# Patient Record
Sex: FEMALE | Race: WHITE | NOT HISPANIC OR LATINO | Employment: OTHER | URBAN - NONMETROPOLITAN AREA
[De-identification: names, ages, dates, MRNs, and addresses within clinical notes are randomized per-mention and may not be internally consistent; named-entity substitution may affect disease eponyms.]

---

## 2017-02-06 ENCOUNTER — LAB (OUTPATIENT)
Dept: FAMILY MEDICINE CLINIC | Facility: CLINIC | Age: 77
End: 2017-02-06

## 2017-02-06 DIAGNOSIS — E78.00 HYPERCHOLESTEREMIA: ICD-10-CM

## 2017-02-06 DIAGNOSIS — G47.33 OSA ON CPAP: ICD-10-CM

## 2017-02-06 DIAGNOSIS — Z99.89 OSA ON CPAP: ICD-10-CM

## 2017-02-06 DIAGNOSIS — E11.9 TYPE 2 DIABETES MELLITUS WITHOUT COMPLICATION (HCC): ICD-10-CM

## 2017-02-06 LAB
ALBUMIN SERPL-MCNC: 3.9 G/DL (ref 3.2–4.8)
ALBUMIN/GLOB SERPL: 1.3 G/DL (ref 1.5–2.5)
ALP SERPL-CCNC: 63 U/L (ref 25–100)
ALT SERPL W P-5'-P-CCNC: 26 U/L (ref 7–40)
ANION GAP SERPL CALCULATED.3IONS-SCNC: 5 MMOL/L (ref 3–11)
ARTICHOKE IGE QN: 182 MG/DL (ref 0–130)
AST SERPL-CCNC: 27 U/L (ref 0–33)
BILIRUB SERPL-MCNC: 0.9 MG/DL (ref 0.3–1.2)
BUN BLD-MCNC: 11 MG/DL (ref 9–23)
BUN/CREAT SERPL: 11 (ref 7–25)
CALCIUM SPEC-SCNC: 9.6 MG/DL (ref 8.7–10.4)
CHLORIDE SERPL-SCNC: 97 MMOL/L (ref 99–109)
CHOLEST SERPL-MCNC: 294 MG/DL (ref 0–200)
CO2 SERPL-SCNC: 32 MMOL/L (ref 20–31)
CREAT BLD-MCNC: 1 MG/DL (ref 0.6–1.3)
GFR SERPL CREATININE-BSD FRML MDRD: 54 ML/MIN/1.73
GLOBULIN UR ELPH-MCNC: 2.9 GM/DL
GLUCOSE BLD-MCNC: 142 MG/DL (ref 70–100)
HBA1C MFR BLD: 7.6 % (ref 4.8–5.6)
HDLC SERPL-MCNC: 70 MG/DL (ref 40–60)
POTASSIUM BLD-SCNC: 4.5 MMOL/L (ref 3.5–5.5)
PROT SERPL-MCNC: 6.8 G/DL (ref 5.7–8.2)
SODIUM BLD-SCNC: 134 MMOL/L (ref 132–146)
T4 FREE SERPL-MCNC: 1.19 NG/DL (ref 0.89–1.76)
TRIGL SERPL-MCNC: 144 MG/DL (ref 0–150)
TSH SERPL DL<=0.05 MIU/L-ACNC: 1.77 MIU/ML (ref 0.35–5.35)
VIT B12 BLD-MCNC: 801 PG/ML (ref 211–911)

## 2017-02-06 PROCEDURE — 84443 ASSAY THYROID STIM HORMONE: CPT | Performed by: INTERNAL MEDICINE

## 2017-02-06 PROCEDURE — 80053 COMPREHEN METABOLIC PANEL: CPT | Performed by: INTERNAL MEDICINE

## 2017-02-06 PROCEDURE — 84439 ASSAY OF FREE THYROXINE: CPT | Performed by: INTERNAL MEDICINE

## 2017-02-06 PROCEDURE — 80061 LIPID PANEL: CPT | Performed by: INTERNAL MEDICINE

## 2017-02-06 PROCEDURE — 82607 VITAMIN B-12: CPT | Performed by: INTERNAL MEDICINE

## 2017-02-06 PROCEDURE — 83036 HEMOGLOBIN GLYCOSYLATED A1C: CPT | Performed by: INTERNAL MEDICINE

## 2017-02-08 ENCOUNTER — TELEPHONE (OUTPATIENT)
Dept: OBSTETRICS AND GYNECOLOGY | Facility: CLINIC | Age: 77
End: 2017-02-08

## 2017-02-08 ENCOUNTER — OFFICE VISIT (OUTPATIENT)
Dept: FAMILY MEDICINE CLINIC | Facility: CLINIC | Age: 77
End: 2017-02-08

## 2017-02-08 VITALS
BODY MASS INDEX: 31.08 KG/M2 | WEIGHT: 198 LBS | RESPIRATION RATE: 16 BRPM | OXYGEN SATURATION: 97 % | TEMPERATURE: 97.5 F | DIASTOLIC BLOOD PRESSURE: 60 MMHG | SYSTOLIC BLOOD PRESSURE: 135 MMHG | HEART RATE: 77 BPM | HEIGHT: 67 IN

## 2017-02-08 DIAGNOSIS — T46.6X5A STATIN-INDUCED MYOSITIS: ICD-10-CM

## 2017-02-08 DIAGNOSIS — E11.9 TYPE 2 DIABETES MELLITUS WITHOUT COMPLICATION, WITHOUT LONG-TERM CURRENT USE OF INSULIN (HCC): Primary | ICD-10-CM

## 2017-02-08 DIAGNOSIS — Z99.89 OSA ON CPAP: ICD-10-CM

## 2017-02-08 DIAGNOSIS — G47.33 OSA ON CPAP: ICD-10-CM

## 2017-02-08 DIAGNOSIS — M60.9 STATIN-INDUCED MYOSITIS: ICD-10-CM

## 2017-02-08 DIAGNOSIS — I10 ESSENTIAL HYPERTENSION: ICD-10-CM

## 2017-02-08 PROCEDURE — G0439 PPPS, SUBSEQ VISIT: HCPCS | Performed by: INTERNAL MEDICINE

## 2017-02-08 RX ORDER — LOVASTATIN 10 MG/1
10 TABLET ORAL NIGHTLY
Qty: 30 TABLET | Refills: 11 | Status: SHIPPED | OUTPATIENT
Start: 2017-02-08 | End: 2017-07-13 | Stop reason: SDUPTHER

## 2017-02-08 NOTE — PROGRESS NOTES
"Subjective   Patient ID: Radha Whelan is a 76 y.o. female Pt is here for management of multiple medical problems.  History of Present Illness  Pt is here for management of multiple medical problems.    Pt with work up in Dr Howard office.  Pt waiting to hear results.       Pt ran out of DMT2 meds x 1 week. Had inj in hip and knee in Dr Salgado.  Feels well.           The following portions of the patient's history were reviewed and updated as appropriate: allergies, current medications, past family history, past medical history, past social history, past surgical history and problem list.  Review of Systems   Constitutional: Positive for fatigue.   All other systems reviewed and are negative.      Objective     Visit Vitals   • /60   • Pulse 77   • Temp 97.5 °F (36.4 °C) (Oral)   • Resp 16   • Ht 67\" (170.2 cm)   • Wt 198 lb (89.8 kg)   • SpO2 97%   • BMI 31.01 kg/m2     Physical Exam  General Appearance:    Alert, cooperative, no distress, appears stated age   Head:    Normocephalic, without obvious abnormality, atraumatic   Eyes:    PERRL, conjunctiva/corneas clear, EOM's intact           Ears:    Normal TM's and external ear canals, both ears   Nose:   Nares normal, septum midline, mucosa normal, no drainage    or sinus tenderness   Throat:   Lips, mucosa, and tongue normal; teeth and gums normal   Neck:   Supple, symmetrical, trachea midline, no adenopathy;        thyroid:  No enlargement/tenderness/nodules; no carotid    bruit or JVD   Back:     Symmetric, no curvature, ROM normal, no CVA tenderness   Lungs:     Clear to auscultation bilaterally, respirations unlabored   Chest wall:    No tenderness or deformity   Heart:    Regular rate and rhythm, S1 and S2 normal, no murmur, rub   or gallop   Abdomen:     Soft, non-tender, bowel sounds active all four quadrants,     no masses, no organomegaly           Extremities:   Extremities normal, atraumatic, no cyanosis or edema   Pulses:   2+ and symmetric all " extremities   Skin:   Skin color, texture, turgor normal, no rashes or lesions   Lymph nodes:   Cervical, supraclavicular, and axillary nodes normal   Neurologic:   CNII-XII intact. Normal strength, sensation and reflexes       throughout       Assessment/Plan     Pt will try Q3day dosing of lovastatin. Pt has been on all statins and intolerant.     Pt stated walking.   Pt will get more diet complainant.         Radha was seen today for follow-up.    Diagnoses and all orders for this visit:    Type 2 diabetes mellitus without complication, without long-term current use of insulin  -     Hemoglobin A1c; Future  -     Lipid Panel; Future  -     TSH; Future  -     T4, Free; Future  -     Comprehensive Metabolic Panel; Future  -     CBC & Differential; Future  -     Vitamin B12; Future  -     MicroAlbumin, Urine, Random; Future    ODELL on CPAP  -     Hemoglobin A1c; Future  -     Lipid Panel; Future  -     TSH; Future  -     T4, Free; Future  -     Comprehensive Metabolic Panel; Future  -     CBC & Differential; Future  -     Vitamin B12; Future  -     MicroAlbumin, Urine, Random; Future    Essential hypertension  -     Hemoglobin A1c; Future  -     Lipid Panel; Future  -     TSH; Future  -     T4, Free; Future  -     Comprehensive Metabolic Panel; Future  -     CBC & Differential; Future  -     Vitamin B12; Future  -     MicroAlbumin, Urine, Random; Future    Statin-induced myositis  -     Hemoglobin A1c; Future  -     Lipid Panel; Future  -     TSH; Future  -     T4, Free; Future  -     Comprehensive Metabolic Panel; Future  -     CBC & Differential; Future  -     Vitamin B12; Future  -     MicroAlbumin, Urine, Random; Future    Other orders  -     lovastatin (MEVACOR) 10 MG tablet; Take 1 tablet by mouth Every Night.    Return in about 3 months (around 5/8/2017).                   There are no Patient Instructions on file for this visit.

## 2017-02-08 NOTE — TELEPHONE ENCOUNTER
,      This pt has stopped by office 2x for results of her US and Bx that was done back in December

## 2017-02-08 NOTE — PROGRESS NOTES
QUICK REFERENCE INFORMATION:  The ABCs of the Annual Wellness Visit    Medicare Annual Wellness Visit    Subjective   History of Present Illness    Radha Whelan is a 76 y.o. female who presents for an Annual Wellness Visit. In addition, we addressed the following health issues:as per my last note on today.          PMH, PSH, SocHx, FamHx, Allergies, and Medications: Reviewed and updated.     Outpatient Medications Prior to Visit   Medication Sig Dispense Refill   • aspirin 81 MG tablet Take  by mouth daily.     • aspirin-acetaminophen-caffeine (EXCEDRIN MIGRAINE) 250-250-65 MG per tablet Take  by mouth.     • clopidogrel (PLAVIX) 75 MG tablet Take 1 tablet by mouth Daily. 90 tablet 3   • folic acid (FOLVITE) 1 MG tablet Take 1 tablet by mouth Daily. TAKE 2 TABLETS DAILY 90 tablet 3   • furosemide (LASIX) 20 MG tablet Take  by mouth 2 (two) times a day.     • glipiZIDE (GLIPIZIDE XL) 2.5 MG 24 hr tablet Take 1 tablet by mouth Daily. 90 tablet 3   • Glucosamine-Chondroit-Vit C-Mn (GLUCOSAMINE CHONDR 1500 COMPLX PO) Take  by mouth.     • lisinopril (PRINIVIL,ZESTRIL) 40 MG tablet Take 1 tablet by mouth Daily. 90 tablet 3   • niacin (NIASPAN) 1000 MG CR tablet Take 1 tablet by mouth Every Night. 90 tablet 3   • Omega-3 Fatty Acids (FISH OIL) 1200 MG capsule capsule Take  by mouth daily.     • pantoprazole (PROTONIX) 40 MG EC tablet Take 1 tablet by mouth Daily. 30 tablet 11   • amoxicillin-clavulanate (AUGMENTIN) 875-125 MG per tablet Take 1 tablet by mouth 2 (two) times a day. 20 tablet 0   • cetirizine (ZyrTEC) 10 MG tablet Take 1 tablet by mouth daily. 30 tablet 11   • Evolocumab 140 MG/ML solution prefilled syringe Inject 140 mg under the skin every 14 (fourteen) days. 2 syringe 11     No facility-administered medications prior to visit.        Patient Active Problem List   Diagnosis   • Type 2 diabetes mellitus without complication   • Temporary cerebral vascular dysfunction   • Fatigue   • Hypertension   •  Hypervitaminosis B6   • Hyponatremia   • ODELL on CPAP   • Peripheral neuropathy   • Restless legs syndrome   • Edema   • H/O hyperlipidemia       Health Habits:  Dental Exam. up to date  Eye Exam. up to date  No exam data present  Exercise: 7 times/week.  Current exercise activities include: walking    Health Risk Assessment:  The patient has completed a Health Risk Assessment. This has been reviewed with them and has been scanned into the patient's chart.    Current Medical Providers:  Patient Care Team:  Farhan Schaefer MD as PCP - General    The [facility] providers who are involved in the care of this patient are listed above. Additional providers and suppliers are listed below:  none    Recent Hospitalizations:  No recent hospitalization(s)..    Recent Lab Results:  CMP:  Lab Results   Component Value Date    BUN 11 02/06/2017    CREATININE 1.00 02/06/2017    EGFRIFNONA 54 (L) 02/06/2017    BCR 11.0 02/06/2017     02/06/2017    K 4.5 02/06/2017    CO2 32.0 (H) 02/06/2017    CALCIUM 9.6 02/06/2017    ALBUMIN 3.90 02/06/2017    LABIL2 1.3 (L) 02/06/2017    BILITOT 0.9 02/06/2017    ALKPHOS 63 02/06/2017    AST 27 02/06/2017    ALT 26 02/06/2017       LIPID PANEL:  Lab Results   Component Value Date    TRIG 144 02/06/2017    HDL 70 (H) 02/06/2017       HbA1c:  Lab Results   Component Value Date    HGBA1C 7.60 (H) 02/06/2017       URINE MICROALBUMIN:  No results found for: MICROALBUR, POCMALB    PSA:  No results found for: PSA    Age-appropriate Screening Schedule:  Refer to the list below for future screening recommendations based on patient's age, sex and/or medical conditions. Orders for these recommended tests are listed in the plan section. The patient has been provided with a written plan.    Health Maintenance   Topic Date Due   • PNEUMOCOCCAL VACCINES (65+ LOW/MEDIUM RISK) (2 of 2 - PPSV23) 02/08/2017   • HEMOGLOBIN A1C  08/06/2017   • DIABETIC FOOT EXAM  08/08/2017   • DIABETIC EYE EXAM   "01/08/2018   • LIPID PANEL  02/06/2018   • URINE MICROALBUMIN  02/08/2018   • MAMMOGRAM  05/04/2018   • TDAP/TD VACCINES (2 - Td) 02/08/2024   • INFLUENZA VACCINE  Completed   • ZOSTER VACCINE  Addressed       Depression Screen:   No flowsheet data found.      Functional and Cognitive Screening:  No flowsheet data found.    Does the patient have evidence of cognitive impairment? No    Advanced Care Planning:  has NO advanced directive - information provided to the patient today    Identification of Risk Factors:  Risk factors include: weight  and unhealthy diet.    Review of Systems    Compared to one year ago, the patient feels his physical health is the same.  Compared to one year ago, the patient feels his mental health is the same.    Objective     Physical Exam    Vitals:    02/08/17 0957 02/08/17 1027   BP: 154/94 135/60   BP Location: Right arm    Patient Position: Sitting    Cuff Size: Adult    Pulse: 77    Resp: 16    Temp: 97.5 °F (36.4 °C)    TempSrc: Oral    SpO2: 97%    Weight: 198 lb (89.8 kg)    Height: 67\" (170.2 cm)        Body mass index is 31.01 kg/(m^2).  Discussed the patient's BMI with her. The BMI is above average; BMI management plan is completed.    Assessment/Plan   Patient Self-Management and Personalized Health Advice  The patient has been provided with information about: diet and exercise and preventive services including:   · Advanced directives: has NO advanced directive - not interested in additional information.    Visit Diagnoses:    ICD-10-CM ICD-9-CM   1. Type 2 diabetes mellitus without complication, without long-term current use of insulin E11.9 250.00   2. ODELL on CPAP G47.33 327.23   3. Essential hypertension I10 401.9   4. Statin-induced myositis M60.9 729.1    T65.6X1A E980.4       Orders Placed This Encounter   Procedures   • Hemoglobin A1c     Standing Status:   Future     Standing Expiration Date:   8/10/2018   • Lipid Panel     Standing Status:   Future     Standing " Expiration Date:   2/8/2018   • TSH     Standing Status:   Future     Standing Expiration Date:   8/27/2018   • T4, Free     Standing Status:   Future     Standing Expiration Date:   8/27/2018   • Comprehensive Metabolic Panel     Standing Status:   Future     Standing Expiration Date:   8/27/2018   • Vitamin B12     Standing Status:   Future     Standing Expiration Date:   8/10/2018   • MicroAlbumin, Urine, Random     Standing Status:   Future     Standing Expiration Date:   8/27/2018       Outpatient Encounter Prescriptions as of 2/8/2017   Medication Sig Dispense Refill   • aspirin 81 MG tablet Take  by mouth daily.     • aspirin-acetaminophen-caffeine (EXCEDRIN MIGRAINE) 250-250-65 MG per tablet Take  by mouth.     • clopidogrel (PLAVIX) 75 MG tablet Take 1 tablet by mouth Daily. 90 tablet 3   • folic acid (FOLVITE) 1 MG tablet Take 1 tablet by mouth Daily. TAKE 2 TABLETS DAILY 90 tablet 3   • furosemide (LASIX) 20 MG tablet Take  by mouth 2 (two) times a day.     • glipiZIDE (GLIPIZIDE XL) 2.5 MG 24 hr tablet Take 1 tablet by mouth Daily. 90 tablet 3   • Glucosamine-Chondroit-Vit C-Mn (GLUCOSAMINE CHONDR 1500 COMPLX PO) Take  by mouth.     • lisinopril (PRINIVIL,ZESTRIL) 40 MG tablet Take 1 tablet by mouth Daily. 90 tablet 3   • niacin (NIASPAN) 1000 MG CR tablet Take 1 tablet by mouth Every Night. 90 tablet 3   • Omega-3 Fatty Acids (FISH OIL) 1200 MG capsule capsule Take  by mouth daily.     • pantoprazole (PROTONIX) 40 MG EC tablet Take 1 tablet by mouth Daily. 30 tablet 11   • lovastatin (MEVACOR) 10 MG tablet Take 1 tablet by mouth Every Night. 30 tablet 11   • [DISCONTINUED] amoxicillin-clavulanate (AUGMENTIN) 875-125 MG per tablet Take 1 tablet by mouth 2 (two) times a day. 20 tablet 0   • [DISCONTINUED] cetirizine (ZyrTEC) 10 MG tablet Take 1 tablet by mouth daily. 30 tablet 11   • [DISCONTINUED] Evolocumab 140 MG/ML solution prefilled syringe Inject 140 mg under the skin every 14 (fourteen) days. 2  syringe 11     No facility-administered encounter medications on file as of 2/8/2017.        Reviewed use of high risk medication in the elderly: yes  Reviewed for potential of harmful drug interactions in the elderly: yes    Follow Up:  Return in about 3 months (around 5/8/2017).     An After Visit Summary and PPPS with all of these plans were given to the patient.            Radha was seen today for follow-up.    Diagnoses and all orders for this visit:    Type 2 diabetes mellitus without complication, without long-term current use of insulin  -     Hemoglobin A1c; Future  -     Lipid Panel; Future  -     TSH; Future  -     T4, Free; Future  -     Comprehensive Metabolic Panel; Future  -     CBC & Differential; Future  -     Vitamin B12; Future  -     MicroAlbumin, Urine, Random; Future    ODELL on CPAP  -     Hemoglobin A1c; Future  -     Lipid Panel; Future  -     TSH; Future  -     T4, Free; Future  -     Comprehensive Metabolic Panel; Future  -     CBC & Differential; Future  -     Vitamin B12; Future  -     MicroAlbumin, Urine, Random; Future    Essential hypertension  -     Hemoglobin A1c; Future  -     Lipid Panel; Future  -     TSH; Future  -     T4, Free; Future  -     Comprehensive Metabolic Panel; Future  -     CBC & Differential; Future  -     Vitamin B12; Future  -     MicroAlbumin, Urine, Random; Future    Statin-induced myositis  -     Hemoglobin A1c; Future  -     Lipid Panel; Future  -     TSH; Future  -     T4, Free; Future  -     Comprehensive Metabolic Panel; Future  -     CBC & Differential; Future  -     Vitamin B12; Future  -     MicroAlbumin, Urine, Random; Future    Other orders  -     lovastatin (MEVACOR) 10 MG tablet; Take 1 tablet by mouth Every Night.

## 2017-02-08 NOTE — TELEPHONE ENCOUNTER
----- Message from Sandra A Locker sent at 2/8/2017 10:53 AM EST -----  Contact: PATIENT  Patient would like a callback with ultrasound and biopsy results when possible. Patient sees Dr. Howard.

## 2017-02-21 RX ORDER — FUROSEMIDE 20 MG/1
TABLET ORAL
Qty: 180 TABLET | Refills: 3 | Status: SHIPPED | OUTPATIENT
Start: 2017-02-21 | End: 2018-02-11 | Stop reason: SDUPTHER

## 2017-03-06 RX ORDER — GLIPIZIDE 2.5 MG/1
TABLET, EXTENDED RELEASE ORAL
Qty: 30 TABLET | Refills: 0 | Status: ON HOLD | OUTPATIENT
Start: 2017-03-06 | End: 2019-12-19 | Stop reason: HOSPADM

## 2017-05-03 ENCOUNTER — OFFICE VISIT (OUTPATIENT)
Dept: FAMILY MEDICINE CLINIC | Facility: CLINIC | Age: 77
End: 2017-05-03

## 2017-05-03 ENCOUNTER — LAB (OUTPATIENT)
Dept: FAMILY MEDICINE CLINIC | Facility: CLINIC | Age: 77
End: 2017-05-03

## 2017-05-03 VITALS
SYSTOLIC BLOOD PRESSURE: 151 MMHG | RESPIRATION RATE: 16 BRPM | OXYGEN SATURATION: 100 % | WEIGHT: 195 LBS | TEMPERATURE: 98 F | HEIGHT: 67 IN | DIASTOLIC BLOOD PRESSURE: 67 MMHG | HEART RATE: 60 BPM | BODY MASS INDEX: 30.61 KG/M2

## 2017-05-03 DIAGNOSIS — G47.33 OSA ON CPAP: ICD-10-CM

## 2017-05-03 DIAGNOSIS — I10 ESSENTIAL HYPERTENSION: ICD-10-CM

## 2017-05-03 DIAGNOSIS — G89.29 CHRONIC PAIN OF LEFT KNEE: Primary | ICD-10-CM

## 2017-05-03 DIAGNOSIS — T46.6X5A STATIN-INDUCED MYOSITIS: ICD-10-CM

## 2017-05-03 DIAGNOSIS — M25.562 CHRONIC PAIN OF LEFT KNEE: Primary | ICD-10-CM

## 2017-05-03 DIAGNOSIS — E11.9 TYPE 2 DIABETES MELLITUS WITHOUT COMPLICATION, WITHOUT LONG-TERM CURRENT USE OF INSULIN (HCC): ICD-10-CM

## 2017-05-03 DIAGNOSIS — M60.9 STATIN-INDUCED MYOSITIS: ICD-10-CM

## 2017-05-03 DIAGNOSIS — Z99.89 OSA ON CPAP: ICD-10-CM

## 2017-05-03 PROCEDURE — 20610 DRAIN/INJ JOINT/BURSA W/O US: CPT | Performed by: INTERNAL MEDICINE

## 2017-05-03 RX ORDER — TRIAMCINOLONE ACETONIDE 40 MG/ML
40 INJECTION, SUSPENSION INTRA-ARTICULAR; INTRAMUSCULAR ONCE
Status: COMPLETED | OUTPATIENT
Start: 2017-05-03 | End: 2017-05-03

## 2017-05-03 RX ADMIN — TRIAMCINOLONE ACETONIDE 40 MG: 40 INJECTION, SUSPENSION INTRA-ARTICULAR; INTRAMUSCULAR at 11:47

## 2017-05-04 LAB
ALBUMIN SERPL-MCNC: 4.2 G/DL (ref 3.5–5)
ALBUMIN/GLOB SERPL: 1.3 G/DL (ref 1–2)
ALP SERPL-CCNC: 65 U/L (ref 38–126)
ALT SERPL-CCNC: 35 U/L (ref 13–69)
AST SERPL-CCNC: 29 U/L (ref 15–46)
BASOPHILS # BLD AUTO: 0.02 10*3/MM3 (ref 0–0.2)
BASOPHILS NFR BLD AUTO: 0.5 % (ref 0–2.5)
BILIRUB SERPL-MCNC: 1.3 MG/DL (ref 0.2–1.3)
BUN SERPL-MCNC: 19 MG/DL (ref 7–20)
BUN/CREAT SERPL: 19 (ref 7.1–23.5)
CALCIUM SERPL-MCNC: 9.5 MG/DL (ref 8.4–10.2)
CHLORIDE SERPL-SCNC: 101 MMOL/L (ref 98–107)
CHOLEST SERPL-MCNC: 247 MG/DL (ref 0–199)
CO2 SERPL-SCNC: 29 MMOL/L (ref 26–30)
CREAT SERPL-MCNC: 1 MG/DL (ref 0.6–1.3)
EOSINOPHIL # BLD AUTO: 0.15 10*3/MM3 (ref 0–0.7)
EOSINOPHIL NFR BLD AUTO: 3.8 % (ref 0–7)
ERYTHROCYTE [DISTWIDTH] IN BLOOD BY AUTOMATED COUNT: 14 % (ref 11.5–14.5)
GLOBULIN SER CALC-MCNC: 3.2 GM/DL
GLUCOSE SERPL-MCNC: 112 MG/DL (ref 74–98)
HBA1C MFR BLD: 6.7 %
HCT VFR BLD AUTO: 40 % (ref 37–47)
HDLC SERPL-MCNC: 86 MG/DL (ref 40–60)
HGB BLD-MCNC: 13.2 G/DL (ref 12–16)
IMM GRANULOCYTES # BLD: 0.03 10*3/MM3 (ref 0–0.06)
IMM GRANULOCYTES NFR BLD: 0.8 % (ref 0–0.6)
LDLC SERPL CALC-MCNC: 145 MG/DL (ref 0–99)
LYMPHOCYTES # BLD AUTO: 1.88 10*3/MM3 (ref 0.6–3.4)
LYMPHOCYTES NFR BLD AUTO: 47.6 % (ref 10–50)
MCH RBC QN AUTO: 31.6 PG (ref 27–31)
MCHC RBC AUTO-ENTMCNC: 33 G/DL (ref 30–37)
MCV RBC AUTO: 95.7 FL (ref 81–99)
MICROALBUMIN UR-MCNC: <3 UG/ML
MONOCYTES # BLD AUTO: 0.34 10*3/MM3 (ref 0–0.9)
MONOCYTES NFR BLD AUTO: 8.6 % (ref 0–12)
NEUTROPHILS # BLD AUTO: 1.53 10*3/MM3 (ref 2–6.9)
NEUTROPHILS NFR BLD AUTO: 38.7 % (ref 37–80)
NRBC BLD AUTO-RTO: 0 /100 WBC (ref 0–0)
PLATELET # BLD AUTO: 158 10*3/MM3 (ref 130–400)
POTASSIUM SERPL-SCNC: 4.5 MMOL/L (ref 3.5–5.1)
PROT SERPL-MCNC: 7.4 G/DL (ref 6.3–8.2)
RBC # BLD AUTO: 4.18 10*6/MM3 (ref 4.2–5.4)
SODIUM SERPL-SCNC: 139 MMOL/L (ref 137–145)
T4 FREE SERPL-MCNC: 1.08 NG/DL (ref 0.78–2.19)
TRIGL SERPL-MCNC: 80 MG/DL
TSH SERPL DL<=0.005 MIU/L-ACNC: 1.49 MIU/ML (ref 0.47–4.68)
VIT B12 SERPL-MCNC: 470 PG/ML (ref 239–931)
VLDLC SERPL CALC-MCNC: 16 MG/DL
WBC # BLD AUTO: 3.95 10*3/MM3 (ref 4.8–10.8)

## 2017-05-09 ENCOUNTER — OFFICE VISIT (OUTPATIENT)
Dept: FAMILY MEDICINE CLINIC | Facility: CLINIC | Age: 77
End: 2017-05-09

## 2017-05-09 VITALS
HEIGHT: 67 IN | DIASTOLIC BLOOD PRESSURE: 73 MMHG | BODY MASS INDEX: 29.98 KG/M2 | OXYGEN SATURATION: 98 % | HEART RATE: 67 BPM | TEMPERATURE: 97.4 F | SYSTOLIC BLOOD PRESSURE: 139 MMHG | WEIGHT: 191 LBS

## 2017-05-09 DIAGNOSIS — M60.9 STATIN-INDUCED MYOSITIS: ICD-10-CM

## 2017-05-09 DIAGNOSIS — E78.00 HYPERCHOLESTEREMIA: ICD-10-CM

## 2017-05-09 DIAGNOSIS — T46.6X5A STATIN-INDUCED MYOSITIS: ICD-10-CM

## 2017-05-09 DIAGNOSIS — E11.9 TYPE 2 DIABETES MELLITUS WITHOUT COMPLICATION, WITHOUT LONG-TERM CURRENT USE OF INSULIN (HCC): Primary | ICD-10-CM

## 2017-05-09 DIAGNOSIS — I10 ESSENTIAL HYPERTENSION: ICD-10-CM

## 2017-05-09 PROCEDURE — 99213 OFFICE O/P EST LOW 20 MIN: CPT | Performed by: INTERNAL MEDICINE

## 2017-07-13 ENCOUNTER — TELEPHONE (OUTPATIENT)
Dept: FAMILY MEDICINE CLINIC | Facility: CLINIC | Age: 77
End: 2017-07-13

## 2017-07-13 ENCOUNTER — PATIENT MESSAGE (OUTPATIENT)
Dept: FAMILY MEDICINE CLINIC | Facility: CLINIC | Age: 77
End: 2017-07-13

## 2017-07-13 RX ORDER — LOVASTATIN 10 MG/1
10 TABLET ORAL NIGHTLY
Qty: 90 TABLET | Refills: 3 | Status: SHIPPED | OUTPATIENT
Start: 2017-07-13 | End: 2018-06-04 | Stop reason: SDUPTHER

## 2017-07-13 NOTE — TELEPHONE ENCOUNTER
----- Message from Radha Tip sent at 7/13/2017 11:06 AM EDT -----  Regarding: Visit Follow-Up Question  Contact: [phone]  I need a 90 day prescription for Lovastatin  10mg. from [facility] mail order. I was trying it for 30 days. I am doing ok now with it.         Thanks.

## 2017-07-16 ENCOUNTER — PATIENT MESSAGE (OUTPATIENT)
Dept: FAMILY MEDICINE CLINIC | Facility: CLINIC | Age: 77
End: 2017-07-16

## 2017-08-02 ENCOUNTER — TRANSCRIBE ORDERS (OUTPATIENT)
Dept: ADMINISTRATIVE | Facility: HOSPITAL | Age: 77
End: 2017-08-02

## 2017-08-02 DIAGNOSIS — Z13.9 SCREENING: Primary | ICD-10-CM

## 2017-08-25 ENCOUNTER — HOSPITAL ENCOUNTER (OUTPATIENT)
Dept: MAMMOGRAPHY | Facility: HOSPITAL | Age: 77
Discharge: HOME | End: 2017-08-25
Attending: INTERNAL MEDICINE | Admitting: INTERNAL MEDICINE

## 2017-08-25 DIAGNOSIS — Z13.9 SCREENING: ICD-10-CM

## 2017-08-25 PROCEDURE — 77063 BREAST TOMOSYNTHESIS BI: CPT

## 2017-08-25 PROCEDURE — G0202 SCR MAMMO BI INCL CAD: HCPCS

## 2017-09-20 ENCOUNTER — CLINICAL SUPPORT (OUTPATIENT)
Dept: FAMILY MEDICINE CLINIC | Facility: CLINIC | Age: 77
End: 2017-09-20

## 2017-09-20 DIAGNOSIS — Z23 NEED FOR INFLUENZA VACCINATION: Primary | ICD-10-CM

## 2017-09-20 PROCEDURE — G0008 ADMIN INFLUENZA VIRUS VAC: HCPCS | Performed by: INTERNAL MEDICINE

## 2017-09-20 PROCEDURE — 90662 IIV NO PRSV INCREASED AG IM: CPT | Performed by: INTERNAL MEDICINE

## 2017-09-20 RX ORDER — GLIPIZIDE 2.5 MG/1
TABLET, EXTENDED RELEASE ORAL
Qty: 90 TABLET | Refills: 3 | Status: SHIPPED | OUTPATIENT
Start: 2017-09-20 | End: 2018-11-16

## 2017-09-22 RX ORDER — PANTOPRAZOLE SODIUM 40 MG/1
40 TABLET, DELAYED RELEASE ORAL DAILY
Qty: 30 TABLET | Refills: 11 | Status: SHIPPED | OUTPATIENT
Start: 2017-09-22 | End: 2018-09-04 | Stop reason: SDUPTHER

## 2017-10-25 DIAGNOSIS — I10 ESSENTIAL HYPERTENSION: ICD-10-CM

## 2017-10-25 RX ORDER — LISINOPRIL 40 MG/1
TABLET ORAL
Qty: 90 TABLET | Refills: 3 | Status: SHIPPED | OUTPATIENT
Start: 2017-10-25 | End: 2018-10-09 | Stop reason: SDUPTHER

## 2017-11-09 LAB
ALBUMIN SERPL-MCNC: 3.6 G/DL (ref 3.5–5)
ALBUMIN/GLOB SERPL: 1.3 G/DL (ref 1–2)
ALP SERPL-CCNC: 60 U/L (ref 38–126)
ALT SERPL-CCNC: 33 U/L (ref 13–69)
AST SERPL-CCNC: 20 U/L (ref 15–46)
BASOPHILS # BLD AUTO: 0.01 10*3/MM3 (ref 0–0.2)
BASOPHILS NFR BLD AUTO: 0.2 % (ref 0–2.5)
BILIRUB SERPL-MCNC: 0.9 MG/DL (ref 0.2–1.3)
BUN SERPL-MCNC: 39 MG/DL (ref 7–20)
BUN/CREAT SERPL: 39 (ref 7.1–23.5)
CALCIUM SERPL-MCNC: 8.9 MG/DL (ref 8.4–10.2)
CHLORIDE SERPL-SCNC: 98 MMOL/L (ref 98–107)
CO2 SERPL-SCNC: 33 MMOL/L (ref 26–30)
CREAT SERPL-MCNC: 1 MG/DL (ref 0.6–1.3)
EOSINOPHIL # BLD AUTO: 0.07 10*3/MM3 (ref 0–0.7)
EOSINOPHIL NFR BLD AUTO: 1.3 % (ref 0–7)
ERYTHROCYTE [DISTWIDTH] IN BLOOD BY AUTOMATED COUNT: 14.6 % (ref 11.5–14.5)
GFR SERPLBLD CREATININE-BSD FMLA CKD-EPI: 54 ML/MIN/1.73
GFR SERPLBLD CREATININE-BSD FMLA CKD-EPI: 65 ML/MIN/1.73
GLOBULIN SER CALC-MCNC: 2.8 GM/DL
GLUCOSE SERPL-MCNC: 154 MG/DL (ref 74–98)
HBA1C MFR BLD: 6.9 %
HCT VFR BLD AUTO: 38.9 % (ref 37–47)
HGB BLD-MCNC: 12.5 G/DL (ref 12–16)
IMM GRANULOCYTES # BLD: 0.04 10*3/MM3 (ref 0–0.06)
IMM GRANULOCYTES NFR BLD: 0.7 % (ref 0–0.6)
LYMPHOCYTES # BLD AUTO: 1.9 10*3/MM3 (ref 0.6–3.4)
LYMPHOCYTES NFR BLD AUTO: 34.2 % (ref 10–50)
MCH RBC QN AUTO: 30.4 PG (ref 27–31)
MCHC RBC AUTO-ENTMCNC: 32.1 G/DL (ref 30–37)
MCV RBC AUTO: 94.6 FL (ref 81–99)
MONOCYTES # BLD AUTO: 0.43 10*3/MM3 (ref 0–0.9)
MONOCYTES NFR BLD AUTO: 7.7 % (ref 0–12)
NEUTROPHILS # BLD AUTO: 3.11 10*3/MM3 (ref 2–6.9)
NEUTROPHILS NFR BLD AUTO: 55.9 % (ref 37–80)
NRBC BLD AUTO-RTO: 0 /100 WBC (ref 0–0)
PLATELET # BLD AUTO: 164 10*3/MM3 (ref 130–400)
POTASSIUM SERPL-SCNC: 4.5 MMOL/L (ref 3.5–5.1)
PROT SERPL-MCNC: 6.4 G/DL (ref 6.3–8.2)
RBC # BLD AUTO: 4.11 10*6/MM3 (ref 4.2–5.4)
SODIUM SERPL-SCNC: 140 MMOL/L (ref 137–145)
T4 FREE SERPL-MCNC: 1.08 NG/DL (ref 0.78–2.19)
TSH SERPL DL<=0.005 MIU/L-ACNC: 1.01 MIU/ML (ref 0.47–4.68)
VIT B12 SERPL-MCNC: 388 PG/ML (ref 239–931)
WBC # BLD AUTO: 5.56 10*3/MM3 (ref 4.8–10.8)

## 2017-11-14 ENCOUNTER — OFFICE VISIT (OUTPATIENT)
Dept: FAMILY MEDICINE CLINIC | Facility: CLINIC | Age: 77
End: 2017-11-14

## 2017-11-14 VITALS
HEART RATE: 58 BPM | BODY MASS INDEX: 28.88 KG/M2 | WEIGHT: 184 LBS | TEMPERATURE: 97.4 F | DIASTOLIC BLOOD PRESSURE: 68 MMHG | RESPIRATION RATE: 16 BRPM | OXYGEN SATURATION: 100 % | HEIGHT: 67 IN | SYSTOLIC BLOOD PRESSURE: 127 MMHG

## 2017-11-14 DIAGNOSIS — E11.9 TYPE 2 DIABETES MELLITUS WITHOUT COMPLICATION, WITHOUT LONG-TERM CURRENT USE OF INSULIN (HCC): ICD-10-CM

## 2017-11-14 DIAGNOSIS — Z99.89 OSA ON CPAP: ICD-10-CM

## 2017-11-14 DIAGNOSIS — I10 ESSENTIAL HYPERTENSION: Primary | ICD-10-CM

## 2017-11-14 DIAGNOSIS — I67.82 TEMPORARY CEREBRAL VASCULAR DYSFUNCTION: ICD-10-CM

## 2017-11-14 DIAGNOSIS — G47.33 OSA ON CPAP: ICD-10-CM

## 2017-11-14 PROCEDURE — 99214 OFFICE O/P EST MOD 30 MIN: CPT | Performed by: INTERNAL MEDICINE

## 2017-11-14 RX ORDER — CLOPIDOGREL BISULFATE 75 MG/1
75 TABLET ORAL DAILY
Qty: 90 TABLET | Refills: 3 | Status: SHIPPED | OUTPATIENT
Start: 2017-11-14 | End: 2018-02-22 | Stop reason: SDUPTHER

## 2017-11-14 NOTE — PROGRESS NOTES
Subjective     Patient ID: Radha Whelan is a 77 y.o. female. Patient is here for management of multiple medical problems.     Chief Complaint   Patient presents with   • Diabetes Mellitus     follow-up   • URI     runny nose and dry cough, daughter diagnosed with Flu      Diabetes Mellitus   This is a chronic problem. The current episode started today. The problem occurs constantly. The problem has been gradually improving. Associated symptoms include swollen glands. Pertinent negatives include no abdominal pain, anorexia, arthralgias, change in bowel habit or chills. Nothing aggravates the symptoms. The treatment provided moderate relief.   URI    This is a new problem. The current episode started in the past 7 days. There has been no fever. Associated symptoms include rhinorrhea and swollen glands. Pertinent negatives include no abdominal pain. The treatment provided no relief.    + flu exposure. Pt had flu vac. Exposed on sat.  Pt thinks she was expose on the plane.       Pt just had hip inj. bs getting better after that.     Some cramps on cholest meds.     Skin ca on nose followed by derm.               The following portions of the patient's history were reviewed and updated as appropriate: allergies, current medications, past family history, past medical history, past social history, past surgical history and problem list.    Review of Systems   Constitutional: Negative for chills.   HENT: Positive for rhinorrhea.    Gastrointestinal: Negative for abdominal pain, anorexia and change in bowel habit.   Musculoskeletal: Negative for arthralgias.       Current Outpatient Prescriptions:   •  aspirin 81 MG tablet, Take  by mouth daily., Disp: , Rfl:   •  aspirin-acetaminophen-caffeine (EXCEDRIN MIGRAINE) 250-250-65 MG per tablet, Take  by mouth., Disp: , Rfl:   •  folic acid (FOLVITE) 1 MG tablet, Take 1 tablet by mouth Daily. TAKE 2 TABLETS DAILY, Disp: 90 tablet, Rfl: 3  •  furosemide (LASIX) 20 MG tablet, TAKE 1  "TABLET TWICE A DAY, Disp: 180 tablet, Rfl: 3  •  GLIPIZIDE XL 2.5 MG 24 hr tablet, TAKE 1 TABLET DAILY IN THE MORNING BEFORE BREAKFAST, Disp: 90 tablet, Rfl: 3  •  Glucosamine-Chondroit-Vit C-Mn (GLUCOSAMINE CHONDR 1500 COMPLX PO), Take  by mouth., Disp: , Rfl:   •  lisinopril (PRINIVIL,ZESTRIL) 40 MG tablet, TAKE 1 TABLET DAILY, Disp: 90 tablet, Rfl: 3  •  lovastatin (MEVACOR) 10 MG tablet, Take 1 tablet by mouth Every Night., Disp: 90 tablet, Rfl: 3  •  niacin (NIASPAN) 1000 MG CR tablet, Take 1 tablet by mouth Every Night., Disp: 90 tablet, Rfl: 3  •  pantoprazole (PROTONIX) 40 MG EC tablet, Take 1 tablet by mouth Daily., Disp: 30 tablet, Rfl: 11  •  clopidogrel (PLAVIX) 75 MG tablet, Take 1 tablet by mouth Daily., Disp: 90 tablet, Rfl: 3  •  Omega-3 Fatty Acids (FISH OIL) 1200 MG capsule capsule, Take  by mouth daily., Disp: , Rfl:     Objective      Blood pressure 127/68, pulse 58, temperature 97.4 °F (36.3 °C), temperature source Temporal Artery , resp. rate 16, height 67\" (170.2 cm), weight 184 lb (83.5 kg), SpO2 100 %.    Physical Exam    Radha had a diabetic foot exam performed today.   During the foot exam she had a monofilament test performed.    Neurological Sensory Findings - Unaltered hot/cold right ankle/foot discrimination and unaltered hot/cold left ankle/foot discrimination. Unaltered sharp/dull right ankle/foot discrimination and unaltered sharp/dull left ankle/foot discrimination. No right ankle/foot altered proprioception and no left ankle/foot altered proprioception    Vascular Status -  Her exam exhibits right foot vasculature normal. Her exam exhibits left foot vasculature normal. Her exam exhibits no left foot edema.   Skin Integrity  -  Her right foot skin is intact.     Radha 's left foot skin is intact. .       General Appearance:    Alert, cooperative, no distress, appears stated age   Head:    Normocephalic, without obvious abnormality, atraumatic   Eyes:    PERRL, conjunctiva/corneas " clear, EOM's intact   Ears:    Normal TM's and external ear canals, both ears   Nose:   Nares normal, septum midline, mucosa normal, no drainage   or sinus tenderness   Throat:   Lips, mucosa, and tongue normal; teeth and gums normal   Neck:   Supple, symmetrical, trachea midline, no adenopathy;        thyroid:  No enlargement/tenderness/nodules; no carotid    bruit or JVD   Back:     Symmetric, no curvature, ROM normal, no CVA tenderness   Lungs:     Clear to auscultation bilaterally, respirations unlabored   Chest wall:    No tenderness or deformity   Heart:    Regular rate and rhythm, S1 and S2 normal, no murmur,        rub or gallop   Abdomen:     Soft, non-tender, bowel sounds active all four quadrants,     no masses, no organomegaly   Extremities:   Extremities normal, atraumatic, no cyanosis or edema   Pulses:   2+ and symmetric all extremities   Skin:   Skin color, texture, turgor normal, no rashes or lesions   Lymph nodes:   Cervical, supraclavicular, and axillary nodes normal   Neurologic:   CNII-XII intact. Normal strength, sensation and reflexes       throughout      Results for orders placed or performed in visit on 05/09/17   TSH   Result Value Ref Range    TSH 1.010 0.470 - 4.680 mIU/mL   T4, Free   Result Value Ref Range    Free T4 1.08 0.78 - 2.19 ng/dL   Vitamin B12   Result Value Ref Range    Vitamin B-12 388 239 - 931 pg/mL   Comprehensive Metabolic Panel   Result Value Ref Range    Glucose 154 (H) 74 - 98 mg/dL    BUN 39 (H) 7 - 20 mg/dL    Creatinine 1.00 0.60 - 1.30 mg/dL    eGFR Non African Am 54 (L) >60 mL/min/1.73    eGFR African Am 65 >60 mL/min/1.73    BUN/Creatinine Ratio 39.0 (H) 7.1 - 23.5    Sodium 140 137 - 145 mmol/L    Potassium 4.5 3.5 - 5.1 mmol/L    Chloride 98 98 - 107 mmol/L    Total CO2 33.0 (H) 26.0 - 30.0 mmol/L    Calcium 8.9 8.4 - 10.2 mg/dL    Total Protein 6.4 6.3 - 8.2 g/dL    Albumin 3.60 3.50 - 5.00 g/dL    Globulin 2.8 gm/dL    A/G Ratio 1.3 1.0 - 2.0 g/dL    Total  Bilirubin 0.9 0.2 - 1.3 mg/dL    Alkaline Phosphatase 60 38 - 126 U/L    AST (SGOT) 20 15 - 46 U/L    ALT (SGPT) 33 13 - 69 U/L   Hemoglobin A1c   Result Value Ref Range    Hemoglobin A1C 6.90 %   CBC & Differential   Result Value Ref Range    WBC 5.56 4.80 - 10.80 10*3/mm3    RBC 4.11 (L) 4.20 - 5.40 10*6/mm3    Hemoglobin 12.5 12.0 - 16.0 g/dL    Hematocrit 38.9 37.0 - 47.0 %    MCV 94.6 81.0 - 99.0 fL    MCH 30.4 27.0 - 31.0 pg    MCHC 32.1 30.0 - 37.0 g/dL    RDW 14.6 (H) 11.5 - 14.5 %    Platelets 164 130 - 400 10*3/mm3    Neutrophil Rel % 55.9 37.0 - 80.0 %    Lymphocyte Rel % 34.2 10.0 - 50.0 %    Monocyte Rel % 7.7 0.0 - 12.0 %    Eosinophil Rel % 1.3 0.0 - 7.0 %    Basophil Rel % 0.2 0.0 - 2.5 %    Neutrophils Absolute 3.11 2.00 - 6.90 10*3/mm3    Lymphocytes Absolute 1.90 0.60 - 3.40 10*3/mm3    Monocytes Absolute 0.43 0.00 - 0.90 10*3/mm3    Eosinophils Absolute 0.07 0.00 - 0.70 10*3/mm3    Basophils Absolute 0.01 0.00 - 0.20 10*3/mm3    Immature Granulocyte Rel % 0.7 (H) 0.0 - 0.6 %    Immature Grans Absolute 0.04 0.00 - 0.06 10*3/mm3    nRBC 0.0 0.0 - 0.0 /100 WBC         Assessment/Plan       Radha was seen today for diabetes mellitus and uri.    Diagnoses and all orders for this visit:    Essential hypertension  -     Vitamin B12  -     TSH  -     T4, Free  -     Comprehensive Metabolic Panel  -     CBC & Differential  -     Lipid Panel  -     Hemoglobin A1c  -     MicroAlbumin, Urine, Random - Urine, Clean Catch    Type 2 diabetes mellitus without complication, without long-term current use of insulin  -     Vitamin B12  -     TSH  -     T4, Free  -     Comprehensive Metabolic Panel  -     CBC & Differential  -     Lipid Panel  -     Hemoglobin A1c  -     MicroAlbumin, Urine, Random - Urine, Clean Catch    ODELL on CPAP  -     Vitamin B12  -     TSH  -     T4, Free  -     Comprehensive Metabolic Panel  -     CBC & Differential  -     Lipid Panel  -     Hemoglobin A1c  -     MicroAlbumin, Urine, Random -  Urine, Clean Catch      Return in about 6 months (around 5/14/2018).          There are no Patient Instructions on file for this visit.     Farhan Schaefer MD    Assessment/Plan

## 2017-11-16 DIAGNOSIS — E11.8 TYPE 2 DIABETES MELLITUS WITH COMPLICATION, WITHOUT LONG-TERM CURRENT USE OF INSULIN (HCC): Primary | ICD-10-CM

## 2017-11-16 RX ORDER — DIPHENHYDRAMINE HYDROCHLORIDE 25 MG/1
1 CAPSULE, LIQUID FILLED ORAL ONCE
Qty: 1 EACH | Refills: 0 | Status: SHIPPED | OUTPATIENT
Start: 2017-11-16 | End: 2017-11-16

## 2017-11-16 NOTE — TELEPHONE ENCOUNTER
Patient's diabetic testing machine has stopped working.  New machine and strip prescription sent to [facility].  Patient notified.

## 2017-11-21 RX ORDER — CLOPIDOGREL BISULFATE 75 MG/1
TABLET ORAL
Qty: 30 TABLET | Refills: 11 | OUTPATIENT
Start: 2017-11-21

## 2017-11-21 NOTE — TELEPHONE ENCOUNTER
Dr. Schaefer, I talked to Radha, she is still taking the Plavix, Rx was sent to [facility].on 11/14/17.

## 2017-12-06 DIAGNOSIS — I67.82 TEMPORARY CEREBRAL VASCULAR DYSFUNCTION: ICD-10-CM

## 2017-12-07 RX ORDER — CLOPIDOGREL BISULFATE 75 MG/1
TABLET ORAL
Qty: 90 TABLET | Refills: 3 | Status: SHIPPED | OUTPATIENT
Start: 2017-12-07 | End: 2019-05-23 | Stop reason: SDUPTHER

## 2017-12-21 DIAGNOSIS — Z86.39 H/O HYPERLIPIDEMIA: ICD-10-CM

## 2017-12-21 RX ORDER — NIACIN 1000 MG/1
TABLET, EXTENDED RELEASE ORAL
Qty: 90 TABLET | Refills: 3 | Status: SHIPPED | OUTPATIENT
Start: 2017-12-21 | End: 2018-11-09 | Stop reason: SDUPTHER

## 2017-12-29 RX ORDER — FOLIC ACID 1 MG/1
TABLET ORAL
Qty: 90 TABLET | Refills: 3 | Status: SHIPPED | OUTPATIENT
Start: 2017-12-29 | End: 2018-12-26 | Stop reason: SDUPTHER

## 2018-02-12 RX ORDER — FUROSEMIDE 20 MG/1
TABLET ORAL
Qty: 180 TABLET | Refills: 1 | Status: SHIPPED | OUTPATIENT
Start: 2018-02-12 | End: 2018-08-16 | Stop reason: SDUPTHER

## 2018-02-22 ENCOUNTER — OFFICE VISIT (OUTPATIENT)
Dept: INTERNAL MEDICINE | Facility: CLINIC | Age: 78
End: 2018-02-22

## 2018-02-22 VITALS
TEMPERATURE: 97.4 F | OXYGEN SATURATION: 100 % | SYSTOLIC BLOOD PRESSURE: 151 MMHG | HEIGHT: 67 IN | RESPIRATION RATE: 16 BRPM | WEIGHT: 194 LBS | DIASTOLIC BLOOD PRESSURE: 62 MMHG | HEART RATE: 56 BPM | BODY MASS INDEX: 30.45 KG/M2

## 2018-02-22 DIAGNOSIS — G47.33 OSA ON CPAP: ICD-10-CM

## 2018-02-22 DIAGNOSIS — Z99.89 OSA ON CPAP: ICD-10-CM

## 2018-02-22 DIAGNOSIS — Z86.39 H/O HYPERLIPIDEMIA: ICD-10-CM

## 2018-02-22 DIAGNOSIS — E11.9 TYPE 2 DIABETES MELLITUS WITHOUT COMPLICATION, WITHOUT LONG-TERM CURRENT USE OF INSULIN (HCC): Primary | ICD-10-CM

## 2018-02-22 DIAGNOSIS — R79.89 LOW VITAMIN D LEVEL: ICD-10-CM

## 2018-02-22 PROCEDURE — 99214 OFFICE O/P EST MOD 30 MIN: CPT | Performed by: INTERNAL MEDICINE

## 2018-02-22 NOTE — PROGRESS NOTES
Subjective     Patient ID: Radha Whelan is a 77 y.o. female. Patient is here for management of multiple medical problems.     Chief Complaint   Patient presents with   • Hypertension     Patient states her blood pressure on Monday was 197/94, patient has been having swelling in face, hands and feet x 4 days, patient increased Linsopril to 1 1/2 tablets on Monday.     Hypertension   This is a chronic problem. The problem has been gradually worsening since onset. Pertinent negatives include no anxiety, blurred vision, chest pain or headaches. Compliance problems include diet.  Hypertensive end-organ damage includes PVD. There is no history of angina, kidney disease or CAD/MI. There is no history of chronic renal disease or coarctation of the aorta.        Pt drinking vingerar and baking soda.  Pt had increase swelling and edema and wt gain.    Pt had wt loss.  Till alvaro. Now wt is up and pt just getting back to diet and exercise.        No cp no soa with walking on treadmill.        pt doing well on cpap.            The following portions of the patient's history were reviewed and updated as appropriate: allergies, current medications, past family history, past medical history, past social history, past surgical history and problem list.    Review of Systems   Eyes: Negative for blurred vision.   Cardiovascular: Positive for leg swelling. Negative for chest pain.   Neurological: Negative for headaches.       Current Outpatient Prescriptions:   •  aspirin 81 MG tablet, Take  by mouth daily., Disp: , Rfl:   •  aspirin-acetaminophen-caffeine (EXCEDRIN MIGRAINE) 250-250-65 MG per tablet, Take  by mouth., Disp: , Rfl:   •  clopidogrel (PLAVIX) 75 MG tablet, TAKE 1 TABLET DAILY, Disp: 90 tablet, Rfl: 3  •  folic acid (FOLVITE) 1 MG tablet, Take 1 tablet by mouth Daily. TAKE 2 TABLETS DAILY, Disp: 90 tablet, Rfl: 3  •  folic acid (FOLVITE) 1 MG tablet, TAKE 1 TABLET DAILY, Disp: 90 tablet, Rfl: 3  •  furosemide (LASIX) 20  "MG tablet, TAKE 1 TABLET TWICE A DAY, Disp: 180 tablet, Rfl: 1  •  GLIPIZIDE XL 2.5 MG 24 hr tablet, TAKE 1 TABLET DAILY IN THE MORNING BEFORE BREAKFAST, Disp: 90 tablet, Rfl: 3  •  Glucosamine-Chondroit-Vit C-Mn (GLUCOSAMINE CHONDR 1500 COMPLX PO), Take  by mouth., Disp: , Rfl:   •  glucose blood test strip, Use as instructed, Disp: 100 each, Rfl: 12  •  lisinopril (PRINIVIL,ZESTRIL) 40 MG tablet, TAKE 1 TABLET DAILY, Disp: 90 tablet, Rfl: 3  •  lovastatin (MEVACOR) 10 MG tablet, Take 1 tablet by mouth Every Night., Disp: 90 tablet, Rfl: 3  •  niacin (NIASPAN) 1000 MG CR tablet, TAKE 1 TABLET EVERY NIGHT, Disp: 90 tablet, Rfl: 3  •  Omega-3 Fatty Acids (FISH OIL) 1200 MG capsule capsule, Take  by mouth daily., Disp: , Rfl:   •  pantoprazole (PROTONIX) 40 MG EC tablet, Take 1 tablet by mouth Daily., Disp: 30 tablet, Rfl: 11    Objective      Blood pressure 151/62, pulse 56, temperature 97.4 °F (36.3 °C), temperature source Oral, resp. rate 16, height 170.2 cm (67\"), weight 88 kg (194 lb), SpO2 100 %.    Physical Exam     General Appearance:    Alert, cooperative, no distress, appears stated age   Head:    Normocephalic, without obvious abnormality, atraumatic   Eyes:    PERRL, conjunctiva/corneas clear, EOM's intact   Ears:    Normal TM's and external ear canals, both ears   Nose:   Nares normal, septum midline, mucosa normal, no drainage   or sinus tenderness   Throat:   Lips, mucosa, and tongue normal; teeth and gums normal   Neck:   Supple, symmetrical, trachea midline, no adenopathy;        thyroid:  No enlargement/tenderness/nodules; no carotid    bruit or JVD   Back:     Symmetric, no curvature, ROM normal, no CVA tenderness   Lungs:     Clear to auscultation bilaterally, respirations unlabored   Chest wall:    No tenderness or deformity   Heart:    Regular rate and rhythm, S1 and S2 normal, no murmur,        rub or gallop   Abdomen:     Soft, non-tender, bowel sounds active all four quadrants,     no masses, " no organomegaly   Extremities:   Extremities normal, atraumatic, no cyanosis or+ 2 edema   Pulses:   2+ and symmetric all extremities   Skin:   Skin color, texture, turgor normal, no rashes or lesions   Lymph nodes:   Cervical, supraclavicular, and axillary nodes normal   Neurologic:   CNII-XII intact. Normal strength, sensation and reflexes       throughout      Results for orders placed or performed in visit on 05/09/17   TSH   Result Value Ref Range    TSH 1.010 0.470 - 4.680 mIU/mL   T4, Free   Result Value Ref Range    Free T4 1.08 0.78 - 2.19 ng/dL   Vitamin B12   Result Value Ref Range    Vitamin B-12 388 239 - 931 pg/mL   Comprehensive Metabolic Panel   Result Value Ref Range    Glucose 154 (H) 74 - 98 mg/dL    BUN 39 (H) 7 - 20 mg/dL    Creatinine 1.00 0.60 - 1.30 mg/dL    eGFR Non African Am 54 (L) >60 mL/min/1.73    eGFR African Am 65 >60 mL/min/1.73    BUN/Creatinine Ratio 39.0 (H) 7.1 - 23.5    Sodium 140 137 - 145 mmol/L    Potassium 4.5 3.5 - 5.1 mmol/L    Chloride 98 98 - 107 mmol/L    Total CO2 33.0 (H) 26.0 - 30.0 mmol/L    Calcium 8.9 8.4 - 10.2 mg/dL    Total Protein 6.4 6.3 - 8.2 g/dL    Albumin 3.60 3.50 - 5.00 g/dL    Globulin 2.8 gm/dL    A/G Ratio 1.3 1.0 - 2.0 g/dL    Total Bilirubin 0.9 0.2 - 1.3 mg/dL    Alkaline Phosphatase 60 38 - 126 U/L    AST (SGOT) 20 15 - 46 U/L    ALT (SGPT) 33 13 - 69 U/L   Hemoglobin A1c   Result Value Ref Range    Hemoglobin A1C 6.90 %   CBC & Differential   Result Value Ref Range    WBC 5.56 4.80 - 10.80 10*3/mm3    RBC 4.11 (L) 4.20 - 5.40 10*6/mm3    Hemoglobin 12.5 12.0 - 16.0 g/dL    Hematocrit 38.9 37.0 - 47.0 %    MCV 94.6 81.0 - 99.0 fL    MCH 30.4 27.0 - 31.0 pg    MCHC 32.1 30.0 - 37.0 g/dL    RDW 14.6 (H) 11.5 - 14.5 %    Platelets 164 130 - 400 10*3/mm3    Neutrophil Rel % 55.9 37.0 - 80.0 %    Lymphocyte Rel % 34.2 10.0 - 50.0 %    Monocyte Rel % 7.7 0.0 - 12.0 %    Eosinophil Rel % 1.3 0.0 - 7.0 %    Basophil Rel % 0.2 0.0 - 2.5 %    Neutrophils  Absolute 3.11 2.00 - 6.90 10*3/mm3    Lymphocytes Absolute 1.90 0.60 - 3.40 10*3/mm3    Monocytes Absolute 0.43 0.00 - 0.90 10*3/mm3    Eosinophils Absolute 0.07 0.00 - 0.70 10*3/mm3    Basophils Absolute 0.01 0.00 - 0.20 10*3/mm3    Immature Granulocyte Rel % 0.7 (H) 0.0 - 0.6 %    Immature Grans Absolute 0.04 0.00 - 0.06 10*3/mm3    nRBC 0.0 0.0 - 0.0 /100 WBC         Assessment/Plan   Pt will start back on hctz for a few days and then back to prn.    Radha was seen today for hypertension.    Diagnoses and all orders for this visit:    Type 2 diabetes mellitus without complication, without long-term current use of insulin  -     TSH  -     T4, Free  -     Vitamin B12  -     Lipid Panel  -     Hemoglobin A1c  -     MicroAlbumin, Urine, Random - Urine, Clean Catch  -     Comprehensive Metabolic Panel  -     CBC & Differential    H/O hyperlipidemia  -     TSH  -     T4, Free  -     Vitamin B12  -     Lipid Panel  -     Hemoglobin A1c  -     MicroAlbumin, Urine, Random - Urine, Clean Catch  -     Comprehensive Metabolic Panel  -     CBC & Differential    ODELL on CPAP  -     TSH  -     T4, Free  -     Vitamin B12  -     Lipid Panel  -     Hemoglobin A1c  -     MicroAlbumin, Urine, Random - Urine, Clean Catch  -     Comprehensive Metabolic Panel  -     CBC & Differential    Low vitamin D level  -     Vitamin D 25 Hydroxy    Return if symptoms worsen or fail to improve.     pt has f/u with me in may.        There are no Patient Instructions on file for this visit.     Farhan Schaefer MD    Assessment/Plan

## 2018-03-07 RX ORDER — GLIPIZIDE 2.5 MG/1
TABLET, EXTENDED RELEASE ORAL
Qty: 90 TABLET | Refills: 3 | Status: SHIPPED | OUTPATIENT
Start: 2018-03-07 | End: 2018-11-26 | Stop reason: SDUPTHER

## 2018-05-10 LAB
CK MB SERPL-MCNC: 0.85 NG/ML (ref 0.2–3.4)
CK SERPL-CCNC: 95 U/L (ref 30–170)
MYOGLOBIN SERPL-MCNC: 42.2 NG/ML (ref 0–101)

## 2018-05-11 LAB
25(OH)D3+25(OH)D2 SERPL-MCNC: 21.5 NG/ML
ALBUMIN SERPL-MCNC: 3.8 G/DL (ref 3.5–5)
ALBUMIN/GLOB SERPL: 1.3 G/DL (ref 1–2)
ALP SERPL-CCNC: 79 U/L (ref 38–126)
ALT SERPL-CCNC: 38 U/L (ref 13–69)
AST SERPL-CCNC: 38 U/L (ref 15–46)
BASOPHILS # BLD AUTO: 0.02 10*3/MM3 (ref 0–0.2)
BASOPHILS NFR BLD AUTO: 0.6 % (ref 0–2.5)
BILIRUB SERPL-MCNC: 0.6 MG/DL (ref 0.2–1.3)
BUN SERPL-MCNC: 21 MG/DL (ref 7–20)
BUN/CREAT SERPL: 26.3 (ref 7.1–23.5)
CALCIUM SERPL-MCNC: 9 MG/DL (ref 8.4–10.2)
CHLORIDE SERPL-SCNC: 99 MMOL/L (ref 98–107)
CHOLEST SERPL-MCNC: 212 MG/DL (ref 0–199)
CO2 SERPL-SCNC: 29 MMOL/L (ref 26–30)
CREAT SERPL-MCNC: 0.8 MG/DL (ref 0.6–1.3)
EOSINOPHIL # BLD AUTO: 0.17 10*3/MM3 (ref 0–0.7)
EOSINOPHIL NFR BLD AUTO: 4.7 % (ref 0–7)
ERYTHROCYTE [DISTWIDTH] IN BLOOD BY AUTOMATED COUNT: 14.1 % (ref 11.5–14.5)
GFR SERPLBLD CREATININE-BSD FMLA CKD-EPI: 69 ML/MIN/1.73
GFR SERPLBLD CREATININE-BSD FMLA CKD-EPI: 84 ML/MIN/1.73
GLOBULIN SER CALC-MCNC: 3 GM/DL
GLUCOSE SERPL-MCNC: 142 MG/DL (ref 74–98)
HBA1C MFR BLD: 6.8 %
HCT VFR BLD AUTO: 38.2 % (ref 37–47)
HDLC SERPL-MCNC: 81 MG/DL (ref 40–60)
HGB BLD-MCNC: 12.6 G/DL (ref 12–16)
IMM GRANULOCYTES # BLD: 0.01 10*3/MM3 (ref 0–0.06)
IMM GRANULOCYTES NFR BLD: 0.3 % (ref 0–0.6)
LDLC SERPL CALC-MCNC: 114 MG/DL (ref 0–99)
LYMPHOCYTES # BLD AUTO: 1.74 10*3/MM3 (ref 0.6–3.4)
LYMPHOCYTES NFR BLD AUTO: 48.6 % (ref 10–50)
MCH RBC QN AUTO: 30.9 PG (ref 27–31)
MCHC RBC AUTO-ENTMCNC: 33 G/DL (ref 30–37)
MCV RBC AUTO: 93.6 FL (ref 81–99)
MICROALBUMIN UR-MCNC: <3 UG/ML
MONOCYTES # BLD AUTO: 0.34 10*3/MM3 (ref 0–0.9)
MONOCYTES NFR BLD AUTO: 9.5 % (ref 0–12)
NEUTROPHILS # BLD AUTO: 1.3 10*3/MM3 (ref 2–6.9)
NEUTROPHILS NFR BLD AUTO: 36.3 % (ref 37–80)
NRBC BLD AUTO-RTO: 0 /100 WBC (ref 0–0)
PLATELET # BLD AUTO: 166 10*3/MM3 (ref 130–400)
POTASSIUM SERPL-SCNC: 3.9 MMOL/L (ref 3.5–5.1)
PROT SERPL-MCNC: 6.8 G/DL (ref 6.3–8.2)
RBC # BLD AUTO: 4.08 10*6/MM3 (ref 4.2–5.4)
SODIUM SERPL-SCNC: 140 MMOL/L (ref 137–145)
T4 FREE SERPL-MCNC: 0.98 NG/DL (ref 0.78–2.19)
TRIGL SERPL-MCNC: 84 MG/DL
TSH SERPL DL<=0.005 MIU/L-ACNC: 1.84 MIU/ML (ref 0.47–4.68)
VIT B12 SERPL-MCNC: 881 PG/ML (ref 239–931)
VLDLC SERPL CALC-MCNC: 16.8 MG/DL
WBC # BLD AUTO: 3.58 10*3/MM3 (ref 4.8–10.8)

## 2018-05-16 ENCOUNTER — OFFICE VISIT (OUTPATIENT)
Dept: INTERNAL MEDICINE | Facility: CLINIC | Age: 78
End: 2018-05-16

## 2018-05-16 VITALS
TEMPERATURE: 97.6 F | WEIGHT: 192 LBS | SYSTOLIC BLOOD PRESSURE: 138 MMHG | OXYGEN SATURATION: 100 % | BODY MASS INDEX: 30.07 KG/M2 | DIASTOLIC BLOOD PRESSURE: 70 MMHG | RESPIRATION RATE: 16 BRPM | HEART RATE: 60 BPM

## 2018-05-16 DIAGNOSIS — E11.9 TYPE 2 DIABETES MELLITUS WITHOUT COMPLICATION, WITHOUT LONG-TERM CURRENT USE OF INSULIN (HCC): ICD-10-CM

## 2018-05-16 DIAGNOSIS — I10 ESSENTIAL HYPERTENSION: ICD-10-CM

## 2018-05-16 DIAGNOSIS — R79.89 LOW VITAMIN D LEVEL: Primary | ICD-10-CM

## 2018-05-16 PROCEDURE — G0439 PPPS, SUBSEQ VISIT: HCPCS | Performed by: INTERNAL MEDICINE

## 2018-05-16 PROCEDURE — 99213 OFFICE O/P EST LOW 20 MIN: CPT | Performed by: INTERNAL MEDICINE

## 2018-05-16 RX ORDER — LANOLIN ALCOHOL/MO/W.PET/CERES
3 CREAM (GRAM) TOPICAL NIGHTLY
COMMUNITY

## 2018-05-16 NOTE — PROGRESS NOTES
Subjective     Patient ID: Radha Whelan is a 77 y.o. female. Patient is here for management of multiple medical problems.     Chief Complaint   Patient presents with   • Diabetes     3 month follow-up     Diabetes   She presents for her follow-up diabetic visit. She has type 2 diabetes mellitus. There are no hypoglycemic associated symptoms. There are no diabetic associated symptoms. Pertinent negatives for diabetes include no chest pain and no fatigue. There are no hypoglycemic complications. Diabetic complications include a CVA. Current diabetic treatment includes oral agent (monotherapy). She is compliant with treatment all of the time. She is following a diabetic diet.   Hypertension   This is a chronic problem. The current episode started more than 1 year ago. The problem is unchanged. The problem is uncontrolled. Pertinent negatives include no anxiety or chest pain. Current antihypertension treatment includes ACE inhibitors. The current treatment provides moderate improvement. Hypertensive end-organ damage includes CVA. There is no history of angina, kidney disease or CAD/MI.        just got back from vagus 2 am this am. Had leg pain a nd now better since this am.    melatonin helping with sleep.      The following portions of the patient's history were reviewed and updated as appropriate: allergies, current medications, past family history, past medical history, past social history, past surgical history and problem list.    Review of Systems   Constitutional: Negative for diaphoresis and fatigue.   Cardiovascular: Negative for chest pain.   Musculoskeletal: Negative for arthralgias and gait problem.   Skin: Negative for wound.   Psychiatric/Behavioral: Negative for dysphoric mood.   All other systems reviewed and are negative.      Current Outpatient Prescriptions:   •  aspirin 81 MG tablet, Take  by mouth daily., Disp: , Rfl:   •  aspirin-acetaminophen-caffeine (EXCEDRIN MIGRAINE) 250-250-65 MG per tablet,  Take  by mouth., Disp: , Rfl:   •  clopidogrel (PLAVIX) 75 MG tablet, TAKE 1 TABLET DAILY, Disp: 90 tablet, Rfl: 3  •  folic acid (FOLVITE) 1 MG tablet, Take 1 tablet by mouth Daily. TAKE 2 TABLETS DAILY, Disp: 90 tablet, Rfl: 3  •  furosemide (LASIX) 20 MG tablet, TAKE 1 TABLET TWICE A DAY, Disp: 180 tablet, Rfl: 1  •  glipiZIDE (GLUCOTROL XL) 2.5 MG 24 hr tablet, TAKE 1 TABLET DAILY, Disp: 90 tablet, Rfl: 3  •  GLIPIZIDE XL 2.5 MG 24 hr tablet, TAKE 1 TABLET DAILY IN THE MORNING BEFORE BREAKFAST, Disp: 90 tablet, Rfl: 3  •  Glucosamine-Chondroit-Vit C-Mn (GLUCOSAMINE CHONDR 1500 COMPLX PO), Take  by mouth., Disp: , Rfl:   •  glucose blood test strip, Use as instructed, Disp: 100 each, Rfl: 12  •  lisinopril (PRINIVIL,ZESTRIL) 40 MG tablet, TAKE 1 TABLET DAILY, Disp: 90 tablet, Rfl: 3  •  lovastatin (MEVACOR) 10 MG tablet, Take 1 tablet by mouth Every Night. (Patient taking differently: Take 10 mg by mouth Every Other Day.), Disp: 90 tablet, Rfl: 3  •  melatonin 3 MG tablet, Take  by mouth., Disp: , Rfl:   •  niacin (NIASPAN) 1000 MG CR tablet, TAKE 1 TABLET EVERY NIGHT, Disp: 90 tablet, Rfl: 3  •  Omega-3 Fatty Acids (FISH OIL) 1200 MG capsule capsule, Take  by mouth daily., Disp: , Rfl:   •  pantoprazole (PROTONIX) 40 MG EC tablet, Take 1 tablet by mouth Daily., Disp: 30 tablet, Rfl: 11  •  folic acid (FOLVITE) 1 MG tablet, TAKE 1 TABLET DAILY, Disp: 90 tablet, Rfl: 3    Objective      Blood pressure 138/70, pulse 60, temperature 97.6 °F (36.4 °C), temperature source Oral, resp. rate 16, weight 87.1 kg (192 lb), SpO2 100 %.    Physical Exam     General Appearance:    Alert, cooperative, no distress, appears stated age   Head:    Normocephalic, without obvious abnormality, atraumatic   Eyes:    PERRL, conjunctiva/corneas clear, EOM's intact   Ears:    Normal TM's and external ear canals, both ears   Nose:   Nares normal, septum midline, mucosa normal, no drainage   or sinus tenderness   Throat:   Lips, mucosa, and  tongue normal; teeth and gums normal   Neck:   Supple, symmetrical, trachea midline, no adenopathy;        thyroid:  No enlargement/tenderness/nodules; no carotid    bruit or JVD   Back:     Symmetric, no curvature, ROM normal, no CVA tenderness   Lungs:     Clear to auscultation bilaterally, respirations unlabored   Chest wall:    No tenderness or deformity   Heart:    Regular rate and rhythm, S1 and S2 normal, no murmur,        rub or gallop   Abdomen:     Soft, non-tender, bowel sounds active all four quadrants,     no masses, no organomegaly   Extremities:   Extremities normal, atraumatic, no cyanosis or edema   Pulses:   2+ and symmetric all extremities   Skin:   Skin color, texture, turgor normal, no rashes or lesions   Lymph nodes:   Cervical, supraclavicular, and axillary nodes normal   Neurologic:   CNII-XII intact. Normal strength, sensation and reflexes       throughout      Results for orders placed or performed in visit on 02/22/18   TSH   Result Value Ref Range    TSH 1.840 0.470 - 4.680 mIU/mL   T4, Free   Result Value Ref Range    Free T4 0.98 0.78 - 2.19 ng/dL   Vitamin B12   Result Value Ref Range    Vitamin B-12 881 239 - 931 pg/mL   Vitamin D 25 Hydroxy   Result Value Ref Range    25 Hydroxy, Vitamin D 21.5 ng/ml   Lipid Panel   Result Value Ref Range    Total Cholesterol 212 (H) 0 - 199 mg/dL    Triglycerides 84 <150 mg/dL    HDL Cholesterol 81 (H) 40 - 60 mg/dL    VLDL Cholesterol 16.8 mg/dL    LDL Cholesterol  114 (H) 0 - 99 mg/dL   Hemoglobin A1c   Result Value Ref Range    Hemoglobin A1C 6.80 %   MicroAlbumin, Urine, Random - Urine, Clean Catch   Result Value Ref Range    Microalbumin, Urine <3.0 Not Estab. ug/mL   Comprehensive Metabolic Panel   Result Value Ref Range    Glucose 142 (H) 74 - 98 mg/dL    BUN 21 (H) 7 - 20 mg/dL    Creatinine 0.80 0.60 - 1.30 mg/dL    eGFR Non African Am 69 >60 mL/min/1.73    eGFR African Am 84 >60 mL/min/1.73    BUN/Creatinine Ratio 26.3 (H) 7.1 - 23.5     Sodium 140 137 - 145 mmol/L    Potassium 3.9 3.5 - 5.1 mmol/L    Chloride 99 98 - 107 mmol/L    Total CO2 29.0 26.0 - 30.0 mmol/L    Calcium 9.0 8.4 - 10.2 mg/dL    Total Protein 6.8 6.3 - 8.2 g/dL    Albumin 3.80 3.50 - 5.00 g/dL    Globulin 3.0 gm/dL    A/G Ratio 1.3 1.0 - 2.0 g/dL    Total Bilirubin 0.6 0.2 - 1.3 mg/dL    Alkaline Phosphatase 79 38 - 126 U/L    AST (SGOT) 38 15 - 46 U/L    ALT (SGPT) 38 13 - 69 U/L   CK Total & CKMB   Result Value Ref Range    Creatine Kinase 95 30 - 170 U/L    CKMB 0.85 0.20 - 3.40 ng/mL   Myoglobin, Serum   Result Value Ref Range    Myoglobin 42.2 0.0 - 101.0 ng/mL   CBC & Differential   Result Value Ref Range    WBC 3.58 (L) 4.80 - 10.80 10*3/mm3    RBC 4.08 (L) 4.20 - 5.40 10*6/mm3    Hemoglobin 12.6 12.0 - 16.0 g/dL    Hematocrit 38.2 37.0 - 47.0 %    MCV 93.6 81.0 - 99.0 fL    MCH 30.9 27.0 - 31.0 pg    MCHC 33.0 30.0 - 37.0 g/dL    RDW 14.1 11.5 - 14.5 %    Platelets 166 130 - 400 10*3/mm3    Neutrophil Rel % 36.3 (L) 37.0 - 80.0 %    Lymphocyte Rel % 48.6 10.0 - 50.0 %    Monocyte Rel % 9.5 0.0 - 12.0 %    Eosinophil Rel % 4.7 0.0 - 7.0 %    Basophil Rel % 0.6 0.0 - 2.5 %    Neutrophils Absolute 1.30 (L) 2.00 - 6.90 10*3/mm3    Lymphocytes Absolute 1.74 0.60 - 3.40 10*3/mm3    Monocytes Absolute 0.34 0.00 - 0.90 10*3/mm3    Eosinophils Absolute 0.17 0.00 - 0.70 10*3/mm3    Basophils Absolute 0.02 0.00 - 0.20 10*3/mm3    Immature Granulocyte Rel % 0.3 0.0 - 0.6 %    Immature Grans Absolute 0.01 0.00 - 0.06 10*3/mm3    nRBC 0.0 0.0 - 0.0 /100 WBC         Assessment/Plan       Radha was seen today for diabetes.    Diagnoses and all orders for this visit:    Low vitamin D level  -     Vitamin D 25 Hydroxy    Essential hypertension  -     TSH  -     Vitamin B12  -     Lipid Panel  -     Comprehensive Metabolic Panel  -     CBC & Differential    Type 2 diabetes mellitus without complication, without long-term current use of insulin  -     MicroAlbumin, Urine, Random - Urine,  Clean Catch  -     Hemoglobin A1c      Return in about 6 months (around 11/16/2018).          There are no Patient Instructions on file for this visit.     Farhan Schaefer MD    Assessment/Plan

## 2018-05-16 NOTE — PROGRESS NOTES
QUICK REFERENCE INFORMATION:  The ABCs of the Annual Wellness Visit    Initial Medicare Wellness Visit    HEALTH RISK ASSESSMENT    1940    Recent Hospitalizations:  No hospitalization(s) within the last year..    Pain score: no pain reported.      Current Medical Providers:  Patient Care Team:  Farhan Schaefer MD as PCP - General  Farhan Schaefer MD as PCP - Claims Attributed        Smoking Status:  History   Smoking Status   • Never Smoker   Smokeless Tobacco   • Never Used       Alcohol Consumption:  History   Alcohol Use No       Depression Screen:   PHQ-2/PHQ-9 Depression Screening 5/16/2018   Little interest or pleasure in doing things 0   Feeling down, depressed, or hopeless 0   Trouble falling or staying asleep, or sleeping too much -   Feeling tired or having little energy -   Poor appetite or overeating -   Feeling bad about yourself - or that you are a failure or have let yourself or your family down -   Trouble concentrating on things, such as reading the newspaper or watching television -   Moving or speaking so slowly that other people could have noticed. Or the opposite - being so fidgety or restless that you have been moving around a lot more than usual -   Thoughts that you would be better off dead, or of hurting yourself in some way -   Total Score 0       Health Habits and Functional and Cognitive Screening:  Functional & Cognitive Status 5/16/2018   Do you have difficulty preparing food and eating? No   Do you have difficulty bathing yourself, getting dressed or grooming yourself? No   Do you have difficulty using the toilet? No   Do you have difficulty moving around from place to place? No   Do you have trouble with steps or getting out of a bed or a chair? No   In the past year have you fallen or experienced a near fall? No   Current Diet Well Balanced Diet   Dental Exam Up to date   Eye Exam Up to date   Exercise (times per week) 5 times per week   Current Exercise Activities Include  Walking   Do you need help using the phone?  No   Are you deaf or do you have serious difficulty hearing?  No   Do you need help with transportation? No   Do you need help shopping? No   Do you need help preparing meals?  No   Do you need help with housework?  No   Do you need help with laundry? No   Do you need help taking your medications? No   Do you need help managing money? No   Do you ever drive or ride in a car without wearing a seat belt? No   Have you felt unusual stress, anger or loneliness in the last month? No   Who do you live with? Spouse   If you need help, do you have trouble finding someone available to you? No   Have you been bothered in the last four weeks by sexual problems? No   Do you have difficulty concentrating, remembering or making decisions? No           Does the patient have evidence of cognitive impairment? No    Asiprin use counseling: Taking ASA appropriately as indicated      Recent Lab Results:    Visual Acuity:  No exam data present    Age-appropriate Screening Schedule:  Refer to the list below for future screening recommendations based on patient's age, sex and/or medical conditions. Orders for these recommended tests are listed in the plan section. The patient has been provided with a written plan.    Health Maintenance   Topic Date Due   • DIABETIC EYE EXAM  05/16/2018   • INFLUENZA VACCINE  08/01/2018   • HEMOGLOBIN A1C  11/10/2018   • DIABETIC FOOT EXAM  11/14/2018   • LIPID PANEL  05/10/2019   • URINE MICROALBUMIN  05/10/2019   • MAMMOGRAM  08/25/2019   • TDAP/TD VACCINES (2 - Td) 02/08/2024   • PNEUMOCOCCAL VACCINES (65+ LOW/MEDIUM RISK)  Addressed   • ZOSTER VACCINE  Addressed        Subjective   History of Present Illness    Radha Whelan is a 77 y.o. female who presents for an Annual Wellness Visit.    The following portions of the patient's history were reviewed and updated as appropriate: allergies, current medications, past family history, past medical history, past  social history, past surgical history and problem list.    Outpatient Medications Prior to Visit   Medication Sig Dispense Refill   • aspirin 81 MG tablet Take  by mouth daily.     • aspirin-acetaminophen-caffeine (EXCEDRIN MIGRAINE) 250-250-65 MG per tablet Take  by mouth.     • clopidogrel (PLAVIX) 75 MG tablet TAKE 1 TABLET DAILY 90 tablet 3   • folic acid (FOLVITE) 1 MG tablet Take 1 tablet by mouth Daily. TAKE 2 TABLETS DAILY 90 tablet 3   • furosemide (LASIX) 20 MG tablet TAKE 1 TABLET TWICE A  tablet 1   • glipiZIDE (GLUCOTROL XL) 2.5 MG 24 hr tablet TAKE 1 TABLET DAILY 90 tablet 3   • GLIPIZIDE XL 2.5 MG 24 hr tablet TAKE 1 TABLET DAILY IN THE MORNING BEFORE BREAKFAST 90 tablet 3   • Glucosamine-Chondroit-Vit C-Mn (GLUCOSAMINE CHONDR 1500 COMPLX PO) Take  by mouth.     • glucose blood test strip Use as instructed 100 each 12   • lisinopril (PRINIVIL,ZESTRIL) 40 MG tablet TAKE 1 TABLET DAILY 90 tablet 3   • lovastatin (MEVACOR) 10 MG tablet Take 1 tablet by mouth Every Night. (Patient taking differently: Take 10 mg by mouth Every Other Day.) 90 tablet 3   • niacin (NIASPAN) 1000 MG CR tablet TAKE 1 TABLET EVERY NIGHT 90 tablet 3   • Omega-3 Fatty Acids (FISH OIL) 1200 MG capsule capsule Take  by mouth daily.     • pantoprazole (PROTONIX) 40 MG EC tablet Take 1 tablet by mouth Daily. 30 tablet 11   • folic acid (FOLVITE) 1 MG tablet TAKE 1 TABLET DAILY 90 tablet 3     No facility-administered medications prior to visit.        Patient Active Problem List   Diagnosis   • Type 2 diabetes mellitus without complication   • Temporary cerebral vascular dysfunction   • Fatigue   • Hypertension   • Hypervitaminosis B6   • Hyponatremia   • ODELL on CPAP   • Peripheral neuropathy   • Restless legs syndrome   • Edema   • H/O hyperlipidemia       Advance Care Planning:  has NO advance directive - not interested in additional information    Identification of Risk Factors:  Risk factors include: weight , unhealthy  diet, cardiovascular risk and increased fall risk.    Review of Systems    Compared to one year ago, the patient feels her physical health is the same.  Compared to one year ago, the patient feels her mental health is the same.    Objective     Physical Exam    Vitals:    05/16/18 1122   BP: 138/70   BP Location: Left arm   Patient Position: Sitting   Cuff Size: Adult   Pulse: 60   Resp: 16   Temp: 97.6 °F (36.4 °C)   TempSrc: Oral   SpO2: 100%   Weight: 87.1 kg (192 lb)       Patient's Body mass index is 30.07 kg/m². BMI is above normal parameters. Recommendations include: exercise counseling.      Assessment/Plan   Patient Self-Management and Personalized Health Advice  The patient has been provided with information about: diet, exercise, weight management and fall prevention and preventive services including:   · Advance directive, Fall Risk assessment done, Fall Risk plan of care done.    Visit Diagnoses:    ICD-10-CM ICD-9-CM   1. Low vitamin D level E55.9 268.9   2. Essential hypertension I10 401.9   3. Type 2 diabetes mellitus without complication, without long-term current use of insulin E11.9 250.00       No orders of the defined types were placed in this encounter.      Outpatient Encounter Prescriptions as of 5/16/2018   Medication Sig Dispense Refill   • aspirin 81 MG tablet Take  by mouth daily.     • aspirin-acetaminophen-caffeine (EXCEDRIN MIGRAINE) 250-250-65 MG per tablet Take  by mouth.     • clopidogrel (PLAVIX) 75 MG tablet TAKE 1 TABLET DAILY 90 tablet 3   • folic acid (FOLVITE) 1 MG tablet Take 1 tablet by mouth Daily. TAKE 2 TABLETS DAILY 90 tablet 3   • furosemide (LASIX) 20 MG tablet TAKE 1 TABLET TWICE A  tablet 1   • glipiZIDE (GLUCOTROL XL) 2.5 MG 24 hr tablet TAKE 1 TABLET DAILY 90 tablet 3   • GLIPIZIDE XL 2.5 MG 24 hr tablet TAKE 1 TABLET DAILY IN THE MORNING BEFORE BREAKFAST 90 tablet 3   • Glucosamine-Chondroit-Vit C-Mn (GLUCOSAMINE CHONDR 1500 COMPLX PO) Take  by mouth.     •  glucose blood test strip Use as instructed 100 each 12   • lisinopril (PRINIVIL,ZESTRIL) 40 MG tablet TAKE 1 TABLET DAILY 90 tablet 3   • lovastatin (MEVACOR) 10 MG tablet Take 1 tablet by mouth Every Night. (Patient taking differently: Take 10 mg by mouth Every Other Day.) 90 tablet 3   • melatonin 3 MG tablet Take  by mouth.     • niacin (NIASPAN) 1000 MG CR tablet TAKE 1 TABLET EVERY NIGHT 90 tablet 3   • Omega-3 Fatty Acids (FISH OIL) 1200 MG capsule capsule Take  by mouth daily.     • pantoprazole (PROTONIX) 40 MG EC tablet Take 1 tablet by mouth Daily. 30 tablet 11   • folic acid (FOLVITE) 1 MG tablet TAKE 1 TABLET DAILY 90 tablet 3     No facility-administered encounter medications on file as of 5/16/2018.        Reviewed use of high risk medication in the elderly: yes  Reviewed for potential of harmful drug interactions in the elderly: yes    Follow Up:  Return in about 3 months (around 8/16/2018).     An After Visit Summary and PPPS with all of these plans were given to the patient.

## 2018-06-04 RX ORDER — LOVASTATIN 10 MG/1
TABLET ORAL
Qty: 30 TABLET | Refills: 11 | OUTPATIENT
Start: 2018-06-04

## 2018-06-04 RX ORDER — LOVASTATIN 10 MG/1
10 TABLET ORAL NIGHTLY
Qty: 90 TABLET | Refills: 3 | Status: SHIPPED | OUTPATIENT
Start: 2018-06-04 | End: 2019-04-29 | Stop reason: SDUPTHER

## 2018-08-16 RX ORDER — FUROSEMIDE 20 MG/1
TABLET ORAL
Qty: 180 TABLET | Refills: 1 | Status: SHIPPED | OUTPATIENT
Start: 2018-08-16 | End: 2019-02-05 | Stop reason: SDUPTHER

## 2018-08-22 ENCOUNTER — TRANSCRIBE ORDERS (OUTPATIENT)
Dept: ADMINISTRATIVE | Facility: HOSPITAL | Age: 78
End: 2018-08-22

## 2018-08-22 DIAGNOSIS — Z12.39 SCREENING BREAST EXAMINATION: Primary | ICD-10-CM

## 2018-09-04 RX ORDER — PANTOPRAZOLE SODIUM 40 MG/1
40 TABLET, DELAYED RELEASE ORAL DAILY
Qty: 90 TABLET | Refills: 3 | Status: SHIPPED | OUTPATIENT
Start: 2018-09-04 | End: 2019-09-08 | Stop reason: SDUPTHER

## 2018-09-11 ENCOUNTER — HOSPITAL ENCOUNTER (OUTPATIENT)
Dept: MAMMOGRAPHY | Facility: HOSPITAL | Age: 78
Discharge: HOME | End: 2018-09-11
Attending: INTERNAL MEDICINE | Admitting: INTERNAL MEDICINE

## 2018-09-11 DIAGNOSIS — Z12.39 SCREENING BREAST EXAMINATION: ICD-10-CM

## 2018-09-11 PROCEDURE — 77067 SCR MAMMO BI INCL CAD: CPT

## 2018-09-11 PROCEDURE — 77063 BREAST TOMOSYNTHESIS BI: CPT

## 2018-10-09 DIAGNOSIS — I10 ESSENTIAL HYPERTENSION: ICD-10-CM

## 2018-10-09 RX ORDER — LISINOPRIL 40 MG/1
TABLET ORAL
Qty: 90 TABLET | Refills: 3 | Status: SHIPPED | OUTPATIENT
Start: 2018-10-09 | End: 2019-09-23 | Stop reason: SDUPTHER

## 2018-10-12 ENCOUNTER — TELEPHONE (OUTPATIENT)
Dept: INTERNAL MEDICINE | Facility: CLINIC | Age: 78
End: 2018-10-12

## 2018-10-12 ENCOUNTER — FLU SHOT (OUTPATIENT)
Dept: INTERNAL MEDICINE | Facility: CLINIC | Age: 78
End: 2018-10-12

## 2018-10-12 PROCEDURE — G0008 ADMIN INFLUENZA VIRUS VAC: HCPCS | Performed by: INTERNAL MEDICINE

## 2018-10-12 PROCEDURE — 90662 IIV NO PRSV INCREASED AG IM: CPT | Performed by: INTERNAL MEDICINE

## 2018-10-17 ENCOUNTER — OFFICE VISIT (OUTPATIENT)
Dept: INTERNAL MEDICINE | Facility: CLINIC | Age: 78
End: 2018-10-17

## 2018-10-17 VITALS
HEART RATE: 60 BPM | TEMPERATURE: 97.5 F | HEIGHT: 67 IN | DIASTOLIC BLOOD PRESSURE: 60 MMHG | OXYGEN SATURATION: 100 % | BODY MASS INDEX: 30.13 KG/M2 | WEIGHT: 192 LBS | RESPIRATION RATE: 16 BRPM | SYSTOLIC BLOOD PRESSURE: 140 MMHG

## 2018-10-17 DIAGNOSIS — Q10.5 CONGENITAL BLOCKED TEAR DUCT OF LEFT EYE: ICD-10-CM

## 2018-10-17 DIAGNOSIS — R53.83 OTHER FATIGUE: ICD-10-CM

## 2018-10-17 DIAGNOSIS — E11.9 TYPE 2 DIABETES MELLITUS WITHOUT COMPLICATION, WITHOUT LONG-TERM CURRENT USE OF INSULIN (HCC): ICD-10-CM

## 2018-10-17 DIAGNOSIS — I10 ESSENTIAL HYPERTENSION: Primary | ICD-10-CM

## 2018-10-17 PROCEDURE — 99213 OFFICE O/P EST LOW 20 MIN: CPT | Performed by: INTERNAL MEDICINE

## 2018-10-17 RX ORDER — TOBRAMYCIN AND DEXAMETHASONE 3; 1 MG/ML; MG/ML
1 SUSPENSION/ DROPS OPHTHALMIC
Qty: 10 ML | Refills: 5 | Status: SHIPPED | OUTPATIENT
Start: 2018-10-17 | End: 2018-10-17 | Stop reason: SDUPTHER

## 2018-10-17 RX ORDER — TOBRAMYCIN AND DEXAMETHASONE 3; 1 MG/ML; MG/ML
1 SUSPENSION/ DROPS OPHTHALMIC
Qty: 10 ML | Refills: 5 | Status: SHIPPED | OUTPATIENT
Start: 2018-10-17 | End: 2019-08-26

## 2018-10-17 NOTE — PROGRESS NOTES
Subjective     Patient ID: Radha Whelan is a 78 y.o. female. Patient is here for management of multiple medical problems.     Chief Complaint   Patient presents with   • eye issues     patient states she has an infected tear duct in the left eye, patient needs to RX for eye drops     History of Present Illness       Came in for frequent sopping up of lacamar duct. Use tobramycin drops prn to fix the problem.  duration years.    The following portions of the patient's history were reviewed and updated as appropriate: allergies, current medications, past family history, past medical history, past social history, past surgical history and problem list.    Review of Systems   Constitutional: Negative for fatigue.   Eyes: Positive for discharge. Negative for pain and itching.   Psychiatric/Behavioral: Negative for sleep disturbance.       Current Outpatient Prescriptions:   •  aspirin 81 MG tablet, Take  by mouth daily., Disp: , Rfl:   •  aspirin-acetaminophen-caffeine (EXCEDRIN MIGRAINE) 250-250-65 MG per tablet, Take  by mouth., Disp: , Rfl:   •  clopidogrel (PLAVIX) 75 MG tablet, TAKE 1 TABLET DAILY, Disp: 90 tablet, Rfl: 3  •  folic acid (FOLVITE) 1 MG tablet, Take 1 tablet by mouth Daily. TAKE 2 TABLETS DAILY, Disp: 90 tablet, Rfl: 3  •  folic acid (FOLVITE) 1 MG tablet, TAKE 1 TABLET DAILY, Disp: 90 tablet, Rfl: 3  •  furosemide (LASIX) 20 MG tablet, TAKE 1 TABLET TWICE A DAY, Disp: 180 tablet, Rfl: 1  •  glipiZIDE (GLUCOTROL XL) 2.5 MG 24 hr tablet, TAKE 1 TABLET DAILY, Disp: 90 tablet, Rfl: 3  •  GLIPIZIDE XL 2.5 MG 24 hr tablet, TAKE 1 TABLET DAILY IN THE MORNING BEFORE BREAKFAST, Disp: 90 tablet, Rfl: 3  •  Glucosamine-Chondroit-Vit C-Mn (GLUCOSAMINE CHONDR 1500 COMPLX PO), Take  by mouth., Disp: , Rfl:   •  glucose blood test strip, Use as instructed, Disp: 100 each, Rfl: 12  •  lisinopril (PRINIVIL,ZESTRIL) 40 MG tablet, TAKE 1 TABLET DAILY, Disp: 90 tablet, Rfl: 3  •  lovastatin (MEVACOR) 10 MG tablet, Take 1  "tablet by mouth Every Night., Disp: 90 tablet, Rfl: 3  •  melatonin 3 MG tablet, Take  by mouth., Disp: , Rfl:   •  niacin (NIASPAN) 1000 MG CR tablet, TAKE 1 TABLET EVERY NIGHT, Disp: 90 tablet, Rfl: 3  •  Omega-3 Fatty Acids (FISH OIL) 1200 MG capsule capsule, Take  by mouth daily., Disp: , Rfl:   •  pantoprazole (PROTONIX) 40 MG EC tablet, Take 1 tablet by mouth Daily., Disp: 90 tablet, Rfl: 3  •  tobramycin-dexamethasone (TOBRADEX) 0.3-0.1 % ophthalmic suspension, Administer 1 drop into the left eye Every 4 (Four) Hours While Awake., Disp: 10 mL, Rfl: 5    Objective      Blood pressure 140/60, pulse 60, temperature 97.5 °F (36.4 °C), temperature source Oral, resp. rate 16, height 170.2 cm (67\"), weight 87.1 kg (192 lb), SpO2 100 %.    Physical Exam     General Appearance:    Alert, cooperative, no distress, appears stated age   Head:    Normocephalic, without obvious abnormality, atraumatic   Eyes:    Left eye conjunctivitis.  PERRL, conjunctiva/corneas clear, EOM's intact   Ears:    Normal TM's and external ear canals, both ears   Nose:   Nares normal, septum midline, mucosa normal, no drainage   or sinus tenderness   Throat:   Lips, mucosa, and tongue normal; teeth and gums normal   Neck:   Supple, symmetrical, trachea midline, no adenopathy;        thyroid:  No enlargement/tenderness/nodules; no carotid    bruit or JVD   Back:     Symmetric, no curvature, ROM normal, no CVA tenderness   Lungs:     Clear to auscultation bilaterally, respirations unlabored   Chest wall:    No tenderness or deformity   Heart:    Regular rate and rhythm, S1 and S2 normal, no murmur,        rub or gallop   Abdomen:     Soft, non-tender, bowel sounds active all four quadrants,     no masses, no organomegaly   Extremities:   Extremities normal, atraumatic, no cyanosis or edema   Pulses:   2+ and symmetric all extremities   Skin:   Skin color, texture, turgor normal, no rashes or lesions   Lymph nodes:   Cervical, supraclavicular, " and axillary nodes normal   Neurologic:   CNII-XII intact. Normal strength, sensation and reflexes       throughout      Results for orders placed or performed in visit on 02/22/18   TSH   Result Value Ref Range    TSH 1.840 0.470 - 4.680 mIU/mL   T4, Free   Result Value Ref Range    Free T4 0.98 0.78 - 2.19 ng/dL   Vitamin B12   Result Value Ref Range    Vitamin B-12 881 239 - 931 pg/mL   Vitamin D 25 Hydroxy   Result Value Ref Range    25 Hydroxy, Vitamin D 21.5 ng/ml   Lipid Panel   Result Value Ref Range    Total Cholesterol 212 (H) 0 - 199 mg/dL    Triglycerides 84 <150 mg/dL    HDL Cholesterol 81 (H) 40 - 60 mg/dL    VLDL Cholesterol 16.8 mg/dL    LDL Cholesterol  114 (H) 0 - 99 mg/dL   Hemoglobin A1c   Result Value Ref Range    Hemoglobin A1C 6.80 %   MicroAlbumin, Urine, Random - Urine, Clean Catch   Result Value Ref Range    Microalbumin, Urine <3.0 Not Estab. ug/mL   Comprehensive Metabolic Panel   Result Value Ref Range    Glucose 142 (H) 74 - 98 mg/dL    BUN 21 (H) 7 - 20 mg/dL    Creatinine 0.80 0.60 - 1.30 mg/dL    eGFR Non African Am 69 >60 mL/min/1.73    eGFR African Am 84 >60 mL/min/1.73    BUN/Creatinine Ratio 26.3 (H) 7.1 - 23.5    Sodium 140 137 - 145 mmol/L    Potassium 3.9 3.5 - 5.1 mmol/L    Chloride 99 98 - 107 mmol/L    Total CO2 29.0 26.0 - 30.0 mmol/L    Calcium 9.0 8.4 - 10.2 mg/dL    Total Protein 6.8 6.3 - 8.2 g/dL    Albumin 3.80 3.50 - 5.00 g/dL    Globulin 3.0 gm/dL    A/G Ratio 1.3 1.0 - 2.0 g/dL    Total Bilirubin 0.6 0.2 - 1.3 mg/dL    Alkaline Phosphatase 79 38 - 126 U/L    AST (SGOT) 38 15 - 46 U/L    ALT (SGPT) 38 13 - 69 U/L   CK Total & CKMB   Result Value Ref Range    Creatine Kinase 95 30 - 170 U/L    CKMB 0.85 0.20 - 3.40 ng/mL   Myoglobin, Serum   Result Value Ref Range    Myoglobin 42.2 0.0 - 101.0 ng/mL   CBC & Differential   Result Value Ref Range    WBC 3.58 (L) 4.80 - 10.80 10*3/mm3    RBC 4.08 (L) 4.20 - 5.40 10*6/mm3    Hemoglobin 12.6 12.0 - 16.0 g/dL     Hematocrit 38.2 37.0 - 47.0 %    MCV 93.6 81.0 - 99.0 fL    MCH 30.9 27.0 - 31.0 pg    MCHC 33.0 30.0 - 37.0 g/dL    RDW 14.1 11.5 - 14.5 %    Platelets 166 130 - 400 10*3/mm3    Neutrophil Rel % 36.3 (L) 37.0 - 80.0 %    Lymphocyte Rel % 48.6 10.0 - 50.0 %    Monocyte Rel % 9.5 0.0 - 12.0 %    Eosinophil Rel % 4.7 0.0 - 7.0 %    Basophil Rel % 0.6 0.0 - 2.5 %    Neutrophils Absolute 1.30 (L) 2.00 - 6.90 10*3/mm3    Lymphocytes Absolute 1.74 0.60 - 3.40 10*3/mm3    Monocytes Absolute 0.34 0.00 - 0.90 10*3/mm3    Eosinophils Absolute 0.17 0.00 - 0.70 10*3/mm3    Basophils Absolute 0.02 0.00 - 0.20 10*3/mm3    Immature Granulocyte Rel % 0.3 0.0 - 0.6 %    Immature Grans Absolute 0.01 0.00 - 0.06 10*3/mm3    nRBC 0.0 0.0 - 0.0 /100 WBC         Assessment/Plan       Radha was seen today for eye issues.    Diagnoses and all orders for this visit:    Essential hypertension  -     T4, Free  -     TSH  -     Comprehensive Metabolic Panel  -     Vitamin B12  -     CBC & Differential  -     Lipid Panel    Type 2 diabetes mellitus without complication, without long-term current use of insulin (CMS/[facility])  -     Hemoglobin A1c  -     MicroAlbumin, Urine, Random - Urine, Clean Catch    Other fatigue  -     T4, Free  -     TSH    Congenital blocked tear duct of left eye  -     tobramycin-dexamethasone (TOBRADEX) 0.3-0.1 % ophthalmic suspension; Administer 1 drop into the left eye Every 4 (Four) Hours While Awake.    Other orders  -     Discontinue: tobramycin-dexamethasone (TOBRADEX) 0.3-0.1 % ophthalmic suspension; Administer 1 drop into the left eye Every 4 (Four) Hours While Awake.      No Follow-up on file.          There are no Patient Instructions on file for this visit.     Farhan Schaefer MD    Assessment/Plan

## 2018-11-02 ENCOUNTER — OFFICE VISIT (OUTPATIENT)
Dept: INTERNAL MEDICINE | Facility: CLINIC | Age: 78
End: 2018-11-02

## 2018-11-02 ENCOUNTER — TELEPHONE (OUTPATIENT)
Dept: INTERNAL MEDICINE | Facility: CLINIC | Age: 78
End: 2018-11-02

## 2018-11-02 VITALS
TEMPERATURE: 98.6 F | HEIGHT: 67 IN | WEIGHT: 198 LBS | RESPIRATION RATE: 18 BRPM | SYSTOLIC BLOOD PRESSURE: 130 MMHG | HEART RATE: 63 BPM | BODY MASS INDEX: 31.08 KG/M2 | DIASTOLIC BLOOD PRESSURE: 80 MMHG | OXYGEN SATURATION: 98 %

## 2018-11-02 DIAGNOSIS — J01.00 ACUTE NON-RECURRENT MAXILLARY SINUSITIS: Primary | ICD-10-CM

## 2018-11-02 DIAGNOSIS — H04.302 INFECTION OF LEFT TEAR DUCT: ICD-10-CM

## 2018-11-02 DIAGNOSIS — H57.12 LEFT EYE PAIN: ICD-10-CM

## 2018-11-02 PROCEDURE — 99213 OFFICE O/P EST LOW 20 MIN: CPT | Performed by: FAMILY MEDICINE

## 2018-11-02 RX ORDER — KETOROLAC TROMETHAMINE 4 MG/ML
1 SOLUTION/ DROPS OPHTHALMIC 4 TIMES DAILY
Qty: 5 ML | Refills: 0 | Status: SHIPPED | OUTPATIENT
Start: 2018-11-02 | End: 2019-09-26

## 2018-11-02 RX ORDER — AMOXICILLIN AND CLAVULANATE POTASSIUM 875; 125 MG/1; MG/1
1 TABLET, FILM COATED ORAL EVERY 12 HOURS SCHEDULED
Qty: 20 TABLET | Refills: 0 | Status: SHIPPED | OUTPATIENT
Start: 2018-11-02 | End: 2018-11-16

## 2018-11-02 RX ORDER — IBUPROFEN 800 MG/1
800 TABLET ORAL
COMMUNITY
Start: 2015-08-10 | End: 2019-05-30

## 2018-11-02 RX ORDER — ERYTHROMYCIN 5 MG/G
OINTMENT OPHTHALMIC
Refills: 1 | COMMUNITY
Start: 2018-10-12 | End: 2019-05-23

## 2018-11-02 NOTE — PROGRESS NOTES
Radha Whelan is a 78 y.o. female.    Chief Complaint   Patient presents with   • Eye Pain     left eye pain pt states she had an infection and is no better       HPI   Patient reports eye pain for over a week.  She had an eye infection last week. She states the drainage has improved and is now clear.  However, she continues to have redness and swelling to medial left eye.  She states she has a history of a clogged tear duct.  She also reports she is concerned the infection has spread to her sinuses as her left face has become painful and it is painful to wear her dentures.     The following portions of the patient's history were reviewed and updated as appropriate: allergies, current medications, past family history, past medical history, past social history, past surgical history and problem list.     Allergies   Allergen Reactions   • Other    • Statins Myalgia         Current Outpatient Prescriptions:   •  aspirin 81 MG tablet, Take  by mouth daily., Disp: , Rfl:   •  aspirin-acetaminophen-caffeine (EXCEDRIN MIGRAINE) 250-250-65 MG per tablet, Take  by mouth., Disp: , Rfl:   •  clopidogrel (PLAVIX) 75 MG tablet, TAKE 1 TABLET DAILY, Disp: 90 tablet, Rfl: 3  •  erythromycin (ROMYCIN) 5 MG/GM ophthalmic ointment, , Disp: , Rfl: 1  •  folic acid (FOLVITE) 1 MG tablet, Take 1 tablet by mouth Daily. TAKE 2 TABLETS DAILY, Disp: 90 tablet, Rfl: 3  •  folic acid (FOLVITE) 1 MG tablet, TAKE 1 TABLET DAILY, Disp: 90 tablet, Rfl: 3  •  furosemide (LASIX) 20 MG tablet, TAKE 1 TABLET TWICE A DAY, Disp: 180 tablet, Rfl: 1  •  glipiZIDE (GLUCOTROL XL) 2.5 MG 24 hr tablet, TAKE 1 TABLET DAILY, Disp: 90 tablet, Rfl: 3  •  GLIPIZIDE XL 2.5 MG 24 hr tablet, TAKE 1 TABLET DAILY IN THE MORNING BEFORE BREAKFAST, Disp: 90 tablet, Rfl: 3  •  Glucosamine-Chondroit-Vit C-Mn (GLUCOSAMINE CHONDR 1500 COMPLX PO), Take  by mouth., Disp: , Rfl:   •  glucose blood test strip, Use as instructed, Disp: 100 each, Rfl: 12  •  ibuprofen (ADVIL,MOTRIN)  "800 MG tablet, Take 800 mg by mouth., Disp: , Rfl:   •  lisinopril (PRINIVIL,ZESTRIL) 40 MG tablet, TAKE 1 TABLET DAILY, Disp: 90 tablet, Rfl: 3  •  lovastatin (MEVACOR) 10 MG tablet, Take 1 tablet by mouth Every Night., Disp: 90 tablet, Rfl: 3  •  melatonin 3 MG tablet, Take  by mouth., Disp: , Rfl:   •  niacin (NIASPAN) 1000 MG CR tablet, TAKE 1 TABLET EVERY NIGHT, Disp: 90 tablet, Rfl: 3  •  Omega-3 Fatty Acids (FISH OIL) 1200 MG capsule capsule, Take  by mouth daily., Disp: , Rfl:   •  pantoprazole (PROTONIX) 40 MG EC tablet, Take 1 tablet by mouth Daily., Disp: 90 tablet, Rfl: 3  •  tobramycin-dexamethasone (TOBRADEX) 0.3-0.1 % ophthalmic suspension, Administer 1 drop into the left eye Every 4 (Four) Hours While Awake., Disp: 10 mL, Rfl: 5  •  amoxicillin-clavulanate (AUGMENTIN) 875-125 MG per tablet, Take 1 tablet by mouth Every 12 (Twelve) Hours., Disp: 20 tablet, Rfl: 0  •  ketorolac (ACULAR) 0.4 % solution, Administer 1 drop to both eyes 4 (Four) Times a Day., Disp: 5 mL, Rfl: 0    ROS    Review of Systems   Constitutional: Negative for chills, fatigue and fever.   HENT: Negative for congestion, postnasal drip and sore throat.         Facial pain   Eyes: Positive for pain, discharge, redness and visual disturbance.   Respiratory: Negative for cough, shortness of breath and wheezing.    Cardiovascular: Negative for chest pain and leg swelling.   Gastrointestinal: Negative for abdominal pain, constipation, diarrhea, nausea and vomiting.   Neurological: Positive for headache. Negative for weakness.       Vitals:    11/02/18 1444   BP: 130/80   BP Location: Left arm   Patient Position: Sitting   Cuff Size: Adult   Pulse: 63   Resp: 18   Temp: 98.6 °F (37 °C)   TempSrc: Temporal Artery    SpO2: 98%   Weight: 89.8 kg (198 lb)   Height: 170.2 cm (67\")     Body mass index is 31.01 kg/m².    Physical Exam     Physical Exam   Constitutional: She is oriented to person, place, and time. She appears well-developed and " well-nourished. No distress.   HENT:   Head: Normocephalic and atraumatic.   Right Ear: External ear normal.   Left Ear: External ear normal.   Nose: Right sinus exhibits no maxillary sinus tenderness and no frontal sinus tenderness. Left sinus exhibits maxillary sinus tenderness. Left sinus exhibits no frontal sinus tenderness.   Eyes: Conjunctivae and EOM are normal.   Redness and swelling to medial periorbital region with trace clear discharge form left eye   Neck: Normal range of motion. Neck supple.   Cardiovascular: Normal rate and regular rhythm.    No murmur heard.  Pulmonary/Chest: Effort normal and breath sounds normal. No respiratory distress. She has no wheezes.   Abdominal: Soft. Bowel sounds are normal. She exhibits no distension. There is no tenderness.   Lymphadenopathy:     She has no cervical adenopathy.   Neurological: She is alert and oriented to person, place, and time. No cranial nerve deficit.   Skin: Skin is warm and dry.   Psychiatric: She has a normal mood and affect.       Assessment/Plan    Problem List Items Addressed This Visit     Acute non-recurrent maxillary sinusitis - Primary     Concern for left maxillary sinusitis.  Will treat with augmentin.         Infection of left tear duct     Continue tobradex.  Encouraged to apply warm compress and massage the area.          Relevant Medications    amoxicillin-clavulanate (AUGMENTIN) 875-125 MG per tablet    Left eye pain     Secondary to infected tear duct.  Will add ketorlac ophthalmic drops to help with pain.                New Medications Ordered This Visit   Medications   • ketorolac (ACULAR) 0.4 % solution     Sig: Administer 1 drop to both eyes 4 (Four) Times a Day.     Dispense:  5 mL     Refill:  0   • amoxicillin-clavulanate (AUGMENTIN) 875-125 MG per tablet     Sig: Take 1 tablet by mouth Every 12 (Twelve) Hours.     Dispense:  20 tablet     Refill:  0       No orders of the defined types were placed in this  encounter.      Return if symptoms worsen or fail to improve.    Shauna Davis, DO

## 2018-11-09 DIAGNOSIS — Z86.39 H/O HYPERLIPIDEMIA: ICD-10-CM

## 2018-11-09 RX ORDER — NIACIN 1000 MG/1
TABLET, EXTENDED RELEASE ORAL
Qty: 90 TABLET | Refills: 3 | Status: ON HOLD | OUTPATIENT
Start: 2018-11-09 | End: 2019-09-16

## 2018-11-14 LAB
ALBUMIN SERPL-MCNC: 4.1 G/DL (ref 3.5–5)
ALBUMIN/GLOB SERPL: 1.4 G/DL (ref 1–2)
ALP SERPL-CCNC: 71 U/L (ref 38–126)
ALT SERPL-CCNC: 30 U/L (ref 13–69)
AST SERPL-CCNC: 25 U/L (ref 15–46)
BASOPHILS # BLD AUTO: 0.02 10*3/MM3 (ref 0–0.2)
BASOPHILS NFR BLD AUTO: 0.4 % (ref 0–2.5)
BILIRUB SERPL-MCNC: 0.8 MG/DL (ref 0.2–1.3)
BUN SERPL-MCNC: 26 MG/DL (ref 7–20)
BUN/CREAT SERPL: 28.9 (ref 7.1–23.5)
CALCIUM SERPL-MCNC: 9.3 MG/DL (ref 8.4–10.2)
CHLORIDE SERPL-SCNC: 100 MMOL/L (ref 98–107)
CHOLEST SERPL-MCNC: 247 MG/DL (ref 0–199)
CO2 SERPL-SCNC: 31 MMOL/L (ref 26–30)
CREAT SERPL-MCNC: 0.9 MG/DL (ref 0.6–1.3)
EOSINOPHIL # BLD AUTO: 0.08 10*3/MM3 (ref 0–0.7)
EOSINOPHIL NFR BLD AUTO: 1.7 % (ref 0–7)
ERYTHROCYTE [DISTWIDTH] IN BLOOD BY AUTOMATED COUNT: 13.5 % (ref 11.5–14.5)
GLOBULIN SER CALC-MCNC: 2.9 GM/DL
GLUCOSE SERPL-MCNC: 158 MG/DL (ref 74–98)
HBA1C MFR BLD: 6.9 %
HCT VFR BLD AUTO: 41.1 % (ref 37–47)
HDLC SERPL-MCNC: 85 MG/DL (ref 40–60)
HGB BLD-MCNC: 12.9 G/DL (ref 12–16)
IMM GRANULOCYTES # BLD: 0.02 10*3/MM3 (ref 0–0.06)
IMM GRANULOCYTES NFR BLD: 0.4 % (ref 0–0.6)
LDLC SERPL CALC-MCNC: 144 MG/DL (ref 0–99)
LYMPHOCYTES # BLD AUTO: 1.81 10*3/MM3 (ref 0.6–3.4)
LYMPHOCYTES NFR BLD AUTO: 38.8 % (ref 10–50)
MCH RBC QN AUTO: 30.3 PG (ref 27–31)
MCHC RBC AUTO-ENTMCNC: 31.4 G/DL (ref 30–37)
MCV RBC AUTO: 96.5 FL (ref 81–99)
MICROALBUMIN UR-MCNC: 7.8 UG/ML
MONOCYTES # BLD AUTO: 0.4 10*3/MM3 (ref 0–0.9)
MONOCYTES NFR BLD AUTO: 8.6 % (ref 0–12)
NEUTROPHILS # BLD AUTO: 2.33 10*3/MM3 (ref 2–6.9)
NEUTROPHILS NFR BLD AUTO: 50.1 % (ref 37–80)
NRBC BLD AUTO-RTO: 0 /100 WBC (ref 0–0)
PLATELET # BLD AUTO: 169 10*3/MM3 (ref 130–400)
POTASSIUM SERPL-SCNC: 4.2 MMOL/L (ref 3.5–5.1)
PROT SERPL-MCNC: 7 G/DL (ref 6.3–8.2)
RBC # BLD AUTO: 4.26 10*6/MM3 (ref 4.2–5.4)
SODIUM SERPL-SCNC: 137 MMOL/L (ref 137–145)
T4 FREE SERPL-MCNC: 1.1 NG/DL (ref 0.78–2.19)
TRIGL SERPL-MCNC: 89 MG/DL
TSH SERPL DL<=0.005 MIU/L-ACNC: 1.84 MIU/ML (ref 0.47–4.68)
VIT B12 SERPL-MCNC: 857 PG/ML (ref 239–931)
VLDLC SERPL CALC-MCNC: 17.8 MG/DL
WBC # BLD AUTO: 4.66 10*3/MM3 (ref 4.8–10.8)

## 2018-11-16 ENCOUNTER — OFFICE VISIT (OUTPATIENT)
Dept: INTERNAL MEDICINE | Facility: CLINIC | Age: 78
End: 2018-11-16

## 2018-11-16 VITALS
SYSTOLIC BLOOD PRESSURE: 99 MMHG | BODY MASS INDEX: 29.82 KG/M2 | TEMPERATURE: 97.5 F | OXYGEN SATURATION: 98 % | HEIGHT: 67 IN | RESPIRATION RATE: 16 BRPM | WEIGHT: 190 LBS | DIASTOLIC BLOOD PRESSURE: 50 MMHG | HEART RATE: 73 BPM

## 2018-11-16 DIAGNOSIS — M25.562 CHRONIC PAIN OF LEFT KNEE: ICD-10-CM

## 2018-11-16 DIAGNOSIS — I10 ESSENTIAL HYPERTENSION: Primary | ICD-10-CM

## 2018-11-16 DIAGNOSIS — G89.29 CHRONIC PAIN OF LEFT KNEE: ICD-10-CM

## 2018-11-16 DIAGNOSIS — E11.9 TYPE 2 DIABETES MELLITUS WITHOUT COMPLICATION, WITHOUT LONG-TERM CURRENT USE OF INSULIN (HCC): ICD-10-CM

## 2018-11-16 PROCEDURE — 99214 OFFICE O/P EST MOD 30 MIN: CPT | Performed by: INTERNAL MEDICINE

## 2018-11-16 PROCEDURE — 20610 DRAIN/INJ JOINT/BURSA W/O US: CPT | Performed by: INTERNAL MEDICINE

## 2018-11-16 RX ORDER — TRIAMCINOLONE ACETONIDE 40 MG/ML
40 INJECTION, SUSPENSION INTRA-ARTICULAR; INTRAMUSCULAR ONCE
Status: COMPLETED | OUTPATIENT
Start: 2018-11-16 | End: 2018-11-16

## 2018-11-16 RX ADMIN — TRIAMCINOLONE ACETONIDE 40 MG: 40 INJECTION, SUSPENSION INTRA-ARTICULAR; INTRAMUSCULAR at 15:36

## 2018-11-16 NOTE — PROGRESS NOTES
Subjective     Patient ID: Radha Whelan is a 78 y.o. female. Patient is here for management of multiple medical problems.     Chief Complaint   Patient presents with   • Hypertension     6 month follow-up   • Pain     patient having left knee pain, she would like to discuss getting a joint injection     Hypertension   This is a chronic problem. The current episode started more than 1 year ago. The problem has been rapidly improving since onset. The problem is controlled. There are no associated agents to hypertension. Past treatments include ACE inhibitors and direct vasodilators. The current treatment provides moderate improvement. There are no compliance problems.  There is no history of angina, kidney disease or CAD/MI. There is no history of chronic renal disease, hyperaldosteronism or hypercortisolism.   Pain   This is a chronic problem. The problem occurs intermittently. The problem has been waxing and waning. Associated symptoms include arthralgias, fatigue and joint swelling. The symptoms are aggravated by bending. She has tried nothing for the symptoms. The treatment provided no relief.          Left keen pain.       The following portions of the patient's history were reviewed and updated as appropriate: allergies, current medications, past family history, past medical history, past social history, past surgical history and problem list.    Review of Systems   Constitutional: Positive for fatigue.   Musculoskeletal: Positive for arthralgias, gait problem and joint swelling.   Psychiatric/Behavioral: Negative for sleep disturbance.   All other systems reviewed and are negative.      Current Outpatient Medications:   •  aspirin 81 MG tablet, Take  by mouth daily., Disp: , Rfl:   •  aspirin-acetaminophen-caffeine (EXCEDRIN MIGRAINE) 250-250-65 MG per tablet, Take  by mouth., Disp: , Rfl:   •  clopidogrel (PLAVIX) 75 MG tablet, TAKE 1 TABLET DAILY, Disp: 90 tablet, Rfl: 3  •  erythromycin (ROMYCIN) 5 MG/GM  "ophthalmic ointment, , Disp: , Rfl: 1  •  folic acid (FOLVITE) 1 MG tablet, TAKE 1 TABLET DAILY, Disp: 90 tablet, Rfl: 3  •  furosemide (LASIX) 20 MG tablet, TAKE 1 TABLET TWICE A DAY, Disp: 180 tablet, Rfl: 1  •  glipiZIDE (GLUCOTROL XL) 2.5 MG 24 hr tablet, TAKE 1 TABLET DAILY, Disp: 90 tablet, Rfl: 3  •  Glucosamine-Chondroit-Vit C-Mn (GLUCOSAMINE CHONDR 1500 COMPLX PO), Take  by mouth., Disp: , Rfl:   •  glucose blood test strip, Use as instructed, Disp: 100 each, Rfl: 12  •  ibuprofen (ADVIL,MOTRIN) 800 MG tablet, Take 800 mg by mouth., Disp: , Rfl:   •  ketorolac (ACULAR) 0.4 % solution, Administer 1 drop to both eyes 4 (Four) Times a Day., Disp: 5 mL, Rfl: 0  •  lisinopril (PRINIVIL,ZESTRIL) 40 MG tablet, TAKE 1 TABLET DAILY, Disp: 90 tablet, Rfl: 3  •  lovastatin (MEVACOR) 10 MG tablet, Take 1 tablet by mouth Every Night., Disp: 90 tablet, Rfl: 3  •  melatonin 3 MG tablet, Take  by mouth., Disp: , Rfl:   •  niacin (NIASPAN) 1000 MG CR tablet, TAKE 1 TABLET EVERY NIGHT, Disp: 90 tablet, Rfl: 3  •  Omega-3 Fatty Acids (FISH OIL) 1200 MG capsule capsule, Take  by mouth daily., Disp: , Rfl:   •  pantoprazole (PROTONIX) 40 MG EC tablet, Take 1 tablet by mouth Daily., Disp: 90 tablet, Rfl: 3  •  tobramycin-dexamethasone (TOBRADEX) 0.3-0.1 % ophthalmic suspension, Administer 1 drop into the left eye Every 4 (Four) Hours While Awake., Disp: 10 mL, Rfl: 5    Current Facility-Administered Medications:   •  triamcinolone acetonide (KENALOG-40) injection 40 mg, 40 mg, Intra-articular, Once, Farhan Schaefer MD    Objective      Blood pressure 99/50, pulse 73, temperature 97.5 °F (36.4 °C), temperature source Oral, resp. rate 16, height 170.2 cm (67\"), weight 86.2 kg (190 lb), SpO2 98 %.    Physical Exam     General Appearance:    Alert, cooperative, no distress, appears stated age   Head:    Normocephalic, without obvious abnormality, atraumatic   Eyes:    PERRL, conjunctiva/corneas clear, EOM's intact   Ears:    " Normal TM's and external ear canals, both ears   Nose:   Nares normal, septum midline, mucosa normal, no drainage   or sinus tenderness   Throat:   Lips, mucosa, and tongue normal; teeth and gums normal   Neck:   Supple, symmetrical, trachea midline, no adenopathy;        thyroid:  No enlargement/tenderness/nodules; no carotid    bruit or JVD   Back:     Symmetric, no curvature, ROM normal, no CVA tenderness   Lungs:     Clear to auscultation bilaterally, respirations unlabored   Chest wall:    No tenderness or deformity   Heart:    Regular rate and rhythm, S1 and S2 normal, no murmur,        rub or gallop   Abdomen:     Soft, non-tender, bowel sounds active all four quadrants,     no masses, no organomegaly   Extremities:   Extremities normal, atraumatic, no cyanosis or edema   Pulses:   2+ and symmetric all extremities   Skin:   Skin color, texture, turgor normal, no rashes or lesions   Lymph nodes:   Cervical, supraclavicular, and axillary nodes normal   Neurologic:   CNII-XII intact. Normal strength, sensation and reflexes       throughout      Results for orders placed or performed in visit on 10/17/18   T4, Free   Result Value Ref Range    Free T4 1.10 0.78 - 2.19 ng/dL   TSH   Result Value Ref Range    TSH 1.840 0.470 - 4.680 mIU/mL   Comprehensive Metabolic Panel   Result Value Ref Range    Glucose 158 (H) 74 - 98 mg/dL    BUN 26 (H) 7 - 20 mg/dL    Creatinine 0.90 0.60 - 1.30 mg/dL    eGFR Non African Am 60 (L) >60 mL/min/1.73    eGFR African Am 73 >60 mL/min/1.73    BUN/Creatinine Ratio 28.9 (H) 7.1 - 23.5    Sodium 137 137 - 145 mmol/L    Potassium 4.2 3.5 - 5.1 mmol/L    Chloride 100 98 - 107 mmol/L    Total CO2 31.0 (H) 26.0 - 30.0 mmol/L    Calcium 9.3 8.4 - 10.2 mg/dL    Total Protein 7.0 6.3 - 8.2 g/dL    Albumin 4.10 3.50 - 5.00 g/dL    Globulin 2.9 gm/dL    A/G Ratio 1.4 1.0 - 2.0 g/dL    Total Bilirubin 0.8 0.2 - 1.3 mg/dL    Alkaline Phosphatase 71 38 - 126 U/L    AST (SGOT) 25 15 - 46 U/L    ALT  (SGPT) 30 13 - 69 U/L   Vitamin B12   Result Value Ref Range    Vitamin B-12 857 239 - 931 pg/mL   Lipid Panel   Result Value Ref Range    Total Cholesterol 247 (H) 0 - 199 mg/dL    Triglycerides 89 <150 mg/dL    HDL Cholesterol 85 (H) 40 - 60 mg/dL    VLDL Cholesterol 17.8 mg/dL    LDL Cholesterol  144 (H) 0 - 99 mg/dL   Hemoglobin A1c   Result Value Ref Range    Hemoglobin A1C 6.90 %   MicroAlbumin, Urine, Random - Urine, Clean Catch   Result Value Ref Range    Microalbumin, Urine 7.8 Not Estab. ug/mL   CBC & Differential   Result Value Ref Range    WBC 4.66 (L) 4.80 - 10.80 10*3/mm3    RBC 4.26 4.20 - 5.40 10*6/mm3    Hemoglobin 12.9 12.0 - 16.0 g/dL    Hematocrit 41.1 37.0 - 47.0 %    MCV 96.5 81.0 - 99.0 fL    MCH 30.3 27.0 - 31.0 pg    MCHC 31.4 30.0 - 37.0 g/dL    RDW 13.5 11.5 - 14.5 %    Platelets 169 130 - 400 10*3/mm3    Neutrophil Rel % 50.1 37.0 - 80.0 %    Lymphocyte Rel % 38.8 10.0 - 50.0 %    Monocyte Rel % 8.6 0.0 - 12.0 %    Eosinophil Rel % 1.7 0.0 - 7.0 %    Basophil Rel % 0.4 0.0 - 2.5 %    Neutrophils Absolute 2.33 2.00 - 6.90 10*3/mm3    Lymphocytes Absolute 1.81 0.60 - 3.40 10*3/mm3    Monocytes Absolute 0.40 0.00 - 0.90 10*3/mm3    Eosinophils Absolute 0.08 0.00 - 0.70 10*3/mm3    Basophils Absolute 0.02 0.00 - 0.20 10*3/mm3    Immature Granulocyte Rel % 0.4 0.0 - 0.6 %    Immature Grans Absolute 0.02 0.00 - 0.06 10*3/mm3    nRBC 0.0 0.0 - 0.0 /100 WBC         Assessment/Plan   Needs hep a vac.     Knee inj. left  Indication: pain.   Pt consented. Area prepped.  left knee inj with kenolog 40mg 1 cc and lidocaine 1 cc 1%.  Pt tolerated procedure well.          Radha was seen today for hypertension and pain.    Diagnoses and all orders for this visit:    Essential hypertension  -     Lipid Panel  -     CBC & Differential  -     Vitamin B12  -     Comprehensive Metabolic Panel  -     TSH  -     T4, Free  -     Hemoglobin A1c  -     MicroAlbumin, Urine, Random - Urine, Clean Catch    Type 2  diabetes mellitus without complication, without long-term current use of insulin (CMS/[facility])  -     Lipid Panel  -     CBC & Differential  -     Vitamin B12  -     Comprehensive Metabolic Panel  -     TSH  -     T4, Free  -     Hemoglobin A1c  -     MicroAlbumin, Urine, Random - Urine, Clean Catch    Chronic pain of left knee  -     triamcinolone acetonide (KENALOG-40) injection 40 mg; Inject 1 mL into the appropriate joint as directed by provider 1 (One) Time.      Return in about 4 months (around 3/16/2019).          There are no Patient Instructions on file for this visit.     Farhan Schaefer MD    Assessment/Plan

## 2018-11-26 RX ORDER — GLIPIZIDE 2.5 MG/1
2.5 TABLET, EXTENDED RELEASE ORAL DAILY
Qty: 90 TABLET | Refills: 3 | Status: SHIPPED | OUTPATIENT
Start: 2018-11-26 | End: 2019-12-06 | Stop reason: HOSPADM

## 2018-11-26 RX ORDER — GLIPIZIDE 2.5 MG/1
TABLET, EXTENDED RELEASE ORAL
Qty: 90 TABLET | Refills: 3 | OUTPATIENT
Start: 2018-11-26

## 2018-12-07 ENCOUNTER — OFFICE VISIT (OUTPATIENT)
Dept: INTERNAL MEDICINE | Facility: CLINIC | Age: 78
End: 2018-12-07

## 2018-12-07 VITALS
BODY MASS INDEX: 29.66 KG/M2 | HEIGHT: 67 IN | WEIGHT: 189 LBS | DIASTOLIC BLOOD PRESSURE: 64 MMHG | OXYGEN SATURATION: 97 % | SYSTOLIC BLOOD PRESSURE: 124 MMHG | TEMPERATURE: 95.8 F | HEART RATE: 93 BPM

## 2018-12-07 DIAGNOSIS — J01.00 ACUTE NON-RECURRENT MAXILLARY SINUSITIS: Primary | ICD-10-CM

## 2018-12-07 PROCEDURE — 99213 OFFICE O/P EST LOW 20 MIN: CPT | Performed by: PHYSICIAN ASSISTANT

## 2018-12-07 RX ORDER — AMOXICILLIN AND CLAVULANATE POTASSIUM 875; 125 MG/1; MG/1
1 TABLET, FILM COATED ORAL 2 TIMES DAILY
Qty: 14 TABLET | Refills: 0 | Status: SHIPPED | OUTPATIENT
Start: 2018-12-07 | End: 2018-12-14

## 2018-12-07 NOTE — PROGRESS NOTES
Subjective     Chief Complaint: sinus pressure    History of Present Illness     Radha Whelan is a 78 y.o. female presenting with complaints of fatigue, sinus pressure, headache, irritated throat, congestion, cough.  She has been using Mucinex and Coricidin HBP at home.  Patient also took a few pills of left upper Augmentin.  Does note improvement over the past few days.  She denies any fevers, chest pain, shortness breath, nausea, vomiting, diarrhea.  Still eating and drinking well.    The following portions of the patient's history were reviewed and updated as appropriate: current medications, allergies, PMH.    Review of Systems   Constitutional: Positive for fatigue. Negative for appetite change, chills, fever and unexpected weight change.   HENT: Positive for congestion, sinus pressure and sinus pain. Negative for ear pain, hearing loss, nosebleeds, sore throat, tinnitus and trouble swallowing.    Eyes: Negative for pain, discharge, redness, itching and visual disturbance.   Respiratory: Positive for cough. Negative for chest tightness, shortness of breath and wheezing.    Cardiovascular: Negative for chest pain, palpitations and leg swelling.   Gastrointestinal: Negative for abdominal pain, blood in stool, constipation, diarrhea, nausea and vomiting.   Endocrine: Negative for cold intolerance, heat intolerance, polydipsia, polyphagia and polyuria.   Genitourinary: Negative for decreased urine volume, dysuria, flank pain, frequency and hematuria.   Musculoskeletal: Negative for arthralgias, back pain, gait problem, joint swelling, myalgias, neck pain and neck stiffness.   Skin: Negative for color change and rash.   Allergic/Immunologic: Negative for environmental allergies, food allergies and immunocompromised state.   Neurological: Positive for headaches. Negative for dizziness, syncope, weakness and light-headedness.   Hematological: Negative for adenopathy. Does not bruise/bleed easily.  "  Psychiatric/Behavioral: Negative for dysphoric mood, sleep disturbance and suicidal ideas. The patient is not nervous/anxious.      Objective     Vitals:    12/07/18 1401   BP: 124/64   Pulse: 93   Temp: 95.8 °F (35.4 °C)   SpO2: 97%   Weight: 85.7 kg (189 lb)   Height: 170.2 cm (67.01\")       Physical Exam   Constitutional: Appears well-developed and well-nourished.   HENT:   Ears: TMs dull bilaterally  Nose: Maxillary sinus ttp  Mouth/Throat: OP erythema  Eyes: EOM are normal. Pupils are equal, round, and reactive to light.   Neck: Normal range of motion. Neck supple.   Cardiovascular: Normal rate, regular rhythm.  Pulmonary/Chest: Effort normal and breath sounds normal.   Abdominal: Soft. Bowel sounds are normal.   Musculoskeletal: Normal range of motion.   Lymphadenopathy: No cervical adenopathy noted.   Neurological: Alert and oriented to person, place, and time.   Skin: Skin is warm and dry.   Psychiatric: Exhibits a normal mood and affect.     Assessment/Plan     Diagnoses and all orders for this visit:    Acute non-recurrent maxillary sinusitis  -     amoxicillin-clavulanate (AUGMENTIN) 875-125 MG per tablet; Take 1 tablet by mouth 2 (Two) Times a Day for 7 days.    Continue with coricidin HBP prn cough, mucinex, increase water intake.    Myla Guo PA-C  12/07/2018         Please note that portions of this note were completed with a voice recognition program. Efforts were made to edit dictation, but occasionally words are mistranscribed.  "

## 2018-12-26 RX ORDER — FOLIC ACID 1 MG/1
TABLET ORAL
Qty: 90 TABLET | Refills: 3 | Status: SHIPPED | OUTPATIENT
Start: 2018-12-26 | End: 2019-12-23

## 2019-01-11 ENCOUNTER — TELEPHONE (OUTPATIENT)
Dept: INTERNAL MEDICINE | Facility: CLINIC | Age: 79
End: 2019-01-11

## 2019-01-11 ENCOUNTER — OFFICE VISIT (OUTPATIENT)
Dept: INTERNAL MEDICINE | Facility: CLINIC | Age: 79
End: 2019-01-11

## 2019-01-11 VITALS
TEMPERATURE: 97.6 F | HEIGHT: 67 IN | HEART RATE: 63 BPM | OXYGEN SATURATION: 99 % | BODY MASS INDEX: 29.66 KG/M2 | SYSTOLIC BLOOD PRESSURE: 136 MMHG | DIASTOLIC BLOOD PRESSURE: 62 MMHG | WEIGHT: 189 LBS | RESPIRATION RATE: 16 BRPM

## 2019-01-11 DIAGNOSIS — J40 BRONCHITIS: Primary | ICD-10-CM

## 2019-01-11 PROCEDURE — 99213 OFFICE O/P EST LOW 20 MIN: CPT | Performed by: INTERNAL MEDICINE

## 2019-01-11 RX ORDER — DOXYCYCLINE 100 MG/1
100 CAPSULE ORAL EVERY 12 HOURS SCHEDULED
Qty: 20 CAPSULE | Refills: 0 | Status: SHIPPED | OUTPATIENT
Start: 2019-01-11 | End: 2019-04-01

## 2019-01-11 RX ORDER — METHYLPREDNISOLONE 4 MG/1
TABLET ORAL
Qty: 21 EACH | Refills: 0 | Status: SHIPPED | OUTPATIENT
Start: 2019-01-11 | End: 2019-04-01

## 2019-01-11 NOTE — PROGRESS NOTES
Subjective     Patient ID: Radha Whelan is a 78 y.o. female. Patient is here for management of multiple medical problems.     Chief Complaint   Patient presents with   • Cough     patient has a dry cough x 1 week,wheezing x 1day,  runny nose x 1 day      History of Present Illness       Dry cough x 1 week. 3 weeks of amoxil prior to this.   No fever no chills. No sig soa.    Nasal congestion and PND.   Taking whole bottle of mucinex and cloceedin night time.        The following portions of the patient's history were reviewed and updated as appropriate: allergies, current medications, past family history, past medical history, past social history, past surgical history and problem list.    Review of Systems   Constitutional: Positive for fatigue.   Respiratory: Positive for cough. Negative for shortness of breath and wheezing.    All other systems reviewed and are negative.      Current Outpatient Medications:   •  aspirin 81 MG tablet, Take  by mouth daily., Disp: , Rfl:   •  aspirin-acetaminophen-caffeine (EXCEDRIN MIGRAINE) 250-250-65 MG per tablet, Take  by mouth., Disp: , Rfl:   •  clopidogrel (PLAVIX) 75 MG tablet, TAKE 1 TABLET DAILY, Disp: 90 tablet, Rfl: 3  •  erythromycin (ROMYCIN) 5 MG/GM ophthalmic ointment, , Disp: , Rfl: 1  •  folic acid (FOLVITE) 1 MG tablet, TAKE 1 TABLET DAILY, Disp: 90 tablet, Rfl: 3  •  furosemide (LASIX) 20 MG tablet, TAKE 1 TABLET TWICE A DAY, Disp: 180 tablet, Rfl: 1  •  glipiZIDE (GLUCOTROL XL) 2.5 MG 24 hr tablet, Take 1 tablet by mouth Daily., Disp: 90 tablet, Rfl: 3  •  Glucosamine-Chondroit-Vit C-Mn (GLUCOSAMINE CHONDR 1500 COMPLX PO), Take  by mouth., Disp: , Rfl:   •  glucose blood test strip, Use as instructed, Disp: 100 each, Rfl: 12  •  ibuprofen (ADVIL,MOTRIN) 800 MG tablet, Take 800 mg by mouth., Disp: , Rfl:   •  ketorolac (ACULAR) 0.4 % solution, Administer 1 drop to both eyes 4 (Four) Times a Day., Disp: 5 mL, Rfl: 0  •  lisinopril (PRINIVIL,ZESTRIL) 40 MG tablet,  "TAKE 1 TABLET DAILY, Disp: 90 tablet, Rfl: 3  •  lovastatin (MEVACOR) 10 MG tablet, Take 1 tablet by mouth Every Night., Disp: 90 tablet, Rfl: 3  •  melatonin 3 MG tablet, Take  by mouth., Disp: , Rfl:   •  niacin (NIASPAN) 1000 MG CR tablet, TAKE 1 TABLET EVERY NIGHT, Disp: 90 tablet, Rfl: 3  •  Omega-3 Fatty Acids (FISH OIL) 1200 MG capsule capsule, Take  by mouth daily., Disp: , Rfl:   •  pantoprazole (PROTONIX) 40 MG EC tablet, Take 1 tablet by mouth Daily., Disp: 90 tablet, Rfl: 3  •  tobramycin-dexamethasone (TOBRADEX) 0.3-0.1 % ophthalmic suspension, Administer 1 drop into the left eye Every 4 (Four) Hours While Awake., Disp: 10 mL, Rfl: 5  •  doxycycline (MONODOX) 100 MG capsule, Take 1 capsule by mouth Every 12 (Twelve) Hours., Disp: 20 capsule, Rfl: 0  •  MethylPREDNISolone (MEDROL, PETR,) 4 MG tablet, Take as directed on package instructions., Disp: 21 each, Rfl: 0    Objective      Blood pressure 136/62, pulse 63, temperature 97.6 °F (36.4 °C), temperature source Oral, resp. rate 16, height 170.2 cm (67\"), weight 85.7 kg (189 lb), SpO2 99 %.    Physical Exam     General Appearance:    Alert, cooperative, no distress, appears stated age   Head:    Normocephalic, without obvious abnormality, atraumatic   Eyes:    PERRL, conjunctiva/corneas clear, EOM's intact   Ears:    Normal TM's and external ear canals, both ears   Nose:   Nares normal, septum midline, mucosa normal, no drainage   or sinus tenderness   Throat:   Lips, mucosa, and tongue normal; teeth and gums normal   Neck:   Supple, symmetrical, trachea midline, no adenopathy;        thyroid:  No enlargement/tenderness/nodules; no carotid    bruit or JVD   Back:     Symmetric, no curvature, ROM normal, no CVA tenderness   Lungs:     Wheezing on forced expritation to auscultation bilaterally, respirations unlabored   Chest wall:    No tenderness or deformity   Heart:    Regular rate and rhythm, S1 and S2 normal, no murmur,        rub or gallop   Abdomen: "     Soft, non-tender, bowel sounds active all four quadrants,     no masses, no organomegaly   Extremities:   Extremities normal, atraumatic, no cyanosis or edema   Pulses:   2+ and symmetric all extremities   Skin:   Skin color, texture, turgor normal, no rashes or lesions   Lymph nodes:   Cervical, supraclavicular, and axillary nodes normal   Neurologic:   CNII-XII intact. Normal strength, sensation and reflexes       throughout      Results for orders placed or performed in visit on 10/17/18   T4, Free   Result Value Ref Range    Free T4 1.10 0.78 - 2.19 ng/dL   TSH   Result Value Ref Range    TSH 1.840 0.470 - 4.680 mIU/mL   Comprehensive Metabolic Panel   Result Value Ref Range    Glucose 158 (H) 74 - 98 mg/dL    BUN 26 (H) 7 - 20 mg/dL    Creatinine 0.90 0.60 - 1.30 mg/dL    eGFR Non African Am 60 (L) >60 mL/min/1.73    eGFR African Am 73 >60 mL/min/1.73    BUN/Creatinine Ratio 28.9 (H) 7.1 - 23.5    Sodium 137 137 - 145 mmol/L    Potassium 4.2 3.5 - 5.1 mmol/L    Chloride 100 98 - 107 mmol/L    Total CO2 31.0 (H) 26.0 - 30.0 mmol/L    Calcium 9.3 8.4 - 10.2 mg/dL    Total Protein 7.0 6.3 - 8.2 g/dL    Albumin 4.10 3.50 - 5.00 g/dL    Globulin 2.9 gm/dL    A/G Ratio 1.4 1.0 - 2.0 g/dL    Total Bilirubin 0.8 0.2 - 1.3 mg/dL    Alkaline Phosphatase 71 38 - 126 U/L    AST (SGOT) 25 15 - 46 U/L    ALT (SGPT) 30 13 - 69 U/L   Vitamin B12   Result Value Ref Range    Vitamin B-12 857 239 - 931 pg/mL   Lipid Panel   Result Value Ref Range    Total Cholesterol 247 (H) 0 - 199 mg/dL    Triglycerides 89 <150 mg/dL    HDL Cholesterol 85 (H) 40 - 60 mg/dL    VLDL Cholesterol 17.8 mg/dL    LDL Cholesterol  144 (H) 0 - 99 mg/dL   Hemoglobin A1c   Result Value Ref Range    Hemoglobin A1C 6.90 %   MicroAlbumin, Urine, Random - Urine, Clean Catch   Result Value Ref Range    Microalbumin, Urine 7.8 Not Estab. ug/mL   CBC & Differential   Result Value Ref Range    WBC 4.66 (L) 4.80 - 10.80 10*3/mm3    RBC 4.26 4.20 - 5.40  10*6/mm3    Hemoglobin 12.9 12.0 - 16.0 g/dL    Hematocrit 41.1 37.0 - 47.0 %    MCV 96.5 81.0 - 99.0 fL    MCH 30.3 27.0 - 31.0 pg    MCHC 31.4 30.0 - 37.0 g/dL    RDW 13.5 11.5 - 14.5 %    Platelets 169 130 - 400 10*3/mm3    Neutrophil Rel % 50.1 37.0 - 80.0 %    Lymphocyte Rel % 38.8 10.0 - 50.0 %    Monocyte Rel % 8.6 0.0 - 12.0 %    Eosinophil Rel % 1.7 0.0 - 7.0 %    Basophil Rel % 0.4 0.0 - 2.5 %    Neutrophils Absolute 2.33 2.00 - 6.90 10*3/mm3    Lymphocytes Absolute 1.81 0.60 - 3.40 10*3/mm3    Monocytes Absolute 0.40 0.00 - 0.90 10*3/mm3    Eosinophils Absolute 0.08 0.00 - 0.70 10*3/mm3    Basophils Absolute 0.02 0.00 - 0.20 10*3/mm3    Immature Granulocyte Rel % 0.4 0.0 - 0.6 %    Immature Grans Absolute 0.02 0.00 - 0.06 10*3/mm3    nRBC 0.0 0.0 - 0.0 /100 WBC         Assessment/Plan       Radha was seen today for cough.    Diagnoses and all orders for this visit:    Bronchitis  -     doxycycline (MONODOX) 100 MG capsule; Take 1 capsule by mouth Every 12 (Twelve) Hours.  -     MethylPREDNISolone (MEDROL, PETR,) 4 MG tablet; Take as directed on package instructions.      Return if symptoms worsen or fail to improve.          There are no Patient Instructions on file for this visit.     Farhan Schaefer MD    Assessment/Plan

## 2019-02-05 RX ORDER — FUROSEMIDE 20 MG/1
TABLET ORAL
Qty: 180 TABLET | Refills: 1 | Status: SHIPPED | OUTPATIENT
Start: 2019-02-05 | End: 2019-07-31 | Stop reason: SDUPTHER

## 2019-03-28 LAB
ALBUMIN SERPL-MCNC: 3.8 G/DL (ref 3.5–5)
ALBUMIN/GLOB SERPL: 1.2 G/DL (ref 1–2)
ALP SERPL-CCNC: 64 U/L (ref 38–126)
ALT SERPL-CCNC: 27 U/L (ref 13–69)
AST SERPL-CCNC: 26 U/L (ref 15–46)
BASOPHILS # BLD AUTO: 0.02 10*3/MM3 (ref 0–0.2)
BASOPHILS NFR BLD AUTO: 0.4 % (ref 0–1.5)
BILIRUB SERPL-MCNC: 0.6 MG/DL (ref 0.2–1.3)
BUN SERPL-MCNC: 14 MG/DL (ref 7–20)
BUN/CREAT SERPL: 20 (ref 7.1–23.5)
CALCIUM SERPL-MCNC: 9.3 MG/DL (ref 8.4–10.2)
CHLORIDE SERPL-SCNC: 94 MMOL/L (ref 98–107)
CHOLEST SERPL-MCNC: 229 MG/DL (ref 0–199)
CO2 SERPL-SCNC: 30 MMOL/L (ref 26–30)
CREAT SERPL-MCNC: 0.7 MG/DL (ref 0.6–1.3)
EOSINOPHIL # BLD AUTO: 0.2 10*3/MM3 (ref 0–0.4)
EOSINOPHIL NFR BLD AUTO: 4.4 % (ref 0.3–6.2)
ERYTHROCYTE [DISTWIDTH] IN BLOOD BY AUTOMATED COUNT: 13.2 % (ref 12.3–15.4)
GLOBULIN SER CALC-MCNC: 3.1 GM/DL
GLUCOSE SERPL-MCNC: 142 MG/DL (ref 74–98)
HBA1C MFR BLD: 7 % (ref 4.8–5.6)
HCT VFR BLD AUTO: 39.8 % (ref 34–46.6)
HDLC SERPL-MCNC: 87 MG/DL (ref 40–60)
HGB BLD-MCNC: 13.2 G/DL (ref 12–15.9)
IMM GRANULOCYTES # BLD AUTO: 0.01 10*3/MM3 (ref 0–0.05)
IMM GRANULOCYTES NFR BLD AUTO: 0.2 % (ref 0–0.5)
LDLC SERPL CALC-MCNC: 119 MG/DL (ref 0–99)
LYMPHOCYTES # BLD AUTO: 1.91 10*3/MM3 (ref 0.7–3.1)
LYMPHOCYTES NFR BLD AUTO: 42.3 % (ref 19.6–45.3)
MCH RBC QN AUTO: 32.1 PG (ref 26.6–33)
MCHC RBC AUTO-ENTMCNC: 33.2 G/DL (ref 31.5–35.7)
MCV RBC AUTO: 96.8 FL (ref 79–97)
MONOCYTES # BLD AUTO: 0.44 10*3/MM3 (ref 0.1–0.9)
MONOCYTES NFR BLD AUTO: 9.7 % (ref 5–12)
NEUTROPHILS # BLD AUTO: 1.94 10*3/MM3 (ref 1.4–7)
NEUTROPHILS NFR BLD AUTO: 43 % (ref 42.7–76)
NRBC BLD AUTO-RTO: 0 /100 WBC (ref 0–0)
PLATELET # BLD AUTO: 162 10*3/MM3 (ref 140–450)
POTASSIUM SERPL-SCNC: 4.1 MMOL/L (ref 3.5–5.1)
PROT SERPL-MCNC: 6.9 G/DL (ref 6.3–8.2)
RBC # BLD AUTO: 4.11 10*6/MM3 (ref 3.77–5.28)
SODIUM SERPL-SCNC: 134 MMOL/L (ref 137–145)
T4 FREE SERPL-MCNC: 1.19 NG/DL (ref 0.78–2.19)
TRIGL SERPL-MCNC: 116 MG/DL
TSH SERPL DL<=0.005 MIU/L-ACNC: 2.52 MIU/ML (ref 0.47–4.68)
UNABLE TO VOID: NORMAL
VIT B12 SERPL-MCNC: 867 PG/ML (ref 239–931)
VLDLC SERPL CALC-MCNC: 23.2 MG/DL
WBC # BLD AUTO: 4.52 10*3/MM3 (ref 3.4–10.8)

## 2019-04-01 ENCOUNTER — OFFICE VISIT (OUTPATIENT)
Dept: INTERNAL MEDICINE | Facility: CLINIC | Age: 79
End: 2019-04-01

## 2019-04-01 VITALS
SYSTOLIC BLOOD PRESSURE: 118 MMHG | BODY MASS INDEX: 30.29 KG/M2 | RESPIRATION RATE: 16 BRPM | TEMPERATURE: 97.7 F | HEIGHT: 67 IN | WEIGHT: 193 LBS | HEART RATE: 81 BPM | OXYGEN SATURATION: 100 % | DIASTOLIC BLOOD PRESSURE: 67 MMHG

## 2019-04-01 DIAGNOSIS — I10 ESSENTIAL HYPERTENSION: ICD-10-CM

## 2019-04-01 DIAGNOSIS — E11.9 TYPE 2 DIABETES MELLITUS WITHOUT COMPLICATION, WITHOUT LONG-TERM CURRENT USE OF INSULIN (HCC): ICD-10-CM

## 2019-04-01 DIAGNOSIS — G89.29 CHRONIC PAIN OF LEFT KNEE: Primary | ICD-10-CM

## 2019-04-01 DIAGNOSIS — M25.562 CHRONIC PAIN OF LEFT KNEE: Primary | ICD-10-CM

## 2019-04-01 PROCEDURE — 99214 OFFICE O/P EST MOD 30 MIN: CPT | Performed by: INTERNAL MEDICINE

## 2019-04-01 PROCEDURE — 20610 DRAIN/INJ JOINT/BURSA W/O US: CPT | Performed by: INTERNAL MEDICINE

## 2019-04-01 RX ORDER — TRIAMCINOLONE ACETONIDE 40 MG/ML
40 INJECTION, SUSPENSION INTRA-ARTICULAR; INTRAMUSCULAR ONCE
Status: COMPLETED | OUTPATIENT
Start: 2019-04-01 | End: 2019-04-01

## 2019-04-01 RX ADMIN — TRIAMCINOLONE ACETONIDE 40 MG: 40 INJECTION, SUSPENSION INTRA-ARTICULAR; INTRAMUSCULAR at 15:41

## 2019-04-01 NOTE — PROGRESS NOTES
Knee inj.   Indication: pain.   Pt consented. Area prepped.  left knee inj with kenolog 40mg 1 cc and lidocaine 1 cc 1%.  Pt tolerated procedure well.

## 2019-04-01 NOTE — PROGRESS NOTES
Subjective     Patient ID: Radha Whelan is a 78 y.o. female. Patient is here for management of multiple medical problems.     Chief Complaint   Patient presents with   • Hypertension     follow-up   • Knee Pain     patient having left knee pain, would like to get a joint injection     Hypertension   This is a chronic problem. The current episode started more than 1 year ago. The problem has been waxing and waning since onset. The problem is controlled. Pertinent negatives include no headaches or shortness of breath. Current antihypertension treatment includes ACE inhibitors. The current treatment provides significant improvement. There are no compliance problems.  There is no history of angina, kidney disease or CAD/MI. There is no history of chronic renal disease, hyperaldosteronism or hypercortisolism.   Knee Pain    The incident occurred more than 1 week ago. The incident occurred at home. There was no injury mechanism. The pain is at a severity of 4/10. She reports no foreign bodies present. The treatment provided moderate relief.   Diabetes   She presents for her follow-up diabetic visit. She has type 2 diabetes mellitus. Her disease course has been stable. Pertinent negatives for hypoglycemia include no dizziness, headaches, hunger or nervousness/anxiousness. Associated symptoms include fatigue. Pertinent negatives for hypoglycemia complications include no blackouts. Symptoms are stable. Risk factors for coronary artery disease include diabetes mellitus. Her weight is stable. There is no change in her home blood glucose trend. An ACE inhibitor/angiotensin II receptor blocker is being taken.        The following portions of the patient's history were reviewed and updated as appropriate: allergies, current medications, past family history, past medical history, past social history, past surgical history and problem list.    Review of Systems   Constitutional: Positive for fatigue.   Respiratory: Negative for  shortness of breath.    Musculoskeletal: Positive for arthralgias, gait problem and joint swelling. Negative for back pain.   Neurological: Negative for dizziness and headaches.   Psychiatric/Behavioral: Negative for self-injury and sleep disturbance. The patient is not nervous/anxious and is not hyperactive.    All other systems reviewed and are negative.      Current Outpatient Medications:   •  aspirin 81 MG tablet, Take  by mouth daily., Disp: , Rfl:   •  aspirin-acetaminophen-caffeine (EXCEDRIN MIGRAINE) 250-250-65 MG per tablet, Take  by mouth., Disp: , Rfl:   •  clopidogrel (PLAVIX) 75 MG tablet, TAKE 1 TABLET DAILY, Disp: 90 tablet, Rfl: 3  •  erythromycin (ROMYCIN) 5 MG/GM ophthalmic ointment, , Disp: , Rfl: 1  •  folic acid (FOLVITE) 1 MG tablet, TAKE 1 TABLET DAILY, Disp: 90 tablet, Rfl: 3  •  furosemide (LASIX) 20 MG tablet, TAKE 1 TABLET TWICE A DAY, Disp: 180 tablet, Rfl: 1  •  glipiZIDE (GLUCOTROL XL) 2.5 MG 24 hr tablet, Take 1 tablet by mouth Daily., Disp: 90 tablet, Rfl: 3  •  Glucosamine-Chondroit-Vit C-Mn (GLUCOSAMINE CHONDR 1500 COMPLX PO), Take  by mouth., Disp: , Rfl:   •  glucose blood test strip, Use as instructed, Disp: 100 each, Rfl: 12  •  ibuprofen (ADVIL,MOTRIN) 800 MG tablet, Take 800 mg by mouth., Disp: , Rfl:   •  ketorolac (ACULAR) 0.4 % solution, Administer 1 drop to both eyes 4 (Four) Times a Day., Disp: 5 mL, Rfl: 0  •  lisinopril (PRINIVIL,ZESTRIL) 40 MG tablet, TAKE 1 TABLET DAILY, Disp: 90 tablet, Rfl: 3  •  lovastatin (MEVACOR) 10 MG tablet, Take 1 tablet by mouth Every Night., Disp: 90 tablet, Rfl: 3  •  melatonin 3 MG tablet, Take  by mouth., Disp: , Rfl:   •  niacin (NIASPAN) 1000 MG CR tablet, TAKE 1 TABLET EVERY NIGHT, Disp: 90 tablet, Rfl: 3  •  Omega-3 Fatty Acids (FISH OIL) 1200 MG capsule capsule, Take  by mouth daily., Disp: , Rfl:   •  pantoprazole (PROTONIX) 40 MG EC tablet, Take 1 tablet by mouth Daily., Disp: 90 tablet, Rfl: 3  •  tobramycin-dexamethasone  "(TOBRADEX) 0.3-0.1 % ophthalmic suspension, Administer 1 drop into the left eye Every 4 (Four) Hours While Awake., Disp: 10 mL, Rfl: 5    Current Facility-Administered Medications:   •  triamcinolone acetonide (KENALOG-40) injection 40 mg, 40 mg, Intra-articular, Once, Farhan Schaefer MD    Objective      Blood pressure 118/67, pulse 81, temperature 97.7 °F (36.5 °C), temperature source Oral, resp. rate 16, height 170.2 cm (67\"), weight 87.5 kg (193 lb), SpO2 100 %.    Physical Exam     General Appearance:    Alert, cooperative, no distress, appears stated age   Head:    Normocephalic, without obvious abnormality, atraumatic   Eyes:    PERRL, conjunctiva/corneas clear, EOM's intact   Ears:    Normal TM's and external ear canals, both ears   Nose:   Nares normal, septum midline, mucosa normal, no drainage   or sinus tenderness   Throat:   Lips, mucosa, and tongue normal; teeth and gums normal   Neck:   Supple, symmetrical, trachea midline, no adenopathy;        thyroid:  No enlargement/tenderness/nodules; no carotid    bruit or JVD   Back:     Symmetric, no curvature, ROM normal, no CVA tenderness   Lungs:     Clear to auscultation bilaterally, respirations unlabored   Chest wall:    No tenderness or deformity   Heart:    Regular rate and rhythm, S1 and S2 normal, no murmur,        rub or gallop   Abdomen:     Soft, non-tender, bowel sounds active all four quadrants,     no masses, no organomegaly   Extremities:   Extremities normal, atraumatic, no cyanosis or edema   Pulses:   2+ and symmetric all extremities   Skin:   Skin color, texture, turgor normal, no rashes or lesions   Lymph nodes:   Cervical, supraclavicular, and axillary nodes normal   Neurologic:   CNII-XII intact. Normal strength, sensation and reflexes       throughout      Results for orders placed or performed in visit on 11/16/18   Lipid Panel   Result Value Ref Range    Total Cholesterol 229 (H) 0 - 199 mg/dL    Triglycerides 116 <150 mg/dL    HDL " Cholesterol 87 (H) 40 - 60 mg/dL    VLDL Cholesterol 23.2 mg/dL    LDL Cholesterol  119 (H) 0 - 99 mg/dL   Vitamin B12   Result Value Ref Range    Vitamin B-12 867 239 - 931 pg/mL   Comprehensive Metabolic Panel   Result Value Ref Range    Glucose 142 (H) 74 - 98 mg/dL    BUN 14 7 - 20 mg/dL    Creatinine 0.70 0.60 - 1.30 mg/dL    eGFR Non African Am 81 >60 mL/min/1.73    eGFR African Am 98 >60 mL/min/1.73    BUN/Creatinine Ratio 20.0 7.1 - 23.5    Sodium 134 (L) 137 - 145 mmol/L    Potassium 4.1 3.5 - 5.1 mmol/L    Chloride 94 (L) 98 - 107 mmol/L    Total CO2 30.0 26.0 - 30.0 mmol/L    Calcium 9.3 8.4 - 10.2 mg/dL    Total Protein 6.9 6.3 - 8.2 g/dL    Albumin 3.80 3.50 - 5.00 g/dL    Globulin 3.1 gm/dL    A/G Ratio 1.2 1.0 - 2.0 g/dL    Total Bilirubin 0.6 0.2 - 1.3 mg/dL    Alkaline Phosphatase 64 38 - 126 U/L    AST (SGOT) 26 15 - 46 U/L    ALT (SGPT) 27 13 - 69 U/L   TSH   Result Value Ref Range    TSH 2.520 0.470 - 4.680 mIU/mL   T4, Free   Result Value Ref Range    Free T4 1.19 0.78 - 2.19 ng/dL   Hemoglobin A1c   Result Value Ref Range    Hemoglobin A1C 7.00 (H) 4.80 - 5.60 %   Unable To Void   Result Value Ref Range    Unable to Void Comment    CBC & Differential   Result Value Ref Range    WBC 4.52 3.40 - 10.80 10*3/mm3    RBC 4.11 3.77 - 5.28 10*6/mm3    Hemoglobin 13.2 12.0 - 15.9 g/dL    Hematocrit 39.8 34.0 - 46.6 %    MCV 96.8 79.0 - 97.0 fL    MCH 32.1 26.6 - 33.0 pg    MCHC 33.2 31.5 - 35.7 g/dL    RDW 13.2 12.3 - 15.4 %    Platelets 162 140 - 450 10*3/mm3    Neutrophil Rel % 43.0 42.7 - 76.0 %    Lymphocyte Rel % 42.3 19.6 - 45.3 %    Monocyte Rel % 9.7 5.0 - 12.0 %    Eosinophil Rel % 4.4 0.3 - 6.2 %    Basophil Rel % 0.4 0.0 - 1.5 %    Neutrophils Absolute 1.94 1.40 - 7.00 10*3/mm3    Lymphocytes Absolute 1.91 0.70 - 3.10 10*3/mm3    Monocytes Absolute 0.44 0.10 - 0.90 10*3/mm3    Eosinophils Absolute 0.20 0.00 - 0.40 10*3/mm3    Basophils Absolute 0.02 0.00 - 0.20 10*3/mm3    Immature  Granulocyte Rel % 0.2 0.0 - 0.5 %    Immature Grans Absolute 0.01 0.00 - 0.05 10*3/mm3    nRBC 0.0 0.0 - 0.0 /100 WBC         Assessment/Plan       Radha was seen today for hypertension and knee pain.    Diagnoses and all orders for this visit:    Chronic pain of left knee  -     triamcinolone acetonide (KENALOG-40) injection 40 mg  -     Vitamin B12  -     Lipid Panel  -     TSH  -     Comprehensive Metabolic Panel  -     CBC & Differential  -     T4, Free  -     Hemoglobin A1c    Essential hypertension  -     Vitamin B12  -     Lipid Panel  -     TSH  -     Comprehensive Metabolic Panel  -     CBC & Differential  -     T4, Free  -     Hemoglobin A1c    Type 2 diabetes mellitus without complication, without long-term current use of insulin (CMS/[facility])  -     Vitamin B12  -     Lipid Panel  -     TSH  -     Comprehensive Metabolic Panel  -     CBC & Differential  -     T4, Free  -     Hemoglobin A1c      Return in about 4 months (around 8/1/2019), or if symptoms worsen or fail to improve.          There are no Patient Instructions on file for this visit.     Farhan Schaefer MD    Assessment/Plan

## 2019-04-02 LAB — MICROALBUMIN UR-MCNC: <3 UG/ML

## 2019-04-19 DIAGNOSIS — I67.82 TEMPORARY CEREBRAL VASCULAR DYSFUNCTION: ICD-10-CM

## 2019-04-19 RX ORDER — CLOPIDOGREL BISULFATE 75 MG/1
TABLET ORAL
Qty: 90 TABLET | Refills: 3 | Status: SHIPPED | OUTPATIENT
Start: 2019-04-19 | End: 2020-05-05

## 2019-04-29 RX ORDER — LOVASTATIN 10 MG/1
TABLET ORAL
Qty: 90 TABLET | Refills: 3 | Status: SHIPPED | OUTPATIENT
Start: 2019-04-29 | End: 2020-01-22

## 2019-05-10 ENCOUNTER — OFFICE VISIT (OUTPATIENT)
Dept: INTERNAL MEDICINE | Facility: CLINIC | Age: 79
End: 2019-05-10

## 2019-05-10 VITALS
RESPIRATION RATE: 16 BRPM | HEART RATE: 71 BPM | DIASTOLIC BLOOD PRESSURE: 62 MMHG | TEMPERATURE: 97.9 F | OXYGEN SATURATION: 100 % | SYSTOLIC BLOOD PRESSURE: 119 MMHG | HEIGHT: 67 IN | BODY MASS INDEX: 30.29 KG/M2 | WEIGHT: 193 LBS

## 2019-05-10 DIAGNOSIS — L57.0 AK (ACTINIC KERATOSIS): Primary | ICD-10-CM

## 2019-05-10 PROCEDURE — 17003 DESTRUCT PREMALG LES 2-14: CPT | Performed by: INTERNAL MEDICINE

## 2019-05-10 PROCEDURE — 17000 DESTRUCT PREMALG LESION: CPT | Performed by: INTERNAL MEDICINE

## 2019-05-10 PROCEDURE — 99213 OFFICE O/P EST LOW 20 MIN: CPT | Performed by: INTERNAL MEDICINE

## 2019-05-10 NOTE — PROGRESS NOTES
Subjective     Patient ID: Radha Whelan is a 78 y.o. female. Patient is here for management of multiple medical problems.     Chief Complaint   Patient presents with   • Skin Lesions     patient states she is 2 to 3 skins on her chest x 6 months     History of Present Illness     New lesions x 6 months. Hx of skin cancer.    Her dermatologist has retired. Dr Gray.        The following portions of the patient's history were reviewed and updated as appropriate: allergies, current medications, past family history, past medical history, past social history, past surgical history and problem list.    Review of Systems   Skin: Positive for color change, rash and wound.       Current Outpatient Medications:   •  aspirin 81 MG tablet, Take  by mouth daily., Disp: , Rfl:   •  aspirin-acetaminophen-caffeine (EXCEDRIN MIGRAINE) 250-250-65 MG per tablet, Take  by mouth., Disp: , Rfl:   •  clopidogrel (PLAVIX) 75 MG tablet, TAKE 1 TABLET DAILY, Disp: 90 tablet, Rfl: 3  •  clopidogrel (PLAVIX) 75 MG tablet, TAKE 1 TABLET DAILY, Disp: 90 tablet, Rfl: 3  •  erythromycin (ROMYCIN) 5 MG/GM ophthalmic ointment, , Disp: , Rfl: 1  •  folic acid (FOLVITE) 1 MG tablet, TAKE 1 TABLET DAILY, Disp: 90 tablet, Rfl: 3  •  furosemide (LASIX) 20 MG tablet, TAKE 1 TABLET TWICE A DAY, Disp: 180 tablet, Rfl: 1  •  glipiZIDE (GLUCOTROL XL) 2.5 MG 24 hr tablet, Take 1 tablet by mouth Daily., Disp: 90 tablet, Rfl: 3  •  Glucosamine-Chondroit-Vit C-Mn (GLUCOSAMINE CHONDR 1500 COMPLX PO), Take  by mouth., Disp: , Rfl:   •  glucose blood test strip, Use as instructed, Disp: 100 each, Rfl: 12  •  ibuprofen (ADVIL,MOTRIN) 800 MG tablet, Take 800 mg by mouth., Disp: , Rfl:   •  ketorolac (ACULAR) 0.4 % solution, Administer 1 drop to both eyes 4 (Four) Times a Day., Disp: 5 mL, Rfl: 0  •  lisinopril (PRINIVIL,ZESTRIL) 40 MG tablet, TAKE 1 TABLET DAILY, Disp: 90 tablet, Rfl: 3  •  lovastatin (MEVACOR) 10 MG tablet, TAKE 1 TABLET EVERY NIGHT, Disp: 90 tablet,  "Rfl: 3  •  melatonin 3 MG tablet, Take  by mouth., Disp: , Rfl:   •  niacin (NIASPAN) 1000 MG CR tablet, TAKE 1 TABLET EVERY NIGHT, Disp: 90 tablet, Rfl: 3  •  Omega-3 Fatty Acids (FISH OIL) 1200 MG capsule capsule, Take  by mouth daily., Disp: , Rfl:   •  pantoprazole (PROTONIX) 40 MG EC tablet, Take 1 tablet by mouth Daily., Disp: 90 tablet, Rfl: 3  •  tobramycin-dexamethasone (TOBRADEX) 0.3-0.1 % ophthalmic suspension, Administer 1 drop into the left eye Every 4 (Four) Hours While Awake., Disp: 10 mL, Rfl: 5    Objective      Blood pressure 119/62, pulse 71, temperature 97.9 °F (36.6 °C), temperature source Oral, resp. rate 16, height 170.2 cm (67\"), weight 87.5 kg (193 lb), SpO2 100 %.    Physical Exam     skin:   Lesion hyperkerotic on chest x 3, 0.5 cm.   On chest wall.           Lymph nodes:    Neurologic:       Results for orders placed or performed in visit on 04/01/19   MicroAlbumin, Urine, Random -   Result Value Ref Range    Microalbumin, Urine <3.0 Not Estab. ug/mL         Assessment/Plan   Skin lesion x 3 on cw cryo x 3 treatment. Pt tolerated procedure well.      Radha was seen today for skin lesions.    Diagnoses and all orders for this visit:    AK (actinic keratosis)      No Follow-up on file.          There are no Patient Instructions on file for this visit.     Farhan Schaefer MD    Assessment/Plan       "

## 2019-05-23 ENCOUNTER — OFFICE VISIT (OUTPATIENT)
Dept: INTERNAL MEDICINE | Facility: CLINIC | Age: 79
End: 2019-05-23

## 2019-05-23 VITALS
WEIGHT: 196.8 LBS | SYSTOLIC BLOOD PRESSURE: 125 MMHG | TEMPERATURE: 97.6 F | HEART RATE: 55 BPM | RESPIRATION RATE: 16 BRPM | HEIGHT: 67 IN | OXYGEN SATURATION: 100 % | BODY MASS INDEX: 30.89 KG/M2 | DIASTOLIC BLOOD PRESSURE: 55 MMHG

## 2019-05-23 DIAGNOSIS — C44.90 SKIN CANCER: ICD-10-CM

## 2019-05-23 DIAGNOSIS — A46 ERYSIPELAS: Primary | ICD-10-CM

## 2019-05-23 PROCEDURE — 99214 OFFICE O/P EST MOD 30 MIN: CPT | Performed by: INTERNAL MEDICINE

## 2019-05-23 RX ORDER — DOXYCYCLINE 100 MG/1
100 CAPSULE ORAL EVERY 12 HOURS SCHEDULED
Qty: 20 CAPSULE | Refills: 0 | OUTPATIENT
Start: 2019-05-23 | End: 2019-05-25

## 2019-05-23 NOTE — PROGRESS NOTES
Subjective     Patient ID: Radha Whelan is a 78 y.o. female. Patient is here for management of multiple medical problems.     Chief Complaint   Patient presents with   • Insect Bite     patient states she has an insect bite on the eyelid of her left eye, patient having swelling, redness and itching  of the left eye x 3 days      History of Present Illness     Recent insect bit to left eye lid. X 3 days and getting worse. Chills.  Took tylenol.  + fever.      The following portions of the patient's history were reviewed and updated as appropriate: allergies, current medications, past family history, past medical history, past social history, past surgical history and problem list.    Review of Systems   Constitutional: Positive for chills, fatigue and fever. Negative for diaphoresis.   Skin: Positive for color change, rash and wound.   Psychiatric/Behavioral: Positive for sleep disturbance.   All other systems reviewed and are negative.      Current Outpatient Medications:   •  aspirin 81 MG tablet, Take  by mouth daily., Disp: , Rfl:   •  aspirin-acetaminophen-caffeine (EXCEDRIN MIGRAINE) 250-250-65 MG per tablet, Take  by mouth., Disp: , Rfl:   •  clopidogrel (PLAVIX) 75 MG tablet, TAKE 1 TABLET DAILY, Disp: 90 tablet, Rfl: 3  •  folic acid (FOLVITE) 1 MG tablet, TAKE 1 TABLET DAILY, Disp: 90 tablet, Rfl: 3  •  furosemide (LASIX) 20 MG tablet, TAKE 1 TABLET TWICE A DAY, Disp: 180 tablet, Rfl: 1  •  glipiZIDE (GLUCOTROL XL) 2.5 MG 24 hr tablet, Take 1 tablet by mouth Daily., Disp: 90 tablet, Rfl: 3  •  Glucosamine-Chondroit-Vit C-Mn (GLUCOSAMINE CHONDR 1500 COMPLX PO), Take  by mouth., Disp: , Rfl:   •  glucose blood test strip, Use as instructed, Disp: 100 each, Rfl: 12  •  ibuprofen (ADVIL,MOTRIN) 800 MG tablet, Take 800 mg by mouth., Disp: , Rfl:   •  ketorolac (ACULAR) 0.4 % solution, Administer 1 drop to both eyes 4 (Four) Times a Day., Disp: 5 mL, Rfl: 0  •  lisinopril (PRINIVIL,ZESTRIL) 40 MG tablet, TAKE 1  "TABLET DAILY, Disp: 90 tablet, Rfl: 3  •  lovastatin (MEVACOR) 10 MG tablet, TAKE 1 TABLET EVERY NIGHT, Disp: 90 tablet, Rfl: 3  •  melatonin 3 MG tablet, Take  by mouth., Disp: , Rfl:   •  niacin (NIASPAN) 1000 MG CR tablet, TAKE 1 TABLET EVERY NIGHT, Disp: 90 tablet, Rfl: 3  •  Omega-3 Fatty Acids (FISH OIL) 1200 MG capsule capsule, Take  by mouth daily., Disp: , Rfl:   •  pantoprazole (PROTONIX) 40 MG EC tablet, Take 1 tablet by mouth Daily., Disp: 90 tablet, Rfl: 3  •  tobramycin-dexamethasone (TOBRADEX) 0.3-0.1 % ophthalmic suspension, Administer 1 drop into the left eye Every 4 (Four) Hours While Awake., Disp: 10 mL, Rfl: 5  •  doxycycline (MONODOX) 100 MG capsule, Take 1 capsule by mouth Every 12 (Twelve) Hours., Disp: 20 capsule, Rfl: 0    Objective      Blood pressure 125/55, pulse 55, temperature 97.6 °F (36.4 °C), temperature source Oral, resp. rate 16, height 170.2 cm (67\"), weight 89.3 kg (196 lb 12.8 oz), SpO2 100 %.    Physical Exam     General Appearance:    Alert, cooperative, no distress, appears stated age   Head:    Normocephalic, without obvious abnormality, atraumatic   Eyes:    Left eye erythema surrounding.  PERRL, conjunctiva/corneas clear, EOM's intact   Ears:    Normal TM's and external ear canals, both ears   Nose:   Nares normal, septum midline, mucosa normal, no drainage   or sinus tenderness   Throat:   Lips, mucosa, and tongue normal; teeth and gums normal   Neck:   Supple, symmetrical, trachea midline, no adenopathy;        thyroid:  No enlargement/tenderness/nodules; no carotid    bruit or JVD   Back:     Symmetric, no curvature, ROM normal, no CVA tenderness   Lungs:     Clear to auscultation bilaterally, respirations unlabored   Chest wall:    No tenderness or deformity   Heart:    Regular rate and rhythm, S1 and S2 normal, no murmur,        rub or gallop   Abdomen:     Soft, non-tender, bowel sounds active all four quadrants,     no masses, no organomegaly   Extremities:   " Extremities normal, atraumatic, no cyanosis or edema   Pulses:   2+ and symmetric all extremities   Skin:   Skin color, texture, turgor normal, no rashes or lesions   Lymph nodes:   Cervical, supraclavicular, and axillary nodes normal   Neurologic:   CNII-XII intact. Normal strength, sensation and reflexes       throughout      Results for orders placed or performed in visit on 04/01/19   MicroAlbumin, Urine, Random -   Result Value Ref Range    Microalbumin, Urine <3.0 Not Estab. ug/mL         Assessment/Plan   Failed augmetin in the past    Radha was seen today for insect bite.    Diagnoses and all orders for this visit:    Erysipelas  -     doxycycline (MONODOX) 100 MG capsule; Take 1 capsule by mouth Every 12 (Twelve) Hours.    Skin cancer  -     Ambulatory Referral to General Surgery      No Follow-up on file.          There are no Patient Instructions on file for this visit.     Farhan Schaefer MD    Assessment/Plan

## 2019-05-25 ENCOUNTER — HOSPITAL ENCOUNTER (EMERGENCY)
Facility: HOSPITAL | Age: 79
Discharge: HOME | End: 2019-05-25
Attending: EMERGENCY MEDICINE | Admitting: EMERGENCY MEDICINE

## 2019-05-25 VITALS
DIASTOLIC BLOOD PRESSURE: 82 MMHG | SYSTOLIC BLOOD PRESSURE: 145 MMHG | OXYGEN SATURATION: 98 % | HEART RATE: 80 BPM | WEIGHT: 197 LBS | RESPIRATION RATE: 18 BRPM | TEMPERATURE: 98 F | HEIGHT: 67 IN | BODY MASS INDEX: 30.92 KG/M2

## 2019-05-25 DIAGNOSIS — L03.213 PERIORBITAL CELLULITIS OF LEFT EYE: Primary | ICD-10-CM

## 2019-05-25 PROCEDURE — 25010000002 CEFTRIAXONE PER 250 MG: Performed by: EMERGENCY MEDICINE

## 2019-05-25 PROCEDURE — 99283 EMERGENCY DEPT VISIT LOW MDM: CPT

## 2019-05-25 PROCEDURE — 96372 THER/PROPH/DIAG INJ SC/IM: CPT

## 2019-05-25 RX ORDER — CEFTRIAXONE 1 G/1
1000 INJECTION, POWDER, FOR SOLUTION INTRAMUSCULAR; INTRAVENOUS ONCE
Status: COMPLETED | OUTPATIENT
Start: 2019-05-25 | End: 2019-05-25

## 2019-05-25 RX ORDER — CEPHALEXIN 500 MG/1
500 CAPSULE ORAL 4 TIMES DAILY
Qty: 40 CAPSULE | Refills: 0 | Status: SHIPPED | OUTPATIENT
Start: 2019-05-25 | End: 2019-06-03

## 2019-05-25 RX ORDER — LIDOCAINE HYDROCHLORIDE 10 MG/ML
2.1 INJECTION, SOLUTION EPIDURAL; INFILTRATION; INTRACAUDAL; PERINEURAL ONCE
Status: COMPLETED | OUTPATIENT
Start: 2019-05-25 | End: 2019-05-25

## 2019-05-25 RX ORDER — SULFAMETHOXAZOLE AND TRIMETHOPRIM 800; 160 MG/1; MG/1
1 TABLET ORAL ONCE
Status: COMPLETED | OUTPATIENT
Start: 2019-05-25 | End: 2019-05-25

## 2019-05-25 RX ORDER — SULFAMETHOXAZOLE AND TRIMETHOPRIM 800; 160 MG/1; MG/1
1 TABLET ORAL 2 TIMES DAILY
Qty: 20 TABLET | Refills: 0 | Status: SHIPPED | OUTPATIENT
Start: 2019-05-25 | End: 2019-06-03

## 2019-05-25 RX ADMIN — LIDOCAINE HYDROCHLORIDE 2.1 ML: 10 INJECTION, SOLUTION EPIDURAL; INFILTRATION; INTRACAUDAL; PERINEURAL at 20:59

## 2019-05-25 RX ADMIN — SULFAMETHOXAZOLE AND TRIMETHOPRIM 160 MG: 800; 160 TABLET ORAL at 20:59

## 2019-05-25 RX ADMIN — CEFTRIAXONE SODIUM 1000 MG: 1 INJECTION, POWDER, FOR SOLUTION INTRAMUSCULAR; INTRAVENOUS at 20:57

## 2019-05-30 ENCOUNTER — OFFICE VISIT (OUTPATIENT)
Dept: SURGERY | Facility: CLINIC | Age: 79
End: 2019-05-30

## 2019-05-30 VITALS
HEART RATE: 71 BPM | WEIGHT: 194.6 LBS | SYSTOLIC BLOOD PRESSURE: 140 MMHG | OXYGEN SATURATION: 96 % | DIASTOLIC BLOOD PRESSURE: 72 MMHG | HEIGHT: 67 IN | BODY MASS INDEX: 30.54 KG/M2 | TEMPERATURE: 96.7 F

## 2019-05-30 DIAGNOSIS — L98.9 SKIN LESION: Primary | ICD-10-CM

## 2019-05-30 PROCEDURE — 99203 OFFICE O/P NEW LOW 30 MIN: CPT | Performed by: SURGERY

## 2019-05-30 NOTE — PROGRESS NOTES
Patient: Radha Whelan    YOB: 1940    Date: 05/30/2019    Primary Care Provider: Farhan Schaefer MD    Reason for Consultation: Lesion    Chief Complaint   Patient presents with   • Skin Lesion     on chest       Subjective .     History of present illness: Patient with a 3-month history of a chest skin lesion.  She states that it had increased in size.  It also causes her local discomfort.  She had an attempt at cryoablation without success although she states that it may be somewhat smaller since that procedure    The following portions of the patient's history were reviewed and updated as appropriate: allergies, current medications, past family history, past medical history, past social history, past surgical history and problem list..    Review of Systems   Constitutional: Negative for chills, fever and unexpected weight change.   HENT: Negative for hearing loss, trouble swallowing and voice change.    Eyes: Negative for visual disturbance.   Respiratory: Negative for apnea, cough, chest tightness, shortness of breath and wheezing.    Cardiovascular: Negative for chest pain, palpitations and leg swelling.   Gastrointestinal: Negative for abdominal distention, abdominal pain, anal bleeding, blood in stool, constipation, diarrhea, nausea, rectal pain and vomiting.   Endocrine: Negative for cold intolerance and heat intolerance.   Genitourinary: Negative for difficulty urinating, dysuria and flank pain.   Musculoskeletal: Negative for back pain and gait problem.   Skin: Negative for color change, rash and wound.   Neurological: Negative for dizziness, syncope, speech difficulty, weakness, light-headedness, numbness and headaches.   Hematological: Negative for adenopathy. Does not bruise/bleed easily.   Psychiatric/Behavioral: Negative for confusion. The patient is not nervous/anxious.        History:  Past Medical History:   Diagnosis Date   • Diabetes mellitus (CMS/[facility])    • History of migraine  headaches    • Hyperlipidemia    • Hypertension    • Hyponatremia    • Left leg pain     left leg and knee pain    • Muscle spasm    • Stroke (CMS/HCC)    • Thyroid nodule        Past Surgical History:   Procedure Laterality Date   • BREAST BIOPSY     • TUBAL ABDOMINAL LIGATION         Family History   Problem Relation Age of Onset   • Diabetes Other    • Hypertension Other    • Cancer Other         malignant neoplasm    • Migraines Other    • Heart disease Mother    • Heart disease Father    • Breast cancer Sister    • Breast cancer Other    • Breast cancer Sister        Social History     Tobacco Use   • Smoking status: Never Smoker   • Smokeless tobacco: Never Used   Substance Use Topics   • Alcohol use: No   • Drug use: No        Allergies:  Allergies   Allergen Reactions   • Other    • Statins Myalgia       Medications:    Current Outpatient Medications:   •  aspirin 81 MG tablet, Take  by mouth daily., Disp: , Rfl:   •  aspirin-acetaminophen-caffeine (EXCEDRIN MIGRAINE) 250-250-65 MG per tablet, Take  by mouth., Disp: , Rfl:   •  cephalexin (KEFLEX) 500 MG capsule, Take 1 capsule by mouth 4 (Four) Times a Day for 10 days., Disp: 40 capsule, Rfl: 0  •  clopidogrel (PLAVIX) 75 MG tablet, TAKE 1 TABLET DAILY, Disp: 90 tablet, Rfl: 3  •  folic acid (FOLVITE) 1 MG tablet, TAKE 1 TABLET DAILY, Disp: 90 tablet, Rfl: 3  •  furosemide (LASIX) 20 MG tablet, TAKE 1 TABLET TWICE A DAY, Disp: 180 tablet, Rfl: 1  •  glipiZIDE (GLUCOTROL XL) 2.5 MG 24 hr tablet, Take 1 tablet by mouth Daily., Disp: 90 tablet, Rfl: 3  •  Glucosamine-Chondroit-Vit C-Mn (GLUCOSAMINE CHONDR 1500 COMPLX PO), Take  by mouth., Disp: , Rfl:   •  glucose blood test strip, Use as instructed, Disp: 100 each, Rfl: 12  •  ketorolac (ACULAR) 0.4 % solution, Administer 1 drop to both eyes 4 (Four) Times a Day., Disp: 5 mL, Rfl: 0  •  lisinopril (PRINIVIL,ZESTRIL) 40 MG tablet, TAKE 1 TABLET DAILY, Disp: 90 tablet, Rfl: 3  •  lovastatin (MEVACOR) 10 MG  "tablet, TAKE 1 TABLET EVERY NIGHT, Disp: 90 tablet, Rfl: 3  •  melatonin 3 MG tablet, Take  by mouth., Disp: , Rfl:   •  niacin (NIASPAN) 1000 MG CR tablet, TAKE 1 TABLET EVERY NIGHT, Disp: 90 tablet, Rfl: 3  •  Omega-3 Fatty Acids (FISH OIL) 1200 MG capsule capsule, Take  by mouth daily., Disp: , Rfl:   •  pantoprazole (PROTONIX) 40 MG EC tablet, Take 1 tablet by mouth Daily., Disp: 90 tablet, Rfl: 3  •  sulfamethoxazole-trimethoprim (BACTRIM DS,SEPTRA DS) 800-160 MG per tablet, Take 1 tablet by mouth 2 (Two) Times a Day for 10 days., Disp: 20 tablet, Rfl: 0  •  tobramycin-dexamethasone (TOBRADEX) 0.3-0.1 % ophthalmic suspension, Administer 1 drop into the left eye Every 4 (Four) Hours While Awake., Disp: 10 mL, Rfl: 5    Objective     Vital Signs:   Vitals:    05/30/19 1420   BP: 140/72   Pulse: 71   Temp: 96.7 °F (35.9 °C)   SpO2: 96%   Weight: 88.3 kg (194 lb 9.6 oz)   Height: 170.2 cm (67\")       Physical Exam:  General Appearance:    Alert, cooperative, in no acute distress   Head:    Normocephalic, without obvious abnormality, atraumatic   Eyes:            Normal.  No scleral icterus.  PERRLA    Lungs:     Clear to auscultation,respirations regular, even and                  unlabored    Heart:    Regular rhythm and normal rate, normal S1 and S2, no            murmur   Extremities:   Moves all extremities well, no edema, no cyanosis, no             redness   Skin:  1.2 cm chest scaly skin neoplasm.   Neurologic:   Normal without gross deficits.   Psychiatric: No evidence of depression or anxiety          Results Review:   None    Review of Systems was reviewed and confirmed as accurate today.    Assessment/Plan     1. Skin lesion      Patient with a skin neoplasm of the chest.  Possible basal cell carcinoma.  She wishes to have this removed.  She understands the procedure.  She also understands the risks of bleeding and infection and she wishes to proceed.  She wants to try to come off the Plavix for the " procedure and she will discuss this with her primary care physician.  Obviously if she is unable to stop this we will go ahead with the added risk of bleeding although small risk.    Electronically signed by Nik Chaudhari MD  05/30/19  2:39 PM        Portions of this note have been scribed for Nik Chaudhari MD by Judith Valencia CMA 5/30/2019  2:39 PM

## 2019-06-03 ENCOUNTER — HOSPITAL ENCOUNTER (EMERGENCY)
Facility: HOSPITAL | Age: 79
Discharge: HOME | End: 2019-06-03
Attending: EMERGENCY MEDICINE | Admitting: EMERGENCY MEDICINE

## 2019-06-03 ENCOUNTER — HOSPITAL ENCOUNTER (OUTPATIENT)
Dept: GENERAL RADIOLOGY | Facility: HOSPITAL | Age: 79
Discharge: HOME | End: 2019-06-03
Admitting: INTERNAL MEDICINE

## 2019-06-03 ENCOUNTER — OFFICE VISIT (OUTPATIENT)
Dept: INTERNAL MEDICINE | Facility: CLINIC | Age: 79
End: 2019-06-03

## 2019-06-03 VITALS
HEART RATE: 72 BPM | DIASTOLIC BLOOD PRESSURE: 70 MMHG | SYSTOLIC BLOOD PRESSURE: 140 MMHG | OXYGEN SATURATION: 98 % | RESPIRATION RATE: 16 BRPM | HEIGHT: 67 IN | TEMPERATURE: 97.4 F | WEIGHT: 192 LBS | BODY MASS INDEX: 30.13 KG/M2

## 2019-06-03 VITALS
OXYGEN SATURATION: 98 % | HEIGHT: 67 IN | HEART RATE: 81 BPM | TEMPERATURE: 97.2 F | SYSTOLIC BLOOD PRESSURE: 160 MMHG | WEIGHT: 192 LBS | BODY MASS INDEX: 30.13 KG/M2 | DIASTOLIC BLOOD PRESSURE: 60 MMHG

## 2019-06-03 DIAGNOSIS — J40 BRONCHITIS: ICD-10-CM

## 2019-06-03 DIAGNOSIS — J40 BRONCHITIS: Primary | ICD-10-CM

## 2019-06-03 DIAGNOSIS — S61.412A LACERATION OF LEFT HAND WITHOUT FOREIGN BODY, INITIAL ENCOUNTER: Primary | ICD-10-CM

## 2019-06-03 DIAGNOSIS — C44.90 SKIN CANCER: ICD-10-CM

## 2019-06-03 PROCEDURE — 99283 EMERGENCY DEPT VISIT LOW MDM: CPT

## 2019-06-03 PROCEDURE — 71046 X-RAY EXAM CHEST 2 VIEWS: CPT

## 2019-06-03 PROCEDURE — 99214 OFFICE O/P EST MOD 30 MIN: CPT | Performed by: INTERNAL MEDICINE

## 2019-06-03 RX ORDER — FLUOROURACIL 50 MG/G
CREAM TOPICAL 2 TIMES DAILY
Qty: 40 G | Refills: 0 | Status: SHIPPED | OUTPATIENT
Start: 2019-06-03 | End: 2019-08-12 | Stop reason: SINTOL

## 2019-06-03 RX ORDER — GUAIFENESIN 600 MG/1
1200 TABLET, EXTENDED RELEASE ORAL 2 TIMES DAILY
Qty: 60 TABLET | Refills: 0 | Status: SHIPPED | OUTPATIENT
Start: 2019-06-03 | End: 2019-06-04

## 2019-06-03 RX ORDER — LIDOCAINE HYDROCHLORIDE 10 MG/ML
10 INJECTION, SOLUTION INFILTRATION; PERINEURAL ONCE
Status: COMPLETED | OUTPATIENT
Start: 2019-06-03 | End: 2019-06-03

## 2019-06-03 RX ORDER — IBUPROFEN 200 MG
1 TABLET ORAL ONCE
Status: COMPLETED | OUTPATIENT
Start: 2019-06-03 | End: 2019-06-03

## 2019-06-03 RX ORDER — AZITHROMYCIN 250 MG/1
TABLET, FILM COATED ORAL
Qty: 6 TABLET | Refills: 0 | Status: SHIPPED | OUTPATIENT
Start: 2019-06-03 | End: 2019-08-26

## 2019-06-03 RX ADMIN — LIDOCAINE HYDROCHLORIDE 10 ML: 10 INJECTION, SOLUTION INFILTRATION; PERINEURAL at 13:56

## 2019-06-03 RX ADMIN — POLYMYXIN B SULFATE, BACITRACIN ZINC, NEOMYCIN SULFATE 1 APPLICATION: 5000; 3.5; 4 OINTMENT TOPICAL at 13:56

## 2019-06-03 NOTE — PROGRESS NOTES
Subjective     Patient ID: Radha Whelan is a 78 y.o. female. Patient is here for management of multiple medical problems.     Chief Complaint   Patient presents with   • Nausea     chills, cough (non productive), shortness of breath and fatigue     History of Present Illness       Pt with pin strokes. Needs to be off plavix x 5 days for sking lesion removal with surgeon.    Chills cough and soa.      Pt with periorbital cellulitis.  Failed doxy.  Given bacterium and keflex in er.    Now with uri symptoms. Cough and soa. Nasal congestion.  HA.  Stopped abx yesterday.          The following portions of the patient's history were reviewed and updated as appropriate: allergies, current medications, past family history, past medical history, past social history, past surgical history and problem list.    Review of Systems   Constitutional: Positive for diaphoresis, fatigue and fever.   HENT: Positive for congestion, postnasal drip, rhinorrhea, sinus pressure and sinus pain.    Psychiatric/Behavioral: Negative for self-injury and sleep disturbance.   All other systems reviewed and are negative.      Current Outpatient Medications:   •  aspirin 81 MG tablet, Take  by mouth daily., Disp: , Rfl:   •  aspirin-acetaminophen-caffeine (EXCEDRIN MIGRAINE) 250-250-65 MG per tablet, Take  by mouth., Disp: , Rfl:   •  clopidogrel (PLAVIX) 75 MG tablet, TAKE 1 TABLET DAILY, Disp: 90 tablet, Rfl: 3  •  folic acid (FOLVITE) 1 MG tablet, TAKE 1 TABLET DAILY, Disp: 90 tablet, Rfl: 3  •  furosemide (LASIX) 20 MG tablet, TAKE 1 TABLET TWICE A DAY, Disp: 180 tablet, Rfl: 1  •  glipiZIDE (GLUCOTROL XL) 2.5 MG 24 hr tablet, Take 1 tablet by mouth Daily., Disp: 90 tablet, Rfl: 3  •  Glucosamine-Chondroit-Vit C-Mn (GLUCOSAMINE CHONDR 1500 COMPLX PO), Take  by mouth., Disp: , Rfl:   •  glucose blood test strip, Use as instructed, Disp: 100 each, Rfl: 12  •  ketorolac (ACULAR) 0.4 % solution, Administer 1 drop to both eyes 4 (Four) Times a Day.,  "Disp: 5 mL, Rfl: 0  •  lisinopril (PRINIVIL,ZESTRIL) 40 MG tablet, TAKE 1 TABLET DAILY, Disp: 90 tablet, Rfl: 3  •  lovastatin (MEVACOR) 10 MG tablet, TAKE 1 TABLET EVERY NIGHT, Disp: 90 tablet, Rfl: 3  •  melatonin 3 MG tablet, Take  by mouth., Disp: , Rfl:   •  niacin (NIASPAN) 1000 MG CR tablet, TAKE 1 TABLET EVERY NIGHT, Disp: 90 tablet, Rfl: 3  •  Omega-3 Fatty Acids (FISH OIL) 1200 MG capsule capsule, Take  by mouth daily., Disp: , Rfl:   •  pantoprazole (PROTONIX) 40 MG EC tablet, Take 1 tablet by mouth Daily., Disp: 90 tablet, Rfl: 3  •  tobramycin-dexamethasone (TOBRADEX) 0.3-0.1 % ophthalmic suspension, Administer 1 drop into the left eye Every 4 (Four) Hours While Awake., Disp: 10 mL, Rfl: 5  •  azithromycin (ZITHROMAX) 250 MG tablet, Take 2 tablets the first day, then 1 tablet daily for 4 days., Disp: 6 tablet, Rfl: 0  •  fluorouracil (EFUDEX) 5 % cream, Apply  topically to the appropriate area as directed 2 (Two) Times a Day. For 4 weeks., Disp: 40 g, Rfl: 0    Objective      Blood pressure 160/60, pulse 81, temperature 97.2 °F (36.2 °C), height 170.2 cm (67\"), weight 87.1 kg (192 lb), SpO2 98 %.    Physical Exam     General Appearance:    Alert, cooperative, no distress, appears stated age   Head:    Normocephalic, without obvious abnormality, atraumatic   Eyes:    PERRL, conjunctiva/corneas clear, EOM's intact   Ears:    Normal TM's and external ear canals, both ears   Nose:   Nares normal, septum midline, mucosa normal, no drainage   or sinus tenderness   Throat:   Lips, mucosa, and tongue normal; teeth and gums normal   Neck:   Supple, symmetrical, trachea midline, no adenopathy;        thyroid:  No enlargement/tenderness/nodules; no carotid    bruit or JVD   Back:     Symmetric, no curvature, ROM normal, no CVA tenderness   Lungs:     Crackle in both lung bases.  to auscultation bilaterally, respirations unlabored   Chest wall:    No tenderness or deformity   Heart:    Regular rate and rhythm, S1 " and S2 normal, no murmur,        rub or gallop   Abdomen:     Soft, non-tender, bowel sounds active all four quadrants,     no masses, no organomegaly   Extremities:   Extremities normal, atraumatic, no cyanosis or edema   Pulses:   2+ and symmetric all extremities   Skin:   erythema hyperkerotic lesion on right sternal chest wall.   Lymph nodes:   Cervical, supraclavicular, and axillary nodes normal   Neurologic:   CNII-XII intact. Normal strength, sensation and reflexes       throughout      Results for orders placed or performed in visit on 04/01/19   MicroAlbumin, Urine, Random -   Result Value Ref Range    Microalbumin, Urine <3.0 Not Estab. ug/mL         Assessment/Plan   Pt on cpap  With uri and cough and abnormal lung exam.   On bacterium and keflex with these symptom.          Radha was seen today for nausea.    Diagnoses and all orders for this visit:    Bronchitis  -     azithromycin (ZITHROMAX) 250 MG tablet; Take 2 tablets the first day, then 1 tablet daily for 4 days.  -     guaiFENesin (MUCINEX) 600 MG 12 hr tablet; Take 2 tablets by mouth 2 (Two) Times a Day for 1 dose.  -     XR Chest PA & Lateral; Future    Skin cancer  -     fluorouracil (EFUDEX) 5 % cream; Apply  topically to the appropriate area as directed 2 (Two) Times a Day. For 4 weeks.      Return in about 4 weeks (around 7/1/2019) for Medicare Wellness.          There are no Patient Instructions on file for this visit.     Farhan Schaefer MD    Assessment/Plan

## 2019-06-03 NOTE — ED PROVIDER NOTES
Subjective   78-year-old female presents with a laceration to her left thumb on the palm surface, she accidentally stabbed with a knife while preparing food in the kitchen, shots are up-to-date including tetanus shot.  Bleeding controlled with pressure.        History provided by:  Patient   used: No    Laceration   Location:  Hand  Hand laceration location:  L hand  Depth:  Through dermis  Quality: straight    Bleeding: venous    Time since incident:  1 hour  Laceration mechanism:  Knife  Pain details:     Quality:  Aching    Severity:  Mild    Timing:  Constant    Progression:  Unchanged  Foreign body present:  No foreign bodies  Relieved by:  Pressure  Worsened by:  Nothing  Ineffective treatments:  None tried  Tetanus status:  Up to date      Review of Systems   Skin:        Laceration left thumb   All other systems reviewed and are negative.      Past Medical History:   Diagnosis Date   • Diabetes mellitus (CMS/HCC)    • History of migraine headaches    • Hyperlipidemia    • Hypertension    • Hyponatremia    • Left leg pain     left leg and knee pain    • Muscle spasm    • Stroke (CMS/HCC)    • Thyroid nodule        Allergies   Allergen Reactions   • Other        Past Surgical History:   Procedure Laterality Date   • BREAST BIOPSY     • TUBAL ABDOMINAL LIGATION         Family History   Problem Relation Age of Onset   • Diabetes Other    • Hypertension Other    • Cancer Other         malignant neoplasm    • Migraines Other    • Heart disease Mother    • Heart disease Father    • Breast cancer Sister    • Breast cancer Other    • Breast cancer Sister        Social History     Socioeconomic History   • Marital status:      Spouse name: Not on file   • Number of children: Not on file   • Years of education: Not on file   • Highest education level: Not on file   Tobacco Use   • Smoking status: Never Smoker   • Smokeless tobacco: Never Used   Substance and Sexual Activity   • Alcohol use:  No   • Drug use: No           Objective   Physical Exam   Constitutional: She is oriented to person, place, and time. She appears well-developed and well-nourished.   Eyes: EOM are normal.   Neck: Normal range of motion. Neck supple.   Cardiovascular: Normal rate and regular rhythm.   Pulmonary/Chest: Effort normal and breath sounds normal.   Abdominal: Soft. Bowel sounds are normal.   Musculoskeletal: Normal range of motion.   Neurological: She is alert and oriented to person, place, and time. She has normal reflexes.   Skin: Skin is warm and dry.        1 cm laceration palmar surface of left thumb   Psychiatric: She has a normal mood and affect.   Nursing note and vitals reviewed.      Laceration Repair  Date/Time: 6/3/2019 1:37 PM  Performed by: Heath Owens Jr., PA-C  Authorized by: Cici Fontenot MD     Consent:     Consent obtained:  Verbal    Consent given by:  Patient    Risks discussed:  Infection, pain and poor cosmetic result  Anesthesia (see MAR for exact dosages):     Anesthesia method:  Local infiltration    Local anesthetic:  Lidocaine 1% w/o epi  Laceration details:     Location:  Hand    Hand location:  L palm    Length (cm):  1  Repair type:     Repair type:  Simple  Pre-procedure details:     Preparation:  Patient was prepped and draped in usual sterile fashion  Exploration:     Hemostasis achieved with:  Direct pressure    Wound exploration: wound explored through full range of motion      Contaminated: no    Treatment:     Area cleansed with:  Hibiclens    Amount of cleaning:  Standard    Irrigation method:  Syringe    Visualized foreign bodies/material removed: no    Skin repair:     Repair method:  Sutures    Suture size:  4-0    Suture material:  Nylon    Suture technique:  Simple interrupted    Number of sutures:  3  Approximation:     Approximation:  Close    Vermilion border: well-aligned    Post-procedure details:     Dressing:  Antibiotic ointment and sterile  dressing               ED Course                  MDM  Number of Diagnoses or Management Options  Laceration of left hand without foreign body, initial encounter: new and requires workup  Risk of Complications, Morbidity, and/or Mortality  Presenting problems: minimal  Management options: minimal    Patient Progress  Patient progress: stable        Final diagnoses:   Laceration of left hand without foreign body, initial encounter            Heath Owens Jr., PA-C  06/03/19 [id number]

## 2019-06-27 ENCOUNTER — TELEPHONE (OUTPATIENT)
Dept: SURGERY | Facility: CLINIC | Age: 79
End: 2019-06-27

## 2019-06-27 NOTE — TELEPHONE ENCOUNTER
Patient canceled her appointment with Dr Chaudhari . Called patient to see if she wanted to reschedule no answer,called her cell and left message for her to call office.

## 2019-07-31 RX ORDER — FUROSEMIDE 20 MG/1
TABLET ORAL
Qty: 180 TABLET | Refills: 1 | Status: SHIPPED | OUTPATIENT
Start: 2019-07-31 | End: 2019-12-06 | Stop reason: HOSPADM

## 2019-08-07 LAB
ALBUMIN SERPL-MCNC: 3.7 G/DL (ref 3.5–5.2)
ALBUMIN/GLOB SERPL: 1.3 G/DL
ALP SERPL-CCNC: 61 U/L (ref 39–117)
ALT SERPL-CCNC: 15 U/L (ref 1–33)
AST SERPL-CCNC: 16 U/L (ref 1–32)
BASOPHILS # BLD AUTO: 0.02 10*3/MM3 (ref 0–0.2)
BASOPHILS NFR BLD AUTO: 0.4 % (ref 0–1.5)
BILIRUB SERPL-MCNC: 0.6 MG/DL (ref 0.2–1.2)
BUN SERPL-MCNC: 17 MG/DL (ref 8–23)
BUN/CREAT SERPL: 19.5 (ref 7–25)
CALCIUM SERPL-MCNC: 8.9 MG/DL (ref 8.6–10.5)
CHLORIDE SERPL-SCNC: 94 MMOL/L (ref 98–107)
CHOLEST SERPL-MCNC: 225 MG/DL (ref 0–200)
CO2 SERPL-SCNC: 30.4 MMOL/L (ref 22–29)
CREAT SERPL-MCNC: 0.87 MG/DL (ref 0.57–1)
EOSINOPHIL # BLD AUTO: 0.04 10*3/MM3 (ref 0–0.4)
EOSINOPHIL NFR BLD AUTO: 0.7 % (ref 0.3–6.2)
ERYTHROCYTE [DISTWIDTH] IN BLOOD BY AUTOMATED COUNT: 14.8 % (ref 12.3–15.4)
GLOBULIN SER CALC-MCNC: 2.9 GM/DL
GLUCOSE SERPL-MCNC: 162 MG/DL (ref 65–99)
HBA1C MFR BLD: 7.2 % (ref 4.8–5.6)
HCT VFR BLD AUTO: 37.9 % (ref 34–46.6)
HDLC SERPL-MCNC: 97 MG/DL (ref 40–60)
HGB BLD-MCNC: 12.1 G/DL (ref 12–15.9)
IMM GRANULOCYTES # BLD AUTO: 0.04 10*3/MM3 (ref 0–0.05)
IMM GRANULOCYTES NFR BLD AUTO: 0.7 % (ref 0–0.5)
LDLC SERPL CALC-MCNC: 115 MG/DL (ref 0–100)
LYMPHOCYTES # BLD AUTO: 2.06 10*3/MM3 (ref 0.7–3.1)
LYMPHOCYTES NFR BLD AUTO: 37.2 % (ref 19.6–45.3)
MCH RBC QN AUTO: 32.4 PG (ref 26.6–33)
MCHC RBC AUTO-ENTMCNC: 31.9 G/DL (ref 31.5–35.7)
MCV RBC AUTO: 101.6 FL (ref 79–97)
MONOCYTES # BLD AUTO: 0.46 10*3/MM3 (ref 0.1–0.9)
MONOCYTES NFR BLD AUTO: 8.3 % (ref 5–12)
NEUTROPHILS # BLD AUTO: 2.92 10*3/MM3 (ref 1.7–7)
NEUTROPHILS NFR BLD AUTO: 52.7 % (ref 42.7–76)
NRBC BLD AUTO-RTO: 0 /100 WBC (ref 0–0.2)
PLATELET # BLD AUTO: 168 10*3/MM3 (ref 140–450)
POTASSIUM SERPL-SCNC: 4.6 MMOL/L (ref 3.5–5.2)
PROT SERPL-MCNC: 6.6 G/DL (ref 6–8.5)
RBC # BLD AUTO: 3.73 10*6/MM3 (ref 3.77–5.28)
SODIUM SERPL-SCNC: 135 MMOL/L (ref 136–145)
T4 FREE SERPL-MCNC: 1.29 NG/DL (ref 0.93–1.7)
TRIGL SERPL-MCNC: 65 MG/DL (ref 0–150)
TSH SERPL DL<=0.005 MIU/L-ACNC: 1.66 MIU/ML (ref 0.27–4.2)
VIT B12 SERPL-MCNC: 624 PG/ML (ref 211–946)
VLDLC SERPL CALC-MCNC: 13 MG/DL
WBC # BLD AUTO: 5.54 10*3/MM3 (ref 3.4–10.8)

## 2019-08-12 ENCOUNTER — OFFICE VISIT (OUTPATIENT)
Dept: INTERNAL MEDICINE | Facility: CLINIC | Age: 79
End: 2019-08-12

## 2019-08-12 VITALS
BODY MASS INDEX: 30.61 KG/M2 | OXYGEN SATURATION: 100 % | SYSTOLIC BLOOD PRESSURE: 140 MMHG | DIASTOLIC BLOOD PRESSURE: 68 MMHG | TEMPERATURE: 97.7 F | HEIGHT: 67 IN | WEIGHT: 195 LBS | RESPIRATION RATE: 16 BRPM | HEART RATE: 59 BPM

## 2019-08-12 DIAGNOSIS — Z00.00 ROUTINE GENERAL MEDICAL EXAMINATION AT A HEALTH CARE FACILITY: ICD-10-CM

## 2019-08-12 DIAGNOSIS — G89.29 CHRONIC PAIN OF LEFT KNEE: ICD-10-CM

## 2019-08-12 DIAGNOSIS — I10 ESSENTIAL HYPERTENSION: ICD-10-CM

## 2019-08-12 DIAGNOSIS — E11.9 TYPE 2 DIABETES MELLITUS WITHOUT COMPLICATION, WITHOUT LONG-TERM CURRENT USE OF INSULIN (HCC): Primary | ICD-10-CM

## 2019-08-12 DIAGNOSIS — E67.2 HYPERVITAMINOSIS B6: ICD-10-CM

## 2019-08-12 DIAGNOSIS — G72.0 STATIN MYOPATHY: ICD-10-CM

## 2019-08-12 DIAGNOSIS — T46.6X5A STATIN MYOPATHY: ICD-10-CM

## 2019-08-12 DIAGNOSIS — L98.9 SKIN LESION OF LEFT LEG: ICD-10-CM

## 2019-08-12 DIAGNOSIS — M25.562 CHRONIC PAIN OF LEFT KNEE: ICD-10-CM

## 2019-08-12 DIAGNOSIS — R53.83 FATIGUE, UNSPECIFIED TYPE: ICD-10-CM

## 2019-08-12 PROCEDURE — 20610 DRAIN/INJ JOINT/BURSA W/O US: CPT | Performed by: INTERNAL MEDICINE

## 2019-08-12 PROCEDURE — 99204 OFFICE O/P NEW MOD 45 MIN: CPT | Performed by: INTERNAL MEDICINE

## 2019-08-12 RX ORDER — TRIAMCINOLONE ACETONIDE 40 MG/ML
40 INJECTION, SUSPENSION INTRA-ARTICULAR; INTRAMUSCULAR ONCE
Status: COMPLETED | OUTPATIENT
Start: 2019-08-12 | End: 2019-08-12

## 2019-08-12 RX ADMIN — TRIAMCINOLONE ACETONIDE 40 MG: 40 INJECTION, SUSPENSION INTRA-ARTICULAR; INTRAMUSCULAR at 10:37

## 2019-08-12 NOTE — PROGRESS NOTES
Subjective     Patient ID: Radha Whelan is a 79 y.o. female. Patient is here for management of multiple medical problems.     Chief Complaint   Patient presents with   • Knee Pain     patient states she is having pain in the left knee, she would like to discuss getting  a joint injection    • Mammogram     patient needs an order for a mamogram     Knee Pain    The incident occurred more than 1 week ago. The incident occurred at home. There was no injury mechanism. The pain is at a severity of 4/10. The pain is moderate. The pain has been worsening since onset. Pertinent negatives include no inability to bear weight or loss of motion. She reports no foreign bodies present. The treatment provided moderate relief.   Diabetes   She presents for her follow-up diabetic visit. She has type 2 diabetes mellitus. Her disease course has been worsening. Pertinent negatives for hypoglycemia include no pallor. Associated symptoms include fatigue. Pertinent negatives for diabetes include no blurred vision and no chest pain. Symptoms are worsening. Risk factors for coronary artery disease include diabetes mellitus, hypertension, dyslipidemia and sedentary lifestyle. Her weight is stable. She has had a previous visit with a dietitian. She never participates in exercise. Her home blood glucose trend is increasing rapidly. An ACE inhibitor/angiotensin II receptor blocker is being taken. Eye exam is current.   Hyperlipidemia   This is a chronic problem. The problem is resistant. Recent lipid tests were reviewed and are variable. She has no history of chronic renal disease, diabetes, hypothyroidism or liver disease. Pertinent negatives include no chest pain. Current antihyperlipidemic treatment includes statins. The current treatment provides moderate improvement of lipids. Compliance problems include medication side effects.       Off statin due to myositis.      Pt with skin lesion on left lower danii x 3 month and not getting better. She  started using her effudex cream prn on the lesion.  Her Dermatologist has retired.      Knee pain left side. Years of pain. No better. asking for inj.      Had right troch inj some better.        The following portions of the patient's history were reviewed and updated as appropriate: allergies, current medications, past family history, past medical history, past social history, past surgical history and problem list.    Review of Systems   Constitutional: Positive for fatigue.   Eyes: Negative for blurred vision.   Cardiovascular: Negative for chest pain.   Musculoskeletal: Positive for arthralgias and gait problem.   Skin: Positive for color change and wound. Negative for pallor and rash.   All other systems reviewed and are negative.      Current Outpatient Medications:   •  aspirin 81 MG tablet, Take  by mouth daily., Disp: , Rfl:   •  aspirin-acetaminophen-caffeine (EXCEDRIN MIGRAINE) 250-250-65 MG per tablet, Take  by mouth., Disp: , Rfl:   •  clopidogrel (PLAVIX) 75 MG tablet, TAKE 1 TABLET DAILY, Disp: 90 tablet, Rfl: 3  •  folic acid (FOLVITE) 1 MG tablet, TAKE 1 TABLET DAILY, Disp: 90 tablet, Rfl: 3  •  furosemide (LASIX) 20 MG tablet, TAKE 1 TABLET TWICE A DAY, Disp: 180 tablet, Rfl: 1  •  glipiZIDE (GLUCOTROL XL) 2.5 MG 24 hr tablet, Take 1 tablet by mouth Daily., Disp: 90 tablet, Rfl: 3  •  Glucosamine-Chondroit-Vit C-Mn (GLUCOSAMINE CHONDR 1500 COMPLX PO), Take  by mouth., Disp: , Rfl:   •  glucose blood test strip, Use as instructed, Disp: 100 each, Rfl: 12  •  ketorolac (ACULAR) 0.4 % solution, Administer 1 drop to both eyes 4 (Four) Times a Day., Disp: 5 mL, Rfl: 0  •  lisinopril (PRINIVIL,ZESTRIL) 40 MG tablet, TAKE 1 TABLET DAILY, Disp: 90 tablet, Rfl: 3  •  lovastatin (MEVACOR) 10 MG tablet, TAKE 1 TABLET EVERY NIGHT, Disp: 90 tablet, Rfl: 3  •  melatonin 3 MG tablet, Take  by mouth., Disp: , Rfl:   •  niacin (NIASPAN) 1000 MG CR tablet, TAKE 1 TABLET EVERY NIGHT, Disp: 90 tablet, Rfl: 3  •   "Omega-3 Fatty Acids (FISH OIL) 1200 MG capsule capsule, Take  by mouth daily., Disp: , Rfl:   •  pantoprazole (PROTONIX) 40 MG EC tablet, Take 1 tablet by mouth Daily., Disp: 90 tablet, Rfl: 3  •  tobramycin-dexamethasone (TOBRADEX) 0.3-0.1 % ophthalmic suspension, Administer 1 drop into the left eye Every 4 (Four) Hours While Awake., Disp: 10 mL, Rfl: 5  •  azithromycin (ZITHROMAX) 250 MG tablet, Take 2 tablets the first day, then 1 tablet daily for 4 days., Disp: 6 tablet, Rfl: 0    Current Facility-Administered Medications:   •  triamcinolone acetonide (KENALOG-40) injection 40 mg, 40 mg, Intra-articular, Once, Farhan Schaefer MD    Objective      Blood pressure 140/68, pulse 59, temperature 97.7 °F (36.5 °C), temperature source Oral, resp. rate 16, height 170.2 cm (67\"), weight 88.5 kg (195 lb), SpO2 100 %.    Physical Exam     General Appearance:    Alert, cooperative, no distress, appears stated age   Head:    Normocephalic, without obvious abnormality, atraumatic   Eyes:    PERRL, conjunctiva/corneas clear, EOM's intact   Ears:    Normal TM's and external ear canals, both ears   Nose:   Nares normal, septum midline, mucosa normal, no drainage   or sinus tenderness   Throat:   Lips, mucosa, and tongue normal; teeth and gums normal   Neck:   Supple, symmetrical, trachea midline, no adenopathy;        thyroid:  No enlargement/tenderness/nodules; no carotid    bruit or JVD   Back:     Symmetric, no curvature, ROM normal, no CVA tenderness   Lungs:     Clear to auscultation bilaterally, respirations unlabored   Chest wall:    No tenderness or deformity   Heart:    Regular rate and rhythm, S1 and S2 normal, no murmur,        rub or gallop   Abdomen:     Soft, non-tender, bowel sounds active all four quadrants,     no masses, no organomegaly   Extremities:   Crepitus in left knee.      Pulses:   2+ and symmetric all extremities   Skin:   Lesion on left lower leg.     Lymph nodes:   Cervical, supraclavicular, and " axillary nodes normal   Neurologic:   CNII-XII intact. Normal strength, sensation and reflexes       throughout      Results for orders placed or performed in visit on 04/01/19   MicroAlbumin, Urine, Random -   Result Value Ref Range    Microalbumin, Urine <3.0 Not Estab. ug/mL         Assessment/Plan     Knee inj.   Indication: pain.   Pt consented. Area prepped.  left knee inj with kenolog 40mg 1 cc and lidocaine 1 cc 1%.  Pt tolerated procedure well.      Radha was seen today for knee pain and mammogram.    Diagnoses and all orders for this visit:    Type 2 diabetes mellitus without complication, without long-term current use of insulin (CMS/[facility])  -     CBC & Differential  -     Vitamin B12  -     Lipid Panel  -     Comprehensive Metabolic Panel  -     TSH  -     T4, Free  -     Hemoglobin A1c  -     Ambulatory Referral to Cardiology    Essential hypertension  -     CBC & Differential  -     Vitamin B12  -     Lipid Panel  -     Comprehensive Metabolic Panel  -     TSH  -     T4, Free  -     Hemoglobin A1c    Hypervitaminosis B6  -     CBC & Differential  -     Vitamin B12  -     Lipid Panel  -     Comprehensive Metabolic Panel  -     TSH  -     T4, Free  -     Hemoglobin A1c    Chronic pain of left knee  -     triamcinolone acetonide (KENALOG-40) injection 40 mg  -     CBC & Differential  -     Vitamin B12  -     Lipid Panel  -     Comprehensive Metabolic Panel  -     TSH  -     T4, Free  -     Hemoglobin A1c    Skin lesion of left leg  -     Ambulatory Referral to Dermatology    Routine general medical examination at a health care facility  -     CBC & Differential  -     Vitamin B12  -     Lipid Panel  -     Comprehensive Metabolic Panel  -     TSH  -     T4, Free  -     Hemoglobin A1c    Statin myopathy  -     CBC & Differential  -     Vitamin B12  -     Lipid Panel  -     Comprehensive Metabolic Panel  -     TSH  -     T4, Free  -     Hemoglobin A1c    Fatigue, unspecified type  -     Ambulatory Referral to  Cardiology      Return in about 3 months (around 11/12/2019).          There are no Patient Instructions on file for this visit.     Farhan Schaefer MD    Assessment/Plan

## 2019-08-26 ENCOUNTER — CONSULT (OUTPATIENT)
Dept: CARDIOLOGY | Facility: CLINIC | Age: 79
End: 2019-08-26

## 2019-08-26 VITALS
BODY MASS INDEX: 30.83 KG/M2 | HEIGHT: 67 IN | DIASTOLIC BLOOD PRESSURE: 64 MMHG | SYSTOLIC BLOOD PRESSURE: 124 MMHG | WEIGHT: 196.4 LBS | HEART RATE: 87 BPM | OXYGEN SATURATION: 94 %

## 2019-08-26 DIAGNOSIS — I44.7 LBBB (LEFT BUNDLE BRANCH BLOCK): ICD-10-CM

## 2019-08-26 DIAGNOSIS — R60.0 BILATERAL LEG EDEMA: ICD-10-CM

## 2019-08-26 DIAGNOSIS — R53.83 FATIGUE, UNSPECIFIED TYPE: Primary | ICD-10-CM

## 2019-08-26 DIAGNOSIS — G47.33 OSA ON CPAP: ICD-10-CM

## 2019-08-26 DIAGNOSIS — E11.9 TYPE 2 DIABETES MELLITUS WITHOUT COMPLICATION, WITHOUT LONG-TERM CURRENT USE OF INSULIN (HCC): ICD-10-CM

## 2019-08-26 DIAGNOSIS — Z99.89 OSA ON CPAP: ICD-10-CM

## 2019-08-26 DIAGNOSIS — E78.2 MIXED HYPERLIPIDEMIA: ICD-10-CM

## 2019-08-26 DIAGNOSIS — I10 ESSENTIAL HYPERTENSION: ICD-10-CM

## 2019-08-26 PROCEDURE — 93000 ELECTROCARDIOGRAM COMPLETE: CPT | Performed by: INTERNAL MEDICINE

## 2019-08-26 PROCEDURE — 99204 OFFICE O/P NEW MOD 45 MIN: CPT | Performed by: INTERNAL MEDICINE

## 2019-08-26 RX ORDER — UBIDECARENONE 200 MG
400 CAPSULE ORAL EVERY OTHER DAY
COMMUNITY

## 2019-08-26 NOTE — PROGRESS NOTES
[facility] at [facility]  Consultation H&P  Radha Tip  1940  [address]  [place] [address]     VISIT DATE:  19    PCP: Farhan Schaefer MD  [address] [facility] [address]  [facility] [address]    IDENTIFICATION: A 79 y.o. female retired    CC:  Chief Complaint   Patient presents with   • Fatigue     Consult    • Edema       PROBLEM LIST:  1. HTN  2. HLD  1. Statin intolerant to multiple agents  2. 2017   HDL 70   3. 2019  TG 65 HDL 97 , on lovastatin  3. Hx TIAs  1. Committed to DAPT ~  4. T2DM  1. Dx ~   2.  A1c 7.2  5. Hx DVT ~, temporary ac  6. Migraines  7. Thyroid nodule  8. Hx hyponatremia  9.   10. Surgical history:  1. Tubal ligation  2. Breast biopsy    Allergies  Allergies   Allergen Reactions   • Other        Current Medications    Current Outpatient Medications:   •  aspirin 81 MG tablet, Take  by mouth daily., Disp: , Rfl:   •  aspirin-acetaminophen-caffeine (EXCEDRIN MIGRAINE) 250-250-65 MG per tablet, Take  by mouth., Disp: , Rfl:   •  Cholecalciferol (VITAMIN D PO), Take 125 mcg by mouth 3 (Three) Times a Week., Disp: , Rfl:   •  clopidogrel (PLAVIX) 75 MG tablet, TAKE 1 TABLET DAILY, Disp: 90 tablet, Rfl: 3  •  coenzyme Q10 100 MG capsule, Take 400 mg by mouth 3 (Three) Times a Week., Disp: , Rfl:   •  folic acid (FOLVITE) 1 MG tablet, TAKE 1 TABLET DAILY, Disp: 90 tablet, Rfl: 3  •  furosemide (LASIX) 20 MG tablet, TAKE 1 TABLET TWICE A DAY, Disp: 180 tablet, Rfl: 1  •  glipiZIDE (GLUCOTROL XL) 2.5 MG 24 hr tablet, Take 1 tablet by mouth Daily., Disp: 90 tablet, Rfl: 3  •  Glucosamine-Chondroit-Vit C-Mn (GLUCOSAMINE CHONDR 1500 COMPLX PO), Take  by mouth., Disp: , Rfl:   •  glucose blood test strip, Use as instructed, Disp: 100 each, Rfl: 12  •  ketorolac (ACULAR) 0.4 % solution, Administer 1 drop to both eyes 4 (Four) Times a Day., Disp: 5 mL, Rfl: 0  •  lisinopril (PRINIVIL,ZESTRIL) 40 MG tablet, TAKE 1 TABLET  DAILY, Disp: 90 tablet, Rfl: 3  •  lovastatin (MEVACOR) 10 MG tablet, TAKE 1 TABLET EVERY NIGHT (Patient taking differently: TAKE 1 TABLET EVERY THREE TIMES WEEKLY), Disp: 90 tablet, Rfl: 3  •  melatonin 3 MG tablet, Take  by mouth., Disp: , Rfl:   •  niacin (NIASPAN) 1000 MG CR tablet, TAKE 1 TABLET EVERY NIGHT, Disp: 90 tablet, Rfl: 3  •  Omega-3 Fatty Acids (FISH OIL) 1200 MG capsule capsule, Take  by mouth daily., Disp: , Rfl:   •  pantoprazole (PROTONIX) 40 MG EC tablet, Take 1 tablet by mouth Daily., Disp: 90 tablet, Rfl: 3     History of Present Illness   WEI Whelan is a 79 y.o. year old female with the above mentioned PMH who presents for consult from PCP Dr. Schaefer for evaluation of fatigue.    Pt reports a ~6 month history of increased fatigue and LE edema. She does report increased stress over the last month, her  was admitted at [facility] for 2 weeks and then a rehab at [facility] following partial paralysis d/t a back problem and pt stayed with him a lot and did not sleep. Additionally her daughter was recently diagnosed w nonischemic cardiomyopathy. Pt denies any chest pain, dyspnea at rest, dyspnea on exertion, orthopnea, PND, palpitations, or claudication. She does use CPAP at night. Pt had routinely been walking 2.5 miles a day, but her walking moustapha has been ill and she has had some hip pain.     Pt has had TIAs in the past, the first ~15 years ago, most recently ~4 years ago, she was then placed on plavix and asa. Pt denies history of CHF, DVT, PE, MI, or rheumatic fever.       ROS  Review of Systems   Constitution: Positive for malaise/fatigue.   HENT: Positive for hearing loss.    Cardiovascular: Positive for leg swelling.   Respiratory: Positive for sleep disturbances due to breathing.    Musculoskeletal: Positive for arthritis, joint pain and myalgias.   Neurological: Positive for headaches.   All other systems reviewed and are negative.      SOCIAL HX  Social History     Socioeconomic  "History   • Marital status:      Spouse name: Not on file   • Number of children: Not on file   • Years of education: Not on file   • Highest education level: Not on file   Tobacco Use   • Smoking status: Never Smoker   • Smokeless tobacco: Never Used   Substance and Sexual Activity   • Alcohol use: No   • Drug use: No       FAMILY HX  Family History   Problem Relation Age of Onset   • Diabetes Other    • Hypertension Other    • Cancer Other         malignant neoplasm    • Migraines Other    • Heart disease Mother    • Heart disease Father    • Breast cancer Sister    • Breast cancer Other    • Breast cancer Sister        Vitals:    08/26/19 1342   BP: 124/64   BP Location: Right arm   Patient Position: Sitting   Pulse: 87   SpO2: 94%   Weight: 89.1 kg (196 lb 6.4 oz)   Height: 170.2 cm (67\")       PHYSICAL EXAMINATION:  Physical Exam   Constitutional: She is oriented to person, place, and time. She appears well-developed and well-nourished. No distress.   HENT:   Head: Normocephalic and atraumatic.   Right Ear: External ear normal.   Left Ear: External ear normal.   Nose: Nose normal.   Eyes: Conjunctivae and EOM are normal.   Neck: Neck supple. No hepatojugular reflux and no JVD present. Carotid bruit is not present. No thyromegaly present.   Cardiovascular: Normal rate, regular rhythm, S1 normal, S2 normal, normal heart sounds, intact distal pulses and normal pulses. Exam reveals no gallop, no distant heart sounds and no midsystolic click.   No murmur heard.  Pulses:       Radial pulses are 2+ on the right side, and 2+ on the left side.        Dorsalis pedis pulses are 2+ on the right side, and 2+ on the left side.        Posterior tibial pulses are 2+ on the right side, and 2+ on the left side.   Pulmonary/Chest: Effort normal and breath sounds normal. No respiratory distress. She has no decreased breath sounds. She has no wheezes. She has no rhonchi. She has no rales.   Abdominal: Soft. Bowel sounds are " normal. There is no hepatosplenomegaly. There is no tenderness.   Musculoskeletal: Normal range of motion. She exhibits edema.   Neurological: She is alert and oriented to person, place, and time.   No focal deficits.   Skin: Skin is warm and dry. No erythema.   Dusky purple distal LEs   Psychiatric: She has a normal mood and affect. Thought content normal.   Nursing note and vitals reviewed.      Diagnostic Data:    ECG 12 Lead  Date/Time: 8/26/2019 1:50 PM  Performed by: Caleb Luevano MD  Authorized by: Caleb Luevano MD   Comparison: not compared with previous ECG   Previous ECG: no previous ECG available  Rhythm: sinus rhythm  BPM: 87  Conduction: left bundle branch block    Clinical impression: abnormal EKG          Lab Results   Component Value Date    CHLPL 225 (H) 08/07/2019    TRIG 65 08/07/2019    HDL 97 (H) 08/07/2019     Lab Results   Component Value Date    GLUCOSE 142 (H) 02/06/2017    BUN 17 08/07/2019    CREATININE 0.87 08/07/2019     (L) 08/07/2019    K 4.6 08/07/2019    CL 94 (L) 08/07/2019    CO2 30.4 (H) 08/07/2019     Lab Results   Component Value Date    HGBA1C 7.20 (H) 08/07/2019     Lab Results   Component Value Date    WBC 5.54 08/07/2019    HGB 12.1 08/07/2019    HCT 37.9 08/07/2019     08/07/2019       ASSESSMENT:   Diagnosis Plan   1. Fatigue, unspecified type  Adult Transthoracic Echo Complete W/ Cont if Necessary Per Protocol   2. Essential hypertension  ECG 12 Lead    Adult Transthoracic Echo Complete W/ Cont if Necessary Per Protocol   3. Mixed hyperlipidemia     4. Bilateral leg edema  Adult Transthoracic Echo Complete W/ Cont if Necessary Per Protocol   5. Type 2 diabetes mellitus without complication, without long-term current use of insulin (CMS/HCC)     6. ODELL on CPAP  Adult Transthoracic Echo Complete W/ Cont if Necessary Per Protocol   7. LBBB (left bundle branch block)  Adult Transthoracic Echo Complete W/ Cont if Necessary Per Protocol       PLAN:  1. With  fatigue, LE edema, and new LBBB and no recent testing, we will evaluate the structure and function of her heart w echocardiogram. Would consider ischemic testing following w any abnormalities.   2. Patient has acceptable BP. Continue current medical management. Counseled to regularly check BP at home with goal averaging <130/80.   3. Continue statin therapy w 3x weekly lovastatin. Labs per pcp. Pt's HDL is 100, and she does not need to be on both niacin and lovastatin. Rec to d/c niacin.   4.  Pt also counseled on limiting sodium and utilizing compression stockings.   5. Patient counseled that cardiac risk with diabetes increases with hemaglobin a1c >7. Counseled to avoid starch rich foods such as bread, pasta, potatoes, and sweets, and to avoid drinking sugary beverages.    6. Pt counseled on cardiac complications of untreated sleep apnea, continue CPAP     Scribed for Caleb Luevano MD by Cammy Arellano PA-C. 8/26/2019  2:23 PM   Caleb Luevano MD, [facility]

## 2019-09-09 RX ORDER — PANTOPRAZOLE SODIUM 40 MG/1
TABLET, DELAYED RELEASE ORAL
Qty: 90 TABLET | Refills: 3 | Status: SHIPPED | OUTPATIENT
Start: 2019-09-09 | End: 2020-08-24

## 2019-09-11 ENCOUNTER — HOSPITAL ENCOUNTER (OUTPATIENT)
Dept: CARDIOLOGY | Facility: HOSPITAL | Age: 79
Discharge: HOME | End: 2019-09-11
Admitting: PHYSICIAN ASSISTANT

## 2019-09-11 DIAGNOSIS — I10 ESSENTIAL HYPERTENSION: ICD-10-CM

## 2019-09-11 DIAGNOSIS — R60.0 BILATERAL LEG EDEMA: ICD-10-CM

## 2019-09-11 DIAGNOSIS — R53.83 FATIGUE, UNSPECIFIED TYPE: ICD-10-CM

## 2019-09-11 DIAGNOSIS — Z99.89 OSA ON CPAP: ICD-10-CM

## 2019-09-11 DIAGNOSIS — I44.7 LBBB (LEFT BUNDLE BRANCH BLOCK): ICD-10-CM

## 2019-09-11 DIAGNOSIS — G47.33 OSA ON CPAP: ICD-10-CM

## 2019-09-11 LAB
BH CV ECHO MEAS - AO MAX PG (FULL): 4.7 MMHG
BH CV ECHO MEAS - AO MAX PG: 7 MMHG
BH CV ECHO MEAS - AO MEAN PG (FULL): 2 MMHG
BH CV ECHO MEAS - AO MEAN PG: 3 MMHG
BH CV ECHO MEAS - AO ROOT AREA (BSA CORRECTED): 1.6
BH CV ECHO MEAS - AO ROOT AREA: 8 CM^2
BH CV ECHO MEAS - AO ROOT DIAM: 3.2 CM
BH CV ECHO MEAS - AO V2 MAX: 129 CM/SEC
BH CV ECHO MEAS - AO V2 MEAN: 85.8 CM/SEC
BH CV ECHO MEAS - AO V2 VTI: 33.6 CM
BH CV ECHO MEAS - AVA(I,A): 2.1 CM^2
BH CV ECHO MEAS - AVA(I,D): 2.1 CM^2
BH CV ECHO MEAS - AVA(V,A): 2 CM^2
BH CV ECHO MEAS - AVA(V,D): 2 CM^2
BH CV ECHO MEAS - BSA(HAYCOCK): 2.1 M^2
BH CV ECHO MEAS - BSA: 2 M^2
BH CV ECHO MEAS - BZI_BMI: 30.7 KILOGRAMS/M^2
BH CV ECHO MEAS - BZI_METRIC_HEIGHT: 170.2 CM
BH CV ECHO MEAS - BZI_METRIC_WEIGHT: 88.9 KG
BH CV ECHO MEAS - EDV(CUBED): 96.7 ML
BH CV ECHO MEAS - EDV(MOD-SP2): 92 ML
BH CV ECHO MEAS - EDV(MOD-SP4): 126 ML
BH CV ECHO MEAS - EDV(TEICH): 96.8 ML
BH CV ECHO MEAS - EF(CUBED): 40.5 %
BH CV ECHO MEAS - EF(MOD-BP): 41 %
BH CV ECHO MEAS - EF(MOD-SP2): 40.2 %
BH CV ECHO MEAS - EF(MOD-SP4): 48.4 %
BH CV ECHO MEAS - EF(TEICH): 33.6 %
BH CV ECHO MEAS - ESV(CUBED): 57.5 ML
BH CV ECHO MEAS - ESV(MOD-SP2): 55 ML
BH CV ECHO MEAS - ESV(MOD-SP4): 65 ML
BH CV ECHO MEAS - ESV(TEICH): 64.3 ML
BH CV ECHO MEAS - FS: 15.9 %
BH CV ECHO MEAS - IVS/LVPW: 1.1
BH CV ECHO MEAS - IVSD: 0.77 CM
BH CV ECHO MEAS - LA DIMENSION: 4.7 CM
BH CV ECHO MEAS - LA/AO: 1.5
BH CV ECHO MEAS - LAD MAJOR: 5.6 CM
BH CV ECHO MEAS - LV DIASTOLIC VOL/BSA (35-75): 62.8 ML/M^2
BH CV ECHO MEAS - LV MASS(C)D: 107.6 GRAMS
BH CV ECHO MEAS - LV MASS(C)DI: 53.7 GRAMS/M^2
BH CV ECHO MEAS - LV MAX PG: 2.3 MMHG
BH CV ECHO MEAS - LV MEAN PG: 1 MMHG
BH CV ECHO MEAS - LV SYSTOLIC VOL/BSA (12-30): 32.4 ML/M^2
BH CV ECHO MEAS - LV V1 MAX: 75.2 CM/SEC
BH CV ECHO MEAS - LV V1 MEAN: 49.1 CM/SEC
BH CV ECHO MEAS - LV V1 VTI: 20.7 CM
BH CV ECHO MEAS - LVIDD: 4.6 CM
BH CV ECHO MEAS - LVIDS: 3.9 CM
BH CV ECHO MEAS - LVLD AP2: 7 CM
BH CV ECHO MEAS - LVLD AP4: 7.6 CM
BH CV ECHO MEAS - LVLS AP2: 5.2 CM
BH CV ECHO MEAS - LVLS AP4: 6.4 CM
BH CV ECHO MEAS - LVOT AREA (M): 3.5 CM^2
BH CV ECHO MEAS - LVOT AREA: 3.5 CM^2
BH CV ECHO MEAS - LVOT DIAM: 2.1 CM
BH CV ECHO MEAS - LVPWD: 0.73 CM
BH CV ECHO MEAS - MV A MAX VEL: 112 CM/SEC
BH CV ECHO MEAS - MV DEC SLOPE: 191 CM/SEC^2
BH CV ECHO MEAS - MV DEC TIME: 0.23 SEC
BH CV ECHO MEAS - MV E MAX VEL: 76 CM/SEC
BH CV ECHO MEAS - MV E/A: 0.68
BH CV ECHO MEAS - MV P1/2T MAX VEL: 82.5 CM/SEC
BH CV ECHO MEAS - MV P1/2T: 126.5 MSEC
BH CV ECHO MEAS - MVA P1/2T LCG: 2.7 CM^2
BH CV ECHO MEAS - MVA(P1/2T): 1.7 CM^2
BH CV ECHO MEAS - PA ACC SLOPE: 828 CM/SEC^2
BH CV ECHO MEAS - PA ACC TIME: 0.1 SEC
BH CV ECHO MEAS - PA PR(ACCEL): 34.9 MMHG
BH CV ECHO MEAS - PI END-D VEL: 112 CM/SEC
BH CV ECHO MEAS - RAP SYSTOLE: 3 MMHG
BH CV ECHO MEAS - RVSP: 22 MMHG
BH CV ECHO MEAS - SI(AO): 134.8 ML/M^2
BH CV ECHO MEAS - SI(CUBED): 19.5 ML/M^2
BH CV ECHO MEAS - SI(LVOT): 35.8 ML/M^2
BH CV ECHO MEAS - SI(MOD-SP2): 18.5 ML/M^2
BH CV ECHO MEAS - SI(MOD-SP4): 30.4 ML/M^2
BH CV ECHO MEAS - SI(TEICH): 16.2 ML/M^2
BH CV ECHO MEAS - SV(AO): 270.2 ML
BH CV ECHO MEAS - SV(CUBED): 39.2 ML
BH CV ECHO MEAS - SV(LVOT): 71.7 ML
BH CV ECHO MEAS - SV(MOD-SP2): 37 ML
BH CV ECHO MEAS - SV(MOD-SP4): 61 ML
BH CV ECHO MEAS - SV(TEICH): 32.5 ML
BH CV ECHO MEAS - TAPSE (>1.6): 2.5 CM2
BH CV ECHO MEAS - TR MAX PG: 19 MMHG
BH CV ECHO MEAS - TR MAX VEL: 218 CM/SEC
BH CV XLRA - RV BASE: 3.4 CM
BH CV XLRA - RV LENGTH: 5.4 CM
BH CV XLRA - RV MID: 2.7 CM
LEFT ATRIUM VOLUME INDEX: 39.4 ML/M^2
LEFT ATRIUM VOLUME: 79 ML
LV EF 2D ECHO EST: 42 %

## 2019-09-11 PROCEDURE — 93306 TTE W/DOPPLER COMPLETE: CPT

## 2019-09-11 PROCEDURE — 93306 TTE W/DOPPLER COMPLETE: CPT | Performed by: INTERNAL MEDICINE

## 2019-09-12 ENCOUNTER — TELEPHONE (OUTPATIENT)
Dept: CARDIOLOGY | Facility: CLINIC | Age: 79
End: 2019-09-12

## 2019-09-12 DIAGNOSIS — R93.1 ABNORMAL ECHOCARDIOGRAM: Primary | ICD-10-CM

## 2019-09-12 NOTE — TELEPHONE ENCOUNTER
Per Dr Spain Echo reveals weakened pump function   [facility] rec'd       Notified the pt of echo results and dr spain recommendations. She is agreeable to proceed with heart cath. Order placed

## 2019-09-13 ENCOUNTER — PREP FOR SURGERY (OUTPATIENT)
Dept: OTHER | Facility: HOSPITAL | Age: 79
End: 2019-09-13

## 2019-09-13 DIAGNOSIS — I42.0 CARDIOMYOPATHY, DILATED (HCC): ICD-10-CM

## 2019-09-13 DIAGNOSIS — E11.9 TYPE 2 DIABETES MELLITUS WITHOUT COMPLICATION, WITHOUT LONG-TERM CURRENT USE OF INSULIN (HCC): Primary | ICD-10-CM

## 2019-09-13 PROBLEM — R93.1 ABNORMAL ECHOCARDIOGRAM: Status: ACTIVE | Noted: 2019-09-13

## 2019-09-13 RX ORDER — ASPIRIN 325 MG
325 TABLET ORAL ONCE
Status: CANCELLED | OUTPATIENT
Start: 2019-09-13 | End: 2019-09-13

## 2019-09-13 RX ORDER — ONDANSETRON 2 MG/ML
4 INJECTION INTRAMUSCULAR; INTRAVENOUS EVERY 6 HOURS PRN
Status: CANCELLED | OUTPATIENT
Start: 2019-09-13

## 2019-09-13 RX ORDER — ASPIRIN 325 MG
325 TABLET, DELAYED RELEASE (ENTERIC COATED) ORAL DAILY
Status: CANCELLED | OUTPATIENT
Start: 2019-09-14

## 2019-09-13 RX ORDER — NITROGLYCERIN 0.4 MG/1
0.4 TABLET SUBLINGUAL
Status: CANCELLED | OUTPATIENT
Start: 2019-09-13

## 2019-09-13 RX ORDER — METOCLOPRAMIDE HYDROCHLORIDE 5 MG/ML
10 INJECTION INTRAMUSCULAR; INTRAVENOUS EVERY 6 HOURS PRN
Status: CANCELLED | OUTPATIENT
Start: 2019-09-13

## 2019-09-16 ENCOUNTER — HOSPITAL ENCOUNTER (OUTPATIENT)
Facility: HOSPITAL | Age: 79
Discharge: HOME | End: 2019-09-16
Attending: INTERNAL MEDICINE | Admitting: INTERNAL MEDICINE

## 2019-09-16 VITALS
SYSTOLIC BLOOD PRESSURE: 145 MMHG | BODY MASS INDEX: 31 KG/M2 | TEMPERATURE: 96.4 F | RESPIRATION RATE: 18 BRPM | DIASTOLIC BLOOD PRESSURE: 75 MMHG | WEIGHT: 197.53 LBS | OXYGEN SATURATION: 99 % | HEART RATE: 58 BPM | HEIGHT: 67 IN

## 2019-09-16 DIAGNOSIS — R93.1 ABNORMAL ECHOCARDIOGRAM: ICD-10-CM

## 2019-09-16 DIAGNOSIS — R53.83 FATIGUE, UNSPECIFIED TYPE: Primary | ICD-10-CM

## 2019-09-16 PROBLEM — I42.9 CARDIOMYOPATHY (HCC): Status: ACTIVE | Noted: 2019-09-16

## 2019-09-16 LAB
ANION GAP SERPL CALCULATED.3IONS-SCNC: 10 MMOL/L (ref 5–15)
BUN BLD-MCNC: 15 MG/DL (ref 8–23)
BUN/CREAT SERPL: 18.3 (ref 7–25)
CALCIUM SPEC-SCNC: 8.9 MG/DL (ref 8.6–10.5)
CHLORIDE SERPL-SCNC: 100 MMOL/L (ref 98–107)
CHOLEST SERPL-MCNC: 224 MG/DL (ref 0–200)
CO2 SERPL-SCNC: 30 MMOL/L (ref 22–29)
CREAT BLD-MCNC: 0.82 MG/DL (ref 0.57–1)
DEPRECATED RDW RBC AUTO: 48.8 FL (ref 37–54)
ERYTHROCYTE [DISTWIDTH] IN BLOOD BY AUTOMATED COUNT: 13.3 % (ref 12.3–15.4)
GFR SERPL CREATININE-BSD FRML MDRD: 67 ML/MIN/1.73
GLUCOSE BLD-MCNC: 115 MG/DL (ref 65–99)
HBA1C MFR BLD: 7.2 % (ref 4.8–5.6)
HCT VFR BLD AUTO: 36.6 % (ref 34–46.6)
HDLC SERPL-MCNC: 71 MG/DL (ref 40–60)
HGB BLD-MCNC: 11.7 G/DL (ref 12–15.9)
LDLC SERPL CALC-MCNC: 133 MG/DL (ref 0–100)
LDLC/HDLC SERPL: 1.88 {RATIO}
MCH RBC QN AUTO: 31.7 PG (ref 26.6–33)
MCHC RBC AUTO-ENTMCNC: 32 G/DL (ref 31.5–35.7)
MCV RBC AUTO: 99.2 FL (ref 79–97)
PLATELET # BLD AUTO: 171 10*3/MM3 (ref 140–450)
PMV BLD AUTO: 8.6 FL (ref 6–12)
POTASSIUM BLD-SCNC: 3.6 MMOL/L (ref 3.5–5.2)
RBC # BLD AUTO: 3.69 10*6/MM3 (ref 3.77–5.28)
SODIUM BLD-SCNC: 140 MMOL/L (ref 136–145)
TRIGL SERPL-MCNC: 99 MG/DL (ref 0–150)
VLDLC SERPL-MCNC: 19.8 MG/DL
WBC NRBC COR # BLD: 4.14 10*3/MM3 (ref 3.4–10.8)

## 2019-09-16 PROCEDURE — C1769 GUIDE WIRE: HCPCS | Performed by: INTERNAL MEDICINE

## 2019-09-16 PROCEDURE — 25010000002 MIDAZOLAM PER 1 MG: Performed by: INTERNAL MEDICINE

## 2019-09-16 PROCEDURE — 93458 L HRT ARTERY/VENTRICLE ANGIO: CPT | Performed by: INTERNAL MEDICINE

## 2019-09-16 PROCEDURE — 85027 COMPLETE CBC AUTOMATED: CPT

## 2019-09-16 PROCEDURE — C1894 INTRO/SHEATH, NON-LASER: HCPCS | Performed by: INTERNAL MEDICINE

## 2019-09-16 PROCEDURE — 25010000002 HEPARIN (PORCINE) PER 1000 UNITS: Performed by: INTERNAL MEDICINE

## 2019-09-16 PROCEDURE — 80061 LIPID PANEL: CPT | Performed by: PHYSICIAN ASSISTANT

## 2019-09-16 PROCEDURE — 36415 COLL VENOUS BLD VENIPUNCTURE: CPT

## 2019-09-16 PROCEDURE — 0 IOPAMIDOL PER 1 ML: Performed by: INTERNAL MEDICINE

## 2019-09-16 PROCEDURE — 83036 HEMOGLOBIN GLYCOSYLATED A1C: CPT | Performed by: INTERNAL MEDICINE

## 2019-09-16 PROCEDURE — 80048 BASIC METABOLIC PNL TOTAL CA: CPT | Performed by: INTERNAL MEDICINE

## 2019-09-16 RX ORDER — LIDOCAINE HYDROCHLORIDE 10 MG/ML
INJECTION, SOLUTION EPIDURAL; INFILTRATION; INTRACAUDAL; PERINEURAL AS NEEDED
Status: DISCONTINUED | OUTPATIENT
Start: 2019-09-16 | End: 2019-09-16 | Stop reason: HOSPADM

## 2019-09-16 RX ORDER — MIDAZOLAM HYDROCHLORIDE 1 MG/ML
INJECTION INTRAMUSCULAR; INTRAVENOUS AS NEEDED
Status: DISCONTINUED | OUTPATIENT
Start: 2019-09-16 | End: 2019-09-16 | Stop reason: HOSPADM

## 2019-09-16 RX ORDER — ONDANSETRON 2 MG/ML
4 INJECTION INTRAMUSCULAR; INTRAVENOUS EVERY 6 HOURS PRN
Status: DISCONTINUED | OUTPATIENT
Start: 2019-09-16 | End: 2019-09-16 | Stop reason: HOSPADM

## 2019-09-16 RX ORDER — ASPIRIN 325 MG
325 TABLET, DELAYED RELEASE (ENTERIC COATED) ORAL DAILY
Status: DISCONTINUED | OUTPATIENT
Start: 2019-09-17 | End: 2019-09-16 | Stop reason: HOSPADM

## 2019-09-16 RX ORDER — METOCLOPRAMIDE HYDROCHLORIDE 5 MG/ML
10 INJECTION INTRAMUSCULAR; INTRAVENOUS EVERY 6 HOURS PRN
Status: DISCONTINUED | OUTPATIENT
Start: 2019-09-16 | End: 2019-09-16 | Stop reason: HOSPADM

## 2019-09-16 RX ORDER — ASPIRIN 325 MG
325 TABLET ORAL ONCE
Status: COMPLETED | OUTPATIENT
Start: 2019-09-16 | End: 2019-09-16

## 2019-09-16 RX ORDER — NITROGLYCERIN 0.4 MG/1
0.4 TABLET SUBLINGUAL
Status: DISCONTINUED | OUTPATIENT
Start: 2019-09-16 | End: 2019-09-16 | Stop reason: HOSPADM

## 2019-09-16 RX ORDER — SODIUM CHLORIDE 9 MG/ML
100 INJECTION, SOLUTION INTRAVENOUS CONTINUOUS
Status: ACTIVE | OUTPATIENT
Start: 2019-09-16 | End: 2019-09-16

## 2019-09-16 RX ADMIN — ASPIRIN 325 MG: 325 TABLET ORAL at 07:02

## 2019-09-16 NOTE — INTERVAL H&P NOTE
H&P reviewed with the following changes noted:    No change in the physical exam    Lab Results   Component Value Date    BUN 15 09/16/2019     Lab Results   Component Value Date    CREATININE 0.82 09/16/2019     Lab Results   Component Value Date     09/16/2019    K 3.6 09/16/2019     09/16/2019    CO2 30.0 (H) 09/16/2019     Lab Results   Component Value Date    WBC 4.14 09/16/2019    HGB 11.7 (L) 09/16/2019    HCT 36.6 09/16/2019    MCV 99.2 (H) 09/16/2019     09/16/2019       Active Hospital Problems    Diagnosis   • **Abnormal echocardiogram     · Echo (9/11/2019):LVEF = 42%.  Grade 1 diastolic dysfunction.     • Cardiomyopathy (CMS/HCC)     · Echo (9/11/2019): LVEF 42%.  Grade 1 diastolic dysfunction     • LBBB (left bundle branch block)     · Noted on EKG 8/26/2019     • Type 2 diabetes mellitus without complication (CMS/HCC)   • Mixed hyperlipidemia   • Hypertension     Patient is a 79-year-old female with no known coronary disease but a history of diabetes mellitus, hypertension, hyperlipidemia and TIAs that is being referred by Dr. Sylvester Luevano to undergo cardiac catheterization for an abnormal echocardiogram suggesting new cardiomyopathy with a reduced LVEF 42% new left bundle branch block noted on EKG. The risks and benefits of the procedure were discussed and the patient is agreeable to proceed.      Plan:  · Proceed with cardiac cath  · Further recommendations to follow    Liz ACEVEDO

## 2019-09-23 DIAGNOSIS — I10 ESSENTIAL HYPERTENSION: ICD-10-CM

## 2019-09-23 RX ORDER — LISINOPRIL 40 MG/1
TABLET ORAL
Qty: 90 TABLET | Refills: 3 | Status: SHIPPED | OUTPATIENT
Start: 2019-09-23 | End: 2019-12-06 | Stop reason: HOSPADM

## 2019-09-26 ENCOUNTER — OFFICE VISIT (OUTPATIENT)
Dept: INTERNAL MEDICINE | Facility: CLINIC | Age: 79
End: 2019-09-26

## 2019-09-26 VITALS
SYSTOLIC BLOOD PRESSURE: 123 MMHG | RESPIRATION RATE: 16 BRPM | HEART RATE: 62 BPM | TEMPERATURE: 98.1 F | HEIGHT: 67 IN | BODY MASS INDEX: 31.25 KG/M2 | OXYGEN SATURATION: 99 % | DIASTOLIC BLOOD PRESSURE: 73 MMHG | WEIGHT: 199.12 LBS

## 2019-09-26 DIAGNOSIS — E11.9 TYPE 2 DIABETES MELLITUS WITHOUT COMPLICATION, WITHOUT LONG-TERM CURRENT USE OF INSULIN (HCC): ICD-10-CM

## 2019-09-26 DIAGNOSIS — I10 ESSENTIAL HYPERTENSION: Primary | ICD-10-CM

## 2019-09-26 DIAGNOSIS — Z00.00 ROUTINE GENERAL MEDICAL EXAMINATION AT A HEALTH CARE FACILITY: ICD-10-CM

## 2019-09-26 DIAGNOSIS — H10.32 ACUTE BACTERIAL CONJUNCTIVITIS OF LEFT EYE: ICD-10-CM

## 2019-09-26 DIAGNOSIS — I42.9 CARDIOMYOPATHY, UNSPECIFIED TYPE (HCC): ICD-10-CM

## 2019-09-26 PROCEDURE — G0439 PPPS, SUBSEQ VISIT: HCPCS | Performed by: INTERNAL MEDICINE

## 2019-09-26 PROCEDURE — 99214 OFFICE O/P EST MOD 30 MIN: CPT | Performed by: INTERNAL MEDICINE

## 2019-09-26 RX ORDER — AMOXICILLIN AND CLAVULANATE POTASSIUM 875; 125 MG/1; MG/1
1 TABLET, FILM COATED ORAL EVERY 12 HOURS SCHEDULED
Qty: 20 TABLET | Refills: 0 | Status: SHIPPED | OUTPATIENT
Start: 2019-09-26 | End: 2019-10-10

## 2019-09-26 NOTE — PROGRESS NOTES
QUICK REFERENCE INFORMATION:  The ABCs of the Annual Wellness Visit    Medicare Annual Wellness Visit    Subjective   History of Present Illness    Radha Whelan is a 79 y.o. female who presents for an Annual Wellness Visit. In addition, we addressed the following health issues conjunctitis dmt2.      Chronic pain: 5/10      PMH, PSH, SocHx, FamHx, Allergies, and Medications: Reviewed and updated.     Outpatient Medications Prior to Visit   Medication Sig Dispense Refill   • aspirin 81 MG tablet Take  by mouth daily.     • aspirin-acetaminophen-caffeine (EXCEDRIN MIGRAINE) 250-250-65 MG per tablet Take  by mouth.     • Cholecalciferol (VITAMIN D PO) Take 125 mcg by mouth 2 (Two) Times a Week.     • clopidogrel (PLAVIX) 75 MG tablet TAKE 1 TABLET DAILY 90 tablet 3   • coenzyme Q10 100 MG capsule Take 400 mg by mouth Every Other Day.     • folic acid (FOLVITE) 1 MG tablet TAKE 1 TABLET DAILY 90 tablet 3   • furosemide (LASIX) 20 MG tablet TAKE 1 TABLET TWICE A  tablet 1   • glipiZIDE (GLUCOTROL XL) 2.5 MG 24 hr tablet Take 1 tablet by mouth Daily. 90 tablet 3   • Glucosamine-Chondroit-Vit C-Mn (GLUCOSAMINE CHONDR 1500 COMPLX PO) Take 1 tablet by mouth Daily.     • glucose blood test strip Use as instructed 100 each 12   • lisinopril (PRINIVIL,ZESTRIL) 40 MG tablet TAKE 1 TABLET DAILY 90 tablet 3   • lovastatin (MEVACOR) 10 MG tablet TAKE 1 TABLET EVERY NIGHT (Patient taking differently: TAKE 1 TABLET EVERY THREE TIMES WEEKLY) 90 tablet 3   • melatonin 3 MG tablet Take 3 mg by mouth Every Night.     • Omega-3 Fatty Acids (FISH OIL) 1200 MG capsule capsule Take 1,200 mg by mouth Daily.     • pantoprazole (PROTONIX) 40 MG EC tablet TAKE 1 TABLET DAILY 90 tablet 3   • Tobramycin-Dexamethasone 0.3-0.05 % suspension Apply 1 drop to eye(s) as directed by provider 2 (Two) Times a Day. 5 mL 0   • ketorolac (ACULAR) 0.4 % solution Administer 1 drop to both eyes 4 (Four) Times a Day. 5 mL 0     No facility-administered  medications prior to visit.        Patient Active Problem List   Diagnosis   • Type 2 diabetes mellitus without complication (CMS/HCC)   • Temporary cerebral vascular dysfunction   • Fatigue   • Hypertension   • Hypervitaminosis B6   • Hyponatremia   • ODELL on CPAP   • Peripheral neuropathy   • Restless legs syndrome   • Edema   • H/O hyperlipidemia   • Acute non-recurrent maxillary sinusitis   • Infection of left tear duct   • Left eye pain   • Mixed hyperlipidemia   • LBBB (left bundle branch block)   • Abnormal echocardiogram   • Cardiomyopathy (CMS/HCC)       Health Habits:  Dental Exam. up to date  Eye Exam. up to date  No exam data present  Exercise: 0 times/week.  Current exercise activities include: none    Health Risk Assessment:  The patient has completed a Health Risk Assessment. This has been reviewed with them and has been scanned into the patient's chart.    Current Medical Providers:  Patient Care Team:  Farhan Schaefer MD as PCP - General  Farhan Schaefer MD as PCP - [facility] Nik Ingram MD as Consulting Physician (General Surgery)    The [facility] providers who are involved in the care of this patient are listed above. Additional providers and suppliers are listed below:  Dr Trotter eye  Dr Mays cardiology.       Recent Hospitalizations:  No hospitalization(s) within the last year..    Recent Lab Results:  CMP:  Lab Results   Component Value Date     (H) 08/07/2019    BUN 15 09/16/2019    CREATININE 0.82 09/16/2019    EGFRIFNONA 67 09/16/2019    EGFRIFAFRI 76 08/07/2019    BCR 18.3 09/16/2019     09/16/2019    K 3.6 09/16/2019    CO2 30.0 (H) 09/16/2019    CALCIUM 8.9 09/16/2019    PROTENTOTREF 6.6 08/07/2019    ALBUMIN 3.70 08/07/2019    LABGLOBREF 2.9 08/07/2019    LABIL2 1.3 08/07/2019    BILITOT 0.6 08/07/2019    ALKPHOS 61 08/07/2019    AST 16 08/07/2019    ALT 15 08/07/2019       LIPID PANEL:  Lab Results   Component Value Date    CHLPL 225 (H) 08/07/2019     TRIG 99 09/16/2019    HDL 71 (H) 09/16/2019    VLDL 19.8 09/16/2019     (H) 09/16/2019       HbA1c:  Lab Results   Component Value Date    HGBA1C 7.20 (H) 09/16/2019       URINE MICROALBUMIN:  Lab Results   Component Value Date    MICROALBUR <3.0 04/01/2019       PSA:  No results found for: PSA    Age-appropriate Screening Schedule:  Refer to the list below for future screening recommendations based on patient's age, sex and/or medical conditions. Orders for these recommended tests are listed in the plan section. The patient has been provided with a written plan.    Health Maintenance   Topic Date Due   • ZOSTER VACCINE (2 of 2) 04/05/2013   • INFLUENZA VACCINE  08/01/2019   • DIABETIC EYE EXAM  12/10/2019   • HEMOGLOBIN A1C  03/16/2020   • URINE MICROALBUMIN  04/01/2020   • MAMMOGRAM  09/11/2020   • LIPID PANEL  09/16/2020   • TDAP/TD VACCINES (2 - Td) 02/08/2024   • PNEUMOCOCCAL VACCINES (65+ LOW/MEDIUM RISK)  Addressed       Depression Screen:   PHQ-2/PHQ-9 Depression Screening 9/26/2019   Little interest or pleasure in doing things 0   Feeling down, depressed, or hopeless 0   Trouble falling or staying asleep, or sleeping too much 0   Feeling tired or having little energy 0   Poor appetite or overeating 0   Feeling bad about yourself - or that you are a failure or have let yourself or your family down 0   Trouble concentrating on things, such as reading the newspaper or watching television 0   Moving or speaking so slowly that other people could have noticed. Or the opposite - being so fidgety or restless that you have been moving around a lot more than usual 0   Thoughts that you would be better off dead, or of hurting yourself in some way 0   Total Score 0         Functional and Cognitive Screening:  Functional & Cognitive Status 9/26/2019   Do you have difficulty preparing food and eating? No   Do you have difficulty bathing yourself, getting dressed or grooming yourself? No   Do you have difficulty  "using the toilet? No   Do you have difficulty moving around from place to place? No   Do you have trouble with steps or getting out of a bed or a chair? No   Current Diet Well Balanced Diet   Dental Exam Up to date   Eye Exam Up to date   Exercise (times per week) 0 times per week   Current Exercise Activities Include None   Do you need help using the phone?  No   Are you deaf or do you have serious difficulty hearing?  No   Do you need help with transportation? No   Do you need help shopping? No   Do you need help preparing meals?  No   Do you need help with housework?  No   Do you need help with laundry? No   Do you need help taking your medications? No   Do you need help managing money? No   Do you ever drive or ride in a car without wearing a seat belt? No   Have you felt unusual stress, anger or loneliness in the last month? No   Who do you live with? Spouse   If you need help, do you have trouble finding someone available to you? No   Have you been bothered in the last four weeks by sexual problems? No   Do you have difficulty concentrating, remembering or making decisions? No       Does the patient have evidence of cognitive impairment? No    Advanced Care Planning:  Patient does not have an advance directive - additional information requested. Referral to advance care planning placed    Identification of Risk Factors:  Risk factors include: Advance Directive Discussion  Cardiovascular risk  Fall Risk  Obesity/Overweight   Osteoprorosis Risk.    Review of Systems    Compared to one year ago, the patient feels his physical health is better.  Compared to one year ago, the patient feels his mental health is better.    Objective     Physical Exam    Vitals:    09/26/19 1524   BP: 123/73   BP Location: Left arm   Patient Position: Sitting   Cuff Size: Large Adult   Pulse: 62   Resp: 16   Temp: 98.1 °F (36.7 °C)   TempSrc: Oral   SpO2: 99%   Weight: 90.3 kg (199 lb 1.9 oz)   Height: 170.2 cm (67\")       Body mass " index is 31.19 kg/m².  Discussed the patient's BMI with her. The BMI is in the acceptable range.    Assessment/Plan   Patient Self-Management and Personalized Health Advice  The patient has been provided with information about: diet, exercise and weight management and preventive services including:   · Annual Wellness Visit (AWV).    Visit Diagnoses:    ICD-10-CM ICD-9-CM   1. Essential hypertension I10 401.9   2. Acute bacterial conjunctivitis of left eye H10.32 372.03   3. Type 2 diabetes mellitus without complication, without long-term current use of insulin (CMS/[facility]) E11.9 250.00   4. Cardiomyopathy, unspecified type (CMS/[facility]) I42.9 425.4   5. Routine general medical examination at a health care facility Z00.00 V70.0       Orders Placed This Encounter   Procedures   • Comprehensive Metabolic Panel   • Vitamin B12   • Lipid Panel   • Hemoglobin A1c   • TSH   • T4, Free   • CBC & Differential     Order Specific Question:   Manual Differential     Answer:   No       Outpatient Encounter Medications as of 9/26/2019   Medication Sig Dispense Refill   • aspirin 81 MG tablet Take  by mouth daily.     • aspirin-acetaminophen-caffeine (EXCEDRIN MIGRAINE) 250-250-65 MG per tablet Take  by mouth.     • Cholecalciferol (VITAMIN D PO) Take 125 mcg by mouth 2 (Two) Times a Week.     • clopidogrel (PLAVIX) 75 MG tablet TAKE 1 TABLET DAILY 90 tablet 3   • coenzyme Q10 100 MG capsule Take 400 mg by mouth Every Other Day.     • folic acid (FOLVITE) 1 MG tablet TAKE 1 TABLET DAILY 90 tablet 3   • furosemide (LASIX) 20 MG tablet TAKE 1 TABLET TWICE A  tablet 1   • glipiZIDE (GLUCOTROL XL) 2.5 MG 24 hr tablet Take 1 tablet by mouth Daily. 90 tablet 3   • Glucosamine-Chondroit-Vit C-Mn (GLUCOSAMINE CHONDR 1500 COMPLX PO) Take 1 tablet by mouth Daily.     • glucose blood test strip Use as instructed 100 each 12   • lisinopril (PRINIVIL,ZESTRIL) 40 MG tablet TAKE 1 TABLET DAILY 90 tablet 3   • lovastatin (MEVACOR) 10 MG tablet  TAKE 1 TABLET EVERY NIGHT (Patient taking differently: TAKE 1 TABLET EVERY THREE TIMES WEEKLY) 90 tablet 3   • melatonin 3 MG tablet Take 3 mg by mouth Every Night.     • Omega-3 Fatty Acids (FISH OIL) 1200 MG capsule capsule Take 1,200 mg by mouth Daily.     • pantoprazole (PROTONIX) 40 MG EC tablet TAKE 1 TABLET DAILY 90 tablet 3   • Tobramycin-Dexamethasone 0.3-0.05 % suspension Apply 1 drop to eye(s) as directed by provider 2 (Two) Times a Day. 5 mL 0   • [DISCONTINUED] Tobramycin-Dexamethasone 0.3-0.05 % suspension Apply 1 drop to eye(s) as directed by provider 2 (Two) Times a Day. 5 mL 0   • [DISCONTINUED] Tobramycin-Dexamethasone 0.3-0.05 % suspension Apply 1 drop to eye(s) as directed by provider 2 (Two) Times a Day. 5 mL 0   • [DISCONTINUED] ketorolac (ACULAR) 0.4 % solution Administer 1 drop to both eyes 4 (Four) Times a Day. 5 mL 0     No facility-administered encounter medications on file as of 9/26/2019.        Reviewed use of high risk medication in the elderly: yes  Reviewed for potential of harmful drug interactions in the elderly: yes    Follow Up:  Return in about 6 months (around 3/26/2020).     An After Visit Summary and PPPS with all of these plans were given to the patient.             Radha was seen today for eye issues.    Diagnoses and all orders for this visit:  Routine general medical examination at a health care facility    Other orders

## 2019-09-26 NOTE — PROGRESS NOTES
Subjective     Patient ID: Radha Whelan is a 79 y.o. female. Patient is here for management of multiple medical problems.     Chief Complaint   Patient presents with   • Eye issues     patient states she is having yellow drainage running from her left eye, the eye and area around the eye is red and swollen     History of Present Illness   Eye draining puss.   Happen a few x a year.  Has Gent eye drops and is running out. Clogged tear duct.            The following portions of the patient's history were reviewed and updated as appropriate: allergies, current medications, past family history, past medical history, past social history, past surgical history and problem list.    Review of Systems   Constitutional: Positive for fatigue.   HENT: Negative for ear discharge, ear pain and facial swelling.    Gastrointestinal: Negative for abdominal distention, anal bleeding and blood in stool.   Musculoskeletal: Positive for arthralgias, back pain and gait problem.   All other systems reviewed and are negative.      Current Outpatient Medications:   •  aspirin 81 MG tablet, Take  by mouth daily., Disp: , Rfl:   •  aspirin-acetaminophen-caffeine (EXCEDRIN MIGRAINE) 250-250-65 MG per tablet, Take  by mouth., Disp: , Rfl:   •  Cholecalciferol (VITAMIN D PO), Take 125 mcg by mouth 2 (Two) Times a Week., Disp: , Rfl:   •  clopidogrel (PLAVIX) 75 MG tablet, TAKE 1 TABLET DAILY, Disp: 90 tablet, Rfl: 3  •  coenzyme Q10 100 MG capsule, Take 400 mg by mouth Every Other Day., Disp: , Rfl:   •  folic acid (FOLVITE) 1 MG tablet, TAKE 1 TABLET DAILY, Disp: 90 tablet, Rfl: 3  •  furosemide (LASIX) 20 MG tablet, TAKE 1 TABLET TWICE A DAY, Disp: 180 tablet, Rfl: 1  •  glipiZIDE (GLUCOTROL XL) 2.5 MG 24 hr tablet, Take 1 tablet by mouth Daily., Disp: 90 tablet, Rfl: 3  •  Glucosamine-Chondroit-Vit C-Mn (GLUCOSAMINE CHONDR 1500 COMPLX PO), Take 1 tablet by mouth Daily., Disp: , Rfl:   •  glucose blood test strip, Use as instructed, Disp: 100  "each, Rfl: 12  •  lisinopril (PRINIVIL,ZESTRIL) 40 MG tablet, TAKE 1 TABLET DAILY, Disp: 90 tablet, Rfl: 3  •  lovastatin (MEVACOR) 10 MG tablet, TAKE 1 TABLET EVERY NIGHT (Patient taking differently: TAKE 1 TABLET EVERY THREE TIMES WEEKLY), Disp: 90 tablet, Rfl: 3  •  melatonin 3 MG tablet, Take 3 mg by mouth Every Night., Disp: , Rfl:   •  Omega-3 Fatty Acids (FISH OIL) 1200 MG capsule capsule, Take 1,200 mg by mouth Daily., Disp: , Rfl:   •  pantoprazole (PROTONIX) 40 MG EC tablet, TAKE 1 TABLET DAILY, Disp: 90 tablet, Rfl: 3  •  Tobramycin-Dexamethasone 0.3-0.05 % suspension, Apply 1 drop to eye(s) as directed by provider 2 (Two) Times a Day., Disp: 5 mL, Rfl: 0  •  amoxicillin-clavulanate (AUGMENTIN) 875-125 MG per tablet, Take 1 tablet by mouth Every 12 (Twelve) Hours., Disp: 20 tablet, Rfl: 0    Objective      Blood pressure 123/73, pulse 62, temperature 98.1 °F (36.7 °C), temperature source Oral, resp. rate 16, height 170.2 cm (67\"), weight 90.3 kg (199 lb 1.9 oz), SpO2 99 %.    Physical Exam     General Appearance:    Alert, cooperative, no distress, appears stated age   Head:    Normocephalic, without obvious abnormality, atraumatic   Eyes:    PERRL, conjunctiva/corneas clear, EOM's intact   Ears:    Normal TM's and external ear canals, both ears   Nose:   Nares normal, septum midline, mucosa normal, no drainage   or sinus tenderness   Throat:   Lips, mucosa, and tongue normal; teeth and gums normal   Neck:   Supple, symmetrical, trachea midline, no adenopathy;        thyroid:  No enlargement/tenderness/nodules; no carotid    bruit or JVD   Back:     Symmetric, no curvature, ROM normal, no CVA tenderness   Lungs:     Clear to auscultation bilaterally, respirations unlabored   Chest wall:    No tenderness or deformity   Heart:    Regular rate and rhythm, S1 and S2 normal, no murmur,        rub or gallop   Abdomen:     Soft, non-tender, bowel sounds active all four quadrants,     no masses, no organomegaly "   Extremities:   Extremities normal, atraumatic, no cyanosis or edema   Pulses:   2+ and symmetric all extremities   Skin:   Skin color, texture, turgor normal, no rashes or lesions   Lymph nodes:   Cervical, supraclavicular, and axillary nodes normal   Neurologic:   CNII-XII intact. Normal strength, sensation and reflexes       throughout      Results for orders placed or performed during the hospital encounter of 09/16/19   Lipid Panel   Result Value Ref Range    Total Cholesterol 224 (H) 0 - 200 mg/dL    Triglycerides 99 0 - 150 mg/dL    HDL Cholesterol 71 (H) 40 - 60 mg/dL    LDL Cholesterol  133 (H) 0 - 100 mg/dL    VLDL Cholesterol 19.8 mg/dL    LDL/HDL Ratio 1.88    CBC (No Diff)   Result Value Ref Range    WBC 4.14 3.40 - 10.80 10*3/mm3    RBC 3.69 (L) 3.77 - 5.28 10*6/mm3    Hemoglobin 11.7 (L) 12.0 - 15.9 g/dL    Hematocrit 36.6 34.0 - 46.6 %    MCV 99.2 (H) 79.0 - 97.0 fL    MCH 31.7 26.6 - 33.0 pg    MCHC 32.0 31.5 - 35.7 g/dL    RDW 13.3 12.3 - 15.4 %    RDW-SD 48.8 37.0 - 54.0 fl    MPV 8.6 6.0 - 12.0 fL    Platelets 171 140 - 450 10*3/mm3   Basic Metabolic Panel   Result Value Ref Range    Glucose 115 (H) 65 - 99 mg/dL    BUN 15 8 - 23 mg/dL    Creatinine 0.82 0.57 - 1.00 mg/dL    Sodium 140 136 - 145 mmol/L    Potassium 3.6 3.5 - 5.2 mmol/L    Chloride 100 98 - 107 mmol/L    CO2 30.0 (H) 22.0 - 29.0 mmol/L    Calcium 8.9 8.6 - 10.5 mg/dL    eGFR Non African Amer 67 >60 mL/min/1.73    BUN/Creatinine Ratio 18.3 7.0 - 25.0    Anion Gap 10.0 5.0 - 15.0 mmol/L   Hemoglobin A1c   Result Value Ref Range    Hemoglobin A1C 7.20 (H) 4.80 - 5.60 %         Assessment/Plan     ha1c a bit higher. Pt states she will do better on her diet.      Radha was seen today for eye issues.    Diagnoses and all orders for this visit:    Essential hypertension  -     Comprehensive Metabolic Panel  -     Vitamin B12  -     CBC & Differential  -     Lipid Panel  -     Hemoglobin A1c  -     TSH  -     T4, Free    Acute bacterial  conjunctivitis of left eye  -     Tobramycin-Dexamethasone 0.3-0.05 % suspension; Apply 1 drop to eye(s) as directed by provider 2 (Two) Times a Day.  -     amoxicillin-clavulanate (AUGMENTIN) 875-125 MG per tablet; Take 1 tablet by mouth Every 12 (Twelve) Hours.    Type 2 diabetes mellitus without complication, without long-term current use of insulin (CMS/[facility])  -     Comprehensive Metabolic Panel  -     Vitamin B12  -     CBC & Differential  -     Lipid Panel  -     Hemoglobin A1c  -     TSH  -     T4, Free    Cardiomyopathy, unspecified type (CMS/[facility])  -     Comprehensive Metabolic Panel  -     Vitamin B12  -     CBC & Differential  -     Lipid Panel  -     Hemoglobin A1c  -     TSH  -     T4, Free  Other orders  -     Discontinue: Tobramycin-Dexamethasone 0.3-0.05 % suspension; Apply 1 drop to eye(s) as directed by provider 2 (Two) Times a Day.      Return in about 6 months (around 3/26/2020).          There are no Patient Instructions on file for this visit.     Farhan Schaefer MD    Assessment/Plan

## 2019-10-10 ENCOUNTER — OFFICE VISIT (OUTPATIENT)
Dept: INTERNAL MEDICINE | Facility: CLINIC | Age: 79
End: 2019-10-10

## 2019-10-10 VITALS
TEMPERATURE: 97.8 F | SYSTOLIC BLOOD PRESSURE: 139 MMHG | HEART RATE: 81 BPM | RESPIRATION RATE: 16 BRPM | WEIGHT: 202.8 LBS | OXYGEN SATURATION: 100 % | BODY MASS INDEX: 31.83 KG/M2 | DIASTOLIC BLOOD PRESSURE: 64 MMHG | HEIGHT: 67 IN

## 2019-10-10 DIAGNOSIS — M25.551 RIGHT HIP PAIN: Primary | ICD-10-CM

## 2019-10-10 DIAGNOSIS — E11.59 TYPE 2 DIABETES MELLITUS WITH OTHER CIRCULATORY COMPLICATION, WITHOUT LONG-TERM CURRENT USE OF INSULIN (HCC): ICD-10-CM

## 2019-10-10 PROCEDURE — 99213 OFFICE O/P EST LOW 20 MIN: CPT | Performed by: INTERNAL MEDICINE

## 2019-10-10 NOTE — PROGRESS NOTES
Subjective     Patient ID: Radha Whelan is a 79 y.o. female. Patient is here for management of multiple medical problems.     Chief Complaint   Patient presents with   • Preop clearance     Patient for preop clearance for right hip replacement     History of Present Illness     Unable to walk due hip pain.    Was walking 2.6 miles a day.  Neg heart cath..    Last TIA/cva 10 years ago. On plavix and asp.        The following portions of the patient's history were reviewed and updated as appropriate: allergies, current medications, past family history, past medical history, past social history, past surgical history and problem list.    Review of Systems   Constitutional: Positive for fatigue.   HENT: Negative for congestion, dental problem, ear discharge and facial swelling.    Musculoskeletal: Positive for arthralgias, gait problem and joint swelling. Negative for back pain.   Psychiatric/Behavioral: Negative for self-injury and sleep disturbance. The patient is not nervous/anxious.    All other systems reviewed and are negative.      Current Outpatient Medications:   •  aspirin 81 MG tablet, Take  by mouth daily., Disp: , Rfl:   •  aspirin-acetaminophen-caffeine (EXCEDRIN MIGRAINE) 250-250-65 MG per tablet, Take  by mouth., Disp: , Rfl:   •  Cholecalciferol (VITAMIN D PO), Take 125 mcg by mouth 2 (Two) Times a Week., Disp: , Rfl:   •  clopidogrel (PLAVIX) 75 MG tablet, TAKE 1 TABLET DAILY, Disp: 90 tablet, Rfl: 3  •  coenzyme Q10 100 MG capsule, Take 400 mg by mouth Every Other Day., Disp: , Rfl:   •  folic acid (FOLVITE) 1 MG tablet, TAKE 1 TABLET DAILY, Disp: 90 tablet, Rfl: 3  •  furosemide (LASIX) 20 MG tablet, TAKE 1 TABLET TWICE A DAY, Disp: 180 tablet, Rfl: 1  •  glipiZIDE (GLUCOTROL XL) 2.5 MG 24 hr tablet, Take 1 tablet by mouth Daily., Disp: 90 tablet, Rfl: 3  •  Glucosamine-Chondroit-Vit C-Mn (GLUCOSAMINE CHONDR 1500 COMPLX PO), Take 1 tablet by mouth Daily., Disp: , Rfl:   •  glucose blood test strip, Use  "as instructed, Disp: 100 each, Rfl: 12  •  lisinopril (PRINIVIL,ZESTRIL) 40 MG tablet, TAKE 1 TABLET DAILY, Disp: 90 tablet, Rfl: 3  •  lovastatin (MEVACOR) 10 MG tablet, TAKE 1 TABLET EVERY NIGHT (Patient taking differently: TAKE 1 TABLET EVERY THREE TIMES WEEKLY), Disp: 90 tablet, Rfl: 3  •  melatonin 3 MG tablet, Take 3 mg by mouth Every Night., Disp: , Rfl:   •  Omega-3 Fatty Acids (FISH OIL) 1200 MG capsule capsule, Take 1,200 mg by mouth Daily., Disp: , Rfl:   •  pantoprazole (PROTONIX) 40 MG EC tablet, TAKE 1 TABLET DAILY, Disp: 90 tablet, Rfl: 3  •  Tobramycin-Dexamethasone 0.3-0.05 % suspension, Apply 1 drop to eye(s) as directed by provider 2 (Two) Times a Day., Disp: 5 mL, Rfl: 0    Objective      Blood pressure 139/64, pulse 81, temperature 97.8 °F (36.6 °C), temperature source Oral, resp. rate 16, height 170.2 cm (67\"), weight 92 kg (202 lb 12.8 oz), SpO2 100 %.    Physical Exam     General Appearance:    Alert, cooperative, no distress, appears stated age   Head:    Normocephalic, without obvious abnormality, atraumatic   Eyes:    PERRL, conjunctiva/corneas clear, EOM's intact   Ears:    Normal TM's and external ear canals, both ears   Nose:   Nares normal, septum midline, mucosa normal, no drainage   or sinus tenderness   Throat:   Lips, mucosa, and tongue normal; teeth and gums normal   Neck:   Supple, symmetrical, trachea midline, no adenopathy;        thyroid:  No enlargement/tenderness/nodules; no carotid    bruit or JVD   Back:     Symmetric, no curvature, ROM normal, no CVA tenderness   Lungs:     Clear to auscultation bilaterally, respirations unlabored   Chest wall:    No tenderness or deformity   Heart:    Regular rate and rhythm, S1 and S2 normal, no murmur,        rub or gallop   Abdomen:     Soft, non-tender, bowel sounds active all four quadrants,     no masses, no organomegaly   Extremities:   Pain in hip and limited rom.  Extremities normal, atraumatic, no cyanosis or edema   Pulses:   " 2+ and symmetric all extremities   Skin:   Skin color, texture, turgor normal, no rashes or lesions   Lymph nodes:   Cervical, supraclavicular, and axillary nodes normal   Neurologic:   CNII-XII intact. Normal strength, sensation and reflexes       throughout      Results for orders placed or performed during the hospital encounter of 09/16/19   Lipid Panel   Result Value Ref Range    Total Cholesterol 224 (H) 0 - 200 mg/dL    Triglycerides 99 0 - 150 mg/dL    HDL Cholesterol 71 (H) 40 - 60 mg/dL    LDL Cholesterol  133 (H) 0 - 100 mg/dL    VLDL Cholesterol 19.8 mg/dL    LDL/HDL Ratio 1.88    CBC (No Diff)   Result Value Ref Range    WBC 4.14 3.40 - 10.80 10*3/mm3    RBC 3.69 (L) 3.77 - 5.28 10*6/mm3    Hemoglobin 11.7 (L) 12.0 - 15.9 g/dL    Hematocrit 36.6 34.0 - 46.6 %    MCV 99.2 (H) 79.0 - 97.0 fL    MCH 31.7 26.6 - 33.0 pg    MCHC 32.0 31.5 - 35.7 g/dL    RDW 13.3 12.3 - 15.4 %    RDW-SD 48.8 37.0 - 54.0 fl    MPV 8.6 6.0 - 12.0 fL    Platelets 171 140 - 450 10*3/mm3   Basic Metabolic Panel   Result Value Ref Range    Glucose 115 (H) 65 - 99 mg/dL    BUN 15 8 - 23 mg/dL    Creatinine 0.82 0.57 - 1.00 mg/dL    Sodium 140 136 - 145 mmol/L    Potassium 3.6 3.5 - 5.2 mmol/L    Chloride 100 98 - 107 mmol/L    CO2 30.0 (H) 22.0 - 29.0 mmol/L    Calcium 8.9 8.6 - 10.5 mg/dL    eGFR Non African Amer 67 >60 mL/min/1.73    BUN/Creatinine Ratio 18.3 7.0 - 25.0    Anion Gap 10.0 5.0 - 15.0 mmol/L   Hemoglobin A1c   Result Value Ref Range    Hemoglobin A1C 7.20 (H) 4.80 - 5.60 %         Assessment/Plan     Pt with cva in the past/.    Pt needs off asprin and plavix x 5 days prior to surgery.    Labs stable.  Pt with acceptable rf  to surgery.   Pt is medically cleared for surgery.      Radha was seen today for preop clearance.    Diagnoses and all orders for this visit:    Right hip pain  -     Vitamin B12  -     CBC & Differential  -     Lipid Panel  -     TSH  -     Comprehensive Metabolic Panel  -     T4, Free  -      Hemoglobin A1c  -     MicroAlbumin, Urine, Random - Urine, Clean Catch    Type 2 diabetes mellitus with other circulatory complication, without long-term current use of insulin (CMS/[facility])  -     Vitamin B12  -     CBC & Differential  -     Lipid Panel  -     TSH  -     Comprehensive Metabolic Panel  -     T4, Free  -     Hemoglobin A1c  -     MicroAlbumin, Urine, Random - Urine, Clean Catch      Return if symptoms worsen or fail to improve.          There are no Patient Instructions on file for this visit.     Farhan Schaefer MD    Assessment/Plan

## 2019-10-17 ENCOUNTER — TRANSCRIBE ORDERS (OUTPATIENT)
Dept: ADMINISTRATIVE | Facility: HOSPITAL | Age: 79
End: 2019-10-17

## 2019-10-17 DIAGNOSIS — Z12.31 VISIT FOR SCREENING MAMMOGRAM: Primary | ICD-10-CM

## 2019-10-21 ENCOUNTER — OFFICE VISIT (OUTPATIENT)
Dept: CARDIOLOGY | Facility: CLINIC | Age: 79
End: 2019-10-21

## 2019-10-21 VITALS
WEIGHT: 203 LBS | BODY MASS INDEX: 31.86 KG/M2 | DIASTOLIC BLOOD PRESSURE: 82 MMHG | OXYGEN SATURATION: 97 % | HEART RATE: 58 BPM | SYSTOLIC BLOOD PRESSURE: 144 MMHG | HEIGHT: 67 IN

## 2019-10-21 DIAGNOSIS — I10 ESSENTIAL HYPERTENSION: ICD-10-CM

## 2019-10-21 DIAGNOSIS — I25.10 CORONARY ARTERY DISEASE INVOLVING NATIVE CORONARY ARTERY OF NATIVE HEART WITHOUT ANGINA PECTORIS: Primary | ICD-10-CM

## 2019-10-21 DIAGNOSIS — E11.65 TYPE 2 DIABETES MELLITUS WITH HYPERGLYCEMIA, WITHOUT LONG-TERM CURRENT USE OF INSULIN (HCC): ICD-10-CM

## 2019-10-21 DIAGNOSIS — E78.2 MIXED HYPERLIPIDEMIA: ICD-10-CM

## 2019-10-21 DIAGNOSIS — I50.22 CHRONIC SYSTOLIC CONGESTIVE HEART FAILURE (HCC): ICD-10-CM

## 2019-10-21 PROCEDURE — 99214 OFFICE O/P EST MOD 30 MIN: CPT | Performed by: INTERNAL MEDICINE

## 2019-10-21 RX ORDER — IBUPROFEN 800 MG/1
800 TABLET ORAL 3 TIMES DAILY PRN
Refills: 0 | COMMUNITY
Start: 2019-10-09 | End: 2019-12-06 | Stop reason: HOSPADM

## 2019-10-21 NOTE — PROGRESS NOTES
[facility]  Office Progress Note  Radha Whelan  1940      Visit Date: 10/21/19    PCP: Farhan Schaefer MD  [address] [address]    IDENTIFICATION: A 79 y.o. female retired      PROBLEM LIST:   1. CAD  1. 2019 echo: EF 42%, grade 1 diastolic dysfunction, RVSP 22  2. 2019 LHC per [facility] no evidence of hemodynamically significant CAD, normal EF 55%, 24-hour Holter recommended to assess for bradycardic arrhythmias  2. Bradycardia  1. 2019 24-hour Holter: Average HR 60, range 45-1 07, 1.6% PACs, 0.3% PVCs, rare SVT less than 11 beats with no symptoms  3. HTN  4. HLD  1. Statin intolerant to multiple agents  2. 2017   HDL 70   3. 2019  TG 65 HDL 97 , on lovastatin  5. Hx TIAs  1. Committed to DAPT ~  6. T2DM  1. Dx ~   2.  A1c 7.2  7. Hx DVT ~, temporary ac  8. Migraines  9. Thyroid nodule  10. Hx hyponatremia  11.   12. Surgical history:  1. Tubal ligation  2. Breast biopsy      Chief Complaint   Patient presents with   • Hypertension       Allergies  Allergies   Allergen Reactions   • Other        Current Medications    Current Outpatient Medications:   •  aspirin 81 MG tablet, Take  by mouth daily., Disp: , Rfl:   •  aspirin-acetaminophen-caffeine (EXCEDRIN MIGRAINE) 250-250-65 MG per tablet, Take  by mouth., Disp: , Rfl:   •  Cholecalciferol (VITAMIN D PO), Take 125 mcg by mouth 2 (Two) Times a Week., Disp: , Rfl:   •  clopidogrel (PLAVIX) 75 MG tablet, TAKE 1 TABLET DAILY, Disp: 90 tablet, Rfl: 3  •  coenzyme Q10 100 MG capsule, Take 400 mg by mouth Every Other Day., Disp: , Rfl:   •  folic acid (FOLVITE) 1 MG tablet, TAKE 1 TABLET DAILY, Disp: 90 tablet, Rfl: 3  •  furosemide (LASIX) 20 MG tablet, TAKE 1 TABLET TWICE A DAY, Disp: 180 tablet, Rfl: 1  •  glipiZIDE (GLUCOTROL XL) 2.5 MG 24 hr tablet, Take 1 tablet by mouth Daily., Disp: 90 tablet, Rfl: 3  •  Glucosamine-Chondroit-Vit C-Mn  "(GLUCOSAMINE CHONDR 1500 COMPLX PO), Take 1 tablet by mouth Daily., Disp: , Rfl:   •  glucose blood test strip, Use as instructed, Disp: 100 each, Rfl: 12  •  ibuprofen (ADVIL,MOTRIN) 800 MG tablet, Take 800 mg by mouth 3 (Three) Times a Day As Needed., Disp: , Rfl: 0  •  lisinopril (PRINIVIL,ZESTRIL) 40 MG tablet, TAKE 1 TABLET DAILY, Disp: 90 tablet, Rfl: 3  •  lovastatin (MEVACOR) 10 MG tablet, TAKE 1 TABLET EVERY NIGHT (Patient taking differently: TAKE 1 TABLET EVERY THREE TIMES WEEKLY), Disp: 90 tablet, Rfl: 3  •  melatonin 3 MG tablet, Take 3 mg by mouth Every Night., Disp: , Rfl:   •  Omega-3 Fatty Acids (FISH OIL) 1200 MG capsule capsule, Take 1,200 mg by mouth Daily., Disp: , Rfl:   •  pantoprazole (PROTONIX) 40 MG EC tablet, TAKE 1 TABLET DAILY, Disp: 90 tablet, Rfl: 3  •  Tobramycin-Dexamethasone 0.3-0.05 % suspension, Apply 1 drop to eye(s) as directed by provider 2 (Two) Times a Day., Disp: 5 mL, Rfl: 0      History of Present Illness   Radha Whelan is a 79 y.o. year old female here for follow up.  For hip replacement in December w Dr Salgado.    Pt denies any new chest pain, dyspnea, dyspnea on exertion, orthopnea, PND, palpitations,  or claudication.  Baseline bilat LE edema.    OBJECTIVE:  Vitals:    10/21/19 1258   BP: 144/82   BP Location: Right arm   Patient Position: Sitting   Pulse: 58   SpO2: 97%   Weight: 92.1 kg (203 lb)   Height: 170.2 cm (67\")     Physical Exam   Constitutional: She appears well-developed and well-nourished.   Neck: Normal range of motion. Neck supple. No hepatojugular reflux and no JVD present. Carotid bruit is not present. No tracheal deviation present. No thyromegaly present.   Cardiovascular: Normal rate, regular rhythm, S1 normal, S2 normal, intact distal pulses and normal pulses. PMI is not displaced. Exam reveals no gallop, no distant heart sounds, no friction rub, no midsystolic click and no opening snap.   No murmur heard.  Pulses:       Radial pulses are 2+ on the " right side, and 2+ on the left side.        Dorsalis pedis pulses are 2+ on the right side, and 2+ on the left side.        Posterior tibial pulses are 2+ on the right side, and 2+ on the left side.   Pulmonary/Chest: Effort normal and breath sounds normal. She has no wheezes. She has no rales.   Abdominal: Soft. Bowel sounds are normal. She exhibits no mass. There is no tenderness. There is no guarding.   Musculoskeletal: She exhibits edema.       Diagnostic Data:  Procedures      ASSESSMENT:   Diagnosis Plan   1. Coronary artery disease involving native coronary artery of native heart without angina pectoris     2. Chronic systolic congestive heart failure (CMS/[facility])     3. Essential hypertension     4. Mixed hyperlipidemia     5. Type 2 diabetes mellitus with hyperglycemia, without long-term current use of insulin (CMS/[facility])         PLAN:  CAD nonobstructive continue med Rx    CHF systolic, LV gram appeared w better LVEF however ectopy noted during.  Relooked at echo LVEF appears mid 40's.  Euvolemic continue acei.  HR on monitor 50's avg so remain off beta blockade    htn controlled acei    Hl on statin    DM oral agents.  For cho restriction. Exercise post hip replacement    Acceptable cardiac risk for hip replacement off plavix    Farhan Schaefer MD, thank you for referring Ms. Whelan for evaluation.  I have forwarded my electronically generated recommendations to you for review.  Please do not hesitate to call with any questions.      Caleb Luevano MD, [facility]

## 2019-10-25 ENCOUNTER — HOSPITAL ENCOUNTER (EMERGENCY)
Facility: HOSPITAL | Age: 79
Discharge: HOME | End: 2019-10-25
Attending: EMERGENCY MEDICINE | Admitting: EMERGENCY MEDICINE

## 2019-10-25 ENCOUNTER — APPOINTMENT (OUTPATIENT)
Dept: CT IMAGING | Facility: HOSPITAL | Age: 79
End: 2019-10-25

## 2019-10-25 VITALS
HEART RATE: 58 BPM | SYSTOLIC BLOOD PRESSURE: 171 MMHG | OXYGEN SATURATION: 98 % | DIASTOLIC BLOOD PRESSURE: 67 MMHG | WEIGHT: 203.6 LBS | BODY MASS INDEX: 31.96 KG/M2 | TEMPERATURE: 97.7 F | RESPIRATION RATE: 14 BRPM | HEIGHT: 67 IN

## 2019-10-25 DIAGNOSIS — R60.9 EDEMA, UNSPECIFIED TYPE: ICD-10-CM

## 2019-10-25 DIAGNOSIS — E87.6 HYPOKALEMIA: ICD-10-CM

## 2019-10-25 DIAGNOSIS — I10 ELEVATED BLOOD PRESSURE READING WITH DIAGNOSIS OF HYPERTENSION: ICD-10-CM

## 2019-10-25 DIAGNOSIS — R51.9 ACUTE NONINTRACTABLE HEADACHE, UNSPECIFIED HEADACHE TYPE: Primary | ICD-10-CM

## 2019-10-25 LAB
ALBUMIN SERPL-MCNC: 4.3 G/DL (ref 3.5–5.2)
ALBUMIN/GLOB SERPL: 1.1 G/DL
ALP SERPL-CCNC: 85 U/L (ref 39–117)
ALT SERPL W P-5'-P-CCNC: 18 U/L (ref 1–33)
ANION GAP SERPL CALCULATED.3IONS-SCNC: 14.9 MMOL/L (ref 5–15)
AST SERPL-CCNC: 24 U/L (ref 1–32)
BILIRUB SERPL-MCNC: 0.4 MG/DL (ref 0.2–1.2)
BUN BLD-MCNC: 15 MG/DL (ref 8–23)
BUN/CREAT SERPL: 15.5 (ref 7–25)
CALCIUM SPEC-SCNC: 9.3 MG/DL (ref 8.6–10.5)
CHLORIDE SERPL-SCNC: 97 MMOL/L (ref 98–107)
CO2 SERPL-SCNC: 28.1 MMOL/L (ref 22–29)
CREAT BLD-MCNC: 0.97 MG/DL (ref 0.57–1)
GFR SERPL CREATININE-BSD FRML MDRD: 55 ML/MIN/1.73
GLOBULIN UR ELPH-MCNC: 3.8 GM/DL
GLUCOSE BLD-MCNC: 218 MG/DL (ref 65–99)
HOLD SPECIMEN: NORMAL
HOLD SPECIMEN: NORMAL
POTASSIUM BLD-SCNC: 3.3 MMOL/L (ref 3.5–5.2)
PROT SERPL-MCNC: 8.1 G/DL (ref 6–8.5)
SODIUM BLD-SCNC: 140 MMOL/L (ref 136–145)
WHOLE BLOOD HOLD SPECIMEN: NORMAL
WHOLE BLOOD HOLD SPECIMEN: NORMAL

## 2019-10-25 PROCEDURE — 99284 EMERGENCY DEPT VISIT MOD MDM: CPT

## 2019-10-25 PROCEDURE — 93005 ELECTROCARDIOGRAM TRACING: CPT

## 2019-10-25 PROCEDURE — 25010000002 FUROSEMIDE PER 20 MG: Performed by: EMERGENCY MEDICINE

## 2019-10-25 PROCEDURE — 80053 COMPREHEN METABOLIC PANEL: CPT | Performed by: EMERGENCY MEDICINE

## 2019-10-25 PROCEDURE — 96374 THER/PROPH/DIAG INJ IV PUSH: CPT

## 2019-10-25 PROCEDURE — 70450 CT HEAD/BRAIN W/O DYE: CPT

## 2019-10-25 RX ORDER — POTASSIUM CHLORIDE 20 MEQ/1
20 TABLET, EXTENDED RELEASE ORAL DAILY
Qty: 5 TABLET | Refills: 0 | Status: SHIPPED | OUTPATIENT
Start: 2019-10-25 | End: 2019-11-13

## 2019-10-25 RX ORDER — MORPHINE SULFATE 2 MG/ML
2 INJECTION, SOLUTION INTRAMUSCULAR; INTRAVENOUS ONCE
Status: DISCONTINUED | OUTPATIENT
Start: 2019-10-25 | End: 2019-10-26 | Stop reason: HOSPADM

## 2019-10-25 RX ORDER — SODIUM CHLORIDE 0.9 % (FLUSH) 0.9 %
10 SYRINGE (ML) INJECTION AS NEEDED
Status: DISCONTINUED | OUTPATIENT
Start: 2019-10-25 | End: 2019-10-26 | Stop reason: HOSPADM

## 2019-10-25 RX ORDER — POTASSIUM CHLORIDE 750 MG/1
40 CAPSULE, EXTENDED RELEASE ORAL ONCE
Status: COMPLETED | OUTPATIENT
Start: 2019-10-25 | End: 2019-10-25

## 2019-10-25 RX ORDER — ONDANSETRON 2 MG/ML
4 INJECTION INTRAMUSCULAR; INTRAVENOUS ONCE
Status: DISCONTINUED | OUTPATIENT
Start: 2019-10-25 | End: 2019-10-26 | Stop reason: HOSPADM

## 2019-10-25 RX ORDER — HYDRALAZINE HYDROCHLORIDE 20 MG/ML
10 INJECTION INTRAMUSCULAR; INTRAVENOUS ONCE
Status: DISCONTINUED | OUTPATIENT
Start: 2019-10-25 | End: 2019-10-26 | Stop reason: HOSPADM

## 2019-10-25 RX ORDER — FUROSEMIDE 10 MG/ML
40 INJECTION INTRAMUSCULAR; INTRAVENOUS ONCE
Status: COMPLETED | OUTPATIENT
Start: 2019-10-25 | End: 2019-10-25

## 2019-10-25 RX ORDER — CLONIDINE HYDROCHLORIDE 0.1 MG/1
0.1 TABLET ORAL 2 TIMES DAILY
Qty: 30 TABLET | Refills: 0 | Status: SHIPPED | OUTPATIENT
Start: 2019-10-25 | End: 2019-12-06 | Stop reason: HOSPADM

## 2019-10-25 RX ADMIN — POTASSIUM CHLORIDE 40 MEQ: 750 CAPSULE, EXTENDED RELEASE ORAL at 22:53

## 2019-10-25 RX ADMIN — FUROSEMIDE 40 MG: 10 INJECTION, SOLUTION INTRAMUSCULAR; INTRAVENOUS at 21:57

## 2019-10-26 NOTE — ED PROVIDER NOTES
Subjective   History of Present Illness    Chief Complaint: Headache edema elevated blood pressures  History of Present Illness: Patient is an 79-year-old female with Plavix use and Lasix use for lower extremity edema.  Presents with a headache today with associated elevated blood pressures.  No falls or injury.  No confusion.  States legs are more swollen today.  Took an additional lisinopril 40 mg at 7 PM.  Onset: One day  Duration: Persistent  Exacerbating / Alleviating factors: None  Associated symptoms: None      Nurses Notes reviewed and agree, including vitals, allergies, social history and prior medical history.     REVIEW OF SYSTEMS: All systems reviewed and not pertinent unless noted.    Positive for: Headache, edema, elevated blood pressures at home    Negative for: Confusion, fall, neck stiffness, fever  Review of Systems    Past Medical History:   Diagnosis Date   • Diabetes mellitus (CMS/[facility])    • DVT (deep venous thrombosis) (CMS/[facility])     1970's   • Edema    • Fatigue    • Fatigue    • History of migraine headaches    • Hyperlipidemia    • Hypertension    • Hyponatremia    • LBBB (left bundle branch block)    • Left leg pain     left leg and knee pain    • Muscle spasm    • ODELL on CPAP    • Stroke (CMS/[facility])    • Thyroid nodule    • TIA (transient ischemic attack)        Allergies   Allergen Reactions   • Other        Past Surgical History:   Procedure Laterality Date   • BREAST BIOPSY     • CARDIAC CATHETERIZATION      09/16/2019 PER .   • CARDIAC CATHETERIZATION N/A 9/16/2019    Procedure: Left Heart Cath +/- CBI;  Surgeon: Ashlyn Mays MD;  Location: [facility] INVASIVE LOCATION;  Service: Cardiovascular   • CATARACT EXTRACTION  12/2018    both eyes    • TUBAL ABDOMINAL LIGATION         Family History   Problem Relation Age of Onset   • Diabetes Other    • Hypertension Other    • Cancer Other         malignant neoplasm    • Migraines Other    • Heart disease Mother    • Heart disease  Father    • Breast cancer Sister    • Breast cancer Other    • Breast cancer Sister        Social History     Socioeconomic History   • Marital status:      Spouse name: Not on file   • Number of children: Not on file   • Years of education: Not on file   • Highest education level: Not on file   Tobacco Use   • Smoking status: Never Smoker   • Smokeless tobacco: Never Used   Substance and Sexual Activity   • Alcohol use: No   • Drug use: No   • Sexual activity: Defer           Objective   Physical Exam      GENERAL APPEARANCE: Well developed, well nourished, in no acute distress.  VITAL SIGNS: per nursing, reviewed and noted  SKIN: no rashes, ulcerations or petechiae.  Head: Normocephalic, atraumatic.   EYES: perrla. EOMI.  ENT:  TM clear, posterior pharynx patent.  LUNGS:  normal breath sounds. No retractions.   CARDIOVASCULAR:  regular rate and rhythm, no murmurs.  Good Peripheral pulses.  ABDOMEN: Soft, nontender, normal bowel sounds. No hernia. No ascites.  MUSCULOSKELETAL:  No tenderness. Full ROM. Strength and tone normal.  NEUROLOGIC: Alert, oriented x 3. No gross deficits.   NECK: Supple, symmetric. No tenderness, no masses. Full ROM no meningeal signs.  Back: full rom, no paraspinal spasm. No CVA tenderness.   : no bladder tenderness or distention, no CVA tenderness      Procedures     No attending provider procedures were performed      ED Course  ED Course as of Oct 25 2257   Fri Oct 25, 2019   2213 EKG interpreted by me reveals sinus bradycardia with left bundle branch block and nonspecific T wave changes.  [PF]      ED Course User Index  [PF] Chava Carpio,         Head CT interpreted by radiologist reveals no acute intracranial abnormality.  Patient symptomatic improvement and refused pain medications after some diuresis.  benign CMP except for potassium 3.3.  Advised to increase her Lasix to 40 mg twice daily for the next 2 days.  We will add potassium supplementation.  We will also add  clonidine 0.1 mg 2 times daily as needed.  And advised strict blood pressure diary.          MDM    Final diagnoses:   Acute nonintractable headache, unspecified headache type   Elevated blood pressure reading with diagnosis of hypertension   Edema, unspecified type   Hypokalemia              Chava Carpio, DO  10/25/19 9307

## 2019-10-26 NOTE — DISCHARGE INSTRUCTIONS
Increase  Lasix to 40 mg 2 times daily for the next 2 days.  Take the potassium as directed until gone.  Keep a blood pressure diary, you can take the clonidine as needed up to twice daily if you find your blood pressures are staying elevated.

## 2019-10-28 ENCOUNTER — HOSPITAL ENCOUNTER (OUTPATIENT)
Dept: MRI IMAGING | Facility: HOSPITAL | Age: 79
Discharge: HOME | End: 2019-10-28
Admitting: INTERNAL MEDICINE

## 2019-10-28 ENCOUNTER — OFFICE VISIT (OUTPATIENT)
Dept: INTERNAL MEDICINE | Facility: CLINIC | Age: 79
End: 2019-10-28

## 2019-10-28 VITALS
HEIGHT: 67 IN | WEIGHT: 196 LBS | RESPIRATION RATE: 16 BRPM | DIASTOLIC BLOOD PRESSURE: 62 MMHG | TEMPERATURE: 97.8 F | SYSTOLIC BLOOD PRESSURE: 135 MMHG | OXYGEN SATURATION: 98 % | BODY MASS INDEX: 30.76 KG/M2 | HEART RATE: 65 BPM

## 2019-10-28 DIAGNOSIS — I10 ESSENTIAL HYPERTENSION: Primary | ICD-10-CM

## 2019-10-28 DIAGNOSIS — G44.52 NEW DAILY PERSISTENT HEADACHE: Primary | ICD-10-CM

## 2019-10-28 DIAGNOSIS — R78.81 BACTEREMIA: ICD-10-CM

## 2019-10-28 DIAGNOSIS — I10 ESSENTIAL HYPERTENSION: ICD-10-CM

## 2019-10-28 DIAGNOSIS — G44.52 NEW DAILY PERSISTENT HEADACHE: ICD-10-CM

## 2019-10-28 DIAGNOSIS — E11.9 TYPE 2 DIABETES MELLITUS WITHOUT COMPLICATION, WITHOUT LONG-TERM CURRENT USE OF INSULIN (HCC): ICD-10-CM

## 2019-10-28 PROCEDURE — 70544 MR ANGIOGRAPHY HEAD W/O DYE: CPT

## 2019-10-28 PROCEDURE — 99214 OFFICE O/P EST MOD 30 MIN: CPT | Performed by: INTERNAL MEDICINE

## 2019-10-28 RX ORDER — SPIRONOLACTONE 25 MG/1
25 TABLET ORAL DAILY
Qty: 30 TABLET | Refills: 11 | Status: SHIPPED | OUTPATIENT
Start: 2019-10-28 | End: 2019-11-13 | Stop reason: SDUPTHER

## 2019-10-28 NOTE — PROGRESS NOTES
Subjective     Patient ID: Radha Whelan is a 79 y.o. female. Patient is here for management of multiple medical problems.     Chief Complaint   Patient presents with   • Hypertension     patient having elevated blood pressures and severe headaches x 1 week     History of Present Illness       Frontal HA started last Monday.   Standing for minutes cause nausea x 3 days.   In er for hypertension and HA.   Offered morphine and declined.    Hx of complex migrains. This doesn't feel the same.      No diet changes. Increased leg edema. Pt on lasix extra the past 2 day.    exedrin no help.   ibu no help. increasing intensity and frequency.  Ct head no bleed    Pian in hip. May be causing bp to be elevated.  Bitemporal HA.   Waking at night with HA.         The following portions of the patient's history were reviewed and updated as appropriate: allergies, current medications, past family history, past medical history, past social history, past surgical history and problem list.    Review of Systems   Constitutional: Negative for diaphoresis, fatigue and fever.   HENT: Negative for congestion, dental problem, drooling, postnasal drip, rhinorrhea and sinus pain.    Psychiatric/Behavioral: Negative for self-injury and sleep disturbance. The patient is not nervous/anxious.    All other systems reviewed and are negative.      Current Outpatient Medications:   •  aspirin 81 MG tablet, Take  by mouth daily., Disp: , Rfl:   •  aspirin-acetaminophen-caffeine (EXCEDRIN MIGRAINE) 250-250-65 MG per tablet, Take  by mouth., Disp: , Rfl:   •  Cholecalciferol (VITAMIN D PO), Take 125 mcg by mouth 2 (Two) Times a Week., Disp: , Rfl:   •  cloNIDine (CATAPRES) 0.1 MG tablet, Take 1 tablet by mouth 2 (Two) Times a Day., Disp: 30 tablet, Rfl: 0  •  clopidogrel (PLAVIX) 75 MG tablet, TAKE 1 TABLET DAILY, Disp: 90 tablet, Rfl: 3  •  coenzyme Q10 100 MG capsule, Take 400 mg by mouth Every Other Day., Disp: , Rfl:   •  folic acid (FOLVITE) 1 MG  "tablet, TAKE 1 TABLET DAILY, Disp: 90 tablet, Rfl: 3  •  furosemide (LASIX) 20 MG tablet, TAKE 1 TABLET TWICE A DAY, Disp: 180 tablet, Rfl: 1  •  glipiZIDE (GLUCOTROL XL) 2.5 MG 24 hr tablet, Take 1 tablet by mouth Daily., Disp: 90 tablet, Rfl: 3  •  Glucosamine-Chondroit-Vit C-Mn (GLUCOSAMINE CHONDR 1500 COMPLX PO), Take 1 tablet by mouth Daily., Disp: , Rfl:   •  glucose blood test strip, Use as instructed, Disp: 100 each, Rfl: 12  •  ibuprofen (ADVIL,MOTRIN) 800 MG tablet, Take 800 mg by mouth 3 (Three) Times a Day As Needed., Disp: , Rfl: 0  •  lisinopril (PRINIVIL,ZESTRIL) 40 MG tablet, TAKE 1 TABLET DAILY, Disp: 90 tablet, Rfl: 3  •  lovastatin (MEVACOR) 10 MG tablet, TAKE 1 TABLET EVERY NIGHT (Patient taking differently: TAKE 1 TABLET EVERY THREE TIMES WEEKLY), Disp: 90 tablet, Rfl: 3  •  melatonin 3 MG tablet, Take 3 mg by mouth Every Night., Disp: , Rfl:   •  Omega-3 Fatty Acids (FISH OIL) 1200 MG capsule capsule, Take 1,200 mg by mouth Daily., Disp: , Rfl:   •  pantoprazole (PROTONIX) 40 MG EC tablet, TAKE 1 TABLET DAILY, Disp: 90 tablet, Rfl: 3  •  potassium chloride (K-DUR,KLOR-CON) 20 MEQ CR tablet, Take 1 tablet by mouth Daily., Disp: 5 tablet, Rfl: 0  •  Tobramycin-Dexamethasone 0.3-0.05 % suspension, Apply 1 drop to eye(s) as directed by provider 2 (Two) Times a Day., Disp: 5 mL, Rfl: 0  •  spironolactone (ALDACTONE) 25 MG tablet, Take 1 tablet by mouth Daily., Disp: 30 tablet, Rfl: 11    Objective      Blood pressure 135/62, pulse 65, temperature 97.8 °F (36.6 °C), temperature source Oral, resp. rate 16, height 170.2 cm (67\"), weight 88.9 kg (196 lb), SpO2 98 %.    Physical Exam     General Appearance:    Alert, cooperative, no distress, appears stated age   Head:    Normocephalic, without obvious abnormality, atraumatic   Eyes:    PERRL, conjunctiva/corneas clear, EOM's intact   Ears:    Normal TM's and external ear canals, both ears   Nose:   Nares normal, septum midline, mucosa normal, no " drainage   or sinus tenderness   Throat:   Lips, mucosa, and tongue normal; teeth and gums normal   Neck:   Supple, symmetrical, trachea midline, no adenopathy;        thyroid:  No enlargement/tenderness/nodules; no carotid    bruit or JVD   Back:     Symmetric, no curvature, ROM normal, no CVA tenderness   Lungs:     Clear to auscultation bilaterally, respirations unlabored   Chest wall:    No tenderness or deformity   Heart:    Regular rate and rhythm, S1 and S2 normal, no murmur,        rub or gallop   Abdomen:     Soft, non-tender, bowel sounds active all four quadrants,     no masses, no organomegaly   Extremities:   Extremities normal, atraumatic, no cyanosis or edema   Pulses:   2+ and symmetric all extremities   Skin:   Skin color, texture, turgor normal, no rashes or lesions   Lymph nodes:   Cervical, supraclavicular, and axillary nodes normal   Neurologic:   CNII-XII intact. Normal strength, sensation and reflexes       throughout      Results for orders placed or performed during the hospital encounter of 10/25/19   Comprehensive Metabolic Panel   Result Value Ref Range    Glucose 218 (H) 65 - 99 mg/dL    BUN 15 8 - 23 mg/dL    Creatinine 0.97 0.57 - 1.00 mg/dL    Sodium 140 136 - 145 mmol/L    Potassium 3.3 (L) 3.5 - 5.2 mmol/L    Chloride 97 (L) 98 - 107 mmol/L    CO2 28.1 22.0 - 29.0 mmol/L    Calcium 9.3 8.6 - 10.5 mg/dL    Total Protein 8.1 6.0 - 8.5 g/dL    Albumin 4.30 3.50 - 5.20 g/dL    ALT (SGPT) 18 1 - 33 U/L    AST (SGOT) 24 1 - 32 U/L    Alkaline Phosphatase 85 39 - 117 U/L    Total Bilirubin 0.4 0.2 - 1.2 mg/dL    eGFR Non African Amer 55 (L) >60 mL/min/1.73    Globulin 3.8 gm/dL    A/G Ratio 1.1 g/dL    BUN/Creatinine Ratio 15.5 7.0 - 25.0    Anion Gap 14.9 5.0 - 15.0 mmol/L   Light Blue Top   Result Value Ref Range    Extra Tube hold for add-on    Green Top (Gel)   Result Value Ref Range    Extra Tube Hold for add-ons.    Lavender Top   Result Value Ref Range    Extra Tube hold for add-on     Gold Top - SST   Result Value Ref Range    Extra Tube Hold for add-ons.          Assessment/Plan       Radha was seen today for hypertension.    Diagnoses and all orders for this visit:    New daily persistent headache  -     MRI angiogram head wo contrast; Future  -     CBC & Differential  -     Vitamin B12  -     Comprehensive Metabolic Panel  -     TSH  -     T4, Free  -     Urinalysis With Microscopic - Urine, Clean Catch  -     Urine Culture - Urine, Urine, Clean Catch  -     Hemoglobin A1c  -     MRI angiogram head wo contrast; Future    Type 2 diabetes mellitus without complication, without long-term current use of insulin (CMS/[facility])  -     CBC & Differential  -     Vitamin B12  -     Comprehensive Metabolic Panel  -     TSH  -     T4, Free  -     Urinalysis With Microscopic - Urine, Clean Catch  -     Urine Culture - Urine, Urine, Clean Catch  -     Hemoglobin A1c  -     spironolactone (ALDACTONE) 25 MG tablet; Take 1 tablet by mouth Daily.    Essential hypertension  -     CBC & Differential  -     Vitamin B12  -     Comprehensive Metabolic Panel  -     TSH  -     T4, Free  -     Urinalysis With Microscopic - Urine, Clean Catch  -     Urine Culture - Urine, Urine, Clean Catch  -     Hemoglobin A1c  -     spironolactone (ALDACTONE) 25 MG tablet; Take 1 tablet by mouth Daily.    Bacteremia   -     Urine Culture - Urine, Urine, Clean Catch      Return in about 3 days (around 10/31/2019).          There are no Patient Instructions on file for this visit.     Farhan Schaefer MD    Assessment/Plan

## 2019-10-29 ENCOUNTER — TELEPHONE (OUTPATIENT)
Dept: INTERNAL MEDICINE | Facility: CLINIC | Age: 79
End: 2019-10-29

## 2019-10-29 LAB
BUN SERPL-MCNC: 16 MG/DL (ref 8–23)
BUN/CREAT SERPL: 15.7 (ref 7–25)
CALCIUM SERPL-MCNC: 9.5 MG/DL (ref 8.6–10.5)
CHLORIDE SERPL-SCNC: 95 MMOL/L (ref 98–107)
CO2 SERPL-SCNC: 32.9 MMOL/L (ref 22–29)
CREAT SERPL-MCNC: 1.02 MG/DL (ref 0.57–1)
GLUCOSE SERPL-MCNC: 148 MG/DL (ref 65–99)
POTASSIUM SERPL-SCNC: 3.7 MMOL/L (ref 3.5–5.2)
SODIUM SERPL-SCNC: 142 MMOL/L (ref 136–145)

## 2019-10-29 NOTE — TELEPHONE ENCOUNTER
Dr. Schaefer, I  Notified Radha  of MRA results, she states her blood pressure is down and her headache is gone, do you still want to see her on Thursday.

## 2019-10-31 LAB
ALBUMIN SERPL-MCNC: 4.6 G/DL (ref 3.5–5.2)
ALBUMIN/GLOB SERPL: 1.5 G/DL
ALP SERPL-CCNC: 76 U/L (ref 39–117)
ALT SERPL-CCNC: 15 U/L (ref 1–33)
APPEARANCE UR: CLEAR
AST SERPL-CCNC: 19 U/L (ref 1–32)
BACTERIA #/AREA URNS HPF: NORMAL /HPF
BACTERIA UR CULT: ABNORMAL
BACTERIA UR CULT: ABNORMAL
BASOPHILS # BLD AUTO: 0.02 10*3/MM3 (ref 0–0.2)
BASOPHILS NFR BLD AUTO: 0.3 % (ref 0–1.5)
BILIRUB SERPL-MCNC: 0.6 MG/DL (ref 0.2–1.2)
BILIRUB UR QL STRIP: NEGATIVE
BUN SERPL-MCNC: 16 MG/DL (ref 8–23)
BUN/CREAT SERPL: 15 (ref 7–25)
CALCIUM SERPL-MCNC: 9.6 MG/DL (ref 8.6–10.5)
CASTS URNS MICRO: NORMAL
CHLORIDE SERPL-SCNC: 95 MMOL/L (ref 98–107)
CO2 SERPL-SCNC: 33.2 MMOL/L (ref 22–29)
COLOR UR: YELLOW
CREAT SERPL-MCNC: 1.07 MG/DL (ref 0.57–1)
EOSINOPHIL # BLD AUTO: 0.07 10*3/MM3 (ref 0–0.4)
EOSINOPHIL NFR BLD AUTO: 1.1 % (ref 0.3–6.2)
EPI CELLS #/AREA URNS HPF: NORMAL /HPF
ERYTHROCYTE [DISTWIDTH] IN BLOOD BY AUTOMATED COUNT: 11.9 % (ref 12.3–15.4)
GLOBULIN SER CALC-MCNC: 3.1 GM/DL
GLUCOSE SERPL-MCNC: 151 MG/DL (ref 65–99)
GLUCOSE UR QL: NEGATIVE
HBA1C MFR BLD: 7.2 % (ref 4.8–5.6)
HCT VFR BLD AUTO: 41.1 % (ref 34–46.6)
HGB BLD-MCNC: 13.5 G/DL (ref 12–15.9)
HGB UR QL STRIP: NEGATIVE
IMM GRANULOCYTES # BLD AUTO: 0.01 10*3/MM3 (ref 0–0.05)
IMM GRANULOCYTES NFR BLD AUTO: 0.2 % (ref 0–0.5)
KETONES UR QL STRIP: NEGATIVE
LEUKOCYTE ESTERASE UR QL STRIP: (no result)
LYMPHOCYTES # BLD AUTO: 2.42 10*3/MM3 (ref 0.7–3.1)
LYMPHOCYTES NFR BLD AUTO: 36.5 % (ref 19.6–45.3)
MCH RBC QN AUTO: 30.5 PG (ref 26.6–33)
MCHC RBC AUTO-ENTMCNC: 32.8 G/DL (ref 31.5–35.7)
MCV RBC AUTO: 92.8 FL (ref 79–97)
MONOCYTES # BLD AUTO: 0.55 10*3/MM3 (ref 0.1–0.9)
MONOCYTES NFR BLD AUTO: 8.3 % (ref 5–12)
NEUTROPHILS # BLD AUTO: 3.56 10*3/MM3 (ref 1.7–7)
NEUTROPHILS NFR BLD AUTO: 53.6 % (ref 42.7–76)
NITRITE UR QL STRIP: NEGATIVE
NRBC BLD AUTO-RTO: 0 /100 WBC (ref 0–0.2)
OTHER ANTIBIOTIC SUSC ISLT: ABNORMAL
PH UR STRIP: 6.5 [PH] (ref 5–8)
PLATELET # BLD AUTO: 221 10*3/MM3 (ref 140–450)
POTASSIUM SERPL-SCNC: 3.5 MMOL/L (ref 3.5–5.2)
PROT SERPL-MCNC: 7.7 G/DL (ref 6–8.5)
PROT UR QL STRIP: NEGATIVE
RBC # BLD AUTO: 4.43 10*6/MM3 (ref 3.77–5.28)
RBC #/AREA URNS HPF: NORMAL /HPF
SODIUM SERPL-SCNC: 143 MMOL/L (ref 136–145)
SP GR UR: 1.01 (ref 1–1.03)
T4 FREE SERPL-MCNC: 1.18 NG/DL (ref 0.93–1.7)
TSH SERPL DL<=0.005 MIU/L-ACNC: 2.35 UIU/ML (ref 0.27–4.2)
UROBILINOGEN UR STRIP-MCNC: (no result) MG/DL
VIT B12 SERPL-MCNC: 886 PG/ML (ref 211–946)
WBC # BLD AUTO: 6.63 10*3/MM3 (ref 3.4–10.8)
WBC #/AREA URNS HPF: NORMAL /HPF

## 2019-10-31 RX ORDER — AMOXICILLIN AND CLAVULANATE POTASSIUM 875; 125 MG/1; MG/1
1 TABLET, FILM COATED ORAL EVERY 12 HOURS SCHEDULED
Qty: 20 TABLET | Refills: 0 | Status: SHIPPED | OUTPATIENT
Start: 2019-10-31 | End: 2019-11-15 | Stop reason: SDUPTHER

## 2019-11-13 ENCOUNTER — OFFICE VISIT (OUTPATIENT)
Dept: INTERNAL MEDICINE | Facility: CLINIC | Age: 79
End: 2019-11-13

## 2019-11-13 VITALS
TEMPERATURE: 97.7 F | BODY MASS INDEX: 30.13 KG/M2 | WEIGHT: 192 LBS | OXYGEN SATURATION: 100 % | SYSTOLIC BLOOD PRESSURE: 115 MMHG | DIASTOLIC BLOOD PRESSURE: 48 MMHG | HEIGHT: 67 IN | RESPIRATION RATE: 16 BRPM | HEART RATE: 82 BPM

## 2019-11-13 DIAGNOSIS — N30.00 ACUTE CYSTITIS WITHOUT HEMATURIA: Primary | ICD-10-CM

## 2019-11-13 DIAGNOSIS — E11.9 TYPE 2 DIABETES MELLITUS WITHOUT COMPLICATION, WITHOUT LONG-TERM CURRENT USE OF INSULIN (HCC): ICD-10-CM

## 2019-11-13 DIAGNOSIS — I10 ESSENTIAL HYPERTENSION: ICD-10-CM

## 2019-11-13 PROCEDURE — 99213 OFFICE O/P EST LOW 20 MIN: CPT | Performed by: INTERNAL MEDICINE

## 2019-11-13 RX ORDER — SPIRONOLACTONE 25 MG/1
25 TABLET ORAL DAILY
Qty: 90 TABLET | Refills: 3 | Status: SHIPPED | OUTPATIENT
Start: 2019-11-13 | End: 2019-12-06 | Stop reason: HOSPADM

## 2019-11-13 NOTE — PROGRESS NOTES
Subjective     Patient ID: Radha Whelan is a 79 y.o. female. Patient is here for management of multiple medical problems.     Chief Complaint   Patient presents with   • Hypertension     follow-up, patient continuing to have headaches   • Urinary Tract Infection     follow-up, patient states she finished the Augmentin on 11/10/19     Hypertension   This is a chronic problem. The current episode started more than 1 year ago. The problem has been waxing and waning since onset. The problem is controlled. Pertinent negatives include no shortness of breath. Current antihypertension treatment includes diuretics and angiotensin blockers. The current treatment provides significant improvement.   Urinary Tract Infection    Pertinent negatives include no urgency.    assymptomatic. Had ua with wbc.      HA daily. Takes excederin.  Pain in right frontal head. Only in am.  After she is up a while.          Pt on a low carb diet x 2 weeks and feeling better.  Wt down some.   Now dysuria. Pain stomach last week end.  Took last abx on Sunday and Monday doing better.          The following portions of the patient's history were reviewed and updated as appropriate: allergies, current medications, past family history, past medical history, past social history, past surgical history and problem list.    Review of Systems   Constitutional: Positive for fatigue.   HENT: Negative for congestion, dental problem, drooling and ear pain.    Respiratory: Negative for shortness of breath and stridor.    Genitourinary: Negative for decreased urine volume, pelvic pain, urgency and vaginal bleeding.   Psychiatric/Behavioral: The patient is not hyperactive.    All other systems reviewed and are negative.      Current Outpatient Medications:   •  aspirin 81 MG tablet, Take  by mouth daily., Disp: , Rfl:   •  aspirin-acetaminophen-caffeine (EXCEDRIN MIGRAINE) 250-250-65 MG per tablet, Take  by mouth., Disp: , Rfl:   •  Cholecalciferol (VITAMIN D PO),  Take 125 mcg by mouth 2 (Two) Times a Week., Disp: , Rfl:   •  cloNIDine (CATAPRES) 0.1 MG tablet, Take 1 tablet by mouth 2 (Two) Times a Day., Disp: 30 tablet, Rfl: 0  •  clopidogrel (PLAVIX) 75 MG tablet, TAKE 1 TABLET DAILY, Disp: 90 tablet, Rfl: 3  •  coenzyme Q10 100 MG capsule, Take 400 mg by mouth Every Other Day., Disp: , Rfl:   •  folic acid (FOLVITE) 1 MG tablet, TAKE 1 TABLET DAILY, Disp: 90 tablet, Rfl: 3  •  furosemide (LASIX) 20 MG tablet, TAKE 1 TABLET TWICE A DAY, Disp: 180 tablet, Rfl: 1  •  glipiZIDE (GLUCOTROL XL) 2.5 MG 24 hr tablet, Take 1 tablet by mouth Daily., Disp: 90 tablet, Rfl: 3  •  Glucosamine-Chondroit-Vit C-Mn (GLUCOSAMINE CHONDR 1500 COMPLX PO), Take 1 tablet by mouth Daily., Disp: , Rfl:   •  glucose blood test strip, Use as instructed, Disp: 100 each, Rfl: 12  •  ibuprofen (ADVIL,MOTRIN) 800 MG tablet, Take 800 mg by mouth 3 (Three) Times a Day As Needed., Disp: , Rfl: 0  •  lisinopril (PRINIVIL,ZESTRIL) 40 MG tablet, TAKE 1 TABLET DAILY, Disp: 90 tablet, Rfl: 3  •  lovastatin (MEVACOR) 10 MG tablet, TAKE 1 TABLET EVERY NIGHT (Patient taking differently: TAKE 1 TABLET EVERY THREE TIMES WEEKLY), Disp: 90 tablet, Rfl: 3  •  melatonin 3 MG tablet, Take 3 mg by mouth Every Night., Disp: , Rfl:   •  Omega-3 Fatty Acids (FISH OIL) 1200 MG capsule capsule, Take 1,200 mg by mouth Daily., Disp: , Rfl:   •  pantoprazole (PROTONIX) 40 MG EC tablet, TAKE 1 TABLET DAILY, Disp: 90 tablet, Rfl: 3  •  spironolactone (ALDACTONE) 25 MG tablet, Take 1 tablet by mouth Daily., Disp: 90 tablet, Rfl: 3  •  Tobramycin-Dexamethasone 0.3-0.05 % suspension, Apply 1 drop to eye(s) as directed by provider 2 (Two) Times a Day., Disp: 5 mL, Rfl: 0  •  amoxicillin-clavulanate (AUGMENTIN) 875-125 MG per tablet, Take 1 tablet by mouth Every 12 (Twelve) Hours., Disp: 20 tablet, Rfl: 0    Objective      Blood pressure 115/48, pulse 82, temperature 97.7 °F (36.5 °C), temperature source Oral, resp. rate 16, height  "170.2 cm (67\"), weight 87.1 kg (192 lb), SpO2 100 %.    Physical Exam     General Appearance:    Alert, cooperative, no distress, appears stated age   Head:    Normocephalic, without obvious abnormality, atraumatic   Eyes:    PERRL, conjunctiva/corneas clear, EOM's intact   Ears:    Normal TM's and external ear canals, both ears   Nose:   Nares normal, septum midline, mucosa normal, no drainage   or sinus tenderness   Throat:   Lips, mucosa, and tongue normal; teeth and gums normal   Neck:   Supple, symmetrical, trachea midline, no adenopathy;        thyroid:  No enlargement/tenderness/nodules; no carotid    bruit or JVD   Back:     Symmetric, no curvature, ROM normal, no CVA tenderness   Lungs:     Clear to auscultation bilaterally, respirations unlabored   Chest wall:    No tenderness or deformity   Heart:    Regular rate and rhythm, S1 and S2 normal, no murmur,        rub or gallop   Abdomen:     Soft, non-tender, bowel sounds active all four quadrants,     no masses, no organomegaly   Extremities:   Extremities normal, atraumatic, no cyanosis or edema   Pulses:   2+ and symmetric all extremities   Skin:   Skin color, texture, turgor normal, no rashes or lesions   Lymph nodes:   Cervical, supraclavicular, and axillary nodes normal   Neurologic:   CNII-XII intact. Normal strength, sensation and reflexes       throughout      Results for orders placed or performed in visit on 10/28/19   Urine Culture - Urine, Urine, Clean Catch   Result Value Ref Range    Urine Culture Final report (A)     Result 1 Klebsiella pneumoniae (A)     Susceptibility Testing Comment    Vitamin B12   Result Value Ref Range    Vitamin B-12 886 211 - 946 pg/mL   Comprehensive Metabolic Panel   Result Value Ref Range    Glucose 151 (H) 65 - 99 mg/dL    BUN 16 8 - 23 mg/dL    Creatinine 1.07 (H) 0.57 - 1.00 mg/dL    eGFR Non African Am 49 (L) >60 mL/min/1.73    eGFR African Am 60 (L) >60 mL/min/1.73    BUN/Creatinine Ratio 15.0 7.0 - 25.0    " Sodium 143 136 - 145 mmol/L    Potassium 3.5 3.5 - 5.2 mmol/L    Chloride 95 (L) 98 - 107 mmol/L    Total CO2 33.2 (H) 22.0 - 29.0 mmol/L    Calcium 9.6 8.6 - 10.5 mg/dL    Total Protein 7.7 6.0 - 8.5 g/dL    Albumin 4.60 3.50 - 5.20 g/dL    Globulin 3.1 gm/dL    A/G Ratio 1.5 g/dL    Total Bilirubin 0.6 0.2 - 1.2 mg/dL    Alkaline Phosphatase 76 39 - 117 U/L    AST (SGOT) 19 1 - 32 U/L    ALT (SGPT) 15 1 - 33 U/L   TSH   Result Value Ref Range    TSH 2.350 0.270 - 4.200 uIU/mL   T4, Free   Result Value Ref Range    Free T4 1.18 0.93 - 1.70 ng/dL   Hemoglobin A1c   Result Value Ref Range    Hemoglobin A1C 7.20 (H) 4.80 - 5.60 %   Microscopic Examination -   Result Value Ref Range    WBC, UA 0-2 /HPF    RBC, UA 0-2 /HPF    Epithelial Cells (non renal) 0-2 /HPF    Cast Type Comment     Bacteria, UA Comment None Seen /HPF   CBC & Differential   Result Value Ref Range    WBC 6.63 3.40 - 10.80 10*3/mm3    RBC 4.43 3.77 - 5.28 10*6/mm3    Hemoglobin 13.5 12.0 - 15.9 g/dL    Hematocrit 41.1 34.0 - 46.6 %    MCV 92.8 79.0 - 97.0 fL    MCH 30.5 26.6 - 33.0 pg    MCHC 32.8 31.5 - 35.7 g/dL    RDW 11.9 (L) 12.3 - 15.4 %    Platelets 221 140 - 450 10*3/mm3    Neutrophil Rel % 53.6 42.7 - 76.0 %    Lymphocyte Rel % 36.5 19.6 - 45.3 %    Monocyte Rel % 8.3 5.0 - 12.0 %    Eosinophil Rel % 1.1 0.3 - 6.2 %    Basophil Rel % 0.3 0.0 - 1.5 %    Neutrophils Absolute 3.56 1.70 - 7.00 10*3/mm3    Lymphocytes Absolute 2.42 0.70 - 3.10 10*3/mm3    Monocytes Absolute 0.55 0.10 - 0.90 10*3/mm3    Eosinophils Absolute 0.07 0.00 - 0.40 10*3/mm3    Basophils Absolute 0.02 0.00 - 0.20 10*3/mm3    Immature Granulocyte Rel % 0.2 0.0 - 0.5 %    Immature Grans Absolute 0.01 0.00 - 0.05 10*3/mm3    nRBC 0.0 0.0 - 0.2 /100 WBC   Urinalysis With Microscopic - Urine, Clean Catch   Result Value Ref Range    Specific Gravity, UA 1.015 1.005 - 1.030    pH, UA 6.5 5.0 - 8.0    Color, UA Yellow     Appearance, UA Clear Clear    Leukocytes, UA Trace (A)  Negative    Protein Negative Negative    Glucose, UA Negative Negative    Ketones Negative Negative    Blood, UA Negative Negative    Bilirubin, UA Negative Negative    Urobilinogen, UA Comment     Nitrite, UA Negative Negative         Assessment/Plan       Radha was seen today for hypertension and urinary tract infection.    Diagnoses and all orders for this visit:    Acute cystitis without hematuria  -     Urine Culture - Urine, Urine, Clean Catch  -     CBC & Differential  -     Lipid Panel  -     Vitamin B12  -     Comprehensive Metabolic Panel  -     TSH  -     T4, Free    Type 2 diabetes mellitus without complication, without long-term current use of insulin (CMS/[facility])  -     spironolactone (ALDACTONE) 25 MG tablet; Take 1 tablet by mouth Daily.  -     CBC & Differential  -     Lipid Panel  -     Vitamin B12  -     Comprehensive Metabolic Panel  -     TSH  -     T4, Free    Essential hypertension  -     spironolactone (ALDACTONE) 25 MG tablet; Take 1 tablet by mouth Daily.  -     CBC & Differential  -     Lipid Panel  -     Vitamin B12  -     Comprehensive Metabolic Panel  -     TSH  -     T4, Free      Return if symptoms worsen or fail to improve.          There are no Patient Instructions on file for this visit.     Farhan Schaefer MD    Assessment/Plan

## 2019-11-15 RX ORDER — AMOXICILLIN AND CLAVULANATE POTASSIUM 875; 125 MG/1; MG/1
1 TABLET, FILM COATED ORAL EVERY 12 HOURS SCHEDULED
Qty: 20 TABLET | Refills: 0 | Status: ON HOLD | OUTPATIENT
Start: 2019-11-15 | End: 2019-12-04

## 2019-11-16 LAB
BACTERIA UR CULT: ABNORMAL
BACTERIA UR CULT: ABNORMAL
OTHER ANTIBIOTIC SUSC ISLT: ABNORMAL

## 2019-11-20 ENCOUNTER — APPOINTMENT (OUTPATIENT)
Dept: PREADMISSION TESTING | Facility: HOSPITAL | Age: 79
End: 2019-11-20

## 2019-11-20 VITALS — WEIGHT: 192 LBS | HEIGHT: 67 IN | BODY MASS INDEX: 30.13 KG/M2

## 2019-11-20 DIAGNOSIS — E11.9 TYPE 2 DIABETES MELLITUS WITHOUT COMPLICATION, WITHOUT LONG-TERM CURRENT USE OF INSULIN (HCC): ICD-10-CM

## 2019-11-20 DIAGNOSIS — I42.0 CARDIOMYOPATHY, DILATED (HCC): ICD-10-CM

## 2019-11-20 LAB
ABO GROUP BLD: NORMAL
ALBUMIN SERPL-MCNC: 4.6 G/DL (ref 3.5–5.2)
ALBUMIN/GLOB SERPL: 1.2 G/DL
ALP SERPL-CCNC: 69 U/L (ref 39–117)
ALT SERPL W P-5'-P-CCNC: 15 U/L (ref 1–33)
ANION GAP SERPL CALCULATED.3IONS-SCNC: 16.3 MMOL/L (ref 5–15)
APTT PPP: 32.2 SECONDS (ref 24.5–37.2)
AST SERPL-CCNC: 23 U/L (ref 1–32)
BACTERIA UR QL AUTO: ABNORMAL /HPF
BILIRUB SERPL-MCNC: 0.6 MG/DL (ref 0.2–1.2)
BILIRUB UR QL STRIP: NEGATIVE
BUN BLD-MCNC: 24 MG/DL (ref 8–23)
BUN/CREAT SERPL: 23.1 (ref 7–25)
CALCIUM SPEC-SCNC: 10 MG/DL (ref 8.6–10.5)
CHLORIDE SERPL-SCNC: 91 MMOL/L (ref 98–107)
CLARITY UR: CLEAR
CO2 SERPL-SCNC: 25.7 MMOL/L (ref 22–29)
COLOR UR: YELLOW
CREAT BLD-MCNC: 1.04 MG/DL (ref 0.57–1)
DEPRECATED RDW RBC AUTO: 43.3 FL (ref 37–54)
ERYTHROCYTE [DISTWIDTH] IN BLOOD BY AUTOMATED COUNT: 12.5 % (ref 12.3–15.4)
GFR SERPL CREATININE-BSD FRML MDRD: 51 ML/MIN/1.73
GLOBULIN UR ELPH-MCNC: 3.9 GM/DL
GLUCOSE BLD-MCNC: 108 MG/DL (ref 65–99)
GLUCOSE UR STRIP-MCNC: NEGATIVE MG/DL
HBA1C MFR BLD: 6.9 % (ref 4.8–5.6)
HCT VFR BLD AUTO: 41.4 % (ref 34–46.6)
HGB BLD-MCNC: 13.8 G/DL (ref 12–15.9)
HGB UR QL STRIP.AUTO: NEGATIVE
HYALINE CASTS UR QL AUTO: ABNORMAL /LPF
INR PPP: 0.97 (ref 0.9–1.1)
KETONES UR QL STRIP: NEGATIVE
LEUKOCYTE ESTERASE UR QL STRIP.AUTO: ABNORMAL
MCH RBC QN AUTO: 31.2 PG (ref 26.6–33)
MCHC RBC AUTO-ENTMCNC: 33.3 G/DL (ref 31.5–35.7)
MCV RBC AUTO: 93.7 FL (ref 79–97)
NITRITE UR QL STRIP: NEGATIVE
PH UR STRIP.AUTO: <=5 [PH] (ref 5–8)
PLATELET # BLD AUTO: 206 10*3/MM3 (ref 140–450)
PMV BLD AUTO: 9.2 FL (ref 6–12)
POTASSIUM BLD-SCNC: 4.1 MMOL/L (ref 3.5–5.2)
PROT SERPL-MCNC: 8.5 G/DL (ref 6–8.5)
PROT UR QL STRIP: NEGATIVE
PROTHROMBIN TIME: 13.1 SECONDS (ref 12–15.1)
RBC # BLD AUTO: 4.42 10*6/MM3 (ref 3.77–5.28)
RBC # UR: ABNORMAL /HPF
REF LAB TEST METHOD: ABNORMAL
RH BLD: POSITIVE
SODIUM BLD-SCNC: 133 MMOL/L (ref 136–145)
SP GR UR STRIP: 1.01 (ref 1–1.03)
SQUAMOUS #/AREA URNS HPF: ABNORMAL /HPF
UROBILINOGEN UR QL STRIP: ABNORMAL
WBC NRBC COR # BLD: 6.11 10*3/MM3 (ref 3.4–10.8)
WBC UR QL AUTO: ABNORMAL /HPF

## 2019-11-20 PROCEDURE — 85610 PROTHROMBIN TIME: CPT | Performed by: ORTHOPAEDIC SURGERY

## 2019-11-20 PROCEDURE — 85730 THROMBOPLASTIN TIME PARTIAL: CPT | Performed by: ORTHOPAEDIC SURGERY

## 2019-11-20 PROCEDURE — 86901 BLOOD TYPING SEROLOGIC RH(D): CPT | Performed by: ORTHOPAEDIC SURGERY

## 2019-11-20 PROCEDURE — 80053 COMPREHEN METABOLIC PANEL: CPT | Performed by: ORTHOPAEDIC SURGERY

## 2019-11-20 PROCEDURE — 85027 COMPLETE CBC AUTOMATED: CPT | Performed by: ORTHOPAEDIC SURGERY

## 2019-11-20 PROCEDURE — 83036 HEMOGLOBIN GLYCOSYLATED A1C: CPT | Performed by: PHYSICIAN ASSISTANT

## 2019-11-20 PROCEDURE — 81001 URINALYSIS AUTO W/SCOPE: CPT | Performed by: ORTHOPAEDIC SURGERY

## 2019-11-20 PROCEDURE — 36415 COLL VENOUS BLD VENIPUNCTURE: CPT

## 2019-11-20 PROCEDURE — 86900 BLOOD TYPING SEROLOGIC ABO: CPT | Performed by: ORTHOPAEDIC SURGERY

## 2019-11-20 NOTE — PAT
"Pt here for PAT appt for upcoming procedure with Dr. Salgado on 12/4/19.  Pt states that she has a cardiac clearance from 10/21/19 from Dr. Luevano.  Pt states that she was seen in the E.R. On 10/25/19 R/T high blood pressure.  Pt reported, \"my blood pressure was about 212/110\".  Pt states that she was given IV lasix.  Pt stated a few days later, she followed up with her PCP, Dr. Schaefer, and was put on spironolactone.  Pt stated that she lost 13 lbs and went on a diet.  Pt stated that she also stopped taking ibuprofen.  Pt denies any edema now.  Last PCP visit on 11/13/19 has a BP documented of 115/48.      Called anesthesia phone.  Spoke to Carl Long CRNA and notified regarding above.  Asked if anything further needed.  Carl stated, \"no, we don't need anything else\".    "

## 2019-12-04 ENCOUNTER — APPOINTMENT (OUTPATIENT)
Dept: GENERAL RADIOLOGY | Facility: HOSPITAL | Age: 79
End: 2019-12-04

## 2019-12-04 ENCOUNTER — HOSPITAL ENCOUNTER (INPATIENT)
Facility: HOSPITAL | Age: 79
LOS: 2 days | Discharge: ANOTHER HOSPITAL | End: 2019-12-06
Attending: ORTHOPAEDIC SURGERY | Admitting: ORTHOPAEDIC SURGERY

## 2019-12-04 ENCOUNTER — ANESTHESIA (OUTPATIENT)
Dept: PERIOP | Facility: HOSPITAL | Age: 79
End: 2019-12-04

## 2019-12-04 ENCOUNTER — ANESTHESIA EVENT (OUTPATIENT)
Dept: PERIOP | Facility: HOSPITAL | Age: 79
End: 2019-12-04

## 2019-12-04 DIAGNOSIS — Z74.09 IMPAIRED MOBILITY AND ADLS: Primary | ICD-10-CM

## 2019-12-04 DIAGNOSIS — Z78.9 IMPAIRED MOBILITY AND ADLS: Primary | ICD-10-CM

## 2019-12-04 DIAGNOSIS — M16.11 PRIMARY OSTEOARTHRITIS OF RIGHT HIP: ICD-10-CM

## 2019-12-04 PROBLEM — Z96.641 STATUS POST RIGHT HIP REPLACEMENT: Status: ACTIVE | Noted: 2019-12-04

## 2019-12-04 PROBLEM — M19.90 ARTHRITIS: Status: ACTIVE | Noted: 2019-12-04

## 2019-12-04 PROBLEM — I50.22 CHRONIC SYSTOLIC CONGESTIVE HEART FAILURE: Status: ACTIVE | Noted: 2019-12-04

## 2019-12-04 LAB
ABO GROUP BLD: NORMAL
ANION GAP SERPL CALCULATED.3IONS-SCNC: 10.5 MMOL/L (ref 5–15)
BLD GP AB SCN SERPL QL: NEGATIVE
BUN BLD-MCNC: 25 MG/DL (ref 8–23)
BUN/CREAT SERPL: 23.1 (ref 7–25)
CALCIUM SPEC-SCNC: 9.2 MG/DL (ref 8.6–10.5)
CHLORIDE SERPL-SCNC: 100 MMOL/L (ref 98–107)
CO2 SERPL-SCNC: 26.5 MMOL/L (ref 22–29)
CREAT BLD-MCNC: 1.08 MG/DL (ref 0.57–1)
DEPRECATED RDW RBC AUTO: 43.6 FL (ref 37–54)
ERYTHROCYTE [DISTWIDTH] IN BLOOD BY AUTOMATED COUNT: 12.7 % (ref 12.3–15.4)
GFR SERPL CREATININE-BSD FRML MDRD: 49 ML/MIN/1.73
GLUCOSE BLD-MCNC: 202 MG/DL (ref 65–99)
GLUCOSE BLDC GLUCOMTR-MCNC: 115 MG/DL (ref 70–130)
GLUCOSE BLDC GLUCOMTR-MCNC: 152 MG/DL (ref 70–130)
GLUCOSE BLDC GLUCOMTR-MCNC: 191 MG/DL (ref 70–130)
HCT VFR BLD AUTO: 32.9 % (ref 34–46.6)
HGB BLD-MCNC: 10.8 G/DL (ref 12–15.9)
MCH RBC QN AUTO: 30.9 PG (ref 26.6–33)
MCHC RBC AUTO-ENTMCNC: 32.8 G/DL (ref 31.5–35.7)
MCV RBC AUTO: 94 FL (ref 79–97)
PLATELET # BLD AUTO: 154 10*3/MM3 (ref 140–450)
PMV BLD AUTO: 8.9 FL (ref 6–12)
POTASSIUM BLD-SCNC: 3.8 MMOL/L (ref 3.5–5.2)
RBC # BLD AUTO: 3.5 10*6/MM3 (ref 3.77–5.28)
RH BLD: POSITIVE
SODIUM BLD-SCNC: 137 MMOL/L (ref 136–145)
T&S EXPIRATION DATE: NORMAL
WBC NRBC COR # BLD: 7.5 10*3/MM3 (ref 3.4–10.8)

## 2019-12-04 PROCEDURE — 86900 BLOOD TYPING SEROLOGIC ABO: CPT | Performed by: ORTHOPAEDIC SURGERY

## 2019-12-04 PROCEDURE — 25010000002 ONDANSETRON PER 1 MG: Performed by: NURSE ANESTHETIST, CERTIFIED REGISTERED

## 2019-12-04 PROCEDURE — 86901 BLOOD TYPING SEROLOGIC RH(D): CPT | Performed by: ORTHOPAEDIC SURGERY

## 2019-12-04 PROCEDURE — 82962 GLUCOSE BLOOD TEST: CPT

## 2019-12-04 PROCEDURE — 63710000001 INSULIN ASPART PER 5 UNITS: Performed by: NURSE PRACTITIONER

## 2019-12-04 PROCEDURE — 25010000002 MIDAZOLAM PER 1MG: Performed by: NURSE ANESTHETIST, CERTIFIED REGISTERED

## 2019-12-04 PROCEDURE — C1776 JOINT DEVICE (IMPLANTABLE): HCPCS | Performed by: ORTHOPAEDIC SURGERY

## 2019-12-04 PROCEDURE — 80048 BASIC METABOLIC PNL TOTAL CA: CPT | Performed by: NURSE PRACTITIONER

## 2019-12-04 PROCEDURE — 86850 RBC ANTIBODY SCREEN: CPT | Performed by: ORTHOPAEDIC SURGERY

## 2019-12-04 PROCEDURE — 0SR904A REPLACEMENT OF RIGHT HIP JOINT WITH CERAMIC ON POLYETHYLENE SYNTHETIC SUBSTITUTE, UNCEMENTED, OPEN APPROACH: ICD-10-PCS | Performed by: ORTHOPAEDIC SURGERY

## 2019-12-04 PROCEDURE — 25010000002 PROMETHAZINE PER 50 MG

## 2019-12-04 PROCEDURE — 73501 X-RAY EXAM HIP UNI 1 VIEW: CPT

## 2019-12-04 PROCEDURE — 25010000002 PROPOFOL 200 MG/20ML EMULSION: Performed by: NURSE ANESTHETIST, CERTIFIED REGISTERED

## 2019-12-04 PROCEDURE — 25010000002 MORPHINE PER 10 MG: Performed by: NURSE ANESTHETIST, CERTIFIED REGISTERED

## 2019-12-04 PROCEDURE — 99221 1ST HOSP IP/OBS SF/LOW 40: CPT | Performed by: NURSE PRACTITIONER

## 2019-12-04 PROCEDURE — 25010000002 ONDANSETRON PER 1 MG

## 2019-12-04 PROCEDURE — 25010000003 CEFAZOLIN SODIUM-DEXTROSE 2-3 GM-%(50ML) RECONSTITUTED SOLUTION: Performed by: ORTHOPAEDIC SURGERY

## 2019-12-04 PROCEDURE — 25010000002 PROPOFOL 1000 MG/100ML EMULSION: Performed by: NURSE ANESTHETIST, CERTIFIED REGISTERED

## 2019-12-04 PROCEDURE — 85027 COMPLETE CBC AUTOMATED: CPT | Performed by: NURSE PRACTITIONER

## 2019-12-04 PROCEDURE — 25010000002 DEXAMETHASONE PER 1 MG: Performed by: NURSE ANESTHETIST, CERTIFIED REGISTERED

## 2019-12-04 DEVICE — SHLL ACET G7 PPS LTD/HL TI SZE 52MM: Type: IMPLANTABLE DEVICE | Site: HIP | Status: FUNCTIONAL

## 2019-12-04 DEVICE — IMPLANTABLE DEVICE: Type: IMPLANTABLE DEVICE | Site: HIP | Status: FUNCTIONAL

## 2019-12-04 DEVICE — TOTAL HIP PRIMARY: Type: IMPLANTABLE DEVICE | Status: FUNCTIONAL

## 2019-12-04 DEVICE — HD FEM/HIP G7 BIOLOX/DELTA OPTN 36MM: Type: IMPLANTABLE DEVICE | Site: HIP | Status: FUNCTIONAL

## 2019-12-04 DEVICE — STEM FEM/HIP TAPERLOC COMPL DIST/REDUC PPS OFFST/STD SZ12: Type: IMPLANTABLE DEVICE | Site: HIP | Status: FUNCTIONAL

## 2019-12-04 DEVICE — CAP CERAM HD HIP UPCHRG: Type: IMPLANTABLE DEVICE | Status: FUNCTIONAL

## 2019-12-04 DEVICE — SCRW ACET CORT TRILOGY S/TAP 6.5X30: Type: IMPLANTABLE DEVICE | Site: HIP | Status: FUNCTIONAL

## 2019-12-04 DEVICE — ADAPT HIP BIOLOX OPTN TYPE1 TPR STD: Type: IMPLANTABLE DEVICE | Site: HIP | Status: FUNCTIONAL

## 2019-12-04 DEVICE — CAP HIP VE UPCHRG: Type: IMPLANTABLE DEVICE | Status: FUNCTIONAL

## 2019-12-04 RX ORDER — DEXAMETHASONE SODIUM PHOSPHATE 4 MG/ML
INJECTION, SOLUTION INTRA-ARTICULAR; INTRALESIONAL; INTRAMUSCULAR; INTRAVENOUS; SOFT TISSUE AS NEEDED
Status: DISCONTINUED | OUTPATIENT
Start: 2019-12-04 | End: 2019-12-04 | Stop reason: SURG

## 2019-12-04 RX ORDER — LANOLIN ALCOHOL/MO/W.PET/CERES
3 CREAM (GRAM) TOPICAL NIGHTLY
Status: DISCONTINUED | OUTPATIENT
Start: 2019-12-04 | End: 2019-12-06 | Stop reason: HOSPADM

## 2019-12-04 RX ORDER — MIDAZOLAM HYDROCHLORIDE 2 MG/2ML
INJECTION, SOLUTION INTRAMUSCULAR; INTRAVENOUS AS NEEDED
Status: DISCONTINUED | OUTPATIENT
Start: 2019-12-04 | End: 2019-12-04 | Stop reason: SURG

## 2019-12-04 RX ORDER — SENNA AND DOCUSATE SODIUM 50; 8.6 MG/1; MG/1
2 TABLET, FILM COATED ORAL 2 TIMES DAILY
Status: DISCONTINUED | OUTPATIENT
Start: 2019-12-04 | End: 2019-12-06 | Stop reason: HOSPADM

## 2019-12-04 RX ORDER — NICOTINE POLACRILEX 4 MG
1 LOZENGE BUCCAL
Status: DISCONTINUED | OUTPATIENT
Start: 2019-12-04 | End: 2019-12-06 | Stop reason: HOSPADM

## 2019-12-04 RX ORDER — OXYCODONE HYDROCHLORIDE AND ACETAMINOPHEN 5; 325 MG/1; MG/1
2 TABLET ORAL EVERY 4 HOURS PRN
Status: DISCONTINUED | OUTPATIENT
Start: 2019-12-04 | End: 2019-12-06 | Stop reason: HOSPADM

## 2019-12-04 RX ORDER — MORPHINE SULFATE 1 MG/ML
INJECTION, SOLUTION EPIDURAL; INTRATHECAL; INTRAVENOUS AS NEEDED
Status: DISCONTINUED | OUTPATIENT
Start: 2019-12-04 | End: 2019-12-04 | Stop reason: SURG

## 2019-12-04 RX ORDER — SODIUM CHLORIDE, SODIUM LACTATE, POTASSIUM CHLORIDE, CALCIUM CHLORIDE 600; 310; 30; 20 MG/100ML; MG/100ML; MG/100ML; MG/100ML
1000 INJECTION, SOLUTION INTRAVENOUS CONTINUOUS
Status: ACTIVE | OUTPATIENT
Start: 2019-12-04 | End: 2019-12-05

## 2019-12-04 RX ORDER — IBUPROFEN 800 MG/1
800 TABLET ORAL EVERY 6 HOURS PRN
Status: DISCONTINUED | OUTPATIENT
Start: 2019-12-04 | End: 2019-12-04

## 2019-12-04 RX ORDER — CEFAZOLIN SODIUM 2 G/50ML
2 SOLUTION INTRAVENOUS ONCE
Status: COMPLETED | OUTPATIENT
Start: 2019-12-04 | End: 2019-12-04

## 2019-12-04 RX ORDER — ONDANSETRON 2 MG/ML
INJECTION INTRAMUSCULAR; INTRAVENOUS
Status: COMPLETED
Start: 2019-12-04 | End: 2019-12-04

## 2019-12-04 RX ORDER — BUPIVACAINE HYDROCHLORIDE 7.5 MG/ML
INJECTION, SOLUTION EPIDURAL; RETROBULBAR
Status: COMPLETED | OUTPATIENT
Start: 2019-12-04 | End: 2019-12-04

## 2019-12-04 RX ORDER — DEXTROSE MONOHYDRATE 25 G/50ML
25 INJECTION, SOLUTION INTRAVENOUS
Status: DISCONTINUED | OUTPATIENT
Start: 2019-12-04 | End: 2019-12-06 | Stop reason: HOSPADM

## 2019-12-04 RX ORDER — SODIUM CHLORIDE 9 MG/ML
50 INJECTION, SOLUTION INTRAVENOUS CONTINUOUS
Status: ACTIVE | OUTPATIENT
Start: 2019-12-04 | End: 2019-12-04

## 2019-12-04 RX ORDER — DOCUSATE SODIUM 100 MG/1
100 CAPSULE, LIQUID FILLED ORAL 2 TIMES DAILY PRN
Status: DISCONTINUED | OUTPATIENT
Start: 2019-12-04 | End: 2019-12-06 | Stop reason: HOSPADM

## 2019-12-04 RX ORDER — MORPHINE SULFATE 2 MG/ML
6 INJECTION, SOLUTION INTRAMUSCULAR; INTRAVENOUS
Status: DISCONTINUED | OUTPATIENT
Start: 2019-12-04 | End: 2019-12-04

## 2019-12-04 RX ORDER — ONDANSETRON 2 MG/ML
INJECTION INTRAMUSCULAR; INTRAVENOUS AS NEEDED
Status: DISCONTINUED | OUTPATIENT
Start: 2019-12-04 | End: 2019-12-04 | Stop reason: SURG

## 2019-12-04 RX ORDER — SPIRONOLACTONE 25 MG/1
25 TABLET ORAL DAILY
Status: DISCONTINUED | OUTPATIENT
Start: 2019-12-04 | End: 2019-12-04

## 2019-12-04 RX ORDER — PROPOFOL 10 MG/ML
INJECTION, EMULSION INTRAVENOUS AS NEEDED
Status: DISCONTINUED | OUTPATIENT
Start: 2019-12-04 | End: 2019-12-04 | Stop reason: SURG

## 2019-12-04 RX ORDER — ONDANSETRON 2 MG/ML
4 INJECTION INTRAMUSCULAR; INTRAVENOUS ONCE AS NEEDED
Status: COMPLETED | OUTPATIENT
Start: 2019-12-04 | End: 2019-12-04

## 2019-12-04 RX ORDER — NALOXONE HCL 0.4 MG/ML
0.4 VIAL (ML) INJECTION
Status: DISCONTINUED | OUTPATIENT
Start: 2019-12-04 | End: 2019-12-05

## 2019-12-04 RX ORDER — CLOPIDOGREL BISULFATE 75 MG/1
75 TABLET ORAL DAILY
Status: DISCONTINUED | OUTPATIENT
Start: 2019-12-04 | End: 2019-12-06 | Stop reason: HOSPADM

## 2019-12-04 RX ORDER — MORPHINE SULFATE 2 MG/ML
2 INJECTION, SOLUTION INTRAMUSCULAR; INTRAVENOUS
Status: DISCONTINUED | OUTPATIENT
Start: 2019-12-04 | End: 2019-12-05

## 2019-12-04 RX ORDER — FUROSEMIDE 20 MG/1
20 TABLET ORAL 2 TIMES DAILY
Status: DISCONTINUED | OUTPATIENT
Start: 2019-12-04 | End: 2019-12-04

## 2019-12-04 RX ORDER — SODIUM CHLORIDE 9 MG/ML
50 INJECTION, SOLUTION INTRAVENOUS CONTINUOUS
Status: ACTIVE | OUTPATIENT
Start: 2019-12-04 | End: 2019-12-05

## 2019-12-04 RX ORDER — PANTOPRAZOLE SODIUM 40 MG/1
40 TABLET, DELAYED RELEASE ORAL
Status: DISCONTINUED | OUTPATIENT
Start: 2019-12-04 | End: 2019-12-06 | Stop reason: HOSPADM

## 2019-12-04 RX ORDER — LISINOPRIL 20 MG/1
40 TABLET ORAL DAILY
Status: DISCONTINUED | OUTPATIENT
Start: 2019-12-05 | End: 2019-12-04

## 2019-12-04 RX ORDER — PROMETHAZINE HYDROCHLORIDE 25 MG/ML
INJECTION, SOLUTION INTRAMUSCULAR; INTRAVENOUS
Status: COMPLETED
Start: 2019-12-04 | End: 2019-12-04

## 2019-12-04 RX ORDER — PROMETHAZINE HYDROCHLORIDE 25 MG/ML
12.5 INJECTION, SOLUTION INTRAMUSCULAR; INTRAVENOUS EVERY 6 HOURS PRN
Status: DISCONTINUED | OUTPATIENT
Start: 2019-12-04 | End: 2019-12-04

## 2019-12-04 RX ORDER — NALOXONE HCL 0.4 MG/ML
0.4 VIAL (ML) INJECTION
Status: DISCONTINUED | OUTPATIENT
Start: 2019-12-04 | End: 2019-12-04

## 2019-12-04 RX ORDER — FOLIC ACID 1 MG/1
1000 TABLET ORAL DAILY
Status: DISCONTINUED | OUTPATIENT
Start: 2019-12-04 | End: 2019-12-06 | Stop reason: HOSPADM

## 2019-12-04 RX ORDER — OXYCODONE HYDROCHLORIDE AND ACETAMINOPHEN 5; 325 MG/1; MG/1
1 TABLET ORAL EVERY 4 HOURS PRN
Status: DISCONTINUED | OUTPATIENT
Start: 2019-12-04 | End: 2019-12-06 | Stop reason: HOSPADM

## 2019-12-04 RX ORDER — SODIUM CHLORIDE 9 MG/ML
75 INJECTION, SOLUTION INTRAVENOUS CONTINUOUS
Status: ACTIVE | OUTPATIENT
Start: 2019-12-04 | End: 2019-12-05

## 2019-12-04 RX ORDER — MORPHINE SULFATE 4 MG/ML
4 INJECTION, SOLUTION INTRAMUSCULAR; INTRAVENOUS
Status: DISCONTINUED | OUTPATIENT
Start: 2019-12-04 | End: 2019-12-04

## 2019-12-04 RX ORDER — PROMETHAZINE HYDROCHLORIDE 25 MG/ML
6.25 INJECTION, SOLUTION INTRAMUSCULAR; INTRAVENOUS ONCE
Status: COMPLETED | OUTPATIENT
Start: 2019-12-04 | End: 2019-12-04

## 2019-12-04 RX ORDER — PROPOFOL 10 MG/ML
INJECTION, EMULSION INTRAVENOUS CONTINUOUS PRN
Status: DISCONTINUED | OUTPATIENT
Start: 2019-12-04 | End: 2019-12-04 | Stop reason: SURG

## 2019-12-04 RX ORDER — MEPERIDINE HYDROCHLORIDE 50 MG/ML
12.5 INJECTION INTRAMUSCULAR; INTRAVENOUS; SUBCUTANEOUS
Status: DISCONTINUED | OUTPATIENT
Start: 2019-12-04 | End: 2019-12-04 | Stop reason: HOSPADM

## 2019-12-04 RX ORDER — CEFAZOLIN SODIUM 2 G/50ML
2 SOLUTION INTRAVENOUS EVERY 8 HOURS
Status: COMPLETED | OUTPATIENT
Start: 2019-12-04 | End: 2019-12-05

## 2019-12-04 RX ORDER — ONDANSETRON 2 MG/ML
4 INJECTION INTRAMUSCULAR; INTRAVENOUS EVERY 6 HOURS PRN
Status: DISCONTINUED | OUTPATIENT
Start: 2019-12-04 | End: 2019-12-06 | Stop reason: HOSPADM

## 2019-12-04 RX ORDER — CLONIDINE HYDROCHLORIDE 0.1 MG/1
0.1 TABLET ORAL 2 TIMES DAILY
Status: DISCONTINUED | OUTPATIENT
Start: 2019-12-04 | End: 2019-12-05

## 2019-12-04 RX ORDER — ONDANSETRON 4 MG/1
4 TABLET, FILM COATED ORAL EVERY 6 HOURS PRN
Status: DISCONTINUED | OUTPATIENT
Start: 2019-12-04 | End: 2019-12-06 | Stop reason: HOSPADM

## 2019-12-04 RX ADMIN — ONDANSETRON 4 MG: 2 INJECTION INTRAMUSCULAR; INTRAVENOUS at 09:16

## 2019-12-04 RX ADMIN — DEXAMETHASONE SODIUM PHOSPHATE 8 MG: 4 INJECTION, SOLUTION INTRAMUSCULAR; INTRAVENOUS at 09:16

## 2019-12-04 RX ADMIN — INSULIN ASPART 2 UNITS: 100 INJECTION, SOLUTION INTRAVENOUS; SUBCUTANEOUS at 17:31

## 2019-12-04 RX ADMIN — CLOPIDOGREL BISULFATE 75 MG: 75 TABLET ORAL at 13:24

## 2019-12-04 RX ADMIN — PROMETHAZINE HYDROCHLORIDE 6.25 MG: 25 INJECTION INTRAMUSCULAR; INTRAVENOUS at 11:06

## 2019-12-04 RX ADMIN — BUPIVACAINE HYDROCHLORIDE 2 ML: 7.5 INJECTION, SOLUTION EPIDURAL; RETROBULBAR at 07:55

## 2019-12-04 RX ADMIN — SODIUM CHLORIDE 75 ML/HR: 9 INJECTION, SOLUTION INTRAVENOUS at 17:32

## 2019-12-04 RX ADMIN — MELATONIN 3 MG: at 20:49

## 2019-12-04 RX ADMIN — ONDANSETRON 4 MG: 2 INJECTION INTRAMUSCULAR; INTRAVENOUS at 10:40

## 2019-12-04 RX ADMIN — PROPOFOL 25 MCG/KG/MIN: 10 INJECTION, EMULSION INTRAVENOUS at 08:05

## 2019-12-04 RX ADMIN — MIDAZOLAM HYDROCHLORIDE 2 MG: 1 INJECTION, SOLUTION INTRAMUSCULAR; INTRAVENOUS at 08:55

## 2019-12-04 RX ADMIN — PROPOFOL 20 MG: 10 INJECTION, EMULSION INTRAVENOUS at 08:05

## 2019-12-04 RX ADMIN — PROPOFOL 30 MG: 10 INJECTION, EMULSION INTRAVENOUS at 08:00

## 2019-12-04 RX ADMIN — CEFAZOLIN SODIUM 2 G: 2 SOLUTION INTRAVENOUS at 17:32

## 2019-12-04 RX ADMIN — SENNOSIDES AND DOCUSATE SODIUM 2 TABLET: 8.6; 5 TABLET ORAL at 20:49

## 2019-12-04 RX ADMIN — SODIUM CHLORIDE 50 ML/HR: 9 INJECTION, SOLUTION INTRAVENOUS at 15:34

## 2019-12-04 RX ADMIN — FOLIC ACID 1000 MCG: 1 TABLET ORAL at 13:24

## 2019-12-04 RX ADMIN — PROMETHAZINE HYDROCHLORIDE 6.25 MG: 25 INJECTION, SOLUTION INTRAMUSCULAR; INTRAVENOUS at 11:06

## 2019-12-04 RX ADMIN — CEFAZOLIN SODIUM 2 G: 2 SOLUTION INTRAVENOUS at 09:16

## 2019-12-04 RX ADMIN — PANTOPRAZOLE SODIUM 40 MG: 40 TABLET, DELAYED RELEASE ORAL at 13:24

## 2019-12-04 RX ADMIN — SODIUM CHLORIDE, POTASSIUM CHLORIDE, SODIUM LACTATE AND CALCIUM CHLORIDE 1000 ML: 600; 310; 30; 20 INJECTION, SOLUTION INTRAVENOUS at 07:27

## 2019-12-04 RX ADMIN — MORPHINE SULFATE 0.3 MG: 1 INJECTION EPIDURAL; INTRATHECAL; INTRAVENOUS at 07:57

## 2019-12-04 NOTE — OP NOTE
[facility] [address], [address]  [place]  [address] [phone]      OPERATIVE REPORT         PATIENT NAME:  Radha Whelan                            YOB: 1940        PREOP DIAGNOSIS:   Right hip advanced end-stage osteoarthritis    POSTOP DIAGNOSIS:  Right hip advanced end-stage osteoarthritis    PROCEDURE:   Right total hip replacement.    SURGEON:   Wellington Salgado MD    OPERATIVE TEAM:   Circulator: Julian Aviles RN  Scrub Person: Yady Herman; Sandra Forman    ANESTHETIST:  MEGHA: Denzel Oliveira CRNA; Tong Cabrera CRNA    ANESTHESIA:   General      SPECIMENS:   Femoral head sent to pathology      FINDINGS:   Right hip oa    HARDWARE:                        biomet     COMPLICATIONS:  None    DISPOSITION:  Stable to recovery.    INDICATIONS AND NARRATIVE:  The patient presents for planned total hip replacement.  The patient has persistent daily pain, limited ambulation and activity intolerance, hip stiffness, deformity, and limitations related to advanced degenerative joint disease of the hip.  Treatment alternatives have been discussed, and the patient wishes to proceed with elective total hip replacement.  Risks and benefits of the proposed procedure have been discussed, and informed consent obtained.  Risks were discussed including but not limited to, anesthesia, infection, nerve/vessel/tendon injury, fracture, prosthetic wear, loosening or dislocation, DVT, pulmonary embolus, blood loss and the possible need for transfusion.  Arrangements have been made for tranexemic acid IV protocol.  Goals of the procedure include the potential for pain relief, improved hip motion, limb alignment and improved tolerance with ambulation and activities.      Antibiotic prophylaxis was given.  Surgeon site marking and a time out were performed.  Anesthesia was effective and well tolerated.  The hip and leg were prepped and draped in the usual sterile fashion.  The  patient was placed in the lateral decubitus position for the procedure, with care taken to pad all areas of the body including a bean bag mold, axillary roll, and fibular head and malleolar padding.      After sterile prep and drape, a curved incision was made centered on the greater trochanter of the hip.  Dissection proceeded in line with the skin incision and through the tensor fascia fe.  A Charnley self-retaining retractor was placed.  The hip was gently internally rotated and a blunt Cobra was placed under the gluteus medius.  The piriformis tendon and short external rotators were released from the fossa and tagged for subsequent repair.  A T capsulotomy was performed and the capsule was tagged for repair.  Synovial joint fluid expressed and suctioned.  There were marked degenerative changes, cartilage worn down to bone and a deformed femoral head and acetabulum.  Using the neck-cutting template, with the desired slope and position, a neck cut was made with a saw.  The femoral head was extracted from the hip, and sent to pathology as a specimen.      After debridement of the osteophytes, reaming of the acetabulum was performed that provided a concentric acetabular socket for the implant.  A trial cup provided an excellent press fit with no toggling.  Care was taken to ream and place the cup in 20 degrees of anteversion and 40-45degrees of inclination.  The trial component was removed, and the acetabulum was irrigated copiously.  Then, the actual acetabular shell was impacted in place and obtained an excellent press fit, with good position.  The fixation was reinforced with superior quadrant screws, with standard drilling depth gauge measurement, and placement of screws.  The liner was placed onto the cup.    Next, steps were taken to prepare the femur.  A box-cutting osteotome was used to lateralize the entry to the canal and along the greater trochanter.  Sequential broaching with the broach was performed,  up to the best-fit sizes, which provided an excellent press fit, no toggling.  Care was taken to broach 15-20 degrees of femoral anteversion.  Next, trial heads and necks were placed to find the best range of motion and stability.  There was smooth, free flexion, full extension of the hip and smooth full external and internal rotation with stability.  The trial components were removed.  The wound and bone surfaces were pulse irrigated with copious antibiotic solution, and then suctioned.  Next, the actual stem was placed, which had an excellent press fit that was in good position.  The trial cup liner was removed and the actual insert was placed.  The actual head was then Smith tapered onto the femoral stem.  A final reduction was performed.  Clinically the leg lengths were equal and there was full range of motion and stability of the hip.  The wound was irrigated copiously.      Routine closure was performed and consisted of interrupted #1 Vicryl to repair the capsule, #5 non-absorbable Ethibond to repair the piriformis tendon through greater trochanter bone tunnels, running barbed absorbable suture to repair the tensor fascia fe, 2-0 Vicryl for the deep and superficial subcutaneous layers, and running barbed suture for the skin.  A sterile Dermabond and Covaderm dressing was applied.  An abduction pillow was placed and the patient was moved supine on the hospital bed.  Anesthesia was effective and well tolerated.  There were no complications of surgery.  The patient was transferred in stable condition to the recovery room and x-rays of the pelvis and hip were requested.

## 2019-12-04 NOTE — PLAN OF CARE
Problem: Patient Care Overview  Goal: Plan of Care Review  Outcome: Ongoing (interventions implemented as appropriate)   12/04/19 8094   Coping/Psychosocial   Plan of Care Reviewed With patient;family   OTHER   Outcome Summary new admit       Problem: Hip Arthroplasty (Total, Partial) (Adult)  Goal: Signs and Symptoms of Listed Potential Problems Will be Absent, Minimized or Managed (Hip Arthroplasty)  Outcome: Ongoing (interventions implemented as appropriate)      Problem: Hospitalized Acutely Ill Older (Adult)  Goal: Signs and Symptoms of Listed Potential Problems Will be Absent, Minimized or Managed (Hospitalized Acutely Ill Older)  Outcome: Ongoing (interventions implemented as appropriate)      Problem: Fall Risk (Adult)  Goal: Identify Related Risk Factors and Signs and Symptoms  Outcome: Ongoing (interventions implemented as appropriate)    Goal: Absence of Fall  Outcome: Ongoing (interventions implemented as appropriate)

## 2019-12-04 NOTE — CONSULTS
[facility] HOSPITALIST   CONSULTATION      Name:  Radha Whelan   Age:  79 y.o.  Sex:  female  :  1940  MRN:  [id number]   Visit Number:  [id number]  Admission Date:  2019  Date Of Service:  19  Primary Care Physician:  Farhan Schaefer MD    Consulting Physician:    CURTIS Wall    Referring Physician:    Dr. Salgado     Reason For Consult:    Medical management    Chief Complaint:     Right hip pain    History Of Presenting Illness:    -year-old female with past medical history significant for hypertension, diabetes mellitus, obstructive sleep apnea on CPAP and TIA who presented to Dr. Salgado with complaints of right hip pain.  According to the patient it is been worsening over time to the point that it was interfering with activity of daily living.  She tried analgesics, anti-inflammatories, rest and exercise with little to no relief.  At the time of evaluation he did discuss treatment options including a right total hip replacement.  Patient did present to [facility] on 2019 where she did undergo a right total hip replacement performed by Dr. Salgado.  She has had some postop nausea and hypotension.  She is getting IV fluids.  The hospitalist service was consulted for medical management.  I have seen and evaluated patient at bedside.  She did have some nausea earlier but that does appear to be resolving.  She is asking about advancing her diet.  She denies any chest pain, shortness of breath nausea or vomiting at this time.      Review Of Systems:     General ROS: Patient denies any fevers, chills or loss of consciousness. Complains of generalized weakness.   Psychological ROS: Denies any hallucinations and delusions.  Ophthalmic ROS: Denies any diplopia or transient loss of vision.  ENT ROS: Denies sore throat, nasal congestion or ear pain.   Allergy and Immunology ROS: Denies rash or itching.  Hematological and Lymphatic ROS: Denies neck swelling or  "easy bleeding.  Endocrine ROS: Denies any recent unintentional weight gain or loss.    Respiratory ROS: Denies cough or shortness of breath.   Cardiovascular ROS: Denies chest pain or palpitations. No history of exertional chest pain.   Gastrointestinal ROS:nausea earlier but resolved.  Denies vomiting. Denies any abdominal pain. No diarrhea.   Genito-Urinary ROS: Denies dysuria or hematuria.  Musculoskeletal ROS: Complains of chronic back pain. No muscle pain. No calf pain. Neurological ROS: Denies any focal weakness. No loss of consciousness. Denies any numbness. Denies neck pain.   Dermatological ROS: Denies any redness or pruritis.     Past Medical History:    Past Medical History:   Diagnosis Date   • Arthritis    • Basal cell carcinoma    • Body piercing     both ears   • Cataract, bilateral     s/p removal    • Diabetes mellitus (CMS/HCC)    • DVT (deep venous thrombosis) (CMS/HCC)     1970's- left leg   • Edema     legs- hx of   • Elevated cholesterol    • Fatigue    • Fatigue    • History of left heart catheterization     9/16/19  no stents   • History of migraine headaches    • Hx of exercise stress test 2004   • Hyperlipidemia    • Hypertension    • Hyponatremia    • LBBB (left bundle branch block)    • Left leg pain     left leg and knee pain    • Muscle spasm     hx of   • ODELL on CPAP     CPAP   • Stroke (CMS/HCC)    • Teeth missing     all of the top, and has 6 bottom teeth left   • Thyroid nodule    • TIA (transient ischemic attack)     x3.  last one was \"a few years ago\"   • Wears dentures     top plate   • Wears glasses     to read       Past Surgical history:    Past Surgical History:   Procedure Laterality Date   • BREAST BIOPSY Left     benign   • CARDIAC CATHETERIZATION      09/16/2019 PER .   • CARDIAC CATHETERIZATION N/A 9/16/2019    Procedure: Left Heart Cath +/- CBI;  Surgeon: Ashlyn Mays MD;  Location: [facility] CATH INVASIVE LOCATION;  Service: Cardiovascular   • CATARACT EXTRACTION "  12/2018    both eyes    • COLONOSCOPY     • MOUTH SURGERY      all top teeth   • SKIN BIOPSY      basal cell   • TUBAL ABDOMINAL LIGATION         Social History:    Social History     Socioeconomic History   • Marital status:      Spouse name: Not on file   • Number of children: Not on file   • Years of education: Not on file   • Highest education level: Not on file   Tobacco Use   • Smoking status: Never Smoker   • Smokeless tobacco: Never Used   Substance and Sexual Activity   • Alcohol use: No   • Drug use: No   • Sexual activity: Defer       Family History:    Family History   Problem Relation Age of Onset   • Diabetes Other    • Hypertension Other    • Cancer Other         malignant neoplasm    • Migraines Other    • Heart disease Mother    • Heart disease Father    • Breast cancer Sister    • Breast cancer Other    • Breast cancer Sister        Allergies:      Cumini; Butter flavor [flavoring agent]; Cheese; Other; and Saccharin    Home Medications:    Prior to Admission Medications     Prescriptions Last Dose Informant Patient Reported? Taking?    aspirin-acetaminophen-caffeine (EXCEDRIN MIGRAINE) 250-250-65 MG per tablet Past Month Self Yes Yes    Take 1 tablet by mouth Every 6 (Six) Hours As Needed.    Cholecalciferol (VITAMIN D PO) Past Week Self Yes Yes    Take 125 mcg by mouth 2 (Two) Times a Week.    coenzyme Q10 100 MG capsule Past Week Self Yes Yes    Take 400 mg by mouth Every Other Day.    Cyanocobalamin ([employer] VITAMIN B12 PO) Past Week Self Yes Yes    Take 1 tablet by mouth 2 (Two) Times a Week.    folic acid (FOLVITE) 1 MG tablet 12/3/2019 Self No Yes    TAKE 1 TABLET DAILY    furosemide (LASIX) 20 MG tablet 12/3/2019 Self No Yes    TAKE 1 TABLET TWICE A DAY    glipiZIDE (GLUCOTROL XL) 2.5 MG 24 hr tablet 12/3/2019 Self No Yes    Take 1 tablet by mouth Daily.    Glucosamine-Chondroit-Vit C-Mn (GLUCOSAMINE CHONDR 1500 COMPLX PO) 12/3/2019 Self Yes Yes    Take 1 tablet by mouth Daily.     lisinopril (PRINIVIL,ZESTRIL) 40 MG tablet 12/4/2019 Self No Yes    TAKE 1 TABLET DAILY    lovastatin (MEVACOR) 10 MG tablet 12/3/2019 Self No Yes    TAKE 1 TABLET EVERY NIGHT    Patient taking differently:  TAKE 1 TABLET EVERY THREE TIMES WEEKLY    melatonin 3 MG tablet 12/3/2019 Self Yes Yes    Take 3 mg by mouth Every Night.    Omega-3 Fatty Acids (FISH OIL) 1200 MG capsule capsule 12/3/2019 Self Yes Yes    Take 1,200 mg by mouth Daily.    pantoprazole (PROTONIX) 40 MG EC tablet 12/3/2019 Self No Yes    TAKE 1 TABLET DAILY    aspirin 81 MG tablet 11/28/2019 Self Yes No    Take 81 mg by mouth Daily.    cloNIDine (CATAPRES) 0.1 MG tablet More than a month Self No No    Take 1 tablet by mouth 2 (Two) Times a Day.    clopidogrel (PLAVIX) 75 MG tablet 11/28/2019 Self No No    TAKE 1 TABLET DAILY    glucose blood test strip Unknown Self No No    Use as instructed    ibuprofen (ADVIL,MOTRIN) 800 MG tablet More than a month  Yes No    Take 800 mg by mouth 3 (Three) Times a Day As Needed.    spironolactone (ALDACTONE) 25 MG tablet More than a month Self No No    Take 1 tablet by mouth Daily.    Tobramycin-Dexamethasone 0.3-0.05 % suspension More than a month Self No No    Apply 1 drop to eye(s) as directed by provider 2 (Two) Times a Day.    Patient taking differently:  Apply 1 drop to eye(s) as directed by provider 2 (Two) Times a Day As Needed.             Hospital Scheduled Meds:      ceFAZolin 2 g Intravenous Q8H   cloNIDine 0.1 mg Oral BID   clopidogrel 75 mg Oral Daily   [START ON 12/5/2019] enoxaparin 40 mg Subcutaneous Daily   folic acid 1,000 mcg Oral Daily   furosemide 20 mg Oral BID   [START ON 12/5/2019] lisinopril 40 mg Oral Daily   melatonin 3 mg Oral Nightly   pantoprazole 40 mg Oral Q AM   senna-docusate sodium 2 tablet Oral BID   spironolactone 25 mg Oral Daily         lactated ringers 1,000 mL Last Rate: 1,000 mL (12/04/19 0727)        Vital Signs:    Temp:  [97.1 °F (36.2 °C)-98.4 °F (36.9 °C)] 98.4 °F  (36.9 °C)  Heart Rate:  [51-80] 57  Resp:  [13-18] 14  BP: ()/(33-61) 102/53        12/04/19  1309   Weight: 101 kg (223 lb 1 oz)       Body mass index is 34.94 kg/m².    Physical Exam:    General Appearance:    Alert and cooperative, not in any acute distress.   Head:    Atraumatic and normocephalic, without obvious abnormality.   Eyes:            PERRLA, conjunctivae and sclerae normal, no Icterus. No pallor. Extraocular movements are within normal limits.   Ears:    Ears appear intact with no abnormalities noted.   Throat:   No oral lesions, no thrush, oral mucosa moist.   Neck:   Supple, trachea midline, no thyromegaly, no carotid bruit.       Lungs:     Chest shape is normal. Breath sounds heard bilaterally equally.  No crackles or wheezing. No Pleural rub or bronchial breathing.      Heart:    Normal S1 and S2, no murmur, no gallop, no rub. No JVD   Abdomen:     Normal bowel sounds, no masses, no organomegaly. Soft        non-tender, non-distended, no guarding, no rebound                Tenderness, obese   Extremities:   Moves all extremities well, no edema, no cyanosis, no             Clubbing, Dressing to right hip intact.  No hematoma or edema   Pulses:   Pulses palpable and equal bilaterally   Skin:   No bleeding, bruising or rash, no skin breakdown, pink, warm and dry       Neurologic:   Cranial nerves 2 - 12 grossly intact, sensation intact, Motor power is normal and equal bilaterally.           Labs:    Lab Results (last 24 hours)     Procedure Component Value Units Date/Time    Tissue Pathology Exam [[id number]] Collected:  12/04/19 0833    Specimen:  Bone from Hip, Right Updated:  12/04/19 1416    POC Glucose Once [[id number]]  (Normal) Collected:  12/04/19 0722    Specimen:  Blood Updated:  12/04/19 0740     Glucose 115 mg/dL      Comment: Serial Number: [id number]:  [id number]             Radiology:    Imaging Results (Last 72 Hours)     Procedure Component Value Units Date/Time    XR Hip 1  View Without Pelvis Right (Surgery Only) [[id number]] Collected:  12/04/19 0953     Updated:  12/04/19 0956    Narrative:       PROCEDURE: XR HIP 1 VIEW WO PELVIS RIGHT-     HISTORY: Post-Op Hip Arthroplasty; M16.11-Unilateral primary  osteoarthritis, right hip     COMPARISON: None.     FINDINGS: The patient is status post total right hip arthroplasty. There  are no hardware complications or fractures. The pubic symphysis and SI  joints are intact. The expected postoperative gas is seen in the soft  tissues of the right hip.       Impression:       Status post total right hip arthroplasty with no hardware  complications.     This report was finalized on 12/4/2019 9:53 AM by Alyx Parsons M.D..          Assessment/Recommendations/Plan:   1.  Diabetes mellitus-we will place patient on insulin protocol.  Monitor blood sugars and titrate accordingly    2.  Chronic systolic CHF with last EF 42% in September 2019.  He is followed by Dr. Luevano with cardiology.  She did see him in October for clearance for this procedure.  According to the family she underwent a heart catheterization in September 2019 which was reported as no blockages.  Given her systolic heart failure we will need to hydrate carefully.  I will hold her diuretics and antihypertensives at this time since her blood pressure is on the lower side.    3.  Chronic essential hypertension-patient's blood pressure is on the lower side currently so we will hold her blood pressure medications.  We will have to give fluids cautiously given that she does have systolic heart failure.  Will closely monitor for any evidence of overload.    4.  Obstructive sleep apnea-patient does have her home unit.    5.  History of CVA    6.  Status post right total hip replacement-patient is on analgesics and DVT prophylaxis.  PT OT has been consulted as well as case management for discharge planning.    7.  Hypotension-I do suspect that this is likely secondary to anesthesia and  pain medication.  I have decreased her dose of morphine.  We will continue to monitor closely    8.  Nausea-resolving patient does have antiemetics ordered as needed.          CURTIS Wall  12/04/19  2:29 PM

## 2019-12-04 NOTE — ANESTHESIA PROCEDURE NOTES
Spinal Block      Patient reassessed immediately prior to procedure    Patient location during procedure: OR  Start Time: 12/4/2019 7:50 AM  Stop Time: 12/4/2019 7:57 AM  Indication:procedure for pain  Performed By  CRNA: Denzel Oliveira CRNA  Preanesthetic Checklist  Completed: patient identified, site marked, surgical consent, pre-op evaluation, timeout performed, IV checked, risks and benefits discussed and monitors and equipment checked  Spinal Block Prep:  Patient Position:sitting  Sterile Tech:cap, gloves, mask and sterile barriers  Prep:Chloraprep  Patient Monitoring:blood pressure monitoring, continuous pulse oximetry and EKG  Spinal Block Procedure  Approach:midline  Guidance:landmark technique  Location:L3-L4  Needle Type:Pencan  Needle Gauge:25 G  Placement of Spinal needle event:cerebrospinal fluid aspirated  Paresthesia: no  Fluid Appearance:clear  Medications: bupivacaine PF (MARCAINE) injection 0.75%, 2 mL  Med Administered at 12/4/2019 7:55 AM   Post Assessment  Patient Tolerance:patient tolerated the procedure well with no apparent complications  Complications no  Additional Notes  Risks of spinal anesthesia discussed , including but not limited to:  Headache, itching, nausea and vomiting, infection, failure of the block, decreased BP, permanent chronic back pain, nerve damage, paralysis, etc.    Skin localized with lidocaine skin wheal.    SPINAL INJECTED INTRATHECALLY WITH  2 ml 0.75% Bupivacaine with dextrose  0.1 ml of Epinephrine 1:1000  0.3 mg Morphine

## 2019-12-04 NOTE — ANESTHESIA POSTPROCEDURE EVALUATION
Patient: Radha Whelan    Procedure Summary     Date:  12/04/19 Room / Location:  [facility] OR  /  [facility] OR    Anesthesia Start:  0747 Anesthesia Stop:  0939    Procedure:  HIP ARTHROPLASTY TOTAL RIGHT (Right Hip) Diagnosis:       Primary osteoarthritis of right hip      (Primary osteoarthritis of right hip [M16.11])    Surgeon:  Issa Salgado MD Provider:  Tong Cabrera CRNA    Anesthesia Type:  general with block, spinal ASA Status:  3          Anesthesia Type: general with block, spinal  Last vitals  BP   (!) 86/37 (12/04/19 1000)   Temp   97.1 °F (36.2 °C) (12/04/19 0930)   Pulse   61 (12/04/19 1000)   Resp   16 (12/04/19 1000)     SpO2   99 % (12/04/19 1000)     Post Anesthesia Care and Evaluation    Patient location during evaluation: PACU  Patient participation: complete - patient participated  Level of consciousness: awake  Pain score: 0  Pain management: adequate  Airway patency: patent  Anesthetic complications: No anesthetic complications  PONV Status: controlled  Cardiovascular status: acceptable and stable  Respiratory status: acceptable and room air  Hydration status: acceptable

## 2019-12-04 NOTE — ANESTHESIA PREPROCEDURE EVALUATION
Anesthesia Evaluation     Patient summary reviewed and Nursing notes reviewed   no history of anesthetic complications:  NPO Solid Status: > 8 hours  NPO Liquid Status: > 8 hours           Airway   Mallampati: II  TM distance: >3 FB  Neck ROM: full  no difficulty expected  Dental - normal exam     Pulmonary - normal exam   (+) sleep apnea,   Cardiovascular - normal exam    Rhythm: regular  Rate: normal    (+) hypertension, dysrhythmias, DVT, hyperlipidemia,       Neuro/Psych  (+) TIA, CVA, numbness,     GI/Hepatic/Renal/Endo    (+) obesity,   diabetes mellitus type 2,     Musculoskeletal     Abdominal    Substance History - negative use     OB/GYN negative ob/gyn ROS         Other   arthritis,    history of cancer    ROS/Med Hx Other: Hx of Left BBB  Hx of skin cancer on nose  Off plavix for six days   Off ASA for six days                  Anesthesia Plan    ASA 3     general with block and spinal   (GA with FI block vs SAB with post op FI block. Pt agreeable to either plan.)

## 2019-12-04 NOTE — SIGNIFICANT NOTE
12/04/19 1452   Rehab Time/Intention   Evaluation Not Performed patient unavailable for evaluation  (Attempted PT eval. Pt unable to participate d/t B LE's still being numb from spinal block. PT will follow up with patient tomorrow and perform eval as tolerated.)   Rehab Treatment   Discipline physical therapist

## 2019-12-04 NOTE — SIGNIFICANT NOTE
12/04/19 1421   Rehab Time/Intention   Evaluation Not Performed other (see comments)  (Pt not seen for OT evaluation at this time as she is still numb from her spinal.  Will follow up with pt tomorrow.)   Rehab Treatment   Discipline occupational therapist

## 2019-12-05 ENCOUNTER — APPOINTMENT (OUTPATIENT)
Dept: GENERAL RADIOLOGY | Facility: HOSPITAL | Age: 79
End: 2019-12-05

## 2019-12-05 LAB
ANION GAP SERPL CALCULATED.3IONS-SCNC: 11.4 MMOL/L (ref 5–15)
ANION GAP SERPL CALCULATED.3IONS-SCNC: 12.5 MMOL/L (ref 5–15)
BASOPHILS # BLD AUTO: 0 10*3/MM3 (ref 0–0.2)
BASOPHILS NFR BLD AUTO: 0 % (ref 0–1.5)
BILIRUB UR QL STRIP: NEGATIVE
BUN BLD-MCNC: 19 MG/DL (ref 8–23)
BUN BLD-MCNC: 20 MG/DL (ref 8–23)
BUN/CREAT SERPL: 16.7 (ref 7–25)
BUN/CREAT SERPL: 18.3 (ref 7–25)
CALCIUM SPEC-SCNC: 8.9 MG/DL (ref 8.6–10.5)
CALCIUM SPEC-SCNC: 9.4 MG/DL (ref 8.6–10.5)
CHLORIDE SERPL-SCNC: 101 MMOL/L (ref 98–107)
CHLORIDE SERPL-SCNC: 98 MMOL/L (ref 98–107)
CLARITY UR: CLEAR
CO2 SERPL-SCNC: 25.5 MMOL/L (ref 22–29)
CO2 SERPL-SCNC: 26.6 MMOL/L (ref 22–29)
COLOR UR: YELLOW
CREAT BLD-MCNC: 1.09 MG/DL (ref 0.57–1)
CREAT BLD-MCNC: 1.14 MG/DL (ref 0.57–1)
DEPRECATED RDW RBC AUTO: 44.3 FL (ref 37–54)
EOSINOPHIL # BLD AUTO: 0.01 10*3/MM3 (ref 0–0.4)
EOSINOPHIL NFR BLD AUTO: 0.1 % (ref 0.3–6.2)
ERYTHROCYTE [DISTWIDTH] IN BLOOD BY AUTOMATED COUNT: 12.6 % (ref 12.3–15.4)
GFR SERPL CREATININE-BSD FRML MDRD: 46 ML/MIN/1.73
GFR SERPL CREATININE-BSD FRML MDRD: 48 ML/MIN/1.73
GLUCOSE BLD-MCNC: 141 MG/DL (ref 65–99)
GLUCOSE BLD-MCNC: 179 MG/DL (ref 65–99)
GLUCOSE BLDC GLUCOMTR-MCNC: 131 MG/DL (ref 70–130)
GLUCOSE BLDC GLUCOMTR-MCNC: 135 MG/DL (ref 70–130)
GLUCOSE BLDC GLUCOMTR-MCNC: 150 MG/DL (ref 70–130)
GLUCOSE BLDC GLUCOMTR-MCNC: 182 MG/DL (ref 70–130)
GLUCOSE UR STRIP-MCNC: NEGATIVE MG/DL
HCT VFR BLD AUTO: 31.1 % (ref 34–46.6)
HCT VFR BLD AUTO: 31.3 % (ref 34–46.6)
HCT VFR BLD AUTO: 31.6 % (ref 34–46.6)
HGB BLD-MCNC: 10.1 G/DL (ref 12–15.9)
HGB BLD-MCNC: 10.4 G/DL (ref 12–15.9)
HGB BLD-MCNC: 9.9 G/DL (ref 12–15.9)
HGB UR QL STRIP.AUTO: NEGATIVE
IMM GRANULOCYTES # BLD AUTO: 0.03 10*3/MM3 (ref 0–0.05)
IMM GRANULOCYTES NFR BLD AUTO: 0.4 % (ref 0–0.5)
KETONES UR QL STRIP: NEGATIVE
LEUKOCYTE ESTERASE UR QL STRIP.AUTO: NEGATIVE
LYMPHOCYTES # BLD AUTO: 1.54 10*3/MM3 (ref 0.7–3.1)
LYMPHOCYTES NFR BLD AUTO: 20.5 % (ref 19.6–45.3)
MCH RBC QN AUTO: 30.6 PG (ref 26.6–33)
MCHC RBC AUTO-ENTMCNC: 31.8 G/DL (ref 31.5–35.7)
MCV RBC AUTO: 96 FL (ref 79–97)
MONOCYTES # BLD AUTO: 0.97 10*3/MM3 (ref 0.1–0.9)
MONOCYTES NFR BLD AUTO: 12.9 % (ref 5–12)
NEUTROPHILS # BLD AUTO: 4.95 10*3/MM3 (ref 1.7–7)
NEUTROPHILS NFR BLD AUTO: 66.1 % (ref 42.7–76)
NITRITE UR QL STRIP: NEGATIVE
NRBC BLD AUTO-RTO: 0 /100 WBC (ref 0–0.2)
PH UR STRIP.AUTO: <=5 [PH] (ref 5–8)
PLATELET # BLD AUTO: 151 10*3/MM3 (ref 140–450)
PMV BLD AUTO: 9 FL (ref 6–12)
POTASSIUM BLD-SCNC: 4.5 MMOL/L (ref 3.5–5.2)
POTASSIUM BLD-SCNC: 5.1 MMOL/L (ref 3.5–5.2)
PROT UR QL STRIP: NEGATIVE
RBC # BLD AUTO: 3.24 10*6/MM3 (ref 3.77–5.28)
SODIUM BLD-SCNC: 136 MMOL/L (ref 136–145)
SODIUM BLD-SCNC: 139 MMOL/L (ref 136–145)
SP GR UR STRIP: 1.02 (ref 1–1.03)
TROPONIN T SERPL-MCNC: <0.01 NG/ML (ref 0–0.03)
UROBILINOGEN UR QL STRIP: NORMAL
WBC NRBC COR # BLD: 7.5 10*3/MM3 (ref 3.4–10.8)

## 2019-12-05 PROCEDURE — 99222 1ST HOSP IP/OBS MODERATE 55: CPT | Performed by: INTERNAL MEDICINE

## 2019-12-05 PROCEDURE — 25010000003 CEFAZOLIN SODIUM-DEXTROSE 2-3 GM-%(50ML) RECONSTITUTED SOLUTION: Performed by: ORTHOPAEDIC SURGERY

## 2019-12-05 PROCEDURE — 85014 HEMATOCRIT: CPT | Performed by: NURSE PRACTITIONER

## 2019-12-05 PROCEDURE — 97162 PT EVAL MOD COMPLEX 30 MIN: CPT

## 2019-12-05 PROCEDURE — 97530 THERAPEUTIC ACTIVITIES: CPT

## 2019-12-05 PROCEDURE — 85018 HEMOGLOBIN: CPT | Performed by: NURSE PRACTITIONER

## 2019-12-05 PROCEDURE — 81003 URINALYSIS AUTO W/O SCOPE: CPT | Performed by: NURSE PRACTITIONER

## 2019-12-05 PROCEDURE — 80048 BASIC METABOLIC PNL TOTAL CA: CPT | Performed by: NURSE PRACTITIONER

## 2019-12-05 PROCEDURE — 73502 X-RAY EXAM HIP UNI 2-3 VIEWS: CPT

## 2019-12-05 PROCEDURE — 85014 HEMATOCRIT: CPT | Performed by: ORTHOPAEDIC SURGERY

## 2019-12-05 PROCEDURE — 97165 OT EVAL LOW COMPLEX 30 MIN: CPT

## 2019-12-05 PROCEDURE — 85018 HEMOGLOBIN: CPT | Performed by: ORTHOPAEDIC SURGERY

## 2019-12-05 PROCEDURE — 25010000002 ENOXAPARIN PER 10 MG: Performed by: ORTHOPAEDIC SURGERY

## 2019-12-05 PROCEDURE — 80048 BASIC METABOLIC PNL TOTAL CA: CPT | Performed by: ORTHOPAEDIC SURGERY

## 2019-12-05 PROCEDURE — 82962 GLUCOSE BLOOD TEST: CPT

## 2019-12-05 PROCEDURE — 99232 SBSQ HOSP IP/OBS MODERATE 35: CPT | Performed by: NURSE PRACTITIONER

## 2019-12-05 PROCEDURE — 63710000001 INSULIN ASPART PER 5 UNITS: Performed by: NURSE PRACTITIONER

## 2019-12-05 PROCEDURE — 71045 X-RAY EXAM CHEST 1 VIEW: CPT

## 2019-12-05 PROCEDURE — 85025 COMPLETE CBC W/AUTO DIFF WBC: CPT | Performed by: NURSE PRACTITIONER

## 2019-12-05 PROCEDURE — 84484 ASSAY OF TROPONIN QUANT: CPT | Performed by: NURSE PRACTITIONER

## 2019-12-05 RX ORDER — NALOXONE HCL 0.4 MG/ML
0.4 VIAL (ML) INJECTION
Status: DISCONTINUED | OUTPATIENT
Start: 2019-12-05 | End: 2019-12-06 | Stop reason: HOSPADM

## 2019-12-05 RX ORDER — ONDANSETRON 2 MG/ML
4 INJECTION INTRAMUSCULAR; INTRAVENOUS EVERY 6 HOURS PRN
Status: ON HOLD
Start: 2019-12-05 | End: 2019-12-06

## 2019-12-05 RX ORDER — SODIUM CHLORIDE 9 MG/ML
75 INJECTION, SOLUTION INTRAVENOUS CONTINUOUS
Status: ON HOLD
Start: 2019-12-05 | End: 2019-12-06

## 2019-12-05 RX ORDER — SODIUM CHLORIDE 9 MG/ML
50 INJECTION, SOLUTION INTRAVENOUS CONTINUOUS
Status: ON HOLD
Start: 2019-12-05 | End: 2019-12-06

## 2019-12-05 RX ORDER — OXYCODONE HYDROCHLORIDE AND ACETAMINOPHEN 5; 325 MG/1; MG/1
1 TABLET ORAL EVERY 4 HOURS PRN
Status: ON HOLD
Start: 2019-12-05 | End: 2019-12-06

## 2019-12-05 RX ORDER — ONDANSETRON 4 MG/1
4 TABLET, FILM COATED ORAL EVERY 6 HOURS PRN
Status: ON HOLD
Start: 2019-12-05 | End: 2019-12-06

## 2019-12-05 RX ORDER — MORPHINE SULFATE 2 MG/ML
1 INJECTION, SOLUTION INTRAMUSCULAR; INTRAVENOUS EVERY 4 HOURS PRN
Status: DISCONTINUED | OUTPATIENT
Start: 2019-12-05 | End: 2019-12-06 | Stop reason: HOSPADM

## 2019-12-05 RX ORDER — NALOXONE HCL 0.4 MG/ML
0.4 VIAL (ML) INJECTION
Status: ON HOLD
Start: 2019-12-05 | End: 2019-12-06

## 2019-12-05 RX ORDER — MORPHINE SULFATE 2 MG/ML
1 INJECTION, SOLUTION INTRAMUSCULAR; INTRAVENOUS EVERY 4 HOURS PRN
Status: ON HOLD
Start: 2019-12-05 | End: 2019-12-06

## 2019-12-05 RX ORDER — PSEUDOEPHEDRINE HCL 30 MG
100 TABLET ORAL 2 TIMES DAILY PRN
Start: 2019-12-05 | End: 2020-03-09

## 2019-12-05 RX ORDER — SODIUM CHLORIDE 9 MG/ML
75 INJECTION, SOLUTION INTRAVENOUS CONTINUOUS
Status: ACTIVE | OUTPATIENT
Start: 2019-12-05 | End: 2019-12-06

## 2019-12-05 RX ORDER — SODIUM CHLORIDE 9 MG/ML
75 INJECTION, SOLUTION INTRAVENOUS CONTINUOUS
Status: DISCONTINUED | OUTPATIENT
Start: 2019-12-05 | End: 2019-12-05

## 2019-12-05 RX ORDER — SENNA AND DOCUSATE SODIUM 50; 8.6 MG/1; MG/1
2 TABLET, FILM COATED ORAL 2 TIMES DAILY
Status: ON HOLD
Start: 2019-12-05 | End: 2019-12-06

## 2019-12-05 RX ADMIN — OXYCODONE HYDROCHLORIDE AND ACETAMINOPHEN 2 TABLET: 5; 325 TABLET ORAL at 20:17

## 2019-12-05 RX ADMIN — PANTOPRAZOLE SODIUM 40 MG: 40 TABLET, DELAYED RELEASE ORAL at 06:43

## 2019-12-05 RX ADMIN — SODIUM CHLORIDE 75 ML/HR: 9 INJECTION, SOLUTION INTRAVENOUS at 15:52

## 2019-12-05 RX ADMIN — SODIUM CHLORIDE 250 ML: 9 INJECTION, SOLUTION INTRAVENOUS at 11:33

## 2019-12-05 RX ADMIN — SODIUM CHLORIDE 75 ML/HR: 9 INJECTION, SOLUTION INTRAVENOUS at 12:00

## 2019-12-05 RX ADMIN — OXYCODONE HYDROCHLORIDE AND ACETAMINOPHEN 1 TABLET: 5; 325 TABLET ORAL at 06:53

## 2019-12-05 RX ADMIN — CLOPIDOGREL BISULFATE 75 MG: 75 TABLET ORAL at 09:30

## 2019-12-05 RX ADMIN — INSULIN ASPART 2 UNITS: 100 INJECTION, SOLUTION INTRAVENOUS; SUBCUTANEOUS at 12:00

## 2019-12-05 RX ADMIN — INSULIN ASPART 2 UNITS: 100 INJECTION, SOLUTION INTRAVENOUS; SUBCUTANEOUS at 20:23

## 2019-12-05 RX ADMIN — MELATONIN 3 MG: at 20:17

## 2019-12-05 RX ADMIN — SENNOSIDES AND DOCUSATE SODIUM 2 TABLET: 8.6; 5 TABLET ORAL at 09:30

## 2019-12-05 RX ADMIN — ENOXAPARIN SODIUM 40 MG: 40 INJECTION SUBCUTANEOUS at 09:30

## 2019-12-05 RX ADMIN — SENNOSIDES AND DOCUSATE SODIUM 2 TABLET: 8.6; 5 TABLET ORAL at 20:17

## 2019-12-05 RX ADMIN — OXYCODONE HYDROCHLORIDE AND ACETAMINOPHEN 1 TABLET: 5; 325 TABLET ORAL at 13:37

## 2019-12-05 RX ADMIN — CEFAZOLIN SODIUM 2 G: 2 SOLUTION INTRAVENOUS at 00:50

## 2019-12-05 RX ADMIN — SODIUM CHLORIDE, POTASSIUM CHLORIDE, SODIUM LACTATE AND CALCIUM CHLORIDE 1000 ML: 600; 310; 30; 20 INJECTION, SOLUTION INTRAVENOUS at 06:54

## 2019-12-05 RX ADMIN — FOLIC ACID 1000 MCG: 1 TABLET ORAL at 09:30

## 2019-12-05 NOTE — CONSULTS
"G-Cardiology Consult Note    Referring Provider: Isa  Reason for Consultation: Syncope    Patient Care Team:  Farhan Schaefer MD as PCP - General  Farhan Schaefer MD as PCP - [facility]  Nik Chaudhari MD as Consulting Physician (General Surgery)    Chief complaint : Loss of consciousness    Subjective:    History of present illness: This is an 79-year-old female patient who is been complaining of severe generalized weakness, fatigue and severe dizziness with recurrent near syncope.  The patient indicates that these episodes have been going on for several months.  She indicates that prior to the episode she will experience severe nausea followed by occasional diaphoresis followed by extreme dizziness.  She indicates that if she is not able to sit down or lay down she will \"pass out\".  The patient began a preliminary work-up with an echocardiogram which was felt to show her ejection fraction to be 40-45%.  She was subsequently referred for heart catheterization which was performed by Dr. Mays at [facility].  This demonstrated angiographically minor, nonobstructive coronary atherosclerosis with a left ventricular ejection fraction of 55%.  Bradycardia was identified during the cardiac catheterization and the patient was subsequently referred for a 24-hour Holter monitor.  This was done as an outpatient and showed only minor sinus bradycardia.  The patient recently had a recurrent episode of syncope which resulted in a fall with hip fracture.  The patient underwent uncomplicated hip surgery yesterday.  She was noted to have intermittent low blood pressure postoperatively and her medications have been discontinued.  She was not on any beta blockade, digoxin or other medications which would result in bradycardia.  The patient had multiple episodes of recurrent syncope today.  These were also preceded by severe nausea.  Her rhythm strips were reviewed late this evening by the night shift " nurses who were coming on duty.  It was only then noticed that the patient had had multiple episodes of sinus arrest with pauses ranging from 6-10 seconds per episode.  Her heart rate has spontaneously recovered and she is currently asymptomatic.  Her blood pressure remains lower than usual but stable.  She denies having chest discomfort at rest or with activity.  She denies shortness of breath.  She has no orthopnea PND or lower extremity edema.  She has no palpitations.  She is a lifelong non-smoker.    Review of Systems   Review of Systems   Constitution: Positive for diaphoresis, weakness and malaise/fatigue. Negative for chills, fever, weight gain and weight loss.   HENT: Negative for ear discharge, hearing loss, hoarse voice and nosebleeds.    Eyes: Negative for discharge, double vision, pain and photophobia.   Cardiovascular: Positive for near-syncope and syncope. Negative for chest pain, claudication, cyanosis, dyspnea on exertion, irregular heartbeat, leg swelling, orthopnea, palpitations and paroxysmal nocturnal dyspnea.   Respiratory: Negative for cough, hemoptysis, sputum production and wheezing.    Endocrine: Negative for cold intolerance, heat intolerance, polydipsia, polyphagia and polyuria.   Hematologic/Lymphatic: Negative for adenopathy and bleeding problem. Does not bruise/bleed easily.   Skin: Negative for color change, flushing, itching and rash.   Musculoskeletal: Positive for joint pain. Negative for muscle cramps, muscle weakness, myalgias and stiffness.   Gastrointestinal: Positive for nausea. Negative for abdominal pain, diarrhea, hematemesis, hematochezia and vomiting.   Genitourinary: Negative for dysuria, frequency and nocturia.   Neurological: Positive for dizziness and light-headedness. Negative for focal weakness, loss of balance, numbness, paresthesias and seizures.   Psychiatric/Behavioral: Negative for altered mental status, hallucinations and suicidal ideas.  "  Allergic/Immunologic: Negative for HIV exposure, hives and persistent infections.       History  Past Medical History:   Diagnosis Date   • Arthritis    • Basal cell carcinoma    • Body piercing     both ears   • Cataract, bilateral     s/p removal    • Diabetes mellitus (CMS/HCC)    • DVT (deep venous thrombosis) (CMS/HCC)     1970's- left leg   • Edema     legs- hx of   • Elevated cholesterol    • Fatigue    • Fatigue    • History of left heart catheterization     9/16/19  no stents   • History of migraine headaches    • Hx of exercise stress test 2004   • Hyperlipidemia    • Hypertension    • Hyponatremia    • Impaired functional mobility, balance, gait, and endurance    • LBBB (left bundle branch block)    • Left leg pain     left leg and knee pain    • Muscle spasm     hx of   • ODELL on CPAP     CPAP   • Stroke (CMS/HCC)    • Teeth missing     all of the top, and has 6 bottom teeth left   • Thyroid nodule    • TIA (transient ischemic attack)     x3.  last one was \"a few years ago\"   • Wears dentures     top plate   • Wears glasses     to read   ,   Past Surgical History:   Procedure Laterality Date   • BREAST BIOPSY Left     benign   • CARDIAC CATHETERIZATION      09/16/2019 PER .   • CARDIAC CATHETERIZATION N/A 9/16/2019    Procedure: Left Heart Cath +/- CBI;  Surgeon: Ashlyn Mays MD;  Location: [facility] CATH INVASIVE LOCATION;  Service: Cardiovascular   • CATARACT EXTRACTION  12/2018    both eyes    • COLONOSCOPY     • MOUTH SURGERY      all top teeth   • SKIN BIOPSY      basal cell   • TUBAL ABDOMINAL LIGATION     ,   Family History   Problem Relation Age of Onset   • Diabetes Other    • Hypertension Other    • Cancer Other         malignant neoplasm    • Migraines Other    • Heart disease Mother    • Heart disease Father    • Breast cancer Sister    • Breast cancer Other    • Breast cancer Sister    ,   Social History     Tobacco Use   • Smoking status: Never Smoker   • Smokeless tobacco: Never " Used   Substance Use Topics   • Alcohol use: No   • Drug use: No   ,   Medications Prior to Admission   Medication Sig Dispense Refill Last Dose   • aspirin-acetaminophen-caffeine (EXCEDRIN MIGRAINE) 250-250-65 MG per tablet Take 1 tablet by mouth Every 6 (Six) Hours As Needed.   Past Month at Unknown time   • Cholecalciferol (VITAMIN D PO) Take 125 mcg by mouth 2 (Two) Times a Week.   Past Week at Unknown time   • coenzyme Q10 100 MG capsule Take 400 mg by mouth Every Other Day.   Past Week at Unknown time   • Cyanocobalamin ([employer] VITAMIN B12 PO) Take 1 tablet by mouth 2 (Two) Times a Week.   Past Week at Unknown time   • folic acid (FOLVITE) 1 MG tablet TAKE 1 TABLET DAILY 90 tablet 3 12/3/2019 at 2200   • furosemide (LASIX) 20 MG tablet TAKE 1 TABLET TWICE A  tablet 1 12/3/2019 at 0900   • glipiZIDE (GLUCOTROL XL) 2.5 MG 24 hr tablet Take 1 tablet by mouth Daily. 90 tablet 3 12/3/2019 at 0900   • Glucosamine-Chondroit-Vit C-Mn (GLUCOSAMINE CHONDR 1500 COMPLX PO) Take 1 tablet by mouth Daily.   12/3/2019 at 0900   • lisinopril (PRINIVIL,ZESTRIL) 40 MG tablet TAKE 1 TABLET DAILY 90 tablet 3 12/4/2019 at 0500   • lovastatin (MEVACOR) 10 MG tablet TAKE 1 TABLET EVERY NIGHT (Patient taking differently: TAKE 1 TABLET EVERY THREE TIMES WEEKLY) 90 tablet 3 12/3/2019 at 2200   • melatonin 3 MG tablet Take 3 mg by mouth Every Night.   12/3/2019 at 2200   • Omega-3 Fatty Acids (FISH OIL) 1200 MG capsule capsule Take 1,200 mg by mouth Daily.   12/3/2019 at 2200   • pantoprazole (PROTONIX) 40 MG EC tablet TAKE 1 TABLET DAILY 90 tablet 3 12/3/2019 at 0900   • aspirin 81 MG tablet Take 81 mg by mouth Daily.   11/28/2019   • cloNIDine (CATAPRES) 0.1 MG tablet Take 1 tablet by mouth 2 (Two) Times a Day. 30 tablet 0 More than a month at Unknown time   • clopidogrel (PLAVIX) 75 MG tablet TAKE 1 TABLET DAILY 90 tablet 3 11/28/2019   • glucose blood test strip Use as instructed 100 each 12 Unknown at Unknown time   • ibuprofen  "(ADVIL,MOTRIN) 800 MG tablet Take 800 mg by mouth 3 (Three) Times a Day As Needed.  0 More than a month at Unknown time   • spironolactone (ALDACTONE) 25 MG tablet Take 1 tablet by mouth Daily. 90 tablet 3 More than a month at Unknown time   • Tobramycin-Dexamethasone 0.3-0.05 % suspension Apply 1 drop to eye(s) as directed by provider 2 (Two) Times a Day. (Patient taking differently: Apply 1 drop to eye(s) as directed by provider 2 (Two) Times a Day As Needed.) 5 mL 0 More than a month at Unknown time    and Allergies:  Cumini; Butter flavor [flavoring agent]; Cheese; Other; and Saccharin    Objective:    Vital Sign Min/Max for last 24 hours  Temp  Min: 97.4 °F (36.3 °C)  Max: 97.6 °F (36.4 °C)   BP  Min: 80/48  Max: 104/40   Pulse  Min: 57  Max: 77   Resp  Min: 16  Max: 20   SpO2  Min: 95 %  Max: 100 %   Flow (L/min)  Min: 2  Max: 2   Weight  Min: 89.4 kg (197 lb)  Max: 89.4 kg (197 lb)     Flowsheet Rows      First Filed Value   Admission Height  170.2 cm (67\") Documented at 12/04/2019 1309   Admission Weight  101 kg (223 lb 1 oz) Documented at 12/04/2019 1309             Echo EF Estimated  Lab Results   Component Value Date    ECHOEFEST 42 09/11/2019     Cath Ejection Fraction Quantitative  CATH LVEF: 55%    Physical Exam:   Physical Exam   Constitutional: She is oriented to person, place, and time. She appears well-developed and well-nourished. No distress.   HENT:   Head: Normocephalic and atraumatic.   Mouth/Throat: Oropharynx is clear and moist.   Eyes: Conjunctivae and EOM are normal. Pupils are equal, round, and reactive to light. No scleral icterus.   Neck: Normal range of motion. Neck supple. No JVD present. No tracheal deviation present. No thyromegaly present.   Cardiovascular: Normal rate, regular rhythm, S1 normal, S2 normal, normal heart sounds, intact distal pulses and normal pulses. PMI is not displaced. Exam reveals no gallop and no friction rub.   No murmur heard.  Pulmonary/Chest: Effort normal " and breath sounds normal. No stridor. No respiratory distress. She has no wheezes. She has no rales.   Abdominal: Soft. Bowel sounds are normal. She exhibits no distension and no mass. There is no tenderness. There is no rebound and no guarding.   Musculoskeletal: Normal range of motion. She exhibits no edema or deformity.   Neurological: She is alert and oriented to person, place, and time. She displays normal reflexes. No cranial nerve deficit. Coordination normal.   Skin: Skin is warm and dry. No rash noted. She is not diaphoretic. No erythema.   Psychiatric: She has a normal mood and affect. Her behavior is normal. Thought content normal.       Results Review:   I reviewed the patient's new clinical results.  Results from last 7 days   Lab Units 12/05/19  1644 12/05/19  1251 12/05/19  0548 12/04/19  1434   WBC 10*3/mm3 7.50  --   --  7.50   HEMOGLOBIN g/dL 9.9* 10.4* 10.1* 10.8*   HEMATOCRIT % 31.1* 31.6* 31.3* 32.9*   PLATELETS 10*3/mm3 151  --   --  154     Results from last 7 days   Lab Units 12/05/19  1644 12/05/19  0548 12/04/19  1434   SODIUM mmol/L 136 139 137   POTASSIUM mmol/L 4.5 5.1 3.8   CHLORIDE mmol/L 98 101 100   CO2 mmol/L 25.5 26.6 26.5   BUN mg/dL 19 20 25*   CREATININE mg/dL 1.14* 1.09* 1.08*   GLUCOSE mg/dL 141* 179* 202*   CALCIUM mg/dL 8.9 9.4 9.2     Lab Results   Lab Value Date/Time    TROPONINT <0.010 12/05/2019 1644             Assessment/Plan:      Type 2 diabetes mellitus without complication (CMS/HCC)    Hypertension    ODELL on CPAP    Peripheral neuropathy    LBBB (left bundle branch block)    Cardiomyopathy (CMS/HCC)    Arthritis    Chronic diastolic heart failure.  Heart failure with preserved ejection fraction.  Stage C.  Euvolemic.  New York Heart Association functional class II symptoms.  Left ventricular ejection fraction: 55%    Status post right hip replacement    Multiple recurrent bouts of syncope due to profound sinus node arrest.  Multiple symptomatic pauses up to 10  seconds duration today.  The patient has a typical prodrome of severe nausea and occasional diaphoresis before losing consciousness.  This is most likely an extreme vagal response to postop hip pain.  It appears in retrospect that severe hip pain may have also been initiating vagal episodes as an outpatient.  She is not on any offending medications.  There is evidence of significant conduction disturbance with a extremely wide QRS complex and a left bundle branch block pattern.      NPO.  Transfer to intensive care unit for close supervision.  IV dopamine as needed for heart rate and blood pressure control.  At this point, the patient does not require a temporary transvenous pacemaker.  I have spoken with Dr. Silva electrophysiologist on-call at [facility].  He is agreed to accept the patient in transfer to the CV OU in the morning for permanent pacemaker implantation.  I have also spoken to her regular cardiologist Dr. Mays who is aware.  Strict bedrest.    I discussed the patients findings and my recommendations with patient    Rosendo Frost MD  12/05/19  6:31 PM

## 2019-12-05 NOTE — DISCHARGE SUMMARY
[facility]   DISCHARGE SUMMARY      Name:  Radha Whelan   Age:  79 y.o.  Sex:  female  :  1940  MRN:  [id number]   Visit Number:  [id number]  Primary Care Physician:  Farhan Schaefer MD  Date of Discharge:  2019  Admission Date:  2019      Discharge Diagnosis:     1.  Multiple recurrent bouts of syncope due to profound sinus node arrest.  2.  Status post right hip replacement due to severe osteoarthritis.  3.  Type 2 diabetes mellitus without complication  4.  Essential hypertension  5.  ODELL on CPAP  6.  Left bundle branch block, chronic  7.  Cardiomyopathy with ejection fraction of 42%.  8.  History of CVA    Active Hospital Problems    Diagnosis  POA   • Arthritis [M19.90]  Yes   • Chronic systolic congestive heart failure (CMS/HCC) [I50.22]  Yes   • Status post right hip replacement [Z96.641]  Not Applicable   • Cardiomyopathy (CMS/HCC) [I42.9]  Yes   • LBBB (left bundle branch block) [I44.7]  Yes   • ODELL on CPAP [G47.33, Z99.89]  Not Applicable   • Peripheral neuropathy [G62.9]  Yes   • Type 2 diabetes mellitus without complication (CMS/HCC) [E11.9]  Yes   • Hypertension [I10]  Yes      Resolved Hospital Problems   No resolved problems to display.         Presenting Problem/History of Present Illness:    Primary osteoarthritis of right hip [M16.11]  Arthritis [M19.90]         Hospital Course:    Patient is a 79-year-old female with past medical history significant for hypertension, diabetes mellitus type 2, ODELL on CPAP, TIA who came to Dr. Salgado from orthopedic surgery with right hip pain.  She was found to have osteoarthritis, she was brought in on 2019 for elective total hip arthroplasty.  She tolerated the procedure well but had some hypotension after the procedure which was suspected to be due to anesthesia and pain medication.  However on 2019 she had an episode of recurrent near syncope when standing today.  Patient relays previous episodes in which  she would have severe nausea followed by occasional diaphoresis and extreme dizziness.  She had a work-up as an outpatient including left heart catheterization.  She had angiographically minor nonobstructive coronary atherosclerosis with ejection fraction of 55%.  Bradycardia was identified during the left heart cath and she was referred to her 24-hour Holter monitor which only showed minor sinus bradycardia.    Around 1530 she was orthostatic when she got up to use the restroom.  She felt as if she would pass out.  It was unclear if she lost consciousness.  Work-up was done including chest x-ray, BMP and CBC as well as urinalysis which were negative.  She was noted to have low blood pressure so her blood pressure medications had been held.  She had not been on any beta blockade, digoxin or other medications that would result in bradycardia.  Strips were reviewed after the episode which showed multiple episodes of pauses ranging from 6-10 seconds per episode.  She is currently asymptomatic, denying any chest pressure, shortness of breath, nausea, vomiting, or pain.  Her heart rate is stable at present.     from cardiology was consulted.  He felt the patient needed to be sent to another facility where she could be evaluated for permanent pacemaker implantation.  He spoke with Dr. Silva electrophysiologist on-call at [facility].  He has agreed to accept the patient in transfer to [facility] in the morning for permanent pacemaker implantation.  In the meantime we will transfer the patient to the ICU and monitor closely.  Spoke to the family, answered all questions of the daughters as well as the  and patient.    Dr. Salgado was notified by nursing staff of the events and the transfer.  Procedures Performed:    Procedure(s):  HIP ARTHROPLASTY TOTAL RIGHT       Consults:     Consults     Date and Time Order Name Status Description    12/5/2019 1823 Inpatient Cardiology Consult Completed      12/4/2019 1311 Inpatient Hospitalist Consult Completed           Pertinent Test Results:     Lab Results (all)     Procedure Component Value Units Date/Time    Basic Metabolic Panel [[id number]]  (Abnormal) Collected:  12/05/19 1644    Specimen:  Blood Updated:  12/05/19 1726     Glucose 141 mg/dL      BUN 19 mg/dL      Creatinine 1.14 mg/dL      Sodium 136 mmol/L      Potassium 4.5 mmol/L      Chloride 98 mmol/L      CO2 25.5 mmol/L      Calcium 8.9 mg/dL      eGFR Non African Amer 46 mL/min/1.73      BUN/Creatinine Ratio 16.7     Anion Gap 12.5 mmol/L     Narrative:       GFR Normal >60  Chronic Kidney Disease <60  Kidney Failure <15    Troponin [[id number]]  (Normal) Collected:  12/05/19 1644    Specimen:  Blood Updated:  12/05/19 1726     Troponin T <0.010 ng/mL     Narrative:       Troponin T Reference Range:  <= 0.03 ng/mL-   Negative for AMI  >0.03 ng/mL-     Abnormal for myocardial necrosis.  Clinicians would have to utilize clinical acumen, EKG, Troponin and serial changes to determine if it is an Acute Myocardial Infarction or myocardial injury due to an underlying chronic condition.     POC Glucose Once [[id number]]  (Abnormal) Collected:  12/05/19 1651    Specimen:  Blood Updated:  12/05/19 1705     Glucose 131 mg/dL      Comment: Serial Number: [id number]:  [id number]       CBC & Differential [[id number]] Collected:  12/05/19 1644    Specimen:  Blood Updated:  12/05/19 1701    Narrative:       The following orders were created for panel order CBC & Differential.  Procedure                               Abnormality         Status                     ---------                               -----------         ------                     CBC Auto Differential[[id number]]        Abnormal            Final result                 Please view results for these tests on the individual orders.    CBC Auto Differential [[id number]]  (Abnormal) Collected:  12/05/19 1644    Specimen:  Blood Updated:  12/05/19 1701      WBC 7.50 10*3/mm3      RBC 3.24 10*6/mm3      Hemoglobin 9.9 g/dL      Hematocrit 31.1 %      MCV 96.0 fL      MCH 30.6 pg      MCHC 31.8 g/dL      RDW 12.6 %      RDW-SD 44.3 fl      MPV 9.0 fL      Platelets 151 10*3/mm3      Neutrophil % 66.1 %      Lymphocyte % 20.5 %      Monocyte % 12.9 %      Eosinophil % 0.1 %      Basophil % 0.0 %      Immature Grans % 0.4 %      Neutrophils, Absolute 4.95 10*3/mm3      Lymphocytes, Absolute 1.54 10*3/mm3      Monocytes, Absolute 0.97 10*3/mm3      Eosinophils, Absolute 0.01 10*3/mm3      Basophils, Absolute 0.00 10*3/mm3      Immature Grans, Absolute 0.03 10*3/mm3      nRBC 0.0 /100 WBC     Urinalysis With Culture If Indicated - Urine, Catheter In/Out [[id number]]  (Normal) Collected:  12/05/19 1559    Specimen:  Urine, Catheter In/Out Updated:  12/05/19 1620     Color, UA Yellow     Appearance, UA Clear     pH, UA <=5.0     Specific Gravity, UA 1.024     Glucose, UA Negative     Ketones, UA Negative     Bilirubin, UA Negative     Blood, UA Negative     Protein, UA Negative     Leuk Esterase, UA Negative     Nitrite, UA Negative     Urobilinogen, UA 1.0 E.U./dL    Narrative:       Urine microscopic not indicated.    Hemoglobin & Hematocrit, Blood [[id number]]  (Abnormal) Collected:  12/05/19 1251    Specimen:  Blood Updated:  12/05/19 1303     Hemoglobin 10.4 g/dL      Hematocrit 31.6 %     POC Glucose Once [[id number]]  (Abnormal) Collected:  12/05/19 1044    Specimen:  Blood Updated:  12/05/19 1050     Glucose 150 mg/dL      Comment: Serial Number: [id number]:  [id number]       Basic Metabolic Panel [[id number]]  (Abnormal) Collected:  12/05/19 0548    Specimen:  Blood Updated:  12/05/19 0705     Glucose 179 mg/dL      BUN 20 mg/dL      Creatinine 1.09 mg/dL      Sodium 139 mmol/L      Potassium 5.1 mmol/L      Chloride 101 mmol/L      CO2 26.6 mmol/L      Calcium 9.4 mg/dL      eGFR Non African Amer 48 mL/min/1.73      BUN/Creatinine Ratio 18.3     Anion Gap 11.4 mmol/L      Narrative:       GFR Normal >60  Chronic Kidney Disease <60  Kidney Failure <15    POC Glucose Once [[id number]]  (Abnormal) Collected:  12/05/19 0625    Specimen:  Blood Updated:  12/05/19 0635     Glucose 135 mg/dL      Comment: Serial Number: [id number]:  [id number]       Hemoglobin & Hematocrit, Blood [[id number]]  (Abnormal) Collected:  12/05/19 0548    Specimen:  Blood Updated:  12/05/19 0629     Hemoglobin 10.1 g/dL      Hematocrit 31.3 %     POC Glucose Once [[id number]]  (Abnormal) Collected:  12/04/19 2047    Specimen:  Blood Updated:  12/04/19 2057     Glucose 152 mg/dL      Comment: Serial Number: [id number]:  [id number]       POC Glucose Once [[id number]]  (Abnormal) Collected:  12/04/19 1628    Specimen:  Blood Updated:  12/04/19 1639     Glucose 191 mg/dL      Comment: Serial Number: [id number]:  [id number]       Basic Metabolic Panel [[id number]]  (Abnormal) Collected:  12/04/19 1434    Specimen:  Blood Updated:  12/04/19 1518     Glucose 202 mg/dL      BUN 25 mg/dL      Creatinine 1.08 mg/dL      Sodium 137 mmol/L      Potassium 3.8 mmol/L      Chloride 100 mmol/L      CO2 26.5 mmol/L      Calcium 9.2 mg/dL      eGFR Non African Amer 49 mL/min/1.73      BUN/Creatinine Ratio 23.1     Anion Gap 10.5 mmol/L     Narrative:       GFR Normal >60  Chronic Kidney Disease <60  Kidney Failure <15    CBC (No Diff) [[id number]]  (Abnormal) Collected:  12/04/19 1434    Specimen:  Blood Updated:  12/04/19 1443     WBC 7.50 10*3/mm3      RBC 3.50 10*6/mm3      Hemoglobin 10.8 g/dL      Hematocrit 32.9 %      MCV 94.0 fL      MCH 30.9 pg      MCHC 32.8 g/dL      RDW 12.7 %      RDW-SD 43.6 fl      MPV 8.9 fL      Platelets 154 10*3/mm3     Tissue Pathology Exam [[id number]] Collected:  12/04/19 0833    Specimen:  Bone from Hip, Right Updated:  12/04/19 1416    POC Glucose Once [[id number]]  (Normal) Collected:  12/04/19 0722    Specimen:  Blood Updated:  12/04/19 0740     Glucose 115 mg/dL      Comment:  "Serial Number: [id number]:  [id number]             Imaging Results (All)     Procedure Component Value Units Date/Time    XR Chest 1 View [[id number]] Collected:  12/05/19 1614     Updated:  12/05/19 1622    Narrative:       PROCEDURE: XR CHEST 1 VW-     HISTORY: chills low grade fever; Z74.09-Other reduced mobility;  M16.11-Unilateral primary osteoarthritis, right hip     COMPARISON: None.     FINDINGS: The heart is normal in size. The mediastinum is unremarkable.  The lungs are clear. There is no pneumothorax.  There are no acute  osseous abnormalities.       Impression:       No acute cardiopulmonary process.     Continued followup is recommended.     This report was finalized on 12/5/2019 4:14 PM by Alyx Parsons M.D..    XR Hip 1 View Without Pelvis Right (Surgery Only) [[id number]] Collected:  12/04/19 0953     Updated:  12/04/19 0956    Narrative:       PROCEDURE: XR HIP 1 VIEW WO PELVIS RIGHT-     HISTORY: Post-Op Hip Arthroplasty; M16.11-Unilateral primary  osteoarthritis, right hip     COMPARISON: None.     FINDINGS: The patient is status post total right hip arthroplasty. There  are no hardware complications or fractures. The pubic symphysis and SI  joints are intact. The expected postoperative gas is seen in the soft  tissues of the right hip.       Impression:       Status post total right hip arthroplasty with no hardware  complications.     This report was finalized on 12/4/2019 9:53 AM by Alyx Parsons M.D..          Condition on Discharge:      Guarded    Vital Signs:    BP 91/74 (BP Location: Right arm, Patient Position: Lying)   Pulse 73   Temp 97.6 °F (36.4 °C) (Oral)   Resp 20   Ht 170.2 cm (67\")   Wt 89.4 kg (197 lb)   SpO2 95%   BMI 30.85 kg/m²     Physical Exam:    General: Alert and oriented x4, well-developed well-nourished  Heart: Regular rate and rhythm without murmur rub or thrill  Lungs: Clear to auscultation bilaterally without use of accessory muscles of " respiration  Abdomen: Soft/nontender/nondistended.  No hepatosplenomegaly is noted  Muscular skeletal: 4/5 strength in upper/lower extremities bilaterally.  Pain with any movement of the right leg.  Psych: Short-term long-term memory intact does not appear so depressed.    Discharge Disposition:    Short Term Hospital (DC - External)    Discharge Medication:       Discharge Medications      New Medications      Instructions Start Date   docusate sodium 100 MG capsule   100 mg, Oral, 2 Times Daily PRN      enoxaparin 40 MG/0.4ML solution syringe  Commonly known as:  LOVENOX   40 mg, Subcutaneous, Daily   Start Date:  12/6/2019     insulin aspart 100 UNIT/ML injection  Commonly known as:  novoLOG   0-9 Units, Subcutaneous, 4 Times Daily Before Meals & Nightly      Morphine sulfate (PF) 2 MG/ML solution injection   1 mg, Intravenous, Every 4 Hours PRN      naloxone 0.4 MG/ML injection  Commonly known as:  NARCAN   0.4 mg, Intravenous, Every 5 Minutes PRN      ondansetron 4 MG tablet  Commonly known as:  ZOFRAN   4 mg, Oral, Every 6 Hours PRN      ondansetron 4 MG/2ML injection  Commonly known as:  ZOFRAN   4 mg, Intravenous, Every 6 Hours PRN      oxyCODONE-acetaminophen 5-325 MG per tablet  Commonly known as:  PERCOCET   1 tablet, Oral, Every 4 Hours PRN      senna-docusate sodium 8.6-50 MG tablet  Commonly known as:  SENOKOT-S   2 tablets, Oral, 2 Times Daily      sodium chloride 0.9 % solution   50 mL/hr, Intravenous, Continuous      sodium chloride 0.9 % solution   75 mL/hr, Intravenous, Continuous      sodium chloride 0.9 % solution   75 mL/hr, Intravenous, Continuous         Changes to Medications      Instructions Start Date   lovastatin 10 MG tablet  Commonly known as:  MEVACOR  What changed:    · how much to take  · how to take this  · when to take this   TAKE 1 TABLET EVERY NIGHT         Continue These Medications      Instructions Start Date   clopidogrel 75 MG tablet  Commonly known as:  PLAVIX   TAKE 1  TABLET DAILY      coenzyme Q10 100 MG capsule   400 mg, Oral, Every Other Day      folic acid 1 MG tablet  Commonly known as:  FOLVITE   TAKE 1 TABLET DAILY      GLUCOSAMINE CHONDR 1500 COMPLX PO   1 tablet, Oral, Daily      melatonin 3 MG tablet   3 mg, Oral, Nightly      pantoprazole 40 MG EC tablet  Commonly known as:  PROTONIX   TAKE 1 TABLET DAILY      VITAMIN D PO   125 mcg, Oral, 2 Times Weekly         Stop These Medications    aspirin 81 MG tablet     cloNIDine 0.1 MG tablet  Commonly known as:  CATAPRES     [employer]S VITAMIN B12 PO     EXCEDRIN MIGRAINE 250-250-65 MG per tablet  Generic drug:  aspirin-acetaminophen-caffeine     fish oil 1200 MG capsule capsule     furosemide 20 MG tablet  Commonly known as:  LASIX     glipizide 2.5 MG 24 hr tablet  Commonly known as:  GLUCOTROL XL     glucose blood test strip     ibuprofen 800 MG tablet  Commonly known as:  ADVIL,MOTRIN     lisinopril 40 MG tablet  Commonly known as:  PRINIVIL,ZESTRIL     spironolactone 25 MG tablet  Commonly known as:  ALDACTONE     Tobramycin-Dexamethasone 0.3-0.05 % suspension            Discharge Diet:     Diet Instructions     Diet: Nothing By Mouth      Discharge Diet:  Nothing By Mouth          Activity at Discharge:     Activity Instructions     Pelvic Rest            Follow-up Appointments:    Future Appointments   Date Time Provider Department Center   1/8/2020  2:20 PM [facility]   3/26/2020  1:00 PM Farhan Schaefer MD [facility] None   7/29/2020  2:30 PM Caleb Luevano MD [facility] None         Test Results Pending at Discharge:     Order Current Status    Tissue Pathology Exam In process           Jayant Escalante DO  12/05/19  6:56 PM

## 2019-12-05 NOTE — THERAPY EVALUATION
Acute Care - Occupational Therapy Initial Evaluation   Pierre     Patient Name: Radha Whelan  : 1940  MRN: [id number]  Today's Date: 2019  Onset of Illness/Injury or Date of Surgery: 19  Date of Referral to OT: 19  Referring Physician: Dr. Salgado    Admit Date: 2019       ICD-10-CM ICD-9-CM   1. Impaired mobility and ADLs Z74.09 799.89   2. Primary osteoarthritis of right hip M16.11 715.15     Patient Active Problem List   Diagnosis   • Type 2 diabetes mellitus without complication (CMS/HCC)   • Temporary cerebral vascular dysfunction   • Fatigue   • Hypertension   • Hypervitaminosis B6   • Hyponatremia   • ODELL on CPAP   • Peripheral neuropathy   • Restless legs syndrome   • Edema   • H/O hyperlipidemia   • Acute non-recurrent maxillary sinusitis   • Infection of left tear duct   • Left eye pain   • Mixed hyperlipidemia   • LBBB (left bundle branch block)   • Abnormal echocardiogram   • Cardiomyopathy (CMS/HCC)   • Arthritis   • Chronic systolic congestive heart failure (CMS/HCC)   • Status post right hip replacement     Past Medical History:   Diagnosis Date   • Arthritis    • Basal cell carcinoma    • Body piercing     both ears   • Cataract, bilateral     s/p removal    • Diabetes mellitus (CMS/HCC)    • DVT (deep venous thrombosis) (CMS/HCC)     - left leg   • Edema     legs- hx of   • Elevated cholesterol    • Fatigue    • Fatigue    • History of left heart catheterization     19  no stents   • History of migraine headaches    • Hx of exercise stress test    • Hyperlipidemia    • Hypertension    • Hyponatremia    • Impaired functional mobility, balance, gait, and endurance    • LBBB (left bundle branch block)    • Left leg pain     left leg and knee pain    • Muscle spasm     hx of   • ODELL on CPAP     CPAP   • Stroke (CMS/HCC)    • Teeth missing     all of the top, and has 6 bottom teeth left   • Thyroid nodule    • TIA (transient ischemic attack)     x3.  last one  "was \"a few years ago\"   • Wears dentures     top plate   • Wears glasses     to read     Past Surgical History:   Procedure Laterality Date   • BREAST BIOPSY Left     benign   • CARDIAC CATHETERIZATION      09/16/2019 PER .   • CARDIAC CATHETERIZATION N/A 9/16/2019    Procedure: Left Heart Cath +/- CBI;  Surgeon: Ashlyn Mays MD;  Location:  [facility];  Service: Cardiovascular   • CATARACT EXTRACTION  12/2018    both eyes    • COLONOSCOPY     • MOUTH SURGERY      all top teeth   • SKIN BIOPSY      basal cell   • TUBAL ABDOMINAL LIGATION            OT ASSESSMENT FLOWSHEET (last 12 hours)      Occupational Therapy Evaluation     Row Name 12/05/19 1022                   OT Evaluation Time/Intention    Subjective Information  no complaints  -        Document Type  evaluation  -        Mode of Treatment  occupational therapy  -        Patient Effort  good  -        Symptoms Noted During/After Treatment  dizziness  -           General Information    Patient Profile Reviewed?  yes  -        Onset of Illness/Injury or Date of Surgery  12/04/19  -        Referring Physician  Dr. Salgdao  -        Patient Observations  alert;cooperative;agree to therapy  -        Patient/Family Observations  Pts spouse, daughter and son-in-law present  -        General Observations of Patient  Pt received supine in bed  -        Prior Level of Function  independent:;community mobility;ADL's  -        Equipment Currently Used at Home  walker, rolling;shower chair;bipap/ cpap  -        Pertinent History of Current Functional Problem  arthritis s/p R MANAS  -        Existing Precautions/Restrictions  fall;right;hip, posterior  -        Risks Reviewed  patient and family:;increased discomfort  -        Benefits Reviewed  patient and family:;improve function;increase independence;increase strength  -           Relationship/Environment    Primary Source of Support/Comfort  spouse;child(nataliia)  " -        Lives With  spouse  -           Resource/Environmental Concerns    Current Living Arrangements  home/apartment/condo  -           Home Main Entrance    Number of Stairs, Main Entrance  two  -           Cognitive Assessment/Intervention- PT/OT    Orientation Status (Cognition)  oriented x 4  -        Follows Commands (Cognition)  WFL  -        Safety Deficit (Cognitive)  safety precautions awareness;safety precautions follow-through/compliance  -           Safety Issues, Functional Mobility    Safety Issues Affecting Function (Mobility)  safety precaution awareness;safety precautions follow-through/compliance  -        Impairments Affecting Function (Mobility)  endurance/activity tolerance;strength;pain  -           Mobility Assessment/Treatment    Extremity Weight-bearing Status  right lower extremity  -        Right Lower Extremity (Weight-bearing Status)  weight-bearing as tolerated (WBAT)  -           Bed Mobility Assessment/Treatment    Bed Mobility Assessment/Treatment  supine-sit  -        Supine-Sit [place] (Bed Mobility)  minimum assist (75% patient effort)  -        Assistive Device (Bed Mobility)  bed rails;head of bed elevated  -           Functional Mobility    Functional Mobility- Ind. Level  minimum assist (75% patient effort)  -        Functional Mobility- Device  rolling walker  -        Functional Mobility-Distance (Feet)  12  -        Functional Mobility- Safety Issues  sequencing ability decreased  -           Transfer Assessment/Treatment    Transfer Assessment/Treatment  sit-stand transfer;stand-sit transfer  -           Sit-Stand Transfer    Sit-Stand [place] (Transfers)  minimum assist (75% patient effort)  -        Assistive Device (Sit-Stand Transfers)  walker, front-wheeled  -           Stand-Sit Transfer    Stand-Sit [place] (Transfers)  minimum assist (75% patient effort)  -        Assistive Device (Stand-Sit Transfers)   walker, front-wheeled  -           ADL Assessment/Intervention    BADL Assessment/Intervention  bathing;upper body dressing;lower body dressing;grooming;feeding;toileting  -           Bathing Assessment/Intervention    Bathing [place] Level  moderate assist (50% patient effort)  -           Upper Body Dressing Assessment/Training    Upper Body Dressing [place] Level  set up  -           Lower Body Dressing Assessment/Training    Lower Body Dressing [place] Level  maximum assist (25% patient effort)  -           Grooming Assessment/Training    [place] Level (Grooming)  set up  -           Self-Feeding Assessment/Training    [place] Level (Feeding)  set up  -           Toileting Assessment/Training    [place] Level (Toileting)  moderate assist (50% patient effort)  -           BADL Safety/Performance    Impairments, BADL Safety/Performance  balance;endurance/activity tolerance;pain;strength  -           General ROM    GENERAL ROM COMMENTS  [facility]  -           MMT (Manual Muscle Testing)    General MMT Comments  [facility]           Positioning and Restraints    Pre-Treatment Position  in bed  -        Post Treatment Position  chair  -        In Chair  reclined;call light within reach;encouraged to call for assist;with family/caregiver  -           Pain Assessment    Additional Documentation  Pain Scale: Numbers Pre/Post-Treatment (Group)  -           Pain Scale: Numbers Pre/Post-Treatment    Pain Scale: Numbers, Pretreatment  0/10 - no pain  -        Pain Scale: Numbers, Post-Treatment  0/10 - no pain  -           Wound 12/04/19 0827 Right hip Incision    Wound - Properties Group Date first assessed: 12/04/19  -AO Time first assessed: 0827  -AO Side: Right  -AO Location: hip  -AO Primary Wound Type: Incision  -AO       Coping    Observed Emotional State  accepting;cooperative  -        Verbalized Emotional State  acceptance  -           Plan of Care  Review    Plan of Care Reviewed With  patient;daughter  -           Clinical Impression (OT)    Date of Referral to OT  12/04/19  -        OT Diagnosis  ADL decline  -        Patient/Family Goals Statement (OT Eval)  Pt wants to d/c to rehab  -        Criteria for Skilled Therapeutic Interventions Met (OT Eval)  yes;treatment indicated  -        Rehab Potential (OT Eval)  good, to achieve stated therapy goals  -        Therapy Frequency (OT Eval)  daily Mon-Fri  -        Care Plan Review (OT)  evaluation/treatment results reviewed;patient/other agree to care plan  -        Care Plan Review, Other Participant (OT Eval)  daughter  -        Anticipated Discharge Disposition (OT)  inpatient rehabilitation facility  -           Vital Signs    Pre Systolic BP Rehab  90  -AH        Pre Treatment Diastolic BP  48  -AH        Intra Systolic BP Rehab  98  -AH        Intra Treatment Diastolic BP  60  -AH        Post Systolic BP Rehab  80  -AH        Post Treatment Diastolic BP  42  -AH        Pre Patient Position  Supine  -        Intra Patient Position  Sitting  -AH        Post Patient Position  Standing  -           OT Goals    Bed Mobility Goal Selection (OT)  bed mobility, OT goal 1  -        Transfer Goal Selection (OT)  transfer, OT goal 1  -        Dressing Goal Selection (OT)  dressing, OT goal 1  -        Strength Goal Selection (OT)  strength, OT goal 1  -        Functional Mobility Goal Selection (OT)  functional mobility, OT goal 1  -        Additional Documentation  Strength Goal Selection (OT) (Row);Functional Mobility Selection (OT) (Row)  -           Bed Mobility Goal 1 (OT)    Activity/Assistive Device (Bed Mobility Goal 1, OT)  supine to sit  -        [place] Level/Cues Needed (Bed Mobility Goal 1, OT)  contact guard assist  -        Time Frame (Bed Mobility Goal 1, OT)  long term goal (LTG);2 weeks  -        Progress/Outcomes (Bed Mobility Goal 1, OT)  goal  ongoing  -AH           Transfer Goal 1 (OT)    Activity/Assistive Device (Transfer Goal 1, OT)  sit-to-stand/stand-to-sit;walker, rolling  -AH        [place] Level/Cues Needed (Transfer Goal 1, OT)  contact guard assist  -AH        Time Frame (Transfer Goal 1, OT)  by discharge  -AH        Progress/Outcome (Transfer Goal 1, OT)  goal ongoing  -AH           Dressing Goal 1 (OT)    Activity/Assistive Device (Dressing Goal 1, OT)  lower body dressing;reacher;sock-aid  -AH        [place]/Cues Needed (Dressing Goal 1, OT)  contact guard assist  -AH        Time Frame (Dressing Goal 1, OT)  by discharge  -AH        Progress/Outcome (Dressing Goal 1, OT)  goal ongoing  -AH           Strength Goal 1 (OT)    Strength Goal 1 (OT)  Pt will perform UB strengthening ex using theraband for resistance  -AH        Time Frame (Strength Goal 1, OT)  by discharge  -        Progress/Outcome (Strength Goal 1, OT)  goal ongoing  -AH           Functional Mobility Goal 1 (OT)    Activity/Assistive Device (Functional Mobility Goal 1, OT)  walker, rolling  -AH        [place] Level/Cues Needed (Functional Mobility Goal 1, OT)  contact guard assist  -AH        Distance Goal 1 (Functional Mobility, OT)  50  -AH        Time Frame (Functional Mobility Goal 1, OT)  by discharge  -        Progress/Outcome (Functional Mobility Goal 1, OT)  goal ongoing  -           Living Environment    Home Accessibility  stairs to enter home  -AH          User Key  (r) = Recorded By, (t) = Taken By, (c) = Cosigned By    Initials Name Effective Dates    Jennifer Wood 03/07/18 -     Julian Goldman RN 06/16/16 -          Occupational Therapy Education     Title: PT OT SLP Therapies (In Progress)     Topic: Occupational Therapy (In Progress)     Point: ADL training (Done)     Description: Instruct learner(s) on proper safety adaptation and remediation techniques during self care or transfers.   Instruct in proper use of assistive  devices.    Learning Progress Summary           Patient Acceptance, E,TB, VU by  at 12/5/2019 12:06 PM    Comment:  Role of OT/POC   Family Acceptance, E,TB, VU by  at 12/5/2019 12:06 PM    Comment:  Role of OT/POC                               User Key     Initials Effective Dates Name Provider Type [facility] 03/07/18 -  Jennifer Anthony Occupational Therapist OT                  OT Recommendation and Plan  Outcome Summary/Treatment Plan (OT)  Anticipated Discharge Disposition (OT): inpatient rehabilitation facility  Therapy Frequency (OT Eval): daily(Mon-Fri)  Plan of Care Review  Plan of Care Reviewed With: patient, daughter  Plan of Care Reviewed With: patient, daughter  Outcome Summary: Pt seen for OT evaluation today.  Pt presents with decreased strength and independence with self care and functional mobility tasks.  Pt able to get oob and to the chair, however her BP was noted to drop upon standing.  Pt was asymptomatic until she walked to her chair then upon sitting she c/o dizziness and feeling like she was going to pass out.  Pt was placed in a reclined position and O2 @2L was placed via nc.  Pt is expected to benefit from skilled OT to improve her independence with ADL tasks.    Outcome Measures     Row Name 12/05/19 1022             How much help from another is currently needed...    Putting on and taking off regular lower body clothing?  2  -AH      Bathing (including washing, rinsing, and drying)  2  -AH      Toileting (which includes using toilet bed pan or urinal)  2  -AH      Putting on and taking off regular upper body clothing  3  -AH      Taking care of personal grooming (such as brushing teeth)  3  -AH      Eating meals  4  -AH      AM-PAC 6 Clicks Score (OT)  16  -         Functional Assessment    Outcome Measure Options  AM-PAC 6 Clicks Daily Activity (OT)  -        User Key  (r) = Recorded By, (t) = Taken By, (c) = Cosigned By    Initials Name Provider Type    Jennifer Wood  Occupational Therapist          Time Calculation:   Time Calculation- OT     Row Name 12/05/19 1210             Time Calculation- OT    OT Start Time  1022  -      OT Received On  12/05/19  -      OT Goal Re-Cert Due Date  12/15/19  -        User Key  (r) = Recorded By, (t) = Taken By, (c) = Cosigned By    Initials Name Provider Type    Jennifer Wood Occupational Therapist        Therapy Charges for Today     Code Description Service Date Service Provider Modifiers Qty    [id number]  OT EVAL LOW COMPLEXITY 4 12/5/2019 Jennifer Anthony GO 1               Jennifer Anthony  12/5/2019

## 2019-12-05 NOTE — PLAN OF CARE
Problem: Patient Care Overview  Goal: Plan of Care Review  Outcome: Ongoing (interventions implemented as appropriate)   12/05/19 1206   Coping/Psychosocial   Plan of Care Reviewed With patient;daughter   OTHER   Outcome Summary Pt seen for OT evaluation today. Pt presents with decreased strength and independence with self care and functional mobility tasks. Pt able to get oob and to the chair, however her BP was noted to drop upon standing. Pt was asymptomatic until she walked to her chair then upon sitting she c/o dizziness and feeling like she was going to pass out. Pt was placed in a reclined position and O2 @2L was placed via nc. Pt is expected to benefit from skilled OT to improve her independence with ADL tasks.   Plan of Care Review   Progress no change

## 2019-12-05 NOTE — DISCHARGE PLACEMENT REQUEST
"  Alba Sanchez RN  [phone] fax [phone]      Radha Barillas (79 y.o. Female)     Date of Birth Social Security Number Address Home Phone MRN    1940  [id number] [place] [address] [phone] [id number]    Congregational Marital Status          Mormon        Admission Date Admission Type Admitting Provider Attending Provider Department, Room/Bed    12/4/19 Elective Issa Salgado MD Grau, Gregory F, MD [facility] MED SURG  4, 411/1    Discharge Date Discharge Disposition Discharge Destination                       Attending Provider:  Issa Salgado MD    Allergies:  Cumini, Butter Flavor [Flavoring Agent], Cheese, Other, Saccharin    Isolation:  None   Infection:  None   Code Status:  CPR    Ht:  170.2 cm (67\")   Wt:  89.4 kg (197 lb)    Admission Cmt:  None   Principal Problem:  None                Active Insurance as of 12/4/2019     Primary Coverage     Payor Plan Insurance Group Employer/Plan Group    MEDICARE MEDICARE A & B      Payor Plan Address Payor Plan Phone Number Payor Plan Fax Number Effective Dates    [address] [phone]  8/1/2004 - None Entered    [facility] [address]       Subscriber Name Subscriber Birth Date Member ID       RADHA BARILLAS 1940 [id number]           Secondary Coverage     Payor Plan Insurance Group Employer/Plan Group    [facility] [id number]     Payor Plan Address Payor Plan Phone Number Payor Plan Fax Number Effective Dates    [address]   1/1/2016 - None Entered    [facility] [id number]       Subscriber Name Subscriber Birth Date Member ID       RADHA BARILLAS 1940 [id number]                 Emergency Contacts      (Rel.) Home Phone Work Phone Mobile Phone    Dougie Barillas (Spouse) [phone] -- [phone]               History & Physical      Issa Salgado MD at 12/04/19 0725          H&P reviewed. The patient was examined and there are no changes to the H&P.          Electronically " signed by Issa Salgado MD at 12/04/19 0725   Source Note         Scan on 11/25/2019: [place] [facility] 11/20/2019 (below)            Electronically signed by Interface, Scans Incoming at 11/25/19 1008             H&P filed by [facility], [place] at 11/25/19 1008     Scan on 11/25/2019: [place] [facility] 11/20/2019 (below)            Electronically signed by Interface, Scans Incoming at 11/25/19 1008       Physician Progress Notes (last 24 hours) (Notes from 12/04/19 1233 through 12/05/19 1233)     No notes of this type exist for this encounter.           Physical Therapy Notes (last 24 hours) (Notes from 12/04/19 1233 through 12/05/19 1233)      Mary Beth Rodriguez, PT at 12/04/19 1453  Version 1 of 1            12/04/19 1452   Rehab Time/Intention   Evaluation Not Performed patient unavailable for evaluation  (Attempted PT eval. Pt unable to participate d/t B LE's still being numb from spinal block. PT will follow up with patient tomorrow and perform eval as tolerated.)   Rehab Treatment   Discipline physical therapist       Electronically signed by Mary Beth Rodriguez, PT at 12/04/19 1453     Katharina Jiang, PT Student at 12/05/19 1150  Version 1 of 1         Problem: Patient Care Overview  Goal: Plan of Care Review  Outcome: Ongoing (interventions implemented as appropriate)   12/05/19 1145   Coping/Psychosocial   Plan of Care Reviewed With patient;family   OTHER   Outcome Summary PT eval completed. Pt's blood pressure was taken before therapy and presented with 80/42 when standing. Pt stated she did not feel dizzy and ambulated to bedside chair with min A and RW. Pt tolerated gait training well, but stated she was going to pass out when reclined in bedside chair. Pt did not pass out and was given HEP at end of treatment. Pt presents with deficits in balance, strength, endurance, and functional mobility. Pt to benefit from continued skilled PT services prior to D/C.            Electronically signed by Katharina Jaing, PT Student at 12/05/19  "1150     Katharina Jiang, PT Student at 19 1150  Version 1 of 1         Patient Name: Radha Whelan  : 1940    MRN: [id number]                              Today's Date: 2019       Admit Date: 2019    Visit Dx:     ICD-10-CM ICD-9-CM   1. Impaired mobility and ADLs Z74.09 799.89   2. Primary osteoarthritis of right hip M16.11 715.15     Patient Active Problem List   Diagnosis   • Type 2 diabetes mellitus without complication (CMS/HCC)   • Temporary cerebral vascular dysfunction   • Fatigue   • Hypertension   • Hypervitaminosis B6   • Hyponatremia   • ODELL on CPAP   • Peripheral neuropathy   • Restless legs syndrome   • Edema   • H/O hyperlipidemia   • Acute non-recurrent maxillary sinusitis   • Infection of left tear duct   • Left eye pain   • Mixed hyperlipidemia   • LBBB (left bundle branch block)   • Abnormal echocardiogram   • Cardiomyopathy (CMS/HCC)   • Arthritis   • Chronic systolic congestive heart failure (CMS/HCC)   • Status post right hip replacement     Past Medical History:   Diagnosis Date   • Arthritis    • Basal cell carcinoma    • Body piercing     both ears   • Cataract, bilateral     s/p removal    • Diabetes mellitus (CMS/HCC)    • DVT (deep venous thrombosis) (CMS/HCC)     - left leg   • Edema     legs- hx of   • Elevated cholesterol    • Fatigue    • Fatigue    • History of left heart catheterization     19  no stents   • History of migraine headaches    • Hx of exercise stress test    • Hyperlipidemia    • Hypertension    • Hyponatremia    • Impaired functional mobility, balance, gait, and endurance    • LBBB (left bundle branch block)    • Left leg pain     left leg and knee pain    • Muscle spasm     hx of   • ODELL on CPAP     CPAP   • Stroke (CMS/HCC)    • Teeth missing     all of the top, and has 6 bottom teeth left   • Thyroid nodule    • TIA (transient ischemic attack)     x3.  last one was \"a few years ago\"   • Wears dentures     top plate   • Wears " glasses     to read     Past Surgical History:   Procedure Laterality Date   • BREAST BIOPSY Left     benign   • CARDIAC CATHETERIZATION      09/16/2019 PER .   • CARDIAC CATHETERIZATION N/A 9/16/2019    Procedure: Left Heart Cath +/- CBI;  Surgeon: Ashlyn Mays MD;  Location: [facility]ATH [facility]AS[facility];  Service: Cardiovascular   • CATARACT EXTRACTION  12/2018    both eyes    • COLONOSCOPY     • MOUTH SURGERY      all top teeth   • SKIN BIOPSY      basal cell   • TUBAL ABDOMINAL LIGATION       General Information     Row Name 12/05/19 1025          PT Evaluation Time/Intention    Document Type  evaluation  (Pended)   -MR     Mode of Treatment  physical therapy  (Pended)   -     Row Name 12/05/19 1025          General Information    Patient Profile Reviewed?  yes  (Pended)   -MR     Prior Level of Function  independent:;all household mobility  (Pended)   -MR     Existing Precautions/Restrictions  hip, posterior;right;fall  (Pended)   -MR     Barriers to Rehab  none identified  (Pended)   -     Row Name 12/05/19 1025          Relationship/Environment    Lives With  spouse  (Pended)   -     Row Name 12/05/19 1025          Resource/Environmental Concerns    Current Living Arrangements  home/apartment/condo  (Pended)   -     Row Name 12/05/19 1025          Home Main Entrance    Number of Stairs, Main Entrance  none  (Pended)   -     Row Name 12/05/19 1025          Stairs Within Home, Primary    Number of Stairs, Within Home, Primary  none  (Pended)   -     Row Name 12/05/19 1025          Cognitive Assessment/Intervention- PT/OT    Orientation Status (Cognition)  oriented x 4  (Pended)   -     Row Name 12/05/19 1025          Safety Issues, Functional Mobility    Safety Issues Affecting Function (Mobility)  safety precautions follow-through/compliance;safety precaution awareness  (Pended)   -MR     Impairments Affecting Function (Mobility)  balance;endurance/activity tolerance;pain;strength   (Pended)  Low blood pressure  -MR       User Key  (r) = Recorded By, (t) = Taken By, (c) = Cosigned By    Initials Name Provider Type    Katharina Suazo, PT Student PT Student        Mobility     Row Name 12/05/19 1025          Bed Mobility Assessment/Treatment    Bed Mobility Assessment/Treatment  supine-sit  (Pended)   -MR     Supine-Sit [place] (Bed Mobility)  minimum assist (75% patient effort)  (Pended)   -MR     Assistive Device (Bed Mobility)  bed rails;head of bed elevated  (Pended)   -MR     Row Name 12/05/19 1025          Sit-Stand Transfer    Sit-Stand [place] (Transfers)  minimum assist (75% patient effort)  (Pended)   -MR     Assistive Device (Sit-Stand Transfers)  walker, front-wheeled  (Pended)   -MR     Row Name 12/05/19 1025          Gait/Stairs Assessment/Training    Gait/Stairs Assessment/Training  gait/ambulation assistive device;distance ambulated  (Pended)   -MR     [place] Level (Gait)  minimum assist (75% patient effort)  (Pended)   -MR     Assistive Device (Gait)  walker, front-wheeled  (Pended)   -MR     Distance in Feet (Gait)  12'  (Pended)   -MR     Pattern (Gait)  step-to  (Pended)   -MR     Deviations/Abnormal Patterns (Gait)  gait speed decreased;venancio decreased;stride length decreased  (Pended)   -MR     Bilateral Gait Deviations  heel strike decreased  (Pended)   -MR     Right Sided Gait Deviations  weight shift ability decreased;foot drop/toe drag  (Pended)   -MR     Row Name 12/05/19 1025          Mobility Assessment/Intervention    Extremity Weight-bearing Status  right lower extremity  (Pended)   -MR     Right Lower Extremity (Weight-bearing Status)  weight-bearing as tolerated (WBAT)  (Pended)   -MR       User Key  (r) = Recorded By, (t) = Taken By, (c) = Cosigned By    Initials Name Provider Type    Katharina Suazo, PT Student PT Student        Obj/Interventions     Row Name 12/05/19 1025          General ROM    GENERAL ROM COMMENTS  RLE Hip Precautions; No  IR, ADD, or Flexion passed 90 degrees.   (Pended)   -MR     Row Name 12/05/19 1025          MMT (Manual Muscle Testing)    General MMT Comments  RLE 3/5  (Pended)   -MR     Karthik Name 12/05/19 1025          Static Sitting Balance    Level of [place] (Unsupported Sitting, Static Balance)  conditional independence  (Pended)   -MR     Sitting Position (Unsupported Sitting, Static Balance)  sitting on edge of bed  (Pended)   -MR     Time Able to Maintain Position (Unsupported Sitting, Static Balance)  3 to 4 minutes  (Pended)   -MR     Row Name 12/05/19 1025          Dynamic Sitting Balance    Level of [place], Reaches Outside Midline (Sitting, Dynamic Balance)  conditional independence  (Pended)   -MR     Sitting Position, Reaches Outside Midline (Sitting, Dynamic Balance)  sitting on edge of bed  (Pended)   -MR     Karthik Name 12/05/19 1025          Static Standing Balance    Level of [place] (Supported Standing, Static Balance)  contact guard assist  (Pended)   -MR     Time Able to Maintain Position (Supported Standing, Static Balance)  2 to 3 minutes  (Pended)   -MR     Assistive Device Utilized (Supported Standing, Static Balance)  walker, rolling  (Pended)   -MR     Row Name 12/05/19 1025          Dynamic Standing Balance    Level of [place], Reaches Outside Midline (Standing, Dynamic Balance)  contact guard assist  (Pended)   -MR     Time Able to Maintain Position, Reaches Outside Midline (Standing, Dynamic Balance)  15 to 30 seconds  (Pended)   -MR     Assistive Device Utilized (Supported Standing, Dynamic Balance)  walker, rolling  (Pended)   -MR       User Key  (r) = Recorded By, (t) = Taken By, (c) = Cosigned By    Initials Name Provider Type    Katharina Suazo, PT Student PT Student        Goals/Plan     Row Name 12/05/19 1025          Bed Mobility Goal 1 (PT)    Activity/Assistive Device (Bed Mobility Goal 1, PT)  bed mobility activities, all  (Pended)   -MR     [place] Level/Cues  Needed (Bed Mobility Goal 1, PT)  conditional independence  (Pended)   -MR     Time Frame (Bed Mobility Goal 1, PT)  short term goal (STG);2 weeks  (Pended)   -MR     Progress/Outcomes (Bed Mobility Goal 1, PT)  goal ongoing  (Pended)   -MR     Row Name 12/05/19 1025          Transfer Goal 1 (PT)    Activity/Assistive Device (Transfer Goal 1, PT)  transfers, all  (Pended)   -MR     [place] Level/Cues Needed (Transfer Goal 1, PT)  conditional independence  (Pended)   -MR     Time Frame (Transfer Goal 1, PT)  short term goal (STG);2 weeks  (Pended)   -MR     Progress/Outcome (Transfer Goal 1, PT)  goal ongoing  (Pended)   -MR     Row Name 12/05/19 1025          Gait Training Goal 1 (PT)    Activity/Assistive Device (Gait Training Goal 1, PT)  assistive device use;gait (walking locomotion)  (Pended)   -MR     [place] Level (Gait Training Goal 1, PT)  conditional independence  (Pended)   -MR     Distance (Gait Goal 1, PT)  100'  (Pended)   -MR     Time Frame (Gait Training Goal 1, PT)  short term goal (STG);2 weeks  (Pended)   -MR     Progress/Outcome (Gait Training Goal 1, PT)  goal ongoing  (Pended)   -MR       User Key  (r) = Recorded By, (t) = Taken By, (c) = Cosigned By    Initials Name Provider Type    Katharina Suazo, PT Student PT Student        Clinical Impression     Row Name 12/05/19 1025          Pain Assessment    Additional Documentation  Pain Scale: Numbers Pre/Post-Treatment (Group)  (Pended)   -     Row Name 12/05/19 1025          Pain Scale: Numbers Pre/Post-Treatment    Pain Scale: Numbers, Pretreatment  0/10 - no pain  (Pended)   -MR     Pain Scale: Numbers, Post-Treatment  0/10 - no pain  (Pended)   -MR     Row Name 12/05/19 1025          Plan of Care Review    Plan of Care Reviewed With  patient  (Pended)   -MR     Row Name 12/05/19 1025          Physical Therapy Clinical Impression    Patient/Family Goals Statement (PT Clinical Impression)  Pt wishes to go to rehab after D/C from  hospital.   (Pended)   -MR     Criteria for Skilled Interventions Met (PT Clinical Impression)  yes  (Pended)   -MR     Rehab Potential (PT Clinical Summary)  good, to achieve stated therapy goals  (Pended)   -MR     Row Name 12/05/19 1025          Vital Signs    O2 Delivery Pre Treatment  room air  (Pended)   -MR     O2 Delivery Intra Treatment  room air  (Pended)   -MR     O2 Delivery Post Treatment  supplemental O2  (Pended)  2 LPM  -MR     Pre Patient Position  Supine  (Pended)   -MR     Intra Patient Position  Standing  (Pended)   -MR     Post Patient Position  Sitting  (Pended)   -MR     Row Name 12/05/19 1025          Positioning and Restraints    Pre-Treatment Position  in bed  (Pended)   -MR     Post Treatment Position  chair  (Pended)   -MR     In Chair  encouraged to call for assist;call light within reach;with family/caregiver;reclined  (Pended)   -MR       User Key  (r) = Recorded By, (t) = Taken By, (c) = Cosigned By    Initials Name Provider Type    Katharina Suazo, PT Student PT Student        Outcome Measures     Row Name 12/05/19 1025          How much help from another person do you currently need...    Turning from your back to your side while in flat bed without using bedrails?  3  (Pended)   -MR     Moving from lying on back to sitting on the side of a flat bed without bedrails?  3  (Pended)   -MR     Moving to and from a bed to a chair (including a wheelchair)?  3  (Pended)   -MR     Standing up from a chair using your arms (e.g., wheelchair, bedside chair)?  3  (Pended)   -MR     Climbing 3-5 steps with a railing?  2  (Pended)   -MR     To walk in hospital room?  3  (Pended)   -MR     AM-PAC 6 Clicks Score (PT)  17  (Pended)   -MR     Row Name 12/05/19 1025          Functional Assessment    Outcome Measure Options  AM-PAC 6 Clicks Basic Mobility (PT)  (Pended)   -MR       User Key  (r) = Recorded By, (t) = Taken By, (c) = Cosigned By    Initials Name Provider Type    Katharina Suazo, PT  Student PT Student        Physical Therapy Education     Title: PT OT SLP Therapies (Done)     Topic: Physical Therapy (Done)     Point: Mobility training (Done)     Learning Progress Summary           Patient Acceptance, E,TB, VU by MR at 12/5/2019 11:45 AM    Comment:  Pt instructed on posterior hip protocol, HEP, and gait training with RW.   Family Acceptance, E,TB, VU by MR at 12/5/2019 11:45 AM    Comment:  Pt instructed on posterior hip protocol, HEP, and gait training with RW.                   Point: Home exercise program (Done)     Learning Progress Summary           Patient Acceptance, E,TB, VU by MR at 12/5/2019 11:45 AM    Comment:  Pt instructed on posterior hip protocol, HEP, and gait training with RW.   Family Acceptance, E,TB, VU by MR at 12/5/2019 11:45 AM    Comment:  Pt instructed on posterior hip protocol, HEP, and gait training with RW.                   Point: Body mechanics (Done)     Learning Progress Summary           Patient Acceptance, E,TB, VU by MR at 12/5/2019 11:45 AM    Comment:  Pt instructed on posterior hip protocol, HEP, and gait training with RW.   Family Acceptance, E,TB, VU by MR at 12/5/2019 11:45 AM    Comment:  Pt instructed on posterior hip protocol, HEP, and gait training with RW.                   Point: Precautions (Done)     Learning Progress Summary           Patient Acceptance, E,TB, VU by MR at 12/5/2019 11:45 AM    Comment:  Pt instructed on posterior hip protocol, HEP, and gait training with RW.   Family Acceptance, E,TB, VU by MR at 12/5/2019 11:45 AM    Comment:  Pt instructed on posterior hip protocol, HEP, and gait training with RW.                               User Key     Initials Effective Dates Name Provider Type Discipline    MR 11/06/19 -  Katharina Jiang, PT Student PT Student PT              PT Recommendation and Plan  Planned Therapy Interventions (PT Eval): (P) balance training, bed mobility training, gait training, strengthening, transfer  training  Outcome Summary/Treatment Plan (PT)  Anticipated Discharge Disposition (PT): (P) inpatient rehabilitation facility  Plan of Care Reviewed With: (P) patient, family  Outcome Summary: (P) PT eval completed. Pt's blood pressure was taken before therapy and presented with 80/42 when standing. Pt stated she did not feel dizzy and ambulated to bedside chair with min A and RW.      Time Calculation:   PT Charges     Row Name 19 1025             Time Calculation    Start Time  1025  (Pended)   -MR      PT Received On  19  (Pended)   -MR      PT Goal Re-Cert Due Date  12/15/19  (Pended)   -MR        User Key  (r) = Recorded By, (t) = Taken By, (c) = Cosigned By    Initials Name Provider Type     ApolinarKatharina, PT Student PT Student        Therapy Charges for Today     Code Description Service Date Service Provider Modifiers Qty    16562033922 HC PT EVAL MOD COMPLEXITY 3 2019 Katharina Jiang PT Student GP 1          PT G-Codes  Outcome Measure Options: (P) AM-PAC 6 Clicks Basic Mobility (PT)  AM-PAC 6 Clicks Score (PT): (P) 17    Katharina Jiang PT Student  2019         Electronically signed by Katharina Jiang PT Student at 19 1150          Occupational Therapy Notes (last 24 hours) (Notes from 19 1233 through 19 1233)      Jennifer Anthony at 19 1211          Acute Care - Occupational Therapy Initial Evaluation   Ign[facility]     Patient Name: Radha Whelan  : 1940  MRN: [id number]  Today's Date: 2019  Onset of Illness/Injury or Date of Surgery: 19  Date of Referral to OT: 19  Referring Physician: Dr. Salgado    Admit Date: 2019       ICD-10-CM ICD-9-CM   1. Impaired mobility and ADLs Z74.09 799.89   2. Primary osteoarthritis of right hip M16.11 715.15     Patient Active Problem List   Diagnosis   • Type 2 diabetes mellitus without complication (CMS/HCC)   • Temporary cerebral vascular dysfunction   • Fatigue   • Hypertension   • Hypervitaminosis B6   •  "Hyponatremia   • ODELL on CPAP   • Peripheral neuropathy   • Restless legs syndrome   • Edema   • H/O hyperlipidemia   • Acute non-recurrent maxillary sinusitis   • Infection of left tear duct   • Left eye pain   • Mixed hyperlipidemia   • LBBB (left bundle branch block)   • Abnormal echocardiogram   • Cardiomyopathy (CMS/HCC)   • Arthritis   • Chronic systolic congestive heart failure (CMS/HCC)   • Status post right hip replacement     Past Medical History:   Diagnosis Date   • Arthritis    • Basal cell carcinoma    • Body piercing     both ears   • Cataract, bilateral     s/p removal    • Diabetes mellitus (CMS/HCC)    • DVT (deep venous thrombosis) (CMS/HCC)     1970's- left leg   • Edema     legs- hx of   • Elevated cholesterol    • Fatigue    • Fatigue    • History of left heart catheterization     9/16/19  no stents   • History of migraine headaches    • Hx of exercise stress test 2004   • Hyperlipidemia    • Hypertension    • Hyponatremia    • Impaired functional mobility, balance, gait, and endurance    • LBBB (left bundle branch block)    • Left leg pain     left leg and knee pain    • Muscle spasm     hx of   • ODELL on CPAP     CPAP   • Stroke (CMS/HCC)    • Teeth missing     all of the top, and has 6 bottom teeth left   • Thyroid nodule    • TIA (transient ischemic attack)     x3.  last one was \"a few years ago\"   • Wears dentures     top plate   • Wears glasses     to read     Past Surgical History:   Procedure Laterality Date   • BREAST BIOPSY Left     benign   • CARDIAC CATHETERIZATION      09/16/2019 PER .   • CARDIAC CATHETERIZATION N/A 9/16/2019    Procedure: Left Heart Cath +/- CBI;  Surgeon: Ashlyn Mays MD;  Location: [facility] CATH INVASIVE LOCATION;  Service: Cardiovascular   • CATARACT EXTRACTION  12/2018    both eyes    • COLONOSCOPY     • MOUTH SURGERY      all top teeth   • SKIN BIOPSY      basal cell   • TUBAL ABDOMINAL LIGATION            OT ASSESSMENT FLOWSHEET (last 12 hours)    "   Occupational Therapy Evaluation     Row Name 12/05/19 1022                   OT Evaluation Time/Intention    Subjective Information  no complaints  -        Document Type  evaluation  -        Mode of Treatment  occupational therapy  -        Patient Effort  good  -        Symptoms Noted During/After Treatment  dizziness  -           General Information    Patient Profile Reviewed?  yes  -        Onset of Illness/Injury or Date of Surgery  12/04/19  -        Referring Physician  Dr. Salgado  -        Patient Observations  alert;cooperative;agree to therapy  -        Patient/Family Observations  Pts spouse, daughter and son-in-law present  -        General Observations of Patient  Pt received supine in bed  -        Prior Level of Function  independent:;community mobility;ADL's  -        Equipment Currently Used at Home  walker, rolling;shower chair;bipap/ cpap  -        Pertinent History of Current Functional Problem  arthritis s/p R MANAS  -        Existing Precautions/Restrictions  fall;right;hip, posterior  -        Risks Reviewed  patient and family:;increased discomfort  -        Benefits Reviewed  patient and family:;improve function;increase independence;increase strength  -           Relationship/Environment    Primary Source of Support/Comfort  spouse;child(nataliia)  -        Lives With  spouse  -           Resource/Environmental Concerns    Current Living Arrangements  home/apartment/condo  -           Home Main Entrance    Number of Stairs, Main Entrance  two  -           Cognitive Assessment/Intervention- PT/OT    Orientation Status (Cognition)  oriented x 4  -        Follows Commands (Cognition)  WFL  -        Safety Deficit (Cognitive)  safety precautions awareness;safety precautions follow-through/compliance  -           Safety Issues, Functional Mobility    Safety Issues Affecting Function (Mobility)  safety precaution awareness;safety precautions  follow-through/compliance  -        Impairments Affecting Function (Mobility)  endurance/activity tolerance;strength;pain  -           Mobility Assessment/Treatment    Extremity Weight-bearing Status  right lower extremity  -        Right Lower Extremity (Weight-bearing Status)  weight-bearing as tolerated (WBAT)  -           Bed Mobility Assessment/Treatment    Bed Mobility Assessment/Treatment  supine-sit  -        Supine-Sit [place] (Bed Mobility)  minimum assist (75% patient effort)  -        Assistive Device (Bed Mobility)  bed rails;head of bed elevated  -           Functional Mobility    Functional Mobility- Ind. Level  minimum assist (75% patient effort)  -        Functional Mobility- Device  rolling walker  -        Functional Mobility-Distance (Feet)  12  -        Functional Mobility- Safety Issues  sequencing ability decreased  -           Transfer Assessment/Treatment    Transfer Assessment/Treatment  sit-stand transfer;stand-sit transfer  -           Sit-Stand Transfer    Sit-Stand [place] (Transfers)  minimum assist (75% patient effort)  -        Assistive Device (Sit-Stand Transfers)  walker, front-wheeled  -           Stand-Sit Transfer    Stand-Sit [place] (Transfers)  minimum assist (75% patient effort)  -        Assistive Device (Stand-Sit Transfers)  walker, front-wheeled  -           ADL Assessment/Intervention    BADL Assessment/Intervention  bathing;upper body dressing;lower body dressing;grooming;feeding;toileting  -           Bathing Assessment/Intervention    Bathing [place] Level  moderate assist (50% patient effort)  -           Upper Body Dressing Assessment/Training    Upper Body Dressing [place] Level  set up  -           Lower Body Dressing Assessment/Training    Lower Body Dressing [place] Level  maximum assist (25% patient effort)  -           Grooming Assessment/Training    [place] Level (Grooming)  set up   -           Self-Feeding Assessment/Training    [place] Level (Feeding)  set up  -           Toileting Assessment/Training    [place] Level (Toileting)  moderate assist (50% patient effort)  -           BADL Safety/Performance    Impairments, BADL Safety/Performance  balance;endurance/activity tolerance;pain;strength  -           General ROM    GENERAL ROM COMMENTS  BUE WFL  -           MMT (Manual Muscle Testing)    General MMT Comments  [facility]  -           Positioning and Restraints    Pre-Treatment Position  in bed  -        Post Treatment Position  chair  -        In Chair  reclined;call light within reach;encouraged to call for assist;with family/caregiver  -           Pain Assessment    Additional Documentation  Pain Scale: Numbers Pre/Post-Treatment (Group)  -           Pain Scale: Numbers Pre/Post-Treatment    Pain Scale: Numbers, Pretreatment  0/10 - no pain  -        Pain Scale: Numbers, Post-Treatment  0/10 - no pain  -           Wound 12/04/19 0827 Right hip Incision    Wound - Properties Group Date first assessed: 12/04/19  - Time first assessed: 0827  -AO Side: Right  -AO Location: hip  -AO Primary Wound Type: Incision  -AO       Coping    Observed Emotional State  accepting;cooperative  -        Verbalized Emotional State  acceptance  -           Plan of Care Review    Plan of Care Reviewed With  patient;daughter  -           Clinical Impression (OT)    Date of Referral to OT  12/04/19  -        OT Diagnosis  ADL decline  -        Patient/Family Goals Statement (OT Eval)  Pt wants to d/c to rehab  -        Criteria for Skilled Therapeutic Interventions Met (OT Eval)  yes;treatment indicated  -        Rehab Potential (OT Eval)  good, to achieve stated therapy goals  -        Therapy Frequency (OT Eval)  daily Mon-Fri  -        Care Plan Review (OT)  evaluation/treatment results reviewed;patient/other agree to care plan  -        Care Plan Review,  Other Participant (OT Eval)  daughter  -        Anticipated Discharge Disposition (OT)  inpatient rehabilitation facility  -           Vital Signs    Pre Systolic BP Rehab  90  -AH        Pre Treatment Diastolic BP  48  -AH        Intra Systolic BP Rehab  98  -AH        Intra Treatment Diastolic BP  60  -AH        Post Systolic BP Rehab  80  -AH        Post Treatment Diastolic BP  42  -AH        Pre Patient Position  Supine  -AH        Intra Patient Position  Sitting  -AH        Post Patient Position  Standing  -AH           OT Goals    Bed Mobility Goal Selection (OT)  bed mobility, OT goal 1  -        Transfer Goal Selection (OT)  transfer, OT goal 1  -AH        Dressing Goal Selection (OT)  dressing, OT goal 1  -AH        Strength Goal Selection (OT)  strength, OT goal 1  -        Functional Mobility Goal Selection (OT)  functional mobility, OT goal 1  -        Additional Documentation  Strength Goal Selection (OT) (Row);Functional Mobility Selection (OT) (Row)  -           Bed Mobility Goal 1 (OT)    Activity/Assistive Device (Bed Mobility Goal 1, OT)  supine to sit  -        [place] Level/Cues Needed (Bed Mobility Goal 1, OT)  contact guard assist  -        Time Frame (Bed Mobility Goal 1, OT)  long term goal (LTG);2 weeks  -        Progress/Outcomes (Bed Mobility Goal 1, OT)  goal ongoing  -           Transfer Goal 1 (OT)    Activity/Assistive Device (Transfer Goal 1, OT)  sit-to-stand/stand-to-sit;walker, rolling  -        [place] Level/Cues Needed (Transfer Goal 1, OT)  contact guard assist  -AH        Time Frame (Transfer Goal 1, OT)  by discharge  -        Progress/Outcome (Transfer Goal 1, OT)  goal ongoing  -           Dressing Goal 1 (OT)    Activity/Assistive Device (Dressing Goal 1, OT)  lower body dressing;reacher;sock-aid  -        [place]/Cues Needed (Dressing Goal 1, OT)  contact guard assist  -AH        Time Frame (Dressing Goal 1, OT)  by discharge  -         Progress/Outcome (Dressing Goal 1, OT)  goal ongoing  -           Strength Goal 1 (OT)    Strength Goal 1 (OT)  Pt will perform UB strengthening ex using theraband for resistance  -        Time Frame (Strength Goal 1, OT)  by discharge  -        Progress/Outcome (Strength Goal 1, OT)  goal ongoing  -           Functional Mobility Goal 1 (OT)    Activity/Assistive Device (Functional Mobility Goal 1, OT)  walker, rolling  -        [place] Level/Cues Needed (Functional Mobility Goal 1, OT)  contact guard assist  -        Distance Goal 1 (Functional Mobility, OT)  50  -        Time Frame (Functional Mobility Goal 1, OT)  by discharge  -        Progress/Outcome (Functional Mobility Goal 1, OT)  goal ongoing  -           Living Environment    Home Accessibility  stairs to enter home  -          User Key  (r) = Recorded By, (t) = Taken By, (c) = Cosigned By    Initials Name Effective Dates    Jennifer Wood 03/07/18 -     Julian Goldman RN 06/16/16 -          Occupational Therapy Education     Title: PT OT SLP Therapies (In Progress)     Topic: Occupational Therapy (In Progress)     Point: ADL training (Done)     Description: Instruct learner(s) on proper safety adaptation and remediation techniques during self care or transfers.   Instruct in proper use of assistive devices.    Learning Progress Summary           Patient Acceptance, E,TB, VU by  at 12/5/2019 12:06 PM    Comment:  Role of OT/POC   Family Acceptance, E,TB, VU by  at 12/5/2019 12:06 PM    Comment:  Role of OT/POC                               User Key     Initials Effective Dates Name Provider Type Discipline     03/07/18 -  Jennifer Anthony Occupational Therapist OT                  OT Recommendation and Plan  Outcome Summary/Treatment Plan (OT)  Anticipated Discharge Disposition (OT): inpatient rehabilitation facility  Therapy Frequency (OT Eval): daily(Mon-Fri)  Plan of Care Review  Plan of Care Reviewed  With: patient, daughter  Plan of Care Reviewed With: patient, daughter  Outcome Summary: Pt seen for OT evaluation today.  Pt presents with decreased strength and independence with self care and functional mobility tasks.  Pt able to get oob and to the chair, however her BP was noted to drop upon standing.  Pt was asymptomatic until she walked to her chair then upon sitting she c/o dizziness and feeling like she was going to pass out.  Pt was placed in a reclined position and O2 @2L was placed via nc.  Pt is expected to benefit from skilled OT to improve her independence with ADL tasks.    Outcome Measures     Row Name 12/05/19 1022             How much help from another is currently needed...    Putting on and taking off regular lower body clothing?  2  -AH      Bathing (including washing, rinsing, and drying)  2  -AH      Toileting (which includes using toilet bed pan or urinal)  2  -AH      Putting on and taking off regular upper body clothing  3  -AH      Taking care of personal grooming (such as brushing teeth)  3  -AH      Eating meals  4  -      AM-PAC 6 Clicks Score (OT)  16  -         Functional Assessment    Outcome Measure Options  AM-PAC 6 Clicks Daily Activity (OT)  -        User Key  (r) = Recorded By, (t) = Taken By, (c) = Cosigned By    Initials Name Provider Type    Jennifer Wood Occupational Therapist          Time Calculation:   Time Calculation- OT     Row Name 12/05/19 1210             Time Calculation- OT    OT Start Time  1022  -      OT Received On  12/05/19  -      OT Goal Re-Cert Due Date  12/15/19  -        User Key  (r) = Recorded By, (t) = Taken By, (c) = Cosigned By    Initials Name Provider Type    Jennifer Wood Occupational Therapist        Therapy Charges for Today     Code Description Service Date Service Provider Modifiers Qty    [id number] HC OT EVAL LOW COMPLEXITY 4 12/5/2019 Jennifer Anthony GO 1               Jennifer Anthony  12/5/2019    Electronically signed  by Jennifer Anthony at 12/05/19 1211     Jennifer Anthony at 12/05/19 1210          Problem: Patient Care Overview  Goal: Plan of Care Review  Outcome: Ongoing (interventions implemented as appropriate)   12/05/19 1206   Coping/Psychosocial   Plan of Care Reviewed With patient;daughter   OTHER   Outcome Summary Pt seen for OT evaluation today. Pt presents with decreased strength and independence with self care and functional mobility tasks. Pt able to get oob and to the chair, however her BP was noted to drop upon standing. Pt was asymptomatic until she walked to her chair then upon sitting she c/o dizziness and feeling like she was going to pass out. Pt was placed in a reclined position and O2 @2L was placed via nc. Pt is expected to benefit from skilled OT to improve her independence with ADL tasks.   Plan of Care Review   Progress no change           Electronically signed by Jennifer Anthony at 12/05/19 1210     Jennifer Anthony at 12/04/19 1422             12/04/19 1421   Rehab Time/Intention   Evaluation Not Performed other (see comments)  (Pt not seen for OT evaluation at this time as she is still numb from her spinal.  Will follow up with pt tomorrow.)   Rehab Treatment   Discipline occupational therapist       Electronically signed by Jennifer Anthony at 12/04/19 6555

## 2019-12-05 NOTE — PLAN OF CARE
Problem: Patient Care Overview  Goal: Plan of Care Review  Outcome: Ongoing (interventions implemented as appropriate)   12/05/19 7516   Coping/Psychosocial   Plan of Care Reviewed With patient   OTHER   Outcome Summary VSS, rested well, no complaints of pain, wore home cpap    Plan of Care Review   Progress improving     Goal: Individualization and Mutuality  Outcome: Ongoing (interventions implemented as appropriate)    Goal: Discharge Needs Assessment  Outcome: Ongoing (interventions implemented as appropriate)      Problem: Hip Arthroplasty (Total, Partial) (Adult)  Goal: Signs and Symptoms of Listed Potential Problems Will be Absent, Minimized or Managed (Hip Arthroplasty)  Outcome: Ongoing (interventions implemented as appropriate)    Goal: Anesthesia/Sedation Recovery  Outcome: Ongoing (interventions implemented as appropriate)      Problem: Hospitalized Acutely Ill Older (Adult)  Goal: Signs and Symptoms of Listed Potential Problems Will be Absent, Minimized or Managed (Hospitalized Acutely Ill Older)  Outcome: Ongoing (interventions implemented as appropriate)      Problem: Fall Risk (Adult)  Goal: Identify Related Risk Factors and Signs and Symptoms  Outcome: Outcome(s) achieved Date Met: 12/05/19    Goal: Absence of Fall  Outcome: Ongoing (interventions implemented as appropriate)      Problem: Skin Injury Risk (Adult)  Goal: Identify Related Risk Factors and Signs and Symptoms  Outcome: Outcome(s) achieved Date Met: 12/05/19    Goal: Skin Health and Integrity  Outcome: Ongoing (interventions implemented as appropriate)

## 2019-12-05 NOTE — THERAPY EVALUATION
"Patient Name: Radha Whelan  : 1940    MRN: [id number]                              Today's Date: 2019       Admit Date: 2019    Visit Dx:     ICD-10-CM ICD-9-CM   1. Impaired mobility and ADLs Z74.09 799.89   2. Primary osteoarthritis of right hip M16.11 715.15     Patient Active Problem List   Diagnosis   • Type 2 diabetes mellitus without complication (CMS/HCC)   • Temporary cerebral vascular dysfunction   • Fatigue   • Hypertension   • Hypervitaminosis B6   • Hyponatremia   • ODELL on CPAP   • Peripheral neuropathy   • Restless legs syndrome   • Edema   • H/O hyperlipidemia   • Acute non-recurrent maxillary sinusitis   • Infection of left tear duct   • Left eye pain   • Mixed hyperlipidemia   • LBBB (left bundle branch block)   • Abnormal echocardiogram   • Cardiomyopathy (CMS/HCC)   • Arthritis   • Chronic systolic congestive heart failure (CMS/HCC)   • Status post right hip replacement     Past Medical History:   Diagnosis Date   • Arthritis    • Basal cell carcinoma    • Body piercing     both ears   • Cataract, bilateral     s/p removal    • Diabetes mellitus (CMS/HCC)    • DVT (deep venous thrombosis) (CMS/HCC)     - left leg   • Edema     legs- hx of   • Elevated cholesterol    • Fatigue    • Fatigue    • History of left heart catheterization     19  no stents   • History of migraine headaches    • Hx of exercise stress test    • Hyperlipidemia    • Hypertension    • Hyponatremia    • Impaired functional mobility, balance, gait, and endurance    • LBBB (left bundle branch block)    • Left leg pain     left leg and knee pain    • Muscle spasm     hx of   • ODELL on CPAP     CPAP   • Stroke (CMS/HCC)    • Teeth missing     all of the top, and has 6 bottom teeth left   • Thyroid nodule    • TIA (transient ischemic attack)     x3.  last one was \"a few years ago\"   • Wears dentures     top plate   • Wears glasses     to read     Past Surgical History:   Procedure Laterality Date   • " BREAST BIOPSY Left     benign   • CARDIAC CATHETERIZATION      09/16/2019 PER .   • CARDIAC CATHETERIZATION N/A 9/16/2019    Procedure: Left Heart Cath +/- CBI;  Surgeon: Ashlyn Mays MD;  Location: [facility] CATH INVASIVE LOCATION;  Service: Cardiovascular   • CATARACT EXTRACTION  12/2018    both eyes    • COLONOSCOPY     • MOUTH SURGERY      all top teeth   • SKIN BIOPSY      basal cell   • TUBAL ABDOMINAL LIGATION       General Information     Row Name 12/05/19 1025          PT Evaluation Time/Intention    Document Type  evaluation  (Pended)   -MR     Mode of Treatment  physical therapy  (Pended)   -MR     Row Name 12/05/19 1025          General Information    Patient Profile Reviewed?  yes  (Pended)   -MR     Prior Level of Function  independent:;all household mobility  (Pended)   -MR     Existing Precautions/Restrictions  hip, posterior;right;fall  (Pended)   -MR     Barriers to Rehab  none identified  (Pended)   -MR     Row Name 12/05/19 1025          Relationship/Environment    Lives With  spouse  (Pended)   -     Row Name 12/05/19 1025          Resource/Environmental Concerns    Current Living Arrangements  home/apartment/condo  (Pended)   -     Row Name 12/05/19 1025          Home Main Entrance    Number of Stairs, Main Entrance  none  (Pended)   -MR     Row Name 12/05/19 1025          Stairs Within Home, Primary    Number of Stairs, Within Home, Primary  none  (Pended)   -MR     Row Name 12/05/19 1025          Cognitive Assessment/Intervention- PT/OT    Orientation Status (Cognition)  oriented x 4  (Pended)   -     Row Name 12/05/19 1025          Safety Issues, Functional Mobility    Safety Issues Affecting Function (Mobility)  safety precautions follow-through/compliance;safety precaution awareness  (Pended)   -MR     Impairments Affecting Function (Mobility)  balance;endurance/activity tolerance;pain;strength  (Pended)  Low blood pressure  -MR       User Key  (r) = Recorded By, (t) = Taken  By, (c) = Cosigned By    Initials Name Provider Type    Katharina Suazo, PT Student PT Student        Mobility     Row Name 12/05/19 1025          Bed Mobility Assessment/Treatment    Bed Mobility Assessment/Treatment  supine-sit  (Pended)   -MR     Supine-Sit Mathews (Bed Mobility)  minimum assist (75% patient effort)  (Pended)   -MR     Assistive Device (Bed Mobility)  bed rails;head of bed elevated  (Pended)   -MR     Row Name 12/05/19 1025          Sit-Stand Transfer    Sit-Stand Mathews (Transfers)  minimum assist (75% patient effort)  (Pended)   -MR     Assistive Device (Sit-Stand Transfers)  walker, front-wheeled  (Pended)   -MR     Row Name 12/05/19 1025          Gait/Stairs Assessment/Training    Gait/Stairs Assessment/Training  gait/ambulation assistive device;distance ambulated  (Pended)   -MR     Mathews Level (Gait)  minimum assist (75% patient effort)  (Pended)   -MR     Assistive Device (Gait)  walker, front-wheeled  (Pended)   -MR     Distance in Feet (Gait)  12'  (Pended)   -MR     Pattern (Gait)  step-to  (Pended)   -MR     Deviations/Abnormal Patterns (Gait)  gait speed decreased;venancio decreased;stride length decreased  (Pended)   -MR     Bilateral Gait Deviations  heel strike decreased  (Pended)   -MR     Right Sided Gait Deviations  weight shift ability decreased;foot drop/toe drag  (Pended)   -MR     Row Name 12/05/19 1025          Mobility Assessment/Intervention    Extremity Weight-bearing Status  right lower extremity  (Pended)   -MR     Right Lower Extremity (Weight-bearing Status)  weight-bearing as tolerated (WBAT)  (Pended)   -MR       User Key  (r) = Recorded By, (t) = Taken By, (c) = Cosigned By    Initials Name Provider Type    Katharina Suazo, PT Student PT Student        Obj/Interventions     Row Name 12/05/19 1025          General ROM    GENERAL ROM COMMENTS  RLE Hip Precautions; No IR, ADD, or Flexion passed 90 degrees.   (Pended)   -MR     Row Name 12/05/19  1025          MMT (Manual Muscle Testing)    General MMT Comments  RLE 3/5  (Pended)   -MR     Row Name 12/05/19 1025          Static Sitting Balance    Level of [place] (Unsupported Sitting, Static Balance)  conditional independence  (Pended)   -MR     Sitting Position (Unsupported Sitting, Static Balance)  sitting on edge of bed  (Pended)   -MR     Time Able to Maintain Position (Unsupported Sitting, Static Balance)  3 to 4 minutes  (Pended)   -MR     Row Name 12/05/19 1025          Dynamic Sitting Balance    Level of [place], Reaches Outside Midline (Sitting, Dynamic Balance)  conditional independence  (Pended)   -MR     Sitting Position, Reaches Outside Midline (Sitting, Dynamic Balance)  sitting on edge of bed  (Pended)   -MR     Row Name 12/05/19 1025          Static Standing Balance    Level of [place] (Supported Standing, Static Balance)  contact guard assist  (Pended)   -MR     Time Able to Maintain Position (Supported Standing, Static Balance)  2 to 3 minutes  (Pended)   -MR     Assistive Device Utilized (Supported Standing, Static Balance)  walker, rolling  (Pended)   -MR     Row Name 12/05/19 1025          Dynamic Standing Balance    Level of [place], Reaches Outside Midline (Standing, Dynamic Balance)  contact guard assist  (Pended)   -MR     Time Able to Maintain Position, Reaches Outside Midline (Standing, Dynamic Balance)  15 to 30 seconds  (Pended)   -MR     Assistive Device Utilized (Supported Standing, Dynamic Balance)  walker, rolling  (Pended)   -MR       User Key  (r) = Recorded By, (t) = Taken By, (c) = Cosigned By    Initials Name Provider Type    MR Katharina Jiang, PT Student PT Student        Goals/Plan     Row Name 12/05/19 1025          Bed Mobility Goal 1 (PT)    Activity/Assistive Device (Bed Mobility Goal 1, PT)  bed mobility activities, all  (Pended)   -MR     [place] Level/Cues Needed (Bed Mobility Goal 1, PT)  conditional independence  (Pended)   -MR     Time  Frame (Bed Mobility Goal 1, PT)  short term goal (STG);2 weeks  (Pended)   -MR     Progress/Outcomes (Bed Mobility Goal 1, PT)  goal ongoing  (Pended)   -MR     Row Name 12/05/19 1025          Transfer Goal 1 (PT)    Activity/Assistive Device (Transfer Goal 1, PT)  transfers, all  (Pended)   -MR     [place] Level/Cues Needed (Transfer Goal 1, PT)  conditional independence  (Pended)   -MR     Time Frame (Transfer Goal 1, PT)  short term goal (STG);2 weeks  (Pended)   -MR     Progress/Outcome (Transfer Goal 1, PT)  goal ongoing  (Pended)   -MR     Row Name 12/05/19 1025          Gait Training Goal 1 (PT)    Activity/Assistive Device (Gait Training Goal 1, PT)  assistive device use;gait (walking locomotion)  (Pended)   -MR     [place] Level (Gait Training Goal 1, PT)  conditional independence  (Pended)   -MR     Distance (Gait Goal 1, PT)  100'  (Pended)   -MR     Time Frame (Gait Training Goal 1, PT)  short term goal (STG);2 weeks  (Pended)   -MR     Progress/Outcome (Gait Training Goal 1, PT)  goal ongoing  (Pended)   -MR       User Key  (r) = Recorded By, (t) = Taken By, (c) = Cosigned By    Initials Name Provider Type    Katharina Suazo, PT Student PT Student        Clinical Impression     Row Name 12/05/19 1025          Pain Assessment    Additional Documentation  Pain Scale: Numbers Pre/Post-Treatment (Group)  (Pended)   -MR     Row Name 12/05/19 1025          Pain Scale: Numbers Pre/Post-Treatment    Pain Scale: Numbers, Pretreatment  0/10 - no pain  (Pended)   -MR     Pain Scale: Numbers, Post-Treatment  0/10 - no pain  (Pended)   -MR     Row Name 12/05/19 1025          Plan of Care Review    Plan of Care Reviewed With  patient  (Pended)   -MR     Row Name 12/05/19 1025          Physical Therapy Clinical Impression    Patient/Family Goals Statement (PT Clinical Impression)  Pt wishes to go to rehab after D/C from hospital.   (Pended)   -MR     Criteria for Skilled Interventions Met (PT Clinical  Impression)  yes  (Pended)   -MR     Rehab Potential (PT Clinical Summary)  good, to achieve stated therapy goals  (Pended)   -MR     Row Name 12/05/19 1025          Vital Signs    O2 Delivery Pre Treatment  room air  (Pended)   -MR     O2 Delivery Intra Treatment  room air  (Pended)   -MR     O2 Delivery Post Treatment  supplemental O2  (Pended)  2 LPM  -MR     Pre Patient Position  Supine  (Pended)   -MR     Intra Patient Position  Standing  (Pended)   -MR     Post Patient Position  Sitting  (Pended)   -MR     Row Name 12/05/19 1025          Positioning and Restraints    Pre-Treatment Position  in bed  (Pended)   -MR     Post Treatment Position  chair  (Pended)   -MR     In Chair  encouraged to call for assist;call light within reach;with family/caregiver;reclined  (Pended)   -MR       User Key  (r) = Recorded By, (t) = Taken By, (c) = Cosigned By    Initials Name Provider Type    Katharina Suazo, PT Student PT Student        Outcome Measures     Row Name 12/05/19 1025          How much help from another person do you currently need...    Turning from your back to your side while in flat bed without using bedrails?  3  (Pended)   -MR     Moving from lying on back to sitting on the side of a flat bed without bedrails?  3  (Pended)   -MR     Moving to and from a bed to a chair (including a wheelchair)?  3  (Pended)   -MR     Standing up from a chair using your arms (e.g., wheelchair, bedside chair)?  3  (Pended)   -MR     Climbing 3-5 steps with a railing?  2  (Pended)   -MR     To walk in hospital room?  3  (Pended)   -MR     AM-PAC 6 Clicks Score (PT)  17  (Pended)   -MR     Row Name 12/05/19 1025          Functional Assessment    Outcome Measure Options  AM-PAC 6 Clicks Basic Mobility (PT)  (Pended)   -MR       User Key  (r) = Recorded By, (t) = Taken By, (c) = Cosigned By    Initials Name Provider Type    MR Jiang Katharina, PT Student PT Student        Physical Therapy Education     Title: PT OT SLP Therapies  (Done)     Topic: Physical Therapy (Done)     Point: Mobility training (Done)     Learning Progress Summary           Patient Acceptance, E,TB, VU by MR at 12/5/2019 11:45 AM    Comment:  Pt instructed on posterior hip protocol, HEP, and gait training with RW.   Family Acceptance, E,TB, VU by MR at 12/5/2019 11:45 AM    Comment:  Pt instructed on posterior hip protocol, HEP, and gait training with RW.                   Point: Home exercise program (Done)     Learning Progress Summary           Patient Acceptance, E,TB, VU by MR at 12/5/2019 11:45 AM    Comment:  Pt instructed on posterior hip protocol, HEP, and gait training with RW.   Family Acceptance, E,TB, VU by MR at 12/5/2019 11:45 AM    Comment:  Pt instructed on posterior hip protocol, HEP, and gait training with RW.                   Point: Body mechanics (Done)     Learning Progress Summary           Patient Acceptance, E,TB, VU by MR at 12/5/2019 11:45 AM    Comment:  Pt instructed on posterior hip protocol, HEP, and gait training with RW.   Family Acceptance, E,TB, VU by MR at 12/5/2019 11:45 AM    Comment:  Pt instructed on posterior hip protocol, HEP, and gait training with RW.                   Point: Precautions (Done)     Learning Progress Summary           Patient Acceptance, E,TB, VU by MR at 12/5/2019 11:45 AM    Comment:  Pt instructed on posterior hip protocol, HEP, and gait training with RW.   Family Acceptance, E,TB, VU by MR at 12/5/2019 11:45 AM    Comment:  Pt instructed on posterior hip protocol, HEP, and gait training with RW.                               User Key     Initials Effective Dates Name Provider Type Discipline    MR 11/06/19 -  Katharina Jiang, PT Student PT Student PT              PT Recommendation and Plan  Planned Therapy Interventions (PT Eval): (P) balance training, bed mobility training, gait training, strengthening, transfer training  Outcome Summary/Treatment Plan (PT)  Anticipated Discharge Disposition (PT): (P)  inpatient rehabilitation facility  Plan of Care Reviewed With: (P) patient, family  Outcome Summary: (P) PT eval completed. Pt's blood pressure was taken before therapy and presented with 80/42 when standing. Pt stated she did not feel dizzy and ambulated to bedside chair with min A and RW.      Time Calculation:   PT Charges     Row Name 12/05/19 1025             Time Calculation    Start Time  1025  (Pended)   -MR      PT Received On  12/05/19  (Pended)   -MR      PT Goal Re-Cert Due Date  12/15/19  (Pended)   -MR        User Key  (r) = Recorded By, (t) = Taken By, (c) = Cosigned By    Initials Name Provider Type     Katharina Jiang, PT Student PT Student        Therapy Charges for Today     Code Description Service Date Service Provider Modifiers Qty    [id number] HC PT EVAL MOD COMPLEXITY 3 12/5/2019 Katharina Jiang, PT Student GP 1          PT G-Codes  Outcome Measure Options: (P) AM-PAC 6 Clicks Basic Mobility (PT)  AM-PAC 6 Clicks Score (PT): (P) 17    Katharina Jiang PT Student  12/5/2019

## 2019-12-05 NOTE — PROGRESS NOTES
PROGRESS NOTE        Date of Admission: 12/4/2019  Length of Stay: 1  Primary Care Physician: Farhan Schaefer MD    Subjective   Chief Complaint: Low up hypotension  HPI: This is a 79-year-old female who was admitted on 12/4/2019 by Dr. Salgado with orthopedics after undergoing a right total hip replacement.  She has a history of diabetes mellitus, hypertension, obstructive sleep apnea.  She was noted to have a 2D echo done in September 2019 in preparation for surgery which did show an ejection fraction of 42%.  She saw cardiologist Dr. Luevano who did a heart cath and she did not have any blockages requiring intervention.  She was started on medical management.  However looking back through the notes it looks like she may have had some bradycardia in the past and is not currently on a beta-blocker.  She was noted to have some hypotension postoperatively.  I did start her on IV fluids postoperatively at a decreased rate to avoid fluid overload.  This morning her systolic is still in the 90s but she did drop when standing up with therapy to the 70s.  She is currently sitting up to bedside in a chair.  She denies any dizziness with orthostasis.  I will continue to give her some IV fluids and give her a bolus of 250 and see how she does with this.  Should she continue to have orthostasis may need to consider getting cardiology evaluation for recommendations.  She denies any chest pain, shortness of breath, nausea or vomiting.    Addendum 1530: I was notified by nursing staff that patient became orthostatic this afternoon when they got her up to use the restroom.  According to the patient she had a syncopal episode but did not fall and was only out for a second.  However according nurses have is unclear if she totally lost consciousness or felt like she was just going to pass out.  I have recommended that the patient not be gotten up anymore today.  She is having some chills however I do suspect that this is secondary  to the block wearing off and she is having worsening pain.  Unfortunately given her hypotension we have to treat her pain sparingly as analgesics narcotics can worsen her hypotension.  Due to the chills however I will check a urinalysis, chest x-ray, CBC and BMP to ensure no abnormalities.  She only is noted to have a low-grade fever of 99.  I will place an order that should her temperature spike to greater than 100.6 obtain 2 sets of blood cultures with arts.  I did evaluate her incision and there is no erythema or streaking or drainage to indicate infection.      Review Of Systems:   Review of Systems  General ROS: Patient denies any fevers, chills or loss of consciousness.    ENT ROS: Denies sore throat, nasal congestion or ear pain.   Hematological and Lymphatic ROS: Denies neck swelling or easy bleeding.  Endocrine ROS: Denies any recent unintentional weight gain or loss.  Respiratory ROS: Denies cough or shortness of breath.   Cardiovascular ROS: Denies chest pain or palpitations. No history of exertional chest pain.   Gastrointestinal ROS: Denies nausea and vomiting. Denies any abdominal pain. No diarrhea.  Genito-Urinary ROS: Denies dysuria or hematuria.  Musculoskeletal ROS: Denies back pain. No muscle pain. No calf pain.   Neurological ROS: Denies any focal weakness. No loss of consciousness. Denies any numbness. Denies neck pain.   Dermatological ROS: Denies any redness or pruritis.    Objective      Temp:  [97.4 °F (36.3 °C)-97.6 °F (36.4 °C)] 97.6 °F (36.4 °C)  Heart Rate:  [51-77] 73  Resp:  [12-18] 16  BP: ()/(40-58) 97/42  Physical Exam    General Appearance:  Alert and cooperative, not in any acute distress.   Head:  Atraumatic and normocephalic, without obvious abnormality.   Eyes:          PERRLA, conjunctivae and sclerae normal, no Icterus. No pallor. Extraocular movements are within normal limits.   Ears:  Ears appear intact with no abnormalities noted.   Throat: No oral lesions, no  thrush, oral mucosa moist.   Neck: Supple, trachea midline, no thyromegaly, no carotid bruit.       Lungs:   Chest shape is normal. Breath sounds heard bilaterally equally.  No crackles or wheezing. No Pleural rub or bronchial breathing.   Heart:  Normal S1 and S2, no murmur, no gallop, no rub. No JVD   Abdomen:   Normal bowel sounds, no masses, no organomegaly. Soft    non-tender, non-distended, no guarding, no rebound             tenderness   Extremities: Moves all extremities well, no edema, no cyanosis, no clubbing.  Incision right hip is intact.  No drainage, edema or hematoma noted.  Pulses are palpable.    Pulses: Pulses palpable and equal bilaterally   Skin: No bleeding, bruising or rash       Neurologic:    Psychiatric/Behavior:     Cranial nerves 2 - 12 grossly intact, sensation intact, Motor power is normal and equal bilaterally.  Mood normal, behavior normal       Results Review:    I have reviewed the labs, radiology results and diagnostic studies.    Results from last 7 days   Lab Units 12/05/19  1251  12/04/19  1434   WBC 10*3/mm3  --   --  7.50   HEMOGLOBIN g/dL 10.4*   < > 10.8*   PLATELETS 10*3/mm3  --   --  154    < > = values in this interval not displayed.     Results from last 7 days   Lab Units 12/05/19  0548   SODIUM mmol/L 139   POTASSIUM mmol/L 5.1   CO2 mmol/L 26.6   CREATININE mg/dL 1.09*   GLUCOSE mg/dL 179*       Culture Data:    Radiology Data:   Cardiology Data:    I have reviewed the medications.    Assessment/Plan     Assessment/Plan:  1.  Diabetes mellitus-we will place patient on insulin protocol.  Monitor blood sugars and titrate accordingly     2.  Chronic systolic CHF with last EF 42% in September 2019.  She is followed by Dr. Luevano with cardiology.  She did undergo a cardiac catheterization in September 2019 for which she did not require any intervention.  She does have a follow-up with Dr. Luevano as an outpatient.   She is on ACE inhibitor and looks like spironolactone.  She  is not on a beta-blocker however looking back through her history there is some question about possible bradycardia arrhythmia.  We will hold off on initiating beta-blockers especially given that she is hypotensive.  Will defer to her primary cardiologist to follow-up.    3.  Chronic essential hypertension-patient's blood pressure is on the lower side currently so we will continue to hold her home antihypertensive medications.     4.  Obstructive sleep apnea-patient does have her home unit.     5.  History of CVA     6.  Status post right total hip replacement-patient is on analgesics and DVT prophylaxis.  PT OT has been consulted as well as case management for discharge planning.     7.  Hypotension-blood pressure is still 90 systolic.  He did have some orthostasis going from systolic and 90s while sitting to 70s when standing up.  I am going to give her some gentle hydration and will continue to monitor.  Should she continue to have orthostasis may consider getting cardiology evaluation for recommendations.     8.  Nausea- Resolved.  Tolerating diet.    9.  Acute blood loss anemia-  Patient does not require blood transfusion.  Continue to monitor    10.  Orthostatic hypotension-  BP did drop from 90s systolic to 70s upon standing but did recover.  I am holding antihypertensive medications.  I will give a small IV bolus of 250 mL and give her some fluids as I feel like she is probably just behind on her volume status.  Should she continue to have orthostasis may need to consider getting cardiology evaluation.  Patient has been placed on telemetry.    11.  Chronic kidney disease stage III-do suspect that this is been going on for quite some time.  I do recommend that she follow-up with nephrology on an outpatient basis.        DVT prophylaxis:  Lovenox  Discharge Planning:   CURTIS Wall 12/05/19 1:13 PM

## 2019-12-05 NOTE — PROGRESS NOTES
Patient: Radha Whelan  Procedure(s):  HIP ARTHROPLASTY TOTAL RIGHT  Anesthesia type: [unfilled]    Patient location: [facility] Surgical Floor  Last vitals:   Vitals:    12/05/19 1148   BP: 97/42   Pulse: 73   Resp: 16   Temp: 97.6 °F (36.4 °C)   SpO2: 97%     Level of consciousness: awake, alert and oriented    Post-anesthesia pain: adequate analgesia  Airway patency: patent  Respiratory: unassisted  Cardiovascular: stable and blood pressure at baseline  Hydration: euvolemic    Anesthetic complications: no

## 2019-12-05 NOTE — THERAPY TREATMENT NOTE
"Patient Name: Radha Whelan  : 1940    MRN: [id number]                              Today's Date: 2019       Admit Date: 2019    Visit Dx:     ICD-10-CM ICD-9-CM   1. Impaired mobility and ADLs Z74.09 799.89   2. Primary osteoarthritis of right hip M16.11 715.15     Patient Active Problem List   Diagnosis   • Type 2 diabetes mellitus without complication (CMS/HCC)   • Temporary cerebral vascular dysfunction   • Fatigue   • Hypertension   • Hypervitaminosis B6   • Hyponatremia   • ODELL on CPAP   • Peripheral neuropathy   • Restless legs syndrome   • Edema   • H/O hyperlipidemia   • Acute non-recurrent maxillary sinusitis   • Infection of left tear duct   • Left eye pain   • Mixed hyperlipidemia   • LBBB (left bundle branch block)   • Abnormal echocardiogram   • Cardiomyopathy (CMS/HCC)   • Arthritis   • Chronic systolic congestive heart failure (CMS/HCC)   • Status post right hip replacement     Past Medical History:   Diagnosis Date   • Arthritis    • Basal cell carcinoma    • Body piercing     both ears   • Cataract, bilateral     s/p removal    • Diabetes mellitus (CMS/HCC)    • DVT (deep venous thrombosis) (CMS/HCC)     - left leg   • Edema     legs- hx of   • Elevated cholesterol    • Fatigue    • Fatigue    • History of left heart catheterization     19  no stents   • History of migraine headaches    • Hx of exercise stress test    • Hyperlipidemia    • Hypertension    • Hyponatremia    • Impaired functional mobility, balance, gait, and endurance    • LBBB (left bundle branch block)    • Left leg pain     left leg and knee pain    • Muscle spasm     hx of   • ODELL on CPAP     CPAP   • Stroke (CMS/HCC)    • Teeth missing     all of the top, and has 6 bottom teeth left   • Thyroid nodule    • TIA (transient ischemic attack)     x3.  last one was \"a few years ago\"   • Wears dentures     top plate   • Wears glasses     to read     Past Surgical History:   Procedure Laterality Date   • " BREAST BIOPSY Left     benign   • CARDIAC CATHETERIZATION      09/16/2019 PER .   • CARDIAC CATHETERIZATION N/A 9/16/2019    Procedure: Left Heart Cath +/- CBI;  Surgeon: Ashlyn Mays MD;  Location: [facility] CATH INVASIVE LOCATION;  Service: Cardiovascular   • CATARACT EXTRACTION  12/2018    both eyes    • COLONOSCOPY     • MOUTH SURGERY      all top teeth   • SKIN BIOPSY      basal cell   • TUBAL ABDOMINAL LIGATION       General Information     Row Name 12/05/19 1508 12/05/19 1025       PT Evaluation Time/Intention    Document Type  therapy note (daily note)  (Pended)   -MR  evaluation  -JR (r) MR (t) JR (c)    Mode of Treatment  physical therapy  (Pended)   -MR  physical therapy  -JR (r) MR (t) JR (c)    Row Name 12/05/19 1508 12/05/19 1025       General Information    Patient Profile Reviewed?  yes  (Pended)   -MR  yes  -JR (r) MR (t) JR (c)    Prior Level of Function  --  independent:;all household mobility  -JR (r) MR (t) JR (c)    Existing Precautions/Restrictions  hip, posterior;right;fall  (Pended)   -MR  hip, posterior;right;fall  -JR (r) MR (t) JR (c)    Barriers to Rehab  --  none identified  -JR (r) MR (t) JR (c)    Row Name 12/05/19 1025          Relationship/Environment    Lives With  spouse  -JR (r) MR (t) JR (c)     Row Name 12/05/19 1025          Resource/Environmental Concerns    Current Living Arrangements  home/apartment/condo  -JR (r) MR (t) JR (c)     Row Name 12/05/19 1025          Home Main Entrance    Number of Stairs, Main Entrance  none  -JR (r) MR (t) JR (c)     Row Name 12/05/19 1025          Stairs Within Home, Primary    Number of Stairs, Within Home, Primary  none  -JR (r) MR (t) JR (c)     Row Name 12/05/19 1508 12/05/19 1025       Cognitive Assessment/Intervention- PT/OT    Orientation Status (Cognition)  oriented x 4  (Pended)   -MR  oriented x 4  -JR (r) MR (t) JR (c)    Row Name 12/05/19 1508 12/05/19 1025       Safety Issues, Functional Mobility    Safety Issues Affecting  Function (Mobility)  safety precautions follow-through/compliance;safety precaution awareness  (Pended)   -MR  safety precautions follow-through/compliance;safety precaution awareness  -JR (r) MR (t) JR (c)    Impairments Affecting Function (Mobility)  balance;endurance/activity tolerance;pain;strength  (Pended)   -MR  balance;endurance/activity tolerance;pain;strength Low blood pressure  -JR (r) MR (t) JR (c)    Comment, Safety Issues/Impairments (Mobility)  Pt appeared to pass out on bedside commode due to increased pain; Required min A x 2 to return to bed.   (Pended)   -MR  --      User Key  (r) = Recorded By, (t) = Taken By, (c) = Cosigned By    Initials Name Provider Type    Yen Sin, PT Physical Therapist    Katharina Suazo, PT Student PT Student        Mobility     Row Name 12/05/19 1025          Bed Mobility Assessment/Treatment    Bed Mobility Assessment/Treatment  supine-sit  -JR (r) MR (t) JR (c)     Supine-Sit [place] (Bed Mobility)  minimum assist (75% patient effort)  -JR (r) MR (t) JR (c)     Assistive Device (Bed Mobility)  bed rails;head of bed elevated  -JR (r) MR (t) JR (c)     Row Name 12/05/19 1025          Sit-Stand Transfer    Sit-Stand [place] (Transfers)  minimum assist (75% patient effort)  -JR (r) MR (t) JR (c)     Assistive Device (Sit-Stand Transfers)  walker, front-wheeled  -JR (r) MR (t) JR (c)     Row Name 12/05/19 1025          Gait/Stairs Assessment/Training    Gait/Stairs Assessment/Training  gait/ambulation assistive device;distance ambulated  -JR (r) MR (t) JR (c)     [place] Level (Gait)  minimum assist (75% patient effort)  -JR (r) MR (t) JR (c)     Assistive Device (Gait)  walker, front-wheeled  -JR (r) MR (t) JR (c)     Distance in Feet (Gait)  12'  -JR (r) MR (t) JR (c)     Pattern (Gait)  step-to  -JR (r) MR (t) JR (c)     Deviations/Abnormal Patterns (Gait)  gait speed decreased;venancio decreased;stride length decreased  -JR (r) MR (t) JR (c)      Bilateral Gait Deviations  heel strike decreased  -JR (r) MR (t) JR (c)     Right Sided Gait Deviations  weight shift ability decreased;foot drop/toe drag  -JR (r) MR (t) JR (c)     Row Name 12/05/19 1508 12/05/19 1025       Mobility Assessment/Intervention    Extremity Weight-bearing Status  right lower extremity  (Pended)   -MR  right lower extremity  -JR (r) MR (t) JR (c)    Right Lower Extremity (Weight-bearing Status)  weight-bearing as tolerated (WBAT)  (Pended)   -MR  weight-bearing as tolerated (WBAT)  -JR (r) MR (t) JR (c)      User Key  (r) = Recorded By, (t) = Taken By, (c) = Cosigned By    Initials Name Provider Type    Yen Sni, PT Physical Therapist    Katharina Suazo, PT Student PT Student        Obj/Interventions     Row Name 12/05/19 1025          General ROM    GENERAL ROM COMMENTS  RLE Hip Precautions; No IR, ADD, or Flexion passed 90 degrees.   -JR (r) MR (t) JR (c)     Row Name 12/05/19 1025          MMT (Manual Muscle Testing)    General MMT Comments  RLE 3/5  -JR (r) MR (t) JR (c)     Row Name 12/05/19 1508 12/05/19 1025       Static Sitting Balance    Level of [place] (Unsupported Sitting, Static Balance)  maximal assist, 25 to 49% patient effort  (Pended)   -MR  conditional independence  -JR (r) MR (t) JR (c)    Sitting Position (Unsupported Sitting, Static Balance)  other (see comments)  (Pended)  Sitting on bedside commode  -MR  sitting on edge of bed  -JR (r) MR (t) JR (c)    Time Able to Maintain Position (Unsupported Sitting, Static Balance)  more than 5 minutes  (Pended)   -MR  3 to 4 minutes  -JR (r) MR (t) JR (c)    Comment (Unsupported Sitting, Static Balance)  Pt passed out on bedside commode.   (Pended)   -MR  --    Row Name 12/05/19 1025          Dynamic Sitting Balance    Level of [place], Reaches Outside Midline (Sitting, Dynamic Balance)  conditional independence  -JR (r) MR (t) JR (c)     Sitting Position, Reaches Outside Midline (Sitting, Dynamic Balance)   sitting on edge of bed  -JR (r) MR (t) JR (c)     Row Name 12/05/19 1025          Static Standing Balance    Level of [place] (Supported Standing, Static Balance)  contact guard assist  -JR (r) MR (t) JR (c)     Time Able to Maintain Position (Supported Standing, Static Balance)  2 to 3 minutes  -JR (r) MR (t) JR (c)     Assistive Device Utilized (Supported Standing, Static Balance)  walker, rolling  -JR (r) MR (t) JR (c)     Row Name 12/05/19 1025          Dynamic Standing Balance    Level of [place], Reaches Outside Midline (Standing, Dynamic Balance)  contact guard assist  -JR (r) MR (t) JR (c)     Time Able to Maintain Position, Reaches Outside Midline (Standing, Dynamic Balance)  15 to 30 seconds  -JR (r) MR (t) JR (c)     Assistive Device Utilized (Supported Standing, Dynamic Balance)  walker, rolling  -JR (r) MR (t) JR (c)       User Key  (r) = Recorded By, (t) = Taken By, (c) = Cosigned By    Initials Name Provider Type    Yen Sin, PT Physical Therapist    Katharina Suazo, PT Student PT Student        Goals/Plan     Row Name 12/05/19 1025          Bed Mobility Goal 1 (PT)    Activity/Assistive Device (Bed Mobility Goal 1, PT)  bed mobility activities, all  -JR (r) MR (t) JR (c)     [place] Level/Cues Needed (Bed Mobility Goal 1, PT)  conditional independence  -JR (r) MR (t) JR (c)     Time Frame (Bed Mobility Goal 1, PT)  short term goal (STG);2 weeks  -JR (r) MR (t) JR (c)     Progress/Outcomes (Bed Mobility Goal 1, PT)  goal ongoing  -JR (r) MR (t) JR (c)     Row Name 12/05/19 1025          Transfer Goal 1 (PT)    Activity/Assistive Device (Transfer Goal 1, PT)  transfers, all  -JR (r) MR (t) JR (c)     [place] Level/Cues Needed (Transfer Goal 1, PT)  conditional independence  -JR (r) MR (t) JR (c)     Time Frame (Transfer Goal 1, PT)  short term goal (STG);2 weeks  -JR (r) MR (t) JR (c)     Progress/Outcome (Transfer Goal 1, PT)  goal ongoing  -JR (r)  (t)  (c)     Karthik  Name 12/05/19 1025          Gait Training Goal 1 (PT)    Activity/Assistive Device (Gait Training Goal 1, PT)  assistive device use;gait (walking locomotion)  -JR (r) MR (t) JR (c)     [place] Level (Gait Training Goal 1, PT)  conditional independence  -JR (r) MR (t) JR (c)     Distance (Gait Goal 1, PT)  100'  -JR (r) MR (t) JR (c)     Time Frame (Gait Training Goal 1, PT)  short term goal (STG);2 weeks  -JR (r) MR (t) JR (c)     Progress/Outcome (Gait Training Goal 1, PT)  goal ongoing  -JR (r) MR (t) JR (c)       User Key  (r) = Recorded By, (t) = Taken By, (c) = Cosigned By    Initials Name Provider Type    Yen Sin, PT Physical Therapist    Katharina Suazo, PT Student PT Student        Clinical Impression     Row Name 12/05/19 1025          Pain Assessment    Additional Documentation  Pain Scale: Numbers Pre/Post-Treatment (Group)  -JR (r) MR (t) JR (c)     Row Name 12/05/19 1508 12/05/19 1025       Pain Scale: Numbers Pre/Post-Treatment    Pain Scale: Numbers, Pretreatment  8/10  (Pended)   -MR  0/10 - no pain  -JR (r) MR (t) JR (c)    Pain Scale: Numbers, Post-Treatment  8/10  (Pended)   -MR  0/10 - no pain  -JR (r) MR (t) JR (c)    Pain Location - Side  Right  (Pended)   -MR  --    Pain Location  hip  (Pended)   -MR  --    Pain Intervention(s)  Repositioned;Ambulation/increased activity  (Pended)   -MR  --    Row Name 12/05/19 1025          Plan of Care Review    Plan of Care Reviewed With  patient  -JR (r) MR (t) JR (c)     Row Name 12/05/19 1025          Physical Therapy Clinical Impression    Patient/Family Goals Statement (PT Clinical Impression)  Pt wishes to go to rehab after D/C from hospital.   -JR (r) MR (t) JR (c)     Criteria for Skilled Interventions Met (PT Clinical Impression)  yes  -JR (r) MR (t) JR (c)     Rehab Potential (PT Clinical Summary)  good, to achieve stated therapy goals  -JR (r)  (t)  (c)     Row Name 12/05/19 1508 12/05/19 1025       Vital Signs    Pre SpO2 (%)   100  (Pended)   -MR  --    O2 Delivery Pre Treatment  room air  (Pended)   -MR  room air  -JR (r) MR (t) JR (c)    O2 Delivery Intra Treatment  room air  (Pended)   -MR  room air  -JR (r) MR (t) JR (c)    Post SpO2 (%)  100  (Pended)   -MR  --    O2 Delivery Post Treatment  room air  (Pended)   -MR  supplemental O2 2 LPM  -JR (r) MR (t) JR (c)    Pre Patient Position  Sitting  (Pended)   -MR  Supine  -JR (r) MR (t) JR (c)    Intra Patient Position  Sitting  (Pended)   -MR  Standing  -JR (r) MR (t) JR (c)    Post Patient Position  Supine  (Pended)   -MR  Sitting  -JR (r) MR (t) JR (c)    Row Name 12/05/19 1508 12/05/19 1025       Positioning and Restraints    Pre-Treatment Position  sitting in chair/recliner  (Pended)   -MR  in bed  -JR (r) MR (t) JR (c)    Post Treatment Position  bed  (Pended)   -MR  chair  -JR (r) MR (t) JR (c)    In Bed  with family/caregiver;supine;call light within reach;encouraged to call for assist;ABD pillow  (Pended)   -MR  --    In Chair  --  encouraged to call for assist;call light within reach;with family/caregiver;reclined  -JR (r) MR (t) JR (c)      User Key  (r) = Recorded By, (t) = Taken By, (c) = Cosigned By    Initials Name Provider Type    Yen Sin, PT Physical Therapist    Katharina Suazo, PT Student PT Student        Outcome Measures     Row Name 12/05/19 1508 12/05/19 1025       How much help from another person do you currently need...    Turning from your back to your side while in flat bed without using bedrails?  3  (Pended)   -MR  3  -JR (r) MR (t) JR (c)    Moving from lying on back to sitting on the side of a flat bed without bedrails?  3  (Pended)   -MR  3  -JR (r) MR (t) JR (c)    Moving to and from a bed to a chair (including a wheelchair)?  3  (Pended)   -MR  3  -JR (r) MR (t) JR (c)    Standing up from a chair using your arms (e.g., wheelchair, bedside chair)?  3  (Pended)   -MR  3  -JR (r) MR (t) JR (c)    Climbing 3-5 steps with a railing?  2  (Pended)    -MR  2  -JR (r) MR (t) JR (c)    To walk in hospital room?  3  (Pended)   -MR  3  -JR (r) MR (t) JR (c)    AM-PAC 6 Clicks Score (PT)  17  (Pended)   -MR  17  -JR (r) MR (t)    Row Name 12/05/19 1508 12/05/19 1025       Functional Assessment    Outcome Measure Options  AM-PAC 6 Clicks Basic Mobility (PT)  (Pended)   -MR  AM-PAC 6 Clicks Basic Mobility (PT)  -JR (r) MR (t) JR (c)      User Key  (r) = Recorded By, (t) = Taken By, (c) = Cosigned By    Initials Name Provider Type    Yen Sin, PT Physical Therapist    Katharina Suazo, PT Student PT Student        Physical Therapy Education     Title: PT OT SLP Therapies (In Progress)     Topic: Physical Therapy (Done)     Point: Mobility training (Done)     Learning Progress Summary           Patient Acceptance, E,TB, VU by MR at 12/5/2019  4:11 PM    Comment:  Pt instructed on breathing techniques and protocol for posterior hip.    Acceptance, E,TB, VU by MR at 12/5/2019 11:45 AM    Comment:  Pt instructed on posterior hip protocol, HEP, and gait training with RW.   Family Acceptance, E,TB, VU by MR at 12/5/2019  4:11 PM    Comment:  Pt instructed on breathing techniques and protocol for posterior hip.    Acceptance, E,TB, VU by MR at 12/5/2019 11:45 AM    Comment:  Pt instructed on posterior hip protocol, HEP, and gait training with RW.                   Point: Home exercise program (Done)     Learning Progress Summary           Patient Acceptance, E,TB, VU by MR at 12/5/2019  4:11 PM    Comment:  Pt instructed on breathing techniques and protocol for posterior hip.    Acceptance, E,TB, VU by MR at 12/5/2019 11:45 AM    Comment:  Pt instructed on posterior hip protocol, HEP, and gait training with RW.   Family Acceptance, E,TB, VU by MR at 12/5/2019  4:11 PM    Comment:  Pt instructed on breathing techniques and protocol for posterior hip.    Acceptance, E,TB, VU by MR at 12/5/2019 11:45 AM    Comment:  Pt instructed on posterior hip protocol, HEP, and gait  training with RW.                   Point: Body mechanics (Done)     Learning Progress Summary           Patient Acceptance, E,TB, VU by MR at 12/5/2019  4:11 PM    Comment:  Pt instructed on breathing techniques and protocol for posterior hip.    Acceptance, E,TB, VU by MR at 12/5/2019 11:45 AM    Comment:  Pt instructed on posterior hip protocol, HEP, and gait training with RW.   Family Acceptance, E,TB, VU by MR at 12/5/2019  4:11 PM    Comment:  Pt instructed on breathing techniques and protocol for posterior hip.    Acceptance, E,TB, VU by MR at 12/5/2019 11:45 AM    Comment:  Pt instructed on posterior hip protocol, HEP, and gait training with RW.                   Point: Precautions (Done)     Learning Progress Summary           Patient Acceptance, E,TB, VU by MR at 12/5/2019  4:11 PM    Comment:  Pt instructed on breathing techniques and protocol for posterior hip.    Acceptance, E,TB, VU by MR at 12/5/2019 11:45 AM    Comment:  Pt instructed on posterior hip protocol, HEP, and gait training with RW.   Family Acceptance, E,TB, VU by MR at 12/5/2019  4:11 PM    Comment:  Pt instructed on breathing techniques and protocol for posterior hip.    Acceptance, E,TB, VU by MR at 12/5/2019 11:45 AM    Comment:  Pt instructed on posterior hip protocol, HEP, and gait training with RW.                               User Key     Initials Effective Dates Name Provider Type Discipline    MR 11/06/19 -  Katharina iJang, PT Student PT Student PT              PT Recommendation and Plan  Planned Therapy Interventions (PT Eval): balance training, bed mobility training, gait training, strengthening, transfer training  Outcome Summary/Treatment Plan (PT)  Anticipated Discharge Disposition (PT): inpatient rehabilitation facility  Plan of Care Reviewed With: (P) patient, daughter  Progress: (P) no change  Outcome Summary: (P) PT tx completed. Pt transfered from bedside chair to bedside commode with min A and verbal cues to maintain R  hip protocol. Pt stated she has passed out in the past due to increased pain and while bedside commode pt appeared to pass out for approximately 20 seconds. Nursing was notified and responded promptly to evaluate pt.      Time Calculation:   PT Charges     Row Name 12/05/19 1508 12/05/19 1025          Time Calculation    Start Time  1508  (Pended)   -MR  1025  -JR (r) MR (t) JR (c)     Stop Time  1535  (Pended)   -MR  --     Time Calculation (min)  27 min  (Pended)   -MR  --     PT Received On  12/05/19  (Pended)   -MR  12/05/19  -JR (r) MR (t) JR (c)     PT Goal Re-Cert Due Date  12/15/19  (Pended)   -  12/15/19  -JR (r) MR (t) JR (c)       User Key  (r) = Recorded By, (t) = Taken By, (c) = Cosigned By    Initials Name Provider Type    Yen Sin, PT Physical Therapist    Katharina Suazo, PT Student PT Student        Therapy Charges for Today     Code Description Service Date Service Provider Modifiers Qty    87967634583 HC PT EVAL MOD COMPLEXITY 3 12/5/2019 Katharina Jiang, PT Student GP 1    70554449477 HC PT THERAPEUTIC ACT EA 15 MIN 12/5/2019 Katharina Jiang, PT Student GP 1          PT G-Codes  Outcome Measure Options: (P) AM-PAC 6 Clicks Basic Mobility (PT)  AM-PAC 6 Clicks Score (PT): (P) 17  AM-PAC 6 Clicks Score (OT): 16    Katharina Jiang PT Student  12/5/2019

## 2019-12-05 NOTE — PLAN OF CARE
Problem: Patient Care Overview  Goal: Plan of Care Review  Outcome: Ongoing (interventions implemented as appropriate)   12/05/19 3976   Coping/Psychosocial   Plan of Care Reviewed With patient;family   OTHER   Outcome Summary PT eval completed. Pt's blood pressure was taken before therapy and presented with 80/42 when standing. Pt stated she did not feel dizzy and ambulated to bedside chair with min A and RW. Pt tolerated gait training well, but stated she was going to pass out when reclined in bedside chair. Pt did not pass out and was given HEP at end of treatment. Pt presents with deficits in balance, strength, endurance, and functional mobility. Pt to benefit from continued skilled PT services prior to D/C.

## 2019-12-05 NOTE — PROGRESS NOTES
Discharge Planning Assessment   Ignacio     Patient Name: Radha Whelan  MRN: [id number]  Today's Date: 12/5/2019    Admit Date: 12/4/2019    Discharge Needs Assessment     Row Name 12/05/19 1220       Living Environment    Lives With  spouse    Name(s) of Who Lives With Patient  Dougie     Current Living Arrangements  home/apartment/condo    Primary Care Provided by  self    Provides Primary Care For  no one    Family Caregiver if Needed  spouse    Family Caregiver Names  Dougie     Quality of Family Relationships  involved    Able to Return to Prior Arrangements  no       Resource/Environmental Concerns    Transportation Concerns  car, none       Discharge Needs Assessment    Readmission Within the Last 30 Days  no previous admission in last 30 days    Concerns to be Addressed  discharge planning    Equipment Currently Used at Home  walker, rolling    Equipment Needed After Discharge  commode    Outpatient/Agency/Support Group Needs  inpatient rehabilitation facility    Discharge Facility/Level of Care Needs  acute rehab    Provided post acute provider list?  Refused    Post Acute Provider Lists  Inpatient Rehab        Discharge Plan     Row Name 12/05/19 1222       Plan    Plan  Rehab at [facility]     Patient/Family in Agreement with Plan  yes    Plan Comments  Spoke to pt regarding discharge plans Confirmed address and phone number She has CPAP with [facility]  Walker with Wheels She lives with her   She would like to go to rehab at [facility] Faxed a referral to them She does not have a living will and is declining information regarding this         Destination      No service coordination in this encounter.      Durable Medical Equipment      No service coordination in this encounter.      Dialysis/Infusion      No service coordination in this encounter.      Home Medical Care      No service coordination in this encounter.      Therapy      No service coordination in this encounter.       Community Resources      No service coordination in this encounter.        Expected Discharge Date and Time     Expected Discharge Date Expected Discharge Time    Dec 7, 2019         Demographic Summary     Row Name 12/05/19 1218       General Information    Admission Type  inpatient    Referral Source  admission list        Functional Status     Row Name 12/05/19 1219       Functional Status    Usual Activity Tolerance  moderate       Functional Status, IADL    Medications  independent    Meal Preparation  independent    Housekeeping  independent    Laundry  independent    Shopping  independent       Mental Status    General Appearance WDL  WDL        Psychosocial    No documentation.       Abuse/Neglect    No documentation.       Legal     Row Name 12/05/19 1219       Financial/Legal    Finance Comments  Denies any needs         Substance Abuse    No documentation.       Patient Forms    No documentation.           Alba Sanchez RN

## 2019-12-05 NOTE — PROGRESS NOTES
Adult Nutrition  Assessment/PES    Patient Name:  Radha Whelan  YOB: 1940  MRN: [id number]  Admit Date:  12/4/2019    Assessment Date:  12/5/2019    Comments:    Recommend:  1. Consider adding cardiac modification to current diet order as medically appropriate and tolerated.  2. Encourage PO intake. PO intake average ~87% x 2 meals.  3. RD ordered Boost Glucose Control BID.  4. Consider a multivitamin with minerals daily.     RD available PRN.      Reason for Assessment     Row Name 12/05/19 0948          Reason for Assessment    Reason For Assessment  diagnosis/disease state     Diagnosis  diabetes diagnosis/complications;cardiac disease;other (see comments)     Identified At Risk by Screening Criteria  BMI         Nutrition/Diet History     Row Name 12/05/19 0950          Nutrition/Diet History    Food Allergies  other (see comments) Cumini, butter flavor, cheese, saccharin         Anthropometrics     Row Name 12/05/19 0653          Anthropometrics    Weight  89.4 kg (197 lb)         Labs/Tests/Procedures/Meds     Row Name 12/05/19 0952          Labs/Procedures/Meds    Lab Results Reviewed  reviewed, pertinent     Lab Results Comments  High: Gluc, Cr High: BUN, Cr        Medications    Pertinent Medications Reviewed  reviewed, pertinent     Pertinent Medications Comments  Insulin         Physical Findings     Row Name 12/05/19 0958          Physical Findings    Overall Physical Appearance  obese         Estimated/Assessed Needs     Row Name 12/05/19 0958          Calculation Measurements    Weight Used For Calculations  68.6 kg (151 lb 2 oz) Adjusted BW        Estimated/Assessed Needs    Additional Documentation  Fluid Requirements (Group);Coosa-St. Jeor Equation (Group);Protein Requirements (Group);Calorie Requirements (Group)        Calorie Requirements    Estimated Calorie Need Method  [place]-St Tim     Estimated Calorie Requirement Comment  7038 - 7468 AF 1.3        Coosa-St. Jeor Equation     RMR ([place]-St. Jeor Equation)  1188.125        Protein Requirements    Weight Used For Protein Calculations  68.6 kg (151 lb 2 oz)     Est Protein Requirement Amount (gms/kg)  1.5 gm protein 83 - 102 gm     Estimated Protein Requirements (gms/day)  102.82        Fluid Requirements    Estimated Fluid Requirement Method  [place]-Segar Formula     [place]-Segar Method (over 20 kg)  2871         Nutrition Prescription Ordered     Row Name 12/05/19 1000          Nutrition Prescription PO    Current PO Diet  Regular     Common Modifiers  Consistent Carbohydrate         Evaluation of Received Nutrient/Fluid Intake     Row Name 12/05/19 0958          PO Evaluation    Number of Days PO Intake Evaluated  2 days     Number of Meals  2     % PO Intake  87               Problem/Interventions:  Problem 1     Row Name 12/05/19 1001          Nutrition Diagnoses Problem 1    Problem 1  Impaired Nutrient Utilization     Etiology (related to)  Medical Diagnosis     Endocrine  DM2     Signs/Symptoms (evidenced by)  Biochemical     Specific Labs Noted  Glucose;HgbA1C         Problem 2     Row Name 12/05/19 1002          Nutrition Diagnoses Problem 2    Problem 2  Overweight/Obesity     Etiology (related to)  Factors Affecting Nutrition     Food Habit/Preferences  Large Meals     Signs/Symptoms (evidenced by)  BMI     BMI  30 - 34.9             Intervention Goal     Row Name 12/05/19 1003          Intervention Goal    General  Meet nutritional needs for age/condition     PO  Meet estimated needs;Maintain intake     Weight  No significant weight loss         Nutrition Intervention     Row Name 12/05/19 1004          Nutrition Intervention    RD/Tech Action  Follow Tx progress;Encourage intake;Recommend/ordered     Recommended/Ordered  Supplement         Nutrition Prescription     Row Name 12/05/19 1004          Nutrition Prescription PO    PO Prescription  Begin/change diet;Begin/change supplement     Begin/Change Diet to  Regular      Supplement  Boost Glucose Control     Common Modifiers  Cardiac;Consistent Carbohydrate     New PO Prescription Ordered?  No, recommended        Other Orders    Obtain Weight  Daily     Obtain Weight Ordered?  No, recommended     Supplement  Vitamin mineral supplement     Supplement Ordered?  No, recommended         Education/Evaluation     Row Name 12/05/19 1005          Education    Education  Will Instruct as appropriate        Monitor/Evaluation    Monitor  Per protocol;PO intake;Supplement intake;I&O;Pertinent labs;Weight;Skin status           Electronically signed by:  Mica Knowles RD  12/05/19 10:07 AM

## 2019-12-05 NOTE — PROGRESS NOTES
Continued Stay Note  ANNIKA Pierre     Patient Name: Radha Whelan  MRN: [id number]  Today's Date: 12/5/2019    Admit Date: 12/4/2019    Discharge Plan     Row Name 12/05/19 1411       Plan    Plan Comments  [facility]  has accepted pt Saturday nursing to call report to [phone] fax Dc summary  to [phone]    Row Name 12/05/19 6306       Plan    Plan  Rehab at [facility]     Patient/Family in Agreement with Plan  yes    Plan Comments  Spoke to pt regarding discharge plans Confirmed address and phone number She has CPAP with [facility]  [facility] She lives with her   She would like to go to rehab at [facility] Faxed a referral to them She does not have a living will and is declining  information regarding this         Discharge Codes    No documentation.       Expected Discharge Date and Time     Expected Discharge Date Expected Discharge Time    Dec 7, 2019             Alba Sanchez RN

## 2019-12-05 NOTE — PLAN OF CARE
Problem: Patient Care Overview  Goal: Plan of Care Review  Outcome: Ongoing (interventions implemented as appropriate)   12/05/19 9832   Coping/Psychosocial   Plan of Care Reviewed With patient;daughter   OTHER   Outcome Summary PT tx completed. Pt transferred from bedside chair to bedside commode with min A and verbal cues to maintain R hip protocol. While on bedside commode pt appeared to pass out for approximately 20 seconds. Nursing was notified and responded promptly to evaluate pt. Pt was able to be transferred back to bed with min A x 2 for safety. Pt assisted with hygiene and was placed in supine with abduction pillow in place. Pt's vitals were stable and pt was alert and conversant. She demonstrated bodily shakes and reported 8/10 R hip pain. Pt to continue with skilled PT services per POC.    Plan of Care Review   Progress no change

## 2019-12-06 ENCOUNTER — APPOINTMENT (OUTPATIENT)
Dept: GENERAL RADIOLOGY | Facility: HOSPITAL | Age: 79
End: 2019-12-06

## 2019-12-06 ENCOUNTER — HOSPITAL ENCOUNTER (INPATIENT)
Facility: HOSPITAL | Age: 79
LOS: 3 days | Discharge: NURSING, REHAB OR LONG-TERM CARE | End: 2019-12-10
Attending: INTERNAL MEDICINE | Admitting: INTERNAL MEDICINE

## 2019-12-06 ENCOUNTER — APPOINTMENT (OUTPATIENT)
Dept: MAMMOGRAPHY | Facility: HOSPITAL | Age: 79
End: 2019-12-06

## 2019-12-06 VITALS
OXYGEN SATURATION: 98 % | HEIGHT: 67 IN | WEIGHT: 213 LBS | RESPIRATION RATE: 20 BRPM | DIASTOLIC BLOOD PRESSURE: 62 MMHG | SYSTOLIC BLOOD PRESSURE: 117 MMHG | HEART RATE: 73 BPM | BODY MASS INDEX: 33.43 KG/M2 | TEMPERATURE: 99.5 F

## 2019-12-06 PROBLEM — R00.1 BRADYCARDIA: Status: ACTIVE | Noted: 2019-12-06

## 2019-12-06 LAB
ANION GAP SERPL CALCULATED.3IONS-SCNC: 11.3 MMOL/L (ref 5–15)
BUN BLD-MCNC: 17 MG/DL (ref 8–23)
BUN/CREAT SERPL: 18.7 (ref 7–25)
CALCIUM SPEC-SCNC: 7.7 MG/DL (ref 8.6–10.5)
CHLORIDE SERPL-SCNC: 104 MMOL/L (ref 98–107)
CO2 SERPL-SCNC: 24.7 MMOL/L (ref 22–29)
CREAT BLD-MCNC: 0.91 MG/DL (ref 0.57–1)
DEPRECATED RDW RBC AUTO: 44.8 FL (ref 37–54)
ERYTHROCYTE [DISTWIDTH] IN BLOOD BY AUTOMATED COUNT: 12.8 % (ref 12.3–15.4)
GFR SERPL CREATININE-BSD FRML MDRD: 59 ML/MIN/1.73
GLUCOSE BLD-MCNC: 126 MG/DL (ref 65–99)
GLUCOSE BLDC GLUCOMTR-MCNC: 128 MG/DL (ref 70–130)
GLUCOSE BLDC GLUCOMTR-MCNC: 139 MG/DL (ref 70–130)
GLUCOSE BLDC GLUCOMTR-MCNC: 144 MG/DL (ref 70–130)
GLUCOSE BLDC GLUCOMTR-MCNC: 175 MG/DL (ref 70–130)
HCT VFR BLD AUTO: 28.9 % (ref 34–46.6)
HGB BLD-MCNC: 9.1 G/DL (ref 12–15.9)
MCH RBC QN AUTO: 30.1 PG (ref 26.6–33)
MCHC RBC AUTO-ENTMCNC: 31.5 G/DL (ref 31.5–35.7)
MCV RBC AUTO: 95.7 FL (ref 79–97)
PLATELET # BLD AUTO: 143 10*3/MM3 (ref 140–450)
PMV BLD AUTO: 9.1 FL (ref 6–12)
POTASSIUM BLD-SCNC: 4.3 MMOL/L (ref 3.5–5.2)
RBC # BLD AUTO: 3.02 10*6/MM3 (ref 3.77–5.28)
SODIUM BLD-SCNC: 140 MMOL/L (ref 136–145)
WBC NRBC COR # BLD: 7.2 10*3/MM3 (ref 3.4–10.8)

## 2019-12-06 PROCEDURE — 71046 X-RAY EXAM CHEST 2 VIEWS: CPT

## 2019-12-06 PROCEDURE — 33208 INSRT HEART PM ATRIAL & VENT: CPT | Performed by: INTERNAL MEDICINE

## 2019-12-06 PROCEDURE — 99152 MOD SED SAME PHYS/QHP 5/>YRS: CPT

## 2019-12-06 PROCEDURE — C1785 PMKR, DUAL, RATE-RESP: HCPCS | Performed by: INTERNAL MEDICINE

## 2019-12-06 PROCEDURE — 63710000001 INSULIN LISPRO (HUMAN) PER 5 UNITS: Performed by: NURSE PRACTITIONER

## 2019-12-06 PROCEDURE — C1892 INTRO/SHEATH,FIXED,PEEL-AWAY: HCPCS | Performed by: INTERNAL MEDICINE

## 2019-12-06 PROCEDURE — 25010000002 FENTANYL CITRATE (PF) 100 MCG/2ML SOLUTION: Performed by: INTERNAL MEDICINE

## 2019-12-06 PROCEDURE — 82962 GLUCOSE BLOOD TEST: CPT

## 2019-12-06 PROCEDURE — G0378 HOSPITAL OBSERVATION PER HR: HCPCS

## 2019-12-06 PROCEDURE — 25010000002 KETOROLAC TROMETHAMINE PER 15 MG: Performed by: NURSE PRACTITIONER

## 2019-12-06 PROCEDURE — 25010000002 MORPHINE PER 10 MG: Performed by: NURSE PRACTITIONER

## 2019-12-06 PROCEDURE — C1898 LEAD, PMKR, OTHER THAN TRANS: HCPCS | Performed by: INTERNAL MEDICINE

## 2019-12-06 PROCEDURE — 02H63JZ INSERTION OF PACEMAKER LEAD INTO RIGHT ATRIUM, PERCUTANEOUS APPROACH: ICD-10-PCS | Performed by: INTERNAL MEDICINE

## 2019-12-06 PROCEDURE — 85027 COMPLETE CBC AUTOMATED: CPT | Performed by: NURSE PRACTITIONER

## 2019-12-06 PROCEDURE — 02HK3JZ INSERTION OF PACEMAKER LEAD INTO RIGHT VENTRICLE, PERCUTANEOUS APPROACH: ICD-10-PCS | Performed by: INTERNAL MEDICINE

## 2019-12-06 PROCEDURE — 25010000002 MIDAZOLAM PER 1 MG: Performed by: INTERNAL MEDICINE

## 2019-12-06 PROCEDURE — 0JH606Z INSERTION OF PACEMAKER, DUAL CHAMBER INTO CHEST SUBCUTANEOUS TISSUE AND FASCIA, OPEN APPROACH: ICD-10-PCS | Performed by: INTERNAL MEDICINE

## 2019-12-06 PROCEDURE — 80048 BASIC METABOLIC PNL TOTAL CA: CPT | Performed by: NURSE PRACTITIONER

## 2019-12-06 PROCEDURE — 99222 1ST HOSP IP/OBS MODERATE 55: CPT | Performed by: NURSE PRACTITIONER

## 2019-12-06 PROCEDURE — 25010000002 CALCIUM GLUCONATE-NACL 1-0.675 GM/50ML-% SOLUTION: Performed by: INTERNAL MEDICINE

## 2019-12-06 PROCEDURE — 99239 HOSP IP/OBS DSCHRG MGMT >30: CPT | Performed by: NURSE PRACTITIONER

## 2019-12-06 PROCEDURE — 99152 MOD SED SAME PHYS/QHP 5/>YRS: CPT | Performed by: INTERNAL MEDICINE

## 2019-12-06 PROCEDURE — 25010000003 CEFAZOLIN IN DEXTROSE 2-4 GM/100ML-% SOLUTION: Performed by: INTERNAL MEDICINE

## 2019-12-06 DEVICE — IMPLANTABLE DEVICE: Type: IMPLANTABLE DEVICE | Status: FUNCTIONAL

## 2019-12-06 DEVICE — PACEMAKER
Type: IMPLANTABLE DEVICE | Status: FUNCTIONAL
Brand: ACCOLADE™ MRI DR

## 2019-12-06 RX ORDER — PANTOPRAZOLE SODIUM 40 MG/1
40 TABLET, DELAYED RELEASE ORAL
Status: DISCONTINUED | OUTPATIENT
Start: 2019-12-07 | End: 2019-12-10 | Stop reason: HOSPADM

## 2019-12-06 RX ORDER — BUPIVACAINE HYDROCHLORIDE 5 MG/ML
INJECTION, SOLUTION PERINEURAL AS NEEDED
Status: DISCONTINUED | OUTPATIENT
Start: 2019-12-06 | End: 2019-12-06 | Stop reason: HOSPADM

## 2019-12-06 RX ORDER — IBUPROFEN 600 MG/1
600 TABLET ORAL EVERY 6 HOURS SCHEDULED
Status: DISCONTINUED | OUTPATIENT
Start: 2019-12-06 | End: 2019-12-06

## 2019-12-06 RX ORDER — MIDAZOLAM HYDROCHLORIDE 1 MG/ML
INJECTION INTRAMUSCULAR; INTRAVENOUS AS NEEDED
Status: DISCONTINUED | OUTPATIENT
Start: 2019-12-06 | End: 2019-12-06 | Stop reason: HOSPADM

## 2019-12-06 RX ORDER — FOLIC ACID 1 MG/1
1000 TABLET ORAL DAILY
Status: DISCONTINUED | OUTPATIENT
Start: 2019-12-06 | End: 2019-12-10 | Stop reason: HOSPADM

## 2019-12-06 RX ORDER — ONDANSETRON 2 MG/ML
4 INJECTION INTRAMUSCULAR; INTRAVENOUS EVERY 6 HOURS PRN
Status: DISCONTINUED | OUTPATIENT
Start: 2019-12-06 | End: 2019-12-10 | Stop reason: HOSPADM

## 2019-12-06 RX ORDER — SODIUM CHLORIDE 0.9 % (FLUSH) 0.9 %
3 SYRINGE (ML) INJECTION EVERY 12 HOURS SCHEDULED
Status: DISCONTINUED | OUTPATIENT
Start: 2019-12-06 | End: 2019-12-10 | Stop reason: HOSPADM

## 2019-12-06 RX ORDER — LANOLIN ALCOHOL/MO/W.PET/CERES
1000 CREAM (GRAM) TOPICAL 2 TIMES WEEKLY
COMMUNITY
End: 2022-07-25

## 2019-12-06 RX ORDER — DOCUSATE SODIUM 100 MG/1
100 CAPSULE, LIQUID FILLED ORAL 2 TIMES DAILY PRN
Status: DISCONTINUED | OUTPATIENT
Start: 2019-12-06 | End: 2019-12-10 | Stop reason: HOSPADM

## 2019-12-06 RX ORDER — LIDOCAINE HYDROCHLORIDE 10 MG/ML
INJECTION, SOLUTION EPIDURAL; INFILTRATION; INTRACAUDAL; PERINEURAL AS NEEDED
Status: DISCONTINUED | OUTPATIENT
Start: 2019-12-06 | End: 2019-12-06 | Stop reason: HOSPADM

## 2019-12-06 RX ORDER — FENTANYL CITRATE 50 UG/ML
INJECTION, SOLUTION INTRAMUSCULAR; INTRAVENOUS AS NEEDED
Status: DISCONTINUED | OUTPATIENT
Start: 2019-12-06 | End: 2019-12-06 | Stop reason: HOSPADM

## 2019-12-06 RX ORDER — ACETAMINOPHEN 325 MG/1
650 TABLET ORAL EVERY 6 HOURS
Status: DISCONTINUED | OUTPATIENT
Start: 2019-12-06 | End: 2019-12-10 | Stop reason: HOSPADM

## 2019-12-06 RX ORDER — ATORVASTATIN CALCIUM 10 MG/1
10 TABLET, FILM COATED ORAL NIGHTLY
Status: DISCONTINUED | OUTPATIENT
Start: 2019-12-06 | End: 2019-12-10 | Stop reason: HOSPADM

## 2019-12-06 RX ORDER — CHOLECALCIFEROL (VITAMIN D3) 125 MCG
5 CAPSULE ORAL NIGHTLY
Status: DISCONTINUED | OUTPATIENT
Start: 2019-12-06 | End: 2019-12-10 | Stop reason: HOSPADM

## 2019-12-06 RX ORDER — SODIUM CHLORIDE 0.9 % (FLUSH) 0.9 %
10 SYRINGE (ML) INJECTION AS NEEDED
Status: DISCONTINUED | OUTPATIENT
Start: 2019-12-06 | End: 2019-12-10 | Stop reason: HOSPADM

## 2019-12-06 RX ORDER — NICOTINE POLACRILEX 4 MG
15 LOZENGE BUCCAL
Status: DISCONTINUED | OUTPATIENT
Start: 2019-12-06 | End: 2019-12-10 | Stop reason: HOSPADM

## 2019-12-06 RX ORDER — CEFAZOLIN SODIUM 2 G/100ML
2 INJECTION, SOLUTION INTRAVENOUS ONCE
Status: COMPLETED | OUTPATIENT
Start: 2019-12-06 | End: 2019-12-06

## 2019-12-06 RX ORDER — TRAMADOL HYDROCHLORIDE 50 MG/1
50 TABLET ORAL EVERY 6 HOURS PRN
Status: DISPENSED | OUTPATIENT
Start: 2019-12-06 | End: 2019-12-09

## 2019-12-06 RX ORDER — DEXTROSE MONOHYDRATE 25 G/50ML
25 INJECTION, SOLUTION INTRAVENOUS
Status: DISCONTINUED | OUTPATIENT
Start: 2019-12-06 | End: 2019-12-10 | Stop reason: HOSPADM

## 2019-12-06 RX ORDER — KETOROLAC TROMETHAMINE 30 MG/ML
15 INJECTION, SOLUTION INTRAMUSCULAR; INTRAVENOUS ONCE AS NEEDED
Status: COMPLETED | OUTPATIENT
Start: 2019-12-06 | End: 2019-12-06

## 2019-12-06 RX ORDER — GLIPIZIDE 5 MG/1
1.25 TABLET ORAL
Status: DISCONTINUED | OUTPATIENT
Start: 2019-12-06 | End: 2019-12-06

## 2019-12-06 RX ORDER — GLIPIZIDE 2.5 MG/1
2.5 TABLET, EXTENDED RELEASE ORAL DAILY
COMMUNITY
End: 2019-12-20 | Stop reason: ALTCHOICE

## 2019-12-06 RX ORDER — ACETAMINOPHEN 325 MG/1
650 TABLET ORAL EVERY 6 HOURS PRN
Status: DISCONTINUED | OUTPATIENT
Start: 2019-12-06 | End: 2019-12-10 | Stop reason: HOSPADM

## 2019-12-06 RX ORDER — CLOPIDOGREL BISULFATE 75 MG/1
75 TABLET ORAL DAILY
Status: DISCONTINUED | OUTPATIENT
Start: 2019-12-06 | End: 2019-12-10 | Stop reason: HOSPADM

## 2019-12-06 RX ORDER — CALCIUM GLUCONATE 20 MG/ML
1 INJECTION, SOLUTION INTRAVENOUS ONCE
Status: COMPLETED | OUTPATIENT
Start: 2019-12-06 | End: 2019-12-06

## 2019-12-06 RX ADMIN — SODIUM CHLORIDE, PRESERVATIVE FREE 3 ML: 5 INJECTION INTRAVENOUS at 22:23

## 2019-12-06 RX ADMIN — MORPHINE SULFATE 1 MG: 2 INJECTION, SOLUTION INTRAMUSCULAR; INTRAVENOUS at 08:34

## 2019-12-06 RX ADMIN — MELATONIN TAB 5 MG 5 MG: 5 TAB at 22:22

## 2019-12-06 RX ADMIN — CEFAZOLIN SODIUM 2 G: 2 INJECTION, SOLUTION INTRAVENOUS at 17:50

## 2019-12-06 RX ADMIN — ATORVASTATIN CALCIUM 10 MG: 10 TABLET, FILM COATED ORAL at 19:52

## 2019-12-06 RX ADMIN — INSULIN LISPRO 2 UNITS: 100 INJECTION, SOLUTION INTRAVENOUS; SUBCUTANEOUS at 22:23

## 2019-12-06 RX ADMIN — ACETAMINOPHEN 650 MG: 325 TABLET ORAL at 19:52

## 2019-12-06 RX ADMIN — CALCIUM GLUCONATE 1 G: 20 INJECTION, SOLUTION INTRAVENOUS at 07:18

## 2019-12-06 RX ADMIN — KETOROLAC TROMETHAMINE 15 MG: 30 INJECTION, SOLUTION INTRAMUSCULAR; INTRAVENOUS at 16:02

## 2019-12-06 RX ADMIN — TRAMADOL HYDROCHLORIDE 50 MG: 50 TABLET, COATED ORAL at 21:18

## 2019-12-06 RX ADMIN — OXYCODONE HYDROCHLORIDE AND ACETAMINOPHEN 2 TABLET: 5; 325 TABLET ORAL at 03:19

## 2019-12-06 RX ADMIN — CLOPIDOGREL BISULFATE 75 MG: 75 TABLET ORAL at 19:52

## 2019-12-06 NOTE — H&P (VIEW-ONLY)
Cardiac Electrophysiology In Patient Consultation        [facility]     Consultation      PATIENT NAME:  Radha Whelan    :  1940 AGE: 79 y.o.     Date of Admission:  2019  Date of Consultation:  2019      Subjective      REASON FOR CONSULT: Sinus Node Dysfunction    CHIEF COMPLAINT: Recurrent Syncope    Problem List:     1. Syncope / Collapse  a. Recurrent Wilfrido Syncope x 2.  First 10/2019 while seated and second 2019 s/p Rt total hip surgery on transfer to bed.  2. Essential Hypertension  a. ECHO 2019 LVEF 42%, Trace MR / TR.  b. LHC 2019 without evidence of hemodynamically significant CAD, LVEF 55%.  3. Dyslipidemia  4. Type 2 Diabetes Mellitus   5. CVA / TIA  6. LBBB  7. Osteoarthritis       HISTORY OF PRESENT ILLNESS: Ms. Kumar is a 79-year-old white female seen in consultation for syncope status post right total hip replacement with pauses noted on telemetry.  She has a past medical history significant for hypertension, dyslipidemia, type 2 diabetes, TIAs, and left bundle branch block.  The patient states that she has a very long history of recurrent syncope mainly upon the visualization of blood in the past.  The patient does endorse that her syncope has been different in the recent past year.  She states that her syncope is aggravated with standing and relieved by lying down with her feet raised.  She notes that she has daily symptoms of profound dizziness upon standing associated with visual disturbances and nausea.  She reports wilfrido syncope once approximately 2 months ago sitting in a chair while having her hair fixed and then again yesterday while working with physical therapy to get off the bedside commode and back to bed that she associates with increased pain in her right hip.  The patient also endorses a past medical history of TIAs x 3 initially occurring in  and the last approximately 4 years ago when she was placed on Plavix.  She  "denies ever being told she had a irregular heart rhythm or wearing a heart monitor during this time.  The patient is noted not to be on any AV donovan blocking agents and no other transient or reversible causes are identified.  She is noted to have a low-grade fever up to 100.0 status post right hip replacement 12/4/2019 at outlying facility.    PAST MEDICAL HISTORY  Past Medical History:   Diagnosis Date   • Arthritis    • Basal cell carcinoma    • Body piercing     both ears   • Cataract, bilateral     s/p removal    • Diabetes mellitus (CMS/[facility])    • DVT (deep venous thrombosis) (CMS/[facility])     1970's- left leg   • Edema     legs- hx of   • Elevated cholesterol    • Fatigue    • Fatigue    • History of left heart catheterization     9/16/19  no stents   • History of migraine headaches    • Hx of exercise stress test 2004   • Hyperlipidemia    • Hypertension    • Hyponatremia    • Impaired functional mobility, balance, gait, and endurance    • LBBB (left bundle branch block)    • Left leg pain     left leg and knee pain    • Muscle spasm     hx of   • ODELL on CPAP     CPAP   • Stroke (CMS/[facility])    • Teeth missing     all of the top, and has 6 bottom teeth left   • Thyroid nodule    • TIA (transient ischemic attack)     x3.  last one was \"a few years ago\"   • Wears dentures     top plate   • Wears glasses     to read       SURGICAL HISTORY   has a past surgical history that includes Tubal ligation; Cataract extraction (12/2018); Cardiac catheterization; Cardiac catheterization (N/A, 9/16/2019); Breast biopsy (Left); Colonoscopy; Skin biopsy; and Mouth surgery.     SOCIAL HISTORY  Social History     Socioeconomic History   • Marital status:      Spouse name: Not on file   • Number of children: Not on file   • Years of education: Not on file   • Highest education level: Not on file   Tobacco Use   • Smoking status: Never Smoker   • Smokeless tobacco: Never Used   Substance and Sexual Activity   • Alcohol use: No   • " Drug use: No   • Sexual activity: Defer       FAMILY HISTORY  family history includes Breast cancer in her sister, sister and another family member; Cancer in an other family member; Diabetes in an other family member; Heart disease in her father and mother; Hypertension in an other family member; Migraines in an other family member.     MEDICATIONS  Prior to Admission medications    Medication Sig Start Date End Date Taking? Authorizing Provider   Cholecalciferol (VITAMIN D PO) Take 125 mcg by mouth 2 (Two) Times a Week.   Yes Susy Fernandes MD   clopidogrel (PLAVIX) 75 MG tablet TAKE 1 TABLET DAILY 4/19/19  Yes Farhan Schaefer MD   coenzyme Q10 100 MG capsule Take 400 mg by mouth Every Other Day.   Yes Susy Fernandes MD   docusate sodium 100 MG capsule Take 100 mg by mouth 2 (Two) Times a Day As Needed for Constipation. 12/5/19  Yes Jayant Escalante,    folic acid (FOLVITE) 1 MG tablet TAKE 1 TABLET DAILY 12/26/18  Yes Farhan Schaefer MD   glipizide (GLUCOTROL XL) 2.5 MG 24 hr tablet Take 2.5 mg by mouth Daily.   Yes Susy Fernandes MD   Glucosamine-Chondroit-Vit C-Mn (GLUCOSAMINE CHONDR 1500 COMPLX PO) Take 1 tablet by mouth Daily. 10/28/15  Yes Susy Fernandes MD   lovastatin (MEVACOR) 10 MG tablet TAKE 1 TABLET EVERY NIGHT  Patient taking differently: TAKE 1 TABLET EVERY THREE TIMES WEEKLY 4/29/19  Yes Farhan Schaefer MD   melatonin 3 MG tablet Take 3 mg by mouth Every Night.   Yes Susy Fernandes MD   pantoprazole (PROTONIX) 40 MG EC tablet TAKE 1 TABLET DAILY 9/9/19  Yes Farhan Schaefer MD   vitamin B-12 (CYANOCOBALAMIN) 1000 MCG tablet Take 1,000 mcg by mouth 2 (Two) Times a Week.   Yes Susy Fernandes MD   aspirin 81 MG tablet Take 81 mg by mouth Daily. 10/28/15 12/6/19  Susy Fernandes MD   aspirin-acetaminophen-caffeine (EXCEDRIN MIGRAINE) 250-250-65 MG per tablet Take 1 tablet by mouth Every 6 (Six) Hours As Needed. 12/10/15 12/6/19  Dom  MD Susy   cloNIDine (CATAPRES) 0.1 MG tablet Take 1 tablet by mouth 2 (Two) Times a Day. 10/25/19 12/6/19  Chava Carpio DO   Cyanocobalamin ([facility] VITAMIN B12 PO) Take 1 tablet by mouth 2 (Two) Times a Week.  12/6/19  Susy Fernandes MD   enoxaparin (LOVENOX) 40 MG/0.4ML solution syringe Inject 0.4 mL under the skin into the appropriate area as directed Daily. 12/6/19 12/6/19  Jayant Escalante DO   furosemide (LASIX) 20 MG tablet TAKE 1 TABLET TWICE A DAY 7/31/19 12/6/19  Farhan Schaefer MD   glipiZIDE (GLUCOTROL XL) 2.5 MG 24 hr tablet Take 1 tablet by mouth Daily. 11/26/18 12/6/19  Farhan Schaefer MD   glucose blood test strip Use as instructed 11/16/17 12/6/19  Farhan Schaefer MD   ibuprofen (ADVIL,MOTRIN) 800 MG tablet Take 800 mg by mouth 3 (Three) Times a Day As Needed. 10/9/19 12/6/19  Susy Fernandes MD   insulin aspart (novoLOG) 100 UNIT/ML injection Inject 0-9 Units under the skin into the appropriate area as directed 4 (Four) Times a Day Before Meals & at Bedtime. 12/5/19 12/6/19  Jayant Escalante DO   lisinopril (PRINIVIL,ZESTRIL) 40 MG tablet TAKE 1 TABLET DAILY 9/23/19 12/6/19  Farhan Schaefer MD   Morphine sulfate, PF, 2 MG/ML solution injection Infuse 0.5 mL into a venous catheter Every 4 (Four) Hours As Needed (pain). 12/5/19 12/6/19  Jayant Escalante DO   naloxone (NARCAN) 0.4 MG/ML injection Infuse 1 mL into a venous catheter Every 5 (Five) Minutes As Needed for Respiratory Depression. 12/5/19 12/6/19  Jayant Escalante DO   Omega-3 Fatty Acids (FISH OIL) 1200 MG capsule capsule Take 1,200 mg by mouth Daily. 10/28/15 12/6/19  Susy Fernandes MD   ondansetron (ZOFRAN) 4 MG tablet Take 1 tablet by mouth Every 6 (Six) Hours As Needed for Nausea or Vomiting. 12/5/19 12/6/19  Jayant Escalante,    ondansetron (ZOFRAN) 4 MG/2ML injection Infuse 2 mL into a venous catheter Every 6 (Six) Hours As Needed for Nausea or Vomiting. 12/5/19 12/6/19  Ava,  "Jayant FRIEDMAN DO   oxyCODONE-acetaminophen (PERCOCET) 5-325 MG per tablet Take 1 tablet by mouth Every 4 (Four) Hours As Needed for Moderate Pain  for up to 9 days. 12/5/19 12/6/19  Jayant Escalante DO   senna-docusate sodium (SENOKOT-S) 8.6-50 MG tablet Take 2 tablets by mouth 2 (Two) Times a Day. 12/5/19 12/6/19  Jayant Escalante,    sodium chloride 0.9 % solution Infuse 50 mL/hr into a venous catheter Continuous. 12/5/19 12/6/19  Jayant Escalante, DO   sodium chloride 0.9 % solution Infuse 75 mL/hr into a venous catheter Continuous. 12/5/19 12/6/19  Jayant Escalante, DO   sodium chloride 0.9 % solution Infuse 75 mL/hr into a venous catheter Continuous for 1 day. 12/5/19 12/6/19  Jayant Escalante DO   spironolactone (ALDACTONE) 25 MG tablet Take 1 tablet by mouth Daily. 11/13/19 12/6/19  Farhan Schaefer MD   Tobramycin-Dexamethasone 0.3-0.05 % suspension Apply 1 drop to eye(s) as directed by provider 2 (Two) Times a Day.  Patient taking differently: Apply 1 drop to eye(s) as directed by provider 2 (Two) Times a Day As Needed. 9/26/19 12/6/19  Farhan Schaefer MD       ALLERGIES  Allergies   Allergen Reactions   • Cumini Other (See Comments)     Complex migrane   • Butter Flavor [Flavoring Agent] Other (See Comments)     States \"butter fat\"-migranes     • Cheese Other (See Comments)     migrane     • Other Other (See Comments)     \"ice cream\"-migranes     • Saccharin Other (See Comments)     migrane         REVIEW OF SYSTEMS  Review of Systems:   Constitutional: No fevers or chills, no recent weight gain or weight loss or fatigue  Eyes: No visual loss, blurred vision, double vision, yellow sclerae.  ENT: No headaches, hearing loss, vertigo, congestion or sore throat.   Cardiovascular: Per HPI  Respiratory: No cough or wheezing, no sputum production, no hematemesis   Gastrointestinal: No abdominal pain, no nausea, vomiting, constipation, diarrhea, melena.   Genitourinary: No dysuria, hematuria or " "increased frequency, + nausea  Musculoskeletal:  No gait disturbance, weakness or joint pain or stiffness  Integumentary: No rashes, urticaria, ulcers or sores.   Neurological: No headache, paralysis, ataxia, no prior CVA/TIA, + dizziness, syncope,   Psychiatric: No anxiety, or depression  Endocrine: No diaphoresis, cold or heat intolerance. No polyuria or polydipsia.   Hematologic/Lymphatic: No anemia, abnormal bruising or bleeding. No history of DVT/PE.       Objective     VITAL SIGNS: /56 (BP Location: Left arm, Patient Position: Lying)   Pulse 77   Temp 97.2 °F (36.2 °C) (Temporal)   Ht 170.2 cm (67\")   Wt 99.1 kg (218 lb 7.6 oz)   SpO2 98%   BMI 34.22 kg/m²      ADMIT WEIGHT:  99.1 kg (218 lb 7.6 oz)  BMI: Body mass index is 34.22 kg/m².    Admission Weight: 99.1 kg (218 lb 7.6 oz)     PHYSICAL EXAM  General appearance: Awake, alert, cooperative  Head: Normocephalic, without obvious abnormality, atraumatic  Eyes: Conjunctivae/corneas clear, EOMs intact  Neck: no adenopathy, no carotid bruit, no JVD and thyroid: not enlarged  Lungs: clear to auscultation bilaterally and no rhonchi or crackles\", ' symmetric  Heart: regular rate and rhythm, S1, S2 normal, no murmur, click, rub or gallop  Abdomen: Soft, non-tender, bowel sounds normal,  no organomegaly  Extremities: extremities normal, atraumatic, no cyanosis or edema  Skin: Skin color, turgor normal, no rashes or lesions  Neurologic: Grossly normal     CBC: Results from last 7 days   Lab Units 12/06/19  0428 12/05/19  1644 12/05/19  1251  12/04/19  1434   WBC 10*3/mm3 7.20 7.50  --   --  7.50   HEMOGLOBIN g/dL 9.1* 9.9* 10.4*   < > 10.8*   HEMATOCRIT % 28.9* 31.1* 31.6*   < > 32.9*   MCV fL 95.7 96.0  --   --  94.0   PLATELETS 10*3/mm3 143 151  --   --  154    < > = values in this interval not displayed.         BMP:  Results from last 7 days   Lab Units 12/06/19  0428 12/05/19  1644 12/05/19  0548   POTASSIUM mmol/L 4.3 4.5 5.1   CHLORIDE mmol/L 104 " 98 101   CO2 mmol/L 24.7 25.5 26.6   BUN mg/dL 17 19 20   CREATININE mg/dL 0.91 1.14* 1.09*   GLUCOSE mg/dL 126* 141* 179*   CALCIUM mg/dL 7.7* 8.9 9.4     TELEMETRY: NSR at 70 bpm    Assessment      1.  Syncope/collapse.  2.  Osteoarthritis status post right hip replacement 12/4/2019.  3.  Essential hypertension  4.  Dyslipidemia      Plan        Ms. Kumar is a 79-year-old elderly white female seen in consultation for concern over recurrent syncope/collapse.  The majority of her history of her presyncopal and syncopal episodes are related to standing however she does have a history of wilfrido syncope while seated.  She is not taking any AV donovan blocking agents or no transient or reversible causes are identified.  Will review case with Dr Laboy for potential DDD PM implant.    Will consult hospital medicine for diabetes and pain management status post right hip replacement.    Electronically signed by CURTIS Mccarty, 12/06/19, 1:21 PM.      This note was completed using a voice transcription system. Every effort was made to ensure accuracy. However, inadvertent computerized transcription errors may be present.

## 2019-12-06 NOTE — CONSULTS
Cardiac Electrophysiology In Patient Consultation        [facility]     Consultation      PATIENT NAME:  Radha Whelan    :  1940 AGE: 79 y.o.     Date of Admission:  2019  Date of Consultation:  2019      Subjective      REASON FOR CONSULT: Sinus Node Dysfunction    CHIEF COMPLAINT: Recurrent Syncope    Problem List:     1. Syncope / Collapse  a. Recurrent Wilfrido Syncope x 2.  First 10/2019 while seated and second 2019 s/p Rt total hip surgery on transfer to bed.  2. Essential Hypertension  a. ECHO 2019 LVEF 42%, Trace MR / TR.  b. LHC 2019 without evidence of hemodynamically significant CAD, LVEF 55%.  3. Dyslipidemia  4. Type 2 Diabetes Mellitus   5. CVA / TIA  6. LBBB  7. Osteoarthritis       HISTORY OF PRESENT ILLNESS: Ms. Kumar is a 79-year-old white female seen in consultation for syncope status post right total hip replacement with pauses noted on telemetry.  She has a past medical history significant for hypertension, dyslipidemia, type 2 diabetes, TIAs, and left bundle branch block.  The patient states that she has a very long history of recurrent syncope mainly upon the visualization of blood in the past.  The patient does endorse that her syncope has been different in the recent past year.  She states that her syncope is aggravated with standing and relieved by lying down with her feet raised.  She notes that she has daily symptoms of profound dizziness upon standing associated with visual disturbances and nausea.  She reports wilfrido syncope once approximately 2 months ago sitting in a chair while having her hair fixed and then again yesterday while working with physical therapy to get off the bedside commode and back to bed that she associates with increased pain in her right hip.  The patient also endorses a past medical history of TIAs x 3 initially occurring in  and the last approximately 4 years ago when she was placed on Plavix.  She  "denies ever being told she had a irregular heart rhythm or wearing a heart monitor during this time.  The patient is noted not to be on any AV donovan blocking agents and no other transient or reversible causes are identified.  She is noted to have a low-grade fever up to 100.0 status post right hip replacement 12/4/2019 at outlying facility.    PAST MEDICAL HISTORY  Past Medical History:   Diagnosis Date   • Arthritis    • Basal cell carcinoma    • Body piercing     both ears   • Cataract, bilateral     s/p removal    • Diabetes mellitus (CMS/[facility])    • DVT (deep venous thrombosis) (CMS/Formerly [facility])     1970's- left leg   • Edema     legs- hx of   • Elevated cholesterol    • Fatigue    • Fatigue    • History of left heart catheterization     9/16/19  no stents   • History of migraine headaches    • Hx of exercise stress test 2004   • Hyperlipidemia    • Hypertension    • Hyponatremia    • Impaired functional mobility, balance, gait, and endurance    • LBBB (left bundle branch block)    • Left leg pain     left leg and knee pain    • Muscle spasm     hx of   • ODELL on CPAP     CPAP   • Stroke (CMS/[facility])    • Teeth missing     all of the top, and has 6 bottom teeth left   • Thyroid nodule    • TIA (transient ischemic attack)     x3.  last one was \"a few years ago\"   • Wears dentures     top plate   • Wears glasses     to read       SURGICAL HISTORY   has a past surgical history that includes Tubal ligation; Cataract extraction (12/2018); Cardiac catheterization; Cardiac catheterization (N/A, 9/16/2019); Breast biopsy (Left); Colonoscopy; Skin biopsy; and Mouth surgery.     SOCIAL HISTORY  Social History     Socioeconomic History   • Marital status:      Spouse name: Not on file   • Number of children: Not on file   • Years of education: Not on file   • Highest education level: Not on file   Tobacco Use   • Smoking status: Never Smoker   • Smokeless tobacco: Never Used   Substance and Sexual Activity   • Alcohol use: No   • " Drug use: No   • Sexual activity: Defer       FAMILY HISTORY  family history includes Breast cancer in her sister, sister and another family member; Cancer in an other family member; Diabetes in an other family member; Heart disease in her father and mother; Hypertension in an other family member; Migraines in an other family member.     MEDICATIONS  Prior to Admission medications    Medication Sig Start Date End Date Taking? Authorizing Provider   Cholecalciferol (VITAMIN D PO) Take 125 mcg by mouth 2 (Two) Times a Week.   Yes Susy Fernandes MD   clopidogrel (PLAVIX) 75 MG tablet TAKE 1 TABLET DAILY 4/19/19  Yes Farhan Schaefer MD   coenzyme Q10 100 MG capsule Take 400 mg by mouth Every Other Day.   Yes Susy Fernandes MD   docusate sodium 100 MG capsule Take 100 mg by mouth 2 (Two) Times a Day As Needed for Constipation. 12/5/19  Yes Jayant Escalante,    folic acid (FOLVITE) 1 MG tablet TAKE 1 TABLET DAILY 12/26/18  Yes Farhan Schaefer MD   glipizide (GLUCOTROL XL) 2.5 MG 24 hr tablet Take 2.5 mg by mouth Daily.   Yes Susy Fernandes MD   Glucosamine-Chondroit-Vit C-Mn (GLUCOSAMINE CHONDR 1500 COMPLX PO) Take 1 tablet by mouth Daily. 10/28/15  Yes Susy Fernandes MD   lovastatin (MEVACOR) 10 MG tablet TAKE 1 TABLET EVERY NIGHT  Patient taking differently: TAKE 1 TABLET EVERY THREE TIMES WEEKLY 4/29/19  Yes Farhan Schaefer MD   melatonin 3 MG tablet Take 3 mg by mouth Every Night.   Yes Susy Fernandes MD   pantoprazole (PROTONIX) 40 MG EC tablet TAKE 1 TABLET DAILY 9/9/19  Yes Farhan Schaefer MD   vitamin B-12 (CYANOCOBALAMIN) 1000 MCG tablet Take 1,000 mcg by mouth 2 (Two) Times a Week.   Yes Susy Fernandes MD   aspirin 81 MG tablet Take 81 mg by mouth Daily. 10/28/15 12/6/19  Susy Fernandes MD   aspirin-acetaminophen-caffeine (EXCEDRIN MIGRAINE) 250-250-65 MG per tablet Take 1 tablet by mouth Every 6 (Six) Hours As Needed. 12/10/15 12/6/19  Dom  MD Susy   cloNIDine (CATAPRES) 0.1 MG tablet Take 1 tablet by mouth 2 (Two) Times a Day. 10/25/19 12/6/19  Chava Carpio DO   Cyanocobalamin ([employer] VITAMIN B12 PO) Take 1 tablet by mouth 2 (Two) Times a Week.  12/6/19  Susy Fernandes MD   enoxaparin (LOVENOX) 40 MG/0.4ML solution syringe Inject 0.4 mL under the skin into the appropriate area as directed Daily. 12/6/19 12/6/19  Jayant Escalante DO   furosemide (LASIX) 20 MG tablet TAKE 1 TABLET TWICE A DAY 7/31/19 12/6/19  Farhan Schaefer MD   glipiZIDE (GLUCOTROL XL) 2.5 MG 24 hr tablet Take 1 tablet by mouth Daily. 11/26/18 12/6/19  Farhan Schaefer MD   glucose blood test strip Use as instructed 11/16/17 12/6/19  Farhan Schaefer MD   ibuprofen (ADVIL,MOTRIN) 800 MG tablet Take 800 mg by mouth 3 (Three) Times a Day As Needed. 10/9/19 12/6/19  Susy Fernandes MD   insulin aspart (novoLOG) 100 UNIT/ML injection Inject 0-9 Units under the skin into the appropriate area as directed 4 (Four) Times a Day Before Meals & at Bedtime. 12/5/19 12/6/19  Jayant Escalante DO   lisinopril (PRINIVIL,ZESTRIL) 40 MG tablet TAKE 1 TABLET DAILY 9/23/19 12/6/19  Farhan Schaefer MD   Morphine sulfate, PF, 2 MG/ML solution injection Infuse 0.5 mL into a venous catheter Every 4 (Four) Hours As Needed (pain). 12/5/19 12/6/19  Jayant Escalante DO   naloxone (NARCAN) 0.4 MG/ML injection Infuse 1 mL into a venous catheter Every 5 (Five) Minutes As Needed for Respiratory Depression. 12/5/19 12/6/19  Jayant Escalante DO   Omega-3 Fatty Acids (FISH OIL) 1200 MG capsule capsule Take 1,200 mg by mouth Daily. 10/28/15 12/6/19  Susy Fernandes MD   ondansetron (ZOFRAN) 4 MG tablet Take 1 tablet by mouth Every 6 (Six) Hours As Needed for Nausea or Vomiting. 12/5/19 12/6/19  Jayant Escalante,    ondansetron (ZOFRAN) 4 MG/2ML injection Infuse 2 mL into a venous catheter Every 6 (Six) Hours As Needed for Nausea or Vomiting. 12/5/19 12/6/19  Ava,  "Jayant FRIEDMAN DO   oxyCODONE-acetaminophen (PERCOCET) 5-325 MG per tablet Take 1 tablet by mouth Every 4 (Four) Hours As Needed for Moderate Pain  for up to 9 days. 12/5/19 12/6/19  Jayant Escalante DO   senna-docusate sodium (SENOKOT-S) 8.6-50 MG tablet Take 2 tablets by mouth 2 (Two) Times a Day. 12/5/19 12/6/19  Jayant Escalante,    sodium chloride 0.9 % solution Infuse 50 mL/hr into a venous catheter Continuous. 12/5/19 12/6/19  Jayant Escalante, DO   sodium chloride 0.9 % solution Infuse 75 mL/hr into a venous catheter Continuous. 12/5/19 12/6/19  Jyaant Escalante, DO   sodium chloride 0.9 % solution Infuse 75 mL/hr into a venous catheter Continuous for 1 day. 12/5/19 12/6/19  Jayant Escalante DO   spironolactone (ALDACTONE) 25 MG tablet Take 1 tablet by mouth Daily. 11/13/19 12/6/19  Farhan Schaefer MD   Tobramycin-Dexamethasone 0.3-0.05 % suspension Apply 1 drop to eye(s) as directed by provider 2 (Two) Times a Day.  Patient taking differently: Apply 1 drop to eye(s) as directed by provider 2 (Two) Times a Day As Needed. 9/26/19 12/6/19  Farhan Schaefer MD       ALLERGIES  Allergies   Allergen Reactions   • Cumini Other (See Comments)     Complex migrane   • Butter Flavor [Flavoring Agent] Other (See Comments)     States \"butter fat\"-migranes     • Cheese Other (See Comments)     migrane     • Other Other (See Comments)     \"ice cream\"-migranes     • Saccharin Other (See Comments)     migrane         REVIEW OF SYSTEMS  Review of Systems:   Constitutional: No fevers or chills, no recent weight gain or weight loss or fatigue  Eyes: No visual loss, blurred vision, double vision, yellow sclerae.  ENT: No headaches, hearing loss, vertigo, congestion or sore throat.   Cardiovascular: Per HPI  Respiratory: No cough or wheezing, no sputum production, no hematemesis   Gastrointestinal: No abdominal pain, no nausea, vomiting, constipation, diarrhea, melena.   Genitourinary: No dysuria, hematuria or " "increased frequency, + nausea  Musculoskeletal:  No gait disturbance, weakness or joint pain or stiffness  Integumentary: No rashes, urticaria, ulcers or sores.   Neurological: No headache, paralysis, ataxia, no prior CVA/TIA, + dizziness, syncope,   Psychiatric: No anxiety, or depression  Endocrine: No diaphoresis, cold or heat intolerance. No polyuria or polydipsia.   Hematologic/Lymphatic: No anemia, abnormal bruising or bleeding. No history of DVT/PE.       Objective     VITAL SIGNS: /56 (BP Location: Left arm, Patient Position: Lying)   Pulse 77   Temp 97.2 °F (36.2 °C) (Temporal)   Ht 170.2 cm (67\")   Wt 99.1 kg (218 lb 7.6 oz)   SpO2 98%   BMI 34.22 kg/m²      ADMIT WEIGHT:  99.1 kg (218 lb 7.6 oz)  BMI: Body mass index is 34.22 kg/m².    Admission Weight: 99.1 kg (218 lb 7.6 oz)     PHYSICAL EXAM  General appearance: Awake, alert, cooperative  Head: Normocephalic, without obvious abnormality, atraumatic  Eyes: Conjunctivae/corneas clear, EOMs intact  Neck: no adenopathy, no carotid bruit, no JVD and thyroid: not enlarged  Lungs: clear to auscultation bilaterally and no rhonchi or crackles\", ' symmetric  Heart: regular rate and rhythm, S1, S2 normal, no murmur, click, rub or gallop  Abdomen: Soft, non-tender, bowel sounds normal,  no organomegaly  Extremities: extremities normal, atraumatic, no cyanosis or edema  Skin: Skin color, turgor normal, no rashes or lesions  Neurologic: Grossly normal     CBC: Results from last 7 days   Lab Units 12/06/19  0428 12/05/19  1644 12/05/19  1251  12/04/19  1434   WBC 10*3/mm3 7.20 7.50  --   --  7.50   HEMOGLOBIN g/dL 9.1* 9.9* 10.4*   < > 10.8*   HEMATOCRIT % 28.9* 31.1* 31.6*   < > 32.9*   MCV fL 95.7 96.0  --   --  94.0   PLATELETS 10*3/mm3 143 151  --   --  154    < > = values in this interval not displayed.         BMP:  Results from last 7 days   Lab Units 12/06/19  0428 12/05/19  1644 12/05/19  0548   POTASSIUM mmol/L 4.3 4.5 5.1   CHLORIDE mmol/L 104 " 98 101   CO2 mmol/L 24.7 25.5 26.6   BUN mg/dL 17 19 20   CREATININE mg/dL 0.91 1.14* 1.09*   GLUCOSE mg/dL 126* 141* 179*   CALCIUM mg/dL 7.7* 8.9 9.4     TELEMETRY: NSR at 70 bpm    Assessment      1.  Syncope/collapse.  2.  Osteoarthritis status post right hip replacement 12/4/2019.  3.  Essential hypertension  4.  Dyslipidemia      Plan        Ms. Kumar is a 79-year-old elderly white female seen in consultation for concern over recurrent syncope/collapse.  The majority of her history of her presyncopal and syncopal episodes are related to standing however she does have a history of wilfrido syncope while seated.  She is not taking any AV donovan blocking agents or no transient or reversible causes are identified.  Will review case with Dr Laboy for potential DDD PM implant.    Will consult hospital medicine for diabetes and pain management status post right hip replacement.    Electronically signed by CURTIS Mccarty, 12/06/19, 1:21 PM.      This note was completed using a voice transcription system. Every effort was made to ensure accuracy. However, inadvertent computerized transcription errors may be present.

## 2019-12-06 NOTE — PROGRESS NOTES
Progress Report    S: Pt is sp right MANAS.  Pain control is fair.  She has been out of bed to chair with PT assistance.  She has experienced orthostatic hypotension with standing.  Labs reviewed and pt is not significantly anemic.  Her heart rate has been slow.  She notes some nausea, no vomiting.  She notes no abdominal pain or chest pain.    O: VSS, A&OXIII,NAD. Right LE: slightly internally rotated, though painfree with PROM and AROM with hip IR/ER.  NV status intact RLE. Neg homans. No thigh/calf pain noted. Distal motor and sensory intact.  No abdominal pain to palpation.     A:  sp right MANAS, stable.   Bradycardia   Orthostatic hypotension    P: Request evaluation with hospitalist and possibly cardiology.  Fluid bolus.  Xray ordered right hip to assess implant.   Continue DVT prophylaxis and pain management.  Will follow.  Dictated by Alejo Mariscal PA-C.

## 2019-12-06 NOTE — PROGRESS NOTES
Case Management Discharge Note      Final Note: Pt transferring to [facility]. EMS provided transportation.     Provided post acute provider list?: Refused  Post Acute Provider Lists: Inpatient Rehab    Destination - Selection Complete      Service Provider Request Status Selected Services Address Phone Number Fax Number    [address] [address] [phone] --      Durable Medical Equipment      No service has been selected for the patient.      Dialysis/Infusion      No service has been selected for the patient.      Home Medical Care      No service has been selected for the patient.      Therapy      No service has been selected for the patient.      Community Resources      No service has been selected for the patient.        Transportation Services  Ambulance: [facility]    Final Discharge Disposition Code: 02 - short term [facility] for [facility]

## 2019-12-06 NOTE — CONSULTS
[facility] Medicine Services  CONSULT NOTE      Patient Name: Radha Whelan  : 1940  MRN: [id number]    Primary Care Physician: Farhan Schaefer MD  Provider requesting consultation: Rosendo Frost MD    Subjective   Subjective     Reason for Consultation:  DM and Rt hip pain s/p Rt THR for medical mgmt     HPI:  Radha Whelan is a 79 y.o. female with hx of DM type II, HTN, HLD, ODELL on cpap, OA, RLS, TIAs x 3, obesity, and possible CHF per report.  Pt was admitted to [facility] on  for planned Rt TKR per Dr Salgado.  Underwent surgery as planned. Post op mild hyponatremia.  Then yesterday  developed worsening hypothension, bradycardia with HR in 30s, 6-10 sec pauses on EKG and syncope.  Cards followed.  Transferred today to [facility] to [facility] to Dr Laboy for planned eval and placement of PPM.  Hospitalist were consulted for DM and post Rt THR pain mgmt.     On exam in CVOU pt is awake, alert and in NAD.  HR in 70's per tele.  Pt denies any complaint except for Rt hip pain. Xray of Rt hip at [facility] last pm with no fracture or dislocation. Pt is currently resting calm awaiting PPM.      Review of Systems   Constitutional: Positive for activity change and fatigue. Negative for appetite change and fever.   HENT: Negative.    Respiratory: Negative for chest tightness and shortness of breath.    Cardiovascular: Negative for chest pain and palpitations.   Gastrointestinal: Negative for abdominal distention, diarrhea, nausea and vomiting.   Endocrine: Negative.    Genitourinary: Negative.    Musculoskeletal: Positive for arthralgias.   Skin: Negative.    Allergic/Immunologic: Negative.    Neurological: Positive for dizziness, syncope and weakness.        At OSH yesterday    Hematological: Negative.    Psychiatric/Behavioral: Negative.          Otherwise complete ROS reviewed and is negative except as mentioned in the HPI.    Past Medical History:   Diagnosis Date   • Arthritis    • Basal cell carcinoma   "  • Body piercing     both ears   • Cataract, bilateral     s/p removal    • Diabetes mellitus (CMS/HCC)    • DVT (deep venous thrombosis) (CMS/HCC)     1970's- left leg   • Edema     legs- hx of   • Elevated cholesterol    • Fatigue    • Fatigue    • History of left heart catheterization     9/16/19  no stents   • History of migraine headaches    • Hx of exercise stress test 2004   • Hyperlipidemia    • Hypertension    • Hyponatremia    • Impaired functional mobility, balance, gait, and endurance    • LBBB (left bundle branch block)    • Left leg pain     left leg and knee pain    • Muscle spasm     hx of   • Obesity    • ODELL on CPAP     CPAP   • Stroke (CMS/HCC)    • Teeth missing     all of the top, and has 6 bottom teeth left   • Thyroid nodule    • TIA (transient ischemic attack)     x3.  last one was \"a few years ago\"   • Wears dentures     top plate   • Wears glasses     to read       Past Surgical History:   Procedure Laterality Date   • BREAST BIOPSY Left     benign   • CARDIAC CATHETERIZATION      09/16/2019 PER .   • CARDIAC CATHETERIZATION N/A 9/16/2019    Procedure: Left Heart Cath +/- CBI;  Surgeon: Ashlyn Mays MD;  Location: [facility] CATH INVASIVE LOCATION;  Service: Cardiovascular   • CATARACT EXTRACTION  12/2018    both eyes    • COLONOSCOPY     • MOUTH SURGERY      all top teeth   • SKIN BIOPSY      basal cell   • TUBAL ABDOMINAL LIGATION         Family History: family history includes Asthma in her daughter; Breast cancer in her sister and another family member; Cancer in an other family member; Diabetes in an other family member; Heart disease in her father and mother; Heart failure in her daughter; Hypertension in an other family member; Intracerebral hemorrhage in her brother; Migraines in an other family member. Otherwise pertinent FHx was reviewed and unremarkable.     Social History:  reports that she has never smoked. She has never used smokeless tobacco. She reports that she does " "not drink alcohol or use drugs.    Medications:  Medications Prior to Admission   Medication Sig Dispense Refill Last Dose   • Cholecalciferol (VITAMIN D PO) Take 125 mcg by mouth 2 (Two) Times a Week.   Past Week at Unknown time   • clopidogrel (PLAVIX) 75 MG tablet TAKE 1 TABLET DAILY 90 tablet 3 11/28/2019   • coenzyme Q10 100 MG capsule Take 400 mg by mouth Every Other Day.   Past Week at Unknown time   • docusate sodium 100 MG capsule Take 100 mg by mouth 2 (Two) Times a Day As Needed for Constipation.      • folic acid (FOLVITE) 1 MG tablet TAKE 1 TABLET DAILY 90 tablet 3 12/3/2019 at 2200   • glipizide (GLUCOTROL XL) 2.5 MG 24 hr tablet Take 2.5 mg by mouth Daily.      • Glucosamine-Chondroit-Vit C-Mn (GLUCOSAMINE CHONDR 1500 COMPLX PO) Take 1 tablet by mouth Daily.   12/3/2019 at 0900   • lovastatin (MEVACOR) 10 MG tablet TAKE 1 TABLET EVERY NIGHT (Patient taking differently: TAKE 1 TABLET EVERY THREE TIMES WEEKLY) 90 tablet 3 12/3/2019 at 2200   • melatonin 3 MG tablet Take 3 mg by mouth Every Night.   12/3/2019 at 2200   • pantoprazole (PROTONIX) 40 MG EC tablet TAKE 1 TABLET DAILY 90 tablet 3 12/3/2019 at 0900   • vitamin B-12 (CYANOCOBALAMIN) 1000 MCG tablet Take 1,000 mcg by mouth 2 (Two) Times a Week.          Scheduled Meds:  atorvastatin 10 mg Oral Nightly   clopidogrel 75 mg Oral Daily   folic acid 1,000 mcg Oral Daily   glipizide 1.25 mg Oral BID AC   melatonin 5 mg Oral Nightly   [START ON 12/7/2019] pantoprazole 40 mg Oral Q AM     Continuous Infusions:   PRN Meds:.docusate sodium    Allergies   Allergen Reactions   • Cumini Other (See Comments)     Complex migrane   • Butter Flavor [Flavoring Agent] Other (See Comments)     States \"butter fat\"-migranes     • Cheese Other (See Comments)     migrane     • Other Other (See Comments)     \"ice cream\"-migranes     • Saccharin Other (See Comments)     migrane         Objective   Objective     Vital Signs:   Temp:  [97.2 °F (36.2 °C)-99.7 °F (37.6 °C)] " 97.2 °F (36.2 °C)  Heart Rate:  [70-96] 77  Resp:  [20] 20  BP: ()/(47-66) 126/56     Physical Exam  Constitutional: No acute distress, awake, alert. Siting up in bed.   and daughter at bs.   Eyes: PERRLA, sclerae anicteric, no conjunctival injection  HENT: NCAT, mucous membranes moist  Neck: Supple, no thyromegaly, no lymphadenopathy, trachea midline  Respiratory: Clear to auscultation bilaterally A/P slightly decreased at bases, nonlabored respirations on RA with sats 98%   Cardiovascular: Anthony, no murmurs, rubs, or gallops, palpable pedal pulses bilaterally  Gastrointestinal: Positive bowel sounds, soft, nontender, nondistended. Obese  Musculoskeletal: No bilateral ankle edema, no clubbing or cyanosis to extremities.  CALL spontaneously but decreased ROM to RLE s/p Rt TKR  Psychiatric: Appropriate affect, cooperative and calm   Neurologic: Oriented x 3, strength symmetric in all extremities, Cranial Nerves grossly intact to confrontation, speech clear and appropriate.  Follows commands   Skin: No rashes.  Rt hip drsg d/i from Rt TKA on 12/4.  Scant old dry drainage and mild generalized rt hip/thigh edema.        Results Reviewed:  I have personally reviewed current lab, radiology, and data and agree.    Results from last 7 days   Lab Units 12/06/19  0428   WBC 10*3/mm3 7.20   HEMOGLOBIN g/dL 9.1*   HEMATOCRIT % 28.9*   PLATELETS 10*3/mm3 143     Results from last 7 days   Lab Units 12/06/19  0428   SODIUM mmol/L 140   POTASSIUM mmol/L 4.3   CHLORIDE mmol/L 104   CO2 mmol/L 24.7   BUN mg/dL 17   CREATININE mg/dL 0.91   GLUCOSE mg/dL 126*   CALCIUM mg/dL 7.7*     Estimated Creatinine Clearance: 59.6 mL/min (by C-G formula based on SCr of 0.91 mg/dL).  Brief Urine Lab Results  (Last result in the past 365 days)      Color   Clarity   Blood   Leuk Est   Nitrite   Protein   CREAT   Urine HCG        12/05/19 1559 Yellow Clear Negative Negative Negative Negative             No results found for:  BNP    Microbiology Results Abnormal     None          Imaging Results (Last 24 Hours)     ** No results found for the last 24 hours. **        Results for orders placed during the hospital encounter of 09/11/19   Adult Transthoracic Echo Complete W/ Cont if Necessary Per Protocol    Narrative · Estimated EF = 42%.  · Left ventricular diastolic dysfunction (grade I) consistent with   impaired relaxation.  · Calculated right ventricular systolic pressure from tricuspid   regurgitation is 22.0 mmHg.          Assessment/Plan   Assessment / Plan     Active Hospital Problems    Diagnosis POA   • **Bradycardia [R00.1] Unknown     Priority: High   • Status post right hip replacement [Z96.641] Not Applicable   • ODELL on CPAP [G47.33, Z99.89] Not Applicable   • Restless legs syndrome [G25.81] Yes   • Type 2 diabetes mellitus without complication (CMS/HCC) [E11.9] Yes   • Hypertension [I10] Yes     80 yo female with hx of DM type II, HTN, HLD, ODELL on cpap, OA, RLS, TIAs x 3, obesity, and possible CHF per report.  Pt was admitted to [facility] on 12/4 for planned Rt TKR per Dr Salgado.  Underwent surgery as planned. Post op mild hyponatremia.  Then yesterday 12/5 developed worsening hypothension, bradycardia with HR in 30s, 6-10 sec pauses on EKG and syncope.  Cards followed.  Transferred today to [facility] to [facility] to Dr Laboy for planned eval and placement of PPM.  Hospitalist were consulted for DM and post Rt THR pain mgmt.     Assessment/Plan:    Syncope  Hypotension on hx of HTN  Bradycardia  ?CHF (data deficit)--pt.jennifer deny  --transferred from [facility] today for planned eval and likely PPM per EP/cards  --care per cards  --in CVOU on admit.  Has NOT underwent PPM placement yet    Rt hip pain s/p Rt TKA on 12/4  Hx OA  --Sx of 12/4 at [facility] per Dr Salgado with ortho  --per [facility] ortho notes on tranfer here, pt will need f/u with Dr Salgado in 2 weeks  --consult PT/OT  --CM consult for dc planning.  Pt/family hope to transfer to acute rehab in [place] on  dc  --monitor Rt hip drsg  --Xray at [facility] last pm 12/5 showed no displacement or fracture.  Edema noted.      Mild post op anemia  --Hbg 10.8 on 12/4.  Down to 9.1 today.   --monitor am labs     Dm type II (A1c 6.9--11/20/10)  --takes glipizide outpt.  HOLD  --accuchecks achs  --LDSSI achs for now.  Will need cardiac/diabetic diet when post procedure    ODELL on cpap  --oxygen prn   --RT to setup cpap for hs and prn use     RLS  --home meds     Obesity    DVT prophylaxis: per primary serivce    Disposition: TBD per cards/EP.    Thank you for allowing [facility] Medicine Service to provide consultative care for your patient, we will continue to follow while clinically appropriate.    Electronically signed by CURTIS Toledo, 12/06/19, 4:55 PM.

## 2019-12-06 NOTE — OP NOTE
Cardiac Electrophysiology Procedure Note       [facility] at [facility]     PROCEDURE: PERMANENT PACEMAKER    OPERATION PERFORMED:     Implantation of BSCI L311 model, [id number] serial number pulse generator at the left prepectoral site.    Atrial lead BSCI 7741, 52 cm, serial number [id number].    Right ventricular lead 7742, 89 cm, serial number [id number].     ATTENDING SURGEON: Stephan Laboy, DO     MODERATE SEDATION FOR PROCEDURE:    Moderate sedation was given during this procedure.    I supervised and directed Jackson VILLALOBOS to administer this sedation.  This staff member also monitored the patient's hemodynamic and respiratory status and response to these medications.  Please see the full detailed procedure report generated by the electrophysiology laboratory staff.  The patient tolerated moderate sedation well.  There were no complications regarding sedation.  The total dose of fentanyl was 100 mcg and the total dose of midazolam was 5 mg.  The total dose of Brevital was 50 ,g.  First sedation was administered at 1757 and continued through 1821.    ESTIMATED BLOOD LOSS: less than 20cc    RADIATION EXPOSURE: 2.2 minutes and 14 milliGray.    COMPLICATIONS: None.    TIME OUT: Time out was completed with verification of the correct patient identity, procedure to be performed, procedure site and implanted equipment.    INDICATION FOR PROCEDURE:  Briefly, Radha Whelan is a 79 y.o. year old female with a history of symptomatic sinus node dysfunction with syncope during 8 seconds of cardiac asystole documented on cardiac telemetry at [facility] where she was an inpatient for elective right hip surgery ( which had been completed ).     PROCEDURE AND FINDINGS:  The patient was brought to the electrophysiology laboratory in a post absorptive state.  Informed consent was given by the patient prior to the procedure and confirmed.  Intravenous prophylactic antibiotics were administered prior to  the procedure.  After the site of implantation was prepped and draped in the usual sterile fashion and after adequate anesthesia was given, the skin was infiltrated with 1% lidocaine and 0.5% bupivicaine 50/50 mixture.  The skin was incised with a #10 scalpel.  Blunt and electrosurgical dissection was carried out to the level of the pre pectoral fascia with careful attention paid to hemostasis.  A pocket to house the pulse generator was formed between the pre pectoral fascia and subcutaneous fat with blunt and electrosurgical dissection.  The pocket was copiously irrigated with antibiotic containing normal saline and subsequently observed.  Once adequate hemostasis was confirmed within the pocket, venous access was obtained.  The axillary vein was accessed via a contrast guided Seldinger technique.  J tip 0.035 inch guide wires were introduced and their course through the venous system was confirmed by their presence under fluoroscopy in the inferior vena (excluding inadvertent arterial puncture).    RIGHT VENTRICULAR LEAD:    A peel away sheath was brought to the field and placed into the venous system via over the wire technique.  The right ventricular lead was placed via this sheath into the right ventricle. The lead was attached via active fixation.  Adequate sensing and threshold parameters were obtained.  There was no evidence of diaphragmatic stimulation at 10 V output.  The peel away sheath was removed and the lead collar was advanced to the pectoral muscle and sutured with non absorbable suture.  Tug testing of this lead confirmed stability of the lead and the length of the lead's slack was assessed as optimal with fluoroscopy.     RIGHT ATRIAL LEAD:    A peel away sheath was brought to the field and placed into the venous system via over the wire technique.  The right atrial lead was placed via this sheath into the right atrium. The lead was attached via active fixation.  Adequate sensing and threshold  parameters were obtained.  There was no evidence of diaphragmatic stimulation at 10 V output.  The peel away sheath was removed and the lead collar was advanced to the pectoral muscle and sutured with non absorbable suture.  Tug testing of this lead confirmed stability of the lead and the length of the lead's slack was assessed as optimal with fluoroscopy.     The pulse generator was then sutured to the fascia in a medial location within the pocket using 1-0 silk suture.  The pocket was closed with two layers of suture using 2-0 then 3-0 Vicryl, followed by a layer of surgical adhesive and finally a sterile occlusive dressing.    MEASURED DEVICE DATA:    Atrial lead                   sensin.5 mV                   impedance:            626 Ohms                   Threshold:             0.6 Volts at 0.4 ms    RV lead                    sensin.6 mV                   impedance:            1157 Ohms                   Threshold:             0.4 Volts at 0.4 ms                     PROGRAMMED PARAMETERS:    Mode:                      DDDR   Lower Rate:                60 pulses per minute  Upper Sensor Rate:         130 pulses per minute  Upper Tracking Rate:       130 pulses per minute  Mode Switch Rate:          170 bpm    CONCLUSION:  Successful implantation of permanent pacemaker system.      RECOMMENDATION:    Consider transfer back to [facility] for the balance of her post hip surgery care.     Patient is to follow up with our clinic in approximately one week and then myself in 3 months.    No lovenox or heparin at any dose is to be given.      FOR THE PATIENT    Please do not lift more than 10 pounds or abduct the shoulder joint / or raise the arm above the shoulder for 6 weeks after device was implanted (this does not apply to subcutaneous ICDs).    Avoid activities that involve heavy lifting or rough contact that could result in blows to your implant site and  to allow your incision time to heal.    No shower or getting device incision wet for 2 days post-operatively.    Keep wound exposed to air unless otherwise instructed, the surgical glue is the bandage.    No creams, lotions, or powders to incision.    Please avoid allowing bra strap or suspenders to lay over incision until completely healed.    Avoid hot tubs or pools until incision completely healed.    No driving for 24 hours post-operatively after device implant.    Call your doctor if you have any swelling, redness or discharge around your incision, notice anything unusual or unexpected, or you develop a fever that does not go away in two or three days.    Call your doctor if you hear any beeping sounds / vibratory alerts from your device as this indicates your device needs to be checked immediately.    Carry your Medical Device ID Card with you at all times.  Please call our office () with any questions about the device or the wounds.          Stephan Laboy, DO, FACC, RS  Cardiac Electrophysiologist  [facility] / [facility]

## 2019-12-06 NOTE — NURSING NOTE
ACC REVIEW REPORT: [facility]        PATIENT NAME: Radha Whelan    PATIENT ID: [id number]    BED: [facility]    BED TYPE: observation    BED GIVEN TO: Taina Resendez RN    TIME BED GIVEN: 0620    YOB: 1940    AGE: 79    GENDER: female    PREVIOUS ADMIT TO [facility]:     PREVIOUS ADMISSION DATE:     PATIENT CLASS: observation    TODAY'S DATE: 12/6/2019    TRANSFER DATE: 12/6/19    ETA: 0800    TRANSFERRING FACILITY: [facility]    TRANSFERRING FACILITY PHONE # : [phone]    TRANSFERRING MD: Tiny    DATE/TIME REQUEST RECEIVED: 12/5 @ 0882    [facility] RN: Taty Eller RN    REPORT FROM: Tania Resendez RN    TIME REP*ORT TAKEN: 0615    DIAGNOSIS: Bradycardia    REASON FOR TRANSFER TO [facility]: pacemaker placement    TRANSPORTATION: local ground    CLINICAL REASON FOR TRANSFER TO [facility]: higher level of care      CLINICAL INFORMATION    HEIGHT: 67 inches    WEIGHT: 213 lbs    ALLERGIES: NKDA, Several food allergies    SANCHEZ: no , nurse reports I/O cath with 700 ml return    INFECTIOUS DISEASE: n    ISOLATION: n    LAST VITAL SIGNS:  TIME: 0400  TEMP: 99.5  PULSE: 78  B/P: 123/59  RESP: 18    LAB INFORMATION: BUN 17, Cr 0.91, Calcium 7.7 this morning - nurse will discuss with MD.     CULTURE INFORMATION: none    MEDS/IV FLUIDS: #18 Right FA - NS @ 75 ml/hr      CARDIAC SYSTEM:    CHEST PAIN: no    RATE:     SCALE:     RHYTHM: SB/NSR    Is patient taking or has patient been given any drugs that could increase bleeding? yes  (Plavix, Brilinta, Effient, Eliquis, Xarelto, Warfarin, Integrilin, Angiomax)    DRUG: Plavix     DOSE/FREQUENCY: 75 mg daily    CARDIAC NOTES: Lovenox sq      RESPIRATORY SYSTEM:    LUNG SOUNDS:    CLEAR: y  CRACKLES: n  WHEEZES: n  RHONCHI: n  DIMINISHED: n    OXYGEN: yes    O2 SAT: 97%    ADMINISTRATION ROUTE: 2L NC    RESPIRATORY STATUS: stable      CNS/MUSCULOSKELETAL    ALERT AND ORIENTED:    PERSON: y  PLACE: y  TIME: y    HERNAN COMA SCALE:    E: 4  M: 6  V:  "5      CNS/MUSCULOSKELETAL NOTES: Patient has wedge she uses in bed 2/2 hip repair a few days ago.       GI//GY      ABDOMINAL PAIN: n    VOMITING: n    DIARRHEA: n    NAUSEA: n    BOWEL SOUNDS: +    GI//GY NOTES: Last BM - 12/5 , Urinary retention overnight - nurse I/O cath with 700 return    PAST MEDICAL HISTORY: *  • Arthritis     • Basal cell carcinoma     • Body piercing       both ears   • Cataract, bilateral       s/p removal    • Diabetes mellitus (CMS/HCC)     • DVT (deep venous thrombosis) (CMS/HCC)       1970's- left leg   • Edema       legs- hx of   • Elevated cholesterol     • Fatigue     • Fatigue     • History of left heart catheterization       9/16/19  no stents   • History of migraine headaches     • Hx of exercise stress test 2004   • Hyperlipidemia     • Hypertension     • Hyponatremia     • LBBB (left bundle branch block)     • Left leg pain       left leg and knee pain    • Muscle spasm       hx of   • ODELL on CPAP       CPAP   • Stroke (CMS/HCC)     • Teeth missing       all of the top, and has 6 bottom teeth left   • Thyroid nodule     • TIA (transient ischemic attack)       x3.  last one was \"a few years ago\"   • Wears dentures       top plate   • Wears glasses       to read     • BREAST BIOPSY Left       benign   • CARDIAC CATHETERIZATION         09/16/2019 PER .   • CARDIAC CATHETERIZATION N/A 9/16/2019     Procedure: Left Heart Cath +/- CBI;  Surgeon: Ashlyn Mays MD;  Location: [facility] INVASIVE LOCATION;  Service: Cardiovascular   • CATARACT EXTRACTION   12/2018     both eyes    • COLONOSCOPY       • MOUTH SURGERY         all top teeth   • SKIN BIOPSY         basal cell   • TUBAL ABDOMINAL LIGATION                   OTHER SYMPTOM NOTES: Right Hip incision s/p Right hip repair on 12/4    ADDITIONAL NOTES: Patient presented to [facility] 12/4 for total right hip repair. S/P surgery patient was working with therapy and became symptomatic sitting on side of bed. EKG was " completed and HR was in 30's. Patient is transferring for pacemaker placement today via Dr. Silva.           Linette Eller RN  12/6/2019  6:16 AM

## 2019-12-06 NOTE — PLAN OF CARE
Problem: Fall Risk (Adult)  Intervention: Monitor/Assist with Self Care   12/06/19 0148   Activity   Activity Assistance Provided assistance, 2 people       Goal: Absence of Fall   12/06/19 0148   Fall Risk (Adult)   Absence of Fall achieves outcome

## 2019-12-06 NOTE — DISCHARGE SUMMARY
"    [facility]    Name:  Radha Whelan   Age:  79 y.o.  Sex:  female  :  1940  MRN:  [id number]   Visit Number:  [id number]  Primary Care Physician:  Farhan Schaefer MD  Date of Discharge:  2019  Admission Date:  2019      Presenting Problem:    Primary osteoarthritis of right hip [M16.11]  Arthritis [M19.90]       Discharge Diagnosis:       Type 2 diabetes mellitus without complication (CMS/HCC)    Hypertension    ODELL on CPAP    Peripheral neuropathy    LBBB (left bundle branch block)    Cardiomyopathy (CMS/HCC)    Arthritis    Chronic systolic congestive heart failure (CMS/HCC)    Status post right hip replacement        Past Medical History:  Past Medical History:   Diagnosis Date   • Arthritis    • Basal cell carcinoma    • Body piercing     both ears   • Cataract, bilateral     s/p removal    • Diabetes mellitus (CMS/HCC)    • DVT (deep venous thrombosis) (CMS/HCC)     - left leg   • Edema     legs- hx of   • Elevated cholesterol    • Fatigue    • Fatigue    • History of left heart catheterization     19  no stents   • History of migraine headaches    • Hx of exercise stress test    • Hyperlipidemia    • Hypertension    • Hyponatremia    • Impaired functional mobility, balance, gait, and endurance    • LBBB (left bundle branch block)    • Left leg pain     left leg and knee pain    • Muscle spasm     hx of   • ODELL on CPAP     CPAP   • Stroke (CMS/HCC)    • Teeth missing     all of the top, and has 6 bottom teeth left   • Thyroid nodule    • TIA (transient ischemic attack)     x3.  last one was \"a few years ago\"   • Wears dentures     top plate   • Wears glasses     to read         Consults:     Consults     Date and Time Order Name Status Description    2019 1823 Inpatient Cardiology Consult Completed     2019 1311 Inpatient Hospitalist Consult Completed           Procedures Performed:    Procedure(s):  HIP ARTHROPLASTY TOTAL " RIGHT         History of presenting illness/Hospital Course:  This is a 79-year-old female who was admitted on 12/4/2019 by Dr. Salgado with orthopedics after undergoing a right total hip replacement for osteoarthritis on 12/4/2019.    She has a history of diabetes mellitus, hypertension, obstructive sleep apnea.    The hospitalist service was consulted for medical management of her chronic health problems.    Postoperatively patient did have some hypotension for which she was started on IV fluids.  On 12/5/2019 she was noted to have some orthostatic hypotension.  I did give her IV fluid bolus and continue her IV fluids at a gentle rate given her history of systolic heart failure.  She was noted to have a 2D echo done in September 2019 in preparation for surgery which did show an ejection fraction of 42%.  She saw cardiologist Dr. Luevano who did a heart cath and she did not have any blockages requiring intervention.  She was started on medical management. However, when reviewing her previous notes she was noted to have bradycardia during the cardiac catheterization for which she did have a 24-hour Holter monitor placed.  Events of the Holter monitor showed average heart rate of 60 with some PACs and PVCs.  She had a rare SVT with less than 11 beats.  Cardiology recommended avoiding beta blockade.  After she was noted to have orthostatic hypotension and IV fluids initiated she was placed on cardiac monitoring.  At approximately 3:30 PM I was notified by nursing staff that patient had a syncopal episode when she was gotten out of bed and sitting on the bedside commode.  I did see and evaluate patient at bedside.  She denied any chest pain, shortness of breath, abdominal pain, nausea or vomiting.  She was noted to be shaking at the time of my evaluation however she felt that this was due to pain as her block was wearing off.  I did initiate a work-up to rule out infection with a urinalysis, chest x-ray, CBC, BMP and a  troponin which were all stable.  Patient was noted however on telemetry during her syncopal episode to have a sinus pause up to 8.6 seconds.  Patient did remain asymptomatic.  According to the patient she had been having these episodes for over a year where she would stand up become nauseated and feel as though she was going to pass out however this was not reported to me by the patient at the time of my evaluation during her syncopal episode as an inpatient.    Cardiologist Dr. Frost was consulted for further evaluation and recommendations.  Cardiology did recommend transfer to the ICU for close supervision.  He also recommended IV dopamine as needed for heart rate and blood pressure control however this was not required.  He did not feel that she would require temporary transvenous pacemaker at that time.  He did speak with electrophysiologist on-call at [facility] in [place] who did agree to accept the patient for transfer to the [facility] this morning for pacemaker placement.    I have seen and evaluated patient at bedside this morning.  She denies any chest pain, shortness of breath, abdominal pain, nausea or vomiting.  I have reviewed telemetry and it does appear since transfer to the ICU patient has not had a pauses greater than 1.6 seconds overnight.  Family is at bedside.  She has been accepted to [facility] this morning to the [facility] for permanent pacemaker implantation.  She is hemodynamically stable from the hospitalist standpoint for transfer.  Dr. Salgado with orthopedics was notified last evening of the transfer.  She will need a follow-up in 2 weeks with Dr. Salgado in the office after she is discharged from [facility] in [place].  Patient's intention was to go to rehab in [facility] after discharge.  Will defer to  at [facility] to arrange when she is stable from cardiology standpoint post pacemaker implantation for discharge.              Vital Signs:    Temp:   [97.6 °F (36.4 °C)-99.7 °F (37.6 °C)] 99.5 °F (37.5 °C)  Heart Rate:  [63-96] 78  Resp:  [16-20] 20  BP: ()/(40-74) 123/59    Physical Exam:  General Appearance:    Alert and cooperative, not in any acute distress.   Head:    Atraumatic and normocephalic, without obvious abnormality.   Eyes:            PERRLA, conjunctivae and sclerae normal, no Icterus. No pallor. Extra-occular movements are within normal limits.   Ears:    Ears appear intact with no abnormalities noted.       Neck:   Supple, trachea midline, no thyromegaly, no carotid bruit.   Back:     No kyphoscoliosis present. No tenderness to palpation,   range of motion normal.   Lungs:     Chest shape is normal. Breath sounds heard bilaterally equally.  No crackles or wheezing. No Pleural rub or bronchial breathing.    Heart:    Normal S1 and S2, no murmur, no gallop, no rub. No JVD   Abdomen:     Normal bowel sounds, no masses, no organomegaly. Soft        non-tender, non-distended, no guarding, no rebound                tenderness   Extremities:   Moves all extremities well, no edema, no cyanosis, no             Clubbing, pain with movement of her right leg.  Dressing intact.     Pulses:   Pulses palpable and equal bilaterally   Skin:   No bleeding, bruising or rash     Psychiatric/Behavior:       Normal mood, normal behavior   Neurologic:   Patient is alert and can answer questions and move extremities on command with exception of her right leg due to pain           Pertinent Lab Results:     Results from last 7 days   Lab Units 12/06/19 0428 12/05/19  1644 12/05/19  0548   SODIUM mmol/L 140 136 139   POTASSIUM mmol/L 4.3 4.5 5.1   CHLORIDE mmol/L 104 98 101   CO2 mmol/L 24.7 25.5 26.6   BUN mg/dL 17 19 20   CREATININE mg/dL 0.91 1.14* 1.09*   CALCIUM mg/dL 7.7* 8.9 9.4   GLUCOSE mg/dL 126* 141* 179*     Results from last 7 days   Lab Units 12/06/19  0428 12/05/19  1644 12/05/19  1251  12/04/19  1434   WBC 10*3/mm3 7.20 7.50  --   --  7.50    HEMOGLOBIN g/dL 9.1* 9.9* 10.4*   < > 10.8*   HEMATOCRIT % 28.9* 31.1* 31.6*   < > 32.9*   PLATELETS 10*3/mm3 143 151  --   --  154    < > = values in this interval not displayed.                Pertinent Radiology Results:    Imaging Results (All)     Procedure Component Value Units Date/Time    XR Hip With or Without Pelvis 2 - 3 View Right [[id number]] Updated:  12/05/19 1936    XR Chest 1 View [[id number]] Collected:  12/05/19 1614     Updated:  12/05/19 1622    Narrative:       PROCEDURE: XR CHEST 1 VW-     HISTORY: chills low grade fever; Z74.09-Other reduced mobility;  M16.11-Unilateral primary osteoarthritis, right hip     COMPARISON: None.     FINDINGS: The heart is normal in size. The mediastinum is unremarkable.  The lungs are clear. There is no pneumothorax.  There are no acute  osseous abnormalities.       Impression:       No acute cardiopulmonary process.     Continued followup is recommended.     This report was finalized on 12/5/2019 4:14 PM by Alyx Parsons M.D..    XR Hip 1 View Without Pelvis Right (Surgery Only) [[id number]] Collected:  12/04/19 0953     Updated:  12/04/19 0956    Narrative:       PROCEDURE: XR HIP 1 VIEW WO PELVIS RIGHT-     HISTORY: Post-Op Hip Arthroplasty; M16.11-Unilateral primary  osteoarthritis, right hip     COMPARISON: None.     FINDINGS: The patient is status post total right hip arthroplasty. There  are no hardware complications or fractures. The pubic symphysis and SI  joints are intact. The expected postoperative gas is seen in the soft  tissues of the right hip.       Impression:       Status post total right hip arthroplasty with no hardware  complications.     This report was finalized on 12/4/2019 9:53 AM by Alyx Parsons M.D..          Condition on Discharge:    Stable        Discharge Disposition:    Short Term Hospital (DC - External)    Discharge Medication:       Discharge Medications      New Medications      Instructions Start Date   docusate  sodium 100 MG capsule   100 mg, Oral, 2 Times Daily PRN      enoxaparin 40 MG/0.4ML solution syringe  Commonly known as:  LOVENOX   40 mg, Subcutaneous, Daily      insulin aspart 100 UNIT/ML injection  Commonly known as:  novoLOG   0-9 Units, Subcutaneous, 4 Times Daily Before Meals & Nightly      Morphine sulfate (PF) 2 MG/ML solution injection   1 mg, Intravenous, Every 4 Hours PRN      naloxone 0.4 MG/ML injection  Commonly known as:  NARCAN   0.4 mg, Intravenous, Every 5 Minutes PRN      ondansetron 4 MG tablet  Commonly known as:  ZOFRAN   4 mg, Oral, Every 6 Hours PRN      ondansetron 4 MG/2ML injection  Commonly known as:  ZOFRAN   4 mg, Intravenous, Every 6 Hours PRN      oxyCODONE-acetaminophen 5-325 MG per tablet  Commonly known as:  PERCOCET   1 tablet, Oral, Every 4 Hours PRN      senna-docusate sodium 8.6-50 MG tablet  Commonly known as:  SENOKOT-S   2 tablets, Oral, 2 Times Daily      sodium chloride 0.9 % solution   50 mL/hr, Intravenous, Continuous      sodium chloride 0.9 % solution   75 mL/hr, Intravenous, Continuous      sodium chloride 0.9 % solution   75 mL/hr, Intravenous, Continuous         Changes to Medications      Instructions Start Date   lovastatin 10 MG tablet  Commonly known as:  MEVACOR  What changed:    · how much to take  · how to take this  · when to take this   TAKE 1 TABLET EVERY NIGHT         Continue These Medications      Instructions Start Date   clopidogrel 75 MG tablet  Commonly known as:  PLAVIX   TAKE 1 TABLET DAILY      coenzyme Q10 100 MG capsule   400 mg, Oral, Every Other Day      folic acid 1 MG tablet  Commonly known as:  FOLVITE   TAKE 1 TABLET DAILY      GLUCOSAMINE CHONDR 1500 COMPLX PO   1 tablet, Oral, Daily      melatonin 3 MG tablet   3 mg, Oral, Nightly      pantoprazole 40 MG EC tablet  Commonly known as:  PROTONIX   TAKE 1 TABLET DAILY      VITAMIN D PO   125 mcg, Oral, 2 Times Weekly         Stop These Medications    aspirin 81 MG tablet     cloNIDine 0.1  MG tablet  Commonly known as:  CATAPRES     CVS VITAMIN B12 PO     EXCEDRIN MIGRAINE 250-250-65 MG per tablet  Generic drug:  aspirin-acetaminophen-caffeine     fish oil 1200 MG capsule capsule     furosemide 20 MG tablet  Commonly known as:  LASIX     glipizide 2.5 MG 24 hr tablet  Commonly known as:  GLUCOTROL XL     glucose blood test strip     ibuprofen 800 MG tablet  Commonly known as:  ADVIL,MOTRIN     lisinopril 40 MG tablet  Commonly known as:  PRINIVIL,ZESTRIL     spironolactone 25 MG tablet  Commonly known as:  ALDACTONE     Tobramycin-Dexamethasone 0.3-0.05 % suspension            Discharge Diet:     Diet Instructions     Diet: Nothing By Mouth      Discharge Diet:  Nothing By Mouth          Activity at Discharge:     Activity Instructions     Pelvic Rest            Follow-up Appointments:    Future Appointments   Date Time Provider Department Center   1/8/2020  2:20 PM ZORA CALIXTO [facility] 1 BH [facility] BR [facility] (Cl   3/26/2020  1:00 PM Farhan Schaefer MD [facility] RI MR None   7/29/2020  2:30 PM Caleb Luevano MD [facility] None         Test Results Pending at Discharge:     Order Current Status    Tissue Pathology Exam In process             CURTIS Wall  12/06/19  7:18 AM    Time:  35  minutes were spent reviewing labs, history, evaluating patient and discharge planning.

## 2019-12-07 LAB
ANION GAP SERPL CALCULATED.3IONS-SCNC: 10 MMOL/L (ref 5–15)
BASOPHILS # BLD AUTO: 0.01 10*3/MM3 (ref 0–0.2)
BASOPHILS NFR BLD AUTO: 0.2 % (ref 0–1.5)
BUN BLD-MCNC: 15 MG/DL (ref 8–23)
BUN/CREAT SERPL: 21.1 (ref 7–25)
CALCIUM SPEC-SCNC: 8.2 MG/DL (ref 8.6–10.5)
CHLORIDE SERPL-SCNC: 104 MMOL/L (ref 98–107)
CO2 SERPL-SCNC: 22 MMOL/L (ref 22–29)
CREAT BLD-MCNC: 0.71 MG/DL (ref 0.57–1)
DEPRECATED RDW RBC AUTO: 47.5 FL (ref 37–54)
EOSINOPHIL # BLD AUTO: 0.06 10*3/MM3 (ref 0–0.4)
EOSINOPHIL NFR BLD AUTO: 1 % (ref 0.3–6.2)
ERYTHROCYTE [DISTWIDTH] IN BLOOD BY AUTOMATED COUNT: 13.1 % (ref 12.3–15.4)
GFR SERPL CREATININE-BSD FRML MDRD: 79 ML/MIN/1.73
GLUCOSE BLD-MCNC: 116 MG/DL (ref 65–99)
GLUCOSE BLDC GLUCOMTR-MCNC: 112 MG/DL (ref 70–130)
GLUCOSE BLDC GLUCOMTR-MCNC: 132 MG/DL (ref 70–130)
GLUCOSE BLDC GLUCOMTR-MCNC: 167 MG/DL (ref 70–130)
GLUCOSE BLDC GLUCOMTR-MCNC: 178 MG/DL (ref 70–130)
HCT VFR BLD AUTO: 29 % (ref 34–46.6)
HGB BLD-MCNC: 9 G/DL (ref 12–15.9)
IMM GRANULOCYTES # BLD AUTO: 0.02 10*3/MM3 (ref 0–0.05)
IMM GRANULOCYTES NFR BLD AUTO: 0.3 % (ref 0–0.5)
LAB AP CASE REPORT: NORMAL
LYMPHOCYTES # BLD AUTO: 1.71 10*3/MM3 (ref 0.7–3.1)
LYMPHOCYTES NFR BLD AUTO: 29.1 % (ref 19.6–45.3)
MCH RBC QN AUTO: 31 PG (ref 26.6–33)
MCHC RBC AUTO-ENTMCNC: 31 G/DL (ref 31.5–35.7)
MCV RBC AUTO: 100 FL (ref 79–97)
MONOCYTES # BLD AUTO: 0.57 10*3/MM3 (ref 0.1–0.9)
MONOCYTES NFR BLD AUTO: 9.7 % (ref 5–12)
NEUTROPHILS # BLD AUTO: 3.51 10*3/MM3 (ref 1.7–7)
NEUTROPHILS NFR BLD AUTO: 59.7 % (ref 42.7–76)
NRBC BLD AUTO-RTO: 0 /100 WBC (ref 0–0.2)
PATH REPORT.FINAL DX SPEC: NORMAL
PLATELET # BLD AUTO: 132 10*3/MM3 (ref 140–450)
PMV BLD AUTO: 9.3 FL (ref 6–12)
POTASSIUM BLD-SCNC: 3.7 MMOL/L (ref 3.5–5.2)
RBC # BLD AUTO: 2.9 10*6/MM3 (ref 3.77–5.28)
SODIUM BLD-SCNC: 136 MMOL/L (ref 136–145)
WBC NRBC COR # BLD: 5.88 10*3/MM3 (ref 3.4–10.8)

## 2019-12-07 PROCEDURE — 80048 BASIC METABOLIC PNL TOTAL CA: CPT | Performed by: NURSE PRACTITIONER

## 2019-12-07 PROCEDURE — 99232 SBSQ HOSP IP/OBS MODERATE 35: CPT | Performed by: NURSE PRACTITIONER

## 2019-12-07 PROCEDURE — 82962 GLUCOSE BLOOD TEST: CPT

## 2019-12-07 PROCEDURE — 99024 POSTOP FOLLOW-UP VISIT: CPT | Performed by: PHYSICIAN ASSISTANT

## 2019-12-07 PROCEDURE — 97162 PT EVAL MOD COMPLEX 30 MIN: CPT

## 2019-12-07 PROCEDURE — 97165 OT EVAL LOW COMPLEX 30 MIN: CPT

## 2019-12-07 PROCEDURE — 85025 COMPLETE CBC W/AUTO DIFF WBC: CPT | Performed by: NURSE PRACTITIONER

## 2019-12-07 RX ORDER — MIDODRINE HYDROCHLORIDE 5 MG/1
2.5 TABLET ORAL
Status: DISCONTINUED | OUTPATIENT
Start: 2019-12-07 | End: 2019-12-10 | Stop reason: HOSPADM

## 2019-12-07 RX ADMIN — INSULIN LISPRO 2 UNITS: 100 INJECTION, SOLUTION INTRAVENOUS; SUBCUTANEOUS at 12:03

## 2019-12-07 RX ADMIN — FOLIC ACID 1000 MCG: 1 TABLET ORAL at 09:15

## 2019-12-07 RX ADMIN — ACETAMINOPHEN 650 MG: 325 TABLET ORAL at 17:21

## 2019-12-07 RX ADMIN — SODIUM CHLORIDE, PRESERVATIVE FREE 3 ML: 5 INJECTION INTRAVENOUS at 21:34

## 2019-12-07 RX ADMIN — MIDODRINE HYDROCHLORIDE 2.5 MG: 5 TABLET ORAL at 12:03

## 2019-12-07 RX ADMIN — ACETAMINOPHEN 650 MG: 325 TABLET ORAL at 06:36

## 2019-12-07 RX ADMIN — INSULIN LISPRO 2 UNITS: 100 INJECTION, SOLUTION INTRAVENOUS; SUBCUTANEOUS at 17:22

## 2019-12-07 RX ADMIN — CLOPIDOGREL BISULFATE 75 MG: 75 TABLET ORAL at 09:15

## 2019-12-07 RX ADMIN — PANTOPRAZOLE SODIUM 40 MG: 40 TABLET, DELAYED RELEASE ORAL at 06:36

## 2019-12-07 RX ADMIN — MIDODRINE HYDROCHLORIDE 2.5 MG: 5 TABLET ORAL at 17:22

## 2019-12-07 RX ADMIN — ACETAMINOPHEN 650 MG: 325 TABLET ORAL at 12:03

## 2019-12-07 RX ADMIN — MELATONIN TAB 5 MG 5 MG: 5 TAB at 21:33

## 2019-12-07 RX ADMIN — TRAMADOL HYDROCHLORIDE 50 MG: 50 TABLET, COATED ORAL at 23:43

## 2019-12-07 RX ADMIN — TRAMADOL HYDROCHLORIDE 50 MG: 50 TABLET, COATED ORAL at 02:05

## 2019-12-07 RX ADMIN — TRAMADOL HYDROCHLORIDE 50 MG: 50 TABLET, COATED ORAL at 14:11

## 2019-12-07 NOTE — PLAN OF CARE
Problem: Patient Care Overview  Goal: Plan of Care Review  Outcome: Ongoing (interventions implemented as appropriate)     Problem: Patient Care Overview  Goal: Plan of Care Review  12/7/2019 0930 by Miesha Hernández, PT  Flowsheets  Taken 12/7/2019 0930  Progress: no change  Taken 12/7/2019 0911  Plan of Care Reviewed With: patient;daughter  Outcome Summary: PT initial eval completed. Pt reports dizziness and nausea upon standing, which resolves after transfer is completed and pt is reclined in chair. Pt requires max A x 2 with bed mobility and SPS transfer. Gait not initiated d/t orthostatic BP and symptoms reported. Cont with IPPT and recommend IP rehab to improve independence and safety with mobility.

## 2019-12-07 NOTE — PLAN OF CARE
Problem: Patient Care Overview  Goal: Plan of Care Review  Outcome: Ongoing (interventions implemented as appropriate)  Flowsheets  Taken 12/7/2019 0931 by Jailene Johnston OT  Progress: improving  Outcome Summary: OT completed a brief chart review. Pt is Max Ax2 for bed mobility and Mod Ax2 for STS t/f, limited d/t orthostatic BP and c/o pain. Recommend cont skilled IPOT POC. Pt would benefit from AE next session. Recommend pt DC to IP rehab.  Taken 12/7/2019 0911 by Miesha Hernández PT  Plan of Care Reviewed With: patient;daughter

## 2019-12-07 NOTE — PROGRESS NOTES
"[facility] Progress Note     LOS: 0 days   Patient Care Team:  Farhan Schaefer MD as PCP - General  Farhan Schaefer MD as PCP - Claims Attributed  Nik Chaudhari MD as Consulting Physician (General Surgery)  PCP:  Farhan Schaefer MD    Chief Complaint:  Syncope, s/p ppm, hypotension    SUBJECTIVE:   Pt transferred to [facility] yesterday following syncopal episode correlating w 9 second pauses on tele at [facility] following elective R hip replacement. Pt received ppm implant yesterday. Is doing well post procedure. Denies CP, dyspnea, palpitations. Did have episode of \"not feeling right\" this am after standing up with PT where BP dropped 67/40, family present states she was very pale. BP improved upon sitting. Was 99/55 when I saw her. Dr. Salgado came to see her today, is arranging transfer to a rehab facility probably Monday.    OBJECTIVE:    Vital Sign Min/Max for last 24 hours  Temp  Min: 97.5 °F (36.4 °C)  Max: 98.9 °F (37.2 °C)   BP  Min: 65/41  Max: 158/71   Pulse  Min: 63  Max: 93   Resp  Min: 14  Max: 18   SpO2  Min: 91 %  Max: 99 %   Flow (L/min)  Min: 2  Max: 2   Weight  Min: 94.8 kg (209 lb)  Max: 94.8 kg (209 lb)     Flowsheet Rows      First Filed Value   Admission Height  170.2 cm (67\") Documented at 12/06/2019 0947   Admission Weight  99.1 kg (218 lb 7.6 oz) Documented at 12/06/2019 0947          Telemetry: nsr 80      Intake/Output Summary (Last 24 hours) at 12/7/2019 1110  Last data filed at 12/7/2019 0900  Gross per 24 hour   Intake 480 ml   Output 200 ml   Net 280 ml     Intake & Output (last 3 days)       12/04 0701 - 12/05 0700 12/05 0701 - 12/06 0700 12/06 0701 - 12/07 0700 12/07 0701 - 12/08 0700    P.O.    480    Total Intake(mL/kg)    480 (5.1)    Urine (mL/kg/hr)   200     Total Output   200     Net   -200 +480                   Physical Exam:  Physical Exam   Constitutional: She is oriented to person, place, and time. She appears well-developed and " well-nourished. No distress.   Cardiovascular: Normal rate, regular rhythm, normal heart sounds and intact distal pulses.   No murmur heard.  Pulses:       Radial pulses are 2+ on the right side, and 2+ on the left side.        Dorsalis pedis pulses are 2+ on the right side, and 2+ on the left side.        Posterior tibial pulses are 2+ on the right side, and 2+ on the left side.   Pulmonary/Chest: Effort normal and breath sounds normal. She has no wheezes. She has no rales.   Abdominal: Soft. Bowel sounds are normal. There is no tenderness. There is no guarding.   Musculoskeletal: She exhibits edema (minimal RLE > LLE ). She exhibits no tenderness.   Neurological: She is alert and oriented to person, place, and time.   Skin: Skin is warm and dry. No rash noted.   Psychiatric: She has a normal mood and affect.   Nursing note and vitals reviewed.       LABS/DIAGNOSTIC DATA:  Results from last 7 days   Lab Units 12/07/19  0543 12/06/19  0428 12/05/19  1644   WBC 10*3/mm3 5.88 7.20 7.50   HEMOGLOBIN g/dL 9.0* 9.1* 9.9*   HEMATOCRIT % 29.0* 28.9* 31.1*   PLATELETS 10*3/mm3 132* 143 151     Lab Results   Lab Value Date/Time    TROPONINT <0.010 12/05/2019 1644         Results from last 7 days   Lab Units 12/07/19  0543 12/06/19  0428 12/05/19  1644   SODIUM mmol/L 136 140 136   POTASSIUM mmol/L 3.7 4.3 4.5   CHLORIDE mmol/L 104 104 98   CO2 mmol/L 22.0 24.7 25.5   BUN mg/dL 15 17 19   CREATININE mg/dL 0.71 0.91 1.14*   CALCIUM mg/dL 8.2* 7.7* 8.9   GLUCOSE mg/dL 116* 126* 141*                       Medication Review:     acetaminophen 650 mg Oral Q6H   atorvastatin 10 mg Oral Nightly   clopidogrel 75 mg Oral Daily   folic acid 1,000 mcg Oral Daily   insulin lispro 0-7 Units Subcutaneous 4x Daily With Meals & Nightly   melatonin 5 mg Oral Nightly   midodrine 2.5 mg Oral TID AC   pantoprazole 40 mg Oral Q AM   sodium chloride 3 mL Intravenous Q12H              Bradycardia    Type 2 diabetes mellitus without complication  (CMS/HCC)    Hypertension    ODELL on CPAP    Restless legs syndrome    Status post right hip replacement      ASSESSMENT/PLAN:  Bradycardia  Sinus Pauses  Syncope  - syncopal episode following RTHA correlating w 8.6 second sinus pause  - s/p [employer] ppm per Dr. Laboy    Orthostatic Hypotension  - element of orthostatic hypotension likely contributing to dizziness/syncope as well with clear history of vasodepressor presyncope/syncope.   - Counseled to patient and family that the ppm will only help with bradycardia mediated syncope, and it will still be important for her to hydrate well and avoid sudden positional changes to prevent drops in BP. Would expect this to be worse following surgery d/t anemia.     Recent R hip arthoplasty  - elective RTHA on 12/4/19 at [facility] w Dr. Salgado  - PT  - CM to arrange rehab transfer early next week  - 2 week fu w Dr. Salgado previously arranged    Hypotension  - midodrine started. May titrate up as needed.   - would expect improvement and likely d/c of midodrine as postop anemia resolves  - no antihypertensives    T2DM    ODELL  -CPAP qhs    Will ask hospitalists to take over as attending as pt needs to be switched to inpatient awaiting rehab for her hip. Stable form cardiovascular standpoint otherwise. Would follow up w EP for wound check in 7-10 days then w Dr. Laboy in 3 months.      Electronically signed by Cammy Arellano PA-C, 12/07/19, 10:34 AM.

## 2019-12-07 NOTE — THERAPY EVALUATION
"Patient Name: Radha Whelan  : 1940    MRN: [id number]                              Today's Date: 2019       Admit Date: 2019    Visit Dx: No diagnosis found.  Patient Active Problem List   Diagnosis   • Type 2 diabetes mellitus without complication (CMS/HCC)   • Temporary cerebral vascular dysfunction   • Fatigue   • Hypertension   • Hypervitaminosis B6   • Hyponatremia   • ODELL on CPAP   • Peripheral neuropathy   • Restless legs syndrome   • Edema   • H/O hyperlipidemia   • Acute non-recurrent maxillary sinusitis   • Infection of left tear duct   • Left eye pain   • Mixed hyperlipidemia   • LBBB (left bundle branch block)   • Abnormal echocardiogram   • Cardiomyopathy (CMS/HCC)   • Arthritis   • Chronic systolic congestive heart failure (CMS/HCC)   • Status post right hip replacement   • Bradycardia     Past Medical History:   Diagnosis Date   • Arthritis    • Basal cell carcinoma    • Body piercing     both ears   • Cataract, bilateral     s/p removal    • Diabetes mellitus (CMS/HCC)    • DVT (deep venous thrombosis) (CMS/HCC)     - left leg   • Edema     legs- hx of   • Elevated cholesterol    • Fatigue    • Fatigue    • History of left heart catheterization     19  no stents   • History of migraine headaches    • Hx of exercise stress test    • Hyperlipidemia    • Hypertension    • Hyponatremia    • Impaired functional mobility, balance, gait, and endurance    • LBBB (left bundle branch block)    • Left leg pain     left leg and knee pain    • Muscle spasm     hx of   • Obesity    • ODELL on CPAP     CPAP   • Stroke (CMS/HCC)    • Teeth missing     all of the top, and has 6 bottom teeth left   • Thyroid nodule    • TIA (transient ischemic attack)     x3.  last one was \"a few years ago\"   • Wears dentures     top plate   • Wears glasses     to read     Past Surgical History:   Procedure Laterality Date   • BREAST BIOPSY Left     benign   • CARDIAC CATHETERIZATION      2019 PER " .   • CARDIAC CATHETERIZATION N/A 9/16/2019    Procedure: Left Heart Cath +/- CBI;  Surgeon: Ashlyn Mays MD;  Location: [facility] CATH INVASIVE LOCATION;  Service: Cardiovascular   • CATARACT EXTRACTION  12/2018    both eyes    • COLONOSCOPY     • MOUTH SURGERY      all top teeth   • SKIN BIOPSY      basal cell   • TUBAL ABDOMINAL LIGATION       General Information     Row Name 12/07/19 0900          PT Evaluation Time/Intention    Document Type  evaluation  -SR     Mode of Treatment  physical therapy  -SR     Row Name 12/07/19 0900          General Information    Patient Profile Reviewed?  yes  -SR     Prior Level of Function  independent:;ADL's;all household mobility;community mobility  -SR     Existing Precautions/Restrictions  hip, posterior;fall;oxygen therapy device and L/min;pacemaker pacemaker placement 12/6, recent R hip replacement  -SR     Barriers to Rehab  none identified  -SR     Row Name 12/07/19 0900          Relationship/Environment    Lives With  spouse  -SR     Row Name 12/07/19 0900          Resource/Environmental Concerns    Current Living Arrangements  home/apartment/condo  -SR     Row Name 12/07/19 0900          Home Main Entrance    Number of Stairs, Main Entrance  one  -SR     Row Name 12/07/19 0900          Stairs Within Home, Primary    Number of Stairs, Within Home, Primary  none  -SR     Row Name 12/07/19 0900          Cognitive Assessment/Intervention- PT/OT    Orientation Status (Cognition)  oriented x 3  -SR     Row Name 12/07/19 0900          Safety Issues, Functional Mobility    Safety Issues Affecting Function (Mobility)  insight into deficits/self awareness;awareness of need for assistance;safety precautions follow-through/compliance;safety precaution awareness  -SR     Impairments Affecting Function (Mobility)  endurance/activity tolerance;balance;motor control;strength;pain;shortness of breath;other (see comments)  -SR     Comment, Safety Issues/Impairments (Mobility)  pt  has history of syncope with transfers, she became nauseous and dizzy with transfer today, low BP noted  -SR       User Key  (r) = Recorded By, (t) = Taken By, (c) = Cosigned By    Initials Name Provider Type    SR Miesha Hernández, PT Physical Therapist        Mobility     Row Name 12/07/19 0904          Bed Mobility Assessment/Treatment    Bed Mobility Assessment/Treatment  supine-sit  -SR     Supine-Sit [place] (Bed Mobility)  maximum assist (25% patient effort);2 person assist  -SR     Assistive Device (Bed Mobility)  bed rails;draw sheet;head of bed elevated  -SR     Comment (Bed Mobility)  cues for sequencing and mechanics  -SR     Row Name 12/07/19 0904          Transfer Assessment/Treatment    Comment (Transfers)  cues for step-by-step sequencing  -SR     Row Name 12/07/19 0904          Bed-Chair Transfer    Bed-Chair [place] (Transfers)  maximum assist (25% patient effort);2 person assist  -SR     Assistive Device (Bed-Chair Transfers)  other (see comments) LUE in sling, RUE support, gait belt  -SR     Row Name 12/07/19 0904          Sit-Stand Transfer    Sit-Stand [place] (Transfers)  maximum assist (25% patient effort);2 person assist  -SR     Assistive Device (Sit-Stand Transfers)  other (see comments) LUE in sling, RUE support, gait belt  -SR     Row Name 12/07/19 0904          Gait/Stairs Assessment/Training    [place] Level (Gait)  not tested  -SR     Row Name 12/07/19 0904          Mobility Assessment/Intervention    Extremity Weight-bearing Status  right lower extremity  -SR     Right Lower Extremity (Weight-bearing Status)  weight-bearing as tolerated (WBAT)  -SR       User Key  (r) = Recorded By, (t) = Taken By, (c) = Cosigned By    Initials Name Provider Type    Miesha Garber, PT Physical Therapist        Obj/Interventions     Row Name 12/07/19 0909          General ROM    GENERAL ROM COMMENTS  RLE AROM limited 60% d/t stiffness, LLE AROM WNL  -SR     Row Name 12/07/19 0909           MMT (Manual Muscle Testing)    General MMT Comments  RLE functionally 2+/5, LLE functionally 4/5  -SR     Row Name 12/07/19 0909          Static Sitting Balance    Level of [place] (Unsupported Sitting, Static Balance)  minimal assist, 75% patient effort;2 person assist  -SR     Sitting Position (Unsupported Sitting, Static Balance)  sitting on edge of bed  -SR     Time Able to Maintain Position (Unsupported Sitting, Static Balance)  2 to 3 minutes  -SR     Row Name 12/07/19 0909          Static Standing Balance    Level of [place] (Supported Standing, Static Balance)  maximal assist, 25 to 49% patient effort;2 person assist  -SR     Time Able to Maintain Position (Supported Standing, Static Balance)  2 to 3 minutes  -SR     Assistive Device Utilized (Supported Standing, Static Balance)  other (see comments) LUE in sling, RUE support, gait belt  -SR     Comment (Supported Standing, Static Balance)  pt reports dizziness and nausea with standing and transfer  -SR     Row Name 12/07/19 0909          Sensory Assessment/Intervention    Sensory General Assessment  no sensation deficits identified  -SR       User Key  (r) = Recorded By, (t) = Taken By, (c) = Cosigned By    Initials Name Provider Type    SR Miesha Hernández, PT Physical Therapist        Goals/Plan     Row Name 12/07/19 0923          Bed Mobility Goal 1 (PT)    Activity/Assistive Device (Bed Mobility Goal 1, PT)  bed mobility activities, all  -SR     [place] Level/Cues Needed (Bed Mobility Goal 1, PT)  minimum assist (75% or more patient effort)  -SR     Time Frame (Bed Mobility Goal 1, PT)  short term goal (STG);5 days  -SR     Progress/Outcomes (Bed Mobility Goal 1, PT)  goal ongoing  -SR     Row Name 12/07/19 0923          Transfer Goal 1 (PT)    Activity/Assistive Device (Transfer Goal 1, PT)  sit-to-stand/stand-to-sit;bed-to-chair/chair-to-bed;walker, rolling  -SR     [place] Level/Cues Needed (Transfer Goal 1, PT)  minimum  assist (75% or more patient effort)  -SR     Time Frame (Transfer Goal 1, PT)  long term goal (LTG);10 days  -SR     Progress/Outcome (Transfer Goal 1, PT)  goal ongoing  -SR     Row Name 12/07/19 0923          Gait Training Goal 1 (PT)    Activity/Assistive Device (Gait Training Goal 1, PT)  gait (walking locomotion);assistive device use;walker, rolling  -SR     [place] Level (Gait Training Goal 1, PT)  minimum assist (75% or more patient effort)  -SR     Distance (Gait Goal 1, PT)  200  -SR     Time Frame (Gait Training Goal 1, PT)  long term goal (LTG);10 days  -SR     Progress/Outcome (Gait Training Goal 1, PT)  goal ongoing  -SR       User Key  (r) = Recorded By, (t) = Taken By, (c) = Cosigned By    Initials Name Provider Type    SR Miesha Hernández, PT Physical Therapist        Clinical Impression     Row Name 12/07/19 0911          Pain Assessment    Additional Documentation  Pain Scale: FACES Pre/Post-Treatment (Group)  -SR     Row Name 12/07/19 0911          Pain Scale: Numbers Pre/Post-Treatment    Pain Location - Side  Right  -SR     Pain Location  hip  -SR     Row Name 12/07/19 0911          Pain Scale: FACES Pre/Post-Treatment    Pain: FACES Scale, Pretreatment  0-->no hurt  -SR     Pain: FACES Scale, Post-Treatment  4-->hurts little more  -SR     Pre/Post Treatment Pain Comment  tolerated  -SR     Row Name 12/07/19 0911          Plan of Care Review    Plan of Care Reviewed With  patient;daughter  -SR     Progress  no change  -SR     Outcome Summary  PT initial eval completed. Pt reports dizziness and nausea upon standing, which resolves after transfer is completed and pt is reclined in chair. Pt requires max A x 2 with bed mobility and SPS transfer. Gait not initiated d/t orthostatic BP and symptoms reported. Cont with IPPT and recommend IP rehab to improve independence and safety with mobility.   -SR     Row Name 12/07/19 0911          Physical Therapy Clinical Impression    Patient/Family Goals  Statement (PT Clinical Impression)  to get stronger and go home   -SR     Criteria for Skilled Interventions Met (PT Clinical Impression)  yes;treatment indicated  -SR     Rehab Potential (PT Clinical Summary)  good, to achieve stated therapy goals  -SR     Row Name 12/07/19 0911          Vital Signs    Pre Systolic BP Rehab  65 s/p transfer  -SR     Pre Treatment Diastolic BP  41  -SR     Intra Systolic BP Rehab  79  -SR     Intra Treatment Diastolic BP  37  -SR     Post Systolic BP Rehab  99  -SR     Post Treatment Diastolic BP  58  -SR     Row Name 12/07/19 0911          Positioning and Restraints    Pre-Treatment Position  in bed  -SR     Post Treatment Position  chair  -SR     In Chair  notified nsg;reclined;sitting;call light within reach;encouraged to call for assist;exit alarm on;with family/caregiver;on mechanical lift sling;waffle cushion  -SR       User Key  (r) = Recorded By, (t) = Taken By, (c) = Cosigned By    Initials Name Provider Type    SR Miesha Hernández, PT Physical Therapist        Outcome Measures     Row Name 12/07/19 0925          How much help from another person do you currently need...    Turning from your back to your side while in flat bed without using bedrails?  2  -SR     Moving from lying on back to sitting on the side of a flat bed without bedrails?  2  -SR     Moving to and from a bed to a chair (including a wheelchair)?  2  -SR     Standing up from a chair using your arms (e.g., wheelchair, bedside chair)?  2  -SR     Climbing 3-5 steps with a railing?  1  -SR     To walk in hospital room?  1  -SR     AM-PAC 6 Clicks Score (PT)  10  -SR     Row Name 12/07/19 0925          Functional Assessment    Outcome Measure Options  AM-PAC 6 Clicks Basic Mobility (PT)  -SR       User Key  (r) = Recorded By, (t) = Taken By, (c) = Cosigned By    Initials Name Provider Type    Miesha Garber, PT Physical Therapist          PT Recommendation and Plan  Planned Therapy Interventions (PT Eval):  balance training, bed mobility training, gait training, home exercise program, transfer training, strengthening  Outcome Summary/Treatment Plan (PT)  Anticipated Discharge Disposition (PT): inpatient rehabilitation facility  Plan of Care Reviewed With: patient, daughter  Progress: no change  Outcome Summary: PT initial eval completed. Pt reports dizziness and nausea upon standing, which resolves after transfer is completed and pt is reclined in chair. Pt requires max A x 2 with bed mobility and SPS transfer. Gait not initiated d/t orthostatic BP and symptoms reported. Cont with IPPT and recommend IP rehab to improve independence and safety with mobility.      Time Calculation:   PT Charges     Row Name 12/07/19 0931             Time Calculation    Start Time  0828  -SR      PT Received On  12/07/19  -SR      PT Goal Re-Cert Due Date  12/17/19  -SR         Time Calculation- PT    Total Timed Code Minutes- PT  0 minute(s)  -SR        User Key  (r) = Recorded By, (t) = Taken By, (c) = Cosigned By    Initials Name Provider Type    SR Miesha Hernández, PT Physical Therapist        Therapy Charges for Today     Code Description Service Date Service Provider Modifiers Qty    [id number] HC PT EVAL MOD COMPLEXITY 4 12/7/2019 Miesha Hernández, PT GP 1          PT G-Codes  Outcome Measure Options: AM-PAC 6 Clicks Basic Mobility (PT)  AM-PAC 6 Clicks Score (PT): 10  AM-PAC 6 Clicks Score (OT): 10    Miesha Hernández PT  12/7/2019

## 2019-12-07 NOTE — PROGRESS NOTES
[facility] Medicine Services  PROGRESS NOTE    Patient Name: Radha Whelan  : 1940  MRN: [id number]    Date of Admission: 2019  Primary Care Physician: Farhan Schaefer MD    Subjective   Subjective     CC:  DM and Rt hip pain s/p Rt THR for medical mgmt     HPI:  Pt is seen resting up in the chair in NAD.  Family at bs.  Pt states she feels okay now.  No n/v, abd pain, cp or soa.  Tolerating diet. Reports a brief episode of hypotension, asymptomatic, this am.  None since.  Overall feels good except still having Rt hip post op pain.  Responds well to pain meds. Wants to stay here inpt until ready to go to acute rehab.  States PPM site pain minimal.  No new issues.      Review of Systems  Gen- No fevers, chills  CV- No chest pain, palpitations  Resp- No cough, dyspnea  GI- No N/V/D, abd pain      Objective   Objective     Vital Signs:   Temp:  [97.3 °F (36.3 °C)-98.9 °F (37.2 °C)] 97.3 °F (36.3 °C)  Heart Rate:  [63-93] 67  Resp:  [14-18] 18  BP: ()/(37-93) 123/60        Physical Exam:  Constitutional: No acute distress, awake, alert. Siting up in chair.  Daughter at bs.   Eyes: PERRLA, sclerae anicteric, no conjunctival injection  HENT: NCAT, mucous membranes moist  Neck: Supple, no thyromegaly, no lymphadenopathy, trachea midline  Respiratory: Clear to auscultation bilaterally A/P slightly decreased at bases, nonlabored respirations on RA with sats 94%   Cardiovascular: RRR, no murmurs, rubs, or gallops, palpable pedal pulses bilaterally. New Lt upper chest wall PPM placed yesterday.  Drsg c/d/i with small amount of brusing proximal to drsg.  Soft tissue.  No crepitus.   Gastrointestinal: Positive bowel sounds, soft, nontender, nondistended. Obese  Musculoskeletal: No bilateral ankle edema, no clubbing or cyanosis to extremities. CALL spontaneously but decreased ROM to RLE s/p Rt TKR.    Psychiatric: Appropriate affect, cooperative and calm   Neurologic: Oriented x 3,  strength symmetric in all extremities, Cranial Nerves grossly intact to confrontation, speech clear and appropriate.  Follows commands   Skin: No rashes.  Rt hip drsg d/i from Rt TKA on 12/4.  Scant old dry drainage and mild generalized rt hip/thigh edema.  Stable     Results Reviewed:    Results from last 7 days   Lab Units 12/07/19  0543 12/06/19  0428 12/05/19  1644   WBC 10*3/mm3 5.88 7.20 7.50   HEMOGLOBIN g/dL 9.0* 9.1* 9.9*   HEMATOCRIT % 29.0* 28.9* 31.1*   PLATELETS 10*3/mm3 132* 143 151     Results from last 7 days   Lab Units 12/07/19  0543 12/06/19  0428 12/05/19  1644   SODIUM mmol/L 136 140 136   POTASSIUM mmol/L 3.7 4.3 4.5   CHLORIDE mmol/L 104 104 98   CO2 mmol/L 22.0 24.7 25.5   BUN mg/dL 15 17 19   CREATININE mg/dL 0.71 0.91 1.14*   GLUCOSE mg/dL 116* 126* 141*   CALCIUM mg/dL 8.2* 7.7* 8.9   TROPONIN T ng/mL  --   --  <0.010     Estimated Creatinine Clearance: 66.3 mL/min (by C-G formula based on SCr of 0.71 mg/dL).    Microbiology Results Abnormal     None          Imaging Results (Last 24 Hours)     Procedure Component Value Units Date/Time    XR Chest PA & Lateral [[id number]] Updated:  12/06/19 2154          Results for orders placed during the hospital encounter of 09/11/19   Adult Transthoracic Echo Complete W/ Cont if Necessary Per Protocol    Narrative · Estimated EF = 42%.  · Left ventricular diastolic dysfunction (grade I) consistent with   impaired relaxation.  · Calculated right ventricular systolic pressure from tricuspid   regurgitation is 22.0 mmHg.          I have reviewed the medications:  Scheduled Meds:  acetaminophen 650 mg Oral Q6H   atorvastatin 10 mg Oral Nightly   clopidogrel 75 mg Oral Daily   folic acid 1,000 mcg Oral Daily   insulin lispro 0-7 Units Subcutaneous 4x Daily With Meals & Nightly   melatonin 5 mg Oral Nightly   midodrine 2.5 mg Oral TID AC   pantoprazole 40 mg Oral Q AM   sodium chloride 3 mL Intravenous Q12H     Continuous Infusions:   PRN Meds:.•   acetaminophen  •  dextrose  •  dextrose  •  docusate sodium  •  glucagon (human recombinant)  •  ondansetron  •  sodium chloride  •  traMADol      Assessment/Plan   Assessment / Plan     Active Hospital Problems    Diagnosis  POA   • **Bradycardia [R00.1]  Unknown     Priority: High   • Status post right hip replacement [Z96.641]  Not Applicable   • ODELL on CPAP [G47.33, Z99.89]  Not Applicable   • Restless legs syndrome [G25.81]  Yes   • Type 2 diabetes mellitus without complication (CMS/HCC) [E11.9]  Yes   • Hypertension [I10]  Yes      Resolved Hospital Problems   No resolved problems to display.        Brief Hospital Course to date:  Radha Whelan is a 79 y.o. female with hx of DM type II, HTN, HLD, ODELL on cpap, OA, RLS, TIAs x 3, obesity, and possible CHF per report.  Pt was admitted to [facility] on 12/4 for planned Rt TKR per Dr Salgado.  Underwent surgery as planned. Post op mild hyponatremia.  Then yesterday 12/5 developed worsening hypothension, bradycardia with HR in 30s, 6-10 sec pauses on EKG and syncope.  Cards followed.  Transferred today to [facility] to [facility] to Dr Laboy for planned eval and placement of PPM.  Hospitalist were consulted for DM and post Rt THR pain mgmt.      Assessment/Plan:     Syncope  Hypotension on hx of HTN  Bradycardia  ?CHF (data deficit)--pt.jennifer ivan  --transferred from [facility] today for planned eval and likely PPM per EP/cards  --care per cards  --underwent PPM yesterday 12/6  --another episode of hypotension this am.  She reports asymptomatic.  Cards addressing.       Rt hip pain s/p Rt TKA on 12/4  Hx OA  --Sx of 12/4 at [facility] per Dr Salgado with ortho   --per [facility] ortho notes on tranfer here, pt will need f/u with Dr Salgado in 2 weeks  --consult PT/OT  --CM consulted for dc plannin.  Pt/family hope to transfer to acute rehab in [place] on dc. They DO NOT want to go back to [facility] from here.  theyd rather stay here until ready for rehab.   --monitor Rt hip drsg/ stable  --Xray at [facility] last pm 12/5 showed no  displacement or fracture.  Edema noted.    --hany cbc in am     Mild post op anemia  --Hbg 10.8 on 12/4.  stable today.  --monitor am labs   --will hany cbc in am for stability      Dm type II (A1c 6.9--11/20/10)  --takes glipizide outpt.  HOLD  --accuchecks achs. Glucoses running 112-175  --LDSSI achs for now.    --cardiac/diabetic diet      ODELL on cpap  --oxygen prn   --RT to setup cpap for hs and prn use      RLS  --home meds      Obesity     DVT prophylaxis: per primary serivce     Disposition: TBD per cards/EP.     Thank you for allowing [facility] Medicine Service to provide consultative care for your patient, we will continue to follow while clinically appropriate.    CODE STATUS:   There are no questions and answers to display.         Electronically signed by CURTIS Toledo, 12/07/19, 3:32 PM.

## 2019-12-07 NOTE — PLAN OF CARE
Problem: Patient Care Overview  Goal: Plan of Care Review  Outcome: Ongoing (interventions implemented as appropriate)  Flowsheets  Taken 12/7/2019 1649 by Kia Domínguez RN  Progress: no change  Taken 12/7/2019 0911 by Miesha Hernández PT  Plan of Care Reviewed With: patient;daughter  Goal: Individualization and Mutuality  Outcome: Ongoing (interventions implemented as appropriate)  Goal: Discharge Needs Assessment  Outcome: Ongoing (interventions implemented as appropriate)  Goal: Interprofessional Rounds/Family Conf  Outcome: Ongoing (interventions implemented as appropriate)     Problem: Skin Injury Risk (Adult)  Goal: Identify Related Risk Factors and Signs and Symptoms  Outcome: Ongoing (interventions implemented as appropriate)  Flowsheets (Taken 12/7/2019 0419 by Teresa Nicolas RN)  Related Risk Factors (Skin Injury Risk): advanced age;mobility impaired  Goal: Skin Health and Integrity  Outcome: Ongoing (interventions implemented as appropriate)  Flowsheets (Taken 12/7/2019 0419 by Teresa Nicolas RN)  Skin Health and Integrity: making progress toward outcome     Problem: Fall Risk (Adult)  Goal: Identify Related Risk Factors and Signs and Symptoms  Outcome: Ongoing (interventions implemented as appropriate)  Flowsheets (Taken 12/7/2019 0419 by Teresa Nicolas RN)  Related Risk Factors (Fall Risk): gait/mobility problems;history of falls;environment unfamiliar  Signs and Symptoms (Fall Risk): presence of risk factors  Goal: Absence of Fall  Outcome: Ongoing (interventions implemented as appropriate)  Flowsheets (Taken 12/7/2019 3569 by Teresa Nicolas RN)  Absence of Fall: achieves outcome

## 2019-12-07 NOTE — THERAPY EVALUATION
"Acute Care - Occupational Therapy Initial Evaluation  [facility]     Patient Name: Radha Whelan  : 1940  MRN: [id number]  Today's Date: 2019     Date of Referral to OT: 19       Admit Date: 2019     No diagnosis found.  Patient Active Problem List   Diagnosis   • Type 2 diabetes mellitus without complication (CMS/HCC)   • Temporary cerebral vascular dysfunction   • Fatigue   • Hypertension   • Hypervitaminosis B6   • Hyponatremia   • ODELL on CPAP   • Peripheral neuropathy   • Restless legs syndrome   • Edema   • H/O hyperlipidemia   • Acute non-recurrent maxillary sinusitis   • Infection of left tear duct   • Left eye pain   • Mixed hyperlipidemia   • LBBB (left bundle branch block)   • Abnormal echocardiogram   • Cardiomyopathy (CMS/HCC)   • Arthritis   • Chronic systolic congestive heart failure (CMS/HCC)   • Status post right hip replacement   • Bradycardia     Past Medical History:   Diagnosis Date   • Arthritis    • Basal cell carcinoma    • Body piercing     both ears   • Cataract, bilateral     s/p removal    • Diabetes mellitus (CMS/HCC)    • DVT (deep venous thrombosis) (CMS/HCC)     - left leg   • Edema     legs- hx of   • Elevated cholesterol    • Fatigue    • Fatigue    • History of left heart catheterization     19  no stents   • History of migraine headaches    • Hx of exercise stress test    • Hyperlipidemia    • Hypertension    • Hyponatremia    • Impaired functional mobility, balance, gait, and endurance    • LBBB (left bundle branch block)    • Left leg pain     left leg and knee pain    • Muscle spasm     hx of   • Obesity    • ODELL on CPAP     CPAP   • Stroke (CMS/HCC)    • Teeth missing     all of the top, and has 6 bottom teeth left   • Thyroid nodule    • TIA (transient ischemic attack)     x3.  last one was \"a few years ago\"   • Wears dentures     top plate   • Wears glasses     to read     Past Surgical History:   Procedure Laterality Date   • BREAST " BIOPSY Left     benign   • CARDIAC CATHETERIZATION      09/16/2019 PER .   • CARDIAC CATHETERIZATION N/A 9/16/2019    Procedure: Left Heart Cath +/- CBI;  Surgeon: Ashlyn Mays MD;  Location: [facility] CATH INVASIVE LOCATION;  Service: Cardiovascular   • CATARACT EXTRACTION  12/2018    both eyes    • COLONOSCOPY     • MOUTH SURGERY      all top teeth   • SKIN BIOPSY      basal cell   • TUBAL ABDOMINAL LIGATION            OT ASSESSMENT FLOWSHEET (last 12 hours)      Occupational Therapy Evaluation     Row Name 12/07/19 0825                   OT Evaluation Time/Intention    Subjective Information  complains of;weakness;fatigue;pain  -CL        Document Type  evaluation  -CL        Mode of Treatment  occupational therapy  -CL        Patient Effort  good  -CL        Symptoms Noted During/After Treatment  none  -CL           General Information    Patient Profile Reviewed?  yes  -CL        Prior Level of Function  independent:;all household mobility;transfer;ADL's;dressing;bathing  -CL        Equipment Currently Used at Home  walker, rolling;shower chair  -CL        Existing Precautions/Restrictions  (S) fall;right;hip, posterior;pacemaker;cardiac RLE WBAT w/ PHP s/p MANAS 12/4, s/p PM, orthostatic BP  -CL        Risks Reviewed  patient and family:;LOB;nausea/vomiting;dizziness;change in vital signs;increased discomfort;lines disloged  -CL        Benefits Reviewed  patient and family:;improve function;increase independence;increase strength;increase balance;decrease pain;increase knowledge  -CL        Barriers to Rehab  none identified  -CL           Relationship/Environment    Lives With  spouse  -CL           Resource/Environmental Concerns    Current Living Arrangements  home/apartment/condo  -CL           Home Main Entrance    Number of Stairs, Main Entrance  one  -CL           Cognitive Assessment/Interventions    Additional Documentation  Cognitive Assessment/Intervention (Group)  -CL           Cognitive  Assessment/Intervention- PT/OT    Affect/Mental Status (Cognitive)  WFL  -CL        Orientation Status (Cognition)  oriented x 3  -CL        Follows Commands (Cognition)  WFL  -CL        Cognitive Function (Cognitive)  WFL  -CL           Bed Mobility Assessment/Treatment    Bed Mobility Assessment/Treatment  supine-sit  -CL        Supine-Sit [place] (Bed Mobility)  maximum assist (25% patient effort);2 person assist;verbal cues  -CL        Assistive Device (Bed Mobility)  bed rails;draw sheet;head of bed elevated  -CL           Transfer Assessment/Treatment    Transfer Assessment/Treatment  sit-stand transfer;stand-sit transfer  -CL        Comment (Transfers)  Pt stood w/ RUE support, no noted buckling though c/o dizziness.   -CL           Sit-Stand Transfer    Sit-Stand [place] (Transfers)  maximum assist (25% patient effort);2 person assist;verbal cues  -CL           Stand-Sit Transfer    Stand-Sit [place] (Transfers)  maximum assist (25% patient effort);2 person assist;verbal cues  -CL           ADL Assessment/Intervention    BADL Assessment/Intervention  -- Deferred.   -CL           General ROM    GENERAL ROM COMMENTS  Deferred SAMANTHA JIMENEZ grossly WFL.   -CL           MMT (Manual Muscle Testing)    General MMT Comments  RUE grossly 4/5, LUE deferred.   -CL           Motor Assessment/Interventions    Additional Documentation  Balance (Group);Therapeutic Exercise (Group)  -CL           Balance    Balance  static sitting balance;static standing balance  -CL           Static Sitting Balance    Level of [place] (Unsupported Sitting, Static Balance)  minimal assist, 75% patient effort  -CL        Sitting Position (Unsupported Sitting, Static Balance)  sitting on edge of bed  -CL        Time Able to Maintain Position (Unsupported Sitting, Static Balance)  2 to 3 minutes  -CL           Static Standing Balance    Level of [place] (Supported Standing, Static Balance)  moderate assist, 50 to 74%  patient effort;2 person assist  -CL           Sensory Assessment/Intervention    Sensory General Assessment  no sensation deficits identified  -CL           Positioning and Restraints    Pre-Treatment Position  in bed  -CL        Post Treatment Position  chair  -CL        In Bed  notified nsg;sitting EOB;call light within reach;encouraged to call for assist;exit alarm on;with PT w/ PT  -CL           Pain Assessment    Additional Documentation  Pain Scale 2: FACES Pre/Post-Treatment (Group)  -CL           Pain Scale 2: FACES Pre/Post-Treatment    Pain 2: FACES Scale, Pretreatment  0-->no hurt  -CL        Pain 2: FACES Scale, Post-Treatment  4-->hurts little more  -CL        Pain Location 2 - Side  Right  -CL        Pain Location 2 - Orientation  incisional  -CL        Pain Location 2  hip  -CL        Pre/Post Treatment Pain 2 Comment  tolerated  -CL        Pain Intervention(s) 2  Repositioned;Ambulation/increased activity  -CL           Wound 12/04/19 0827 Right hip Incision    Wound - Properties Group Date first assessed: 12/04/19  -AO Time first assessed: 0827  -AO Side: Right  -AO Location: hip  -AO Primary Wound Type: Incision  -AO       Wound Left chest    Wound - Properties Group Side: Left  -KF Location: chest  -KF       Clinical Impression (OT)    Date of Referral to OT  12/06/19  -CL        OT Diagnosis  Decreased independence.   -CL        Patient/Family Goals Statement (OT Eval)  Return to PLOF.   -CL        Criteria for Skilled Therapeutic Interventions Met (OT Eval)  yes;treatment indicated  -CL        Rehab Potential (OT Eval)  good, to achieve stated therapy goals  -CL        Therapy Frequency (OT Eval)  daily  -CL        Anticipated Equipment Needs at Discharge (OT)  -- TBA further  -CL        Anticipated Discharge Disposition (OT)  inpatient rehabilitation facility  -CL           Vital Signs    Pre Systolic BP Rehab  119  -CL        Pre Treatment Diastolic BP  62  -CL        Intra Systolic BP Rehab   (S) 65  -CL        Intra Treatment Diastolic BP  (S) 41  -CL        Post Systolic BP Rehab  99  -CL        Post Treatment Diastolic BP  55  -CL        Pre SpO2 (%)  96  -CL        O2 Delivery Pre Treatment  room air  -CL        Post SpO2 (%)  96  -CL        O2 Delivery Post Treatment  room air  -CL        Pre Patient Position  Supine  -CL        Intra Patient Position  Standing  -CL        Post Patient Position  Sitting  -CL           OT Goals    Toileting Goal Selection (OT)  toileting, OT goal 1  -CL           Transfer Goal 1 (OT)    Activity/Assistive Device (Transfer Goal 1, OT)  sit-to-stand/stand-to-sit;toilet  -CL        [place] Level/Cues Needed (Transfer Goal 1, OT)  minimum assist (75% or more patient effort);verbal cues required  -CL        Time Frame (Transfer Goal 1, OT)  by discharge;long term goal (LTG)  -CL        Progress/Outcome (Transfer Goal 1, OT)  goal ongoing  -CL           Dressing Goal 1 (OT)    Activity/Assistive Device (Dressing Goal 1, OT)  lower body dressing Don/doff socks w/ AAD  -CL        [place]/Cues Needed (Dressing Goal 1, OT)  moderate assist (50-74% patient effort);verbal cues required  -CL        Time Frame (Dressing Goal 1, OT)  by discharge;long term goal (LTG)  -CL        Progress/Outcome (Dressing Goal 1, OT)  goal ongoing  -CL           Toileting Goal 1 (OT)    Activity/Device (Toileting Goal 1, OT)  adjust/manage clothing;perform perineal hygiene  -CL        [place] Level/Cues Needed (Toileting Goal 1, OT)  moderate assist (50-74% patient effort);verbal cues required  -CL        Time Frame (Toileting Goal 1, OT)  by discharge;long term goal (LTG)  -CL        Progress/Outcome (Toileting Goal 1, OT)  goal ongoing  -CL           Living Environment    Home Accessibility  stairs to enter home  -CL          User Key  (r) = Recorded By, (t) = Taken By, (c) = Cosigned By    Initials Name Effective Dates    Julian Goldman RN 06/16/16 -     KF Faas,  Mariza ANDRE 04/23/18 -     CL Jailene Johnston OT 04/03/18 -                OT Recommendation and Plan  Outcome Summary/Treatment Plan (OT)  Anticipated Equipment Needs at Discharge (OT): (TBA further)  Anticipated Discharge Disposition (OT): inpatient rehabilitation facility  Therapy Frequency (OT Eval): daily  Outcome Summary: OT completed a brief chart review. Pt is Max Ax2 for bed mobility and Mod Ax2 for STS t/f, limited d/t orthostatic BP and c/o pain. Recommend cont skilled IPOT POC. Pt would benefit from AE next session. Recommend pt DC to IP rehab.    Outcome Measures     Row Name 12/07/19 0825             How much help from another is currently needed...    Putting on and taking off regular lower body clothing?  1  -CL      Bathing (including washing, rinsing, and drying)  1  -CL      Toileting (which includes using toilet bed pan or urinal)  1  -CL      Putting on and taking off regular upper body clothing  2  -CL      Taking care of personal grooming (such as brushing teeth)  2  -CL      Eating meals  3  -CL      AM-PAC 6 Clicks Score (OT)  10  -CL         Functional Assessment    Outcome Measure Options  AM-PAC 6 Clicks Daily Activity (OT)  -CL        User Key  (r) = Recorded By, (t) = Taken By, (c) = Cosigned By    Initials Name Provider Type    Jailene Bar OT Occupational Therapist          Time Calculation:   Time Calculation- OT     Row Name 12/07/19 0825             Time Calculation- OT    OT Start Time  0825  -CL      OT Received On  12/07/19  -CL      OT Goal Re-Cert Due Date  12/17/19  -CL        User Key  (r) = Recorded By, (t) = Taken By, (c) = Cosigned By    Initials Name Provider Type    Jailene Bar OT Occupational Therapist        Therapy Charges for Today     Code Description Service Date Service Provider Modifiers Qty    60401238299 HC OT EVAL LOW COMPLEXITY 4 12/7/2019 Jailene Johnston OT GO 1               Jailene Johnston OT  12/7/2019

## 2019-12-07 NOTE — PLAN OF CARE
Problem: Patient Care Overview  Goal: Plan of Care Review  Outcome: Ongoing (interventions implemented as appropriate)  Flowsheets (Taken 12/7/2019 0419)  Plan of Care Reviewed With: patient;family  Outcome Summary: Pt transferred [facility] floor from [facility]. Post pacemaker placement. Pt underwent recent right hip arthoplasty earlier this week. Dressing on right hip dry and intact. Pt complained of pain at incision. PA notified and PRN tramadol added for pain and administered with relief. Vital signs stable, will continue to monitor.     Problem: Skin Injury Risk (Adult)  Goal: Identify Related Risk Factors and Signs and Symptoms  Outcome: Ongoing (interventions implemented as appropriate)  Flowsheets (Taken 12/7/2019 0419)  Related Risk Factors (Skin Injury Risk): advanced age;mobility impaired     Problem: Skin Injury Risk (Adult)  Goal: Skin Health and Integrity  Outcome: Ongoing (interventions implemented as appropriate)  Flowsheets (Taken 12/7/2019 0419)  Skin Health and Integrity: making progress toward outcome     Problem: Fall Risk (Adult)  Goal: Identify Related Risk Factors and Signs and Symptoms  Outcome: Ongoing (interventions implemented as appropriate)  Flowsheets (Taken 12/7/2019 0419)  Related Risk Factors (Fall Risk): gait/mobility problems;history of falls;environment unfamiliar  Signs and Symptoms (Fall Risk): presence of risk factors     Problem: Fall Risk (Adult)  Goal: Absence of Fall  Outcome: Ongoing (interventions implemented as appropriate)  Flowsheets (Taken 12/7/2019 0419)  Absence of Fall: achieves outcome

## 2019-12-07 NOTE — PROGRESS NOTES
Discharge Planning Assessment  [facility]     Patient Name: Radha Whelan  MRN: [id number]  Today's Date: 12/7/2019    Admit Date: 12/6/2019    Discharge Needs Assessment     Row Name 12/07/19 1353       Living Environment    Lives With  spouse    Name(s) of Who Lives With Patient  Spouse is Jg or Dougie    Current Living Arrangements  home/apartment/condo    Primary Care Provided by  self    Provides Primary Care For  no one    Family Caregiver if Needed  spouse;child(nataliia), adult    Family Caregiver Names  adult daughters     Quality of Family Relationships  supportive    Able to Return to Prior Arrangements  yes       Resource/Environmental Concerns    Resource/Environmental Concerns  none    Transportation Concerns  car, none       Transition Planning    Patient/Family Anticipates Transition to  inpatient rehabilitation facility    Patient/Family Anticipated Services at Transition  rehabilitation services    Transportation Anticipated  family or friend will provide       Discharge Needs Assessment    Readmission Within the Last 30 Days  no previous admission in last 30 days    Concerns to be Addressed  discharge planning    Equipment Currently Used at Home  walker, rolling;glucometer;bipap/cpap;grab bar;shower chair CPAP through [facility], new walk in shower     Anticipated Changes Related to Illness  none    Equipment Needed After Discharge  commode    Discharge Facility/Level of Care Needs  rehabilitation facility    Provided post acute provider list?  Refused    Current Discharge Risk  dependent with mobility/activities of daily living        Discharge Plan     Row Name 12/07/19 1313       Plan    Plan  [facility]    Plan Comments  Patient's discharge plan initiated at [facility] with facility making a referral to [facility]. This unit will need to be contacted on Monday to facililitate plans to transfer there. I did review other rehab options with  patient and her family. She gave permission to speak in front of family and include them in discharge planning. I reviewed skilled care in a nursing home or going to [facility]. She continues to request [facility].     Final Discharge Disposition Code  61 - hospital-based swing bed        Destination      Service Provider Request Status Selected Services Address Phone Number Fax Number    [facility] Pending - No Request Sent N/A [address] [id number] [phone]      Durable Medical Equipment      Coordination has not been started for this encounter.      Dialysis/Infusion      Coordination has not been started for this encounter.      Home Medical Care      Coordination has not been started for this encounter.      Therapy      Coordination has not been started for this encounter.      Community Resources      Coordination has not been started for this encounter.          Demographic Summary     Row Name 12/07/19 1352       General Information    Admission Type  inpatient    Referral Source  admission list    Preferred Language  English    General Information Comments  PCP is Farhan Schaefer       Contact Information    Permission Granted to Share Info With          Functional Status     Row Name 12/07/19 1352       Functional Status    Usual Activity Tolerance  moderate    Current Activity Tolerance  poor       Functional Status, IADL    Medications  independent    Meal Preparation  assistive person    Housekeeping  assistive person    Laundry  assistive person    Shopping  assistive person       Employment/    Employment/ Comments  Patient has Medicare and [facility] insurance and denies concerns regarding coverage or disruption in coverage issues. Patient has drug coverage and denies issues obtaining or affording current medications. .        Psychosocial    No documentation.       Abuse/Neglect    No documentation.        Legal    No documentation.       Substance Abuse    No documentation.       Patient Forms    No documentation.           Jesenia Aviles RN

## 2019-12-08 LAB
ANION GAP SERPL CALCULATED.3IONS-SCNC: 9 MMOL/L (ref 5–15)
BASOPHILS # BLD AUTO: 0.01 10*3/MM3 (ref 0–0.2)
BASOPHILS NFR BLD AUTO: 0.2 % (ref 0–1.5)
BUN BLD-MCNC: 18 MG/DL (ref 8–23)
BUN/CREAT SERPL: 21.7 (ref 7–25)
CALCIUM SPEC-SCNC: 8.5 MG/DL (ref 8.6–10.5)
CHLORIDE SERPL-SCNC: 102 MMOL/L (ref 98–107)
CO2 SERPL-SCNC: 27 MMOL/L (ref 22–29)
CREAT BLD-MCNC: 0.83 MG/DL (ref 0.57–1)
DEPRECATED RDW RBC AUTO: 45.9 FL (ref 37–54)
EOSINOPHIL # BLD AUTO: 0.12 10*3/MM3 (ref 0–0.4)
EOSINOPHIL NFR BLD AUTO: 1.8 % (ref 0.3–6.2)
ERYTHROCYTE [DISTWIDTH] IN BLOOD BY AUTOMATED COUNT: 12.9 % (ref 12.3–15.4)
GFR SERPL CREATININE-BSD FRML MDRD: 66 ML/MIN/1.73
GLUCOSE BLD-MCNC: 121 MG/DL (ref 65–99)
GLUCOSE BLDC GLUCOMTR-MCNC: 123 MG/DL (ref 70–130)
GLUCOSE BLDC GLUCOMTR-MCNC: 144 MG/DL (ref 70–130)
GLUCOSE BLDC GLUCOMTR-MCNC: 154 MG/DL (ref 70–130)
GLUCOSE BLDC GLUCOMTR-MCNC: 242 MG/DL (ref 70–130)
HCT VFR BLD AUTO: 28.1 % (ref 34–46.6)
HGB BLD-MCNC: 8.8 G/DL (ref 12–15.9)
IMM GRANULOCYTES # BLD AUTO: 0.02 10*3/MM3 (ref 0–0.05)
IMM GRANULOCYTES NFR BLD AUTO: 0.3 % (ref 0–0.5)
LYMPHOCYTES # BLD AUTO: 2.27 10*3/MM3 (ref 0.7–3.1)
LYMPHOCYTES NFR BLD AUTO: 34.7 % (ref 19.6–45.3)
MCH RBC QN AUTO: 30.7 PG (ref 26.6–33)
MCHC RBC AUTO-ENTMCNC: 31.3 G/DL (ref 31.5–35.7)
MCV RBC AUTO: 97.9 FL (ref 79–97)
MONOCYTES # BLD AUTO: 0.65 10*3/MM3 (ref 0.1–0.9)
MONOCYTES NFR BLD AUTO: 9.9 % (ref 5–12)
NEUTROPHILS # BLD AUTO: 3.48 10*3/MM3 (ref 1.7–7)
NEUTROPHILS NFR BLD AUTO: 53.1 % (ref 42.7–76)
NRBC BLD AUTO-RTO: 0 /100 WBC (ref 0–0.2)
PLAT MORPH BLD: NORMAL
PLATELET # BLD AUTO: 154 10*3/MM3 (ref 140–450)
PMV BLD AUTO: 9.1 FL (ref 6–12)
POTASSIUM BLD-SCNC: 4.2 MMOL/L (ref 3.5–5.2)
RBC # BLD AUTO: 2.87 10*6/MM3 (ref 3.77–5.28)
RBC MORPH BLD: NORMAL
SODIUM BLD-SCNC: 138 MMOL/L (ref 136–145)
WBC MORPH BLD: NORMAL
WBC NRBC COR # BLD: 6.55 10*3/MM3 (ref 3.4–10.8)

## 2019-12-08 PROCEDURE — 85025 COMPLETE CBC W/AUTO DIFF WBC: CPT | Performed by: NURSE PRACTITIONER

## 2019-12-08 PROCEDURE — 82962 GLUCOSE BLOOD TEST: CPT

## 2019-12-08 PROCEDURE — 80048 BASIC METABOLIC PNL TOTAL CA: CPT | Performed by: NURSE PRACTITIONER

## 2019-12-08 PROCEDURE — 97110 THERAPEUTIC EXERCISES: CPT

## 2019-12-08 PROCEDURE — 97116 GAIT TRAINING THERAPY: CPT

## 2019-12-08 PROCEDURE — 99232 SBSQ HOSP IP/OBS MODERATE 35: CPT | Performed by: NURSE PRACTITIONER

## 2019-12-08 PROCEDURE — 99024 POSTOP FOLLOW-UP VISIT: CPT | Performed by: PHYSICIAN ASSISTANT

## 2019-12-08 PROCEDURE — 85007 BL SMEAR W/DIFF WBC COUNT: CPT | Performed by: NURSE PRACTITIONER

## 2019-12-08 RX ADMIN — CLOPIDOGREL BISULFATE 75 MG: 75 TABLET ORAL at 09:22

## 2019-12-08 RX ADMIN — SODIUM CHLORIDE, PRESERVATIVE FREE 3 ML: 5 INJECTION INTRAVENOUS at 09:22

## 2019-12-08 RX ADMIN — MIDODRINE HYDROCHLORIDE 2.5 MG: 5 TABLET ORAL at 12:04

## 2019-12-08 RX ADMIN — ACETAMINOPHEN 650 MG: 325 TABLET ORAL at 02:11

## 2019-12-08 RX ADMIN — FOLIC ACID 1000 MCG: 1 TABLET ORAL at 09:22

## 2019-12-08 RX ADMIN — PANTOPRAZOLE SODIUM 40 MG: 40 TABLET, DELAYED RELEASE ORAL at 06:52

## 2019-12-08 RX ADMIN — SODIUM CHLORIDE, PRESERVATIVE FREE 3 ML: 5 INJECTION INTRAVENOUS at 20:54

## 2019-12-08 RX ADMIN — MIDODRINE HYDROCHLORIDE 2.5 MG: 5 TABLET ORAL at 09:22

## 2019-12-08 RX ADMIN — INSULIN LISPRO 2 UNITS: 100 INJECTION, SOLUTION INTRAVENOUS; SUBCUTANEOUS at 20:55

## 2019-12-08 RX ADMIN — INSULIN LISPRO 3 UNITS: 100 INJECTION, SOLUTION INTRAVENOUS; SUBCUTANEOUS at 12:05

## 2019-12-08 RX ADMIN — MIDODRINE HYDROCHLORIDE 2.5 MG: 5 TABLET ORAL at 17:32

## 2019-12-08 RX ADMIN — ACETAMINOPHEN 650 MG: 325 TABLET ORAL at 17:32

## 2019-12-08 RX ADMIN — ACETAMINOPHEN 650 MG: 325 TABLET ORAL at 06:52

## 2019-12-08 RX ADMIN — TRAMADOL HYDROCHLORIDE 50 MG: 50 TABLET, COATED ORAL at 14:59

## 2019-12-08 RX ADMIN — MELATONIN TAB 5 MG 5 MG: 5 TAB at 20:54

## 2019-12-08 RX ADMIN — ACETAMINOPHEN 650 MG: 325 TABLET ORAL at 12:04

## 2019-12-08 RX ADMIN — TRAMADOL HYDROCHLORIDE 50 MG: 50 TABLET, COATED ORAL at 21:31

## 2019-12-08 NOTE — DISCHARGE INSTRUCTIONS
Please do not lift more than 10 pounds or abduct the shoulder joint / or raise the arm above the shoulder for 6 weeks after device was implanted (this does not apply to subcutaneous ICDs).     Avoid activities that involve heavy lifting or rough contact that could result in blows to your implant site and to allow your incision time to heal.     No shower or getting device incision wet for 2 days post-operatively.     Keep wound exposed to air unless otherwise instructed, the surgical glue is the bandage.     No creams, lotions, or powders to incision.     Please avoid allowing bra strap or suspenders to lay over incision until completely healed.     Avoid hot tubs or pools until incision completely healed.     No driving for 24 hours post-operatively after device implant.     Call your doctor if you have any swelling, redness or discharge around your incision, notice anything unusual or unexpected, or you develop a fever that does not go away in two or three days.     Call your doctor if you hear any beeping sounds / vibratory alerts from your device as this indicates your device needs to be checked immediately.     Carry your Medical Device ID Card with you at all times.  Please call our office () with any questions about the device or the wounds.

## 2019-12-08 NOTE — PROGRESS NOTES
"[facility] Progress Note     LOS: 1 day   Patient Care Team:  Farhan Schaefer MD as PCP - General  Farhan Schaefer MD as PCP - [facility]  Nik Chaudhari MD as Consulting Physician (General Surgery)  PCP:  Farhan Schaefer MD    Chief Complaint:  Syncope, s/p ppm, hypotension    SUBJECTIVE:   Pt just waking up. No complaints. No further presyncopal episodes. Hopefully rehab tomorrow.     OBJECTIVE:    Vital Sign Min/Max for last 24 hours  Temp  Min: 97.3 °F (36.3 °C)  Max: 98.7 °F (37.1 °C)   BP  Min: 65/41  Max: 133/59   Pulse  Min: 63  Max: 84   Resp  Min: 16  Max: 18   SpO2  Min: 91 %  Max: 95 %   Flow (L/min)  Min: 2  Max: 2   No data recorded     Flowsheet Rows      First Filed Value   Admission Height  170.2 cm (67\") Documented at 12/06/2019 0947   Admission Weight  99.1 kg (218 lb 7.6 oz) Documented at 12/06/2019 0947          Telemetry: nsr 72      Intake/Output Summary (Last 24 hours) at 12/8/2019 0732  Last data filed at 12/7/2019 0900  Gross per 24 hour   Intake 480 ml   Output --   Net 480 ml     Intake & Output (last 3 days)       12/05 0701 - 12/06 0700 12/06 0701 - 12/07 0700 12/07 0701 - 12/08 0700 12/08 0701 - 12/09 0700    P.O.   480     Total Intake(mL/kg)   480 (5.1)     Urine (mL/kg/hr)  200      Total Output  200      Net  -200 +480                    Physical Exam:  Physical Exam   Constitutional: She is oriented to person, place, and time. She appears well-developed and well-nourished. No distress.   Cardiovascular: Normal rate, regular rhythm, normal heart sounds and intact distal pulses.   No murmur heard.  Pulses:       Radial pulses are 2+ on the right side, and 2+ on the left side.        Dorsalis pedis pulses are 2+ on the right side, and 2+ on the left side.        Posterior tibial pulses are 2+ on the right side, and 2+ on the left side.   Pulmonary/Chest: Effort normal and breath sounds normal. She has no wheezes. She has no " rales.   Abdominal: Soft. Bowel sounds are normal. There is no tenderness. There is no guarding.   Musculoskeletal: She exhibits edema (minimal RLE > LLE ). She exhibits no tenderness.   Neurological: She is alert and oriented to person, place, and time.   Skin: Skin is warm and dry. No rash noted.   Psychiatric: She has a normal mood and affect.   Nursing note and vitals reviewed.       LABS/DIAGNOSTIC DATA:  Results from last 7 days   Lab Units 12/07/19  0543 12/06/19  0428 12/05/19  1644   WBC 10*3/mm3 5.88 7.20 7.50   HEMOGLOBIN g/dL 9.0* 9.1* 9.9*   HEMATOCRIT % 29.0* 28.9* 31.1*   PLATELETS 10*3/mm3 132* 143 151     Lab Results   Lab Value Date/Time    TROPONINT <0.010 12/05/2019 1644         Results from last 7 days   Lab Units 12/08/19  0540 12/07/19  0543 12/06/19  0428   SODIUM mmol/L 138 136 140   POTASSIUM mmol/L 4.2 3.7 4.3   CHLORIDE mmol/L 102 104 104   CO2 mmol/L 27.0 22.0 24.7   BUN mg/dL 18 15 17   CREATININE mg/dL 0.83 0.71 0.91   CALCIUM mg/dL 8.5* 8.2* 7.7*   GLUCOSE mg/dL 121* 116* 126*                       Medication Review:     acetaminophen 650 mg Oral Q6H   atorvastatin 10 mg Oral Nightly   clopidogrel 75 mg Oral Daily   folic acid 1,000 mcg Oral Daily   insulin lispro 0-7 Units Subcutaneous 4x Daily With Meals & Nightly   melatonin 5 mg Oral Nightly   midodrine 2.5 mg Oral TID AC   pantoprazole 40 mg Oral Q AM   sodium chloride 3 mL Intravenous Q12H              Bradycardia    Type 2 diabetes mellitus without complication (CMS/HCC)    Hypertension    ODELL on CPAP    Restless legs syndrome    Status post right hip replacement      ASSESSMENT/PLAN:  Bradycardia  Sinus Pauses  Syncope  - syncopal episode following RTHA correlating w 8.6 second sinus pause  - s/p [employer] ppm per Dr. Laboy    Orthostatic Hypotension  - element of orthostatic hypotension likely contributing to dizziness/syncope as well with clear history of vasodepressor presyncope/syncope.   - Counseled to patient and  family that the ppm will only help with bradycardia mediated syncope, and it will still be important for her to hydrate well and avoid sudden positional changes to prevent drops in BP. Would expect this to be worse following surgery d/t anemia.     Recent R hip arthoplasty  - elective RTHA on 12/4/19 at [facility] w Dr. Salgado  - PT  - CM to arrange rehab transfer early next week  - 2 week fu w Dr. Salgado previously arranged    Hypotension  - midodrine started. May titrate up as needed.   - would expect improvement and likely d/c of midodrine as postop anemia resolves  - no antihypertensives    T2DM    ODELL  -CPAP [facility]    Hospitalists following postop hip replacement. Plan for rehab at [facility] swing bed hopefully tomorrow. Stable from cardiovascular standpoint otherwise. Would follow up w EP for wound check in 7-10 days then w Dr. Laboy in 3 months.      Electronically signed by Cammy Arellano PA-C, 12/08/19, 7:33 AM.

## 2019-12-08 NOTE — PLAN OF CARE
Problem: Patient Care Overview  Goal: Plan of Care Review  Outcome: Ongoing (interventions implemented as appropriate)  Flowsheets (Taken 12/8/2019 1210)  Progress: improving  Plan of Care Reviewed With: patient;daughter  Outcome Summary: Pt demonstrated increased indep with bed mobility, transfers; progressed forward ambulation distance to 8 total ft with mod x2 A, RW. Gait distance limited by onset of faituge/ nausea; HR WFL but noted drop in BP post gait. Edu pt/DTR re: HEP, post hip precautions, PM precautions; pt very motivated to participate and progress. Will cont PT POC as clinically appropriate, closely monitoring vitals during session.

## 2019-12-08 NOTE — DISCHARGE PLACEMENT REQUEST
"Bryn Whelan (79 y.o. Female)     Transferred from [facility] to [facility] for cardiac. You have referral for her orthopedic surgery done at [place].     Please call [phone] to speak with  Dianne    Thank you    Date of Birth Social Security Number Address Home Phone MRN    1940  [address] [place] [address] [phone] [id number]    Gnosticist Marital Status          Restorationist        Admission Date Admission Type Admitting Provider Attending Provider Department, Room/Bed    12/6/19 Urgent Arsen Mendez MD West, Christopher R, MD [facility] 6B, N631/1    Discharge Date Discharge Disposition Discharge Destination                       Attending Provider:  Arsen Mendez MD    Allergies:  Cumini, Advil [Ibuprofen], Butter Flavor [Flavoring Agent], Cheese, Other, Saccharin    Isolation:  None   Infection:  None   Code Status:  Prior    Ht:  170.2 cm (67\")   Wt:  94.8 kg (209 lb)    Admission Cmt:  None   Principal Problem:  Bradycardia [R00.1]                 Active Insurance as of 12/6/2019     Primary Coverage     Payor Plan Insurance Group Employer/Plan Group    MEDICARE MEDICARE A & B      Payor Plan Address Payor Plan Phone Number Payor Plan Fax Number Effective Dates    [address] [phone]  8/1/2004 - None Entered    [facility] [id number]       Subscriber Name Subscriber Birth Date Member ID       NARGISBRYN 1940 [id number]           Secondary Coverage     Payor Plan Insurance Group Employer/Plan Group    [facility] [id number]     Payor Plan Address Payor Plan Phone Number Payor Plan Fax Number Effective Dates    [address]   1/1/2016 - None Entered    [facility] [id number]       Subscriber Name Subscriber Birth Date Member ID       NARGIS,BRYN 1940 [id number]                 Emergency Contacts      (Rel.) Home Phone Work Phone Mobile Phone    Dougie Whelan (Spouse) [phone] -- " [phone]    Delores Zaidi (Daughter) -- -- [phone]    Afua Lindsay (Daughter) -- -- [phone]            Insurance Information                MEDICARE/MEDICARE A & B Phone: [phone]    Subscriber: Radha Whelan Subscriber#: [id number]    Group#:  Precert#:         [employer] Phone:     Subscriber: Radha Whelan Subscriber#: [id number]    Group#: [id number] Precert#:              History & Physical      Stephan Laboy DO at 19 1530          H&P reviewed. The patient was examined and there are no changes to the H&P.          Electronically signed by Stephan Laboy DO at 19 1530   Source Note     Attestation signed by Stephan Laboy DO at 19 6347    I have reviewed the documentation above and agree.                          Cardiac Electrophysiology In Patient Consultation        [facility] at [facility]     Consultation      PATIENT NAME:  Radha Whelan    :  1940 AGE: 79 y.o.     Date of Admission:  2019  Date of Consultation:  2019      Subjective      REASON FOR CONSULT: Sinus Node Dysfunction    CHIEF COMPLAINT: Recurrent Syncope    Problem List:     1. Syncope / Collapse  a. Recurrent Arthur Syncope x 2.  First 10/2019 while seated and second 2019 s/p Rt total hip surgery on transfer to bed.  2. Essential Hypertension  a. ECHO 2019 LVEF 42%, Trace MR / TR.  b. LHC 2019 without evidence of hemodynamically significant CAD, LVEF 55%.  3. Dyslipidemia  4. Type 2 Diabetes Mellitus   5. CVA / TIA  6. LBBB  7. Osteoarthritis       HISTORY OF PRESENT ILLNESS: Ms. Kumar is a 79-year-old white female seen in consultation for syncope status post right total hip replacement with pauses noted on telemetry.  She has a past medical history significant for hypertension, dyslipidemia, type 2 diabetes, TIAs, and left bundle branch block.  The patient states that she has a very long history of recurrent syncope mainly upon the  visualization of blood in the past.  The patient does endorse that her syncope has been different in the recent past year.  She states that her syncope is aggravated with standing and relieved by lying down with her feet raised.  She notes that she has daily symptoms of profound dizziness upon standing associated with visual disturbances and nausea.  She reports wilfrido syncope once approximately 2 months ago sitting in a chair while having her hair fixed and then again yesterday while working with physical therapy to get off the bedside commode and back to bed that she associates with increased pain in her right hip.  The patient also endorses a past medical history of TIAs x 3 initially occurring in 2004 and the last approximately 4 years ago when she was placed on Plavix.  She denies ever being told she had a irregular heart rhythm or wearing a heart monitor during this time.  The patient is noted not to be on any AV donovan blocking agents and no other transient or reversible causes are identified.  She is noted to have a low-grade fever up to 100.0 status post right hip replacement 12/4/2019 at outlying facility.    PAST MEDICAL HISTORY  Past Medical History:   Diagnosis Date   • Arthritis    • Basal cell carcinoma    • Body piercing     both ears   • Cataract, bilateral     s/p removal    • Diabetes mellitus (CMS/HCC)    • DVT (deep venous thrombosis) (CMS/HCC)     1970's- left leg   • Edema     legs- hx of   • Elevated cholesterol    • Fatigue    • Fatigue    • History of left heart catheterization     9/16/19  no stents   • History of migraine headaches    • Hx of exercise stress test 2004   • Hyperlipidemia    • Hypertension    • Hyponatremia    • Impaired functional mobility, balance, gait, and endurance    • LBBB (left bundle branch block)    • Left leg pain     left leg and knee pain    • Muscle spasm     hx of   • ODELL on CPAP     CPAP   • Stroke (CMS/HCC)    • Teeth missing     all of the top, and has 6  "bottom teeth left   • Thyroid nodule    • TIA (transient ischemic attack)     x3.  last one was \"a few years ago\"   • Wears dentures     top plate   • Wears glasses     to read       SURGICAL HISTORY   has a past surgical history that includes Tubal ligation; Cataract extraction (12/2018); Cardiac catheterization; Cardiac catheterization (N/A, 9/16/2019); Breast biopsy (Left); Colonoscopy; Skin biopsy; and Mouth surgery.     SOCIAL HISTORY  Social History     Socioeconomic History   • Marital status:      Spouse name: Not on file   • Number of children: Not on file   • Years of education: Not on file   • Highest education level: Not on file   Tobacco Use   • Smoking status: Never Smoker   • Smokeless tobacco: Never Used   Substance and Sexual Activity   • Alcohol use: No   • Drug use: No   • Sexual activity: Defer       FAMILY HISTORY  family history includes Breast cancer in her sister, sister and another family member; Cancer in an other family member; Diabetes in an other family member; Heart disease in her father and mother; Hypertension in an other family member; Migraines in an other family member.     MEDICATIONS  Prior to Admission medications    Medication Sig Start Date End Date Taking? Authorizing Provider   Cholecalciferol (VITAMIN D PO) Take 125 mcg by mouth 2 (Two) Times a Week.   Yes Susy Fernandes MD   clopidogrel (PLAVIX) 75 MG tablet TAKE 1 TABLET DAILY 4/19/19  Yes Farhan Schaefer MD   coenzyme Q10 100 MG capsule Take 400 mg by mouth Every Other Day.   Yes Susy Fernandes MD   docusate sodium 100 MG capsule Take 100 mg by mouth 2 (Two) Times a Day As Needed for Constipation. 12/5/19  Yes Jayant Escalante,    folic acid (FOLVITE) 1 MG tablet TAKE 1 TABLET DAILY 12/26/18  Yes Farhan Schaefer MD   glipizide (GLUCOTROL XL) 2.5 MG 24 hr tablet Take 2.5 mg by mouth Daily.   Yes Susy Fernandes MD   Glucosamine-Chondroit-Vit C-Mn (GLUCOSAMINE CHONDR 1500 COMPLX PO) Take " 1 tablet by mouth Daily. 10/28/15  Yes Susy Fernandes MD   lovastatin (MEVACOR) 10 MG tablet TAKE 1 TABLET EVERY NIGHT  Patient taking differently: TAKE 1 TABLET EVERY THREE TIMES WEEKLY 4/29/19  Yes Farhan Schaefer MD   melatonin 3 MG tablet Take 3 mg by mouth Every Night.   Yes Susy Fernandes MD   pantoprazole (PROTONIX) 40 MG EC tablet TAKE 1 TABLET DAILY 9/9/19  Yes Farhan Schaefer MD   vitamin B-12 (CYANOCOBALAMIN) 1000 MCG tablet Take 1,000 mcg by mouth 2 (Two) Times a Week.   Yes Susy Fernandes MD   aspirin 81 MG tablet Take 81 mg by mouth Daily. 10/28/15 12/6/19  Susy Fernandes MD   aspirin-acetaminophen-caffeine (EXCEDRIN MIGRAINE) 250-250-65 MG per tablet Take 1 tablet by mouth Every 6 (Six) Hours As Needed. 12/10/15 12/6/19  Susy Fernandes MD   cloNIDine (CATAPRES) 0.1 MG tablet Take 1 tablet by mouth 2 (Two) Times a Day. 10/25/19 12/6/19  Chava Carpio DO   Cyanocobalamin (CVS VITAMIN B12 PO) Take 1 tablet by mouth 2 (Two) Times a Week.  12/6/19  Susy Fernandes MD   enoxaparin (LOVENOX) 40 MG/0.4ML solution syringe Inject 0.4 mL under the skin into the appropriate area as directed Daily. 12/6/19 12/6/19  Jayant Escalante DO   furosemide (LASIX) 20 MG tablet TAKE 1 TABLET TWICE A DAY 7/31/19 12/6/19  Farhan Schaefer MD   glipiZIDE (GLUCOTROL XL) 2.5 MG 24 hr tablet Take 1 tablet by mouth Daily. 11/26/18 12/6/19  Farhan Schaefer MD   glucose blood test strip Use as instructed 11/16/17 12/6/19  Farhan Schaefer MD   ibuprofen (ADVIL,MOTRIN) 800 MG tablet Take 800 mg by mouth 3 (Three) Times a Day As Needed. 10/9/19 12/6/19  Susy Fernandes MD   insulin aspart (novoLOG) 100 UNIT/ML injection Inject 0-9 Units under the skin into the appropriate area as directed 4 (Four) Times a Day Before Meals & at Bedtime. 12/5/19 12/6/19  Jayant Escalante,    lisinopril (PRINIVIL,ZESTRIL) 40 MG tablet TAKE 1 TABLET DAILY 9/23/19 12/6/19  Farhan Schaefer,  MD   Morphine sulfate, PF, 2 MG/ML solution injection Infuse 0.5 mL into a venous catheter Every 4 (Four) Hours As Needed (pain). 12/5/19 12/6/19  Jayant Escalante DO   naloxone (NARCAN) 0.4 MG/ML injection Infuse 1 mL into a venous catheter Every 5 (Five) Minutes As Needed for Respiratory Depression. 12/5/19 12/6/19  Jayant Escalante DO   Omega-3 Fatty Acids (FISH OIL) 1200 MG capsule capsule Take 1,200 mg by mouth Daily. 10/28/15 12/6/19  ProviderSusy MD   ondansetron (ZOFRAN) 4 MG tablet Take 1 tablet by mouth Every 6 (Six) Hours As Needed for Nausea or Vomiting. 12/5/19 12/6/19  Jayant Escalante DO   ondansetron (ZOFRAN) 4 MG/2ML injection Infuse 2 mL into a venous catheter Every 6 (Six) Hours As Needed for Nausea or Vomiting. 12/5/19 12/6/19  Jayant Escalante DO   oxyCODONE-acetaminophen (PERCOCET) 5-325 MG per tablet Take 1 tablet by mouth Every 4 (Four) Hours As Needed for Moderate Pain  for up to 9 days. 12/5/19 12/6/19  Jayant Escalante DO   senna-docusate sodium (SENOKOT-S) 8.6-50 MG tablet Take 2 tablets by mouth 2 (Two) Times a Day. 12/5/19 12/6/19  Jayant Escalante DO   sodium chloride 0.9 % solution Infuse 50 mL/hr into a venous catheter Continuous. 12/5/19 12/6/19  Jayant Escalante DO   sodium chloride 0.9 % solution Infuse 75 mL/hr into a venous catheter Continuous. 12/5/19 12/6/19  Jayant Escalante DO   sodium chloride 0.9 % solution Infuse 75 mL/hr into a venous catheter Continuous for 1 day. 12/5/19 12/6/19  Jayant Escalante DO   spironolactone (ALDACTONE) 25 MG tablet Take 1 tablet by mouth Daily. 11/13/19 12/6/19  Farhan Schaefer MD   Tobramycin-Dexamethasone 0.3-0.05 % suspension Apply 1 drop to eye(s) as directed by provider 2 (Two) Times a Day.  Patient taking differently: Apply 1 drop to eye(s) as directed by provider 2 (Two) Times a Day As Needed. 9/26/19 12/6/19  Farhan Schaefer MD       ALLERGIES  Allergies   Allergen Reactions   • Cumini  "Other (See Comments)     Complex migrane   • Butter Flavor [Flavoring Agent] Other (See Comments)     States \"butter fat\"-migranes     • Cheese Other (See Comments)     migrane     • Other Other (See Comments)     \"ice cream\"-migranes     • Saccharin Other (See Comments)     migrane         REVIEW OF SYSTEMS  Review of Systems:   Constitutional: No fevers or chills, no recent weight gain or weight loss or fatigue  Eyes: No visual loss, blurred vision, double vision, yellow sclerae.  ENT: No headaches, hearing loss, vertigo, congestion or sore throat.   Cardiovascular: Per HPI  Respiratory: No cough or wheezing, no sputum production, no hematemesis   Gastrointestinal: No abdominal pain, no nausea, vomiting, constipation, diarrhea, melena.   Genitourinary: No dysuria, hematuria or increased frequency, + nausea  Musculoskeletal:  No gait disturbance, weakness or joint pain or stiffness  Integumentary: No rashes, urticaria, ulcers or sores.   Neurological: No headache, paralysis, ataxia, no prior CVA/TIA, + dizziness, syncope,   Psychiatric: No anxiety, or depression  Endocrine: No diaphoresis, cold or heat intolerance. No polyuria or polydipsia.   Hematologic/Lymphatic: No anemia, abnormal bruising or bleeding. No history of DVT/PE.       Objective     VITAL SIGNS: /56 (BP Location: Left arm, Patient Position: Lying)   Pulse 77   Temp 97.2 °F (36.2 °C) (Temporal)   Ht 170.2 cm (67\")   Wt 99.1 kg (218 lb 7.6 oz)   SpO2 98%   BMI 34.22 kg/m²       ADMIT WEIGHT:  99.1 kg (218 lb 7.6 oz)  BMI: Body mass index is 34.22 kg/m².    Admission Weight: 99.1 kg (218 lb 7.6 oz)     PHYSICAL EXAM  General appearance: Awake, alert, cooperative  Head: Normocephalic, without obvious abnormality, atraumatic  Eyes: Conjunctivae/corneas clear, EOMs intact  Neck: no adenopathy, no carotid bruit, no JVD and thyroid: not enlarged  Lungs: clear to auscultation bilaterally and no rhonchi or crackles\", ' symmetric  Heart: regular " rate and rhythm, S1, S2 normal, no murmur, click, rub or gallop  Abdomen: Soft, non-tender, bowel sounds normal,  no organomegaly  Extremities: extremities normal, atraumatic, no cyanosis or edema  Skin: Skin color, turgor normal, no rashes or lesions  Neurologic: Grossly normal     CBC: Results from last 7 days   Lab Units 12/06/19  0428 12/05/19  1644 12/05/19  1251  12/04/19  1434   WBC 10*3/mm3 7.20 7.50  --   --  7.50   HEMOGLOBIN g/dL 9.1* 9.9* 10.4*   < > 10.8*   HEMATOCRIT % 28.9* 31.1* 31.6*   < > 32.9*   MCV fL 95.7 96.0  --   --  94.0   PLATELETS 10*3/mm3 143 151  --   --  154    < > = values in this interval not displayed.         BMP:  Results from last 7 days   Lab Units 12/06/19  0428 12/05/19  1644 12/05/19  0548   POTASSIUM mmol/L 4.3 4.5 5.1   CHLORIDE mmol/L 104 98 101   CO2 mmol/L 24.7 25.5 26.6   BUN mg/dL 17 19 20   CREATININE mg/dL 0.91 1.14* 1.09*   GLUCOSE mg/dL 126* 141* 179*   CALCIUM mg/dL 7.7* 8.9 9.4     TELEMETRY: NSR at 70 bpm    Assessment      1.  Syncope/collapse.  2.  Osteoarthritis status post right hip replacement 12/4/2019.  3.  Essential hypertension  4.  Dyslipidemia      Plan        Ms. Kumar is a 79-year-old elderly white female seen in consultation for concern over recurrent syncope/collapse.  The majority of her history of her presyncopal and syncopal episodes are related to standing however she does have a history of wilfrido syncope while seated.  She is not taking any AV donovan blocking agents or no transient or reversible causes are identified.  Will review case with Dr Laboy for potential DDD PM implant.    Will consult hospital medicine for diabetes and pain management status post right hip replacement.    Electronically signed by CURTIS Mccarty, 12/06/19, 1:21 PM.      This note was completed using a voice transcription system. Every effort was made to ensure accuracy. However, inadvertent computerized transcription errors may be present.    Electronically  signed by Maurice Lawton APRN at 19 1440             Maurice Lawton APRN at 19 1321     Attestation signed by Stephan Laboy DO at 19 1525    I have reviewed the documentation above and agree.                          Cardiac Electrophysiology In Patient Consultation        [facility] at [facility]     Consultation      PATIENT NAME:  Radha Whelan YOB: 1940 AGE: 79 y.o.     Date of Admission:  2019  Date of Consultation:  2019      Subjective      REASON FOR CONSULT: Sinus Node Dysfunction    CHIEF COMPLAINT: Recurrent Syncope    Problem List:     8. Syncope / Collapse  a. Recurrent Wilfrido Syncope x 2.  First 10/2019 while seated and second 2019 s/p Rt total hip surgery on transfer to bed.  9. Essential Hypertension  a. ECHO 2019 LVEF 42%, Trace MR / TR.  b. LHC 2019 without evidence of hemodynamically significant CAD, LVEF 55%.  10. Dyslipidemia  11. Type 2 Diabetes Mellitus   12. CVA / TIA  13. LBBB  14. Osteoarthritis       HISTORY OF PRESENT ILLNESS: Ms. Kumar is a 79-year-old white female seen in consultation for syncope status post right total hip replacement with pauses noted on telemetry.  She has a past medical history significant for hypertension, dyslipidemia, type 2 diabetes, TIAs, and left bundle branch block.  The patient states that she has a very long history of recurrent syncope mainly upon the visualization of blood in the past.  The patient does endorse that her syncope has been different in the recent past year.  She states that her syncope is aggravated with standing and relieved by lying down with her feet raised.  She notes that she has daily symptoms of profound dizziness upon standing associated with visual disturbances and nausea.  She reports wilfrido syncope once approximately 2 months ago sitting in a chair while having her hair fixed and then again yesterday while working with physical therapy to get off the  "bedside commode and back to bed that she associates with increased pain in her right hip.  The patient also endorses a past medical history of TIAs x 3 initially occurring in 2004 and the last approximately 4 years ago when she was placed on Plavix.  She denies ever being told she had a irregular heart rhythm or wearing a heart monitor during this time.  The patient is noted not to be on any AV donovan blocking agents and no other transient or reversible causes are identified.  She is noted to have a low-grade fever up to 100.0 status post right hip replacement 12/4/2019 at outlying facility.    PAST MEDICAL HISTORY  Past Medical History:   Diagnosis Date   • Arthritis    • Basal cell carcinoma    • Body piercing     both ears   • Cataract, bilateral     s/p removal    • Diabetes mellitus (CMS/HCC)    • DVT (deep venous thrombosis) (CMS/HCC)     1970's- left leg   • Edema     legs- hx of   • Elevated cholesterol    • Fatigue    • Fatigue    • History of left heart catheterization     9/16/19  no stents   • History of migraine headaches    • Hx of exercise stress test 2004   • Hyperlipidemia    • Hypertension    • Hyponatremia    • Impaired functional mobility, balance, gait, and endurance    • LBBB (left bundle branch block)    • Left leg pain     left leg and knee pain    • Muscle spasm     hx of   • ODELL on CPAP     CPAP   • Stroke (CMS/HCC)    • Teeth missing     all of the top, and has 6 bottom teeth left   • Thyroid nodule    • TIA (transient ischemic attack)     x3.  last one was \"a few years ago\"   • Wears dentures     top plate   • Wears glasses     to read       SURGICAL HISTORY   has a past surgical history that includes Tubal ligation; Cataract extraction (12/2018); Cardiac catheterization; Cardiac catheterization (N/A, 9/16/2019); Breast biopsy (Left); Colonoscopy; Skin biopsy; and Mouth surgery.     SOCIAL HISTORY  Social History     Socioeconomic History   • Marital status:      Spouse name: Not on " file   • Number of children: Not on file   • Years of education: Not on file   • Highest education level: Not on file   Tobacco Use   • Smoking status: Never Smoker   • Smokeless tobacco: Never Used   Substance and Sexual Activity   • Alcohol use: No   • Drug use: No   • Sexual activity: Defer       FAMILY HISTORY  family history includes Breast cancer in her sister, sister and another family member; Cancer in an other family member; Diabetes in an other family member; Heart disease in her father and mother; Hypertension in an other family member; Migraines in an other family member.     MEDICATIONS  Prior to Admission medications    Medication Sig Start Date End Date Taking? Authorizing Provider   Cholecalciferol (VITAMIN D PO) Take 125 mcg by mouth 2 (Two) Times a Week.   Yes Susy Fernandes MD   clopidogrel (PLAVIX) 75 MG tablet TAKE 1 TABLET DAILY 4/19/19  Yes Farhan Schaefer MD   coenzyme Q10 100 MG capsule Take 400 mg by mouth Every Other Day.   Yes Susy Fernandes MD   docusate sodium 100 MG capsule Take 100 mg by mouth 2 (Two) Times a Day As Needed for Constipation. 12/5/19  Yes Jayant Escalante DO   folic acid (FOLVITE) 1 MG tablet TAKE 1 TABLET DAILY 12/26/18  Yes Farhan Schaefer MD   glipizide (GLUCOTROL XL) 2.5 MG 24 hr tablet Take 2.5 mg by mouth Daily.   Yes Susy Fernandes MD   Glucosamine-Chondroit-Vit C-Mn (GLUCOSAMINE CHONDR 1500 COMPLX PO) Take 1 tablet by mouth Daily. 10/28/15  Yes Susy Fernandes MD   lovastatin (MEVACOR) 10 MG tablet TAKE 1 TABLET EVERY NIGHT  Patient taking differently: TAKE 1 TABLET EVERY THREE TIMES WEEKLY 4/29/19  Yes Farhan Schaefer MD   melatonin 3 MG tablet Take 3 mg by mouth Every Night.   Yes Susy Fernandes MD   pantoprazole (PROTONIX) 40 MG EC tablet TAKE 1 TABLET DAILY 9/9/19  Yes Farhan Schaefer MD   vitamin B-12 (CYANOCOBALAMIN) 1000 MCG tablet Take 1,000 mcg by mouth 2 (Two) Times a Week.   Yes Susy Fernandes MD    aspirin 81 MG tablet Take 81 mg by mouth Daily. 10/28/15 12/6/19  Susy Fernandes MD   aspirin-acetaminophen-caffeine (EXCEDRIN MIGRAINE) 250-250-65 MG per tablet Take 1 tablet by mouth Every 6 (Six) Hours As Needed. 12/10/15 12/6/19  Susy Fernandes MD   cloNIDine (CATAPRES) 0.1 MG tablet Take 1 tablet by mouth 2 (Two) Times a Day. 10/25/19 12/6/19  Chava Carpio DO   Cyanocobalamin (CVS VITAMIN B12 PO) Take 1 tablet by mouth 2 (Two) Times a Week.  12/6/19  Susy Fernandes MD   enoxaparin (LOVENOX) 40 MG/0.4ML solution syringe Inject 0.4 mL under the skin into the appropriate area as directed Daily. 12/6/19 12/6/19  Jayant Escalante DO   furosemide (LASIX) 20 MG tablet TAKE 1 TABLET TWICE A DAY 7/31/19 12/6/19  Farhan Schaefer MD   glipiZIDE (GLUCOTROL XL) 2.5 MG 24 hr tablet Take 1 tablet by mouth Daily. 11/26/18 12/6/19  Farhan Schaefer MD   glucose blood test strip Use as instructed 11/16/17 12/6/19  Farhan Schaefer MD   ibuprofen (ADVIL,MOTRIN) 800 MG tablet Take 800 mg by mouth 3 (Three) Times a Day As Needed. 10/9/19 12/6/19  Susy Fernandes MD   insulin aspart (novoLOG) 100 UNIT/ML injection Inject 0-9 Units under the skin into the appropriate area as directed 4 (Four) Times a Day Before Meals & at Bedtime. 12/5/19 12/6/19  Jayant Escalante DO   lisinopril (PRINIVIL,ZESTRIL) 40 MG tablet TAKE 1 TABLET DAILY 9/23/19 12/6/19  Farhan Schaefer MD   Morphine sulfate, PF, 2 MG/ML solution injection Infuse 0.5 mL into a venous catheter Every 4 (Four) Hours As Needed (pain). 12/5/19 12/6/19  Jayant Escalante DO   naloxone (NARCAN) 0.4 MG/ML injection Infuse 1 mL into a venous catheter Every 5 (Five) Minutes As Needed for Respiratory Depression. 12/5/19 12/6/19  Jayant Escalante,    Omega-3 Fatty Acids (FISH OIL) 1200 MG capsule capsule Take 1,200 mg by mouth Daily. 10/28/15 12/6/19  Provider, MD Susy   ondansetron (ZOFRAN) 4 MG tablet Take 1 tablet by mouth  "Every 6 (Six) Hours As Needed for Nausea or Vomiting. 12/5/19 12/6/19  Jayant Escalante, DO   ondansetron (ZOFRAN) 4 MG/2ML injection Infuse 2 mL into a venous catheter Every 6 (Six) Hours As Needed for Nausea or Vomiting. 12/5/19 12/6/19  Jayant Escalante, DO   oxyCODONE-acetaminophen (PERCOCET) 5-325 MG per tablet Take 1 tablet by mouth Every 4 (Four) Hours As Needed for Moderate Pain  for up to 9 days. 12/5/19 12/6/19  Jayant Escalante, DO   senna-docusate sodium (SENOKOT-S) 8.6-50 MG tablet Take 2 tablets by mouth 2 (Two) Times a Day. 12/5/19 12/6/19  Jayant Escalante, DO   sodium chloride 0.9 % solution Infuse 50 mL/hr into a venous catheter Continuous. 12/5/19 12/6/19  Jayant Escalante, DO   sodium chloride 0.9 % solution Infuse 75 mL/hr into a venous catheter Continuous. 12/5/19 12/6/19  Jayant Escalante, DO   sodium chloride 0.9 % solution Infuse 75 mL/hr into a venous catheter Continuous for 1 day. 12/5/19 12/6/19  Jayant Escalante, DO   spironolactone (ALDACTONE) 25 MG tablet Take 1 tablet by mouth Daily. 11/13/19 12/6/19  Farhan Schaefer MD   Tobramycin-Dexamethasone 0.3-0.05 % suspension Apply 1 drop to eye(s) as directed by provider 2 (Two) Times a Day.  Patient taking differently: Apply 1 drop to eye(s) as directed by provider 2 (Two) Times a Day As Needed. 9/26/19 12/6/19  Farhan Schaefer MD       ALLERGIES  Allergies   Allergen Reactions   • Cumini Other (See Comments)     Complex migrane   • Butter Flavor [Flavoring Agent] Other (See Comments)     States \"butter fat\"-migranes     • Cheese Other (See Comments)     migrane     • Other Other (See Comments)     \"ice cream\"-migranes     • Saccharin Other (See Comments)     migrane         REVIEW OF SYSTEMS  Review of Systems:   Constitutional: No fevers or chills, no recent weight gain or weight loss or fatigue  Eyes: No visual loss, blurred vision, double vision, yellow sclerae.  ENT: No headaches, hearing loss, vertigo, " "congestion or sore throat.   Cardiovascular: Per HPI  Respiratory: No cough or wheezing, no sputum production, no hematemesis   Gastrointestinal: No abdominal pain, no nausea, vomiting, constipation, diarrhea, melena.   Genitourinary: No dysuria, hematuria or increased frequency, + nausea  Musculoskeletal:  No gait disturbance, weakness or joint pain or stiffness  Integumentary: No rashes, urticaria, ulcers or sores.   Neurological: No headache, paralysis, ataxia, no prior CVA/TIA, + dizziness, syncope,   Psychiatric: No anxiety, or depression  Endocrine: No diaphoresis, cold or heat intolerance. No polyuria or polydipsia.   Hematologic/Lymphatic: No anemia, abnormal bruising or bleeding. No history of DVT/PE.       Objective     VITAL SIGNS: /56 (BP Location: Left arm, Patient Position: Lying)   Pulse 77   Temp 97.2 °F (36.2 °C) (Temporal)   Ht 170.2 cm (67\")   Wt 99.1 kg (218 lb 7.6 oz)   SpO2 98%   BMI 34.22 kg/m²       ADMIT WEIGHT:  99.1 kg (218 lb 7.6 oz)  BMI: Body mass index is 34.22 kg/m².    Admission Weight: 99.1 kg (218 lb 7.6 oz)     PHYSICAL EXAM  General appearance: Awake, alert, cooperative  Head: Normocephalic, without obvious abnormality, atraumatic  Eyes: Conjunctivae/corneas clear, EOMs intact  Neck: no adenopathy, no carotid bruit, no JVD and thyroid: not enlarged  Lungs: clear to auscultation bilaterally and no rhonchi or crackles\", ' symmetric  Heart: regular rate and rhythm, S1, S2 normal, no murmur, click, rub or gallop  Abdomen: Soft, non-tender, bowel sounds normal,  no organomegaly  Extremities: extremities normal, atraumatic, no cyanosis or edema  Skin: Skin color, turgor normal, no rashes or lesions  Neurologic: Grossly normal     CBC: Results from last 7 days   Lab Units 12/06/19  0428 12/05/19  1644 12/05/19  1251  12/04/19  1434   WBC 10*3/mm3 7.20 7.50  --   --  7.50   HEMOGLOBIN g/dL 9.1* 9.9* 10.4*   < > 10.8*   HEMATOCRIT % 28.9* 31.1* 31.6*   < > 32.9*   MCV fL 95.7 " 96.0  --   --  94.0   PLATELETS 10*3/mm3 143 151  --   --  154    < > = values in this interval not displayed.         BMP:  Results from last 7 days   Lab Units 12/06/19  0428 12/05/19  1644 12/05/19  0548   POTASSIUM mmol/L 4.3 4.5 5.1   CHLORIDE mmol/L 104 98 101   CO2 mmol/L 24.7 25.5 26.6   BUN mg/dL 17 19 20   CREATININE mg/dL 0.91 1.14* 1.09*   GLUCOSE mg/dL 126* 141* 179*   CALCIUM mg/dL 7.7* 8.9 9.4     TELEMETRY: NSR at 70 bpm    Assessment      1.  Syncope/collapse.  2.  Osteoarthritis status post right hip replacement 12/4/2019.  3.  Essential hypertension  4.  Dyslipidemia      Plan        Ms. Kumar is a 79-year-old elderly white female seen in consultation for concern over recurrent syncope/collapse.  The majority of her history of her presyncopal and syncopal episodes are related to standing however she does have a history of wilfrido syncope while seated.  She is not taking any AV donovan blocking agents or no transient or reversible causes are identified.  Will review case with Dr Laboy for potential DDD PM implant.    Will consult hospital medicine for diabetes and pain management status post right hip replacement.    Electronically signed by CURTIS Mccarty, 12/06/19, 1:21 PM.      This note was completed using a voice transcription system. Every effort was made to ensure accuracy. However, inadvertent computerized transcription errors may be present.    Electronically signed by Maurice Lawton APRN at 12/06/19 1440       Hospital Medications (active)       Dose Frequency Start End                                                                                                                                                                                                                                      Physician Progress Notes (most recent note)      Michell Doe APRN at 12/08/19 1241              [facility] Medicine Services  PROGRESS NOTE    Patient Name:  "Radha Whelan  : 1940  MRN: [id number]    Date of Admission: 2019  Primary Care Physician: Farhan Schaefer MD    Subjective   Subjective     CC:  DM and Rt hip pain s/p Rt THR for medical mgmt     HPI:  Up with PT today and responded better, though still orthostatic. Less symptomatic today, felt \"sick\" and sat down. No dizziness. Pain is controlled when not moving. No other complaints.     Review of Systems  Gen- No fevers, chills  CV- No chest pain, palpitations  Resp- No cough, dyspnea  GI- No N/V/D, abd pain      Objective   Objective     Vital Signs:   Temp:  [97.4 °F (36.3 °C)-98.7 °F (37.1 °C)] 97.4 °F (36.3 °C)  Heart Rate:  [62-73] 71  Resp:  [16-18] 18  BP: (114-144)/(55-73) 115/58        Physical Exam:  Constitutional: No acute distress, awake, alert.  Daughter at bs.   Eyes: PERRLA, sclerae anicteric, no conjunctival injection  HENT: NCAT, mucous membranes moist  Neck: Supple, no thyromegaly, no lymphadenopathy, trachea midline  Respiratory: Clear to auscultation bilaterally A/P slightly decreased at bases, nonlabored respirations on RA   Cardiovascular: RRR, no murmurs, rubs, or gallops, palpable pedal pulses bilaterally. New Lt upper chest wall PPM with arm in sling.  Drsg c/d/i with small amount of brusing proximal to drsg.  Soft tissue.  No crepitus.   Gastrointestinal: Positive bowel sounds, soft, nontender, nondistended. Obese  Musculoskeletal: No bilateral ankle edema, no clubbing or cyanosis to extremities. CALL spontaneously but decreased ROM to RLE s/p Rt TKR.    Psychiatric: Appropriate affect, cooperative and calm   Neurologic: Oriented x 3, strength symmetric in all extremities, Cranial Nerves grossly intact to confrontation, speech clear and appropriate.  Follows commands   Skin: No rashes.  Rt hip drsg d/i from Rt TKA on .  Scant old dry drainage and mild generalized rt hip/thigh edema.  Stable     Results Reviewed:    Results from last 7 days   Lab Units 19  0540 " 12/07/19  0543 12/06/19  0428   WBC 10*3/mm3 6.55 5.88 7.20   HEMOGLOBIN g/dL 8.8* 9.0* 9.1*   HEMATOCRIT % 28.1* 29.0* 28.9*   PLATELETS 10*3/mm3 154 132* 143     Results from last 7 days   Lab Units 12/08/19  0540 12/07/19  0543 12/06/19  0428 12/05/19  1644   SODIUM mmol/L 138 136 140 136   POTASSIUM mmol/L 4.2 3.7 4.3 4.5   CHLORIDE mmol/L 102 104 104 98   CO2 mmol/L 27.0 22.0 24.7 25.5   BUN mg/dL 18 15 17 19   CREATININE mg/dL 0.83 0.71 0.91 1.14*   GLUCOSE mg/dL 121* 116* 126* 141*   CALCIUM mg/dL 8.5* 8.2* 7.7* 8.9   TROPONIN T ng/mL  --   --   --  <0.010     Estimated Creatinine Clearance: 63.9 mL/min (by C-G formula based on SCr of 0.83 mg/dL).    Microbiology Results Abnormal     None          Imaging Results (Last 24 Hours)     Procedure Component Value Units Date/Time    XR Chest PA & Lateral [[id number]] Collected:  12/07/19 2135     Updated:  12/07/19 2139    Narrative:          EXAMINATION: XR CHEST PA AND LATERAL-      INDICATION: ARTIFICIAL CARDIAC PACEMAKER PRESENT      COMPARISON: 06/03/2019     FINDINGS: Left-sided dual lead pacemaker is noted. Heart and vasculature  appear normal in size. Lungs appear normally expanded and grossly clear.  No pneumothorax or effusion is seen.           Impression:       Left-sided pacemaker placement with no evidence of  pneumothorax.     This report was finalized on 12/7/2019 9:36 PM by DR. Jordin Duron MD.             Results for orders placed during the hospital encounter of 09/11/19   Adult Transthoracic Echo Complete W/ Cont if Necessary Per Protocol    Narrative · Estimated EF = 42%.  · Left ventricular diastolic dysfunction (grade I) consistent with   impaired relaxation.  · Calculated right ventricular systolic pressure from tricuspid   regurgitation is 22.0 mmHg.          I have reviewed the medications:  Scheduled Meds:    acetaminophen 650 mg Oral Q6H   atorvastatin 10 mg Oral Nightly   clopidogrel 75 mg Oral Daily   folic acid 1,000 mcg Oral Daily    insulin lispro 0-7 Units Subcutaneous 4x Daily With Meals & Nightly   melatonin 5 mg Oral Nightly   midodrine 2.5 mg Oral TID AC   pantoprazole 40 mg Oral Q AM   sodium chloride 3 mL Intravenous Q12H     Continuous Infusions:   PRN Meds:.•  acetaminophen  •  dextrose  •  dextrose  •  docusate sodium  •  glucagon (human recombinant)  •  ondansetron  •  sodium chloride  •  traMADol      Assessment/Plan   Assessment / Plan     Active Hospital Problems    Diagnosis  POA   • **Bradycardia [R00.1]  Unknown   • Status post right hip replacement [Z96.641]  Not Applicable   • ODELL on CPAP [G47.33, Z99.89]  Not Applicable   • Restless legs syndrome [G25.81]  Yes   • Type 2 diabetes mellitus without complication (CMS/HCC) [E11.9]  Yes   • Hypertension [I10]  Yes      Resolved Hospital Problems   No resolved problems to display.        Brief Hospital Course to date:  Radha Whelan is a 79 y.o. female with hx of DM type II, HTN, HLD, ODELL on cpap, OA, RLS, TIAs x 3, obesity, and possible CHF per report.  Pt was admitted to [facility] on 12/4 for planned Rt TKR per Dr Salgado.  Underwent surgery as planned. Post op mild hyponatremia.  Then yesterday 12/5 developed worsening hypothension, bradycardia with HR in 30s, 6-10 sec pauses on EKG and syncope.  Cards followed.  Transferred today to [facility] to [facility] to Dr Laboy for planned eval and placement of PPM.  Hospitalist were consulted for DM and post Rt THR pain mgmt.      Assessment/Plan:     Syncope  Hypotension on hx of HTN  Bradycardia  ?CHF (data deficit)--pt.jennifer ivan  --transferred from [facility] for planned eval and likely PPM per EP/cards  --care per cards  --underwent PPM yesterday 12/6  --orthostatic, started on Midodrine by cardiology, would expect improvement and likely d/c of midodrine as postop anemia resolves  --experienced orthostatic hypotension again with standing/ ambulation but much less symptomatic.   --Hospital medicine asked to assume care today while medically stable and awaiting  rehab.   --Will need to follow up with EP in 1 week for wound check, and then Dr Laboy in 3 months     Rt hip pain s/p Rt TKA on   Hx OA  --Sx of  at [facility] per Dr Salgado with ortho   --per [facility] ortho notes on tranfer here, pt will need f/u with Dr Salgado in 2 weeks  --consult PT/OT  --CM consulted for dc plannin.  Pt/family hope to transfer to acute rehab in [place] on dc. They DO NOT want to go back to [facility] from here.  theyd rather stay here until ready for rehab.   --monitor Rt hip drsg/ stable  --Xray at [facility] last pm  showed no displacement or fracture.  Edema noted.    --H/H remains stable     Mild post op anemia  --Hbg 8.8/ 28.1  --monitor am labs      Dm type II (A1c 6.9--11/20/10)  --takes glipizide outpt.  HOLD  --accuchecks achs. Glucoses well controlled   --LDSSI achs for now.    --cardiac/diabetic diet      ODELL on cpap  --oxygen prn   --RT to setup cpap for hs and prn use      RLS  --home meds      Obesity     DVT prophylaxis: Mechanical due to recent PPM      Disposition:  I expect the patient to be discharged to rehab in 1-2 days.       CODE STATUS:   There are no questions and answers to display.         Electronically signed by CURTIS Aguilera, 19, 12:41 PM.        Electronically signed by Michell Doe APRN at 19 1259          Physical Therapy Notes (most recent note)      Tere Jolly, PT at 19 1016  Version 1 of 1         Patient Name: Radha Whelan  : 1940    MRN: [id number]                              Today's Date: 2019       Admit Date: 2019    Visit Dx: No diagnosis found.  Patient Active Problem List   Diagnosis   • Type 2 diabetes mellitus without complication (CMS/HCC)   • Temporary cerebral vascular dysfunction   • Fatigue   • Hypertension   • Hypervitaminosis B6   • Hyponatremia   • ODELL on CPAP   • Peripheral neuropathy   • Restless legs syndrome   • Edema   • H/O hyperlipidemia   • Acute non-recurrent maxillary sinusitis   • Infection of left  "tear duct   • Left eye pain   • Mixed hyperlipidemia   • LBBB (left bundle branch block)   • Abnormal echocardiogram   • Cardiomyopathy (CMS/HCC)   • Arthritis   • Chronic systolic congestive heart failure (CMS/HCC)   • Status post right hip replacement   • Bradycardia     Past Medical History:   Diagnosis Date   • Arthritis    • Basal cell carcinoma    • Body piercing     both ears   • Cataract, bilateral     s/p removal    • Diabetes mellitus (CMS/HCC)    • DVT (deep venous thrombosis) (CMS/HCC)     1970's- left leg   • Edema     legs- hx of   • Elevated cholesterol    • Fatigue    • Fatigue    • History of left heart catheterization     9/16/19  no stents   • History of migraine headaches    • Hx of exercise stress test 2004   • Hyperlipidemia    • Hypertension    • Hyponatremia    • Impaired functional mobility, balance, gait, and endurance    • LBBB (left bundle branch block)    • Left leg pain     left leg and knee pain    • Muscle spasm     hx of   • Obesity    • ODELL on CPAP     CPAP   • Stroke (CMS/HCC)    • Teeth missing     all of the top, and has 6 bottom teeth left   • Thyroid nodule    • TIA (transient ischemic attack)     x3.  last one was \"a few years ago\"   • Wears dentures     top plate   • Wears glasses     to read     Past Surgical History:   Procedure Laterality Date   • BREAST BIOPSY Left     benign   • CARDIAC CATHETERIZATION      09/16/2019 PER .   • CARDIAC CATHETERIZATION N/A 9/16/2019    Procedure: Left Heart Cath +/- CBI;  Surgeon: Ashlyn Mays MD;  Location: [facility] INVASIVE LOCATION;  Service: Cardiovascular   • CATARACT EXTRACTION  12/2018    both eyes    • COLONOSCOPY     • MOUTH SURGERY      all top teeth   • SKIN BIOPSY      basal cell   • TUBAL ABDOMINAL LIGATION       General Information     Row Name 12/08/19 1200          PT Evaluation Time/Intention    Document Type  therapy note (daily note)  -LS     Mode of Treatment  physical therapy  -LS     Row Name 12/08/19 " 1200          General Information    Existing Precautions/Restrictions  hip, posterior;fall;oxygen therapy device and L/min;pacemaker RLE WBAT  -LS     Row Name 12/08/19 1200          Cognitive Assessment/Intervention- PT/OT    Orientation Status (Cognition)  oriented x 4  -LS       User Key  (r) = Recorded By, (t) = Taken By, (c) = Cosigned By    Initials Name Provider Type    LS Tere Jolly, PT Physical Therapist        Mobility     Row Name 12/08/19 1201          Bed Mobility Assessment/Treatment    Bed Mobility Assessment/Treatment  supine-sit  -LS     Supine-Sit [place] (Bed Mobility)  moderate assist (50% patient effort);2 person assist;verbal cues  -LS     Assistive Device (Bed Mobility)  bed rails;draw sheet;head of bed elevated  -LS     Comment (Bed Mobility)  VC for sequencing; BP recording upon sitting EOB (WFL).  -LS     Row Name 12/08/19 1201          Transfer Assessment/Treatment    Comment (Transfers)  VC for sliding RLE forward and avoiding excessive LUE use (due to recent PPM placement).   -LS     Row Name 12/08/19 1201          Sit-Stand Transfer    Sit-Stand [place] (Transfers)  minimum assist (75% patient effort);2 person assist;verbal cues  -LS     Assistive Device (Sit-Stand Transfers)  walker, front-wheeled  -LS     Row Name 12/08/19 1201          Gait/Stairs Assessment/Training    Gait/Stairs Assessment/Training  gait/ambulation independence  -LS     [place] Level (Gait)  moderate assist (50% patient effort);2 person assist  -LS     Assistive Device (Gait)  walker, front-wheeled  -LS     Distance in Feet (Gait)  8  -LS     Pattern (Gait)  step-to  -LS     Deviations/Abnormal Patterns (Gait)  gait speed decreased;right sided deviations;stride length decreased  -LS     Bilateral Gait Deviations  forward flexed posture;weight shift ability decreased  -LS     Comment (Gait/Stairs)  PT demonstrated appropriate sequencing of gait prior to pt's attempt. Req'd initial assist for  advancing RLE. Distance limited by onset of nausea/fatigue; followed closely with recliner. BP recorded after gait (116/56; RN aware).   -     Row Name 12/08/19 1201          Mobility Assessment/Intervention    Extremity Weight-bearing Status  right lower extremity  -LS     Right Lower Extremity (Weight-bearing Status)  weight-bearing as tolerated (WBAT)  -       User Key  (r) = Recorded By, (t) = Taken By, (c) = Cosigned By    Initials Name Provider Type    Tere Brothers, PT Physical Therapist        Obj/Interventions     Row Name 12/08/19 1210          Therapeutic Exercise    Lower Extremity (Therapeutic Exercise)  gluteal sets;quad sets, bilateral  -LS     Lower Extremity Range of Motion (Therapeutic Exercise)  ankle dorsiflexion/plantar flexion, bilateral  -LS     Exercise Type (Therapeutic Exercise)  AROM (active range of motion)  -LS     Sets/Reps (Therapeutic Exercise)  1/10  -     Row Name 12/08/19 1210          Static Sitting Balance    Level of [place] (Unsupported Sitting, Static Balance)  contact guard assist  -LS     Sitting Position (Unsupported Sitting, Static Balance)  sitting on edge of bed  -     Row Name 12/08/19 1210          Static Standing Balance    Level of [place] (Supported Standing, Static Balance)  contact guard assist  -LS     Assistive Device Utilized (Supported Standing, Static Balance)  walker, rolling  -       User Key  (r) = Recorded By, (t) = Taken By, (c) = Cosigned By    Initials Name Provider Type    Tere Brothers, PT Physical Therapist        Goals/Plan    No documentation.       Clinical Impression     Row Name 12/08/19 1210          Pain Scale: Numbers Pre/Post-Treatment    Pain Scale: Numbers, Pretreatment  0/10 - no pain  -LS     Pain Scale: Numbers, Post-Treatment  3/10  -LS     Pain Location - Side  Right  -LS     Pain Location - Orientation  incisional  -LS     Pain Location  hip  -LS     Pre/Post Treatment Pain Comment  tolerated  -LS      Pain Intervention(s)  Repositioned;Ambulation/increased activity  -     Row Name 12/08/19 1210          Plan of Care Review    Plan of Care Reviewed With  patient;daughter  -     Progress  improving  -     Outcome Summary  Pt demonstrated increased indep with bed mobility, transfers; progressed forward ambulation distance to 8 total ft with mod x2 A, RW. Gait distance limited by onset of faituge/ nausea; HR WFL but noted drop in BP post gait. Edu pt/DTR re: HEP, post hip precautions, PM precautions; pt very motivated to participate and progress. Will cont PT POC as clinically appropriate, closely monitoring vitals during session.   -     Row Name 12/08/19 1210          Vital Signs    Pre Systolic BP Rehab  144  -LS     Pre Treatment Diastolic BP  73  -LS     Post Systolic BP Rehab  116  -LS     Post Treatment Diastolic BP  56  -LS     Pretreatment Heart Rate (beats/min)  94  -LS     Posttreatment Heart Rate (beats/min)  82  -LS     Pre SpO2 (%)  96  -LS     O2 Delivery Pre Treatment  room air  -LS     O2 Delivery Post Treatment  room air  -LS     Pre Patient Position  Supine  -LS     Intra Patient Position  Standing  -LS     Post Patient Position  Sitting  -     Row Name 12/08/19 1210          Positioning and Restraints    Pre-Treatment Position  in bed  -LS     Post Treatment Position  chair  -LS     In Chair  notified nsg;reclined;call light within reach;encouraged to call for assist;exit alarm on;waffle cushion;on mechanical lift sling;RUE elevated;LUE elevated;legs elevated;heels elevated;with family/caregiver  -       User Key  (r) = Recorded By, (t) = Taken By, (c) = Cosigned By    Initials Name Provider Type    Tere Brothers, PT Physical Therapist        Outcome Measures     Row Name 12/08/19 1214          How much help from another person do you currently need...    Turning from your back to your side while in flat bed without using bedrails?  2  -LS     Moving from lying on back to sitting  on the side of a flat bed without bedrails?  2  -LS     Moving to and from a bed to a chair (including a wheelchair)?  2  -LS     Standing up from a chair using your arms (e.g., wheelchair, bedside chair)?  3  -LS     Climbing 3-5 steps with a railing?  1  -LS     To walk in hospital room?  2  -LS     AM-PAC 6 Clicks Score (PT)  12  -LS       User Key  (r) = Recorded By, (t) = Taken By, (c) = Cosigned By    Initials Name Provider Type    Tere Brothers, PT Physical Therapist          PT Recommendation and Plan     Outcome Summary/Treatment Plan (PT)  Anticipated Discharge Disposition (PT): inpatient rehabilitation facility  Plan of Care Reviewed With: patient, daughter  Progress: improving  Outcome Summary: Pt demonstrated increased indep with bed mobility, transfers; progressed forward ambulation distance to 8 total ft with mod x2 A, RW. Gait distance limited by onset of faituge/ nausea; HR WFL but noted drop in BP post gait. Edu pt/DTR re: HEP, post hip precautions, PM precautions; pt very motivated to participate and progress. Will cont PT POC as clinically appropriate, closely monitoring vitals during session.      Time Calculation:   PT Charges     Row Name 12/08/19 1215             Time Calculation    Start Time  1016  -      PT Received On  12/08/19  -         Time Calculation- PT    Total Timed Code Minutes- PT  29 minute(s)  -         Timed Charges    09045 - PT Therapeutic Exercise Minutes  5  -      16635 - Gait Training Minutes   20  -      39575 - PT Therapeutic Activity Minutes  4  -LS        User Key  (r) = Recorded By, (t) = Taken By, (c) = Cosigned By    Initials Name Provider Type    Tere Brothers, PT Physical Therapist        Therapy Charges for Today     Code Description Service Date Service Provider Modifiers Qty    92693682690 HC GAIT TRAINING EA 15 MIN 12/8/2019 Tere Jolly, PT GP 1    43383636994 HC PT THER PROC EA 15 MIN 12/8/2019 Tere Jolly, PT GP 1    42868739264 HC  PT THER SUPP EA 15 MIN 12/8/2019 Tere Jolly, PT GP 2          PT G-Codes  Outcome Measure Options: AM-PAC 6 Clicks Basic Mobility (PT)  AM-PAC 6 Clicks Score (PT): 12  AM-PAC 6 Clicks Score (OT): 10    Tere Jolly, PT  12/8/2019         Electronically signed by Tere Jolly, PT at 12/08/19 1211

## 2019-12-08 NOTE — THERAPY TREATMENT NOTE
"Patient Name: Radha Whelan  : 1940    MRN: [id number]                              Today's Date: 2019       Admit Date: 2019    Visit Dx: No diagnosis found.  Patient Active Problem List   Diagnosis   • Type 2 diabetes mellitus without complication (CMS/HCC)   • Temporary cerebral vascular dysfunction   • Fatigue   • Hypertension   • Hypervitaminosis B6   • Hyponatremia   • ODELL on CPAP   • Peripheral neuropathy   • Restless legs syndrome   • Edema   • H/O hyperlipidemia   • Acute non-recurrent maxillary sinusitis   • Infection of left tear duct   • Left eye pain   • Mixed hyperlipidemia   • LBBB (left bundle branch block)   • Abnormal echocardiogram   • Cardiomyopathy (CMS/HCC)   • Arthritis   • Chronic systolic congestive heart failure (CMS/HCC)   • Status post right hip replacement   • Bradycardia     Past Medical History:   Diagnosis Date   • Arthritis    • Basal cell carcinoma    • Body piercing     both ears   • Cataract, bilateral     s/p removal    • Diabetes mellitus (CMS/HCC)    • DVT (deep venous thrombosis) (CMS/HCC)     - left leg   • Edema     legs- hx of   • Elevated cholesterol    • Fatigue    • Fatigue    • History of left heart catheterization     19  no stents   • History of migraine headaches    • Hx of exercise stress test    • Hyperlipidemia    • Hypertension    • Hyponatremia    • Impaired functional mobility, balance, gait, and endurance    • LBBB (left bundle branch block)    • Left leg pain     left leg and knee pain    • Muscle spasm     hx of   • Obesity    • ODELL on CPAP     CPAP   • Stroke (CMS/HCC)    • Teeth missing     all of the top, and has 6 bottom teeth left   • Thyroid nodule    • TIA (transient ischemic attack)     x3.  last one was \"a few years ago\"   • Wears dentures     top plate   • Wears glasses     to read     Past Surgical History:   Procedure Laterality Date   • BREAST BIOPSY Left     benign   • CARDIAC CATHETERIZATION      2019 PER " .   • CARDIAC CATHETERIZATION N/A 9/16/2019    Procedure: Left Heart Cath +/- CBI;  Surgeon: Ashlyn Mays MD;  Location: [facility] CATH INVASIVE LOCATION;  Service: Cardiovascular   • CATARACT EXTRACTION  12/2018    both eyes    • COLONOSCOPY     • MOUTH SURGERY      all top teeth   • SKIN BIOPSY      basal cell   • TUBAL ABDOMINAL LIGATION       General Information     Row Name 12/08/19 1200          PT Evaluation Time/Intention    Document Type  therapy note (daily note)  -     Mode of Treatment  physical therapy  -     Row Name 12/08/19 1200          General Information    Existing Precautions/Restrictions  hip, posterior;fall;oxygen therapy device and L/min;pacemaker RLE WBAT  -     Row Name 12/08/19 1200          Cognitive Assessment/Intervention- PT/OT    Orientation Status (Cognition)  oriented x 4  -LS       User Key  (r) = Recorded By, (t) = Taken By, (c) = Cosigned By    Initials Name Provider Type    LS Tere Jolly, PT Physical Therapist        Mobility     Row Name 12/08/19 1201          Bed Mobility Assessment/Treatment    Bed Mobility Assessment/Treatment  supine-sit  -LS     Supine-Sit [place] (Bed Mobility)  moderate assist (50% patient effort);2 person assist;verbal cues  -LS     Assistive Device (Bed Mobility)  bed rails;draw sheet;head of bed elevated  -     Comment (Bed Mobility)  VC for sequencing; BP recording upon sitting EOB (WFL).  -     Row Name 12/08/19 1201          Transfer Assessment/Treatment    Comment (Transfers)  VC for sliding RLE forward and avoiding excessive LUE use (due to recent PPM placement).   -     Row Name 12/08/19 1201          Sit-Stand Transfer    Sit-Stand [place] (Transfers)  minimum assist (75% patient effort);2 person assist;verbal cues  -LS     Assistive Device (Sit-Stand Transfers)  walker, front-wheeled  -     Row Name 12/08/19 1201          Gait/Stairs Assessment/Training    Gait/Stairs Assessment/Training  gait/ambulation  independence  -LS     [place] Level (Gait)  moderate assist (50% patient effort);2 person assist  -LS     Assistive Device (Gait)  walker, front-wheeled  -LS     Distance in Feet (Gait)  8  -LS     Pattern (Gait)  step-to  -LS     Deviations/Abnormal Patterns (Gait)  gait speed decreased;right sided deviations;stride length decreased  -LS     Bilateral Gait Deviations  forward flexed posture;weight shift ability decreased  -LS     Comment (Gait/Stairs)  PT demonstrated appropriate sequencing of gait prior to pt's attempt. Req'd initial assist for advancing RLE. Distance limited by onset of nausea/fatigue; followed closely with recliner. BP recorded after gait (116/56; RN aware).   -LS     Row Name 12/08/19 1201          Mobility Assessment/Intervention    Extremity Weight-bearing Status  right lower extremity  -LS     Right Lower Extremity (Weight-bearing Status)  weight-bearing as tolerated (WBAT)  -LS       User Key  (r) = Recorded By, (t) = Taken By, (c) = Cosigned By    Initials Name Provider Type    LS Tere Jolly, PT Physical Therapist        Obj/Interventions     Row Name 12/08/19 1210          Therapeutic Exercise    Lower Extremity (Therapeutic Exercise)  gluteal sets;quad sets, bilateral  -LS     Lower Extremity Range of Motion (Therapeutic Exercise)  ankle dorsiflexion/plantar flexion, bilateral  -LS     Exercise Type (Therapeutic Exercise)  AROM (active range of motion)  -LS     Sets/Reps (Therapeutic Exercise)  1/10  -     Row Name 12/08/19 1210          Static Sitting Balance    Level of [place] (Unsupported Sitting, Static Balance)  contact guard assist  -LS     Sitting Position (Unsupported Sitting, Static Balance)  sitting on edge of bed  -LS     Row Name 12/08/19 1210          Static Standing Balance    Level of [place] (Supported Standing, Static Balance)  contact guard assist  -LS     Assistive Device Utilized (Supported Standing, Static Balance)  walker, rolling  -LS        User Key  (r) = Recorded By, (t) = Taken By, (c) = Cosigned By    Initials Name Provider Type    LS Tere Jolly, PT Physical Therapist        Goals/Plan    No documentation.       Clinical Impression     Row Name 12/08/19 1210          Pain Scale: Numbers Pre/Post-Treatment    Pain Scale: Numbers, Pretreatment  0/10 - no pain  -LS     Pain Scale: Numbers, Post-Treatment  3/10  -LS     Pain Location - Side  Right  -LS     Pain Location - Orientation  incisional  -LS     Pain Location  hip  -LS     Pre/Post Treatment Pain Comment  tolerated  -LS     Pain Intervention(s)  Repositioned;Ambulation/increased activity  -LS     Row Name 12/08/19 1210          Plan of Care Review    Plan of Care Reviewed With  patient;daughter  -LS     Progress  improving  -LS     Outcome Summary  Pt demonstrated increased indep with bed mobility, transfers; progressed forward ambulation distance to 8 total ft with mod x2 A, RW. Gait distance limited by onset of faituge/ nausea; HR WFL but noted drop in BP post gait. Edu pt/DTR re: HEP, post hip precautions, PM precautions; pt very motivated to participate and progress. Will cont PT POC as clinically appropriate, closely monitoring vitals during session.   -LS     Row Name 12/08/19 1210          Vital Signs    Pre Systolic BP Rehab  144  -LS     Pre Treatment Diastolic BP  73  -LS     Post Systolic BP Rehab  116  -LS     Post Treatment Diastolic BP  56  -LS     Pretreatment Heart Rate (beats/min)  94  -LS     Posttreatment Heart Rate (beats/min)  82  -LS     Pre SpO2 (%)  96  -LS     O2 Delivery Pre Treatment  room air  -LS     O2 Delivery Post Treatment  room air  -LS     Pre Patient Position  Supine  -LS     Intra Patient Position  Standing  -LS     Post Patient Position  Sitting  -LS     Row Name 12/08/19 1210          Positioning and Restraints    Pre-Treatment Position  in bed  -LS     Post Treatment Position  chair  -LS     In Chair  notified nsg;reclined;call light within  reach;encouraged to call for assist;exit alarm on;waffle cushion;on mechanical lift sling;RUE elevated;LUE elevated;legs elevated;heels elevated;with family/caregiver  -       User Key  (r) = Recorded By, (t) = Taken By, (c) = Cosigned By    Initials Name Provider Type    Tere Brothers, PT Physical Therapist        Outcome Measures     Row Name 12/08/19 1214          How much help from another person do you currently need...    Turning from your back to your side while in flat bed without using bedrails?  2  -LS     Moving from lying on back to sitting on the side of a flat bed without bedrails?  2  -LS     Moving to and from a bed to a chair (including a wheelchair)?  2  -LS     Standing up from a chair using your arms (e.g., wheelchair, bedside chair)?  3  -LS     Climbing 3-5 steps with a railing?  1  -LS     To walk in hospital room?  2  -LS     AM-PAC 6 Clicks Score (PT)  12  -       User Key  (r) = Recorded By, (t) = Taken By, (c) = Cosigned By    Initials Name Provider Type    Tere Brothers, PT Physical Therapist          PT Recommendation and Plan     Outcome Summary/Treatment Plan (PT)  Anticipated Discharge Disposition (PT): inpatient rehabilitation facility  Plan of Care Reviewed With: patient, daughter  Progress: improving  Outcome Summary: Pt demonstrated increased indep with bed mobility, transfers; progressed forward ambulation distance to 8 total ft with mod x2 A, RW. Gait distance limited by onset of faituge/ nausea; HR WFL but noted drop in BP post gait. Edu pt/DTR re: HEP, post hip precautions, PM precautions; pt very motivated to participate and progress. Will cont PT POC as clinically appropriate, closely monitoring vitals during session.      Time Calculation:   PT Charges     Row Name 12/08/19 1215             Time Calculation    Start Time  1016  -      PT Received On  12/08/19  -         Time Calculation- PT    Total Timed Code Minutes- PT  29 minute(s)  -         Timed  Charges    93501 - PT Therapeutic Exercise Minutes  5  -LS      45610 - Gait Training Minutes   20  -LS      87622 - PT Therapeutic Activity Minutes  4  -LS        User Key  (r) = Recorded By, (t) = Taken By, (c) = Cosigned By    Initials Name Provider Type    Tere Brothers, PT Physical Therapist        Therapy Charges for Today     Code Description Service Date Service Provider Modifiers Qty    36776984414 HC GAIT TRAINING EA 15 MIN 12/8/2019 Tere Jolly, PT GP 1    77427945334 HC PT THER PROC EA 15 MIN 12/8/2019 Tere Jolly, PT GP 1    57054570132 HC PT THER SUPP EA 15 MIN 12/8/2019 Tere Jolly, PT GP 2          PT G-Codes  Outcome Measure Options: AM-PAC 6 Clicks Basic Mobility (PT)  AM-PAC 6 Clicks Score (PT): 12  AM-PAC 6 Clicks Score (OT): 10    Tere Jolly, PT  12/8/2019

## 2019-12-08 NOTE — PLAN OF CARE
Problem: Patient Care Overview  Goal: Plan of Care Review  Outcome: Ongoing (interventions implemented as appropriate)  Goal: Individualization and Mutuality  Outcome: Ongoing (interventions implemented as appropriate)  Goal: Discharge Needs Assessment  Outcome: Ongoing (interventions implemented as appropriate)  Goal: Interprofessional Rounds/Family Conf  Outcome: Ongoing (interventions implemented as appropriate)     Problem: Skin Injury Risk (Adult)  Goal: Identify Related Risk Factors and Signs and Symptoms  Outcome: Ongoing (interventions implemented as appropriate)  Goal: Skin Health and Integrity  Outcome: Ongoing (interventions implemented as appropriate)     Problem: Fall Risk (Adult)  Goal: Identify Related Risk Factors and Signs and Symptoms  Outcome: Ongoing (interventions implemented as appropriate)  Goal: Absence of Fall  Outcome: Ongoing (interventions implemented as appropriate)     Problem: Patient Care Overview  Goal: Plan of Care Review  Outcome: Ongoing (interventions implemented as appropriate)  Goal: Individualization and Mutuality  Outcome: Ongoing (interventions implemented as appropriate)  Goal: Discharge Needs Assessment  Outcome: Ongoing (interventions implemented as appropriate)  Goal: Interprofessional Rounds/Family Conf  Outcome: Ongoing (interventions implemented as appropriate)     Problem: Skin Injury Risk (Adult)  Goal: Identify Related Risk Factors and Signs and Symptoms  Outcome: Ongoing (interventions implemented as appropriate)  Goal: Skin Health and Integrity  Outcome: Ongoing (interventions implemented as appropriate)     Problem: Fall Risk (Adult)  Goal: Identify Related Risk Factors and Signs and Symptoms  Outcome: Ongoing (interventions implemented as appropriate)  Goal: Absence of Fall  Outcome: Ongoing (interventions implemented as appropriate)

## 2019-12-08 NOTE — PROGRESS NOTES
"    [facility] Medicine Services  PROGRESS NOTE    Patient Name: Radha Whelan  : 1940  MRN: [id number]    Date of Admission: 2019  Primary Care Physician: Farhan Schaefer MD    Subjective   Subjective     CC:  DM and Rt hip pain s/p Rt THR for medical mgmt     HPI:  Up with PT today and responded better, though still orthostatic. Less symptomatic today, felt \"sick\" and sat down. No dizziness. Pain is controlled when not moving. No other complaints.     Review of Systems  Gen- No fevers, chills  CV- No chest pain, palpitations  Resp- No cough, dyspnea  GI- No N/V/D, abd pain      Objective   Objective     Vital Signs:   Temp:  [97.4 °F (36.3 °C)-98.7 °F (37.1 °C)] 97.4 °F (36.3 °C)  Heart Rate:  [62-73] 71  Resp:  [16-18] 18  BP: (114-144)/(55-73) 115/58        Physical Exam:  Constitutional: No acute distress, awake, alert.  Daughter at bs.   Eyes: PERRLA, sclerae anicteric, no conjunctival injection  HENT: NCAT, mucous membranes moist  Neck: Supple, no thyromegaly, no lymphadenopathy, trachea midline  Respiratory: Clear to auscultation bilaterally A/P slightly decreased at bases, nonlabored respirations on RA   Cardiovascular: RRR, no murmurs, rubs, or gallops, palpable pedal pulses bilaterally. New Lt upper chest wall PPM with arm in sling.  Drsg c/d/i with small amount of brusing proximal to drsg.  Soft tissue.  No crepitus.   Gastrointestinal: Positive bowel sounds, soft, nontender, nondistended. Obese  Musculoskeletal: No bilateral ankle edema, no clubbing or cyanosis to extremities. CALL spontaneously but decreased ROM to RLE s/p Rt TKR.    Psychiatric: Appropriate affect, cooperative and calm   Neurologic: Oriented x 3, strength symmetric in all extremities, Cranial Nerves grossly intact to confrontation, speech clear and appropriate.  Follows commands   Skin: No rashes.  Rt hip drsg d/i from Rt TKA on .  Scant old dry drainage and mild generalized rt hip/thigh edema.  " Stable     Results Reviewed:    Results from last 7 days   Lab Units 12/08/19  0540 12/07/19  0543 12/06/19  0428   WBC 10*3/mm3 6.55 5.88 7.20   HEMOGLOBIN g/dL 8.8* 9.0* 9.1*   HEMATOCRIT % 28.1* 29.0* 28.9*   PLATELETS 10*3/mm3 154 132* 143     Results from last 7 days   Lab Units 12/08/19  0540 12/07/19  0543 12/06/19  0428 12/05/19  1644   SODIUM mmol/L 138 136 140 136   POTASSIUM mmol/L 4.2 3.7 4.3 4.5   CHLORIDE mmol/L 102 104 104 98   CO2 mmol/L 27.0 22.0 24.7 25.5   BUN mg/dL 18 15 17 19   CREATININE mg/dL 0.83 0.71 0.91 1.14*   GLUCOSE mg/dL 121* 116* 126* 141*   CALCIUM mg/dL 8.5* 8.2* 7.7* 8.9   TROPONIN T ng/mL  --   --   --  <0.010     Estimated Creatinine Clearance: 63.9 mL/min (by C-G formula based on SCr of 0.83 mg/dL).    Microbiology Results Abnormal     None          Imaging Results (Last 24 Hours)     Procedure Component Value Units Date/Time    XR Chest PA & Lateral [[id number]] Collected:  12/07/19 2135     Updated:  12/07/19 2139    Narrative:          EXAMINATION: XR CHEST PA AND LATERAL-      INDICATION: ARTIFICIAL CARDIAC PACEMAKER PRESENT      COMPARISON: 06/03/2019     FINDINGS: Left-sided dual lead pacemaker is noted. Heart and vasculature  appear normal in size. Lungs appear normally expanded and grossly clear.  No pneumothorax or effusion is seen.           Impression:       Left-sided pacemaker placement with no evidence of  pneumothorax.     This report was finalized on 12/7/2019 9:36 PM by DR. Jordin Duron MD.             Results for orders placed during the hospital encounter of 09/11/19   Adult Transthoracic Echo Complete W/ Cont if Necessary Per Protocol    Narrative · Estimated EF = 42%.  · Left ventricular diastolic dysfunction (grade I) consistent with   impaired relaxation.  · Calculated right ventricular systolic pressure from tricuspid   regurgitation is 22.0 mmHg.          I have reviewed the medications:  Scheduled Meds:    acetaminophen 650 mg Oral Q6H   atorvastatin 10 mg  Oral Nightly   clopidogrel 75 mg Oral Daily   folic acid 1,000 mcg Oral Daily   insulin lispro 0-7 Units Subcutaneous 4x Daily With Meals & Nightly   melatonin 5 mg Oral Nightly   midodrine 2.5 mg Oral TID AC   pantoprazole 40 mg Oral Q AM   sodium chloride 3 mL Intravenous Q12H     Continuous Infusions:   PRN Meds:.•  acetaminophen  •  dextrose  •  dextrose  •  docusate sodium  •  glucagon (human recombinant)  •  ondansetron  •  sodium chloride  •  traMADol      Assessment/Plan   Assessment / Plan     Active Hospital Problems    Diagnosis  POA   • **Bradycardia [R00.1]  Unknown   • Status post right hip replacement [Z96.641]  Not Applicable   • ODELL on CPAP [G47.33, Z99.89]  Not Applicable   • Restless legs syndrome [G25.81]  Yes   • Type 2 diabetes mellitus without complication (CMS/HCC) [E11.9]  Yes   • Hypertension [I10]  Yes      Resolved Hospital Problems   No resolved problems to display.        Brief Hospital Course to date:  Radha Whelan is a 79 y.o. female with hx of DM type II, HTN, HLD, ODELL on cpap, OA, RLS, TIAs x 3, obesity, and possible CHF per report.  Pt was admitted to [facility] on 12/4 for planned Rt TKR per Dr Salgado.  Underwent surgery as planned. Post op mild hyponatremia.  Then yesterday 12/5 developed worsening hypothension, bradycardia with HR in 30s, 6-10 sec pauses on EKG and syncope.  Cards followed.  Transferred today to [facility] to [facility] to Dr Laboy for planned eval and placement of PPM.  Hospitalist were consulted for DM and post Rt THR pain mgmt.      Assessment/Plan:     Syncope  Hypotension on hx of HTN  Bradycardia  ?CHF (data deficit)--pt.jennifer moncho  --transferred from [facility] for planned eval and likely PPM per EP/cards  --care per cards  --underwent PPM yesterday 12/6  --orthostatic, started on Midodrine by cardiology, would expect improvement and likely d/c of midodrine as postop anemia resolves  --experienced orthostatic hypotension again with standing/ ambulation but much less symptomatic.    --Hospital medicine asked to assume care today while medically stable and awaiting rehab.   --Will need to follow up with EP in 1 week for wound check, and then Dr Laboy in 3 months     Rt hip pain s/p Rt TKA on 12/4  Hx OA  --Sx of 12/4 at [facility] per Dr Salgado with ortho   --per [facility] ortho notes on tranfer here, pt will need f/u with Dr Salgado in 2 weeks  --consult PT/OT  --CM consulted for dc plannin.  Pt/family hope to transfer to acute rehab in [place] on dc. They DO NOT want to go back to [facility] from here.  theyd rather stay here until ready for rehab.   --monitor Rt hip drsg/ stable  --Xray at [facility] last pm 12/5 showed no displacement or fracture.  Edema noted.    --H/H remains stable     Mild post op anemia  --Hbg 8.8/ 28.1  --monitor am labs      Dm type II (A1c 6.9--11/20/10)  --takes glipizide outpt.  HOLD  --accuchecks achs. Glucoses well controlled   --LDSSI achs for now.    --cardiac/diabetic diet      ODELL on cpap  --oxygen prn   --RT to setup cpap for hs and prn use      RLS  --home meds      Obesity     DVT prophylaxis: Mechanical due to recent PPM      Disposition: I expect the patient to be discharged to rehab in 1-2 days.       CODE STATUS:   There are no questions and answers to display.         Electronically signed by CURTIS Aguilera, 12/08/19, 12:41 PM.

## 2019-12-09 LAB
GLUCOSE BLDC GLUCOMTR-MCNC: 127 MG/DL (ref 70–130)
GLUCOSE BLDC GLUCOMTR-MCNC: 157 MG/DL (ref 70–130)
GLUCOSE BLDC GLUCOMTR-MCNC: 160 MG/DL (ref 70–130)
GLUCOSE BLDC GLUCOMTR-MCNC: 195 MG/DL (ref 70–130)
GLUCOSE BLDC GLUCOMTR-MCNC: 202 MG/DL (ref 70–130)
GLUCOSE BLDC GLUCOMTR-MCNC: 203 MG/DL (ref 70–130)

## 2019-12-09 PROCEDURE — 82962 GLUCOSE BLOOD TEST: CPT

## 2019-12-09 PROCEDURE — 97116 GAIT TRAINING THERAPY: CPT

## 2019-12-09 PROCEDURE — 97110 THERAPEUTIC EXERCISES: CPT

## 2019-12-09 PROCEDURE — 99232 SBSQ HOSP IP/OBS MODERATE 35: CPT | Performed by: NURSE PRACTITIONER

## 2019-12-09 RX ADMIN — ACETAMINOPHEN 650 MG: 325 TABLET ORAL at 06:00

## 2019-12-09 RX ADMIN — MELATONIN TAB 5 MG 5 MG: 5 TAB at 20:49

## 2019-12-09 RX ADMIN — INSULIN LISPRO 2 UNITS: 100 INJECTION, SOLUTION INTRAVENOUS; SUBCUTANEOUS at 12:22

## 2019-12-09 RX ADMIN — DOCUSATE SODIUM 100 MG: 100 CAPSULE, LIQUID FILLED ORAL at 20:58

## 2019-12-09 RX ADMIN — INSULIN LISPRO 2 UNITS: 100 INJECTION, SOLUTION INTRAVENOUS; SUBCUTANEOUS at 18:36

## 2019-12-09 RX ADMIN — MIDODRINE HYDROCHLORIDE 2.5 MG: 5 TABLET ORAL at 11:00

## 2019-12-09 RX ADMIN — ACETAMINOPHEN 650 MG: 325 TABLET ORAL at 00:33

## 2019-12-09 RX ADMIN — MIDODRINE HYDROCHLORIDE 2.5 MG: 5 TABLET ORAL at 08:32

## 2019-12-09 RX ADMIN — ACETAMINOPHEN 650 MG: 325 TABLET ORAL at 18:34

## 2019-12-09 RX ADMIN — PANTOPRAZOLE SODIUM 40 MG: 40 TABLET, DELAYED RELEASE ORAL at 06:00

## 2019-12-09 RX ADMIN — FOLIC ACID 1000 MCG: 1 TABLET ORAL at 08:32

## 2019-12-09 RX ADMIN — MIDODRINE HYDROCHLORIDE 2.5 MG: 5 TABLET ORAL at 18:35

## 2019-12-09 RX ADMIN — POLYETHYLENE GLYCOL 3350 17 G: 17 POWDER, FOR SOLUTION ORAL at 12:22

## 2019-12-09 RX ADMIN — SODIUM CHLORIDE, PRESERVATIVE FREE 3 ML: 5 INJECTION INTRAVENOUS at 20:49

## 2019-12-09 RX ADMIN — SODIUM CHLORIDE, PRESERVATIVE FREE 10 ML: 5 INJECTION INTRAVENOUS at 11:03

## 2019-12-09 RX ADMIN — TRAMADOL HYDROCHLORIDE 50 MG: 50 TABLET, COATED ORAL at 20:49

## 2019-12-09 RX ADMIN — ACETAMINOPHEN 650 MG: 325 TABLET ORAL at 11:00

## 2019-12-09 RX ADMIN — INSULIN LISPRO 3 UNITS: 100 INJECTION, SOLUTION INTRAVENOUS; SUBCUTANEOUS at 20:49

## 2019-12-09 RX ADMIN — CLOPIDOGREL BISULFATE 75 MG: 75 TABLET ORAL at 08:32

## 2019-12-09 NOTE — PLAN OF CARE
Problem: Patient Care Overview  Goal: Plan of Care Review  Note:   Up to chair  most of shift. Vss denies pain, RA sats 95%,

## 2019-12-09 NOTE — PROGRESS NOTES
[facility] Medicine Services  PROGRESS NOTE    Patient Name: Radha Whelan  : 1940  MRN: [id number]    Date of Admission: 2019  Primary Care Physician: Farhan Schaefer MD    Subjective   Subjective     CC:  DM and Rt hip pain s/p Rt THR for medical mgmt     HPI:  Has been walking, pain is much better today. No SOA. Felt slightly dizzy after a few minutes of ambulation and needed to rest/ sit down , but improving everyday. Feels well and eager to go to rehab.     Review of Systems  Gen- No fevers, chills, +dizziness   CV- No chest pain, palpitations  Resp- No cough, dyspnea  GI- No N/V/D, abd pain        Objective   Objective     Vital Signs:   Temp:  [97.6 °F (36.4 °C)-98.3 °F (36.8 °C)] 97.9 °F (36.6 °C)  Heart Rate:  [60-76] 60  Resp:  [14-19] 16  BP: (135-150)/(60-75) 146/70        Physical Exam:  Constitutional: No acute distress, awake, alert.  Daughter at bs.   Eyes: PERRLA, sclerae anicteric, no conjunctival injection  HENT: NCAT, mucous membranes moist  Neck: Supple, no thyromegaly, no lymphadenopathy, trachea midline  Respiratory: Clear to auscultation bilaterally A/P slightly decreased at bases, nonlabored respirations on RA   Cardiovascular: RRR, no murmurs, rubs, or gallops, palpable pedal pulses bilaterally. New Lt upper chest wall PPM.  Drsg c/d/i with small amount of brusing proximal to drsg.  Soft tissue.  No crepitus.   Gastrointestinal: Positive bowel sounds, soft, nontender, nondistended. Obese  Musculoskeletal: No bilateral ankle edema, no clubbing or cyanosis to extremities. CALL spontaneously but decreased ROM to RLE s/p Rt TKR.    Psychiatric: Appropriate affect, cooperative and calm   Neurologic: Oriented x 3, strength symmetric in all extremities, Cranial Nerves grossly intact to confrontation, speech clear and appropriate.  Follows commands   Skin: No rashes.  Rt hip drsg d/i from Rt TKA on .  Scant old dry drainage and mild generalized rt hip/thigh  edema.  Stable     Results Reviewed:    Results from last 7 days   Lab Units 12/08/19  0540 12/07/19  0543 12/06/19  0428   WBC 10*3/mm3 6.55 5.88 7.20   HEMOGLOBIN g/dL 8.8* 9.0* 9.1*   HEMATOCRIT % 28.1* 29.0* 28.9*   PLATELETS 10*3/mm3 154 132* 143     Results from last 7 days   Lab Units 12/08/19  0540 12/07/19  0543 12/06/19  0428 12/05/19  1644   SODIUM mmol/L 138 136 140 136   POTASSIUM mmol/L 4.2 3.7 4.3 4.5   CHLORIDE mmol/L 102 104 104 98   CO2 mmol/L 27.0 22.0 24.7 25.5   BUN mg/dL 18 15 17 19   CREATININE mg/dL 0.83 0.71 0.91 1.14*   GLUCOSE mg/dL 121* 116* 126* 141*   CALCIUM mg/dL 8.5* 8.2* 7.7* 8.9   TROPONIN T ng/mL  --   --   --  <0.010     Estimated Creatinine Clearance: 63.9 mL/min (by C-G formula based on SCr of 0.83 mg/dL).    Microbiology Results Abnormal     None          Imaging Results (Last 24 Hours)     ** No results found for the last 24 hours. **          Results for orders placed during the hospital encounter of 09/11/19   Adult Transthoracic Echo Complete W/ Cont if Necessary Per Protocol    Narrative · Estimated EF = 42%.  · Left ventricular diastolic dysfunction (grade I) consistent with   impaired relaxation.  · Calculated right ventricular systolic pressure from tricuspid   regurgitation is 22.0 mmHg.          I have reviewed the medications:  Scheduled Meds:    acetaminophen 650 mg Oral Q6H   atorvastatin 10 mg Oral Nightly   clopidogrel 75 mg Oral Daily   folic acid 1,000 mcg Oral Daily   insulin lispro 0-7 Units Subcutaneous 4x Daily With Meals & Nightly   melatonin 5 mg Oral Nightly   midodrine 2.5 mg Oral TID AC   pantoprazole 40 mg Oral Q AM   polyethylene glycol 17 g Oral Daily   sodium chloride 3 mL Intravenous Q12H     Continuous Infusions:   PRN Meds:.•  acetaminophen  •  dextrose  •  dextrose  •  docusate sodium  •  glucagon (human recombinant)  •  ondansetron  •  sodium chloride  •  traMADol      Assessment/Plan   Assessment / Plan     Active Hospital Problems     Diagnosis  POA   • **Bradycardia [R00.1]  Unknown   • Status post right hip replacement [Z96.641]  Not Applicable   • ODELL on CPAP [G47.33, Z99.89]  Not Applicable   • Restless legs syndrome [G25.81]  Yes   • Type 2 diabetes mellitus without complication (CMS/HCC) [E11.9]  Yes   • Hypertension [I10]  Yes      Resolved Hospital Problems   No resolved problems to display.        Brief Hospital Course to date:  Radha Whelan is a 79 y.o. female with hx of DM type II, HTN, HLD, ODELL on cpap, OA, RLS, TIAs x 3, obesity, and possible CHF per report.  Pt was admitted to [facility] on 12/4 for planned Rt TKR per Dr Salgado.  Underwent surgery as planned. Post op mild hyponatremia.  Then yesterday 12/5 developed worsening hypothension, bradycardia with HR in 30s, 6-10 sec pauses on EKG and syncope.  Cards followed.  Transferred today to [facility] to [facility] to Dr Laboy for planned eval and placement of PPM.  Hospitalist were consulted for DM and post Rt THR pain mgmt.      Assessment/Plan:     Syncope  Hypotension on hx of HTN  Bradycardia  ?CHF (data deficit)--pt.jennifer ivan  --transferred from [facility] for planned eval and now s/p PPM per EP/cards  --care per cards  --underwent PPM 12/6  --orthostatic, started on Midodrine by cardiology, would expect improvement and likely d/c of midodrine as postop anemia resolves  --Hospital medicine asked to assume care while medically stable and awaiting rehab.   --Will need to follow up with EP in 1 week for wound check, and then Dr Laboy in 3 months     Rt hip pain s/p Rt TKA on 12/4  Hx OA  --Sx of 12/4 at [facility] per Dr Salgado with ortho   --per [facility] ortho notes on tranfer here, pt will need f/u with Dr Salgado in 2 weeks  --consult PT/OT  --CM consulted for dc plannin.  Pt/family hope to transfer to acute rehab in [place] on dc. They DO NOT want to go back to [facility] from here.  theyd rather stay here until ready for rehab.   --monitor Rt hip drsg/ stable  --Xray at [facility] last pm 12/5 showed no displacement or fracture.  Edema  noted.    --H/H remains stable     Mild post op anemia  --Hbg 8.8/ 28.1  --monitor am labs      Dm type II (A1c 6.9--11/20/10)  --takes glipizide outpt.  HOLD  --accuchecks achs. Glucoses well controlled   --LDSSI achs for now.    --cardiac/diabetic diet      ODELL on cpap  --oxygen prn   --RT to setup cpap for hs and prn use      RLS  --home meds      Obesity     DVT prophylaxis: Mechanical due to recent PPM      Disposition: I expect the patient to be discharged to rehab in 1-2 days.       CODE STATUS:   Code Status and Medical Interventions:   Ordered at: 12/09/19 1139     Level Of Support Discussed With:    Patient     Code Status:    CPR     Medical Interventions (Level of Support Prior to Arrest):    Full         Electronically signed by CURTIS Aguilera, 12/09/19, 1:27 PM.

## 2019-12-09 NOTE — PLAN OF CARE
Problem: Patient Care Overview  Goal: Plan of Care Review  Outcome: Ongoing (interventions implemented as appropriate)  Flowsheets  Taken 12/9/2019 0405 by Erin Velazquez RN  Progress: no change  Taken 12/8/2019 1210 by Tere Jolly, PT  Plan of Care Reviewed With: patient;daughter  Note:   Patient rested well overnight. Tramadol given x1 as ordered for pain. Pt used abductor pillow as ordered to sleep this shift. VSS. Plan to D/C to rehab when bed available.

## 2019-12-09 NOTE — PROGRESS NOTES
Continued Stay Note  [facility]     Patient Name: Radha Whelan  MRN: [id number]  Today's Date: 12/9/2019    Admit Date: 12/6/2019    Discharge Plan     Row Name 12/09/19 1556       Plan    Plan  update    Patient/Family in Agreement with Plan  yes    Plan Comments  Called [facility] and spoke to admissions who can offer patient a bed tomorrow if discharged.  Spoke with patient and  at bedside regarding discharge plan and advised that [facility] can take her when she is discharged.  Patient in agreement with plan, family to transport.       Final Discharge Disposition Code  61 - hospital-based swing bed        Discharge Codes    No documentation.       Expected Discharge Date and Time     Expected Discharge Date Expected Discharge Time    Dec 13, 2019             Nancy Mckeon RN

## 2019-12-09 NOTE — THERAPY TREATMENT NOTE
"Patient Name: Radha Whelan  : 1940    MRN: [id number]                              Today's Date: 2019       Admit Date: 2019    Visit Dx: No diagnosis found.  Patient Active Problem List   Diagnosis   • Type 2 diabetes mellitus without complication (CMS/HCC)   • Temporary cerebral vascular dysfunction   • Fatigue   • Hypertension   • Hypervitaminosis B6   • Hyponatremia   • ODELL on CPAP   • Peripheral neuropathy   • Restless legs syndrome   • Edema   • H/O hyperlipidemia   • Acute non-recurrent maxillary sinusitis   • Infection of left tear duct   • Left eye pain   • Mixed hyperlipidemia   • LBBB (left bundle branch block)   • Abnormal echocardiogram   • Cardiomyopathy (CMS/HCC)   • Arthritis   • Chronic systolic congestive heart failure (CMS/HCC)   • Status post right hip replacement   • Bradycardia     Past Medical History:   Diagnosis Date   • Arthritis    • Basal cell carcinoma    • Body piercing     both ears   • Cataract, bilateral     s/p removal    • Diabetes mellitus (CMS/HCC)    • DVT (deep venous thrombosis) (CMS/HCC)     - left leg   • Edema     legs- hx of   • Elevated cholesterol    • Fatigue    • Fatigue    • History of left heart catheterization     19  no stents   • History of migraine headaches    • Hx of exercise stress test    • Hyperlipidemia    • Hypertension    • Hyponatremia    • Impaired functional mobility, balance, gait, and endurance    • LBBB (left bundle branch block)    • Left leg pain     left leg and knee pain    • Muscle spasm     hx of   • Obesity    • ODELL on CPAP     CPAP   • Stroke (CMS/HCC)    • Teeth missing     all of the top, and has 6 bottom teeth left   • Thyroid nodule    • TIA (transient ischemic attack)     x3.  last one was \"a few years ago\"   • Wears dentures     top plate   • Wears glasses     to read     Past Surgical History:   Procedure Laterality Date   • BREAST BIOPSY Left     benign   • CARDIAC CATHETERIZATION      2019 PER " .   • CARDIAC CATHETERIZATION N/A 9/16/2019    Procedure: Left Heart Cath +/- CBI;  Surgeon: Ashlyn Mays MD;  Location: [facility] CATH INVASIVE LOCATION;  Service: Cardiovascular   • CATARACT EXTRACTION  12/2018    both eyes    • COLONOSCOPY     • MOUTH SURGERY      all top teeth   • SKIN BIOPSY      basal cell   • TUBAL ABDOMINAL LIGATION       General Information     Row Name 12/09/19 0825          PT Evaluation Time/Intention    Document Type  therapy note (daily note)  -VG     Mode of Treatment  individual therapy;physical therapy  -VG     Row Name 12/09/19 0825          General Information    Existing Precautions/Restrictions  fall;right;hip, posterior;pacemaker  -VG     Row Name 12/09/19 0825          Cognitive Assessment/Intervention- PT/OT    Orientation Status (Cognition)  oriented x 3  -VG       User Key  (r) = Recorded By, (t) = Taken By, (c) = Cosigned By    Initials Name Provider Type    VG Mary Osorio, PT Physical Therapist        Mobility     Row Name 12/09/19 0826          Bed Mobility Assessment/Treatment    Bed Mobility Assessment/Treatment  supine-sit  -VG     Supine-Sit [place] (Bed Mobility)  moderate assist (50% patient effort);verbal cues  -VG     Assistive Device (Bed Mobility)  bed rails;draw sheet;head of bed elevated  -VG     Comment (Bed Mobility)  Assist at RLE and trunk for supine to sit. Cues for hand placement, sequencing, maintaining post hip prec.  -VG     Row Name 12/09/19 0826          Transfer Assessment/Treatment    Comment (Transfers)  Cues for hand placement and sequencing.   -VG     Row Name 12/09/19 0826          Sit-Stand Transfer    Sit-Stand [place] (Transfers)  minimum assist (75% patient effort);verbal cues  -VG     Assistive Device (Sit-Stand Transfers)  walker, front-wheeled  -VG     Row Name 12/09/19 0826          Gait/Stairs Assessment/Training    [place] Level (Gait)  contact guard;verbal cues;1 person to manage equipment  -VG      Assistive Device (Gait)  walker, front-wheeled  -VG     Distance in Feet (Gait)  50  -VG     Deviations/Abnormal Patterns (Gait)  festinating/shuffling;gait speed decreased;antalgic  -VG     Bilateral Gait Deviations  forward flexed posture  -VG     Comment (Gait/Stairs)  Distance limited by fatigue, weakness, dizziness, and pain in R hip. VSS. Cues for sequencing, hip alignment, and AD management.  -VG     Row Name 12/09/19 0826          Mobility Assessment/Intervention    Extremity Weight-bearing Status  right lower extremity  -VG     Right Lower Extremity (Weight-bearing Status)  weight-bearing as tolerated (WBAT)  -VG       User Key  (r) = Recorded By, (t) = Taken By, (c) = Cosigned By    Initials Name Provider Type    Mary Echeverria, SIOBHAN Physical Therapist        Obj/Interventions     Row Name 12/09/19 0828          Therapeutic Exercise    Lower Extremity (Therapeutic Exercise)  heel slides, bilateral;SLR (straight leg raise), right;gluteal sets  -VG     Lower Extremity Range of Motion (Therapeutic Exercise)  ankle dorsiflexion/plantar flexion, bilateral  -VG     Exercise Type (Therapeutic Exercise)  AROM (active range of motion)  -VG     Position (Therapeutic Exercise)  seated  -VG     Sets/Reps (Therapeutic Exercise)  15x  -VG     Comment (Therapeutic Exercise)  Cues for technique.  -VG       User Key  (r) = Recorded By, (t) = Taken By, (c) = Cosigned By    Initials Name Provider Type    Mary Echeverria, PT Physical Therapist        Goals/Plan    No documentation.       Clinical Impression     Row Name 12/09/19 0829          Pain Scale: Numbers Pre/Post-Treatment    Pain Scale: Numbers, Pretreatment  0/10 - no pain  -VG     Pain Scale: Numbers, Post-Treatment  0/10 - no pain  -VG     Row Name 12/09/19 0829          Plan of Care Review    Plan of Care Reviewed With  patient  -VG     Row Name 12/09/19 0829          Vital Signs    Pre Systolic BP Rehab  138  -VG     Pre Treatment Diastolic BP  72  -VG      Post Systolic BP Rehab  131  -VG     Post Treatment Diastolic BP  65  -VG     Pretreatment Heart Rate (beats/min)  70  -VG     Posttreatment Heart Rate (beats/min)  71  -VG     Pre SpO2 (%)  93  -VG     O2 Delivery Pre Treatment  room air  -VG     Post SpO2 (%)  95  -VG     O2 Delivery Post Treatment  room air  -VG     Pre Patient Position  Supine  -VG     Post Patient Position  Sitting  -VG     Row Name 12/09/19 0829          Positioning and Restraints    Pre-Treatment Position  in bed  -VG     Post Treatment Position  chair  -VG     In Chair  reclined;call light within reach;encouraged to call for assist;exit alarm on;waffle cushion;pillow between legs;legs elevated  -VG       User Key  (r) = Recorded By, (t) = Taken By, (c) = Cosigned By    Initials Name Provider Type    Mary Echeverria, PT Physical Therapist        Outcome Measures     Row Name 12/09/19 0832          How much help from another person do you currently need...    Turning from your back to your side while in flat bed without using bedrails?  3  -VG     Moving from lying on back to sitting on the side of a flat bed without bedrails?  3  -VG     Moving to and from a bed to a chair (including a wheelchair)?  3  -VG     Standing up from a chair using your arms (e.g., wheelchair, bedside chair)?  3  -VG     Climbing 3-5 steps with a railing?  2  -VG     To walk in hospital room?  3  -VG     AM-PAC 6 Clicks Score (PT)  17  -VG     Row Name 12/09/19 0832          Functional Assessment    Outcome Measure Options  AM-PAC 6 Clicks Basic Mobility (PT)  -VG       User Key  (r) = Recorded By, (t) = Taken By, (c) = Cosigned By    Initials Name Provider Type    Mary Echeverria, PT Physical Therapist          PT Recommendation and Plan     Outcome Summary/Treatment Plan (PT)  Anticipated Discharge Disposition (PT): inpatient rehabilitation facility  Plan of Care Reviewed With: patient, daughter  Progress: improving  Outcome Summary: Distance increased  to 50 ft with RW and CGA, 2nd person for recliner follow. Improved endurance, balanace, strength. Distance limited by fatigue, weakness,R hip pain, and dizziness. VSS. Will continue to progress pt as able per POC.     Time Calculation:   PT Charges     Row Name 12/09/19 0745             Time Calculation    Start Time  0745  -VG      PT Received On  12/09/19  -VG      PT Goal Re-Cert Due Date  12/17/19  -VG         Time Calculation- PT    Total Timed Code Minutes- PT  38 minute(s)  -VG         Timed Charges    44081 - PT Therapeutic Exercise Minutes  13  -VG      55586 - Gait Training Minutes   25  -VG        User Key  (r) = Recorded By, (t) = Taken By, (c) = Cosigned By    Initials Name Provider Type    VG Mary Osorio, PT Physical Therapist        Therapy Charges for Today     Code Description Service Date Service Provider Modifiers Qty    19158584853 HC PT THER PROC EA 15 MIN 12/9/2019 Mary Osorio, PT GP 1    56145245526 HC GAIT TRAINING EA 15 MIN 12/9/2019 Mary Osorio, PT GP 2          PT G-Codes  Outcome Measure Options: AM-PAC 6 Clicks Basic Mobility (PT)  AM-PAC 6 Clicks Score (PT): 17  AM-PAC 6 Clicks Score (OT): 10    Leonarda Osorio PT  12/9/2019

## 2019-12-09 NOTE — PLAN OF CARE
Problem: Patient Care Overview  Goal: Plan of Care Review  Flowsheets (Taken 12/9/2019 0833)  Progress: improving  Plan of Care Reviewed With: patient; daughter  Outcome Summary: Distance increased to 50 ft with RW and CGA, 2nd person for recliner follow. Improved endurance, balanace, strength. Distance limited by fatigue, weakness,R hip pain, and dizziness. VSS. Will continue to progress pt as able per POC.

## 2019-12-10 ENCOUNTER — HOSPITAL ENCOUNTER (INPATIENT)
Facility: HOSPITAL | Age: 79
LOS: 9 days | Discharge: HOME | DRG: 560 | End: 2019-12-19
Attending: INTERNAL MEDICINE | Admitting: INTERNAL MEDICINE
Payer: MEDICARE

## 2019-12-10 VITALS
HEART RATE: 65 BPM | BODY MASS INDEX: 32.8 KG/M2 | OXYGEN SATURATION: 98 % | TEMPERATURE: 98.1 F | SYSTOLIC BLOOD PRESSURE: 175 MMHG | WEIGHT: 209 LBS | DIASTOLIC BLOOD PRESSURE: 79 MMHG | RESPIRATION RATE: 18 BRPM | HEIGHT: 67 IN

## 2019-12-10 DIAGNOSIS — N30.01 ACUTE CYSTITIS WITH HEMATURIA: ICD-10-CM

## 2019-12-10 DIAGNOSIS — E11.9 TYPE 2 DIABETES MELLITUS WITHOUT COMPLICATION, WITHOUT LONG-TERM CURRENT USE OF INSULIN (HCC): Primary | ICD-10-CM

## 2019-12-10 PROBLEM — R53.81 DECLINING FUNCTIONAL STATUS: Status: ACTIVE | Noted: 2019-12-10

## 2019-12-10 LAB
BACTERIA: ABNORMAL /HPF
BILIRUBIN URINE: NEGATIVE
BLOOD, URINE: ABNORMAL
CLARITY: ABNORMAL
COLOR: YELLOW
EPITHELIAL CELLS, UA: ABNORMAL /HPF
GLUCOSE BLD-MCNC: 215 MG/DL (ref 74–106)
GLUCOSE BLD-MCNC: 233 MG/DL (ref 74–106)
GLUCOSE BLDC GLUCOMTR-MCNC: 135 MG/DL (ref 70–130)
GLUCOSE BLDC GLUCOMTR-MCNC: 146 MG/DL (ref 70–130)
GLUCOSE URINE: 500 MG/DL
KETONES, URINE: NEGATIVE MG/DL
LEUKOCYTE ESTERASE, URINE: ABNORMAL
MICROSCOPIC EXAMINATION: YES
NITRITE, URINE: NEGATIVE
PERFORMED ON: ABNORMAL
PERFORMED ON: ABNORMAL
PH UA: 6.5 (ref 5–8)
PROTEIN UA: NEGATIVE MG/DL
RBC UA: ABNORMAL /HPF (ref 0–2)
SPECIFIC GRAVITY UA: 1.01 (ref 1–1.03)
URINE REFLEX TO CULTURE: YES
URINE TYPE: ABNORMAL
UROBILINOGEN, URINE: 4 E.U./DL
WBC UA: ABNORMAL /HPF (ref 0–5)

## 2019-12-10 PROCEDURE — 1200000002 HC SEMI PRIVATE SWING BED

## 2019-12-10 PROCEDURE — 97110 THERAPEUTIC EXERCISES: CPT

## 2019-12-10 PROCEDURE — 81001 URINALYSIS AUTO W/SCOPE: CPT

## 2019-12-10 PROCEDURE — 87077 CULTURE AEROBIC IDENTIFY: CPT

## 2019-12-10 PROCEDURE — 87186 SC STD MICRODIL/AGAR DIL: CPT

## 2019-12-10 PROCEDURE — 87086 URINE CULTURE/COLONY COUNT: CPT

## 2019-12-10 PROCEDURE — 82962 GLUCOSE BLOOD TEST: CPT

## 2019-12-10 PROCEDURE — 6370000000 HC RX 637 (ALT 250 FOR IP): Performed by: INTERNAL MEDICINE

## 2019-12-10 PROCEDURE — 87184 SC STD DISK METHOD PER PLATE: CPT

## 2019-12-10 PROCEDURE — 99239 HOSP IP/OBS DSCHRG MGMT >30: CPT | Performed by: NURSE PRACTITIONER

## 2019-12-10 PROCEDURE — 97116 GAIT TRAINING THERAPY: CPT

## 2019-12-10 RX ORDER — GLIPIZIDE 5 MG/1
2.5 TABLET ORAL
Status: DISCONTINUED | OUTPATIENT
Start: 2019-12-11 | End: 2019-12-11

## 2019-12-10 RX ORDER — SODIUM PHOSPHATE,MONO-DIBASIC 19G-7G/118
1 ENEMA (ML) RECTAL DAILY
Status: DISCONTINUED | OUTPATIENT
Start: 2019-12-10 | End: 2019-12-19 | Stop reason: HOSPADM

## 2019-12-10 RX ORDER — FOLIC ACID 1 MG/1
1 TABLET ORAL DAILY
Status: DISCONTINUED | OUTPATIENT
Start: 2019-12-10 | End: 2019-12-19 | Stop reason: HOSPADM

## 2019-12-10 RX ORDER — VITAMIN B COMPLEX
5000 TABLET ORAL DAILY
Status: DISCONTINUED | OUTPATIENT
Start: 2019-12-10 | End: 2019-12-11

## 2019-12-10 RX ORDER — CEFDINIR 300 MG/1
300 CAPSULE ORAL EVERY 12 HOURS SCHEDULED
Status: DISCONTINUED | OUTPATIENT
Start: 2019-12-10 | End: 2019-12-13 | Stop reason: ALTCHOICE

## 2019-12-10 RX ORDER — UBIDECARENONE 100 MG
400 CAPSULE ORAL EVERY OTHER DAY
COMMUNITY

## 2019-12-10 RX ORDER — CHOLECALCIFEROL (VITAMIN D3) 125 MCG
1000 CAPSULE ORAL
Status: DISCONTINUED | OUTPATIENT
Start: 2019-12-10 | End: 2019-12-19 | Stop reason: HOSPADM

## 2019-12-10 RX ORDER — SIMVASTATIN 10 MG
10 TABLET ORAL NIGHTLY
Status: DISCONTINUED | OUTPATIENT
Start: 2019-12-10 | End: 2019-12-19 | Stop reason: HOSPADM

## 2019-12-10 RX ORDER — LANOLIN ALCOHOL/MO/W.PET/CERES
1000 CREAM (GRAM) TOPICAL
COMMUNITY

## 2019-12-10 RX ORDER — ACETAMINOPHEN 325 MG/1
650 TABLET ORAL EVERY 4 HOURS PRN
Status: DISCONTINUED | OUTPATIENT
Start: 2019-12-10 | End: 2019-12-11

## 2019-12-10 RX ORDER — DOCUSATE SODIUM 100 MG/1
100 CAPSULE, LIQUID FILLED ORAL 2 TIMES DAILY PRN
Status: DISCONTINUED | OUTPATIENT
Start: 2019-12-10 | End: 2019-12-19 | Stop reason: HOSPADM

## 2019-12-10 RX ORDER — BISACODYL 10 MG
10 SUPPOSITORY, RECTAL RECTAL ONCE
Status: COMPLETED | OUTPATIENT
Start: 2019-12-10 | End: 2019-12-10

## 2019-12-10 RX ORDER — LANOLIN ALCOHOL/MO/W.PET/CERES
3 CREAM (GRAM) TOPICAL NIGHTLY
COMMUNITY

## 2019-12-10 RX ORDER — PANTOPRAZOLE SODIUM 40 MG/1
40 TABLET, DELAYED RELEASE ORAL DAILY
Status: DISCONTINUED | OUTPATIENT
Start: 2019-12-10 | End: 2019-12-19 | Stop reason: HOSPADM

## 2019-12-10 RX ORDER — DEXTROSE MONOHYDRATE 25 G/50ML
12.5 INJECTION, SOLUTION INTRAVENOUS PRN
Status: DISCONTINUED | OUTPATIENT
Start: 2019-12-10 | End: 2019-12-19 | Stop reason: HOSPADM

## 2019-12-10 RX ORDER — NICOTINE POLACRILEX 4 MG
15 LOZENGE BUCCAL PRN
Status: DISCONTINUED | OUTPATIENT
Start: 2019-12-10 | End: 2019-12-19 | Stop reason: HOSPADM

## 2019-12-10 RX ORDER — ACETAMINOPHEN 325 MG/1
650 TABLET ORAL EVERY 6 HOURS PRN
COMMUNITY

## 2019-12-10 RX ORDER — UBIDECARENONE 100 MG
400 CAPSULE ORAL EVERY OTHER DAY
Status: DISCONTINUED | OUTPATIENT
Start: 2019-12-10 | End: 2019-12-19 | Stop reason: HOSPADM

## 2019-12-10 RX ORDER — LANOLIN ALCOHOL/MO/W.PET/CERES
3 CREAM (GRAM) TOPICAL NIGHTLY
Status: DISCONTINUED | OUTPATIENT
Start: 2019-12-10 | End: 2019-12-19 | Stop reason: HOSPADM

## 2019-12-10 RX ORDER — ACETAMINOPHEN 325 MG/1
650 TABLET ORAL EVERY 6 HOURS PRN
Status: DISCONTINUED | OUTPATIENT
Start: 2019-12-10 | End: 2019-12-19 | Stop reason: HOSPADM

## 2019-12-10 RX ORDER — DOCUSATE SODIUM 100 MG/1
100 CAPSULE, LIQUID FILLED ORAL 2 TIMES DAILY PRN
Status: ON HOLD | COMMUNITY
End: 2019-12-19 | Stop reason: SDUPTHER

## 2019-12-10 RX ORDER — POLYETHYLENE GLYCOL 3350 17 G/17G
17 POWDER, FOR SOLUTION ORAL DAILY
COMMUNITY
Start: 2019-12-11

## 2019-12-10 RX ORDER — TRAMADOL HYDROCHLORIDE 50 MG/1
50 TABLET ORAL ONCE
Status: COMPLETED | OUTPATIENT
Start: 2019-12-10 | End: 2019-12-10

## 2019-12-10 RX ORDER — DEXTROSE MONOHYDRATE 50 MG/ML
100 INJECTION, SOLUTION INTRAVENOUS PRN
Status: DISCONTINUED | OUTPATIENT
Start: 2019-12-10 | End: 2019-12-19 | Stop reason: HOSPADM

## 2019-12-10 RX ORDER — HYDROCODONE BITARTRATE AND ACETAMINOPHEN 5; 325 MG/1; MG/1
1 TABLET ORAL EVERY 4 HOURS PRN
Status: DISCONTINUED | OUTPATIENT
Start: 2019-12-10 | End: 2019-12-19 | Stop reason: HOSPADM

## 2019-12-10 RX ORDER — POLYETHYLENE GLYCOL 3350 17 G/17G
17 POWDER, FOR SOLUTION ORAL DAILY
Status: DISCONTINUED | OUTPATIENT
Start: 2019-12-11 | End: 2019-12-19 | Stop reason: HOSPADM

## 2019-12-10 RX ORDER — ACETAMINOPHEN 325 MG/1
650 TABLET ORAL EVERY 6 HOURS PRN
Start: 2019-12-10

## 2019-12-10 RX ORDER — CLOPIDOGREL BISULFATE 75 MG/1
75 TABLET ORAL DAILY
Status: DISCONTINUED | OUTPATIENT
Start: 2019-12-10 | End: 2019-12-19 | Stop reason: HOSPADM

## 2019-12-10 RX ORDER — LOVASTATIN 10 MG/1
10 TABLET ORAL NIGHTLY
COMMUNITY

## 2019-12-10 RX ADMIN — HYDROCODONE BITARTRATE AND ACETAMINOPHEN 1 TABLET: 5; 325 TABLET ORAL at 20:33

## 2019-12-10 RX ADMIN — POLYETHYLENE GLYCOL 3350 17 G: 17 POWDER, FOR SOLUTION ORAL at 08:16

## 2019-12-10 RX ADMIN — CLOPIDOGREL BISULFATE 75 MG: 75 TABLET ORAL at 10:47

## 2019-12-10 RX ADMIN — MELATONIN TAB 3 MG 3 MG: 3 TAB at 20:34

## 2019-12-10 RX ADMIN — MIDODRINE HYDROCHLORIDE 2.5 MG: 5 TABLET ORAL at 08:15

## 2019-12-10 RX ADMIN — CYANOCOBALAMIN TAB 500 MCG 1000 MCG: 500 TAB at 20:34

## 2019-12-10 RX ADMIN — DOCUSATE SODIUM 100 MG: 100 CAPSULE, LIQUID FILLED ORAL at 10:47

## 2019-12-10 RX ADMIN — ACETAMINOPHEN 650 MG: 325 TABLET ORAL at 00:38

## 2019-12-10 RX ADMIN — FOLIC ACID 1000 MCG: 1 TABLET ORAL at 08:16

## 2019-12-10 RX ADMIN — MELATONIN 5000 UNITS: at 20:33

## 2019-12-10 RX ADMIN — ACETAMINOPHEN 650 MG: 325 TABLET ORAL at 06:06

## 2019-12-10 RX ADMIN — PANTOPRAZOLE SODIUM 40 MG: 40 TABLET, DELAYED RELEASE ORAL at 20:34

## 2019-12-10 RX ADMIN — ACETAMINOPHEN 650 MG: 325 TABLET ORAL at 12:04

## 2019-12-10 RX ADMIN — PANTOPRAZOLE SODIUM 40 MG: 40 TABLET, DELAYED RELEASE ORAL at 06:06

## 2019-12-10 RX ADMIN — TRAMADOL HYDROCHLORIDE 50 MG: 50 TABLET, FILM COATED ORAL at 03:53

## 2019-12-10 RX ADMIN — INSULIN LISPRO 2 UNITS: 100 INJECTION, SOLUTION INTRAVENOUS; SUBCUTANEOUS at 20:34

## 2019-12-10 RX ADMIN — CEFDINIR 300 MG: 300 CAPSULE ORAL at 23:38

## 2019-12-10 RX ADMIN — BISACODYL 10 MG: 10 SUPPOSITORY RECTAL at 12:04

## 2019-12-10 RX ADMIN — SODIUM CHLORIDE, PRESERVATIVE FREE 10 ML: 5 INJECTION INTRAVENOUS at 08:16

## 2019-12-10 ASSESSMENT — PAIN DESCRIPTION - PAIN TYPE: TYPE: SURGICAL PAIN

## 2019-12-10 ASSESSMENT — PAIN DESCRIPTION - DESCRIPTORS: DESCRIPTORS: SPASM;THROBBING

## 2019-12-10 ASSESSMENT — PAIN SCALES - GENERAL
PAINLEVEL_OUTOF10: 4
PAINLEVEL_OUTOF10: 3

## 2019-12-10 ASSESSMENT — PAIN DESCRIPTION - LOCATION: LOCATION: HIP

## 2019-12-10 ASSESSMENT — PAIN DESCRIPTION - ORIENTATION: ORIENTATION: RIGHT

## 2019-12-10 NOTE — PLAN OF CARE
Problem: Patient Care Overview  Goal: Plan of Care Review  12/10/2019 1422 by Mary Osorio, PT  Flowsheets (Taken 12/10/2019 1419)  Progress: improving  Plan of Care Reviewed With: patient  Outcome Summary: Pt increased ambulation distance to 100 ft with RW and CGA, limited by fatigue and weakness. Improved endurance and stability with gait. Cues throughout for maintaining precautions. Will continue to progress pt as able per POC.

## 2019-12-10 NOTE — DISCHARGE SUMMARY
[facility] Hospital Medicine Services  DISCHARGE SUMMARY    Patient Name: Radha Whelan  : 1940  MRN: [id number]    Date of Admission: 2019  9:40 AM  Date of Discharge:  12/10/2019  Primary Care Physician: Farhan Schaefer MD    Consults     Date and Time Order Name Status Description    2019 1112 Inpatient Hospitalist Consult      2019 1446 Inpatient Hospitalist Consult Completed     2019 1823 Inpatient Cardiology Consult Completed     2019 1311 Inpatient Hospitalist Consult Completed           Hospital Course     Presenting Problem:   Bradycardia [R00.1]  Bradycardia [R00.1]  Bradycardia [R00.1]    Active Hospital Problems    Diagnosis  POA   • **Bradycardia [R00.1]  Unknown   • Status post right hip replacement [Z96.641]  Not Applicable   • ODELL on CPAP [G47.33, Z99.89]  Not Applicable   • Restless legs syndrome [G25.81]  Yes   • Type 2 diabetes mellitus without complication (CMS/HCC) [E11.9]  Yes   • Hypertension [I10]  Yes      Resolved Hospital Problems   No resolved problems to display.          Hospital Course:  Radha Whelan is a 79 y.o. female with a history of DM2, HTN, HLD, ODELL on cpap, OA, RLS, TIA x 3, obesity and possible CHF per report.  She was admitted to [facility] on 2019 for a planned right total knee replacement per Dr. Salgado.  She underwent her surgery as planned.  She was noted to have some mild postoperative hyponatremia but was doing decently postop.  Then on 2019 she developed worsening hypotension, bradycardia with heart rate in the 30s, 6 to 10-second pauses on EKG as well as syncope.  Cards was consulted in [place] and followed.  She was transferred to [facility] straight to  OU per Dr. Laboy for planned eval and placement of a PPM.  She underwent a PPM placement on .  She was noted to be orthostatic and was started on  M.idodrine her blood pressures have since improved and she has been taken off of  this.  Patient was seen in consultation by PT/OT given her recent knee replacement.  They have recommended a skilled rehab facility.  Her type 2 diabetes has been well controlled on a low-dose sliding scale.  Patient has done well since her permanent pacemaker placement.  She has had already had her wound check and her postop dressing removed.  She will need to follow-up with Dr. Laboy in 3 months.  She is been accepted to a skilled bed at [facility] for rehab and will to be transferred there today by her family.  She should follow-up with her primary care provider when she is discharged from rehab.      Discharge Follow Up Recommendations for labs/diagnostics:  Follow-up with PCP once discharged from rehab  Follow-up with Dr. Laboy in 3 months with pacemaker check    Day of Discharge     HPI:   Sitting upright in bed with family at bedside.  No overnight issues.  Had her pacemaker dressing removed this morning.  Anxious for rehab.    Review of Systems  Gen- No fevers, chills  CV- No chest pain, palpitations  Resp- No cough, dyspnea  GI- No N/V/D, abd pain      Otherwise ROS is negative except as mentioned in the HPI.    Vital Signs:   Temp:  [97.8 °F (36.6 °C)-98.4 °F (36.9 °C)] 98.1 °F (36.7 °C)  Heart Rate:  [65-82] 65  Resp:  [14-18] 18  BP: (123-175)/(64-79) 175/79     Physical Exam:  Constitutional: No acute distress, awake, alert, resting in bed, family at bedside  HENT: NCAT, mucous membranes moist  Respiratory: Clear to auscultation bilaterally, respiratory effort normal on room air  Cardiovascular: RRR, no murmurs, rubs, or gallops, palpable pedal pulses bilaterally  Gastrointestinal: Positive bowel sounds, soft, nontender, nondistended  Musculoskeletal: No bilateral ankle edema  Psychiatric: Appropriate affect, cooperative  Neurologic: Oriented x 3, moves all extremities, speech clear  Skin: No rashes to exposed skin, left chest permanent pacemaker site open to air-no erythema noted.      Pertinent   and/or Most Recent Results     Results from last 7 days   Lab Units 12/08/19  0540 12/07/19  0543 12/06/19  0428 12/05/19  1644 12/05/19  1251 12/05/19  0548 12/04/19  1434   WBC 10*3/mm3 6.55 5.88 7.20 7.50  --   --  7.50   HEMOGLOBIN g/dL 8.8* 9.0* 9.1* 9.9* 10.4* 10.1* 10.8*   HEMATOCRIT % 28.1* 29.0* 28.9* 31.1* 31.6* 31.3* 32.9*   PLATELETS 10*3/mm3 154 132* 143 151  --   --  154   SODIUM mmol/L 138 136 140 136  --  139 137   POTASSIUM mmol/L 4.2 3.7 4.3 4.5  --  5.1 3.8   CHLORIDE mmol/L 102 104 104 98  --  101 100   CO2 mmol/L 27.0 22.0 24.7 25.5  --  26.6 26.5   BUN mg/dL 18 15 17 19  --  20 25*   CREATININE mg/dL 0.83 0.71 0.91 1.14*  --  1.09* 1.08*   GLUCOSE mg/dL 121* 116* 126* 141*  --  179* 202*   CALCIUM mg/dL 8.5* 8.2* 7.7* 8.9  --  9.4 9.2           Invalid input(s): PROT, LABALBU        Invalid input(s): TG, LDLCALC, LDLREALC  Results from last 7 days   Lab Units 12/05/19  1644   TROPONIN T ng/mL <0.010       Brief Urine Lab Results  (Last result in the past 365 days)      Color   Clarity   Blood   Leuk Est   Nitrite   Protein   CREAT   Urine HCG        12/05/19 1559 Yellow Clear Negative Negative Negative Negative               Microbiology Results Abnormal     None          Imaging Results (All)     Procedure Component Value Units Date/Time    XR Chest PA & Lateral [[id number]] Collected:  12/07/19 2135     Updated:  12/07/19 2139    Narrative:          EXAMINATION: XR CHEST PA AND LATERAL-      INDICATION: ARTIFICIAL CARDIAC PACEMAKER PRESENT      COMPARISON: 06/03/2019     FINDINGS: Left-sided dual lead pacemaker is noted. Heart and vasculature  appear normal in size. Lungs appear normally expanded and grossly clear.  No pneumothorax or effusion is seen.           Impression:       Left-sided pacemaker placement with no evidence of  pneumothorax.     This report was finalized on 12/7/2019 9:36 PM by DR. Jordin Duron MD.                       Results for orders placed during the hospital encounter  of 09/11/19   Adult Transthoracic Echo Complete W/ Cont if Necessary Per Protocol    Narrative · Estimated EF = 42%.  · Left ventricular diastolic dysfunction (grade I) consistent with   impaired relaxation.  · Calculated right ventricular systolic pressure from tricuspid   regurgitation is 22.0 mmHg.            Discharge Details        Discharge Medications      New Medications      Instructions Start Date   acetaminophen 325 MG tablet  Commonly known as:  TYLENOL   650 mg, Oral, Every 6 Hours PRN      insulin lispro 100 UNIT/ML injection  Commonly known as:  humaLOG   0-7 Units, Subcutaneous, 4 Times Daily With Meals & Nightly      polyethylene glycol pack packet  Commonly known as:  MIRALAX   17 g, Oral, Daily   Start Date:  December 11, 2019        Changes to Medications      Instructions Start Date   lovastatin 10 MG tablet  Commonly known as:  MEVACOR  What changed:    · how much to take  · how to take this  · when to take this   TAKE 1 TABLET EVERY NIGHT         Continue These Medications      Instructions Start Date   clopidogrel 75 MG tablet  Commonly known as:  PLAVIX   TAKE 1 TABLET DAILY      coenzyme Q10 100 MG capsule   400 mg, Oral, Every Other Day      docusate sodium 100 MG capsule   100 mg, Oral, 2 Times Daily PRN      folic acid 1 MG tablet  Commonly known as:  FOLVITE   TAKE 1 TABLET DAILY      glipizide 2.5 MG 24 hr tablet  Commonly known as:  GLUCOTROL XL   2.5 mg, Oral, Daily      GLUCOSAMINE CHONDR 1500 COMPLX PO   1 tablet, Oral, Daily      melatonin 3 MG tablet   3 mg, Oral, Nightly      pantoprazole 40 MG EC tablet  Commonly known as:  PROTONIX   TAKE 1 TABLET DAILY      vitamin B-12 1000 MCG tablet  Commonly known as:  CYANOCOBALAMIN   1,000 mcg, Oral, 2 Times Weekly      VITAMIN D PO   125 mcg, Oral, 2 Times Weekly             Allergies   Allergen Reactions   • Cumini Other (See Comments)     Complex migrane   • Advil [Ibuprofen] Rash     Swelling all over   • Butter Flavor [Flavoring  "Agent] Other (See Comments)     States \"butter fat\"-migranes     • Cheese Other (See Comments)     migrane     • Other Other (See Comments)     \"ice cream\"-migranes     • Saccharin Other (See Comments)     migrane           Discharge Disposition:  Skilled Nursing Facility (DC - External)    Diet:  Hospital:  Diet Order   Procedures   • Diet Regular; Consistent Carbohydrate       Activity:  Activity Instructions     Activity as Tolerated                 CODE STATUS:    Code Status and Medical Interventions:   Ordered at: 12/09/19 1139     Level Of Support Discussed With:    Patient     Code Status:    CPR     Medical Interventions (Level of Support Prior to Arrest):    Full       Future Appointments   Date Time Provider Department Center   12/13/2019 11:00 AM Farhan Schaefer MD [facility] None   1/8/2020  2:20 PM [facility]   3/26/2020  1:00 PM Farhan Schaefer MD [facility] None   7/29/2020  2:30 PM Caleb Luevano MD [facility] None       Additional Instructions for the Follow-ups that You Need to Schedule     Discharge Follow-up with PCP   As directed       Currently Documented PCP:    Farhan Schaefer MD    PCP Phone Number:    [phone]     Follow Up Details:  once discharged from rehab         Discharge Follow-up with Specified Provider: Dr. Laboy; 3 Months   As directed      To:  Dr. Laboy    Follow Up:  3 Months               Time Spent on Discharge:  40 minutes    Electronically signed by CURTIS Connolly, 12/10/19, 11:53 AM.      "

## 2019-12-10 NOTE — THERAPY TREATMENT NOTE
"Patient Name: Radha Whelan  : 1940    MRN: [id number]                              Today's Date: 12/10/2019       Admit Date: 2019    Visit Dx: No diagnosis found.  Patient Active Problem List   Diagnosis   • Type 2 diabetes mellitus without complication (CMS/HCC)   • Temporary cerebral vascular dysfunction   • Fatigue   • Hypertension   • Hypervitaminosis B6   • Hyponatremia   • ODELL on CPAP   • Peripheral neuropathy   • Restless legs syndrome   • Edema   • H/O hyperlipidemia   • Acute non-recurrent maxillary sinusitis   • Infection of left tear duct   • Left eye pain   • Mixed hyperlipidemia   • LBBB (left bundle branch block)   • Abnormal echocardiogram   • Cardiomyopathy (CMS/HCC)   • Arthritis   • Chronic systolic congestive heart failure (CMS/HCC)   • Status post right hip replacement   • Bradycardia     Past Medical History:   Diagnosis Date   • Arthritis    • Basal cell carcinoma    • Body piercing     both ears   • Cataract, bilateral     s/p removal    • Diabetes mellitus (CMS/HCC)    • DVT (deep venous thrombosis) (CMS/HCC)     - left leg   • Edema     legs- hx of   • Elevated cholesterol    • Fatigue    • Fatigue    • History of left heart catheterization     19  no stents   • History of migraine headaches    • Hx of exercise stress test    • Hyperlipidemia    • Hypertension    • Hyponatremia    • Impaired functional mobility, balance, gait, and endurance    • LBBB (left bundle branch block)    • Left leg pain     left leg and knee pain    • Muscle spasm     hx of   • Obesity    • ODELL on CPAP     CPAP   • Stroke (CMS/HCC)    • Teeth missing     all of the top, and has 6 bottom teeth left   • Thyroid nodule    • TIA (transient ischemic attack)     x3.  last one was \"a few years ago\"   • Wears dentures     top plate   • Wears glasses     to read     Past Surgical History:   Procedure Laterality Date   • BREAST BIOPSY Left     benign   • CARDIAC CATHETERIZATION      2019 PER " .   • CARDIAC CATHETERIZATION N/A 9/16/2019    Procedure: Left Heart Cath +/- CBI;  Surgeon: Ashlyn Mays MD;  Location:  [facility];  Service: Cardiovascular   • CARDIAC ELECTROPHYSIOLOGY PROCEDURE N/A 12/6/2019    Procedure: PACEMAKER IMPLANTATION- DC;  Surgeon: Stephan Laboy DO;  Location:  [facility] EP INVASIVE LOCATION;  Service: Cardiology   • CATARACT EXTRACTION  12/2018    both eyes    • COLONOSCOPY     • MOUTH SURGERY      all top teeth   • SKIN BIOPSY      basal cell   • TUBAL ABDOMINAL LIGATION       General Information     Row Name 12/10/19 1406          PT Evaluation Time/Intention    Document Type  therapy note (daily note)  -VG     Mode of Treatment  individual therapy;physical therapy  -VG     Row Name 12/10/19 1406          General Information    Existing Precautions/Restrictions  (S) fall;right;hip, posterior;pacemaker  -VG     Row Name 12/10/19 1406          Cognitive Assessment/Intervention- PT/OT    Orientation Status (Cognition)  oriented x 3  -VG     Row Name 12/10/19 1406          Safety Issues, Functional Mobility    Safety Issues Affecting Function (Mobility)  safety precaution awareness Pt able to recall 3/3 hip precautions with cues.  -VG       User Key  (r) = Recorded By, (t) = Taken By, (c) = Cosigned By    Initials Name Provider Type    VG Mary Osorio, PT Physical Therapist        Mobility     Row Name 12/10/19 1402          Bed Mobility Assessment/Treatment    Bed Mobility Assessment/Treatment  rolling left;supine-sit;rolling right  -VG     Rolling Left [place] (Bed Mobility)  moderate assist (50% patient effort);1 person assist  -VG     Rolling Right [place] (Bed Mobility)  moderate assist (50% patient effort);1 person assist  -VG     Supine-Sit [place] (Bed Mobility)  moderate assist (50% patient effort);1 person assist  -VG     Assistive Device (Bed Mobility)  bed rails;draw sheet;head of bed elevated  -VG     Comment (Bed Mobility)   Rolling L/R for donning brief prior to mobility. Assist at RLE and trunk for supine to sit and scooting hips to EOB. Cues for maintaining hip prec. Increased time/effort.  -VG     Row Name 12/10/19 1407          Transfer Assessment/Treatment    Comment (Transfers)  Cues for hand placement and sequencing.   -VG     Row Name 12/10/19 1407          Sit-Stand Transfer    Sit-Stand [place] (Transfers)  contact guard;verbal cues  -VG     Assistive Device (Sit-Stand Transfers)  walker, front-wheeled  -VG     Row Name 12/10/19 1407          Gait/Stairs Assessment/Training    [place] Level (Gait)  contact guard;verbal cues  -VG     Assistive Device (Gait)  walker, front-wheeled  -VG     Distance in Feet (Gait)  100  -VG     Pattern (Gait)  step-to  -VG     Deviations/Abnormal Patterns (Gait)  venancio decreased;festinating/shuffling;gait speed decreased;antalgic  -VG     Bilateral Gait Deviations  forward flexed posture  -VG     Comment (Gait/Stairs)  Distance limited by fatigue, weakness, and R hip pain. Improved endurance. Cues for upright posture, AD management, and maintaining precautions.   -VG       User Key  (r) = Recorded By, (t) = Taken By, (c) = Cosigned By    Initials Name Provider Type    VG Mary Osorio, PT Physical Therapist        Obj/Interventions    No documentation.       Goals/Plan    No documentation.       Clinical Impression     Row Name 12/10/19 1410          Pain Scale: Numbers Pre/Post-Treatment    Pain Scale: Numbers, Pretreatment  0/10 - no pain  -VG     Pain Scale: Numbers, Post-Treatment  0/10 - no pain  -VG     Row Name 12/10/19 1410          Plan of Care Review    Plan of Care Reviewed With  patient  -VG     Progress  improving  -VG     Outcome Summary  Pt increased ambulation distance to 100 ft with RW and CGA, limited by fatigue and weakness. Improved endurance and stability with gait. Cues throughout for maintaining precautions. Will continue to progress pt as able per POC.  -VG      Row Name 12/10/19 1419          Positioning and Restraints    Pre-Treatment Position  in bed  -VG     Post Treatment Position  bathroom  -VG     Bathroom  sitting;call light within reach;encouraged to call for assist;notified nsg  -VG       User Key  (r) = Recorded By, (t) = Taken By, (c) = Cosigned By    Initials Name Provider Type    Mary Echeverria, PT Physical Therapist        Outcome Measures     Row Name 12/10/19 1421          How much help from another person do you currently need...    Turning from your back to your side while in flat bed without using bedrails?  3  -VG     Moving from lying on back to sitting on the side of a flat bed without bedrails?  3  -VG     Moving to and from a bed to a chair (including a wheelchair)?  3  -VG     Standing up from a chair using your arms (e.g., wheelchair, bedside chair)?  3  -VG     Climbing 3-5 steps with a railing?  2  -VG     To walk in hospital room?  3  -VG     AM-PAC 6 Clicks Score (PT)  17  -VG     Row Name 12/10/19 1421          Functional Assessment    Outcome Measure Options  AM-PAC 6 Clicks Basic Mobility (PT)  -VG       User Key  (r) = Recorded By, (t) = Taken By, (c) = Cosigned By    Initials Name Provider Type    Mary Echeverria, PT Physical Therapist          PT Recommendation and Plan     Outcome Summary/Treatment Plan (PT)  Anticipated Discharge Disposition (PT): inpatient rehabilitation facility  Plan of Care Reviewed With: patient  Progress: improving  Outcome Summary: Pt increased ambulation distance to 100 ft with RW and CGA, limited by fatigue and weakness. Improved endurance and stability with gait. Cues throughout for maintaining precautions. Will continue to progress pt as able per POC.     Time Calculation:   PT Charges     Row Name 12/10/19 3385             Time Calculation    Start Time  1339  -VG      PT Received On  12/10/19  -      PT Goal Re-Cert Due Date  12/17/19  -         Time Calculation- PT    Total Timed Code  Minutes- PT  26 minute(s)  -VG         Timed Charges    59213 - PT Therapeutic Exercise Minutes  10  -VG      84832 - Gait Training Minutes   16  -VG        User Key  (r) = Recorded By, (t) = Taken By, (c) = Cosigned By    Initials Name Provider Type    VG Mary Osorio, PT Physical Therapist        Therapy Charges for Today     Code Description Service Date Service Provider Modifiers Qty    88199295242 HC PT THER PROC EA 15 MIN 12/9/2019 Mary Osorio, PT GP 1    45194798014 HC GAIT TRAINING EA 15 MIN 12/9/2019 Mary Osorio, PT GP 2    25750847493 HC PT THER PROC EA 15 MIN 12/10/2019 Mary Osorio, PT GP 1    67655543883 HC GAIT TRAINING EA 15 MIN 12/10/2019 Mary Osorio, PT GP 1          PT G-Codes  Outcome Measure Options: AM-PAC 6 Clicks Basic Mobility (PT)  AM-PAC 6 Clicks Score (PT): 17  AM-PAC 6 Clicks Score (OT): 10    Leonarda Osorio PT  12/10/2019

## 2019-12-10 NOTE — PLAN OF CARE
Problem: Patient Care Overview  Goal: Plan of Care Review  Outcome: Ongoing (interventions implemented as appropriate)  Flowsheets (Taken 12/10/2019 7600)  Progress: improving  Plan of Care Reviewed With: patient  Note:   Patient's order for Tramadol . One time dose ordered per Bethany ACEVEDO this shift. VSS. Patient requiring less assistance to get up to BSC this shift. Plan to D/C to rehab today.       No

## 2019-12-10 NOTE — PROGRESS NOTES
Case Management Discharge Note      Final Note: Per MD rounds, patient ready for discharge.  Spoke with patient and  at bedside regarding discharge plan who report that patient is being discharged today to rehab.  No discharge needs verbalized.  Called Ashanti with [facility] who indicates that they are ready to receive patient today.  Patient plan is to discharge to [facility] today via car with family to transport.  Nursng to call report to [phone].  Discharge Summary to be faxed to [phone].    Provided post acute provider list?: Refused    Destination      No service has been selected for the patient.      Durable Medical Equipment      No service has been selected for the patient.      Dialysis/Infusion      No service has been selected for the patient.      Home Medical Care      No service has been selected for the patient.      Therapy      No service has been selected for the patient.      Community Resources      No service has been selected for the patient.             Final Discharge Disposition Code: 61 - hospital-based swing bed

## 2019-12-10 NOTE — PROGRESS NOTES
Radha Whelan  1940    12/06/2019  0924    Patient scheduled for transfer to [facility] today status post right hip replacement.  Transportation will be provided by her .  Her wound check was performed today by me.  Her wound is clean dry and intact with Dermabond covering.  Drainage.  All activity restrictions and wound care instructions were provided.  Follow-up scheduled in 3 months with Smart Panel device check.  Patient is stable and ready for discharge to rehab from a cardiovascular standpoint.

## 2019-12-11 ENCOUNTER — EPISODE CHANGES (OUTPATIENT)
Dept: CASE MANAGEMENT | Facility: OTHER | Age: 79
End: 2019-12-11

## 2019-12-11 PROBLEM — Z96.641 S/P HIP REPLACEMENT, RIGHT: Status: ACTIVE | Noted: 2019-12-11

## 2019-12-11 PROBLEM — N30.01 ACUTE CYSTITIS WITH HEMATURIA: Status: ACTIVE | Noted: 2019-12-11

## 2019-12-11 PROBLEM — I50.9 CHF (CONGESTIVE HEART FAILURE) (HCC): Status: ACTIVE | Noted: 2019-12-11

## 2019-12-11 PROBLEM — G47.33 OSA (OBSTRUCTIVE SLEEP APNEA): Status: ACTIVE | Noted: 2019-12-11

## 2019-12-11 PROBLEM — Z95.0 S/P PLACEMENT OF CARDIAC PACEMAKER: Status: ACTIVE | Noted: 2019-12-11

## 2019-12-11 LAB
A/G RATIO: 0.8 (ref 0.8–2)
ALBUMIN SERPL-MCNC: 2.7 G/DL (ref 3.4–4.8)
ALP BLD-CCNC: 74 U/L (ref 25–100)
ALT SERPL-CCNC: 17 U/L (ref 4–36)
ANION GAP SERPL CALCULATED.3IONS-SCNC: 8 MMOL/L (ref 3–16)
AST SERPL-CCNC: 35 U/L (ref 8–33)
BASOPHILS ABSOLUTE: 0 K/UL (ref 0–0.1)
BASOPHILS RELATIVE PERCENT: 0.2 %
BILIRUB SERPL-MCNC: 0.8 MG/DL (ref 0.3–1.2)
BUN BLDV-MCNC: 14 MG/DL (ref 6–20)
CALCIUM SERPL-MCNC: 8.6 MG/DL (ref 8.5–10.5)
CHLORIDE BLD-SCNC: 101 MMOL/L (ref 98–107)
CO2: 28 MMOL/L (ref 20–30)
CREAT SERPL-MCNC: 0.8 MG/DL (ref 0.4–1.2)
EOSINOPHILS ABSOLUTE: 0.1 K/UL (ref 0–0.4)
EOSINOPHILS RELATIVE PERCENT: 2.3 %
GFR AFRICAN AMERICAN: >59
GFR NON-AFRICAN AMERICAN: >60
GLOBULIN: 3.2 G/DL
GLUCOSE BLD-MCNC: 136 MG/DL (ref 74–106)
GLUCOSE BLD-MCNC: 139 MG/DL (ref 74–106)
GLUCOSE BLD-MCNC: 141 MG/DL (ref 74–106)
GLUCOSE BLD-MCNC: 142 MG/DL (ref 74–106)
GLUCOSE BLD-MCNC: 162 MG/DL (ref 74–106)
HCT VFR BLD CALC: 27.5 % (ref 37–47)
HEMOGLOBIN: 8.8 G/DL (ref 11.5–16.5)
IMMATURE GRANULOCYTES #: 0.1 K/UL
IMMATURE GRANULOCYTES %: 1.2 % (ref 0–5)
LYMPHOCYTES ABSOLUTE: 2.1 K/UL (ref 1.5–4)
LYMPHOCYTES RELATIVE PERCENT: 37.5 %
MCH RBC QN AUTO: 30.9 PG (ref 27–32)
MCHC RBC AUTO-ENTMCNC: 32 G/DL (ref 31–35)
MCV RBC AUTO: 96.5 FL (ref 80–100)
MONOCYTES ABSOLUTE: 0.6 K/UL (ref 0.2–0.8)
MONOCYTES RELATIVE PERCENT: 10.7 %
NEUTROPHILS ABSOLUTE: 2.8 K/UL (ref 2–7.5)
NEUTROPHILS RELATIVE PERCENT: 48.1 %
PDW BLD-RTO: 13.2 % (ref 11–16)
PERFORMED ON: ABNORMAL
PLATELET # BLD: 169 K/UL (ref 150–400)
PMV BLD AUTO: 9 FL (ref 6–10)
POTASSIUM REFLEX MAGNESIUM: 3.9 MMOL/L (ref 3.4–5.1)
RBC # BLD: 2.85 M/UL (ref 3.8–5.8)
SODIUM BLD-SCNC: 137 MMOL/L (ref 136–145)
TOTAL PROTEIN: 5.9 G/DL (ref 6.4–8.3)
WBC # BLD: 5.7 K/UL (ref 4–11)

## 2019-12-11 PROCEDURE — 80053 COMPREHEN METABOLIC PANEL: CPT

## 2019-12-11 PROCEDURE — 2500000003 HC RX 250 WO HCPCS: Performed by: INTERNAL MEDICINE

## 2019-12-11 PROCEDURE — 6360000002 HC RX W HCPCS: Performed by: INTERNAL MEDICINE

## 2019-12-11 PROCEDURE — 6370000000 HC RX 637 (ALT 250 FOR IP): Performed by: INTERNAL MEDICINE

## 2019-12-11 PROCEDURE — 1200000002 HC SEMI PRIVATE SWING BED

## 2019-12-11 PROCEDURE — 92610 EVALUATE SWALLOWING FUNCTION: CPT

## 2019-12-11 PROCEDURE — G8998 SWALLOW D/C STATUS: HCPCS

## 2019-12-11 PROCEDURE — 97535 SELF CARE MNGMENT TRAINING: CPT

## 2019-12-11 PROCEDURE — 36415 COLL VENOUS BLD VENIPUNCTURE: CPT

## 2019-12-11 PROCEDURE — 97165 OT EVAL LOW COMPLEX 30 MIN: CPT

## 2019-12-11 PROCEDURE — 97161 PT EVAL LOW COMPLEX 20 MIN: CPT

## 2019-12-11 PROCEDURE — 97802 MEDICAL NUTRITION INDIV IN: CPT

## 2019-12-11 PROCEDURE — 99305 1ST NF CARE MODERATE MDM 35: CPT | Performed by: INTERNAL MEDICINE

## 2019-12-11 PROCEDURE — G8996 SWALLOW CURRENT STATUS: HCPCS

## 2019-12-11 PROCEDURE — G8997 SWALLOW GOAL STATUS: HCPCS

## 2019-12-11 PROCEDURE — 85025 COMPLETE CBC W/AUTO DIFF WBC: CPT

## 2019-12-11 RX ORDER — VITAMIN B COMPLEX
5000 TABLET ORAL
Status: DISCONTINUED | OUTPATIENT
Start: 2019-12-16 | End: 2019-12-19 | Stop reason: HOSPADM

## 2019-12-11 RX ADMIN — HYDROCODONE BITARTRATE AND ACETAMINOPHEN 1 TABLET: 5; 325 TABLET ORAL at 08:44

## 2019-12-11 RX ADMIN — ACETAMINOPHEN 650 MG: 325 TABLET, FILM COATED ORAL at 01:50

## 2019-12-11 RX ADMIN — HYDROCODONE BITARTRATE AND ACETAMINOPHEN 1 TABLET: 5; 325 TABLET ORAL at 20:38

## 2019-12-11 RX ADMIN — MELATONIN TAB 3 MG 3 MG: 3 TAB at 20:38

## 2019-12-11 RX ADMIN — ENOXAPARIN SODIUM 40 MG: 40 INJECTION SUBCUTANEOUS at 08:09

## 2019-12-11 RX ADMIN — HYDROCODONE BITARTRATE AND ACETAMINOPHEN 1 TABLET: 5; 325 TABLET ORAL at 04:43

## 2019-12-11 RX ADMIN — SIMVASTATIN 10 MG: 10 TABLET, FILM COATED ORAL at 20:38

## 2019-12-11 RX ADMIN — POLYETHYLENE GLYCOL 3350 17 G: 17 POWDER, FOR SOLUTION ORAL at 08:19

## 2019-12-11 RX ADMIN — INSULIN LISPRO 1 UNITS: 100 INJECTION, SOLUTION INTRAVENOUS; SUBCUTANEOUS at 16:47

## 2019-12-11 RX ADMIN — PANTOPRAZOLE SODIUM 40 MG: 40 TABLET, DELAYED RELEASE ORAL at 08:09

## 2019-12-11 RX ADMIN — INSULIN LISPRO 1 UNITS: 100 INJECTION, SOLUTION INTRAVENOUS; SUBCUTANEOUS at 11:12

## 2019-12-11 RX ADMIN — HYDROCODONE BITARTRATE AND ACETAMINOPHEN 1 TABLET: 5; 325 TABLET ORAL at 12:46

## 2019-12-11 RX ADMIN — CEFDINIR 300 MG: 300 CAPSULE ORAL at 20:38

## 2019-12-11 RX ADMIN — GLIPIZIDE 2.5 MG: 5 TABLET ORAL at 08:09

## 2019-12-11 RX ADMIN — CEFDINIR 300 MG: 300 CAPSULE ORAL at 08:09

## 2019-12-11 RX ADMIN — TUBERCULIN PURIFIED PROTEIN DERIVATIVE 5 UNITS: 5 INJECTION, SOLUTION INTRADERMAL at 08:19

## 2019-12-11 RX ADMIN — MELATONIN 5000 UNITS: at 08:09

## 2019-12-11 RX ADMIN — FOLIC ACID 1 MG: 1 TABLET ORAL at 08:09

## 2019-12-11 RX ADMIN — CLOPIDOGREL BISULFATE 75 MG: 75 TABLET, FILM COATED ORAL at 08:09

## 2019-12-11 ASSESSMENT — PAIN SCALES - GENERAL
PAINLEVEL_OUTOF10: 0
PAINLEVEL_OUTOF10: 2
PAINLEVEL_OUTOF10: 5
PAINLEVEL_OUTOF10: 0
PAINLEVEL_OUTOF10: 0
PAINLEVEL_OUTOF10: 4
PAINLEVEL_OUTOF10: 5
PAINLEVEL_OUTOF10: 2
PAINLEVEL_OUTOF10: 0
PAINLEVEL_OUTOF10: 0
PAINLEVEL_OUTOF10: 2
PAINLEVEL_OUTOF10: 2
PAINLEVEL_OUTOF10: 0
PAINLEVEL_OUTOF10: 0

## 2019-12-11 ASSESSMENT — PAIN DESCRIPTION - LOCATION: LOCATION: HIP

## 2019-12-11 ASSESSMENT — PAIN DESCRIPTION - PAIN TYPE: TYPE: SURGICAL PAIN

## 2019-12-12 ENCOUNTER — PATIENT OUTREACH (OUTPATIENT)
Dept: CASE MANAGEMENT | Facility: OTHER | Age: 79
End: 2019-12-12

## 2019-12-12 LAB
GLUCOSE BLD-MCNC: 138 MG/DL (ref 74–106)
GLUCOSE BLD-MCNC: 156 MG/DL (ref 74–106)
GLUCOSE BLD-MCNC: 185 MG/DL (ref 74–106)
PERFORMED ON: ABNORMAL

## 2019-12-12 PROCEDURE — 1200000002 HC SEMI PRIVATE SWING BED

## 2019-12-12 PROCEDURE — 97535 SELF CARE MNGMENT TRAINING: CPT

## 2019-12-12 PROCEDURE — 6370000000 HC RX 637 (ALT 250 FOR IP): Performed by: INTERNAL MEDICINE

## 2019-12-12 PROCEDURE — 6360000002 HC RX W HCPCS: Performed by: INTERNAL MEDICINE

## 2019-12-12 PROCEDURE — 6370000000 HC RX 637 (ALT 250 FOR IP): Performed by: NURSE PRACTITIONER

## 2019-12-12 PROCEDURE — 97110 THERAPEUTIC EXERCISES: CPT | Performed by: PHYSICAL THERAPY ASSISTANT

## 2019-12-12 PROCEDURE — 97116 GAIT TRAINING THERAPY: CPT | Performed by: PHYSICAL THERAPY ASSISTANT

## 2019-12-12 RX ORDER — LISINOPRIL AND HYDROCHLOROTHIAZIDE 20; 12.5 MG/1; MG/1
1 TABLET ORAL DAILY
Status: DISCONTINUED | OUTPATIENT
Start: 2019-12-12 | End: 2019-12-17

## 2019-12-12 RX ADMIN — HYDROCODONE BITARTRATE AND ACETAMINOPHEN 1 TABLET: 5; 325 TABLET ORAL at 04:20

## 2019-12-12 RX ADMIN — ENOXAPARIN SODIUM 40 MG: 40 INJECTION SUBCUTANEOUS at 08:05

## 2019-12-12 RX ADMIN — LINAGLIPTIN 5 MG: 5 TABLET, FILM COATED ORAL at 08:05

## 2019-12-12 RX ADMIN — CEFDINIR 300 MG: 300 CAPSULE ORAL at 08:05

## 2019-12-12 RX ADMIN — HYDROCODONE BITARTRATE AND ACETAMINOPHEN 1 TABLET: 5; 325 TABLET ORAL at 10:41

## 2019-12-12 RX ADMIN — INSULIN LISPRO 1 UNITS: 100 INJECTION, SOLUTION INTRAVENOUS; SUBCUTANEOUS at 11:23

## 2019-12-12 RX ADMIN — INSULIN LISPRO 1 UNITS: 100 INJECTION, SOLUTION INTRAVENOUS; SUBCUTANEOUS at 20:44

## 2019-12-12 RX ADMIN — CLOPIDOGREL BISULFATE 75 MG: 75 TABLET, FILM COATED ORAL at 08:05

## 2019-12-12 RX ADMIN — CYANOCOBALAMIN TAB 500 MCG 1000 MCG: 500 TAB at 08:05

## 2019-12-12 RX ADMIN — POLYETHYLENE GLYCOL 3350 17 G: 17 POWDER, FOR SOLUTION ORAL at 08:04

## 2019-12-12 RX ADMIN — CEFDINIR 300 MG: 300 CAPSULE ORAL at 20:41

## 2019-12-12 RX ADMIN — ACETAMINOPHEN 650 MG: 325 TABLET, FILM COATED ORAL at 20:39

## 2019-12-12 RX ADMIN — HYDROCODONE BITARTRATE AND ACETAMINOPHEN 1 TABLET: 5; 325 TABLET ORAL at 18:30

## 2019-12-12 RX ADMIN — SIMVASTATIN 10 MG: 10 TABLET, FILM COATED ORAL at 20:41

## 2019-12-12 RX ADMIN — LISINOPRIL AND HYDROCHLOROTHIAZIDE 1 TABLET: 12.5; 2 TABLET ORAL at 10:41

## 2019-12-12 RX ADMIN — PANTOPRAZOLE SODIUM 40 MG: 40 TABLET, DELAYED RELEASE ORAL at 08:05

## 2019-12-12 RX ADMIN — FOLIC ACID 1 MG: 1 TABLET ORAL at 08:05

## 2019-12-12 RX ADMIN — MELATONIN TAB 3 MG 3 MG: 3 TAB at 20:41

## 2019-12-12 ASSESSMENT — PAIN SCALES - GENERAL
PAINLEVEL_OUTOF10: 0
PAINLEVEL_OUTOF10: 6
PAINLEVEL_OUTOF10: 3
PAINLEVEL_OUTOF10: 9
PAINLEVEL_OUTOF10: 0
PAINLEVEL_OUTOF10: 8

## 2019-12-12 ASSESSMENT — PAIN DESCRIPTION - LOCATION: LOCATION: HIP

## 2019-12-12 ASSESSMENT — PAIN DESCRIPTION - ORIENTATION: ORIENTATION: RIGHT

## 2019-12-12 NOTE — OUTREACH NOTE
SNF Follow-up Note      Responses   Acute Facility Discharged From  [facility]   Acute Discharge Date  12/10/19   Name of the Skilled Nursing Facility?  [facility]   Tier Level of the Skilled Nursing Facility  4   Purpose of SNF Admission  PT, OT, SP   Estimated length of stay for the patient?  UNDETERMINED   Who is the insurance provider or payor of patient stay?  Medicare   Progression of Patient?  Spoke with Piper at [facility].  Patient arrived at this facility 12-10-19 for short term rehab.  No DC date set at this time.            Carissa Scott RN  Ambulatory     12/12/2019, 11:52 AM

## 2019-12-12 NOTE — PROGRESS NOTES
[facility]    Name:  Radha Whelan   Age:  79 y.o.  Sex:  female  :  1940  MRN:  [id number]   Visit Number:  [id number]  Admission Date:  2019  Date Of Service:  19  Primary Care Physician:  Farhan Schaefer MD    I did review telemetry for 19.  Patient was noted to have a syncopial episode yesterday afternoon.  She was noted to have some orthostasis post operatively.  She did have a 3.4 sec, 3.5 sec and 8.6 second pauses consecutively on 19 at 3:15pm.  Patient will be transferred to [facility] for pacemaker placement.  Please see discharge summary dated 19 for complete details.    Vital signs:    Vital Signs (last 24 hours)     None              CURTIS Wall  19  9:52 AM

## 2019-12-13 LAB
ANION GAP SERPL CALCULATED.3IONS-SCNC: 10 MMOL/L (ref 3–16)
BUN BLDV-MCNC: 13 MG/DL (ref 6–20)
CALCIUM SERPL-MCNC: 8.6 MG/DL (ref 8.5–10.5)
CHLORIDE BLD-SCNC: 97 MMOL/L (ref 98–107)
CO2: 28 MMOL/L (ref 20–30)
CREAT SERPL-MCNC: 0.9 MG/DL (ref 0.4–1.2)
FERRITIN: 125.9 NG/ML (ref 22–322)
FOLATE: >20 NG/ML
GFR AFRICAN AMERICAN: >59
GFR NON-AFRICAN AMERICAN: >60
GLUCOSE BLD-MCNC: 117 MG/DL (ref 74–106)
GLUCOSE BLD-MCNC: 126 MG/DL (ref 74–106)
GLUCOSE BLD-MCNC: 130 MG/DL (ref 74–106)
GLUCOSE BLD-MCNC: 140 MG/DL (ref 74–106)
GLUCOSE BLD-MCNC: 184 MG/DL (ref 74–106)
HCT VFR BLD CALC: 27.2 % (ref 37–47)
HEMOGLOBIN: 8.6 G/DL (ref 11.5–16.5)
IRON SATURATION: 24 % (ref 15–50)
IRON: 55 UG/DL (ref 37–145)
MCH RBC QN AUTO: 31 PG (ref 27–32)
MCHC RBC AUTO-ENTMCNC: 31.6 G/DL (ref 31–35)
MCV RBC AUTO: 98.2 FL (ref 80–100)
PDW BLD-RTO: 14.4 % (ref 11–16)
PERFORMED ON: ABNORMAL
PLATELET # BLD: 221 K/UL (ref 150–400)
PMV BLD AUTO: 9 FL (ref 6–10)
POTASSIUM SERPL-SCNC: 3.4 MMOL/L (ref 3.4–5.1)
RBC # BLD: 2.77 M/UL (ref 3.8–5.8)
SODIUM BLD-SCNC: 135 MMOL/L (ref 136–145)
TOTAL IRON BINDING CAPACITY: 233 UG/DL (ref 250–450)
VITAMIN B-12: 577 PG/ML (ref 211–911)
WBC # BLD: 5.8 K/UL (ref 4–11)

## 2019-12-13 PROCEDURE — 6370000000 HC RX 637 (ALT 250 FOR IP): Performed by: INTERNAL MEDICINE

## 2019-12-13 PROCEDURE — 6360000002 HC RX W HCPCS: Performed by: INTERNAL MEDICINE

## 2019-12-13 PROCEDURE — 82728 ASSAY OF FERRITIN: CPT

## 2019-12-13 PROCEDURE — 97116 GAIT TRAINING THERAPY: CPT

## 2019-12-13 PROCEDURE — 97535 SELF CARE MNGMENT TRAINING: CPT

## 2019-12-13 PROCEDURE — 82746 ASSAY OF FOLIC ACID SERUM: CPT

## 2019-12-13 PROCEDURE — 97110 THERAPEUTIC EXERCISES: CPT

## 2019-12-13 PROCEDURE — 82607 VITAMIN B-12: CPT

## 2019-12-13 PROCEDURE — 83540 ASSAY OF IRON: CPT

## 2019-12-13 PROCEDURE — 80048 BASIC METABOLIC PNL TOTAL CA: CPT

## 2019-12-13 PROCEDURE — 83550 IRON BINDING TEST: CPT

## 2019-12-13 PROCEDURE — 6370000000 HC RX 637 (ALT 250 FOR IP): Performed by: NURSE PRACTITIONER

## 2019-12-13 PROCEDURE — 36415 COLL VENOUS BLD VENIPUNCTURE: CPT

## 2019-12-13 PROCEDURE — 85027 COMPLETE CBC AUTOMATED: CPT

## 2019-12-13 PROCEDURE — 1200000002 HC SEMI PRIVATE SWING BED

## 2019-12-13 RX ORDER — LEVOFLOXACIN 750 MG/1
750 TABLET ORAL DAILY
Status: DISCONTINUED | OUTPATIENT
Start: 2019-12-13 | End: 2019-12-15

## 2019-12-13 RX ADMIN — SIMVASTATIN 10 MG: 10 TABLET, FILM COATED ORAL at 21:39

## 2019-12-13 RX ADMIN — ACETAMINOPHEN 650 MG: 325 TABLET, FILM COATED ORAL at 04:13

## 2019-12-13 RX ADMIN — ENOXAPARIN SODIUM 40 MG: 40 INJECTION SUBCUTANEOUS at 08:31

## 2019-12-13 RX ADMIN — HYDROCODONE BITARTRATE AND ACETAMINOPHEN 1 TABLET: 5; 325 TABLET ORAL at 21:39

## 2019-12-13 RX ADMIN — ACETAMINOPHEN 650 MG: 325 TABLET, FILM COATED ORAL at 14:40

## 2019-12-13 RX ADMIN — PANTOPRAZOLE SODIUM 40 MG: 40 TABLET, DELAYED RELEASE ORAL at 08:31

## 2019-12-13 RX ADMIN — LINAGLIPTIN 5 MG: 5 TABLET, FILM COATED ORAL at 08:32

## 2019-12-13 RX ADMIN — HYDROCODONE BITARTRATE AND ACETAMINOPHEN 1 TABLET: 5; 325 TABLET ORAL at 09:17

## 2019-12-13 RX ADMIN — LEVOFLOXACIN 750 MG: 750 TABLET, FILM COATED ORAL at 14:37

## 2019-12-13 RX ADMIN — CLOPIDOGREL BISULFATE 75 MG: 75 TABLET, FILM COATED ORAL at 08:32

## 2019-12-13 RX ADMIN — INSULIN LISPRO 1 UNITS: 100 INJECTION, SOLUTION INTRAVENOUS; SUBCUTANEOUS at 11:49

## 2019-12-13 RX ADMIN — MELATONIN TAB 3 MG 3 MG: 3 TAB at 21:39

## 2019-12-13 RX ADMIN — LISINOPRIL AND HYDROCHLOROTHIAZIDE 1 TABLET: 12.5; 2 TABLET ORAL at 08:31

## 2019-12-13 RX ADMIN — FOLIC ACID 1 MG: 1 TABLET ORAL at 08:31

## 2019-12-13 RX ADMIN — INSULIN LISPRO 1 UNITS: 100 INJECTION, SOLUTION INTRAVENOUS; SUBCUTANEOUS at 16:50

## 2019-12-13 RX ADMIN — CEFDINIR 300 MG: 300 CAPSULE ORAL at 08:32

## 2019-12-13 RX ADMIN — POLYETHYLENE GLYCOL 3350 17 G: 17 POWDER, FOR SOLUTION ORAL at 08:31

## 2019-12-13 ASSESSMENT — PAIN SCALES - GENERAL
PAINLEVEL_OUTOF10: 7
PAINLEVEL_OUTOF10: 7
PAINLEVEL_OUTOF10: 4
PAINLEVEL_OUTOF10: 7

## 2019-12-13 ASSESSMENT — PAIN DESCRIPTION - LOCATION: LOCATION: HIP

## 2019-12-13 ASSESSMENT — PAIN DESCRIPTION - ORIENTATION: ORIENTATION: RIGHT

## 2019-12-14 LAB
GLUCOSE BLD-MCNC: 122 MG/DL (ref 74–106)
GLUCOSE BLD-MCNC: 139 MG/DL (ref 74–106)
GLUCOSE BLD-MCNC: 149 MG/DL (ref 74–106)
GLUCOSE BLD-MCNC: 156 MG/DL (ref 74–106)
PERFORMED ON: ABNORMAL

## 2019-12-14 PROCEDURE — 6370000000 HC RX 637 (ALT 250 FOR IP): Performed by: INTERNAL MEDICINE

## 2019-12-14 PROCEDURE — 6370000000 HC RX 637 (ALT 250 FOR IP): Performed by: NURSE PRACTITIONER

## 2019-12-14 PROCEDURE — 6360000002 HC RX W HCPCS: Performed by: INTERNAL MEDICINE

## 2019-12-14 PROCEDURE — 1200000002 HC SEMI PRIVATE SWING BED

## 2019-12-14 RX ORDER — FUROSEMIDE 20 MG/1
20 TABLET ORAL 2 TIMES DAILY
Status: ON HOLD | COMMUNITY
End: 2019-12-19 | Stop reason: SDUPTHER

## 2019-12-14 RX ORDER — FUROSEMIDE 20 MG/1
20 TABLET ORAL DAILY
Status: DISCONTINUED | OUTPATIENT
Start: 2019-12-14 | End: 2019-12-15

## 2019-12-14 RX ADMIN — CLOPIDOGREL BISULFATE 75 MG: 75 TABLET, FILM COATED ORAL at 08:19

## 2019-12-14 RX ADMIN — LISINOPRIL AND HYDROCHLOROTHIAZIDE 1 TABLET: 12.5; 2 TABLET ORAL at 08:19

## 2019-12-14 RX ADMIN — PANTOPRAZOLE SODIUM 40 MG: 40 TABLET, DELAYED RELEASE ORAL at 08:19

## 2019-12-14 RX ADMIN — INSULIN LISPRO 1 UNITS: 100 INJECTION, SOLUTION INTRAVENOUS; SUBCUTANEOUS at 17:02

## 2019-12-14 RX ADMIN — FUROSEMIDE 20 MG: 20 TABLET ORAL at 12:41

## 2019-12-14 RX ADMIN — LINAGLIPTIN 5 MG: 5 TABLET, FILM COATED ORAL at 08:19

## 2019-12-14 RX ADMIN — FOLIC ACID 1 MG: 1 TABLET ORAL at 08:19

## 2019-12-14 RX ADMIN — ACETAMINOPHEN 650 MG: 325 TABLET, FILM COATED ORAL at 20:07

## 2019-12-14 RX ADMIN — ACETAMINOPHEN 650 MG: 325 TABLET, FILM COATED ORAL at 02:21

## 2019-12-14 RX ADMIN — HYDROCODONE BITARTRATE AND ACETAMINOPHEN 1 TABLET: 5; 325 TABLET ORAL at 12:29

## 2019-12-14 RX ADMIN — ENOXAPARIN SODIUM 40 MG: 40 INJECTION SUBCUTANEOUS at 08:18

## 2019-12-14 RX ADMIN — MELATONIN TAB 3 MG 3 MG: 3 TAB at 20:07

## 2019-12-14 RX ADMIN — LEVOFLOXACIN 750 MG: 750 TABLET, FILM COATED ORAL at 08:19

## 2019-12-14 RX ADMIN — SIMVASTATIN 10 MG: 10 TABLET, FILM COATED ORAL at 20:07

## 2019-12-14 RX ADMIN — POLYETHYLENE GLYCOL 3350 17 G: 17 POWDER, FOR SOLUTION ORAL at 08:19

## 2019-12-14 ASSESSMENT — PAIN SCALES - GENERAL
PAINLEVEL_OUTOF10: 5
PAINLEVEL_OUTOF10: 8
PAINLEVEL_OUTOF10: 8

## 2019-12-15 PROBLEM — I50.33 ACUTE ON CHRONIC DIASTOLIC CONGESTIVE HEART FAILURE (HCC): Status: ACTIVE | Noted: 2019-12-11

## 2019-12-15 LAB
GLUCOSE BLD-MCNC: 126 MG/DL (ref 74–106)
GLUCOSE BLD-MCNC: 137 MG/DL (ref 74–106)
GLUCOSE BLD-MCNC: 158 MG/DL (ref 74–106)
GLUCOSE BLD-MCNC: 176 MG/DL (ref 74–106)
ORGANISM: ABNORMAL
PERFORMED ON: ABNORMAL
URINE CULTURE, ROUTINE: ABNORMAL

## 2019-12-15 PROCEDURE — 6360000002 HC RX W HCPCS: Performed by: INTERNAL MEDICINE

## 2019-12-15 PROCEDURE — 6370000000 HC RX 637 (ALT 250 FOR IP): Performed by: INTERNAL MEDICINE

## 2019-12-15 PROCEDURE — 6370000000 HC RX 637 (ALT 250 FOR IP): Performed by: NURSE PRACTITIONER

## 2019-12-15 PROCEDURE — 1200000002 HC SEMI PRIVATE SWING BED

## 2019-12-15 RX ORDER — CIPROFLOXACIN 500 MG/1
500 TABLET, FILM COATED ORAL EVERY 12 HOURS SCHEDULED
Status: DISCONTINUED | OUTPATIENT
Start: 2019-12-15 | End: 2019-12-19 | Stop reason: HOSPADM

## 2019-12-15 RX ORDER — FUROSEMIDE 20 MG/1
20 TABLET ORAL 2 TIMES DAILY
Status: DISCONTINUED | OUTPATIENT
Start: 2019-12-15 | End: 2019-12-17

## 2019-12-15 RX ORDER — CIPROFLOXACIN 500 MG/1
TABLET, FILM COATED ORAL
Status: DISPENSED
Start: 2019-12-15 | End: 2019-12-16

## 2019-12-15 RX ORDER — FUROSEMIDE 20 MG/1
20 TABLET ORAL 2 TIMES DAILY
Status: DISCONTINUED | OUTPATIENT
Start: 2019-12-16 | End: 2019-12-15 | Stop reason: SDUPTHER

## 2019-12-15 RX ORDER — FUROSEMIDE 20 MG/1
20 TABLET ORAL ONCE
Status: COMPLETED | OUTPATIENT
Start: 2019-12-15 | End: 2019-12-15

## 2019-12-15 RX ADMIN — INSULIN LISPRO 1 UNITS: 100 INJECTION, SOLUTION INTRAVENOUS; SUBCUTANEOUS at 20:20

## 2019-12-15 RX ADMIN — HYDROCODONE BITARTRATE AND ACETAMINOPHEN 1 TABLET: 5; 325 TABLET ORAL at 20:15

## 2019-12-15 RX ADMIN — LEVOFLOXACIN 750 MG: 750 TABLET, FILM COATED ORAL at 07:58

## 2019-12-15 RX ADMIN — LISINOPRIL AND HYDROCHLOROTHIAZIDE 1 TABLET: 12.5; 2 TABLET ORAL at 07:58

## 2019-12-15 RX ADMIN — HYDROCODONE BITARTRATE AND ACETAMINOPHEN 1 TABLET: 5; 325 TABLET ORAL at 07:59

## 2019-12-15 RX ADMIN — ENOXAPARIN SODIUM 40 MG: 40 INJECTION SUBCUTANEOUS at 07:57

## 2019-12-15 RX ADMIN — MELATONIN TAB 3 MG 3 MG: 3 TAB at 20:15

## 2019-12-15 RX ADMIN — LINACLOTIDE 145 MCG: 145 CAPSULE, GELATIN COATED ORAL at 06:19

## 2019-12-15 RX ADMIN — FUROSEMIDE 20 MG: 20 TABLET ORAL at 15:41

## 2019-12-15 RX ADMIN — PANTOPRAZOLE SODIUM 40 MG: 40 TABLET, DELAYED RELEASE ORAL at 07:58

## 2019-12-15 RX ADMIN — CIPROFLOXACIN HYDROCHLORIDE 500 MG: 500 TABLET, FILM COATED ORAL at 13:42

## 2019-12-15 RX ADMIN — FOLIC ACID 1 MG: 1 TABLET ORAL at 07:58

## 2019-12-15 RX ADMIN — FUROSEMIDE 20 MG: 20 TABLET ORAL at 07:58

## 2019-12-15 RX ADMIN — CLOPIDOGREL BISULFATE 75 MG: 75 TABLET, FILM COATED ORAL at 07:58

## 2019-12-15 RX ADMIN — INSULIN LISPRO 1 UNITS: 100 INJECTION, SOLUTION INTRAVENOUS; SUBCUTANEOUS at 11:05

## 2019-12-15 RX ADMIN — LINAGLIPTIN 5 MG: 5 TABLET, FILM COATED ORAL at 07:58

## 2019-12-15 ASSESSMENT — PAIN SCALES - GENERAL
PAINLEVEL_OUTOF10: 4
PAINLEVEL_OUTOF10: 4
PAINLEVEL_OUTOF10: 2

## 2019-12-16 LAB
ANION GAP SERPL CALCULATED.3IONS-SCNC: 10 MMOL/L (ref 3–16)
BUN BLDV-MCNC: 16 MG/DL (ref 6–20)
CALCIUM SERPL-MCNC: 8.6 MG/DL (ref 8.5–10.5)
CHLORIDE BLD-SCNC: 94 MMOL/L (ref 98–107)
CO2: 30 MMOL/L (ref 20–30)
CREAT SERPL-MCNC: 1 MG/DL (ref 0.4–1.2)
GFR AFRICAN AMERICAN: >59
GFR NON-AFRICAN AMERICAN: 53
GLUCOSE BLD-MCNC: 112 MG/DL (ref 74–106)
GLUCOSE BLD-MCNC: 130 MG/DL (ref 74–106)
GLUCOSE BLD-MCNC: 148 MG/DL (ref 74–106)
GLUCOSE BLD-MCNC: 151 MG/DL (ref 74–106)
GLUCOSE BLD-MCNC: 161 MG/DL (ref 74–106)
GLUCOSE BLD-MCNC: 231 MG/DL (ref 74–106)
HCT VFR BLD CALC: 27.3 % (ref 37–47)
HEMOGLOBIN: 8.6 G/DL (ref 11.5–16.5)
MAGNESIUM: 2 MG/DL (ref 1.7–2.4)
MCH RBC QN AUTO: 30.7 PG (ref 27–32)
MCHC RBC AUTO-ENTMCNC: 31.5 G/DL (ref 31–35)
MCV RBC AUTO: 97.5 FL (ref 80–100)
PDW BLD-RTO: 15.4 % (ref 11–16)
PERFORMED ON: ABNORMAL
PLATELET # BLD: 265 K/UL (ref 150–400)
PMV BLD AUTO: 8.6 FL (ref 6–10)
POTASSIUM SERPL-SCNC: 3.8 MMOL/L (ref 3.4–5.1)
PRO-BNP: 115 PG/ML (ref 0–1800)
RBC # BLD: 2.8 M/UL (ref 3.8–5.8)
SODIUM BLD-SCNC: 134 MMOL/L (ref 136–145)
WBC # BLD: 6.4 K/UL (ref 4–11)

## 2019-12-16 PROCEDURE — 6360000002 HC RX W HCPCS: Performed by: INTERNAL MEDICINE

## 2019-12-16 PROCEDURE — 97535 SELF CARE MNGMENT TRAINING: CPT

## 2019-12-16 PROCEDURE — 97116 GAIT TRAINING THERAPY: CPT | Performed by: PHYSICAL THERAPY ASSISTANT

## 2019-12-16 PROCEDURE — 85027 COMPLETE CBC AUTOMATED: CPT

## 2019-12-16 PROCEDURE — 83880 ASSAY OF NATRIURETIC PEPTIDE: CPT

## 2019-12-16 PROCEDURE — 6370000000 HC RX 637 (ALT 250 FOR IP): Performed by: NURSE PRACTITIONER

## 2019-12-16 PROCEDURE — 1200000002 HC SEMI PRIVATE SWING BED

## 2019-12-16 PROCEDURE — 97110 THERAPEUTIC EXERCISES: CPT | Performed by: PHYSICAL THERAPY ASSISTANT

## 2019-12-16 PROCEDURE — 83735 ASSAY OF MAGNESIUM: CPT

## 2019-12-16 PROCEDURE — 80048 BASIC METABOLIC PNL TOTAL CA: CPT

## 2019-12-16 PROCEDURE — 6370000000 HC RX 637 (ALT 250 FOR IP): Performed by: INTERNAL MEDICINE

## 2019-12-16 PROCEDURE — 36415 COLL VENOUS BLD VENIPUNCTURE: CPT

## 2019-12-16 RX ADMIN — CIPROFLOXACIN HYDROCHLORIDE 500 MG: 500 TABLET, FILM COATED ORAL at 20:17

## 2019-12-16 RX ADMIN — FUROSEMIDE 20 MG: 20 TABLET ORAL at 17:43

## 2019-12-16 RX ADMIN — INSULIN LISPRO 1 UNITS: 100 INJECTION, SOLUTION INTRAVENOUS; SUBCUTANEOUS at 11:32

## 2019-12-16 RX ADMIN — ENOXAPARIN SODIUM 40 MG: 40 INJECTION SUBCUTANEOUS at 08:36

## 2019-12-16 RX ADMIN — MELATONIN TAB 3 MG 3 MG: 3 TAB at 20:17

## 2019-12-16 RX ADMIN — CIPROFLOXACIN HYDROCHLORIDE 500 MG: 500 TABLET, FILM COATED ORAL at 08:37

## 2019-12-16 RX ADMIN — MELATONIN 5000 UNITS: at 08:44

## 2019-12-16 RX ADMIN — LINAGLIPTIN 5 MG: 5 TABLET, FILM COATED ORAL at 08:36

## 2019-12-16 RX ADMIN — CYANOCOBALAMIN TAB 500 MCG 1000 MCG: 500 TAB at 08:36

## 2019-12-16 RX ADMIN — FOLIC ACID 1 MG: 1 TABLET ORAL at 08:36

## 2019-12-16 RX ADMIN — LISINOPRIL AND HYDROCHLOROTHIAZIDE 1 TABLET: 12.5; 2 TABLET ORAL at 08:36

## 2019-12-16 RX ADMIN — FUROSEMIDE 20 MG: 20 TABLET ORAL at 08:36

## 2019-12-16 RX ADMIN — ACETAMINOPHEN 650 MG: 325 TABLET, FILM COATED ORAL at 11:29

## 2019-12-16 RX ADMIN — HYDROCODONE BITARTRATE AND ACETAMINOPHEN 1 TABLET: 5; 325 TABLET ORAL at 08:44

## 2019-12-16 RX ADMIN — INSULIN LISPRO 1 UNITS: 100 INJECTION, SOLUTION INTRAVENOUS; SUBCUTANEOUS at 20:17

## 2019-12-16 RX ADMIN — PANTOPRAZOLE SODIUM 40 MG: 40 TABLET, DELAYED RELEASE ORAL at 08:37

## 2019-12-16 RX ADMIN — HYDROCODONE BITARTRATE AND ACETAMINOPHEN 1 TABLET: 5; 325 TABLET ORAL at 20:16

## 2019-12-16 RX ADMIN — CLOPIDOGREL BISULFATE 75 MG: 75 TABLET, FILM COATED ORAL at 08:36

## 2019-12-16 RX ADMIN — INSULIN LISPRO 1 UNITS: 100 INJECTION, SOLUTION INTRAVENOUS; SUBCUTANEOUS at 17:43

## 2019-12-16 RX ADMIN — LINACLOTIDE 145 MCG: 145 CAPSULE, GELATIN COATED ORAL at 06:05

## 2019-12-16 ASSESSMENT — PAIN SCALES - GENERAL
PAINLEVEL_OUTOF10: 5
PAINLEVEL_OUTOF10: 5
PAINLEVEL_OUTOF10: 3
PAINLEVEL_OUTOF10: 4

## 2019-12-17 LAB
GLUCOSE BLD-MCNC: 123 MG/DL (ref 74–106)
GLUCOSE BLD-MCNC: 134 MG/DL (ref 74–106)
GLUCOSE BLD-MCNC: 201 MG/DL (ref 74–106)
GLUCOSE BLD-MCNC: 205 MG/DL (ref 74–106)
PERFORMED ON: ABNORMAL

## 2019-12-17 PROCEDURE — 2700000000 HC OXYGEN THERAPY PER DAY

## 2019-12-17 PROCEDURE — 97535 SELF CARE MNGMENT TRAINING: CPT

## 2019-12-17 PROCEDURE — 97803 MED NUTRITION INDIV SUBSEQ: CPT

## 2019-12-17 PROCEDURE — 97110 THERAPEUTIC EXERCISES: CPT | Performed by: PHYSICAL THERAPY ASSISTANT

## 2019-12-17 PROCEDURE — 6370000000 HC RX 637 (ALT 250 FOR IP): Performed by: NURSE PRACTITIONER

## 2019-12-17 PROCEDURE — 97110 THERAPEUTIC EXERCISES: CPT

## 2019-12-17 PROCEDURE — 6370000000 HC RX 637 (ALT 250 FOR IP): Performed by: INTERNAL MEDICINE

## 2019-12-17 PROCEDURE — 1200000002 HC SEMI PRIVATE SWING BED

## 2019-12-17 PROCEDURE — 6360000002 HC RX W HCPCS: Performed by: INTERNAL MEDICINE

## 2019-12-17 PROCEDURE — 97116 GAIT TRAINING THERAPY: CPT | Performed by: PHYSICAL THERAPY ASSISTANT

## 2019-12-17 RX ORDER — HYDROCHLOROTHIAZIDE 25 MG/1
12.5 TABLET ORAL DAILY
Status: DISCONTINUED | OUTPATIENT
Start: 2019-12-18 | End: 2019-12-19 | Stop reason: HOSPADM

## 2019-12-17 RX ORDER — LISINOPRIL 10 MG/1
10 TABLET ORAL DAILY
Status: DISCONTINUED | OUTPATIENT
Start: 2019-12-18 | End: 2019-12-19 | Stop reason: HOSPADM

## 2019-12-17 RX ORDER — FUROSEMIDE 20 MG/1
20 TABLET ORAL DAILY
Status: DISCONTINUED | OUTPATIENT
Start: 2019-12-18 | End: 2019-12-19 | Stop reason: HOSPADM

## 2019-12-17 RX ADMIN — CIPROFLOXACIN HYDROCHLORIDE 500 MG: 500 TABLET, FILM COATED ORAL at 19:41

## 2019-12-17 RX ADMIN — PANTOPRAZOLE SODIUM 40 MG: 40 TABLET, DELAYED RELEASE ORAL at 08:07

## 2019-12-17 RX ADMIN — CIPROFLOXACIN HYDROCHLORIDE 500 MG: 500 TABLET, FILM COATED ORAL at 08:07

## 2019-12-17 RX ADMIN — LINAGLIPTIN 5 MG: 5 TABLET, FILM COATED ORAL at 08:07

## 2019-12-17 RX ADMIN — FOLIC ACID 1 MG: 1 TABLET ORAL at 08:07

## 2019-12-17 RX ADMIN — FUROSEMIDE 20 MG: 20 TABLET ORAL at 08:07

## 2019-12-17 RX ADMIN — HYDROCODONE BITARTRATE AND ACETAMINOPHEN 1 TABLET: 5; 325 TABLET ORAL at 22:54

## 2019-12-17 RX ADMIN — INSULIN LISPRO 1 UNITS: 100 INJECTION, SOLUTION INTRAVENOUS; SUBCUTANEOUS at 19:42

## 2019-12-17 RX ADMIN — LINACLOTIDE 145 MCG: 145 CAPSULE, GELATIN COATED ORAL at 06:33

## 2019-12-17 RX ADMIN — CLOPIDOGREL BISULFATE 75 MG: 75 TABLET, FILM COATED ORAL at 08:07

## 2019-12-17 RX ADMIN — INSULIN LISPRO 2 UNITS: 100 INJECTION, SOLUTION INTRAVENOUS; SUBCUTANEOUS at 16:49

## 2019-12-17 RX ADMIN — LISINOPRIL AND HYDROCHLOROTHIAZIDE 1 TABLET: 12.5; 2 TABLET ORAL at 08:07

## 2019-12-17 RX ADMIN — ENOXAPARIN SODIUM 40 MG: 40 INJECTION SUBCUTANEOUS at 08:06

## 2019-12-17 RX ADMIN — MELATONIN TAB 3 MG 3 MG: 3 TAB at 22:54

## 2019-12-17 ASSESSMENT — PAIN SCALES - GENERAL
PAINLEVEL_OUTOF10: 0
PAINLEVEL_OUTOF10: 4
PAINLEVEL_OUTOF10: 0

## 2019-12-18 LAB
ANION GAP SERPL CALCULATED.3IONS-SCNC: 11 MMOL/L (ref 3–16)
BUN BLDV-MCNC: 17 MG/DL (ref 6–20)
CALCIUM SERPL-MCNC: 8.5 MG/DL (ref 8.5–10.5)
CHLORIDE BLD-SCNC: 95 MMOL/L (ref 98–107)
CO2: 30 MMOL/L (ref 20–30)
CREAT SERPL-MCNC: 1 MG/DL (ref 0.4–1.2)
GFR AFRICAN AMERICAN: >59
GFR NON-AFRICAN AMERICAN: 53
GLUCOSE BLD-MCNC: 113 MG/DL (ref 74–106)
GLUCOSE BLD-MCNC: 119 MG/DL (ref 74–106)
GLUCOSE BLD-MCNC: 145 MG/DL (ref 74–106)
GLUCOSE BLD-MCNC: 145 MG/DL (ref 74–106)
GLUCOSE BLD-MCNC: 156 MG/DL (ref 74–106)
HCT VFR BLD CALC: 27.5 % (ref 37–47)
HEMOGLOBIN: 8.7 G/DL (ref 11.5–16.5)
MCH RBC QN AUTO: 31.3 PG (ref 27–32)
MCHC RBC AUTO-ENTMCNC: 31.6 G/DL (ref 31–35)
MCV RBC AUTO: 98.9 FL (ref 80–100)
PDW BLD-RTO: 15.7 % (ref 11–16)
PERFORMED ON: ABNORMAL
PLATELET # BLD: 289 K/UL (ref 150–400)
PMV BLD AUTO: 8.3 FL (ref 6–10)
POTASSIUM SERPL-SCNC: 3.1 MMOL/L (ref 3.4–5.1)
RBC # BLD: 2.78 M/UL (ref 3.8–5.8)
SODIUM BLD-SCNC: 136 MMOL/L (ref 136–145)
WBC # BLD: 5.8 K/UL (ref 4–11)

## 2019-12-18 PROCEDURE — 6370000000 HC RX 637 (ALT 250 FOR IP): Performed by: INTERNAL MEDICINE

## 2019-12-18 PROCEDURE — 6360000002 HC RX W HCPCS: Performed by: INTERNAL MEDICINE

## 2019-12-18 PROCEDURE — 80048 BASIC METABOLIC PNL TOTAL CA: CPT

## 2019-12-18 PROCEDURE — 97530 THERAPEUTIC ACTIVITIES: CPT | Performed by: PHYSICAL THERAPY ASSISTANT

## 2019-12-18 PROCEDURE — 6370000000 HC RX 637 (ALT 250 FOR IP): Performed by: PHYSICIAN ASSISTANT

## 2019-12-18 PROCEDURE — 36415 COLL VENOUS BLD VENIPUNCTURE: CPT

## 2019-12-18 PROCEDURE — 85027 COMPLETE CBC AUTOMATED: CPT

## 2019-12-18 PROCEDURE — 97535 SELF CARE MNGMENT TRAINING: CPT

## 2019-12-18 PROCEDURE — 1200000002 HC SEMI PRIVATE SWING BED

## 2019-12-18 PROCEDURE — 97116 GAIT TRAINING THERAPY: CPT | Performed by: PHYSICAL THERAPY ASSISTANT

## 2019-12-18 PROCEDURE — 6370000000 HC RX 637 (ALT 250 FOR IP): Performed by: NURSE PRACTITIONER

## 2019-12-18 PROCEDURE — 97530 THERAPEUTIC ACTIVITIES: CPT

## 2019-12-18 RX ORDER — POTASSIUM CHLORIDE 750 MG/1
20 CAPSULE, EXTENDED RELEASE ORAL EVERY 4 HOURS
Status: COMPLETED | OUTPATIENT
Start: 2019-12-18 | End: 2019-12-18

## 2019-12-18 RX ADMIN — LINACLOTIDE 145 MCG: 145 CAPSULE, GELATIN COATED ORAL at 06:21

## 2019-12-18 RX ADMIN — ENOXAPARIN SODIUM 40 MG: 40 INJECTION SUBCUTANEOUS at 08:19

## 2019-12-18 RX ADMIN — FUROSEMIDE 20 MG: 20 TABLET ORAL at 11:34

## 2019-12-18 RX ADMIN — LINAGLIPTIN 5 MG: 5 TABLET, FILM COATED ORAL at 08:21

## 2019-12-18 RX ADMIN — CLOPIDOGREL BISULFATE 75 MG: 75 TABLET, FILM COATED ORAL at 08:20

## 2019-12-18 RX ADMIN — CIPROFLOXACIN HYDROCHLORIDE 500 MG: 500 TABLET, FILM COATED ORAL at 20:33

## 2019-12-18 RX ADMIN — ACETAMINOPHEN 650 MG: 325 TABLET, FILM COATED ORAL at 06:23

## 2019-12-18 RX ADMIN — POTASSIUM CHLORIDE 20 MEQ: 750 CAPSULE, EXTENDED RELEASE ORAL at 08:20

## 2019-12-18 RX ADMIN — POTASSIUM CHLORIDE 20 MEQ: 750 CAPSULE, EXTENDED RELEASE ORAL at 11:34

## 2019-12-18 RX ADMIN — INSULIN LISPRO 1 UNITS: 100 INJECTION, SOLUTION INTRAVENOUS; SUBCUTANEOUS at 20:35

## 2019-12-18 RX ADMIN — CIPROFLOXACIN HYDROCHLORIDE 500 MG: 500 TABLET, FILM COATED ORAL at 08:20

## 2019-12-18 RX ADMIN — PANTOPRAZOLE SODIUM 40 MG: 40 TABLET, DELAYED RELEASE ORAL at 08:19

## 2019-12-18 RX ADMIN — HYDROCHLOROTHIAZIDE 12.5 MG: 25 TABLET ORAL at 08:20

## 2019-12-18 RX ADMIN — INSULIN LISPRO 1 UNITS: 100 INJECTION, SOLUTION INTRAVENOUS; SUBCUTANEOUS at 17:00

## 2019-12-18 RX ADMIN — INSULIN LISPRO 1 UNITS: 100 INJECTION, SOLUTION INTRAVENOUS; SUBCUTANEOUS at 11:26

## 2019-12-18 RX ADMIN — FOLIC ACID 1 MG: 1 TABLET ORAL at 08:21

## 2019-12-18 ASSESSMENT — PAIN SCALES - GENERAL
PAINLEVEL_OUTOF10: 0
PAINLEVEL_OUTOF10: 4
PAINLEVEL_OUTOF10: 0

## 2019-12-19 ENCOUNTER — APPOINTMENT (OUTPATIENT)
Dept: ULTRASOUND IMAGING | Facility: HOSPITAL | Age: 79
DRG: 560 | End: 2019-12-19
Attending: INTERNAL MEDICINE
Payer: MEDICARE

## 2019-12-19 ENCOUNTER — PATIENT OUTREACH (OUTPATIENT)
Dept: CASE MANAGEMENT | Facility: OTHER | Age: 79
End: 2019-12-19

## 2019-12-19 VITALS
SYSTOLIC BLOOD PRESSURE: 130 MMHG | OXYGEN SATURATION: 97 % | BODY MASS INDEX: 31.03 KG/M2 | HEIGHT: 67 IN | WEIGHT: 197.7 LBS | RESPIRATION RATE: 18 BRPM | DIASTOLIC BLOOD PRESSURE: 60 MMHG | TEMPERATURE: 97.8 F | HEART RATE: 61 BPM

## 2019-12-19 PROBLEM — R55 PRE-SYNCOPE: Status: ACTIVE | Noted: 2019-12-19

## 2019-12-19 PROBLEM — E87.6 HYPOKALEMIA: Status: ACTIVE | Noted: 2019-12-19

## 2019-12-19 PROBLEM — D64.9 ANEMIA: Status: ACTIVE | Noted: 2019-12-19

## 2019-12-19 LAB
GLUCOSE BLD-MCNC: 109 MG/DL (ref 74–106)
GLUCOSE BLD-MCNC: 133 MG/DL (ref 74–106)
PERFORMED ON: ABNORMAL
PERFORMED ON: ABNORMAL

## 2019-12-19 PROCEDURE — 6370000000 HC RX 637 (ALT 250 FOR IP): Performed by: INTERNAL MEDICINE

## 2019-12-19 PROCEDURE — 6360000002 HC RX W HCPCS: Performed by: INTERNAL MEDICINE

## 2019-12-19 PROCEDURE — 6370000000 HC RX 637 (ALT 250 FOR IP): Performed by: NURSE PRACTITIONER

## 2019-12-19 PROCEDURE — 6370000000 HC RX 637 (ALT 250 FOR IP): Performed by: PHYSICIAN ASSISTANT

## 2019-12-19 PROCEDURE — 93971 EXTREMITY STUDY: CPT

## 2019-12-19 PROCEDURE — 99315 NF DSCHRG MGMT 30 MIN/LESS: CPT | Performed by: INTERNAL MEDICINE

## 2019-12-19 RX ORDER — DOCUSATE SODIUM 100 MG/1
100 CAPSULE, LIQUID FILLED ORAL 2 TIMES DAILY PRN
Qty: 60 CAPSULE | Refills: 0 | Status: SHIPPED | OUTPATIENT
Start: 2019-12-19

## 2019-12-19 RX ORDER — CIPROFLOXACIN 500 MG/1
500 TABLET, FILM COATED ORAL EVERY 12 HOURS SCHEDULED
Qty: 6 TABLET | Refills: 0 | Status: SHIPPED | OUTPATIENT
Start: 2019-12-19 | End: 2019-12-22

## 2019-12-19 RX ORDER — FUROSEMIDE 20 MG/1
20 TABLET ORAL DAILY
Qty: 60 TABLET | Refills: 3 | Status: SHIPPED | OUTPATIENT
Start: 2019-12-19

## 2019-12-19 RX ADMIN — LINAGLIPTIN 5 MG: 5 TABLET, FILM COATED ORAL at 09:29

## 2019-12-19 RX ADMIN — MELATONIN TAB 3 MG 3 MG: 3 TAB at 00:33

## 2019-12-19 RX ADMIN — CIPROFLOXACIN HYDROCHLORIDE 500 MG: 500 TABLET, FILM COATED ORAL at 09:29

## 2019-12-19 RX ADMIN — MELATONIN 5000 UNITS: at 09:29

## 2019-12-19 RX ADMIN — ACETAMINOPHEN 650 MG: 325 TABLET, FILM COATED ORAL at 00:34

## 2019-12-19 RX ADMIN — FUROSEMIDE 20 MG: 20 TABLET ORAL at 09:29

## 2019-12-19 RX ADMIN — CYANOCOBALAMIN TAB 500 MCG 1000 MCG: 500 TAB at 09:29

## 2019-12-19 RX ADMIN — LINACLOTIDE 145 MCG: 145 CAPSULE, GELATIN COATED ORAL at 06:26

## 2019-12-19 RX ADMIN — ENOXAPARIN SODIUM 40 MG: 40 INJECTION SUBCUTANEOUS at 09:29

## 2019-12-19 RX ADMIN — HYDROCODONE BITARTRATE AND ACETAMINOPHEN 1 TABLET: 5; 325 TABLET ORAL at 03:03

## 2019-12-19 RX ADMIN — PANTOPRAZOLE SODIUM 40 MG: 40 TABLET, DELAYED RELEASE ORAL at 09:29

## 2019-12-19 RX ADMIN — FOLIC ACID 1 MG: 1 TABLET ORAL at 09:29

## 2019-12-19 RX ADMIN — CLOPIDOGREL BISULFATE 75 MG: 75 TABLET, FILM COATED ORAL at 09:29

## 2019-12-19 ASSESSMENT — PAIN SCALES - GENERAL
PAINLEVEL_OUTOF10: 5
PAINLEVEL_OUTOF10: 4
PAINLEVEL_OUTOF10: 8

## 2019-12-20 ENCOUNTER — OFFICE VISIT (OUTPATIENT)
Dept: INTERNAL MEDICINE | Facility: CLINIC | Age: 79
End: 2019-12-20

## 2019-12-20 ENCOUNTER — RESULTS ENCOUNTER (OUTPATIENT)
Dept: INTERNAL MEDICINE | Facility: CLINIC | Age: 79
End: 2019-12-20

## 2019-12-20 VITALS
DIASTOLIC BLOOD PRESSURE: 66 MMHG | TEMPERATURE: 97.7 F | OXYGEN SATURATION: 100 % | HEART RATE: 70 BPM | HEIGHT: 67 IN | BODY MASS INDEX: 30.2 KG/M2 | SYSTOLIC BLOOD PRESSURE: 150 MMHG | RESPIRATION RATE: 16 BRPM | WEIGHT: 192.4 LBS

## 2019-12-20 DIAGNOSIS — E11.9 TYPE 2 DIABETES MELLITUS WITHOUT COMPLICATION, WITHOUT LONG-TERM CURRENT USE OF INSULIN (HCC): Primary | ICD-10-CM

## 2019-12-20 DIAGNOSIS — N30.00 ACUTE CYSTITIS WITHOUT HEMATURIA: ICD-10-CM

## 2019-12-20 DIAGNOSIS — E11.9 TYPE 2 DIABETES MELLITUS WITHOUT COMPLICATION, WITHOUT LONG-TERM CURRENT USE OF INSULIN (HCC): ICD-10-CM

## 2019-12-20 PROCEDURE — 99214 OFFICE O/P EST MOD 30 MIN: CPT | Performed by: INTERNAL MEDICINE

## 2019-12-20 RX ORDER — GLIPIZIDE 2.5 MG/1
2.5 TABLET, EXTENDED RELEASE ORAL DAILY
Qty: 30 TABLET | Refills: 11 | Status: SHIPPED | OUTPATIENT
Start: 2019-12-20 | End: 2020-01-22

## 2019-12-20 RX ORDER — CIPROFLOXACIN 500 MG/1
500 TABLET, FILM COATED ORAL 2 TIMES DAILY
COMMUNITY
Start: 2019-12-19 | End: 2019-12-22

## 2019-12-20 NOTE — PROGRESS NOTES
Subjective     Patient ID: Radha Whelan is a 79 y.o. female. Patient is here for management of multiple medical problems.     Chief Complaint   Patient presents with   • Hospital follow-up     patient here for follow-up, patient had right hip replacement, and pacemaker implant on 12/5/19.   • Medication questions     patient states she was given medicatons that she is unsure about taking     History of Present Illness     Lost wt and was doing well then needed hip replacement.   Need Pace maker. Then in rehab. Dr Lozoya had to change DM meds.    BS remains elevated on Tradjenta.  Pt was on glipizide 2.5 mg and very stable in the past.    Pt had all carb diet in [facility] rehab and hospitalization. Needed insulin.  Out of rehab since last night.    Pt wt is not up and BS with edema in legs with the right leg a bit more edeama.    Pt has been set back for wt with recent diet in the rehab.      Recent uti with MDR.  abx changed to cipro.  No symptoms.                The following portions of the patient's history were reviewed and updated as appropriate: allergies, current medications, past family history, past medical history, past social history, past surgical history and problem list.    Review of Systems   Constitutional: Positive for fatigue.   Cardiovascular: Negative for chest pain and leg swelling.   Gastrointestinal: Negative for abdominal distention and abdominal pain.   All other systems reviewed and are negative.      Current Outpatient Medications:   •  acetaminophen (TYLENOL) 325 MG tablet, Take 2 tablets by mouth Every 6 (Six) Hours As Needed for Mild Pain ., Disp: , Rfl:   •  Cholecalciferol (VITAMIN D PO), Take 125 mcg by mouth 2 (Two) Times a Week., Disp: , Rfl:   •  ciprofloxacin (CIPRO) 500 MG tablet, Take 500 mg by mouth 2 (Two) Times a Day., Disp: , Rfl:   •  clopidogrel (PLAVIX) 75 MG tablet, TAKE 1 TABLET DAILY, Disp: 90 tablet, Rfl: 3  •  coenzyme Q10 100 MG capsule, Take 400 mg by mouth Every  "Other Day., Disp: , Rfl:   •  docusate sodium 100 MG capsule, Take 100 mg by mouth 2 (Two) Times a Day As Needed for Constipation., Disp: , Rfl:   •  folic acid (FOLVITE) 1 MG tablet, TAKE 1 TABLET DAILY, Disp: 90 tablet, Rfl: 3  •  Glucosamine-Chondroit-Vit C-Mn (GLUCOSAMINE CHONDR 1500 COMPLX PO), Take 1 tablet by mouth Daily., Disp: , Rfl:   •  lovastatin (MEVACOR) 10 MG tablet, TAKE 1 TABLET EVERY NIGHT (Patient taking differently: TAKE 1 TABLET EVERY THREE TIMES WEEKLY), Disp: 90 tablet, Rfl: 3  •  melatonin 3 MG tablet, Take 3 mg by mouth Every Night., Disp: , Rfl:   •  pantoprazole (PROTONIX) 40 MG EC tablet, TAKE 1 TABLET DAILY, Disp: 90 tablet, Rfl: 3  •  polyethylene glycol (MIRALAX) pack packet, Take 17 g by mouth Daily., Disp: , Rfl:   •  vitamin B-12 (CYANOCOBALAMIN) 1000 MCG tablet, Take 1,000 mcg by mouth 2 (Two) Times a Week., Disp: , Rfl:   •  glipizide (GLUCOTROL XL) 2.5 MG 24 hr tablet, Take 1 tablet by mouth Daily., Disp: 30 tablet, Rfl: 11    Objective      Blood pressure 150/66, pulse 70, temperature 97.7 °F (36.5 °C), temperature source Oral, resp. rate 16, height 170.2 cm (67\"), weight 87.3 kg (192 lb 6.4 oz), SpO2 100 %.    Physical Exam     General Appearance:    Alert, cooperative, no distress, appears stated age   Head:    Normocephalic, without obvious abnormality, atraumatic   Eyes:    PERRL, conjunctiva/corneas clear, EOM's intact   Ears:    Normal TM's and external ear canals, both ears   Nose:   Nares normal, septum midline, mucosa normal, no drainage   or sinus tenderness   Throat:   Lips, mucosa, and tongue normal; teeth and gums normal   Neck:   Supple, symmetrical, trachea midline, no adenopathy;        thyroid:  No enlargement/tenderness/nodules; no carotid    bruit or JVD   Back:     Symmetric, no curvature, ROM normal, no CVA tenderness   Lungs:     Clear to auscultation bilaterally, respirations unlabored   Chest wall:    PM in left chest.     Heart:    Regular rate and " rhythm, S1 and S2 normal, no murmur,        rub or gallop   Abdomen:     Soft, non-tender, bowel sounds active all four quadrants,     no masses, no organomegaly   Extremities:   Extremities normal, atraumatic, no cyanosis or edema   Pulses:   2+ and symmetric all extremities   Skin:   Skin color, texture, turgor normal, no rashes or lesions   Lymph nodes:   Cervical, supraclavicular, and axillary nodes normal   Neurologic:   CNII-XII intact. Normal strength, sensation and reflexes       throughout      Results for orders placed or performed during the hospital encounter of 12/06/19   Basic Metabolic Panel   Result Value Ref Range    Glucose 116 (H) 65 - 99 mg/dL    BUN 15 8 - 23 mg/dL    Creatinine 0.71 0.57 - 1.00 mg/dL    Sodium 136 136 - 145 mmol/L    Potassium 3.7 3.5 - 5.2 mmol/L    Chloride 104 98 - 107 mmol/L    CO2 22.0 22.0 - 29.0 mmol/L    Calcium 8.2 (L) 8.6 - 10.5 mg/dL    eGFR Non African Amer 79 >60 mL/min/1.73    BUN/Creatinine Ratio 21.1 7.0 - 25.0    Anion Gap 10.0 5.0 - 15.0 mmol/L   CBC Auto Differential   Result Value Ref Range    WBC 5.88 3.40 - 10.80 10*3/mm3    RBC 2.90 (L) 3.77 - 5.28 10*6/mm3    Hemoglobin 9.0 (L) 12.0 - 15.9 g/dL    Hematocrit 29.0 (L) 34.0 - 46.6 %    .0 (H) 79.0 - 97.0 fL    MCH 31.0 26.6 - 33.0 pg    MCHC 31.0 (L) 31.5 - 35.7 g/dL    RDW 13.1 12.3 - 15.4 %    RDW-SD 47.5 37.0 - 54.0 fl    MPV 9.3 6.0 - 12.0 fL    Platelets 132 (L) 140 - 450 10*3/mm3    Neutrophil % 59.7 42.7 - 76.0 %    Lymphocyte % 29.1 19.6 - 45.3 %    Monocyte % 9.7 5.0 - 12.0 %    Eosinophil % 1.0 0.3 - 6.2 %    Basophil % 0.2 0.0 - 1.5 %    Immature Grans % 0.3 0.0 - 0.5 %    Neutrophils, Absolute 3.51 1.70 - 7.00 10*3/mm3    Lymphocytes, Absolute 1.71 0.70 - 3.10 10*3/mm3    Monocytes, Absolute 0.57 0.10 - 0.90 10*3/mm3    Eosinophils, Absolute 0.06 0.00 - 0.40 10*3/mm3    Basophils, Absolute 0.01 0.00 - 0.20 10*3/mm3    Immature Grans, Absolute 0.02 0.00 - 0.05 10*3/mm3    nRBC 0.0 0.0 -  0.2 /100 WBC   Basic Metabolic Panel   Result Value Ref Range    Glucose 121 (H) 65 - 99 mg/dL    BUN 18 8 - 23 mg/dL    Creatinine 0.83 0.57 - 1.00 mg/dL    Sodium 138 136 - 145 mmol/L    Potassium 4.2 3.5 - 5.2 mmol/L    Chloride 102 98 - 107 mmol/L    CO2 27.0 22.0 - 29.0 mmol/L    Calcium 8.5 (L) 8.6 - 10.5 mg/dL    eGFR Non African Amer 66 >60 mL/min/1.73    BUN/Creatinine Ratio 21.7 7.0 - 25.0    Anion Gap 9.0 5.0 - 15.0 mmol/L   CBC Auto Differential   Result Value Ref Range    WBC 6.55 3.40 - 10.80 10*3/mm3    RBC 2.87 (L) 3.77 - 5.28 10*6/mm3    Hemoglobin 8.8 (L) 12.0 - 15.9 g/dL    Hematocrit 28.1 (L) 34.0 - 46.6 %    MCV 97.9 (H) 79.0 - 97.0 fL    MCH 30.7 26.6 - 33.0 pg    MCHC 31.3 (L) 31.5 - 35.7 g/dL    RDW 12.9 12.3 - 15.4 %    RDW-SD 45.9 37.0 - 54.0 fl    MPV 9.1 6.0 - 12.0 fL    Platelets 154 140 - 450 10*3/mm3    Neutrophil % 53.1 42.7 - 76.0 %    Lymphocyte % 34.7 19.6 - 45.3 %    Monocyte % 9.9 5.0 - 12.0 %    Eosinophil % 1.8 0.3 - 6.2 %    Basophil % 0.2 0.0 - 1.5 %    Immature Grans % 0.3 0.0 - 0.5 %    Neutrophils, Absolute 3.48 1.70 - 7.00 10*3/mm3    Lymphocytes, Absolute 2.27 0.70 - 3.10 10*3/mm3    Monocytes, Absolute 0.65 0.10 - 0.90 10*3/mm3    Eosinophils, Absolute 0.12 0.00 - 0.40 10*3/mm3    Basophils, Absolute 0.01 0.00 - 0.20 10*3/mm3    Immature Grans, Absolute 0.02 0.00 - 0.05 10*3/mm3    nRBC 0.0 0.0 - 0.2 /100 WBC   Scan Slide   Result Value Ref Range    RBC Morphology Normal Normal    WBC Morphology Normal Normal    Platelet Morphology Normal Normal   POC Glucose Once   Result Value Ref Range    Glucose 128 70 - 130 mg/dL   POC Glucose Once   Result Value Ref Range    Glucose 144 (H) 70 - 130 mg/dL   POC Glucose Once   Result Value Ref Range    Glucose 139 (H) 70 - 130 mg/dL   POC Glucose Once   Result Value Ref Range    Glucose 175 (H) 70 - 130 mg/dL   POC Glucose Once   Result Value Ref Range    Glucose 112 70 - 130 mg/dL   POC Glucose Once   Result Value Ref Range     Glucose 178 (H) 70 - 130 mg/dL   POC Glucose Once   Result Value Ref Range    Glucose 167 (H) 70 - 130 mg/dL   POC Glucose Once   Result Value Ref Range    Glucose 132 (H) 70 - 130 mg/dL   POC Glucose Once   Result Value Ref Range    Glucose 123 70 - 130 mg/dL   POC Glucose Once   Result Value Ref Range    Glucose 242 (H) 70 - 130 mg/dL   POC Glucose Once   Result Value Ref Range    Glucose 144 (H) 70 - 130 mg/dL   POC Glucose Once   Result Value Ref Range    Glucose 154 (H) 70 - 130 mg/dL   POC Glucose Once   Result Value Ref Range    Glucose 127 70 - 130 mg/dL   POC Glucose Once   Result Value Ref Range    Glucose 195 (H) 70 - 130 mg/dL   POC Glucose Once   Result Value Ref Range    Glucose 203 (H) 70 - 130 mg/dL   POC Glucose Once   Result Value Ref Range    Glucose 160 (H) 70 - 130 mg/dL   POC Glucose Once   Result Value Ref Range    Glucose 157 (H) 70 - 130 mg/dL   POC Glucose Once   Result Value Ref Range    Glucose 202 (H) 70 - 130 mg/dL   POC Glucose Once   Result Value Ref Range    Glucose 146 (H) 70 - 130 mg/dL   POC Glucose Once   Result Value Ref Range    Glucose 135 (H) 70 - 130 mg/dL         Assessment/Plan       Radha was seen today for hospital follow-up and medication questions.    Diagnoses and all orders for this visit:    Type 2 diabetes mellitus without complication, without long-term current use of insulin (CMS/[facility])  -     Vitamin B12  -     CBC & Differential  -     Lipid Panel  -     Comprehensive Metabolic Panel  -     TSH  -     T4, Free  -     Hemoglobin A1c  -     MicroAlbumin, Urine, Random - Urine, Clean Catch  -     Urinalysis With Microscopic - Urine, Clean Catch  -     Urine Culture - , Urine, Clean Catch; Future    Acute cystitis without hematuria  -     Vitamin B12  -     CBC & Differential  -     Lipid Panel  -     Comprehensive Metabolic Panel  -     TSH  -     T4, Free  -     Hemoglobin A1c  -     MicroAlbumin, Urine, Random - Urine, Clean Catch  -     Urinalysis With Microscopic  - Urine, Clean Catch  -     Urine Culture - , Urine, Clean Catch; Future    Other orders  -     glipizide (GLUCOTROL XL) 2.5 MG 24 hr tablet; Take 1 tablet by mouth Daily.      Return in about 2 weeks (around 1/3/2020).          There are no Patient Instructions on file for this visit.     Farhan Schaefer MD    Assessment/Plan

## 2019-12-21 LAB
ALBUMIN SERPL-MCNC: 4.1 G/DL (ref 3.5–5.2)
ALBUMIN/GLOB SERPL: 1.4 G/DL
ALP SERPL-CCNC: 84 U/L (ref 39–117)
ALT SERPL-CCNC: 13 U/L (ref 1–33)
APPEARANCE UR: CLEAR
AST SERPL-CCNC: 21 U/L (ref 1–32)
BACTERIA #/AREA URNS HPF: ABNORMAL /HPF
BASOPHILS # BLD AUTO: 0.02 10*3/MM3 (ref 0–0.2)
BASOPHILS NFR BLD AUTO: 0.4 % (ref 0–1.5)
BILIRUB SERPL-MCNC: 0.8 MG/DL (ref 0.2–1.2)
BILIRUB UR QL STRIP: NEGATIVE
BUN SERPL-MCNC: 14 MG/DL (ref 8–23)
BUN/CREAT SERPL: 14.9 (ref 7–25)
CALCIUM SERPL-MCNC: 9.2 MG/DL (ref 8.6–10.5)
CASTS URNS MICRO: ABNORMAL
CHLORIDE SERPL-SCNC: 93 MMOL/L (ref 98–107)
CHOLEST SERPL-MCNC: 202 MG/DL (ref 0–200)
CO2 SERPL-SCNC: 29.9 MMOL/L (ref 22–29)
COLOR UR: YELLOW
CREAT SERPL-MCNC: 0.94 MG/DL (ref 0.57–1)
EOSINOPHIL # BLD AUTO: 0.04 10*3/MM3 (ref 0–0.4)
EOSINOPHIL NFR BLD AUTO: 0.8 % (ref 0.3–6.2)
EPI CELLS #/AREA URNS HPF: ABNORMAL /HPF
ERYTHROCYTE [DISTWIDTH] IN BLOOD BY AUTOMATED COUNT: 13.8 % (ref 12.3–15.4)
GLOBULIN SER CALC-MCNC: 3 GM/DL
GLUCOSE SERPL-MCNC: 139 MG/DL (ref 65–99)
GLUCOSE UR QL: NEGATIVE
HBA1C MFR BLD: 6 % (ref 4.8–5.6)
HCT VFR BLD AUTO: 30.9 % (ref 34–46.6)
HDLC SERPL-MCNC: 60 MG/DL (ref 40–60)
HGB BLD-MCNC: 10.6 G/DL (ref 12–15.9)
HGB UR QL STRIP: NEGATIVE
IMM GRANULOCYTES # BLD AUTO: 0.02 10*3/MM3 (ref 0–0.05)
IMM GRANULOCYTES NFR BLD AUTO: 0.4 % (ref 0–0.5)
KETONES UR QL STRIP: NEGATIVE
LDLC SERPL CALC-MCNC: 116 MG/DL (ref 0–100)
LEUKOCYTE ESTERASE UR QL STRIP: ABNORMAL
LYMPHOCYTES # BLD AUTO: 1.45 10*3/MM3 (ref 0.7–3.1)
LYMPHOCYTES NFR BLD AUTO: 28.6 % (ref 19.6–45.3)
MCH RBC QN AUTO: 32 PG (ref 26.6–33)
MCHC RBC AUTO-ENTMCNC: 34.3 G/DL (ref 31.5–35.7)
MCV RBC AUTO: 93.4 FL (ref 79–97)
MICROALBUMIN UR-MCNC: 8.1 UG/ML
MONOCYTES # BLD AUTO: 0.48 10*3/MM3 (ref 0.1–0.9)
MONOCYTES NFR BLD AUTO: 9.5 % (ref 5–12)
NEUTROPHILS # BLD AUTO: 3.06 10*3/MM3 (ref 1.7–7)
NEUTROPHILS NFR BLD AUTO: 60.3 % (ref 42.7–76)
NITRITE UR QL STRIP: NEGATIVE
NRBC BLD AUTO-RTO: 0 /100 WBC (ref 0–0.2)
PH UR STRIP: 6.5 [PH] (ref 5–8)
PLATELET # BLD AUTO: 359 10*3/MM3 (ref 140–450)
POTASSIUM SERPL-SCNC: 3.8 MMOL/L (ref 3.5–5.2)
PROT SERPL-MCNC: 7.1 G/DL (ref 6–8.5)
PROT UR QL STRIP: ABNORMAL
RBC # BLD AUTO: 3.31 10*6/MM3 (ref 3.77–5.28)
RBC #/AREA URNS HPF: ABNORMAL /HPF
SODIUM SERPL-SCNC: 135 MMOL/L (ref 136–145)
SP GR UR: 1.03 (ref 1–1.03)
T4 FREE SERPL-MCNC: 1.41 NG/DL (ref 0.93–1.7)
TRIGL SERPL-MCNC: 131 MG/DL (ref 0–150)
TSH SERPL DL<=0.005 MIU/L-ACNC: 1.6 UIU/ML (ref 0.27–4.2)
UROBILINOGEN UR STRIP-MCNC: ABNORMAL MG/DL
VIT B12 SERPL-MCNC: 1098 PG/ML (ref 211–946)
VLDLC SERPL CALC-MCNC: 26.2 MG/DL
WBC # BLD AUTO: 5.07 10*3/MM3 (ref 3.4–10.8)
WBC #/AREA URNS HPF: ABNORMAL /HPF

## 2019-12-23 RX ORDER — FOLIC ACID 1 MG/1
TABLET ORAL
Qty: 90 TABLET | Refills: 3 | Status: SHIPPED | OUTPATIENT
Start: 2019-12-23 | End: 2020-08-29

## 2019-12-26 ENCOUNTER — CLINICAL SUPPORT NO REQUIREMENTS (OUTPATIENT)
Dept: CARDIOLOGY | Facility: CLINIC | Age: 79
End: 2019-12-26

## 2019-12-26 DIAGNOSIS — I50.9 CONGESTIVE HEART FAILURE, UNSPECIFIED HF CHRONICITY, UNSPECIFIED HEART FAILURE TYPE (HCC): Primary | ICD-10-CM

## 2019-12-30 ENCOUNTER — HOSPITAL ENCOUNTER (OUTPATIENT)
Dept: PHYSICAL THERAPY | Facility: HOSPITAL | Age: 79
Setting detail: THERAPIES SERIES
Discharge: HOME | End: 2019-12-30
Payer: MEDICARE

## 2019-12-30 ENCOUNTER — HOSPITAL ENCOUNTER (OUTPATIENT)
Dept: OCCUPATIONAL THERAPY | Facility: HOSPITAL | Age: 79
Setting detail: THERAPIES SERIES
Discharge: HOME | End: 2019-12-30
Payer: MEDICARE

## 2019-12-30 PROCEDURE — 97110 THERAPEUTIC EXERCISES: CPT

## 2019-12-30 PROCEDURE — 97165 OT EVAL LOW COMPLEX 30 MIN: CPT

## 2019-12-30 PROCEDURE — 97530 THERAPEUTIC ACTIVITIES: CPT

## 2019-12-30 PROCEDURE — 97161 PT EVAL LOW COMPLEX 20 MIN: CPT

## 2019-12-30 ASSESSMENT — PAIN SCALES - GENERAL: PAINLEVEL_OUTOF10: 3

## 2019-12-30 ASSESSMENT — PAIN DESCRIPTION - LOCATION: LOCATION: HIP

## 2019-12-30 ASSESSMENT — PAIN DESCRIPTION - PAIN TYPE: TYPE: SURGICAL PAIN

## 2019-12-30 ASSESSMENT — PAIN DESCRIPTION - ORIENTATION: ORIENTATION: RIGHT

## 2019-12-31 ASSESSMENT — PAIN DESCRIPTION - PAIN TYPE: TYPE: SURGICAL PAIN

## 2019-12-31 ASSESSMENT — PAIN DESCRIPTION - ORIENTATION: ORIENTATION: RIGHT

## 2019-12-31 ASSESSMENT — PAIN SCALES - GENERAL: PAINLEVEL_OUTOF10: 3

## 2019-12-31 ASSESSMENT — PAIN DESCRIPTION - LOCATION: LOCATION: HIP

## 2020-01-02 ENCOUNTER — HOSPITAL ENCOUNTER (OUTPATIENT)
Dept: OCCUPATIONAL THERAPY | Facility: HOSPITAL | Age: 80
Setting detail: THERAPIES SERIES
Discharge: HOME | End: 2020-01-02
Payer: MEDICARE

## 2020-01-02 ENCOUNTER — HOSPITAL ENCOUNTER (OUTPATIENT)
Dept: PHYSICAL THERAPY | Facility: HOSPITAL | Age: 80
Setting detail: THERAPIES SERIES
Discharge: HOME | End: 2020-01-02
Payer: MEDICARE

## 2020-01-02 ENCOUNTER — EPISODE CHANGES (OUTPATIENT)
Dept: CASE MANAGEMENT | Facility: OTHER | Age: 80
End: 2020-01-02

## 2020-01-02 PROCEDURE — 97530 THERAPEUTIC ACTIVITIES: CPT

## 2020-01-02 PROCEDURE — 97110 THERAPEUTIC EXERCISES: CPT

## 2020-01-02 PROCEDURE — 97150 GROUP THERAPEUTIC PROCEDURES: CPT

## 2020-01-02 NOTE — PROGRESS NOTES
Occupational Therapy  Daily Treatment Note  Date: 2020  Patient Name: Hans Contreras  :  1940  MRN: [id number]           Treatment Activities:     Standing Balance: Modified independent   Standing Balance  Time: 10 minutes  Activity: Tightrope walk; standing & crossing midline activity with weight shifting on mat surface  Sitting Balance: Modified independent (Pt was seated on therapy ball to work on balance while performing HEP. Pt used hands on adjacent surface to assist with balance as needed)  Balance  Sitting Balance: Modified independent (Pt was seated on therapy ball to work on balance while performing HEP. Pt used hands on adjacent surface to assist with balance as needed)  Standing Balance: Modified independent   Functional Mobility  Functional - Mobility Device: Rolling Walker  Assist Level: Modified independent      Shoulder Exercises  Shoulder Therapy?: Yes(Shoulder flex/Ext x10; Horizontal add/abd x10; shoulder add/abd x10; elbow flex/ext x10 all following pacemaker precautions)   LUE AROM (degrees)  LUE AROM : WFL  Left Hand AROM (degrees)  Left Hand AROM: WFL  RUE AROM (degrees)  RUE AROM : WFL  Right Hand AROM (degrees)  Right Hand AROM: WFL     Transfers  Stand Step Transfers: Modified independent     Functional Activity Tolerance  Additional Comments: Pt treatment tolerated treatment well        Assessment   Performance deficits / Impairments: Decreased balance;Decreased endurance  Assessment: Pt was seen for skilled OT intervention session. No pain reported throughout. Pt performed HEP seated on therapy ball with visual demonstration & verbal prompting for correct movements to adhere to pacemaker precautions. Needed materials for HEP were supplied. Therapeutic activity of body scan was performed with guidance from OT. Pt identified being able to use this technique as a means for pain management & a relaxation technique at home.  Pt performed standing balance activities 10 minutes with RW & CGA. Pt ambulated out of therapy gym to waiting area with Mod I using RW. Therapy Time   Individual Concurrent Group Co-treatment   Time In 0130         Time Out 3603         Minutes 50               In event of d/c, this note will serve as discharge summary.    Courtney Diallo, OTR/L

## 2020-01-06 ENCOUNTER — OFFICE VISIT (OUTPATIENT)
Dept: INTERNAL MEDICINE | Facility: CLINIC | Age: 80
End: 2020-01-06

## 2020-01-06 VITALS
TEMPERATURE: 97.8 F | RESPIRATION RATE: 16 BRPM | HEIGHT: 67 IN | WEIGHT: 190.12 LBS | DIASTOLIC BLOOD PRESSURE: 71 MMHG | OXYGEN SATURATION: 100 % | BODY MASS INDEX: 29.84 KG/M2 | SYSTOLIC BLOOD PRESSURE: 139 MMHG | HEART RATE: 83 BPM

## 2020-01-06 DIAGNOSIS — Z96.641 S/P HIP REPLACEMENT, RIGHT: ICD-10-CM

## 2020-01-06 DIAGNOSIS — M25.552 LEFT HIP PAIN: ICD-10-CM

## 2020-01-06 DIAGNOSIS — M25.562 ACUTE PAIN OF LEFT KNEE: ICD-10-CM

## 2020-01-06 DIAGNOSIS — E11.9 TYPE 2 DIABETES MELLITUS WITHOUT COMPLICATION, WITHOUT LONG-TERM CURRENT USE OF INSULIN (HCC): Primary | ICD-10-CM

## 2020-01-06 PROCEDURE — 99214 OFFICE O/P EST MOD 30 MIN: CPT | Performed by: INTERNAL MEDICINE

## 2020-01-06 PROCEDURE — 20610 DRAIN/INJ JOINT/BURSA W/O US: CPT | Performed by: INTERNAL MEDICINE

## 2020-01-06 RX ORDER — TRIAMCINOLONE ACETONIDE 40 MG/ML
40 INJECTION, SUSPENSION INTRA-ARTICULAR; INTRAMUSCULAR ONCE
Status: COMPLETED | OUTPATIENT
Start: 2020-01-06 | End: 2020-01-06

## 2020-01-06 RX ADMIN — TRIAMCINOLONE ACETONIDE 40 MG: 40 INJECTION, SUSPENSION INTRA-ARTICULAR; INTRAMUSCULAR at 12:16

## 2020-01-06 NOTE — PROGRESS NOTES
Subjective     Patient ID: Radha Whelan is a 79 y.o. female. Patient is here for management of multiple medical problems.     Chief Complaint   Patient presents with   • Diabetes     follow-up, blood sugars have been better since going back on the Glipizide.   • Right Hip replacement     follow-up, patient requesting an order for a cane following right hip replacement   • Pain     patient here to discuss getting a joint injection in her left knee     Diabetes   She presents for her follow-up diabetic visit. She has type 2 diabetes mellitus. No MedicAlert identification noted. Her disease course has been fluctuating. Pertinent negatives for hypoglycemia include no confusion or hunger. Pertinent negatives for diabetes include no blurred vision and no foot paresthesias. Symptoms are stable. Risk factors for coronary artery disease include diabetes mellitus and dyslipidemia. Current diabetic treatment includes oral agent (monotherapy). She is compliant with treatment all of the time. Her weight is decreasing steadily. She has had a previous visit with a dietitian. An ACE inhibitor/angiotensin II receptor blocker is being taken.   Pain   This is a chronic problem. The current episode started more than 1 month ago. The problem has been unchanged. Nothing aggravates the symptoms. The treatment provided moderate relief.      Left knee pain following right thip replacement. Asking for inj.      BS improveing and pt plans to start better diet control./          S/p knee replacement.  Needs craig.     Using walker.        The following portions of the patient's history were reviewed and updated as appropriate: allergies, current medications, past family history, past medical history, past social history, past surgical history and problem list.    Review of Systems   Eyes: Negative for blurred vision.   Psychiatric/Behavioral: Negative for confusion.       Current Outpatient Medications:   •  acetaminophen (TYLENOL) 325 MG tablet,  "Take 2 tablets by mouth Every 6 (Six) Hours As Needed for Mild Pain ., Disp: , Rfl:   •  Cholecalciferol (VITAMIN D PO), Take 125 mcg by mouth 2 (Two) Times a Week., Disp: , Rfl:   •  clopidogrel (PLAVIX) 75 MG tablet, TAKE 1 TABLET DAILY, Disp: 90 tablet, Rfl: 3  •  coenzyme Q10 100 MG capsule, Take 400 mg by mouth Every Other Day., Disp: , Rfl:   •  docusate sodium 100 MG capsule, Take 100 mg by mouth 2 (Two) Times a Day As Needed for Constipation., Disp: , Rfl:   •  folic acid (FOLVITE) 1 MG tablet, TAKE 1 TABLET DAILY, Disp: 90 tablet, Rfl: 3  •  glipizide (GLUCOTROL XL) 2.5 MG 24 hr tablet, Take 1 tablet by mouth Daily., Disp: 30 tablet, Rfl: 11  •  Glucosamine-Chondroit-Vit C-Mn (GLUCOSAMINE CHONDR 1500 COMPLX PO), Take 1 tablet by mouth Daily., Disp: , Rfl:   •  lovastatin (MEVACOR) 10 MG tablet, TAKE 1 TABLET EVERY NIGHT (Patient taking differently: TAKE 1 TABLET EVERY THREE TIMES WEEKLY), Disp: 90 tablet, Rfl: 3  •  melatonin 3 MG tablet, Take 3 mg by mouth Every Night., Disp: , Rfl:   •  pantoprazole (PROTONIX) 40 MG EC tablet, TAKE 1 TABLET DAILY, Disp: 90 tablet, Rfl: 3  •  polyethylene glycol (MIRALAX) pack packet, Take 17 g by mouth Daily., Disp: , Rfl:   •  vitamin B-12 (CYANOCOBALAMIN) 1000 MCG tablet, Take 1,000 mcg by mouth 2 (Two) Times a Week., Disp: , Rfl:     Current Facility-Administered Medications:   •  triamcinolone acetonide (KENALOG-40) injection 40 mg, 40 mg, Intra-articular, Once, Farhan Schaefer MD    Objective      Blood pressure 139/71, pulse 83, temperature 97.8 °F (36.6 °C), temperature source Oral, resp. rate 16, height 170.2 cm (67\"), weight 86.2 kg (190 lb 1.9 oz), SpO2 100 %.    Physical Exam     General Appearance:    Alert, cooperative, no distress, appears stated age   Head:    Normocephalic, without obvious abnormality, atraumatic   Eyes:    PERRL, conjunctiva/corneas clear, EOM's intact   Ears:    Normal TM's and external ear canals, both ears   Nose:   Nares normal, " septum midline, mucosa normal, no drainage   or sinus tenderness   Throat:   Lips, mucosa, and tongue normal; teeth and gums normal   Neck:   Supple, symmetrical, trachea midline, no adenopathy;        thyroid:  No enlargement/tenderness/nodules; no carotid    bruit or JVD   Back:     Symmetric, no curvature, ROM normal, no CVA tenderness   Lungs:     Clear to auscultation bilaterally, respirations unlabored   Chest wall:    No tenderness or deformity   Heart:    Regular rate and rhythm, S1 and S2 normal, no murmur,        rub or gallop   Abdomen:     Soft, non-tender, bowel sounds active all four quadrants,     no masses, no organomegaly   Extremities:   r hip with replacement.  Left knee crepitus.    Extremities normal, atraumatic, no cyanosis or edema   Pulses:   2+ and symmetric all extremities   Skin:   Skin color, texture, turgor normal, no rashes or lesions   Lymph nodes:   Cervical, supraclavicular, and axillary nodes normal   Neurologic:   CNII-XII intact. Normal strength, sensation and reflexes       throughout      Results for orders placed or performed in visit on 12/20/19   Vitamin B12   Result Value Ref Range    Vitamin B-12 1,098 (H) 211 - 946 pg/mL   Lipid Panel   Result Value Ref Range    Total Cholesterol 202 (H) 0 - 200 mg/dL    Triglycerides 131 0 - 150 mg/dL    HDL Cholesterol 60 40 - 60 mg/dL    VLDL Cholesterol 26.2 mg/dL    LDL Cholesterol  116 (H) 0 - 100 mg/dL   Comprehensive Metabolic Panel   Result Value Ref Range    Glucose 139 (H) 65 - 99 mg/dL    BUN 14 8 - 23 mg/dL    Creatinine 0.94 0.57 - 1.00 mg/dL    eGFR Non African Am 57 (L) >60 mL/min/1.73    eGFR African Am 69 >60 mL/min/1.73    BUN/Creatinine Ratio 14.9 7.0 - 25.0    Sodium 135 (L) 136 - 145 mmol/L    Potassium 3.8 3.5 - 5.2 mmol/L    Chloride 93 (L) 98 - 107 mmol/L    Total CO2 29.9 (H) 22.0 - 29.0 mmol/L    Calcium 9.2 8.6 - 10.5 mg/dL    Total Protein 7.1 6.0 - 8.5 g/dL    Albumin 4.10 3.50 - 5.20 g/dL    Globulin 3.0  gm/dL    A/G Ratio 1.4 g/dL    Total Bilirubin 0.8 0.2 - 1.2 mg/dL    Alkaline Phosphatase 84 39 - 117 U/L    AST (SGOT) 21 1 - 32 U/L    ALT (SGPT) 13 1 - 33 U/L   TSH   Result Value Ref Range    TSH 1.600 0.270 - 4.200 uIU/mL   T4, Free   Result Value Ref Range    Free T4 1.41 0.93 - 1.70 ng/dL   Hemoglobin A1c   Result Value Ref Range    Hemoglobin A1C 6.00 (H) 4.80 - 5.60 %   MicroAlbumin, Urine, Random - Urine, Clean Catch   Result Value Ref Range    Microalbumin, Urine 8.1 Not Estab. ug/mL   Microscopic Examination -   Result Value Ref Range    WBC, UA 0-2 /HPF    RBC, UA 0-2 /HPF    Epithelial Cells (non renal) 3-6 (A) /HPF    Cast Type Comment     Bacteria, UA Comment None Seen /HPF   CBC & Differential   Result Value Ref Range    WBC 5.07 3.40 - 10.80 10*3/mm3    RBC 3.31 (L) 3.77 - 5.28 10*6/mm3    Hemoglobin 10.6 (L) 12.0 - 15.9 g/dL    Hematocrit 30.9 (L) 34.0 - 46.6 %    MCV 93.4 79.0 - 97.0 fL    MCH 32.0 26.6 - 33.0 pg    MCHC 34.3 31.5 - 35.7 g/dL    RDW 13.8 12.3 - 15.4 %    Platelets 359 140 - 450 10*3/mm3    Neutrophil Rel % 60.3 42.7 - 76.0 %    Lymphocyte Rel % 28.6 19.6 - 45.3 %    Monocyte Rel % 9.5 5.0 - 12.0 %    Eosinophil Rel % 0.8 0.3 - 6.2 %    Basophil Rel % 0.4 0.0 - 1.5 %    Neutrophils Absolute 3.06 1.70 - 7.00 10*3/mm3    Lymphocytes Absolute 1.45 0.70 - 3.10 10*3/mm3    Monocytes Absolute 0.48 0.10 - 0.90 10*3/mm3    Eosinophils Absolute 0.04 0.00 - 0.40 10*3/mm3    Basophils Absolute 0.02 0.00 - 0.20 10*3/mm3    Immature Granulocyte Rel % 0.4 0.0 - 0.5 %    Immature Grans Absolute 0.02 0.00 - 0.05 10*3/mm3    nRBC 0.0 0.0 - 0.2 /100 WBC   Urinalysis With Microscopic - Urine, Clean Catch   Result Value Ref Range    Specific Gravity, UA 1.026 1.005 - 1.030    pH, UA 6.5 5.0 - 8.0    Color, UA Yellow     Appearance, UA Clear Clear    Leukocytes, UA See below: (A) Negative    Protein Trace (A) Negative    Glucose, UA Negative Negative    Ketones Negative Negative    Blood, UA Negative  Negative    Bilirubin, UA Negative Negative    Urobilinogen, UA Comment     Nitrite, UA Negative Negative         Assessment/Plan      Knee inj.   Indication: pain.   Pt consented. Area prepped.  left knee inj with kenolog 40mg 1 cc and lidocaine 1 cc 1%.  Pt tolerated procedure well.        Radha was seen today for diabetes, right hip replacement and pain.    Diagnoses and all orders for this visit:    Type 2 diabetes mellitus without complication, without long-term current use of insulin (CMS/[facility])  -     Hemoglobin A1c  -     Comprehensive Metabolic Panel  -     Vitamin B12  -     Lipid Panel  -     CBC & Differential  -     triamcinolone acetonide (KENALOG-40) injection 40 mg    S/P hip replacement, right  -     Accessories: Walker, Cane or Crutch    Left hip pain  -     Accessories: Walker, Cane or Crutch  -     triamcinolone acetonide (KENALOG-40) injection 40 mg    Acute pain of left knee  -     Hemoglobin A1c  -     Comprehensive Metabolic Panel  -     Vitamin B12  -     Lipid Panel  -     CBC & Differential  -     triamcinolone acetonide (KENALOG-40) injection 40 mg      Return in about 3 months (around 4/6/2020).          There are no Patient Instructions on file for this visit.     Farhan Schaefer MD    Assessment/Plan

## 2020-01-07 ENCOUNTER — HOSPITAL ENCOUNTER (OUTPATIENT)
Dept: OCCUPATIONAL THERAPY | Facility: HOSPITAL | Age: 80
Setting detail: THERAPIES SERIES
Discharge: HOME | End: 2020-01-07
Payer: MEDICARE

## 2020-01-07 ENCOUNTER — HOSPITAL ENCOUNTER (OUTPATIENT)
Dept: PHYSICAL THERAPY | Facility: HOSPITAL | Age: 80
Setting detail: THERAPIES SERIES
Discharge: HOME | End: 2020-01-07
Payer: MEDICARE

## 2020-01-07 PROCEDURE — 97110 THERAPEUTIC EXERCISES: CPT

## 2020-01-07 PROCEDURE — 97530 THERAPEUTIC ACTIVITIES: CPT

## 2020-01-07 NOTE — PROGRESS NOTES
for Short term goals: 1-2 weeks   Short term goal 1: Pt will identify 2-3 alternative pain management techniques  Short term goal 2: Pt will demonstrate I with HEP with adaptive techniques as needed for precautions  Short term goal 3: Pt will perform tasks targeting balance with CGA  Short term goal 4: Pt will tolerate 15 minutes of activity in standing with SBA using energy conservation techniques  Long term goals  Time Frame for Long term goals : 3-4 weeks  Long term goal 1: Pt will identify 4-5 alternative pain management techniques   Long term goal 2: Pt will report I utilization of HEP 1x/day with adaptive techniques as needed for precautions  Long term goal 3: Pt will increase scores on Murguia balance assessment to walking with I range  Long term goal 4: Pt will tolerate 30 minutes of activity in standing with SBA using energy conservation techniques    Therapy Time   Individual Concurrent Group Co-treatment   Time In  1106         Time Out  12:10         Minutes  64               In event of d/c, this note will serve as discharge summary.    Chepe Delaneys, OTR/L

## 2020-01-08 ENCOUNTER — APPOINTMENT (OUTPATIENT)
Dept: MAMMOGRAPHY | Facility: HOSPITAL | Age: 80
End: 2020-01-08

## 2020-01-09 ENCOUNTER — HOSPITAL ENCOUNTER (OUTPATIENT)
Dept: PHYSICAL THERAPY | Facility: HOSPITAL | Age: 80
Setting detail: THERAPIES SERIES
Discharge: HOME | End: 2020-01-09
Payer: MEDICARE

## 2020-01-09 ENCOUNTER — HOSPITAL ENCOUNTER (OUTPATIENT)
Dept: OCCUPATIONAL THERAPY | Facility: HOSPITAL | Age: 80
Setting detail: THERAPIES SERIES
Discharge: HOME | End: 2020-01-09
Payer: MEDICARE

## 2020-01-09 PROCEDURE — 97530 THERAPEUTIC ACTIVITIES: CPT

## 2020-01-09 PROCEDURE — 97110 THERAPEUTIC EXERCISES: CPT

## 2020-01-09 NOTE — FLOWSHEET NOTE
Physical Therapy Daily Treatment Note   Date:  2020    TIme In:   1003                   Time Out: 5196    Patient Name:  Deya Bishop    :  1940  MRN: [id number]    Restrictions/Precautions:   Pacemaker  Pertinent Medical History:  Medical/Treatment Diagnosis Information:  ·   s/p R SHAHBAZ     Insurance/Certification information:    [facility] / [facility]  Physician Information:    Cah Garcia PA-C  Plan of care signed (Y/N):    Visit# / total visits:     4  /    G-Code (if applicable):      Date / Visit # G-Code Applied:         Progress Note: []  Yes  [x]  No  Next due by: Visit #10      Pain level:   0/10    Subjective: Pt reports she doesn't have pain, its just soreness in the front of her hip; good compliance with HEP; started using cane for ambulation. Objective:   Observation: stable gait with st. cane   Test measurements:     Palpation:    Exercises:  Exercise Resistance/Repetitions Other comments   QS 3x10 9   GS 3x10 9   Supine hip abd 2x10 RLE 9   Hip abd/add(BTB/ball) 2x10 9   St marches 3x10 9   Calf raises 3x10 9   St hip abd 2x10 RLE 9   Nustep L5x10' 9   Minisquats 3x10 9   LAQ 3x10 B 9               Other Therapeutic Activities:      Manual Treatments:      Modalities:        Timed Code Treatment Minutes:  30'      Total Treatment Minutes:  28'    Treatment/Activity Tolerance:  [x] Patient tolerated treatment well [] Patient limited by fatigue  [] Patient limited by pain  [] Patient limited by other medical complications  [x] Other: Pt completed tx with no pain and only soreness in ant hip jnt.     Pain after treatment:      \"sore\"/10    Prognosis: [x] Good [] Fair  [] Poor    Patient Requires Follow-up: [x] Yes  [] No    Plan:   [x] Continue per plan of care [] Alter current plan (see comments)  [] Plan of care initiated [] Hold pending MD visit [] Discharge    Plan for Next Session:        Electronically signed by:  Sunil Sena, PT

## 2020-01-09 NOTE — PROGRESS NOTES
activity providing EC techniques for utilization during grocery shopping due to decreased balance & endurance. Upon exit, pt ambulated out of therapy gym with cane. Therapy Time   Individual Concurrent Group Co-treatment   Time In 1108         Time Out 1200         Minutes 52               In event of d/c, this note will serve as discharge summary.    Chepe Ho, OTR/L

## 2020-01-14 ENCOUNTER — HOSPITAL ENCOUNTER (OUTPATIENT)
Dept: PHYSICAL THERAPY | Facility: HOSPITAL | Age: 80
Setting detail: THERAPIES SERIES
Discharge: HOME | End: 2020-01-14
Payer: MEDICARE

## 2020-01-14 ENCOUNTER — HOSPITAL ENCOUNTER (OUTPATIENT)
Dept: OCCUPATIONAL THERAPY | Facility: HOSPITAL | Age: 80
Setting detail: THERAPIES SERIES
Discharge: HOME | End: 2020-01-14
Payer: MEDICARE

## 2020-01-14 PROCEDURE — 97530 THERAPEUTIC ACTIVITIES: CPT

## 2020-01-14 PROCEDURE — 97110 THERAPEUTIC EXERCISES: CPT

## 2020-01-14 PROCEDURE — 97150 GROUP THERAPEUTIC PROCEDURES: CPT

## 2020-01-14 PROCEDURE — 97168 OT RE-EVAL EST PLAN CARE: CPT

## 2020-01-14 NOTE — FLOWSHEET NOTE
Physical Therapy Daily Treatment Note   Date:  2020    TIme In:   1000                   Time Out: 1024    Patient Name:  Eloy Florence    :  1940  MRN: [id number]    Restrictions/Precautions:   Pacemaker  Pertinent Medical History:  Medical/Treatment Diagnosis Information:  ·   s/p R SHAHBAZ     Insurance/Certification information:    [facility] / [facility]  Physician Information:    Blaise Hartman PA-C  Plan of care signed (Y/N):    Visit# / total visits:     5/    G-Code (if applicable):      Date / Visit # G-Code Applied:         Progress Note: []  Yes  [x]  No  Next due by: Visit #10      Pain level:   0/10    Subjective: Pt reports she is doing pretty good today and she has her new cane. Objective:   Observation: stable gait with st. cane   Test measurements:     Palpation:    Exercises:  Exercise Resistance/Repetitions Other comments   QS 3x10 14   GS 3x10 14   Supine hip abd 2x10 RLE 14   Hip abd/add(BTB/ball) 2x10 14   St marches 3x10 14   Calf raises 3x10 14   St hip abd 2x10 RLE 14   Nustep L5x10' 14   Minisquats 3x10 14   LAQ 3x10 B 14               Other Therapeutic Activities:      Manual Treatments:      Modalities:        Timed Code Treatment Minutes:  20      Total Treatment Minutes:  24    Treatment/Activity Tolerance:  [x] Patient tolerated treatment well [] Patient limited by fatigue  [] Patient limited by pain  [] Patient limited by other medical complications  [x] Other: Pt completed tx with no pain and is ambulating effectively with new cane.      Pain after treatment:      0/10    Prognosis: [x] Good [] Fair  [] Poor    Patient Requires Follow-up: [x] Yes  [] No    Plan:   [x] Continue per plan of care [] Alter current plan (see comments)  [] Plan of care initiated [] Hold pending MD visit [] Discharge    Plan for Next Session:        Electronically signed by:  Forest Lopez PTA

## 2020-01-14 NOTE — PROGRESS NOTES
identify 2-3 alternative pain management techniques  Short term goal 2: Pt will demonstrate I with HEP with adaptive techniques as needed for precautions  Short term goal 3: Pt will perform tasks targeting balance with CGA  Short term goal 4: Pt will tolerate 15 minutes of activity in standing with SBA using energy conservation techniques  Long term goals  Time Frame for Long term goals : 3-4 weeks  Long term goal 1: Pt will identify 4-5 alternative pain management techniques   Long term goal 2: Pt will report I utilization of HEP 1x/day with adaptive techniques as needed for precautions  Long term goal 3: Pt will increase scores on Murguia balance assessment to walking with I range  Long term goal 4: Pt will tolerate 30 minutes of activity in standing with SBA using energy conservation techniques      Therapy Time   Individual Concurrent Group Co-treatment   Time In 1115         Time Out 1200         Minutes 45               This note serve as discharge summary.    Abigail López OTR/L

## 2020-01-16 ENCOUNTER — APPOINTMENT (OUTPATIENT)
Dept: OCCUPATIONAL THERAPY | Facility: HOSPITAL | Age: 80
End: 2020-01-16
Payer: MEDICARE

## 2020-01-16 ENCOUNTER — HOSPITAL ENCOUNTER (OUTPATIENT)
Dept: PHYSICAL THERAPY | Facility: HOSPITAL | Age: 80
Setting detail: THERAPIES SERIES
Discharge: HOME | End: 2020-01-16
Payer: MEDICARE

## 2020-01-16 PROCEDURE — 97110 THERAPEUTIC EXERCISES: CPT

## 2020-01-16 PROCEDURE — 97150 GROUP THERAPEUTIC PROCEDURES: CPT

## 2020-01-17 DIAGNOSIS — Z12.31 ENCOUNTER FOR SCREENING MAMMOGRAM FOR MALIGNANT NEOPLASM OF BREAST: ICD-10-CM

## 2020-01-17 DIAGNOSIS — Z12.39 BREAST CANCER SCREENING: Primary | ICD-10-CM

## 2020-01-21 ENCOUNTER — HOSPITAL ENCOUNTER (OUTPATIENT)
Dept: PHYSICAL THERAPY | Facility: HOSPITAL | Age: 80
Setting detail: THERAPIES SERIES
Discharge: HOME | End: 2020-01-21
Payer: MEDICARE

## 2020-01-21 PROCEDURE — 97150 GROUP THERAPEUTIC PROCEDURES: CPT

## 2020-01-21 NOTE — FLOWSHEET NOTE
Physical Therapy Daily Treatment Note   Date:  2020    TIme In:      1002              Time Out:  1030    Patient Name:  Deniz Stevens    :  1940  MRN: [id number]    Restrictions/Precautions:   Pacemaker  Pertinent Medical History:  Medical/Treatment Diagnosis Information:  ·   s/p R SHAHBAZ     Insurance/Certification information:     [facility]  Physician Information:    Dian Fountain PA-C  Plan of care signed (Y/N):    Visit# / total visits:     7/    G-Code (if applicable):      Date / Visit # G-Code Applied:         Progress Note: []  Yes  [x]  No  Next due by: Visit #10      Pain level:   0/10    Subjective: Pt reports her hip is doing great but she had to wear a brace on her knee today because its acting up some. Objective:   Observation: stable gait with st. cane   Test measurements:     Palpation:    Exercises:  Exercise Resistance/Repetitions Other comments   QS 3x10 21   GS 3x10 21   Supine hip abd 2x10 RLE 21   Hip abd/add(BTB/ball) 2x10 21   St marches 3x10 21   Calf raises 3x10 21   St hip abd 2x10 RLE 21   Nustep L5x10' 21   Minisquats 3x10 21   LAQ 3x10 B 21               Other Therapeutic Activities:      Manual Treatments:      Modalities:        Timed Code Treatment Minutes:  25      Total Treatment Minutes:  28    Treatment/Activity Tolerance:  [x] Patient tolerated treatment well [] Patient limited by fatigue  [] Patient limited by pain  [] Patient limited by other medical complications  [x] Other: Pt completed tx with no pain and performed all activity well.     Pain after treatment:     0/10    Prognosis: [x] Good [] Fair  [] Poor    Patient Requires Follow-up: [x] Yes  [] No    Plan:   [x] Continue per plan of care [] Alter current plan (see comments)  [] Plan of care initiated [] Hold pending MD visit [] Discharge    Plan for Next Session:        Electronically signed by:  Sophie Lopez PTA

## 2020-01-22 RX ORDER — FUROSEMIDE 20 MG/1
TABLET ORAL
Qty: 180 TABLET | Refills: 1 | Status: SHIPPED | OUTPATIENT
Start: 2020-01-22 | End: 2020-07-11

## 2020-01-22 RX ORDER — LOVASTATIN 10 MG/1
TABLET ORAL
Qty: 90 TABLET | Refills: 3 | Status: SHIPPED | OUTPATIENT
Start: 2020-01-22 | End: 2021-01-04

## 2020-01-22 RX ORDER — GLIPIZIDE 2.5 MG/1
TABLET, EXTENDED RELEASE ORAL
Qty: 90 TABLET | Refills: 3 | Status: SHIPPED | OUTPATIENT
Start: 2020-01-22 | End: 2020-07-17

## 2020-01-23 ENCOUNTER — HOSPITAL ENCOUNTER (OUTPATIENT)
Dept: PHYSICAL THERAPY | Facility: HOSPITAL | Age: 80
Setting detail: THERAPIES SERIES
Discharge: HOME | End: 2020-01-23
Payer: MEDICARE

## 2020-01-23 PROCEDURE — 97150 GROUP THERAPEUTIC PROCEDURES: CPT

## 2020-01-28 ENCOUNTER — HOSPITAL ENCOUNTER (OUTPATIENT)
Dept: PHYSICAL THERAPY | Facility: HOSPITAL | Age: 80
Setting detail: THERAPIES SERIES
Discharge: HOME | End: 2020-01-28
Payer: MEDICARE

## 2020-01-28 PROCEDURE — 97150 GROUP THERAPEUTIC PROCEDURES: CPT

## 2020-01-28 PROCEDURE — 97110 THERAPEUTIC EXERCISES: CPT

## 2020-01-28 NOTE — FLOWSHEET NOTE
Physical Therapy Daily Treatment Note   Date:  2020    TIme In:      1030              Time Out:  1105    Patient Name:  Eloy Florence    :  1940  MRN: [id number]    Restrictions/Precautions:   Pacemaker  Pertinent Medical History:  Medical/Treatment Diagnosis Information:  ·   s/p R SHAHBAZ     Insurance/Certification information:    [facility] / [facility]  Physician Information:    Blaise Hartman PA-C  Plan of care signed (Y/N):    Visit# / total visits:     9/    G-Code (if applicable):      Date / Visit # G-Code Applied:         Progress Note: []  Yes  [x]  No  Next due by: Visit #10      Pain level:   0/10    Subjective: Pt reports she is doing well. Objective:   Observation: stable gait with st. cane   Test measurements:     Palpation:    Exercises:  Exercise Resistance/Repetitions Other comments   QS 3x10 28   GS 3x10 28   Supine hip abd 2x10 RLE 28   Hip abd/add(BTB/ball) 2x15 28   St marches 3x10 28   Calf raises 3x10 28   St hip abd 2x10 RLE 28   Nustep L5x10' 28   Minisquats 3x10 28   LAQ 3x10 B 28               Other Therapeutic Activities:      Manual Treatments:      Modalities:        Timed Code Treatment Minutes:  15 and rest group      Total Treatment Minutes:  35    Treatment/Activity Tolerance:  [x] Patient tolerated treatment well [] Patient limited by fatigue  [] Patient limited by pain  [] Patient limited by other medical complications  [x] Other: Pt completed tx with no pin in hip.     Pain after treatment:     0/10    Prognosis: [x] Good [] Fair  [] Poor    Patient Requires Follow-up: [x] Yes  [] No    Plan:   [x] Continue per plan of care [] Alter current plan (see comments)  [] Plan of care initiated [] Hold pending MD visit [] Discharge    Plan for Next Session:        Electronically signed by:  Amy Ascencio PTA

## 2020-01-30 ENCOUNTER — HOSPITAL ENCOUNTER (OUTPATIENT)
Dept: PHYSICAL THERAPY | Facility: HOSPITAL | Age: 80
Setting detail: THERAPIES SERIES
Discharge: HOME | End: 2020-01-30
Payer: MEDICARE

## 2020-01-30 PROCEDURE — 97150 GROUP THERAPEUTIC PROCEDURES: CPT

## 2020-01-30 NOTE — FLOWSHEET NOTE
Physical Therapy Re-Assessment Note   Date:  2020    TIme In:    1127               Time Out:  1215    Patient Name:  Jdoee Weston    :  1940  MRN: [id number]    Restrictions/Precautions:   Pacemaker  Pertinent Medical History:  Medical/Treatment Diagnosis Information:  ·   s/p R SHAHBAZ     Insurance/Certification information:    [facility] / [facility]  Physician Information:    Georgi Frazier PA-C  Plan of care signed (Y/N):    Visit# / total visits:     10/    G-Code (if applicable):      Date / Visit # G-Code Applied:         Progress Note: []  Yes  [x]  No  Next due by: 3/2/20      Pain level:   0/10    Subjective: Pt reports she is doing well. Objective:   Observation: stable gait with st. cane   Test measurements:  LEFS: 49/80. MMT: 4+/5 grossly     Palpation:    Exercises:  Exercise Resistance/Repetitions Other comments   QS 3x10 30   GS 3x10 30   Supine hip abd 2x10 RLE 30   Hip abd/add(BTB/ball) 2x15 30   St marches 3x10 30   Calf raises 3x10 30   St hip abd 2x10 RLE 30   Nustep L5x10' 30   Minisquats 3x10 30   LAQ 3x10 B 30               Other Therapeutic Activities:      Manual Treatments:      Modalities:        Timed Code Treatment Minutes:  39 - all group      Total Treatment Minutes:  45    Treatment/Activity Tolerance:  [x] Patient tolerated treatment well [] Patient limited by fatigue  [] Patient limited by pain  [] Patient limited by other medical complications  [x] Other: Pt is making excellent progress toward goals; plan to continue PT 1-2 more weeks then d/c to HEP.     Pain after treatment:     0/10    Prognosis: [x] Good [] Fair  [] Poor    Patient Requires Follow-up: [x] Yes  [] No    Plan:   [x] Continue per plan of care [] Alter current plan (see comments)  [] Plan of care initiated [] Hold pending MD visit [] Discharge    Plan for Next Session:      Goals  Short term goals  Time Frame for Short term goals: 4 weeks  Short term goal 1: Pt to be I with HEP  Short term goal 2: Pt to

## 2020-02-03 ENCOUNTER — HOSPITAL ENCOUNTER (OUTPATIENT)
Dept: PHYSICAL THERAPY | Facility: HOSPITAL | Age: 80
Setting detail: THERAPIES SERIES
Discharge: HOME | End: 2020-02-03
Payer: MEDICARE

## 2020-02-03 PROCEDURE — 97110 THERAPEUTIC EXERCISES: CPT

## 2020-02-03 PROCEDURE — 97150 GROUP THERAPEUTIC PROCEDURES: CPT

## 2020-02-03 NOTE — FLOWSHEET NOTE
Physical Therapy Daily Treatment Note/Discharge Summary   Date:  2/3/2020    TIme In:      1100              Time Out:  [address]    Patient Name:  Toro Milian    :  1940  MRN: [id number]    Restrictions/Precautions:   Pacemaker  Pertinent Medical History:  Medical/Treatment Diagnosis Information:  ·   s/p R SHAHBAZ     Insurance/Certification information:    [facility] / [facility]  Physician Information:    Albina Moore PA-C  Plan of care signed (Y/N):    Visit# / total visits:     11/    G-Code (if applicable):      Date / Visit # G-Code Applied:         Progress Note: []  Yes  [x]  No  Next due by:    Pain level:   0/10    Subjective: Pt reports she is doing well. Objective:   Observation: stable gait with st. cane   Test measurements:     Palpation:    Exercises:  Exercise Resistance/Repetitions Other comments   QS 3x10 3   GS 3x10 3   Supine hip abd 2x10 RLE 3   Hip abd/add(BTB/ball) 2x15 3   St marches 3x10 3   Calf raises 3x10 3   St hip abd 2x10 RLE 3   Nustep L5x10' 3   Minisquats 3x10 3   LAQ 3x10 B 3               Other Therapeutic Activities:      Manual Treatments:      Modalities:        Timed Code Treatment Minutes:  25 and rest group      Total Treatment Minutes:  40    Treatment/Activity Tolerance:  [x] Patient tolerated treatment well [] Patient limited by fatigue  [] Patient limited by pain  [] Patient limited by other medical complications  [x] Other: Pt completed tx with no pin in hip. Pt has made excellent progress since starting, pt to be discharged to [facility].     Pain after treatment:     0/10    Prognosis: [x] Good [] Fair  [] Poor    Patient Requires Follow-up: [x] Yes  [] No    Plan:   [x] Continue per plan of care [] Alter current plan (see comments)  [] Plan of care initiated [] Hold pending MD visit [] Discharge    Plan for Next Session:        Electronically signed by:  Jennifer Forrester PTA

## 2020-02-05 ENCOUNTER — APPOINTMENT (OUTPATIENT)
Dept: PHYSICAL THERAPY | Facility: HOSPITAL | Age: 80
End: 2020-02-05
Payer: MEDICARE

## 2020-02-06 ENCOUNTER — APPOINTMENT (OUTPATIENT)
Dept: PHYSICAL THERAPY | Facility: HOSPITAL | Age: 80
End: 2020-02-06
Payer: MEDICARE

## 2020-02-10 ENCOUNTER — APPOINTMENT (OUTPATIENT)
Dept: PHYSICAL THERAPY | Facility: HOSPITAL | Age: 80
End: 2020-02-10
Payer: MEDICARE

## 2020-02-12 ENCOUNTER — OFFICE VISIT (OUTPATIENT)
Dept: INTERNAL MEDICINE | Facility: CLINIC | Age: 80
End: 2020-02-12

## 2020-02-12 ENCOUNTER — APPOINTMENT (OUTPATIENT)
Dept: PHYSICAL THERAPY | Facility: HOSPITAL | Age: 80
End: 2020-02-12
Payer: MEDICARE

## 2020-02-12 VITALS
WEIGHT: 183 LBS | TEMPERATURE: 97.5 F | DIASTOLIC BLOOD PRESSURE: 67 MMHG | HEIGHT: 67 IN | SYSTOLIC BLOOD PRESSURE: 153 MMHG | OXYGEN SATURATION: 98 % | HEART RATE: 89 BPM | RESPIRATION RATE: 16 BRPM | BODY MASS INDEX: 28.72 KG/M2

## 2020-02-12 DIAGNOSIS — E11.620 TYPE 2 DIABETES MELLITUS WITH DIABETIC DERMATITIS, WITHOUT LONG-TERM CURRENT USE OF INSULIN (HCC): ICD-10-CM

## 2020-02-12 DIAGNOSIS — B35.4 TINEA CORPORIS: Primary | ICD-10-CM

## 2020-02-12 PROCEDURE — 99214 OFFICE O/P EST MOD 30 MIN: CPT | Performed by: INTERNAL MEDICINE

## 2020-02-12 RX ORDER — NYSTATIN 100000 [USP'U]/G
POWDER TOPICAL 3 TIMES DAILY
Qty: 60 G | Refills: 11 | Status: SHIPPED | OUTPATIENT
Start: 2020-02-12 | End: 2021-03-15

## 2020-02-12 RX ORDER — TOBRAMYCIN AND DEXAMETHASONE 3; 1 MG/ML; MG/ML
1 SUSPENSION/ DROPS OPHTHALMIC DAILY
COMMUNITY
End: 2020-02-12 | Stop reason: SDUPTHER

## 2020-02-12 RX ORDER — FLUCONAZOLE 150 MG/1
150 TABLET ORAL ONCE
Qty: 1 TABLET | Refills: 0 | Status: SHIPPED | OUTPATIENT
Start: 2020-02-12 | End: 2020-02-12

## 2020-02-12 RX ORDER — TOBRAMYCIN AND DEXAMETHASONE 3; 1 MG/ML; MG/ML
1 SUSPENSION/ DROPS OPHTHALMIC DAILY
Qty: 5 ML | Refills: 2 | Status: ON HOLD | OUTPATIENT
Start: 2020-02-12 | End: 2023-03-28 | Stop reason: SDUPTHER

## 2020-02-12 NOTE — PROGRESS NOTES
Subjective     Patient ID: Radha Whelan is a 79 y.o. female. Patient is here for management of multiple medical problems.     Chief Complaint   Patient presents with   • Rash     patient has an itchy rash under both breasts x 1 week     History of Present Illness     Pt with rash under both breast right worse than left.  Itching and mild, topical monstate. Started 13 weeks ago.       The following portions of the patient's history were reviewed and updated as appropriate: allergies, current medications, past family history, past medical history, past social history, past surgical history and problem list.    Review of Systems   HENT: Negative for dental problem, drooling and ear discharge.    Gastrointestinal: Negative for abdominal distention and abdominal pain.   Musculoskeletal: Negative for back pain and gait problem.   Skin: Positive for rash.   Psychiatric/Behavioral: Negative for self-injury and sleep disturbance. The patient is not nervous/anxious.    All other systems reviewed and are negative.      Current Outpatient Medications:   •  acetaminophen (TYLENOL) 325 MG tablet, Take 2 tablets by mouth Every 6 (Six) Hours As Needed for Mild Pain ., Disp: , Rfl:   •  Cholecalciferol (VITAMIN D PO), Take 125 mcg by mouth 2 (Two) Times a Week., Disp: , Rfl:   •  clopidogrel (PLAVIX) 75 MG tablet, TAKE 1 TABLET DAILY, Disp: 90 tablet, Rfl: 3  •  coenzyme Q10 100 MG capsule, Take 400 mg by mouth Every Other Day., Disp: , Rfl:   •  docusate sodium 100 MG capsule, Take 100 mg by mouth 2 (Two) Times a Day As Needed for Constipation., Disp: , Rfl:   •  folic acid (FOLVITE) 1 MG tablet, TAKE 1 TABLET DAILY, Disp: 90 tablet, Rfl: 3  •  furosemide (LASIX) 20 MG tablet, TAKE 1 TABLET TWICE A DAY, Disp: 180 tablet, Rfl: 1  •  glipizide (GLUCOTROL XL) 2.5 MG 24 hr tablet, TAKE 1 TABLET DAILY, Disp: 90 tablet, Rfl: 3  •  Glucosamine-Chondroit-Vit C-Mn (GLUCOSAMINE CHONDR 1500 COMPLX PO), Take 1 tablet by mouth Daily., Disp: ,  "Rfl:   •  glucose blood test strip, Patient tests 1 time daily. Diagnosis code E11.9, Disp: 100 each, Rfl: 3  •  lovastatin (MEVACOR) 10 MG tablet, TAKE 1 TABLET EVERY NIGHT, Disp: 90 tablet, Rfl: 3  •  melatonin 3 MG tablet, Take 3 mg by mouth Every Night., Disp: , Rfl:   •  pantoprazole (PROTONIX) 40 MG EC tablet, TAKE 1 TABLET DAILY, Disp: 90 tablet, Rfl: 3  •  polyethylene glycol (MIRALAX) pack packet, Take 17 g by mouth Daily., Disp: , Rfl:   •  tobramycin-dexamethasone (TOBRADEX) 0.3-0.1 % ophthalmic suspension, Administer 1 drop into the left eye Daily., Disp: 5 mL, Rfl: 2  •  vitamin B-12 (CYANOCOBALAMIN) 1000 MCG tablet, Take 1,000 mcg by mouth 2 (Two) Times a Week., Disp: , Rfl:   •  fluconazole (DIFLUCAN) 150 MG tablet, Take 1 tablet by mouth 1 (One) Time for 1 dose., Disp: 1 tablet, Rfl: 0  •  nystatin (MYCOSTATIN) 904728 UNIT/GM powder, Apply  topically to the appropriate area as directed 3 (Three) Times a Day., Disp: 60 g, Rfl: 11    Objective      Blood pressure 153/67, pulse 89, temperature 97.5 °F (36.4 °C), temperature source Oral, resp. rate 16, height 170.2 cm (67\"), weight 83 kg (183 lb), SpO2 98 %.    Physical Exam     General Appearance:    Alert, cooperative, no distress, appears stated age   Head:    Normocephalic, without obvious abnormality, atraumatic   Eyes:    PERRL, conjunctiva/corneas clear, EOM's intact   Ears:    Normal TM's and external ear canals, both ears   Nose:   Nares normal, septum midline, mucosa normal, no drainage   or sinus tenderness   Throat:   Lips, mucosa, and tongue normal; teeth and gums normal   Neck:   Supple, symmetrical, trachea midline, no adenopathy;        thyroid:  No enlargement/tenderness/nodules; no carotid    bruit or JVD   Back:     Symmetric, no curvature, ROM normal, no CVA tenderness   Lungs:     Clear to auscultation bilaterally, respirations unlabored   Chest wall:    No tenderness or deformity   Heart:    Regular rate and rhythm, S1 and S2 " normal, no murmur,        rub or gallop   Abdomen:     Soft, non-tender, bowel sounds active all four quadrants,     no masses, no organomegaly   Extremities:   Extremities normal, atraumatic, no cyanosis or edema   Pulses:   2+ and symmetric all extremities   Skin:   Erythema under both breast. Right greater than left.     Lymph nodes:   Cervical, supraclavicular, and axillary nodes normal   Neurologic:   CNII-XII intact. Normal strength, sensation and reflexes       throughout      Results for orders placed or performed in visit on 12/20/19   Vitamin B12   Result Value Ref Range    Vitamin B-12 1,098 (H) 211 - 946 pg/mL   Lipid Panel   Result Value Ref Range    Total Cholesterol 202 (H) 0 - 200 mg/dL    Triglycerides 131 0 - 150 mg/dL    HDL Cholesterol 60 40 - 60 mg/dL    VLDL Cholesterol 26.2 mg/dL    LDL Cholesterol  116 (H) 0 - 100 mg/dL   Comprehensive Metabolic Panel   Result Value Ref Range    Glucose 139 (H) 65 - 99 mg/dL    BUN 14 8 - 23 mg/dL    Creatinine 0.94 0.57 - 1.00 mg/dL    eGFR Non African Am 57 (L) >60 mL/min/1.73    eGFR African Am 69 >60 mL/min/1.73    BUN/Creatinine Ratio 14.9 7.0 - 25.0    Sodium 135 (L) 136 - 145 mmol/L    Potassium 3.8 3.5 - 5.2 mmol/L    Chloride 93 (L) 98 - 107 mmol/L    Total CO2 29.9 (H) 22.0 - 29.0 mmol/L    Calcium 9.2 8.6 - 10.5 mg/dL    Total Protein 7.1 6.0 - 8.5 g/dL    Albumin 4.10 3.50 - 5.20 g/dL    Globulin 3.0 gm/dL    A/G Ratio 1.4 g/dL    Total Bilirubin 0.8 0.2 - 1.2 mg/dL    Alkaline Phosphatase 84 39 - 117 U/L    AST (SGOT) 21 1 - 32 U/L    ALT (SGPT) 13 1 - 33 U/L   TSH   Result Value Ref Range    TSH 1.600 0.270 - 4.200 uIU/mL   T4, Free   Result Value Ref Range    Free T4 1.41 0.93 - 1.70 ng/dL   Hemoglobin A1c   Result Value Ref Range    Hemoglobin A1C 6.00 (H) 4.80 - 5.60 %   MicroAlbumin, Urine, Random - Urine, Clean Catch   Result Value Ref Range    Microalbumin, Urine 8.1 Not Estab. ug/mL   Microscopic Examination -   Result Value Ref Range     WBC, UA 0-2 /HPF    RBC, UA 0-2 /HPF    Epithelial Cells (non renal) 3-6 (A) /HPF    Cast Type Comment     Bacteria, UA Comment None Seen /HPF   CBC & Differential   Result Value Ref Range    WBC 5.07 3.40 - 10.80 10*3/mm3    RBC 3.31 (L) 3.77 - 5.28 10*6/mm3    Hemoglobin 10.6 (L) 12.0 - 15.9 g/dL    Hematocrit 30.9 (L) 34.0 - 46.6 %    MCV 93.4 79.0 - 97.0 fL    MCH 32.0 26.6 - 33.0 pg    MCHC 34.3 31.5 - 35.7 g/dL    RDW 13.8 12.3 - 15.4 %    Platelets 359 140 - 450 10*3/mm3    Neutrophil Rel % 60.3 42.7 - 76.0 %    Lymphocyte Rel % 28.6 19.6 - 45.3 %    Monocyte Rel % 9.5 5.0 - 12.0 %    Eosinophil Rel % 0.8 0.3 - 6.2 %    Basophil Rel % 0.4 0.0 - 1.5 %    Neutrophils Absolute 3.06 1.70 - 7.00 10*3/mm3    Lymphocytes Absolute 1.45 0.70 - 3.10 10*3/mm3    Monocytes Absolute 0.48 0.10 - 0.90 10*3/mm3    Eosinophils Absolute 0.04 0.00 - 0.40 10*3/mm3    Basophils Absolute 0.02 0.00 - 0.20 10*3/mm3    Immature Granulocyte Rel % 0.4 0.0 - 0.5 %    Immature Grans Absolute 0.02 0.00 - 0.05 10*3/mm3    nRBC 0.0 0.0 - 0.2 /100 WBC   Urinalysis With Microscopic - Urine, Clean Catch   Result Value Ref Range    Specific Gravity, UA 1.026 1.005 - 1.030    pH, UA 6.5 5.0 - 8.0    Color, UA Yellow     Appearance, UA Clear Clear    Leukocytes, UA See below: (A) Negative    Protein Trace (A) Negative    Glucose, UA Negative Negative    Ketones Negative Negative    Blood, UA Negative Negative    Bilirubin, UA Negative Negative    Urobilinogen, UA Comment     Nitrite, UA Negative Negative         Assessment/Plan       Radha was seen today for rash.    Diagnoses and all orders for this visit:    Tinea corporis  -     nystatin (MYCOSTATIN) 950833 UNIT/GM powder; Apply  topically to the appropriate area as directed 3 (Three) Times a Day.  -     glucose blood test strip; Patient tests 1 time daily. Diagnosis code E11.9    Type 2 diabetes mellitus with diabetic dermatitis, without long-term current use of insulin (CMS/[facility])  -      nystatin (MYCOSTATIN) 324845 UNIT/GM powder; Apply  topically to the appropriate area as directed 3 (Three) Times a Day.  -     glucose blood test strip; Patient tests 1 time daily. Diagnosis code E11.9    Other orders  -     Discontinue: glucose blood test strip; Patient tests 1 time daily. Diagnosis code E11.9  -     tobramycin-dexamethasone (TOBRADEX) 0.3-0.1 % ophthalmic suspension; Administer 1 drop into the left eye Daily.  -     fluconazole (DIFLUCAN) 150 MG tablet; Take 1 tablet by mouth 1 (One) Time for 1 dose.      No follow-ups on file.          There are no Patient Instructions on file for this visit.     Farhan Schaefer MD    Assessment/Plan

## 2020-02-21 ENCOUNTER — TELEPHONE (OUTPATIENT)
Dept: INTERNAL MEDICINE | Facility: CLINIC | Age: 80
End: 2020-02-21

## 2020-02-21 NOTE — TELEPHONE ENCOUNTER
Deb from [facility] wanted to see if the patient can change from One Touch Verio because her insurance does not pay for this anymore, to the Kayce Plus Meter and Kayce Plus Test Strips and Accu-Chek Softclix Lancets. She states that she sent a couple of faxes, so they could be sent back or call her at [phone]

## 2020-03-04 ENCOUNTER — HOSPITAL ENCOUNTER (OUTPATIENT)
Dept: MAMMOGRAPHY | Facility: HOSPITAL | Age: 80
Discharge: HOME | End: 2020-03-04
Admitting: INTERNAL MEDICINE

## 2020-03-04 PROCEDURE — 77067 SCR MAMMO BI INCL CAD: CPT

## 2020-03-04 PROCEDURE — 77063 BREAST TOMOSYNTHESIS BI: CPT

## 2020-03-09 ENCOUNTER — OFFICE VISIT (OUTPATIENT)
Dept: CARDIOLOGY | Facility: CLINIC | Age: 80
End: 2020-03-09

## 2020-03-09 VITALS
HEART RATE: 87 BPM | SYSTOLIC BLOOD PRESSURE: 110 MMHG | DIASTOLIC BLOOD PRESSURE: 60 MMHG | HEIGHT: 67 IN | OXYGEN SATURATION: 98 % | WEIGHT: 190.8 LBS | BODY MASS INDEX: 29.95 KG/M2

## 2020-03-09 DIAGNOSIS — I49.5 SINUS NODE DYSFUNCTION (HCC): ICD-10-CM

## 2020-03-09 PROCEDURE — 93280 PM DEVICE PROGR EVAL DUAL: CPT | Performed by: INTERNAL MEDICINE

## 2020-03-09 PROCEDURE — 99213 OFFICE O/P EST LOW 20 MIN: CPT | Performed by: INTERNAL MEDICINE

## 2020-03-09 NOTE — PROGRESS NOTES
Cardiac Electrophysiology Outpatient Follow Up Note            [facility]     Follow Up Office Visit      Radha Whelan  [id number]  2020  [unfilled]  [unfilled]    Primary Care Physician: Farhan Schaefer MD    Referred By: No ref. provider found    Subjective     Chief Complaint:   Chief Complaint   Patient presents with   • Coronary Artery Disease     PROBLEM LIST:   1. CAD  1. 2019 echo: EF 42%, grade 1 diastolic dysfunction, RVSP 22  2. 2019 LHC per [facility] no evidence of hemodynamically significant CAD, normal EF 55%, 24-hour Holter recommended to assess for bradycardic arrhythmias  2. Bradycardia  1. 2019 24-hour Holter: Average HR 60, range 45-1 07, 1.6% PACs, 0.3% PVCs, rare SVT less than 11 beats with no symptoms  3. HTN  4. HLD  1. Statin intolerant to multiple agents  2. 2017   HDL 70   3. 2019  TG 65 HDL 97 , on lovastatin  5. Hx TIAs  1. Committed to DAPT ~  6. T2DM  1. Dx ~   2.  A1c 7.2  7. Hx DVT ~, temporary ac  8. Migraines  9. Thyroid nodule  10. Hx hyponatremia  11.   12. Surgical history:  1. Tubal ligation  2. Breast biopsy    History of Present Illness:   Mrs. Radha Whelan is a 79 y.o. female who presents to my electrophysiology clinic for follow up of sinus node dysfunction with cardiac asystole of 8 seconds due to sinus arrest.  She feels well.  She is not any further syncope.  She has no chest pain nausea vomit fevers or chills syncope presyncope.  She has no discomfort at her pacemaker site she says the wound is healed beautifully.    She has no chest pain nausea vomiting fevers or chills syncope or presyncope.      Review of Systems:   Constitutional: No fevers or chills, no recent weight gain or weight loss or fatigue  Eyes: No visual loss, blurred vision, double vision, yellow sclerae.  ENT: No headaches, hearing loss, vertigo, congestion or sore throat.  "  Cardiovascular: Per HPI  Respiratory: No cough or wheezing, no sputum production, no hematemesis   Gastrointestinal: No abdominal pain, no nausea, vomiting, constipation, diarrhea, melena.   Genitourinary: No dysuria, hematuria or increased frequency.      Past Medical History:   Past Medical History:   Diagnosis Date   • Arthritis    • Basal cell carcinoma    • Body piercing     both ears   • Cataract, bilateral     s/p removal    • Diabetes mellitus (CMS/HCC)    • DVT (deep venous thrombosis) (CMS/HCC)     1970's- left leg   • Edema     legs- hx of   • Elevated cholesterol    • Fatigue    • Fatigue    • History of left heart catheterization     9/16/19  no stents   • History of migraine headaches    • Hx of exercise stress test 2004   • Hyperlipidemia    • Hypertension    • Hyponatremia    • Impaired functional mobility, balance, gait, and endurance    • LBBB (left bundle branch block)    • Left leg pain     left leg and knee pain    • Muscle spasm     hx of   • Obesity    • ODELL on CPAP     CPAP   • Stroke (CMS/HCC)    • Teeth missing     all of the top, and has 6 bottom teeth left   • Thyroid nodule    • TIA (transient ischemic attack)     x3.  last one was \"a few years ago\"   • Wears dentures     top plate   • Wears glasses     to read       Past Surgical History:   Past Surgical History:   Procedure Laterality Date   • BREAST BIOPSY Left     benign   • CARDIAC CATHETERIZATION      09/16/2019 PER .   • CARDIAC CATHETERIZATION N/A 9/16/2019    Procedure: Left Heart Cath +/- CBI;  Surgeon: Ashlyn Mays MD;  Location:  [facility];  Service: Cardiovascular   • CARDIAC ELECTROPHYSIOLOGY PROCEDURE N/A 12/6/2019    Procedure: PACEMAKER IMPLANTATION- DC;  Surgeon: Stephan Laboy DO;  Location:  [facility];  Service: Cardiology   • CATARACT EXTRACTION  12/2018    both eyes    • COLONOSCOPY     • MOUTH SURGERY      all top teeth   • SKIN BIOPSY      basal cell   • TOTAL HIP " ARTHROPLASTY Right 12/4/2019    Procedure: HIP ARTHROPLASTY TOTAL RIGHT;  Surgeon: Issa Salgado MD;  Location: [facility];  Service: Orthopedics   • TUBAL ABDOMINAL LIGATION         Family History:   Family History   Problem Relation Age of Onset   • Diabetes Other    • Hypertension Other    • Cancer Other         malignant neoplasm    • Migraines Other    • Heart disease Mother    • Heart disease Father    • Breast cancer Sister    • Breast cancer Other    • Intracerebral hemorrhage Brother    • Asthma Daughter    • Heart failure Daughter        Social History:   Social History     Socioeconomic History   • Marital status:      Spouse name: Not on file   • Number of children: Not on file   • Years of education: Not on file   • Highest education level: Not on file   Tobacco Use   • Smoking status: Never Smoker   • Smokeless tobacco: Never Used   Substance and Sexual Activity   • Alcohol use: No   • Drug use: No   • Sexual activity: Defer   Social History Narrative    Pt is . Lives with spouse.  No HH, oxygen or DME.  Uses cpap for ODELL       Medications:     Current Outpatient Medications:   •  acetaminophen (TYLENOL) 325 MG tablet, Take 2 tablets by mouth Every 6 (Six) Hours As Needed for Mild Pain ., Disp: , Rfl:   •  Cholecalciferol (VITAMIN D PO), Take 125 mcg by mouth 2 (Two) Times a Week., Disp: , Rfl:   •  clopidogrel (PLAVIX) 75 MG tablet, TAKE 1 TABLET DAILY, Disp: 90 tablet, Rfl: 3  •  coenzyme Q10 100 MG capsule, Take 400 mg by mouth Every Other Day., Disp: , Rfl:   •  folic acid (FOLVITE) 1 MG tablet, TAKE 1 TABLET DAILY, Disp: 90 tablet, Rfl: 3  •  furosemide (LASIX) 20 MG tablet, TAKE 1 TABLET TWICE A DAY (Patient taking differently: 20 mg Daily.), Disp: 180 tablet, Rfl: 1  •  glipizide (GLUCOTROL XL) 2.5 MG 24 hr tablet, TAKE 1 TABLET DAILY, Disp: 90 tablet, Rfl: 3  •  Glucosamine-Chondroit-Vit C-Mn (GLUCOSAMINE CHONDR 1500 COMPLX PO), Take 1 tablet by mouth Daily., Disp: , Rfl:   •   "glucose blood test strip, Patient tests 1 time daily. Diagnosis code E11.9, Disp: 100 each, Rfl: 3  •  lovastatin (MEVACOR) 10 MG tablet, TAKE 1 TABLET EVERY NIGHT (Patient taking differently: Every Night.), Disp: 90 tablet, Rfl: 3  •  melatonin 3 MG tablet, Take 3 mg by mouth Every Night., Disp: , Rfl:   •  nystatin (MYCOSTATIN) 854748 UNIT/GM powder, Apply  topically to the appropriate area as directed 3 (Three) Times a Day., Disp: 60 g, Rfl: 11  •  pantoprazole (PROTONIX) 40 MG EC tablet, TAKE 1 TABLET DAILY, Disp: 90 tablet, Rfl: 3  •  polyethylene glycol (MIRALAX) pack packet, Take 17 g by mouth Daily., Disp: , Rfl:   •  tobramycin-dexamethasone (TOBRADEX) 0.3-0.1 % ophthalmic suspension, Administer 1 drop into the left eye Daily., Disp: 5 mL, Rfl: 2  •  vitamin B-12 (CYANOCOBALAMIN) 1000 MCG tablet, Take 1,000 mcg by mouth 2 (Two) Times a Week., Disp: , Rfl:     Allergies:   Allergies   Allergen Reactions   • Cumini Other (See Comments)     Complex migrane   • Advil [Ibuprofen] Rash     Swelling all over   • Butter Flavor [Flavoring Agent] Other (See Comments)     States \"butter fat\"-migranes     • Cheese Other (See Comments)     migrane     • Other Other (See Comments)     \"ice cream\"-migranes     • Saccharin Other (See Comments)     migrane         Objective     Physical Exam:  Vital Signs:   Vitals:    03/09/20 1420   BP: 110/60   BP Location: Right arm   Patient Position: Sitting   Pulse: 87   SpO2: 98%   Weight: 86.5 kg (190 lb 12.8 oz)   Height: 170.2 cm (67\")     GEN: Well nourished, well-developed, no acute distress  HEENT: Normocephalic, atraumatic, moist mucous membranes  NECK: Supple, no JVD, no thyromegaly, no lymphadenopathy  CARD: Regular rate and rhythm, normal S1 & S2 are present.  No murmur, gallop or rubs are appreciated.  LUNGS: Clear to auscultation bilateraly, normal respiratory effort  ABDOMEN: Soft, nontender, normal bowel sounds  EXTREMITIES: No gross deformities, no clubbing, cyanosis. "  Edema none    Lab Results   Component Value Date    GLUCOSE 121 (H) 12/08/2019    CALCIUM 9.2 12/20/2019     (L) 12/20/2019    K 3.8 12/20/2019    CO2 29.9 (H) 12/20/2019    CL 93 (L) 12/20/2019    BUN 14 12/20/2019    CREATININE 0.94 12/20/2019    EGFRIFAFRI 69 12/20/2019    EGFRIFNONA 57 (L) 12/20/2019    BCR 14.9 12/20/2019    ANIONGAP 9.0 12/08/2019     Lab Results   Component Value Date    WBC 5.07 12/20/2019    HGB 10.6 (L) 12/20/2019    HCT 30.9 (L) 12/20/2019    MCV 93.4 12/20/2019     12/20/2019     Lab Results   Component Value Date    INR 0.97 11/20/2019    PROTIME 13.1 11/20/2019     Lab Results   Component Value Date    TSH 1.600 12/20/2019       Cardiac Testing:     I personally viewed and interpreted the patient's EKG/Telemetry/lab data    Procedures    Tobacco Cessation: N/A  Obstructive Sleep Apnea Screening: N/A    Assessment & Plan      79-year-old female patient with permanent pacemaker implanted 3 months ago for sinus node dysfunction.  Her pacemaker is functioning well.  Is probably program with adequate pacing and sensing safety margins.    Her blood pressures well controlled.    We will see her back in 1 years time.    There are no diagnoses linked to this encounter.    Follow Up:       Thank you for allowing me to participate in the care of your patient. Please to not hesitate to contact me with additional questions or concerns.        Stephan Laboy DO, FACC, [facility]  Cardiac Electrophysiologist

## 2020-05-05 DIAGNOSIS — I67.82 TEMPORARY CEREBRAL VASCULAR DYSFUNCTION: ICD-10-CM

## 2020-05-05 RX ORDER — CLOPIDOGREL BISULFATE 75 MG/1
TABLET ORAL
Qty: 90 TABLET | Refills: 1 | Status: SHIPPED | OUTPATIENT
Start: 2020-05-05 | End: 2020-11-13

## 2020-06-11 ENCOUNTER — TELEPHONE (OUTPATIENT)
Dept: CARDIOLOGY | Facility: CLINIC | Age: 80
End: 2020-06-11

## 2020-06-11 NOTE — TELEPHONE ENCOUNTER
Called pt due to [employer] home monitor didn't send scheduled reading. Spoke to Mrs Whelan and she will call for assistance with sending in a reading when she gets home.

## 2020-07-11 RX ORDER — FUROSEMIDE 20 MG/1
TABLET ORAL
Qty: 180 TABLET | Refills: 1 | Status: SHIPPED | OUTPATIENT
Start: 2020-07-11 | End: 2021-01-04

## 2020-07-17 RX ORDER — FLUOROURACIL 50 MG/G
CREAM TOPICAL
OUTPATIENT
Start: 2020-07-17

## 2020-07-17 RX ORDER — GLIPIZIDE 5 MG/1
2.5 TABLET ORAL DAILY
Qty: 45 TABLET | Refills: 3 | Status: SHIPPED | OUTPATIENT
Start: 2020-07-17 | End: 2020-07-17 | Stop reason: SDUPTHER

## 2020-07-17 RX ORDER — GLIPIZIDE 5 MG/1
2.5 TABLET ORAL DAILY
Qty: 45 TABLET | Refills: 3 | Status: SHIPPED | OUTPATIENT
Start: 2020-07-17 | End: 2020-10-07 | Stop reason: SDUPTHER

## 2020-07-17 NOTE — TELEPHONE ENCOUNTER
Dr. Schaefer, patient needing refills Fluorouracil 5 % cream to be sent to [facility] in [place].

## 2020-07-20 NOTE — TELEPHONE ENCOUNTER
I called patient but had to leave her a message requesting she call back to schedule an appointment.

## 2020-08-24 RX ORDER — PANTOPRAZOLE SODIUM 40 MG/1
TABLET, DELAYED RELEASE ORAL
Qty: 90 TABLET | Refills: 3 | Status: SHIPPED | OUTPATIENT
Start: 2020-08-24 | End: 2021-08-06

## 2020-08-29 RX ORDER — FOLIC ACID 1 MG/1
TABLET ORAL
Qty: 90 TABLET | Refills: 3 | Status: SHIPPED | OUTPATIENT
Start: 2020-08-29 | End: 2021-06-16 | Stop reason: SDUPTHER

## 2020-09-11 DIAGNOSIS — I10 ESSENTIAL HYPERTENSION: ICD-10-CM

## 2020-09-11 RX ORDER — LISINOPRIL 40 MG/1
TABLET ORAL
Qty: 90 TABLET | Refills: 3 | OUTPATIENT
Start: 2020-09-11

## 2020-09-14 ENCOUNTER — FLU SHOT (OUTPATIENT)
Dept: INTERNAL MEDICINE | Facility: CLINIC | Age: 80
End: 2020-09-14

## 2020-09-14 DIAGNOSIS — Z23 NEED FOR INFLUENZA VACCINATION: ICD-10-CM

## 2020-09-14 PROCEDURE — G0008 ADMIN INFLUENZA VIRUS VAC: HCPCS | Performed by: INTERNAL MEDICINE

## 2020-09-14 PROCEDURE — 90694 VACC AIIV4 NO PRSRV 0.5ML IM: CPT | Performed by: INTERNAL MEDICINE

## 2020-09-18 ENCOUNTER — TELEPHONE (OUTPATIENT)
Dept: CARDIOLOGY | Facility: CLINIC | Age: 80
End: 2020-09-18

## 2020-09-18 NOTE — TELEPHONE ENCOUNTER
Called pt due to [employer] home monitor didn't send in scheduled reading.  Left message for a return call.

## 2020-09-28 RX ORDER — LISINOPRIL 40 MG/1
40 TABLET ORAL DAILY
Qty: 90 TABLET | Refills: 3 | Status: SHIPPED | OUTPATIENT
Start: 2020-09-28 | End: 2021-09-10

## 2020-10-06 ENCOUNTER — TELEPHONE (OUTPATIENT)
Dept: INTERNAL MEDICINE | Facility: CLINIC | Age: 80
End: 2020-10-06

## 2020-10-06 NOTE — TELEPHONE ENCOUNTER
PHONE CALL FROM [facility] [employer].  THEY HAVE A 2.5 AND 5 MG TABLET OF GLIPIZIDE ON FILE, WHICH ONE?     MEDICATION ON HOLD UNTIL THEY RECEIVE CLARIFICATION.     PLEASE CALL [phone]  REFERENCE # [id number]

## 2020-10-07 RX ORDER — GLIPIZIDE 5 MG/1
2.5 TABLET ORAL DAILY
Qty: 45 TABLET | Refills: 3 | Status: SHIPPED | OUTPATIENT
Start: 2020-10-07 | End: 2021-01-07 | Stop reason: SDUPTHER

## 2020-10-07 RX ORDER — GLIPIZIDE 5 MG/1
2.5 TABLET ORAL DAILY
Qty: 45 TABLET | Refills: 3 | Status: SHIPPED | OUTPATIENT
Start: 2020-10-07 | End: 2020-10-07

## 2020-10-07 NOTE — TELEPHONE ENCOUNTER
Dr. Schaefer, I talked to Radha, she states she did get a small supply of Glipizide 2.5 mg but needs it sent to [facility],instead of local pharmacy. I called [facility] and spoke to pharmacy, they stated they would change the RX to Glipzide 2.5 mg instead of 5 mg.

## 2020-11-01 ENCOUNTER — EPISODE CHANGES (OUTPATIENT)
Dept: CASE MANAGEMENT | Facility: OTHER | Age: 80
End: 2020-11-01

## 2020-11-13 DIAGNOSIS — I67.82 TEMPORARY CEREBRAL VASCULAR DYSFUNCTION: ICD-10-CM

## 2020-11-13 RX ORDER — CLOPIDOGREL BISULFATE 75 MG/1
TABLET ORAL
Qty: 90 TABLET | Refills: 3 | Status: SHIPPED | OUTPATIENT
Start: 2020-11-13 | End: 2021-11-04

## 2020-12-09 ENCOUNTER — TELEPHONE (OUTPATIENT)
Dept: INTERNAL MEDICINE | Facility: CLINIC | Age: 80
End: 2020-12-09

## 2020-12-09 DIAGNOSIS — E11.9 TYPE 2 DIABETES MELLITUS WITHOUT COMPLICATION, WITHOUT LONG-TERM CURRENT USE OF INSULIN (HCC): Primary | ICD-10-CM

## 2020-12-09 NOTE — TELEPHONE ENCOUNTER
[facility] contacted patient and stated she was there to receive annual lab work but there are not any orders.   I asked the lab to tell the patient to return after she had heard from our clinic.    Can you please review and order labs if appropriate before appointment on 12/16/2020

## 2020-12-15 LAB
ALBUMIN SERPL-MCNC: 4.1 G/DL (ref 3.6–4.6)
ALBUMIN/GLOB SERPL: 1.4 {RATIO} (ref 1.2–2.2)
ALP SERPL-CCNC: 61 IU/L (ref 39–117)
ALT SERPL-CCNC: 15 IU/L (ref 0–32)
AST SERPL-CCNC: 19 IU/L (ref 0–40)
BASOPHILS # BLD AUTO: 0 X10E3/UL (ref 0–0.2)
BASOPHILS NFR BLD AUTO: 1 %
BILIRUB SERPL-MCNC: 0.4 MG/DL (ref 0–1.2)
BUN SERPL-MCNC: 16 MG/DL (ref 8–27)
BUN/CREAT SERPL: 15 (ref 12–28)
CALCIUM SERPL-MCNC: 9.1 MG/DL (ref 8.7–10.3)
CHLORIDE SERPL-SCNC: 100 MMOL/L (ref 96–106)
CHOLEST SERPL-MCNC: 245 MG/DL (ref 100–199)
CO2 SERPL-SCNC: 25 MMOL/L (ref 20–29)
CREAT SERPL-MCNC: 1.05 MG/DL (ref 0.57–1)
EOSINOPHIL # BLD AUTO: 0.1 X10E3/UL (ref 0–0.4)
EOSINOPHIL NFR BLD AUTO: 2 %
ERYTHROCYTE [DISTWIDTH] IN BLOOD BY AUTOMATED COUNT: 13.2 % (ref 11.7–15.4)
GLOBULIN SER CALC-MCNC: 3 G/DL (ref 1.5–4.5)
GLUCOSE SERPL-MCNC: 138 MG/DL (ref 65–99)
HBA1C MFR BLD: 7.1 % (ref 4.8–5.6)
HCT VFR BLD AUTO: 37.6 % (ref 34–46.6)
HDLC SERPL-MCNC: 56 MG/DL
HGB BLD-MCNC: 12.5 G/DL (ref 11.1–15.9)
IMM GRANULOCYTES # BLD AUTO: 0 X10E3/UL (ref 0–0.1)
IMM GRANULOCYTES NFR BLD AUTO: 0 %
LDLC SERPL CALC-MCNC: 164 MG/DL (ref 0–99)
LYMPHOCYTES # BLD AUTO: 2 X10E3/UL (ref 0.7–3.1)
LYMPHOCYTES NFR BLD AUTO: 41 %
MCH RBC QN AUTO: 29.2 PG (ref 26.6–33)
MCHC RBC AUTO-ENTMCNC: 33.2 G/DL (ref 31.5–35.7)
MCV RBC AUTO: 88 FL (ref 79–97)
MICROALBUMIN UR-MCNC: 9.6 UG/ML
MONOCYTES # BLD AUTO: 0.5 X10E3/UL (ref 0.1–0.9)
MONOCYTES NFR BLD AUTO: 9 %
NEUTROPHILS # BLD AUTO: 2.3 X10E3/UL (ref 1.4–7)
NEUTROPHILS NFR BLD AUTO: 47 %
PLATELET # BLD AUTO: 212 X10E3/UL (ref 150–450)
POTASSIUM SERPL-SCNC: 4.1 MMOL/L (ref 3.5–5.2)
PROT SERPL-MCNC: 7.1 G/DL (ref 6–8.5)
RBC # BLD AUTO: 4.28 X10E6/UL (ref 3.77–5.28)
SODIUM SERPL-SCNC: 140 MMOL/L (ref 134–144)
T4 FREE SERPL-MCNC: 1.11 NG/DL (ref 0.82–1.77)
TRIGL SERPL-MCNC: 140 MG/DL (ref 0–149)
TSH SERPL DL<=0.005 MIU/L-ACNC: 3.31 UIU/ML (ref 0.45–4.5)
VIT B12 SERPL-MCNC: 573 PG/ML (ref 232–1245)
VLDLC SERPL CALC-MCNC: 25 MG/DL (ref 5–40)
WBC # BLD AUTO: 4.9 X10E3/UL (ref 3.4–10.8)

## 2020-12-16 ENCOUNTER — OFFICE VISIT (OUTPATIENT)
Dept: INTERNAL MEDICINE | Facility: CLINIC | Age: 80
End: 2020-12-16

## 2020-12-16 VITALS
DIASTOLIC BLOOD PRESSURE: 80 MMHG | WEIGHT: 202 LBS | RESPIRATION RATE: 16 BRPM | HEIGHT: 67 IN | BODY MASS INDEX: 31.71 KG/M2 | TEMPERATURE: 97.4 F | OXYGEN SATURATION: 95 % | HEART RATE: 88 BPM | SYSTOLIC BLOOD PRESSURE: 124 MMHG

## 2020-12-16 DIAGNOSIS — E11.9 TYPE 2 DIABETES MELLITUS WITHOUT COMPLICATION, WITHOUT LONG-TERM CURRENT USE OF INSULIN (HCC): ICD-10-CM

## 2020-12-16 DIAGNOSIS — Z00.00 ROUTINE GENERAL MEDICAL EXAMINATION AT A HEALTH CARE FACILITY: ICD-10-CM

## 2020-12-16 DIAGNOSIS — K59.04 CHRONIC IDIOPATHIC CONSTIPATION: Primary | ICD-10-CM

## 2020-12-16 DIAGNOSIS — R79.9 ABNORMAL FINDING OF BLOOD CHEMISTRY, UNSPECIFIED: ICD-10-CM

## 2020-12-16 PROBLEM — R53.81 DECLINING FUNCTIONAL STATUS: Status: ACTIVE | Noted: 2019-12-10

## 2020-12-16 PROBLEM — D64.9 ANEMIA: Status: ACTIVE | Noted: 2019-12-19

## 2020-12-16 PROBLEM — E87.6 HYPOKALEMIA: Status: ACTIVE | Noted: 2019-12-19

## 2020-12-16 PROBLEM — Z95.0 S/P PLACEMENT OF CARDIAC PACEMAKER: Status: ACTIVE | Noted: 2019-12-11

## 2020-12-16 PROBLEM — I50.33 ACUTE ON CHRONIC DIASTOLIC CONGESTIVE HEART FAILURE: Status: ACTIVE | Noted: 2019-12-11

## 2020-12-16 PROBLEM — N30.01 ACUTE CYSTITIS WITH HEMATURIA: Status: ACTIVE | Noted: 2019-12-11

## 2020-12-16 PROCEDURE — G0439 PPPS, SUBSEQ VISIT: HCPCS | Performed by: INTERNAL MEDICINE

## 2020-12-16 RX ORDER — ASPIRIN 81 MG/1
81 TABLET, CHEWABLE ORAL DAILY
COMMUNITY

## 2020-12-16 NOTE — PROGRESS NOTES
QUICK REFERENCE INFORMATION:  The ABCs of the Annual Wellness Visit    Medicare Annual Wellness Visit    Subjective   History of Present Illness    Radha Whelan is a 80 y.o. female who presents for an Annual Wellness Visit. In addition, we addressed the following health issues: wellness    Chronic pain. 0/10      PMH, PSH, SocHx, FamHx, Allergies, and Medications: Reviewed and updated.     Outpatient Medications Prior to Visit   Medication Sig Dispense Refill   • acetaminophen (TYLENOL) 325 MG tablet Take 2 tablets by mouth Every 6 (Six) Hours As Needed for Mild Pain .     • aspirin 81 MG chewable tablet Chew 81 mg Daily.     • Cholecalciferol (VITAMIN D PO) Take 125 mcg by mouth 2 (Two) Times a Week.     • clopidogrel (PLAVIX) 75 MG tablet TAKE 1 TABLET DAILY 90 tablet 3   • coenzyme Q10 100 MG capsule Take 400 mg by mouth Every Other Day.     • folic acid (FOLVITE) 1 MG tablet TAKE 1 TABLET DAILY 90 tablet 3   • furosemide (LASIX) 20 MG tablet TAKE 1 TABLET TWICE A  tablet 1   • glipizide (Glucotrol) 5 MG tablet Take 0.5 tablets by mouth Daily. 45 tablet 3   • Glucosamine-Chondroit-Vit C-Mn (GLUCOSAMINE CHONDR 1500 COMPLX PO) Take 1 tablet by mouth Daily.     • glucose blood test strip Patient tests 1 time daily. Diagnosis code E11.9 100 each 3   • lisinopril (PRINIVIL,ZESTRIL) 40 MG tablet Take 1 tablet by mouth Daily. 90 tablet 3   • lovastatin (MEVACOR) 10 MG tablet TAKE 1 TABLET EVERY NIGHT (Patient taking differently: Every Night.) 90 tablet 3   • melatonin 3 MG tablet Take 3 mg by mouth Every Night.     • nystatin (MYCOSTATIN) 878545 UNIT/GM powder Apply  topically to the appropriate area as directed 3 (Three) Times a Day. 60 g 11   • pantoprazole (PROTONIX) 40 MG EC tablet TAKE 1 TABLET DAILY 90 tablet 3   • polyethylene glycol (MIRALAX) pack packet Take 17 g by mouth Daily.     • tobramycin-dexamethasone (TOBRADEX) 0.3-0.1 % ophthalmic suspension Administer 1 drop into the left eye Daily. 5 mL 2   •  vitamin B-12 (CYANOCOBALAMIN) 1000 MCG tablet Take 1,000 mcg by mouth 2 (Two) Times a Week.       No facility-administered medications prior to visit.        Patient Active Problem List   Diagnosis   • Type 2 diabetes mellitus without complication (CMS/[facility])   • Temporary cerebral vascular dysfunction   • Fatigue   • Pre-syncope   • Hypervitaminosis B6   • Hyponatremia   • ODELL (obstructive sleep apnea)   • Peripheral neuropathy   • Restless legs syndrome   • Edema   • H/O hyperlipidemia   • Acute non-recurrent maxillary sinusitis   • Infection of left tear duct   • Left eye pain   • Mixed hyperlipidemia   • LBBB (left bundle branch block)   • Abnormal echocardiogram   • Cardiomyopathy (CMS/HCC)   • Arthritis   • Chronic systolic congestive heart failure (CMS/HCC)   • S/P hip replacement, right   • Bradycardia   • Acute cystitis with hematuria   • Acute on chronic diastolic congestive heart failure (CMS/[facility])   • Anemia   • Declining functional status   • Hypokalemia   • S/P placement of cardiac pacemaker       Health Habits:  Dental Exam. up to date  Eye Exam. up to date  No exam data present  Exercise: 1 times/week.  Current exercise activities include: walking    Health Risk Assessment:  The patient has completed a Health Risk Assessment. This has been reviewed with them and has been scanned into the patient's chart.    Current Medical Providers:  Patient Care Team:  Farhan Schaefer MD as PCP - Nik Ortiz MD as Consulting Physician (General Surgery)    The [facility] providers who are involved in the care of this patient are listed above. Additional providers and suppliers are listed below:      Recent Hospitalizations:  No hospitalization(s) within the last year..    Recent Lab Results:  CMP:  Lab Results   Component Value Date     (H) 12/14/2020    BUN 16 12/14/2020    CREATININE 1.05 (H) 12/14/2020    EGFRIFNONA 50 (L) 12/14/2020    EGFRIFAFRI 58 (L) 12/14/2020    BCR 15 12/14/2020      12/14/2020    K 4.1 12/14/2020    CO2 25 12/14/2020    CALCIUM 9.1 12/14/2020    PROTENTOTREF 7.1 12/14/2020    ALBUMIN 4.1 12/14/2020    LABGLOBREF 3.0 12/14/2020    LABIL2 1.4 12/14/2020    BILITOT 0.4 12/14/2020    ALKPHOS 61 12/14/2020    AST 19 12/14/2020    ALT 15 12/14/2020       LIPID PANEL:  Lab Results   Component Value Date    CHLPL 245 (H) 12/14/2020    TRIG 140 12/14/2020    HDL 56 12/14/2020    VLDL 25 12/14/2020     (H) 12/14/2020       HbA1c:  Lab Results   Component Value Date    HGBA1C 7.1 (H) 12/14/2020       URINE MICROALBUMIN:  Lab Results   Component Value Date    MICROALBUR 9.6 12/14/2020       PSA:  No results found for: PSA    Age-appropriate Screening Schedule:  Refer to the list below for future screening recommendations based on patient's age, sex and/or medical conditions. Orders for these recommended tests are listed in the plan section. The patient has been provided with a written plan.    Health Maintenance   Topic Date Due   • ZOSTER VACCINE (2 of 2) 04/05/2013   • HEMOGLOBIN A1C  06/14/2021   • LIPID PANEL  12/14/2021   • URINE MICROALBUMIN  12/14/2021   • DIABETIC EYE EXAM  12/16/2021   • MAMMOGRAM  03/04/2022   • TDAP/TD VACCINES (2 - Td) 02/08/2024   • INFLUENZA VACCINE  Completed       Depression Screen:   PHQ-2/PHQ-9 Depression Screening 12/16/2020   Little interest or pleasure in doing things 0   Feeling down, depressed, or hopeless 0   Trouble falling or staying asleep, or sleeping too much -   Feeling tired or having little energy -   Poor appetite or overeating -   Feeling bad about yourself - or that you are a failure or have let yourself or your family down -   Trouble concentrating on things, such as reading the newspaper or watching television -   Moving or speaking so slowly that other people could have noticed. Or the opposite - being so fidgety or restless that you have been moving around a lot more than usual -   Thoughts that you would be better  off dead, or of hurting yourself in some way -   Total Score 0         Functional and Cognitive Screening:  Functional & Cognitive Status 12/16/2020   Do you have difficulty preparing food and eating? No   Do you have difficulty bathing yourself, getting dressed or grooming yourself? No   Do you have difficulty using the toilet? No   Do you have difficulty moving around from place to place? No   Do you have trouble with steps or getting out of a bed or a chair? No   Current Diet Limited Junk Food   Dental Exam Up to date   Eye Exam Up to date   Exercise (times per week) 1 times per week   Current Exercise Activities Include Housecleaning   Do you need help using the phone?  No   Are you deaf or do you have serious difficulty hearing?  Yes   Do you need help with transportation? No   Do you need help shopping? No   Do you need help preparing meals?  No   Do you need help with housework?  No   Do you need help with laundry? No   Do you need help taking your medications? No   Do you need help managing money? No   Do you ever drive or ride in a car without wearing a seat belt? No   Have you felt unusual stress, anger or loneliness in the last month? No   Who do you live with? Spouse   If you need help, do you have trouble finding someone available to you? No   Have you been bothered in the last four weeks by sexual problems? No   Do you have difficulty concentrating, remembering or making decisions? No       Does the patient have evidence of cognitive impairment? No    Advanced Care Planning:  ACP discussion was held with the patient during this visit. Patient does not have an advance directive, declines further assistance.    Identification of Risk Factors:  Risk factors include: Advance Directive Discussion  Chronic Pain   Fall Risk.    Review of Systems   Constitutional: Negative for fatigue.   HENT: Negative for facial swelling, mouth sores and sneezing.    Gastrointestinal: Negative for abdominal distention and  "anal bleeding.   Musculoskeletal: Positive for arthralgias and gait problem. Negative for joint swelling and myalgias.   Psychiatric/Behavioral: Negative for hallucinations. The patient is not hyperactive.        Compared to one year ago, the patient feels his physical health is better.  Compared to one year ago, the patient feels his mental health is better.    Objective     Physical Exam  Vitals signs reviewed.   Constitutional:       Appearance: She is normal weight.   HENT:      Head: Normocephalic and atraumatic.      Right Ear: Tympanic membrane normal.      Left Ear: Tympanic membrane normal.      Nose: Nose normal. No congestion or rhinorrhea.      Mouth/Throat:      Mouth: Mucous membranes are moist.      Pharynx: No oropharyngeal exudate.   Eyes:      Extraocular Movements: Extraocular movements intact.      Pupils: Pupils are equal, round, and reactive to light.   Neck:      Musculoskeletal: Normal range of motion and neck supple.   Cardiovascular:      Rate and Rhythm: Normal rate and regular rhythm.      Pulses: Normal pulses.      Heart sounds: Normal heart sounds.   Pulmonary:      Effort: Pulmonary effort is normal.      Breath sounds: Normal breath sounds.   Abdominal:      General: Abdomen is flat. Bowel sounds are normal.   Musculoskeletal: Normal range of motion.         General: Swelling present.      Comments: crepitus in left knee   Skin:     General: Skin is warm.   Neurological:      General: No focal deficit present.      Mental Status: She is alert and oriented to person, place, and time.   Psychiatric:         Mood and Affect: Mood normal.         Behavior: Behavior normal.         Vitals:    12/16/20 1507   BP: 124/80   Pulse: 88   Resp: 16   Temp: 97.4 °F (36.3 °C)   SpO2: 95%   Weight: 91.6 kg (202 lb)   Height: 170.2 cm (67\")   PainSc: 0-No pain       Body mass index is 31.64 kg/m².  Discussed the patient's BMI with her. The BMI is in the acceptable range.    Assessment/Plan   Patient " Self-Management and Personalized Health Advice  The patient has been provided with information about: diet, exercise, weight management and fall prevention and preventive services including:   · Annual Wellness Visit (AWV).    Visit Diagnoses:    ICD-10-CM ICD-9-CM   1. Chronic idiopathic constipation  K59.04 564.00   2. Routine general medical examination at a health care facility  Z00.00 V70.0   3. Type 2 diabetes mellitus without complication, without long-term current use of insulin (CMS/[facility])  E11.9 250.00   4. Abnormal finding of blood chemistry, unspecified   R79.9 790.6       Orders Placed This Encounter   Procedures   • Lipid Panel   • Vitamin B12   • TSH   • Comprehensive Metabolic Panel   • T4, Free   • Hemoglobin A1c   • MicroAlbumin, Urine, Random - Urine, Clean Catch   • CBC & Differential     Order Specific Question:   Manual Differential     Answer:   No       Outpatient Encounter Medications as of 12/16/2020   Medication Sig Dispense Refill   • acetaminophen (TYLENOL) 325 MG tablet Take 2 tablets by mouth Every 6 (Six) Hours As Needed for Mild Pain .     • aspirin 81 MG chewable tablet Chew 81 mg Daily.     • Cholecalciferol (VITAMIN D PO) Take 125 mcg by mouth 2 (Two) Times a Week.     • clopidogrel (PLAVIX) 75 MG tablet TAKE 1 TABLET DAILY 90 tablet 3   • coenzyme Q10 100 MG capsule Take 400 mg by mouth Every Other Day.     • folic acid (FOLVITE) 1 MG tablet TAKE 1 TABLET DAILY 90 tablet 3   • furosemide (LASIX) 20 MG tablet TAKE 1 TABLET TWICE A  tablet 1   • glipizide (Glucotrol) 5 MG tablet Take 0.5 tablets by mouth Daily. 45 tablet 3   • Glucosamine-Chondroit-Vit C-Mn (GLUCOSAMINE CHONDR 1500 COMPLX PO) Take 1 tablet by mouth Daily.     • glucose blood test strip Patient tests 1 time daily. Diagnosis code E11.9 100 each 3   • lisinopril (PRINIVIL,ZESTRIL) 40 MG tablet Take 1 tablet by mouth Daily. 90 tablet 3   • lovastatin (MEVACOR) 10 MG tablet TAKE 1 TABLET EVERY NIGHT (Patient taking  differently: Every Night.) 90 tablet 3   • melatonin 3 MG tablet Take 3 mg by mouth Every Night.     • nystatin (MYCOSTATIN) 487541 UNIT/GM powder Apply  topically to the appropriate area as directed 3 (Three) Times a Day. 60 g 11   • pantoprazole (PROTONIX) 40 MG EC tablet TAKE 1 TABLET DAILY 90 tablet 3   • polyethylene glycol (MIRALAX) pack packet Take 17 g by mouth Daily.     • tobramycin-dexamethasone (TOBRADEX) 0.3-0.1 % ophthalmic suspension Administer 1 drop into the left eye Daily. 5 mL 2   • vitamin B-12 (CYANOCOBALAMIN) 1000 MCG tablet Take 1,000 mcg by mouth 2 (Two) Times a Week.       No facility-administered encounter medications on file as of 12/16/2020.        Reviewed use of high risk medication in the elderly: yes  Reviewed for potential of harmful drug interactions in the elderly: yes    Follow Up:  Return in about 6 months (around 6/16/2021).     An After Visit Summary and PPPS with all of these plans were given to the patient.             Diagnoses and all orders for this visit:    1. Chronic idiopathic constipation (Primary)  -     CBC & Differential  -     Lipid Panel  -     Vitamin B12  -     TSH  -     Comprehensive Metabolic Panel  -     T4, Free  -     Hemoglobin A1c  -     MicroAlbumin, Urine, Random - Urine, Clean Catch    2. Routine general medical examination at a health care facility  -     CBC & Differential  -     Lipid Panel  -     Vitamin B12  -     TSH  -     Comprehensive Metabolic Panel  -     T4, Free  -     Hemoglobin A1c  -     MicroAlbumin, Urine, Random - Urine, Clean Catch    3. Type 2 diabetes mellitus without complication, without long-term current use of insulin (CMS/[facility])  -     Hemoglobin A1c  -     MicroAlbumin, Urine, Random - Urine, Clean Catch    4. Abnormal finding of blood chemistry, unspecified   -     Lipid Panel

## 2021-01-04 RX ORDER — LOVASTATIN 10 MG/1
10 TABLET ORAL NIGHTLY
Qty: 90 TABLET | Refills: 3 | Status: SHIPPED | OUTPATIENT
Start: 2021-01-04 | End: 2021-12-09

## 2021-01-04 RX ORDER — FUROSEMIDE 20 MG/1
TABLET ORAL
Qty: 180 TABLET | Refills: 3 | Status: SHIPPED | OUTPATIENT
Start: 2021-01-04 | End: 2021-12-22

## 2021-01-08 RX ORDER — GLIPIZIDE 5 MG/1
2.5 TABLET ORAL DAILY
Qty: 45 TABLET | Refills: 3 | Status: SHIPPED | OUTPATIENT
Start: 2021-01-08 | End: 2021-03-15

## 2021-01-08 RX ORDER — GLIPIZIDE 2.5 MG/1
2.5 TABLET, EXTENDED RELEASE ORAL DAILY
Qty: 90 TABLET | Refills: 3 | Status: SHIPPED | OUTPATIENT
Start: 2021-01-08 | End: 2021-06-16

## 2021-01-19 ENCOUNTER — TRANSCRIBE ORDERS (OUTPATIENT)
Dept: ADMINISTRATIVE | Facility: HOSPITAL | Age: 81
End: 2021-01-19

## 2021-01-19 DIAGNOSIS — Z12.31 VISIT FOR SCREENING MAMMOGRAM: Primary | ICD-10-CM

## 2021-03-03 ENCOUNTER — TELEPHONE (OUTPATIENT)
Dept: INTERNAL MEDICINE | Facility: CLINIC | Age: 81
End: 2021-03-03

## 2021-03-03 RX ORDER — BLOOD-GLUCOSE METER
1 KIT MISCELLANEOUS AS NEEDED
Qty: 1 EACH | Refills: 1 | Status: SHIPPED | OUTPATIENT
Start: 2021-03-03 | End: 2021-04-05 | Stop reason: SDUPTHER

## 2021-03-03 RX ORDER — BLOOD-GLUCOSE METER
1 KIT MISCELLANEOUS AS NEEDED
Qty: 1 EACH | Refills: 1 | Status: CANCELLED | OUTPATIENT
Start: 2021-03-03

## 2021-03-03 RX ORDER — BLOOD-GLUCOSE METER
1 EACH MISCELLANEOUS DAILY
Qty: 1 KIT | Refills: 0 | Status: CANCELLED | OUTPATIENT
Start: 2021-03-03

## 2021-03-03 NOTE — TELEPHONE ENCOUNTER
Pharmacy Name:  [facility]    Pharmacy representative name: cata    Pharmacy representative phone number: [phone]    What medication are you calling in regards to: new glucose meter  accu check guide    What question does the pharmacy have: patient would need a new meter and the pharmacy would need a prescription for it    Who is the provider that prescribed the medication: Dr. Schaefer    Additional notes:

## 2021-03-03 NOTE — TELEPHONE ENCOUNTER
KIMBERLY FROM [facility] CALLED REGARDING TESTING SUPPLIES FOR PATIENT.    THEY ARE NOT COVERED BY HER INSURANCE SO KIMBERLY WOULD LIKE TO SEE ABOUT GETTING RX FOR DIFFERENT MONITOR AND STRIPS.      PLEASE CALL KIMBERLY    [phone]

## 2021-03-08 ENCOUNTER — TELEPHONE (OUTPATIENT)
Dept: INTERNAL MEDICINE | Facility: CLINIC | Age: 81
End: 2021-03-08

## 2021-03-08 NOTE — TELEPHONE ENCOUNTER
Patient had unread MyChart notification; contacted patient and relayed following message::    Dr. Schaefer sent your new blood glucose meter to the pharmacy for you!

## 2021-03-12 ENCOUNTER — TELEPHONE (OUTPATIENT)
Dept: INTERNAL MEDICINE | Facility: CLINIC | Age: 81
End: 2021-03-12

## 2021-03-12 NOTE — TELEPHONE ENCOUNTER
Lucas from [facility] is wanting to see if he could get the patient a OneTouch Ultra 2 meter, test strips and lancets as her insurance company will pay for this.  She can be reached at [phone]    Ref. No. [id number]

## 2021-03-12 NOTE — TELEPHONE ENCOUNTER
I have given Dr. Schaefer the written order form for this to sign off and will fax it over once it is signed.

## 2021-03-15 ENCOUNTER — OFFICE VISIT (OUTPATIENT)
Dept: CARDIOLOGY | Facility: CLINIC | Age: 81
End: 2021-03-15

## 2021-03-15 VITALS
OXYGEN SATURATION: 98 % | SYSTOLIC BLOOD PRESSURE: 126 MMHG | WEIGHT: 198 LBS | DIASTOLIC BLOOD PRESSURE: 66 MMHG | HEIGHT: 67 IN | HEART RATE: 71 BPM | BODY MASS INDEX: 31.08 KG/M2 | TEMPERATURE: 96.8 F

## 2021-03-15 DIAGNOSIS — I10 ESSENTIAL HYPERTENSION: ICD-10-CM

## 2021-03-15 DIAGNOSIS — Z95.0 S/P PLACEMENT OF CARDIAC PACEMAKER: ICD-10-CM

## 2021-03-15 DIAGNOSIS — I49.5 SINUS NODE DYSFUNCTION (HCC): Primary | ICD-10-CM

## 2021-03-15 PROCEDURE — 99213 OFFICE O/P EST LOW 20 MIN: CPT | Performed by: NURSE PRACTITIONER

## 2021-03-15 PROCEDURE — 93280 PM DEVICE PROGR EVAL DUAL: CPT | Performed by: NURSE PRACTITIONER

## 2021-03-15 NOTE — PROGRESS NOTES
Cardiac Electrophysiology Outpatient Follow Up Note            [facility]    Follow Up Office Visit      Radha Whlean  [id number]  03/15/2021    Primary Care Physician: Farhan Schaefer MD    Referred By: No ref. provider found    Subjective     Chief Complaint:   Diagnoses and all orders for this visit:    1. Sinus node dysfunction (CMS/HCC) (Primary)    2. S/P placement of cardiac pacemaker    3. Essential hypertension      Chief Complaint   Patient presents with   • Sinus node dysfunction (CMS/HCC)     PROBLEM LIST:   1. Sinus node dysfunction  1. 9/16/2019 24-hour Holter: Average HR 60, range 45-1 07, 1.6% PACs, 0.3% PVCs, rare SVT less than 11 beats with no symptoms  2. Recurrent Arthur Syncope x 2.  First 10/2019 while seated and second 12/5/2019 s/p Rt total hip surgery on transfer to bed.  3. 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 Laboy for sinus node dysfunction.  2. CAD  1. 9/11/2019 echo: EF 42%, grade 1 diastolic dysfunction, RVSP 22  2. 9/16/2019 LHC per AA no evidence of hemodynamically significant CAD, normal EF 55%, 24-hour Holter recommended to assess for bradycardic arrhythmias.  3. HTN  4. LBBB  5. HLD  6. Vasovagal syncope  7. Hx TIAs  1. Committed to DAPT ~2015  8. T2DM  9. Hx DVT ~1970s, temporary ac    History of Present Illness:   Radha Whelan is a 80 y.o. female who presents to my electrophysiology clinic for follow up of her history of sinus node dysfunction status post permanent pacemaker implant.  Upon evaluation today patient reports she is doing very well overall from cardiovascular standpoint.  Patient denies chest pain, palpitations, dizziness, presyncope, or syncope.  Patient denies orthopne or PND but does admit to occasional mild bilateral lower extremity edema not present on exam today.  Patient reports following her device implant she had a increased amount of energy and overall just feels much better.  Patient does report a  history of vasovagal syncope practically her whole life with one reported episode a few months ago without injury.  Patient reports her pacemaker incision site is well-healed without complication.  Patient's blood pressure is controlled in office today on current medical therapy with reported control at home.    Review of Systems:   Constitutional: No fevers or chills, no recent weight gain or weight loss or fatigue  Eyes: No visual loss, blurred vision, double vision, yellow sclerae.  ENT: No headaches, hearing loss, vertigo, congestion or sore throat.   Cardiovascular: Per HPI  Respiratory: No cough or wheezing, no sputum production, no hematemesis   Gastrointestinal: No abdominal pain, no nausea, vomiting, constipation, diarrhea, melena.   Genitourinary: No dysuria, hematuria or increased frequency.  Musculoskeletal:  No gait disturbance, weakness or joint pain or stiffness  Integumentary: No rashes, urticaria, ulcers or sores.   Neurological: No headache, dizziness, syncope, paralysis, ataxia  Psychiatric: No anxiety, or depression  Endocrine: No diaphoresis, cold or heat intolerance. No polyuria or polydipsia.   Hematologic/Lymphatic: No anemia, abnormal bruising or bleeding.       Past Medical History:   Past Medical History:   Diagnosis Date   • Arthritis    • Basal cell carcinoma    • Body piercing     both ears   • Cataract, bilateral     s/p removal    • Diabetes mellitus (CMS/HCC)    • DVT (deep venous thrombosis) (CMS/HCC)     1970's- left leg   • Edema     legs- hx of   • Elevated cholesterol    • Fatigue    • Fatigue    • History of left heart catheterization     9/16/19  no stents   • History of migraine headaches    • Hx of exercise stress test 2004   • Hyperlipidemia    • Hypertension    • Hyponatremia    • Impaired functional mobility, balance, gait, and endurance    • LBBB (left bundle branch block)    • LBBB (left bundle branch block)    • Left leg pain     left leg and knee pain    • Muscle  "spasm     hx of   • Obesity    • ODELL on CPAP     CPAP   • Stroke (CMS/HCC)    • Teeth missing     all of the top, and has 6 bottom teeth left   • Thyroid nodule    • TIA (transient ischemic attack)     x3.  last one was \"a few years ago\"   • Wears dentures     top plate   • Wears glasses     to read       Past Surgical History:   Past Surgical History:   Procedure Laterality Date   • BREAST BIOPSY Left     benign   • CARDIAC CATHETERIZATION      09/16/2019 PER .   • CARDIAC CATHETERIZATION N/A 9/16/2019    Procedure: Left Heart Cath +/- CBI;  Surgeon: Ashlyn Mays MD;  Location:  [facility];  Service: Cardiovascular   • CARDIAC ELECTROPHYSIOLOGY PROCEDURE N/A 12/6/2019    Procedure: PACEMAKER IMPLANTATION- DC;  Surgeon: Stephan Laboy DO;  Location:  [facility];  Service: Cardiology   • CATARACT EXTRACTION  12/2018    both eyes    • COLONOSCOPY     • MOUTH SURGERY      all top teeth   • SKIN BIOPSY      basal cell   • TOTAL HIP ARTHROPLASTY Right 12/4/2019    Procedure: HIP ARTHROPLASTY TOTAL RIGHT;  Surgeon: Issa Salgado MD;  Location: [facility] OR;  Service: Orthopedics   • TUBAL ABDOMINAL LIGATION         Family History:   Family History   Problem Relation Age of Onset   • Diabetes Other    • Hypertension Other    • Cancer Other         malignant neoplasm    • Migraines Other    • Heart disease Mother    • Heart disease Father    • Breast cancer Sister    • Breast cancer Other    • Intracerebral hemorrhage Brother    • Asthma Daughter    • Heart failure Daughter    • Diabetes type II Brother    • No Known Problems Brother    • No Known Problems Sister    • Stroke Sister        Social History:   Social History     Socioeconomic History   • Marital status:      Spouse name: Not on file   • Number of children: Not on file   • Years of education: Not on file   • Highest education level: Not on file   Tobacco Use   • Smoking status: Never Smoker   • Smokeless " tobacco: Never Used   Substance and Sexual Activity   • Alcohol use: No   • Drug use: No   • Sexual activity: Defer       Medications:     Current Outpatient Medications:   •  acetaminophen (TYLENOL) 325 MG tablet, Take 2 tablets by mouth Every 6 (Six) Hours As Needed for Mild Pain ., Disp: , Rfl:   •  aspirin 81 MG chewable tablet, Chew 81 mg Daily., Disp: , Rfl:   •  Cholecalciferol (VITAMIN D PO), Take 125 mcg by mouth 2 (Two) Times a Week., Disp: , Rfl:   •  clopidogrel (PLAVIX) 75 MG tablet, TAKE 1 TABLET DAILY, Disp: 90 tablet, Rfl: 3  •  coenzyme Q10 100 MG capsule, Take 400 mg by mouth Every Other Day., Disp: , Rfl:   •  folic acid (FOLVITE) 1 MG tablet, TAKE 1 TABLET DAILY, Disp: 90 tablet, Rfl: 3  •  furosemide (LASIX) 20 MG tablet, TAKE 1 TABLET TWICE A DAY, Disp: 180 tablet, Rfl: 3  •  glipizide (GLUCOTROL XL) 2.5 MG 24 hr tablet, Take 1 tablet by mouth Daily., Disp: 90 tablet, Rfl: 3  •  Glucosamine-Chondroit-Vit C-Mn (GLUCOSAMINE CHONDR 1500 COMPLX PO), Take 1 tablet by mouth Daily., Disp: , Rfl:   •  glucose blood test strip, Use as instructed, Disp: 100 each, Rfl: 6  •  glucose monitor monitoring kit, 1 each As Needed (blood sugar)., Disp: 1 each, Rfl: 1  •  Lancets (accu-chek soft touch) lancets, Use as directed, Disp: 100 each, Rfl: 12  •  lisinopril (PRINIVIL,ZESTRIL) 40 MG tablet, Take 1 tablet by mouth Daily., Disp: 90 tablet, Rfl: 3  •  lovastatin (MEVACOR) 10 MG tablet, Take 1 tablet by mouth Every Night. (Patient taking differently: Take 10 mg by mouth Every Other Day.), Disp: 90 tablet, Rfl: 3  •  melatonin 3 MG tablet, Take 3 mg by mouth Every Night., Disp: , Rfl:   •  pantoprazole (PROTONIX) 40 MG EC tablet, TAKE 1 TABLET DAILY, Disp: 90 tablet, Rfl: 3  •  polyethylene glycol (MIRALAX) pack packet, Take 17 g by mouth Daily., Disp: , Rfl:   •  tobramycin-dexamethasone (TOBRADEX) 0.3-0.1 % ophthalmic suspension, Administer 1 drop into the left eye Daily. (Patient taking differently:  "Administer 1 drop to both eyes As Needed.), Disp: 5 mL, Rfl: 2  •  vitamin B-12 (CYANOCOBALAMIN) 1000 MCG tablet, Take 1,000 mcg by mouth 2 (Two) Times a Week., Disp: , Rfl:     Allergies:   Allergies   Allergen Reactions   • Cumini Other (See Comments)     Complex migrane  migraine   • Cheese Other (See Comments)     migrane    Migraine   • Ibuprofen Rash     Swelling all over  \"Swelling all over\"   • Other Other (See Comments)     \"ice cream\"-migranes    \"ice cream\" migraines   • Saccharin Other (See Comments)     migrane    Migraine       Objective   Vital Signs:   Vitals:    03/15/21 1123   BP: 126/66   BP Location: Left arm   Patient Position: Sitting   Cuff Size: Large Adult   Pulse: 71   Temp: 96.8 °F (36 °C)   SpO2: 98%   Weight: 89.8 kg (198 lb)   Height: 170.2 cm (67\")       PHYSICAL EXAM  General appearance: Awake, alert, cooperative  Head: Normocephalic, without obvious abnormality, atraumatic  Eyes: Conjunctivae/corneas clear, EOMs intact  Neck: no adenopathy, no carotid bruit, no JVD and thyroid: not enlarged  Lungs: clear to auscultation bilaterally and no rhonchi or crackles\", ' symmetric  Heart: regular rate and rhythm, S1, S2 normal, no murmur, click, rub or gallop  Abdomen: Soft, non-tender, bowel sounds normal,  no organomegaly  Extremities: extremities normal, atraumatic, no cyanosis or edema  Skin: Skin color, turgor normal, no rashes or lesions  Neurologic: Grossly normal     Lab Results   Component Value Date    GLUCOSE 121 (H) 12/08/2019    CALCIUM 9.1 12/14/2020     12/14/2020    K 4.1 12/14/2020    CO2 25 12/14/2020     12/14/2020    BUN 16 12/14/2020    CREATININE 1.05 (H) 12/14/2020    EGFRIFAFRI 58 (L) 12/14/2020    EGFRIFNONA 50 (L) 12/14/2020    BCR 15 12/14/2020    ANIONGAP 9.0 12/08/2019     Lab Results   Component Value Date    WBC 4.9 12/14/2020    HGB 12.5 12/14/2020    HCT 37.6 12/14/2020    MCV 88 12/14/2020     12/14/2020     Lab Results   Component Value " Date    INR 0.97 11/20/2019    PROTIME 13.1 11/20/2019     Lab Results   Component Value Date    TSH 3.310 12/14/2020       Assessment & Plan    Diagnoses and all orders for this visit:    1. Sinus node dysfunction (CMS/HCC) (Primary)    2. S/P placement of cardiac pacemaker    3. Essential hypertension         Diagnosis Plan   1. Sinus node dysfunction (CMS/HCC)  Status post pacemaker implant with noted normal function on device interrogation in office today.     2. S/P placement of cardiac pacemaker   device interrogation: [employer] dual-chamber pacemaker at DDDR  ppm, RA paced 11%, RV paced less than 1%, acceptable lead threshold and impedance, battery voltage at 8.5 years, AMS x4 less than 1% all less than 1 minute in duration.      This patient's Cardiac Implanted Electronic Device was manually interrogated and reprogrammed during the patient encounter today.  Iterative programming changes were manually made to determine the sensing threshold, pacing threshold, lead impedance as well as underlying cardiac rhythm.  These programming changes were not limited to but included some or all of the following when appropriate: pacing mode, programmed AV delays, blanking periods, and refractory periods.  Data obtained as a result of these manual programing changes informed the patient's CIED permanent programming.     3. Essential hypertension  Controlled in office today with reported control on current medical therapy at home.    Continue lisinopril 40 mg daily as prescribed.     Body mass index is 31.01 kg/m².    I spent 22 minutes in consultation with this patient which included more than 65% of this time in direct face-to-face counseling, physical examination and discussion of my assessment and findings and shared decision making with the patient.  The remainder of the time not spent face to face was performing one, some or all of the following actions:  preparing to see this patient ( eg. Review of  tests),  ordering medications, tests or procedures ), care coordination, discussion of the plan with other healthcare providers, documenting clinical information in Epic well as independently interpreting results and communicating results to patient, family and or caregiver.  All time noted occurred on the date of service.    Follow Up: 6-month follow-up with [employer] device check.      Thank you for allowing me to participate in the care of your patient. Please to not hesitate to contact me with additional questions or concerns.      Electronically signed by CURTIS Mccarty, 03/15/21, 12:12 PM EDT.   [facility]

## 2021-03-16 ENCOUNTER — APPOINTMENT (OUTPATIENT)
Dept: MAMMOGRAPHY | Facility: HOSPITAL | Age: 81
End: 2021-03-16

## 2021-03-22 PROCEDURE — 93296 REM INTERROG EVL PM/IDS: CPT | Performed by: INTERNAL MEDICINE

## 2021-03-22 PROCEDURE — 93294 REM INTERROG EVL PM/LDLS PM: CPT | Performed by: INTERNAL MEDICINE

## 2021-04-05 RX ORDER — BLOOD-GLUCOSE METER
1 KIT MISCELLANEOUS AS NEEDED
Qty: 1 EACH | Refills: 1 | Status: SHIPPED | OUTPATIENT
Start: 2021-04-05 | End: 2021-06-19

## 2021-04-05 RX ORDER — BLOOD-GLUCOSE METER
1 KIT MISCELLANEOUS AS NEEDED
Qty: 1 EACH | Refills: 1 | Status: SHIPPED | OUTPATIENT
Start: 2021-04-05

## 2021-04-14 RX ORDER — BLOOD SUGAR DIAGNOSTIC
STRIP MISCELLANEOUS
Qty: 100 EACH | Refills: 3 | Status: SHIPPED | OUTPATIENT
Start: 2021-04-14 | End: 2021-06-16 | Stop reason: SDUPTHER

## 2021-04-15 ENCOUNTER — HOSPITAL ENCOUNTER (OUTPATIENT)
Dept: MAMMOGRAPHY | Facility: HOSPITAL | Age: 81
Discharge: HOME | End: 2021-04-15
Admitting: INTERNAL MEDICINE

## 2021-04-15 DIAGNOSIS — Z12.31 VISIT FOR SCREENING MAMMOGRAM: ICD-10-CM

## 2021-04-15 PROCEDURE — 77063 BREAST TOMOSYNTHESIS BI: CPT

## 2021-04-15 PROCEDURE — 77067 SCR MAMMO BI INCL CAD: CPT

## 2021-05-26 ENCOUNTER — OFFICE VISIT (OUTPATIENT)
Dept: INTERNAL MEDICINE | Facility: CLINIC | Age: 81
End: 2021-05-26

## 2021-05-26 VITALS
TEMPERATURE: 97.1 F | HEIGHT: 67 IN | OXYGEN SATURATION: 98 % | BODY MASS INDEX: 30.62 KG/M2 | WEIGHT: 195.12 LBS | DIASTOLIC BLOOD PRESSURE: 80 MMHG | HEART RATE: 62 BPM | SYSTOLIC BLOOD PRESSURE: 126 MMHG

## 2021-05-26 DIAGNOSIS — L98.9 SKIN LESION OF LEFT LOWER EXTREMITY: Primary | ICD-10-CM

## 2021-05-26 DIAGNOSIS — L08.9 RECURRENT INFECTION OF SKIN: ICD-10-CM

## 2021-05-26 PROCEDURE — 99213 OFFICE O/P EST LOW 20 MIN: CPT | Performed by: NURSE PRACTITIONER

## 2021-05-26 NOTE — PROGRESS NOTES
"Date: 2021    Name: Radha Whelan  : 1940    Chief Complaint:   Chief Complaint   Patient presents with   • Leg Injury     pt is wanting a referral for leg wounds       HPI:  Radha Whelan is a 80 y.o. female presents requesting a referral for skin lesion/wound on her left lower leg that has been present for over 2 years.  She has been evaluated by dermatology, who has biopsied the lesion.  No treatments have effectively eradicated lesion, including antifungals.  She has developed a second lesion on her left lower leg.  She is understandably concerned regarding persistent nature of this wound.  Is requesting a referral to Dr. Espinal, infectious disease.  Denies fever, chills, fatigue, myalgia, malaise, drainage or bleeding from lesions.    History:  The following portions of the patient's history were reviewed and updated as appropriate: allergies, current medications, past medical history, family history, surgical history, social history and problem list.     VS:  Vitals:    21 0918   BP: 126/80   Pulse: 62   Temp: 97.1 °F (36.2 °C)   TempSrc: Infrared   SpO2: 98%   Weight: 88.5 kg (195 lb 1.9 oz)   Height: 170.2 cm (67\")     Body mass index is 30.56 kg/m².    PE:  Physical Exam  Constitutional:       Appearance: She is not ill-appearing.   HENT:      Head: Normocephalic.      Right Ear: External ear normal.      Left Ear: External ear normal.   Eyes:      Conjunctiva/sclera: Conjunctivae normal.      Pupils: Pupils are equal, round, and reactive to light.   Cardiovascular:      Rate and Rhythm: Normal rate and regular rhythm.      Pulses: Normal pulses.      Heart sounds: Normal heart sounds.   Pulmonary:      Effort: Pulmonary effort is normal.      Breath sounds: Normal breath sounds.   Musculoskeletal:      Cervical back: Normal range of motion and neck supple.   Skin:     General: Skin is warm.      Capillary Refill: Capillary refill takes less than 2 seconds.      Comments: Annular lesions on " left shin, medial calf with scabs.  Surrounded by erythema, tender to touch.     Neurological:      Mental Status: She is alert and oriented to person, place, and time.      Coordination: Coordination normal.      Gait: Gait normal.   Psychiatric:         Mood and Affect: Mood normal.         Behavior: Behavior normal.         Thought Content: Thought content normal.       Assessment/Plan:  Diagnoses and all orders for this visit:    1. Skin lesion of left lower extremity (Primary)  -     Ambulatory Referral to Infectious Disease    2. Recurrent infection of skin  -     Ambulatory Referral to Infectious Disease        - Advised referral to ID may not be accepted.  If so will refer her to wound care.  Patient is pleased with this plan of care.  Advised copies of office notes from dermatology should be obtained to ensure previously unsuccessful treatments are not repeated.    Return if symptoms worsen or fail to improve.

## 2021-06-11 LAB
ALBUMIN SERPL-MCNC: 4.3 G/DL (ref 3.5–5.2)
ALBUMIN/GLOB SERPL: 1.7 G/DL
ALP SERPL-CCNC: 66 U/L (ref 39–117)
ALT SERPL-CCNC: 14 U/L (ref 1–33)
AST SERPL-CCNC: 18 U/L (ref 1–32)
BASOPHILS # BLD AUTO: 0.02 10*3/MM3 (ref 0–0.2)
BASOPHILS NFR BLD AUTO: 0.4 % (ref 0–1.5)
BILIRUB SERPL-MCNC: 0.5 MG/DL (ref 0–1.2)
BUN SERPL-MCNC: 17 MG/DL (ref 8–23)
BUN/CREAT SERPL: 18.1 (ref 7–25)
CALCIUM SERPL-MCNC: 9.1 MG/DL (ref 8.6–10.5)
CHLORIDE SERPL-SCNC: 102 MMOL/L (ref 98–107)
CHOLEST SERPL-MCNC: 217 MG/DL (ref 0–200)
CO2 SERPL-SCNC: 26.7 MMOL/L (ref 22–29)
CREAT SERPL-MCNC: 0.94 MG/DL (ref 0.57–1)
EOSINOPHIL # BLD AUTO: 0.15 10*3/MM3 (ref 0–0.4)
EOSINOPHIL NFR BLD AUTO: 3.2 % (ref 0.3–6.2)
ERYTHROCYTE [DISTWIDTH] IN BLOOD BY AUTOMATED COUNT: 13.8 % (ref 12.3–15.4)
GLOBULIN SER CALC-MCNC: 2.5 GM/DL
GLUCOSE SERPL-MCNC: 135 MG/DL (ref 65–99)
HBA1C MFR BLD: 7.4 % (ref 4.8–5.6)
HCT VFR BLD AUTO: 38.4 % (ref 34–46.6)
HDLC SERPL-MCNC: 53 MG/DL (ref 40–60)
HGB BLD-MCNC: 12.5 G/DL (ref 12–15.9)
IMM GRANULOCYTES # BLD AUTO: 0.01 10*3/MM3 (ref 0–0.05)
IMM GRANULOCYTES NFR BLD AUTO: 0.2 % (ref 0–0.5)
LDLC SERPL CALC-MCNC: 139 MG/DL (ref 0–100)
LYMPHOCYTES # BLD AUTO: 2.13 10*3/MM3 (ref 0.7–3.1)
LYMPHOCYTES NFR BLD AUTO: 45.7 % (ref 19.6–45.3)
MCH RBC QN AUTO: 29.1 PG (ref 26.6–33)
MCHC RBC AUTO-ENTMCNC: 32.6 G/DL (ref 31.5–35.7)
MCV RBC AUTO: 89.3 FL (ref 79–97)
MICROALBUMIN UR-MCNC: 5.6 UG/ML
MONOCYTES # BLD AUTO: 0.54 10*3/MM3 (ref 0.1–0.9)
MONOCYTES NFR BLD AUTO: 11.6 % (ref 5–12)
NEUTROPHILS # BLD AUTO: 1.81 10*3/MM3 (ref 1.7–7)
NEUTROPHILS NFR BLD AUTO: 38.9 % (ref 42.7–76)
NRBC BLD AUTO-RTO: 0 /100 WBC (ref 0–0.2)
PLATELET # BLD AUTO: 207 10*3/MM3 (ref 140–450)
POTASSIUM SERPL-SCNC: 4.3 MMOL/L (ref 3.5–5.2)
PROT SERPL-MCNC: 6.8 G/DL (ref 6–8.5)
RBC # BLD AUTO: 4.3 10*6/MM3 (ref 3.77–5.28)
SODIUM SERPL-SCNC: 141 MMOL/L (ref 136–145)
T4 FREE SERPL-MCNC: 1.08 NG/DL (ref 0.93–1.7)
TRIGL SERPL-MCNC: 141 MG/DL (ref 0–150)
TSH SERPL DL<=0.005 MIU/L-ACNC: 2.49 UIU/ML (ref 0.27–4.2)
VIT B12 SERPL-MCNC: 532 PG/ML (ref 211–946)
VLDLC SERPL CALC-MCNC: 25 MG/DL (ref 5–40)
WBC # BLD AUTO: 4.66 10*3/MM3 (ref 3.4–10.8)

## 2021-06-16 ENCOUNTER — OFFICE VISIT (OUTPATIENT)
Dept: INTERNAL MEDICINE | Facility: CLINIC | Age: 81
End: 2021-06-16

## 2021-06-16 ENCOUNTER — HOSPITAL ENCOUNTER (OUTPATIENT)
Dept: GENERAL RADIOLOGY | Facility: HOSPITAL | Age: 81
Discharge: HOME | End: 2021-06-16
Admitting: INTERNAL MEDICINE

## 2021-06-16 VITALS
RESPIRATION RATE: 16 BRPM | DIASTOLIC BLOOD PRESSURE: 74 MMHG | OXYGEN SATURATION: 95 % | TEMPERATURE: 96.8 F | SYSTOLIC BLOOD PRESSURE: 122 MMHG | HEART RATE: 72 BPM | BODY MASS INDEX: 30.76 KG/M2 | HEIGHT: 67 IN | WEIGHT: 196 LBS

## 2021-06-16 DIAGNOSIS — E11.9 TYPE 2 DIABETES MELLITUS WITHOUT COMPLICATION, WITHOUT LONG-TERM CURRENT USE OF INSULIN (HCC): Primary | ICD-10-CM
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
## 2021-06-16 DIAGNOSIS — I87.2 CHRONIC VENOUS STASIS DERMATITIS: ICD-10-CM

## 2021-06-16 PROCEDURE — 99214 OFFICE O/P EST MOD 30 MIN: CPT | Performed by: INTERNAL MEDICINE

## 2021-06-16 PROCEDURE — 73090 X-RAY EXAM OF FOREARM: CPT

## 2021-06-16 RX ORDER — BLOOD SUGAR DIAGNOSTIC
STRIP MISCELLANEOUS
Qty: 100 EACH | Refills: 3 | Status: SHIPPED | OUTPATIENT
Start: 2021-06-16 | End: 2021-06-16 | Stop reason: SDUPTHER

## 2021-06-16 RX ORDER — ORAL SEMAGLUTIDE 3 MG/1
3 TABLET ORAL DAILY
Qty: 90 TABLET | Refills: 3 | Status: SHIPPED | OUTPATIENT
Start: 2021-06-16 | End: 2021-10-20

## 2021-06-16 RX ORDER — BLOOD SUGAR DIAGNOSTIC
STRIP MISCELLANEOUS
Qty: 100 EACH | Refills: 3 | Status: SHIPPED | OUTPATIENT
Start: 2021-06-16 | End: 2023-01-31 | Stop reason: SDUPTHER

## 2021-06-16 RX ORDER — FOLIC ACID 1 MG/1
1000 TABLET ORAL DAILY
Qty: 90 TABLET | Refills: 3 | Status: ON HOLD | OUTPATIENT
Start: 2021-06-16 | End: 2023-03-27

## 2021-06-16 RX ORDER — ORAL SEMAGLUTIDE 3 MG/1
3 TABLET ORAL DAILY
Qty: 30 TABLET | Refills: 11 | Status: SHIPPED | OUTPATIENT
Start: 2021-06-16 | End: 2021-06-16 | Stop reason: SDUPTHER

## 2021-06-16 NOTE — PROGRESS NOTES
Subjective     Patient ID: Radha Whelan is a 80 y.o. female. Patient is here for management of multiple medical problems.     Chief Complaint   Patient presents with   • Constipation   • Diabetes     History of Present Illness       Needs diabetic accue check strips.    Non healing wound on left leg. Got better with abx.       The following portions of the patient's history were reviewed and updated as appropriate: allergies, current medications, past family history, past medical history, past social history, past surgical history and problem list.    Review of Systems   Constitutional: Negative for fatigue.   Musculoskeletal: Positive for arthralgias.   Psychiatric/Behavioral: Negative for self-injury and sleep disturbance. The patient is not nervous/anxious.    All other systems reviewed and are negative.      Current Outpatient Medications:   •  acetaminophen (TYLENOL) 325 MG tablet, Take 2 tablets by mouth Every 6 (Six) Hours As Needed for Mild Pain ., Disp: , Rfl:   •  aspirin 81 MG chewable tablet, Chew 81 mg Daily., Disp: , Rfl:   •  Cholecalciferol (VITAMIN D PO), Take 125 mcg by mouth 2 (Two) Times a Week., Disp: , Rfl:   •  clopidogrel (PLAVIX) 75 MG tablet, TAKE 1 TABLET DAILY, Disp: 90 tablet, Rfl: 3  •  coenzyme Q10 100 MG capsule, Take 400 mg by mouth Every Other Day., Disp: , Rfl:   •  folic acid (FOLVITE) 1 MG tablet, Take 1 tablet by mouth Daily., Disp: 90 tablet, Rfl: 3  •  furosemide (LASIX) 20 MG tablet, TAKE 1 TABLET TWICE A DAY, Disp: 180 tablet, Rfl: 3  •  Glucosamine-Chondroit-Vit C-Mn (GLUCOSAMINE CHONDR 1500 COMPLX PO), Take 1 tablet by mouth Daily., Disp: , Rfl:   •  glucose blood (Accu-Chek Kayce Plus) test strip, TEST ONCE DAILY., Disp: 100 each, Rfl: 3  •  glucose monitor monitoring kit, 1 each As Needed (blood sugar monitoring.)., Disp: 1 each, Rfl: 1  •  glucose monitor monitoring kit, 1 each As Needed (blood sugar)., Disp: 1 each, Rfl: 1  •  Lancets (accu-chek soft touch) lancets, Use  "as directed, Disp: 100 each, Rfl: 12  •  lisinopril (PRINIVIL,ZESTRIL) 40 MG tablet, Take 1 tablet by mouth Daily., Disp: 90 tablet, Rfl: 3  •  lovastatin (MEVACOR) 10 MG tablet, Take 1 tablet by mouth Every Night. (Patient taking differently: Take 10 mg by mouth Every Other Day.), Disp: 90 tablet, Rfl: 3  •  melatonin 3 MG tablet, Take 3 mg by mouth Every Night., Disp: , Rfl:   •  pantoprazole (PROTONIX) 40 MG EC tablet, TAKE 1 TABLET DAILY, Disp: 90 tablet, Rfl: 3  •  polyethylene glycol (MIRALAX) pack packet, Take 17 g by mouth Daily., Disp: , Rfl:   •  tobramycin-dexamethasone (TOBRADEX) 0.3-0.1 % ophthalmic suspension, Administer 1 drop into the left eye Daily. (Patient taking differently: Administer 1 drop to both eyes As Needed.), Disp: 5 mL, Rfl: 2  •  vitamin B-12 (CYANOCOBALAMIN) 1000 MCG tablet, Take 1,000 mcg by mouth 2 (Two) Times a Week., Disp: , Rfl:   •  Semaglutide (Rybelsus) 3 MG tablet, Take 3 mg by mouth Daily., Disp: 90 tablet, Rfl: 3    Objective      Blood pressure 122/74, pulse 72, temperature 96.8 °F (36 °C), resp. rate 16, height 170.2 cm (67\"), weight 88.9 kg (196 lb), SpO2 95 %.    Physical Exam     General Appearance:    Alert, cooperative, no distress, appears stated age   Head:    Normocephalic, without obvious abnormality, atraumatic   Eyes:    PERRL, conjunctiva/corneas clear, EOM's intact   Ears:    Normal TM's and external ear canals, both ears   Nose:   Nares normal, septum midline, mucosa normal, no drainage   or sinus tenderness   Throat:   Lips, mucosa, and tongue normal; teeth and gums normal   Neck:   Supple, symmetrical, trachea midline, no adenopathy;        thyroid:  No enlargement/tenderness/nodules; no carotid    bruit or JVD   Back:     Symmetric, no curvature, ROM normal, no CVA tenderness   Lungs:     Clear to auscultation bilaterally, respirations unlabored   Chest wall:    No tenderness or deformity   Heart:    Regular rate and rhythm, S1 and S2 normal, no murmur,   "      rub or gallop   Abdomen:     Soft, non-tender, bowel sounds active all four quadrants,     no masses, no organomegaly   Extremities:   Extremities normal, atraumatic, no cyanosis or edema   Pulses:   2+ and symmetric all extremities   Skin:   Skin color, texture, turgor normal, no rashes or lesions   Lymph nodes:   Cervical, supraclavicular, and axillary nodes normal   Neurologic:   CNII-XII intact. Normal strength, sensation and reflexes       throughout      Results for orders placed or performed in visit on 12/16/20   Lipid Panel    Specimen: Blood   Result Value Ref Range    Total Cholesterol 217 (H) 0 - 200 mg/dL    Triglycerides 141 0 - 150 mg/dL    HDL Cholesterol 53 40 - 60 mg/dL    VLDL Cholesterol Kb 25 5 - 40 mg/dL    LDL Chol Calc (NIH) 139 (H) 0 - 100 mg/dL   Vitamin B12    Specimen: Blood   Result Value Ref Range    Vitamin B-12 532 211 - 946 pg/mL   TSH    Specimen: Blood   Result Value Ref Range    TSH 2.490 0.270 - 4.200 uIU/mL   Comprehensive Metabolic Panel    Specimen: Blood   Result Value Ref Range    Glucose 135 (H) 65 - 99 mg/dL    BUN 17 8 - 23 mg/dL    Creatinine 0.94 0.57 - 1.00 mg/dL    eGFR Non African Am 57 (L) >60 mL/min/1.73    eGFR African Am 69 >60 mL/min/1.73    BUN/Creatinine Ratio 18.1 7.0 - 25.0    Sodium 141 136 - 145 mmol/L    Potassium 4.3 3.5 - 5.2 mmol/L    Chloride 102 98 - 107 mmol/L    Total CO2 26.7 22.0 - 29.0 mmol/L    Calcium 9.1 8.6 - 10.5 mg/dL    Total Protein 6.8 6.0 - 8.5 g/dL    Albumin 4.30 3.50 - 5.20 g/dL    Globulin 2.5 gm/dL    A/G Ratio 1.7 g/dL    Total Bilirubin 0.5 0.0 - 1.2 mg/dL    Alkaline Phosphatase 66 39 - 117 U/L    AST (SGOT) 18 1 - 32 U/L    ALT (SGPT) 14 1 - 33 U/L   T4, Free    Specimen: Blood   Result Value Ref Range    Free T4 1.08 0.93 - 1.70 ng/dL   Hemoglobin A1c    Specimen: Blood   Result Value Ref Range    Hemoglobin A1C 7.40 (H) 4.80 - 5.60 %   MicroAlbumin, Urine, Random - Urine, Clean Catch    Specimen: Urine, Clean Catch    Result Value Ref Range    Microalbumin, Urine 5.6 Not Estab. ug/mL   CBC & Differential    Specimen: Blood   Result Value Ref Range    WBC 4.66 3.40 - 10.80 10*3/mm3    RBC 4.30 3.77 - 5.28 10*6/mm3    Hemoglobin 12.5 12.0 - 15.9 g/dL    Hematocrit 38.4 34.0 - 46.6 %    MCV 89.3 79.0 - 97.0 fL    MCH 29.1 26.6 - 33.0 pg    MCHC 32.6 31.5 - 35.7 g/dL    RDW 13.8 12.3 - 15.4 %    Platelets 207 140 - 450 10*3/mm3    Neutrophil Rel % 38.9 (L) 42.7 - 76.0 %    Lymphocyte Rel % 45.7 (H) 19.6 - 45.3 %    Monocyte Rel % 11.6 5.0 - 12.0 %    Eosinophil Rel % 3.2 0.3 - 6.2 %    Basophil Rel % 0.4 0.0 - 1.5 %    Neutrophils Absolute 1.81 1.70 - 7.00 10*3/mm3    Lymphocytes Absolute 2.13 0.70 - 3.10 10*3/mm3    Monocytes Absolute 0.54 0.10 - 0.90 10*3/mm3    Eosinophils Absolute 0.15 0.00 - 0.40 10*3/mm3    Basophils Absolute 0.02 0.00 - 0.20 10*3/mm3    Immature Granulocyte Rel % 0.2 0.0 - 0.5 %    Immature Grans Absolute 0.01 0.00 - 0.05 10*3/mm3    nRBC 0.0 0.0 - 0.2 /100 WBC         Assessment/Plan     Pt with wt gain over the pandemic. Wt jacek gback down. Will stop glipizide and start rybelus.    Feel may 14.  .  Fell on out streached hand on left.   worsing pain          Diagnoses and all orders for this visit:    1. Type 2 diabetes mellitus without complication, without long-term current use of insulin (CMS/[facility]) (Primary)  -     Comprehensive Metabolic Panel  -     Vitamin B12  -     CBC & Differential  -     Lipid Panel  -     TSH  -     T4, Free    2. Chronic venous stasis dermatitis  -     Comprehensive Metabolic Panel  -     Vitamin B12  -     CBC & Differential  -     Lipid Panel  -     TSH  -     T4, Free    3. Fall, initial encounter  -     XR forearm 2 vw left; Future  -     Comprehensive Metabolic Panel  -     Vitamin B12  -     CBC & Differential  -     Lipid Panel  -     TSH  -     T4, Free    Other orders  -     folic acid (FOLVITE) 1 MG tablet; Take 1 tablet by mouth Daily.  Dispense: 90 tablet; Refill:  3  -     Discontinue: glucose blood (Accu-Chek Kayce Plus) test strip; TEST ONCE DAILY.  Dispense: 100 each; Refill: 3  -     glucose blood (Accu-Chek Kayce Plus) test strip; TEST ONCE DAILY.  Dispense: 100 each; Refill: 3  -     Discontinue: Semaglutide (Rybelsus) 3 MG tablet; Take 3 mg by mouth Daily.  Dispense: 30 tablet; Refill: 11  -     Semaglutide (Rybelsus) 3 MG tablet; Take 3 mg by mouth Daily.  Dispense: 90 tablet; Refill: 3      Return in about 4 months (around 10/16/2021).          There are no Patient Instructions on file for this visit.     Farhan Schaefer MD    Assessment/Plan       Answers for HPI/ROS submitted by the patient on 6/9/2021  What is the primary reason for your visit?: Physical

## 2021-06-17 NOTE — PROGRESS NOTES
Please let them know the xr is neg. If not getting better.( I don't think it is)mri is needed. I would just like to send her to ortho before MRI. Let me know if she want to stay in [place] or go to [place] or even has a preference.

## 2021-06-18 ENCOUNTER — TELEPHONE (OUTPATIENT)
Dept: INTERNAL MEDICINE | Facility: CLINIC | Age: 81
End: 2021-06-18
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
## 2021-06-19 DIAGNOSIS — M79.603 PAIN OF UPPER EXTREMITY, UNSPECIFIED LATERALITY: ICD-10-CM

## 2021-06-19 RX ORDER — BLOOD-GLUCOSE METER
EACH MISCELLANEOUS
Qty: 1 EACH | Refills: 0 | Status: SHIPPED | OUTPATIENT
Start: 2021-06-19

## 2021-07-02 DIAGNOSIS — I87.2 CHRONIC VENOUS STASIS DERMATITIS: ICD-10-CM

## 2021-07-02 DIAGNOSIS — L08.9 RECURRENT INFECTION OF SKIN: ICD-10-CM

## 2021-07-02 DIAGNOSIS — E11.9 TYPE 2 DIABETES MELLITUS WITHOUT COMPLICATION, WITHOUT LONG-TERM CURRENT USE OF INSULIN (HCC): Primary | ICD-10-CM

## 2021-07-02 DIAGNOSIS — L98.9 SKIN LESION OF LEFT LOWER EXTREMITY: ICD-10-CM

## 2021-07-06 RX ORDER — GLIPIZIDE 2.5 MG/1
2.5 TABLET, EXTENDED RELEASE ORAL DAILY
Qty: 90 TABLET | Refills: 3 | Status: SHIPPED | OUTPATIENT
Start: 2021-07-06 | End: 2022-09-12

## 2021-07-08 ENCOUNTER — HOSPITAL ENCOUNTER (OUTPATIENT)
Dept: PHYSICAL THERAPY | Facility: HOSPITAL | Age: 81
Setting detail: THERAPIES SERIES
Discharge: HOME | End: 2021-07-08

## 2021-07-08 DIAGNOSIS — S81.802D MULTIPLE OPEN WOUNDS OF LEFT LOWER EXTREMITY, SUBSEQUENT ENCOUNTER: Primary | ICD-10-CM

## 2021-07-08 PROCEDURE — 29581 APPL MULTLAYER CMPRN SYS LEG: CPT

## 2021-07-08 PROCEDURE — 97597 DBRDMT OPN WND 1ST 20 CM/<: CPT

## 2021-07-08 PROCEDURE — 97161 PT EVAL LOW COMPLEX 20 MIN: CPT

## 2021-07-08 NOTE — THERAPY EVALUATION
Outpatient Rehabilitation - Wound/Debridement Initial Eval   [facility]     Patient Name: Radha Whelan  : 1940  MRN: [id number]  Today's Date: 2021              L lateral leg      L anterior leg      LLE      Admit Date: 2021    Visit Dx:    ICD-10-CM ICD-9-CM   1. Multiple open wounds of left lower extremity, subsequent encounter  S81.802D V58.89     894.0       Patient Active Problem List   Diagnosis   • Type 2 diabetes mellitus without complication (CMS/HCC)   • Temporary cerebral vascular dysfunction   • Fatigue   • Pre-syncope   • Hypervitaminosis B6   • Hyponatremia   • ODELL (obstructive sleep apnea)   • Peripheral neuropathy   • Restless legs syndrome   • Edema   • H/O hyperlipidemia   • Acute non-recurrent maxillary sinusitis   • Infection of left tear duct   • Left eye pain   • Mixed hyperlipidemia   • LBBB (left bundle branch block)   • Abnormal echocardiogram   • Cardiomyopathy (CMS/HCC)   • Arthritis   • Chronic systolic congestive heart failure (CMS/HCC)   • S/P hip replacement, right   • Bradycardia   • Acute cystitis with hematuria   • Acute on chronic diastolic congestive heart failure (CMS/HCC)   • Anemia   • Declining functional status   • Hypokalemia   • S/P placement of cardiac pacemaker   • Sinus node dysfunction (CMS/HCC)   • Essential hypertension        Past Medical History:   Diagnosis Date   • Arthritis    • Basal cell carcinoma    • Body piercing     both ears   • Cataract, bilateral     s/p removal    • Diabetes mellitus (CMS/HCC)    • DVT (deep venous thrombosis) (CMS/HCC)     - left leg   • Edema     legs- hx of   • Elevated cholesterol    • Fatigue    • Fatigue    • History of left heart catheterization     19  no stents   • History of migraine headaches    • Hx of exercise stress test    • Hyperlipidemia    • Hypertension    • Hyponatremia    • Impaired functional mobility, balance, gait, and endurance    • LBBB (left bundle branch block)    • LBBB  "(left bundle branch block)    • Left leg pain     left leg and knee pain    • Muscle spasm     hx of   • Obesity    • ODELL on CPAP     CPAP   • Stroke (CMS/HCC)    • Teeth missing     all of the top, and has 6 bottom teeth left   • Thyroid nodule    • TIA (transient ischemic attack)     x3.  last one was \"a few years ago\"   • Wears dentures     top plate   • Wears glasses     to read        Past Surgical History:   Procedure Laterality Date   • BREAST BIOPSY Left     benign   • CARDIAC CATHETERIZATION      09/16/2019 PER .   • CARDIAC CATHETERIZATION N/A 9/16/2019    Procedure: Left Heart Cath +/- CBI;  Surgeon: Ashlyn Mays MD;  Location:  [facility];  Service: Cardiovascular   • CARDIAC ELECTROPHYSIOLOGY PROCEDURE N/A 12/6/2019    Procedure: PACEMAKER IMPLANTATION- DC;  Surgeon: Stephan Laboy DO;  Location:  [facility];  Service: Cardiology   • CATARACT EXTRACTION  12/2018    both eyes    • COLONOSCOPY     • MOUTH SURGERY      all top teeth   • SKIN BIOPSY      basal cell   • TOTAL HIP ARTHROPLASTY Right 12/4/2019    Procedure: HIP ARTHROPLASTY TOTAL RIGHT;  Surgeon: Issa Salgado MD;  Location:  [facility];  Service: Orthopedics   • TUBAL ABDOMINAL LIGATION         Patient History     Row Name 07/08/21 1000             History    Chief Complaint  Ulcer, wound or other skin conditions;Swelling;Pain  -MC      Type of Pain  Lower Extremity / Leg  -      Brief Description of Current Complaint  Pt reports two-year h/o open wound to the lateral LLE. It started off small and has gradually gotten bigger over time. She has been seen by dermatology, biopsy was negative and medications, including abx, have not helped. Pt reports her PCP and her dermatologist gave the issue a name, \"some long word that they said happens to a lot of people when they get older.\" She reports swelling in her feet and ankles most days, is on diuretics for that.  -      Previous treatment for THIS " PROBLEM  Medication  -      Patient/Caregiver Goals  Heal wound;Know what to do to help the symptoms  -      Patient's Rating of General Health  Good  -      Occupation/sports/leisure activities  Retired  -      Patient seeing anyone else for problem(s)?  PCP  -      Related/Recent Hospitalizations  No  -MC         Pain     Pain Location  Leg  -      Pain at Present  0  -      Pain Comments  Sometimes tender, but mostly when the scab comes off and it's raw  -         Services    Are you currently receiving Home Health services  No  -      Do you plan to receive Home Health services in the near future  No  -         Daily Activities    Primary Language  English  -      Are you able to read  Yes  -MC      Are you able to write  Yes  -      How does patient learn best?  Listening;Demonstration  -      Teaching needs identified  Management of Condition  -      Patient is concerned about/has problems with  Other (comment) wound mgmt  -      Barriers to learning  None  -      Pt Participated in POC and Goals  Yes  -         Safety    Are you being hurt, hit, or frightened by anyone at home or in your life?  No  -MC      Are you being neglected by a caregiver  No  -        User Key  (r) = Recorded By, (t) = Taken By, (c) = Cosigned By    Initials Name Provider Type    Gillian Schafer, PT Physical Therapist          EVALUATION  PT Ortho     Row Name 07/08/21 1000       Subjective Pain    Able to rate subjective pain?  yes  -MC    Pre-Treatment Pain Level  0  -MC    Post-Treatment Pain Level  0  -MC       Transfers    Sit-Stand [place] (Transfers)  independent  -MC    Stand-Sit [place] (Transfers)  independent  -    Comment (Transfers)  seated for tx  -       Gait/Stairs (Locomotion)    [place] Level (Gait)  independent  -      User Key  (r) = Recorded By, (t) = Taken By, (c) = Cosigned By    Initials Name Provider Type    Gillian Schafer, PT Physical  "Therapist        LDA Wound     Row Name 07/08/21 1000             Wound 07/08/21 1000 Left lateral leg Venous Ulcer    Wound - Properties Group Placement Date: 07/08/21  - Placement Time: 1000  - Present on Hospital Admission: Y  - Side: Left  - Orientation: lateral  - Location: leg  - Primary Wound Type: Venous ulcer  -    Wound Image  Images linked: 1  -MC      Dressing Appearance  open to air  -MC      Base  moist;red;epithelialization irregular epithelialization, skin bridges  -MC      Periwound  intact;dry;swelling  -MC      Periwound Temperature  warm  -MC      Periwound Skin Turgor  soft  -MC      Edges  irregular;open  -      Wound Length (cm)  2 cm  -      Wound Width (cm)  0.9 cm  -      Wound Depth (cm)  0.1 cm  -      Drainage Characteristics/Odor  serosanguineous  -      Drainage Amount  small  -      Care, Wound  cleansed with;wound cleanser;debrided  -      Dressing Care  dressing applied;silver impregnated;collagen;antimicrobial agent applied;foam;low-adherent;border dressing;multi-layer wrap supa, HFBt, 4\" optifoam, MLW  -      Periwound Care  cleansed with pH balanced cleanser;dry periwound area maintained  -      Retired Wound - Properties Group Date first assessed: 07/08/21  - Time first assessed: 1000  -MC Present on Hospital Admission: Y  - Side: Left  - Location: leg  - Primary Wound Type: Venous ulcer  -MC       Wound 07/08/21 1000 Left anterior leg Venous Ulcer    Wound - Properties Group Placement Date: 07/08/21  - Placement Time: 1000  - Present on Hospital Admission: Y  - Side: Left  - Orientation: anterior  - Location: leg  - Primary Wound Type: Venous ulcer  -    Wound Image  Images linked: 2  -MC      Dressing Appearance  intact;moist drainage  -      Base  moist;red  -MC      Periwound  intact;dry;swelling  -      Periwound Temperature  warm  -      Periwound Skin Turgor  soft  -MC      Edges  irregular;open  -      Wound " "Length (cm)  0.4 cm  -      Wound Width (cm)  0.3 cm  -      Wound Depth (cm)  0.1 cm  -      Drainage Characteristics/Odor  sanguineous  -      Drainage Amount  scant  -MC      Care, Wound  cleansed with;wound cleanser;debrided  -      Dressing Care  dressing applied;silver impregnated;collagen;antimicrobial agent applied;foam;low-adherent;border dressing;multi-layer wrap supa, HFBt, 4\" optifoam, MLW  -      Periwound Care  cleansed with pH balanced cleanser;dry periwound area maintained  -      Retired Wound - Properties Group Date first assessed: 07/08/21  - Time first assessed: 1000  - Present on Hospital Admission: Y  - Side: Left  - Location: leg  - Primary Wound Type: Venous ulcer  -      User Key  (r) = Recorded By, (t) = Taken By, (c) = Cosigned By    Initials Name Provider Type     Gillian Degroot PT Physical Therapist        Lymphedema     Row Name 07/08/21 1000             Lymphedema Edema Assessment    Ptting Edema Category  By severity  -      Pitting Edema  Mild mild in foot/ankle only  -         Skin Changes/Observations    Location/Assessment  Lower Extremity  -      Lower Extremity Conditions  left:;clean;dry;shiny;hairless  -      Lower Extremity Color/Pigment  left:;brawny;hyperpigmented  -         Lymphedema Pulses/Capillary Refill    Lymphedema Pulses/Capillary Refill  lower extremity pulses;capillary refill  -      Dorsalis Pedis Pulse  left:;+2 normal  -      Posterior Tibialis Pulse  left:;+2 normal  -      Capillary Refill  lower extremity capillary refill  -      Lower Extremity Capillary Refill  left:;less than 3 seconds  -         Compression/Skin Care    Compression/Skin Care  skin care;wrapping location;bandaging  -      Skin Care  washed/dried  -      Wrapping Location  lower extremity  -      Wrapping Location LE  left:;foot to knee  -      Wrapping Comments  size 4 compressogrip doubled distally to create gradient " "compression  -      Bandage Layers  cotton elastic stocking- double layer (comment size)  -        User Key  (r) = Recorded By, (t) = Taken By, (c) = Cosigned By    Initials Name Provider Type    Gillian Schafer, PT Physical Therapist          WOUND DEBRIDEMENT  Total area of Debridement: 3 cm2  Debridement Site 1  Location- Site 1: LLE wounds  Selective Debridement- Site 1: Wound Surface <20cmsq  Instruments- Site 1: #15, scapel, tweezers  Excised Tissue Description- Site 1: maximum, eschar  Bleeding- Site 1: seeping, held pressure, 1 minute             Therapy Education     Row Name 07/08/21 1000             Therapy Education    Education Details  Reviewed s/sx of infection and PT role in wound care. Explained treatment options, including debridement, MIST, and compression. Explained that compression may promote healing, as she has s/sx of venous stasis. Change dressing every 2-3 days as follows: clean with skintegrity/gauze, dress with supa/HFBt/optifoam and replace compressogrip as demonstrated/instructed.  -      Given  Symptoms/condition management;Bandaging/dressing change;Edema management  -      Program  New  -      How Provided  Verbal;Demonstration  -      Provided to  Patient  -      Level of Understanding  Teach back education performed;Verbalized  -        User Key  (r) = Recorded By, (t) = Taken By, (c) = Cosigned By    Initials Name Provider Type    Gillian Schafer PT Physical Therapist          Recommendation and Plan  PT Assessment/Plan     Row Name 07/08/21 1000          PT Assessment    Functional Limitations  Other (comment) wound, edema management  -     Impairments  Integumentary integrity;Edema  -     Assessment Comments  Pt presents with chronic, nonhealing wound to the lateral LLE and a small wound to the anterior LLE that she reports \"is just like how the other one started.\" Pt does not think she has been diagnosed, but has s/sx of venous stasis, with " mild swelling in her L foot and ankle and brawny discoloration to the lower calf. The lateral open wound is moist and pink/red after debridement of thick eschar/scabbing, with some evidence of patchy epithelialization. Pt will benefit from skilled PT wound care to promote healing. Pt may benefit from MIST ultrasound to increase blood flow, decrease bioburden, and promote cellular activity. Pt is also a good candidate for edema management with MLW to increase venous return and maintain limb girth reduction.  -     Rehab Potential  Good  -     Patient/caregiver participated in establishment of treatment plan and goals  Yes  -     Patient would benefit from skilled therapy intervention  Yes  -        PT Plan    PT Frequency  1x/week;2x/week  -     Predicted Duration of Therapy Intervention (PT)  12 visits  -     Planned CPT's?  PT EVAL LOW COMPLEXITY: 46683;PT SELF CARE/MGMT/TRAIN 15 MIN: 37364;PT NONSELECT DEBRIDE 15 MIN: 86865;PT JUSTIN DEBRIDE OPEN WOUND UP TO 20 CM: 30538;PT NLFU MIST: 48652;PT UNNA BOOT: 12064;PT MULTI LAYER COMP SYS LE;PT THER SUPP EA 15 MIN  -     Physical Therapy Interventions (Optional Details)  wound care;patient/family education  -     PT Plan Comments  debridement, MIST if covered, MLW  -       User Key  (r) = Recorded By, (t) = Taken By, (c) = Cosigned By    Initials Name Provider Type    Gillian Schafer, PT Physical Therapist            Goals  PT OP Goals     Row Name 21 1000          PT Short Term Goals    STG 1  Pt will verbalize s/sx of infection.  -     STG 2  Pt will demonstrate 25% reduction in wound area to indicate healing progress.  -        Long Term Goals    LTG 1  Pt will verbalize independence with clean home dressing changes.  -     LTG 2  Pt will demonstrate independence with appropriate compression system as indicated.  -     LTG 3  Pt will demonstrate 75% reduction in wound area to indicate healing progress.  -        Time  Calculation    PT Goal Re-Cert Due Date  10/06/21  -       User Key  (r) = Recorded By, (t) = Taken By, (c) = Cosigned By    Initials Name Provider Type    Gillian Schafer, PT Physical Therapist          Time Calculation: Start Time: 1000  Untimed Charges  PT Eval/Re-eval Minutes: 60  Wound Care: 82018 Multilayer comp below knee, 48155 Selective debridement  87047-[place]f comp below knee: 10  22775-Yhsphczuy debridement: 15  Total Minutes  Untimed Charges Total Minutes: 85   Total Minutes: 85  Therapy Charges for Today     Code Description Service Date Service Provider Modifiers Qty    47249009537 HC PT EVAL LOW COMPLEXITY 4 7/8/2021 Gillian Degroot, PT GP 1    31703898534 HC PT MULTI LAYER COMP SYS BELOW KNEE 7/8/2021 Gillian Degroot, PT GP 1    [id number] HC JUSTIN DEBRIDE OPEN WOUND UP TO 20CM 7/8/2021 Gillian Degroot, PT GP 1                Gillian Degroot PT  7/8/2021

## 2021-07-15 ENCOUNTER — HOSPITAL ENCOUNTER (OUTPATIENT)
Dept: PHYSICAL THERAPY | Facility: HOSPITAL | Age: 81
Setting detail: THERAPIES SERIES
Discharge: HOME | End: 2021-07-15

## 2021-07-15 DIAGNOSIS — S81.802D MULTIPLE OPEN WOUNDS OF LEFT LOWER EXTREMITY, SUBSEQUENT ENCOUNTER: Primary | ICD-10-CM

## 2021-07-15 PROCEDURE — 97597 DBRDMT OPN WND 1ST 20 CM/<: CPT

## 2021-07-15 PROCEDURE — 29581 APPL MULTLAYER CMPRN SYS LEG: CPT

## 2021-07-16 NOTE — THERAPY WOUND CARE TREATMENT
Outpatient Rehabilitation - Wound/Debridement Treatment Note   [place]     Patient Name: Radha Whelan  : 1940  MRN: [id number]  Today's Date: 2021                 Admit Date: 7/15/2021    Visit Dx:    ICD-10-CM ICD-9-CM   1. Multiple open wounds of left lower extremity, subsequent encounter  S81.802D V58.89     894.0       Patient Active Problem List   Diagnosis   • Type 2 diabetes mellitus without complication (CMS/HCC)   • Temporary cerebral vascular dysfunction   • Fatigue   • Pre-syncope   • Hypervitaminosis B6   • Hyponatremia   • ODELL (obstructive sleep apnea)   • Peripheral neuropathy   • Restless legs syndrome   • Edema   • H/O hyperlipidemia   • Acute non-recurrent maxillary sinusitis   • Infection of left tear duct   • Left eye pain   • Mixed hyperlipidemia   • LBBB (left bundle branch block)   • Abnormal echocardiogram   • Cardiomyopathy (CMS/HCC)   • Arthritis   • Chronic systolic congestive heart failure (CMS/HCC)   • S/P hip replacement, right   • Bradycardia   • Acute cystitis with hematuria   • Acute on chronic diastolic congestive heart failure (CMS/HCC)   • Anemia   • Declining functional status   • Hypokalemia   • S/P placement of cardiac pacemaker   • Sinus node dysfunction (CMS/HCC)   • Essential hypertension        Past Medical History:   Diagnosis Date   • Arthritis    • Basal cell carcinoma    • Body piercing     both ears   • Cataract, bilateral     s/p removal    • Diabetes mellitus (CMS/HCC)    • DVT (deep venous thrombosis) (CMS/HCC)     - left leg   • Edema     legs- hx of   • Elevated cholesterol    • Fatigue    • Fatigue    • History of left heart catheterization     19  no stents   • History of migraine headaches    • Hx of exercise stress test    • Hyperlipidemia    • Hypertension    • Hyponatremia    • Impaired functional mobility, balance, gait, and endurance    • LBBB (left bundle branch block)    • LBBB (left bundle branch block)    • Left leg pain  "    left leg and knee pain    • Muscle spasm     hx of   • Obesity    • ODELL on CPAP     CPAP   • Stroke (CMS/HCC)    • Teeth missing     all of the top, and has 6 bottom teeth left   • Thyroid nodule    • TIA (transient ischemic attack)     x3.  last one was \"a few years ago\"   • Wears dentures     top plate   • Wears glasses     to read        Past Surgical History:   Procedure Laterality Date   • BREAST BIOPSY Left     benign   • CARDIAC CATHETERIZATION      09/16/2019 PER .   • CARDIAC CATHETERIZATION N/A 9/16/2019    Procedure: Left Heart Cath +/- CBI;  Surgeon: Ashlyn Mays MD;  Location:  [facility];  Service: Cardiovascular   • CARDIAC ELECTROPHYSIOLOGY PROCEDURE N/A 12/6/2019    Procedure: PACEMAKER IMPLANTATION- DC;  Surgeon: Stephan Laboy DO;  Location:  [facility];  Service: Cardiology   • CATARACT EXTRACTION  12/2018    both eyes    • COLONOSCOPY     • MOUTH SURGERY      all top teeth   • SKIN BIOPSY      basal cell   • TOTAL HIP ARTHROPLASTY Right 12/4/2019    Procedure: HIP ARTHROPLASTY TOTAL RIGHT;  Surgeon: Issa Salgado MD;  Location:  [facility];  Service: Orthopedics   • TUBAL ABDOMINAL LIGATION           EVALUATION  PT Ortho     Row Name 07/15/21 2066       Subjective Comments    Subjective Comments  No complaints, pt reports the compression feels good on her leg and she plans to order full-length compression hose once her leg is healed up.  -       Subjective Pain    Able to rate subjective pain?  yes  -    Pre-Treatment Pain Level  0  -MC    Post-Treatment Pain Level  0  -MC       Transfers    Sit-Stand [place] (Transfers)  independent  -MC    Stand-Sit [place] (Transfers)  independent  -    Comment (Transfers)  seated for tx  -       Gait/Stairs (Locomotion)    [place] Level (Gait)  independent  -      User Key  (r) = Recorded By, (t) = Taken By, (c) = Cosigned By    Initials Name Provider Type    Gillian Schafer, PT " "Physical Therapist          LDA Wound     Row Name 07/15/21 1115             Wound 07/08/21 1000 Left lateral leg Venous Ulcer    Wound - Properties Group Placement Date: 07/08/21  - Placement Time: 1000  - Present on Hospital Admission: Y  - Side: Left  - Orientation: lateral  - Location: leg  - Primary Wound Type: Venous ulcer  -MC    Wound Image  Images linked: 1  -MC      Dressing Appearance  intact;moist drainage;dried drainage  -MC      Base  moist;red;epithelialization one pinpoint area remaining, appears to be from dressing  -MC      Periwound  intact;dry;swelling  -MC      Periwound Temperature  warm  -MC      Periwound Skin Turgor  soft  -MC      Edges  irregular;open  -      Wound Length (cm)  0.3 cm  -      Wound Width (cm)  0.2 cm  -      Wound Depth (cm)  0.1 cm  -      Drainage Characteristics/Odor  serosanguineous  -      Drainage Amount  scant  -      Care, Wound  cleansed with;wound cleanser;debrided  -      Dressing Care  dressing applied;silver impregnated;collagen;low-adherent;foam;border dressing supa, 4\" optifoam  -      Periwound Care  cleansed with pH balanced cleanser;dry periwound area maintained  -      Retired Wound - Properties Group Date first assessed: 07/08/21  - Time first assessed: 1000  -MC Present on Hospital Admission: Y  - Side: Left  - Location: leg  - Primary Wound Type: Venous ulcer  -MC       Wound 07/08/21 1000 Left anterior leg Venous Ulcer    Wound - Properties Group Placement Date: 07/08/21  - Placement Time: 1000  - Present on Hospital Admission: Y  - Side: Left  - Orientation: anterior  - Location: leg  -MC Primary Wound Type: Venous ulcer  -MC    Wound Image  Images linked: 1  -MC      Dressing Appearance  intact;dried drainage  -MC      Base  moist;red;epithelialization  -      Periwound  intact;dry;swelling  -      Periwound Temperature  warm  -      Periwound Skin Turgor  soft  -MC      Edges  irregular;open  " "-      Wound Length (cm)  0.2 cm  -      Wound Width (cm)  0.1 cm  -      Wound Depth (cm)  0.1 cm  -      Drainage Characteristics/Odor  sanguineous  -      Drainage Amount  scant  -      Care, Wound  cleansed with;wound cleanser;debrided  -      Dressing Care  dressing applied;silver impregnated;collagen;low-adherent;foam;border dressing supa, 4\" optifoam  -      Periwound Care  cleansed with pH balanced cleanser;dry periwound area maintained  -      Retired Wound - Properties Group Date first assessed: 07/08/21  - Time first assessed: 1000  - Present on Hospital Admission: Y  - Side: Left  - Location: leg  - Primary Wound Type: Venous ulcer  -      User Key  (r) = Recorded By, (t) = Taken By, (c) = Cosigned By    Initials Name Provider Type    Gillian Schafer, PT Physical Therapist        Lymphedema     Row Name 07/15/21 1115             Lymphedema Edema Assessment    Ptting Edema Category  By severity  -      Pitting Edema  Other (comment) trace/none  -         Skin Changes/Observations    Lower Extremity Conditions  left:;clean;dry;shiny;hairless  -      Lower Extremity Color/Pigment  left:;brawny;hyperpigmented  -         Lymphedema Pulses/Capillary Refill    Lower Extremity Capillary Refill  left:;less than 3 seconds  -         Compression/Skin Care    Compression/Skin Care  skin care;wrapping location;bandaging  -      Skin Care  washed/dried  -      Wrapping Location  lower extremity  -      Wrapping Location LE  left:;foot to knee  -      Wrapping Comments  size 4 compressogrip doubled distally to create gradient compression  -      Bandage Layers  cotton elastic stocking- double layer (comment size)  -        User Key  (r) = Recorded By, (t) = Taken By, (c) = Cosigned By    Initials Name Provider Type    Gillian Schafer, PT Physical Therapist          WOUND DEBRIDEMENT  Total area of Debridement: 2 cm2  Debridement Site 1  Location- Site 1: " LLE wounds  Selective Debridement- Site 1: Wound Surface <20cmsq  Instruments- Site 1: tweezers  Excised Tissue Description- Site 1: minimum, other (comment) (crust)  Bleeding- Site 1: scant, held pressure, 1 minute             Therapy Education     Row Name 07/15/21 4059             Therapy Education    Education Details  D/C use of HFB. Use supa and optifoam only until areas appear closed, likely within the next week. Continue with compressogrip until able to get compression hose. Call for follow up within 30 days.  -      Given  Symptoms/condition management;Bandaging/dressing change;Edema management  -      Program  Progressed  -      How Provided  Verbal;Demonstration  -MC      Provided to  Patient  -      Level of Understanding  Teach back education performed;Verbalized  -        User Key  (r) = Recorded By, (t) = Taken By, (c) = Cosigned By    Initials Name Provider Type    Gillian Schafer, PT Physical Therapist          Recommendation and Plan  PT Assessment/Plan     Row Name 07/15/21 5132          PT Assessment    Functional Limitations  Other (comment) wound, edema management  -     Impairments  Integumentary integrity;Edema  -     Assessment Comments  Pt with notable improvement to her LLE since her initial evaluation. Pt now with only two pinpoint areas that are partially epithelialized. Discontinued HFB foam due to improvement. Pt also with no/trace remaining LLE edema, and plans to procure full-length compression hose for long-term use, which she has used before and feels comfortable with. Based on her notable improvement, PT anticipates the remaining open areas will close within 1-2 weeks. Pt is appropriate for tentative d/c.  -     Rehab Potential  Good  -     Patient/caregiver participated in establishment of treatment plan and goals  Yes  -     Patient would benefit from skilled therapy intervention  Yes  -MC        PT Plan    PT Frequency  Other (comment) prn within 30  days  -     Physical Therapy Interventions (Optional Details)  wound care;patient/family education  -     PT Plan Comments  tentative d/c  -       User Key  (r) = Recorded By, (t) = Taken By, (c) = Cosigned By    Initials Name Provider Type    Gillian Schafer, PT Physical Therapist          Goals  PT OP Goals     Row Name 07/15/21 1115          Time Calculation    PT Goal Re-Cert Due Date  10/06/21  -       User Key  (r) = Recorded By, (t) = Taken By, (c) = Cosigned By    Initials Name Provider Type    Gillian Schafer, PT Physical Therapist          PT Goal Re-Cert Due Date: 10/06/21            Time Calculation: Start Time: 1115  Untimed Charges  45524-Urjxccnlfl comp below knee: 10  56085-Oxhhztwyn debridement: 15  Total Minutes  Untimed Charges Total Minutes: 25   Total Minutes: 25  Therapy Charges for Today     Code Description Service Date Service Provider Modifiers Qty    66428034503 HC JUSTIN DEBRIDE OPEN WOUND UP TO 20CM 7/15/2021 Gillian Degroot, PT GP 1    27992288521 HC PT MULTI LAYER COMP SYS BELOW KNEE 7/15/2021 Gillian Degroot, PT GP 1                  Gillian Degroot, PT  7/16/2021

## 2021-08-06 RX ORDER — PANTOPRAZOLE SODIUM 40 MG/1
TABLET, DELAYED RELEASE ORAL
Qty: 90 TABLET | Refills: 3 | Status: SHIPPED | OUTPATIENT
Start: 2021-08-06 | End: 2022-08-01

## 2021-08-06 NOTE — TELEPHONE ENCOUNTER
Rx Refill Note  Requested Prescriptions     Pending Prescriptions Disp Refills   • pantoprazole (PROTONIX) 40 MG EC tablet [Pharmacy Med Name: PANTOPRAZOLE TAB 40MG DR] 90 tablet 3     Sig: TAKE 1 TABLET DAILY      Last office visit with prescribing clinician: 6/16/2021      Next office visit with prescribing clinician: 10/12/2021            Zion Santos MA  08/06/21, 15:53 EDT

## 2021-08-17 ENCOUNTER — DOCUMENTATION (OUTPATIENT)
Dept: PHYSICAL THERAPY | Facility: HOSPITAL | Age: 81
End: 2021-08-17

## 2021-08-17 DIAGNOSIS — S81.802D MULTIPLE OPEN WOUNDS OF LEFT LOWER EXTREMITY, SUBSEQUENT ENCOUNTER: Primary | ICD-10-CM

## 2021-08-17 NOTE — THERAPY DISCHARGE NOTE
Outpatient Rehabilitation - Wound/Debridement Discharge Summary       Patient Name: Radha Whelan  : 1940  MRN: [id number]  Today's Date: 2021                  Admit Date: (Not on file)    Visit Dx:    ICD-10-CM ICD-9-CM   1. Multiple open wounds of left lower extremity, subsequent encounter  S81.802D V58.89     894.0       Patient Active Problem List   Diagnosis   • Type 2 diabetes mellitus without complication (CMS/[facility])   • Temporary cerebral vascular dysfunction   • Fatigue   • Pre-syncope   • Hypervitaminosis B6   • Hyponatremia   • ODELL (obstructive sleep apnea)   • Peripheral neuropathy   • Restless legs syndrome   • Edema   • H/O hyperlipidemia   • Acute non-recurrent maxillary sinusitis   • Infection of left tear duct   • Left eye pain   • Mixed hyperlipidemia   • LBBB (left bundle branch block)   • Abnormal echocardiogram   • Cardiomyopathy (CMS/[facility])   • Arthritis   • Chronic systolic congestive heart failure (CMS/HCC)   • S/P hip replacement, right   • Bradycardia   • Acute cystitis with hematuria   • Acute on chronic diastolic congestive heart failure (CMS/[facility])   • Anemia   • Declining functional status   • Hypokalemia   • S/P placement of cardiac pacemaker   • Sinus node dysfunction (CMS/[facility])   • Essential hypertension        Past Medical History:   Diagnosis Date   • Arthritis    • Basal cell carcinoma    • Body piercing     both ears   • Cataract, bilateral     s/p removal    • Diabetes mellitus (CMS/HCC)    • DVT (deep venous thrombosis) (CMS/HCC)     - left leg   • Edema     legs- hx of   • Elevated cholesterol    • Fatigue    • Fatigue    • History of left heart catheterization     19  no stents   • History of migraine headaches    • Hx of exercise stress test    • Hyperlipidemia    • Hypertension    • Hyponatremia    • Impaired functional mobility, balance, gait, and endurance    • LBBB (left bundle branch block)    • LBBB (left bundle branch block)    • Left leg pain   "   left leg and knee pain    • Muscle spasm     hx of   • Obesity    • ODELL on CPAP     CPAP   • Stroke (CMS/HCC)    • Teeth missing     all of the top, and has 6 bottom teeth left   • Thyroid nodule    • TIA (transient ischemic attack)     x3.  last one was \"a few years ago\"   • Wears dentures     top plate   • Wears glasses     to read        Past Surgical History:   Procedure Laterality Date   • BREAST BIOPSY Left     benign   • CARDIAC CATHETERIZATION      09/16/2019 PER .   • CARDIAC CATHETERIZATION N/A 9/16/2019    Procedure: Left Heart Cath +/- CBI;  Surgeon: Ashlyn Mays MD;  Location:  [facility];  Service: Cardiovascular   • CARDIAC ELECTROPHYSIOLOGY PROCEDURE N/A 12/6/2019    Procedure: PACEMAKER IMPLANTATION- DC;  Surgeon: Stephan Laboy DO;  Location:  [facility];  Service: Cardiology   • CATARACT EXTRACTION  12/2018    both eyes    • COLONOSCOPY     • MOUTH SURGERY      all top teeth   • SKIN BIOPSY      basal cell   • TOTAL HIP ARTHROPLASTY Right 12/4/2019    Procedure: HIP ARTHROPLASTY TOTAL RIGHT;  Surgeon: Issa Salgado MD;  Location:  [facility];  Service: Orthopedics   • TUBAL ABDOMINAL LIGATION         Goals  PT OP Goals     Row Name 08/17/21 0900          PT Short Term Goals    STG 1  Pt will verbalize s/sx of infection.  -     STG 1 Progress  Met  -     STG 2  Pt will demonstrate 25% reduction in wound area to indicate healing progress.  -     STG 2 Progress  Met  -        Long Term Goals    LTG 1  Pt will verbalize independence with clean home dressing changes.  -     LTG 1 Progress  Met  -     LTG 2  Pt will demonstrate independence with appropriate compression system as indicated.  -     LTG 2 Progress  Met  -     LTG 3  Pt will demonstrate 75% reduction in wound area to indicate healing progress.  -     LTG 3 Progress  Met  -       User Key  (r) = Recorded By, (t) = Taken By, (c) = Cosigned By    Initials Name Provider Type    " Brittanie Marie, PT Physical Therapist        OP Discharge Summary     Row Name 08/17/21 0946             OP PT Discharge Summary    Date of Discharge  08/17/21  -SANTOS      Reason for Discharge  All goals achieved;Independent  -SANTOS      Outcomes Achieved  Able to achieve all goals within established timeline  -SANTOS      Discharge Destination  Home without follow-up  -SANTOS        User Key  (r) = Recorded By, (t) = Taken By, (c) = Cosigned By    Initials Name Provider Type    Brittanie Marie, PT Physical Therapist          Brittanie Esquivel, PT  8/17/2021

## 2021-09-08 ENCOUNTER — OFFICE VISIT (OUTPATIENT)
Dept: INTERNAL MEDICINE | Facility: CLINIC | Age: 81
End: 2021-09-08

## 2021-09-08 VITALS
RESPIRATION RATE: 16 BRPM | OXYGEN SATURATION: 96 % | SYSTOLIC BLOOD PRESSURE: 128 MMHG | HEART RATE: 63 BPM | HEIGHT: 67 IN | WEIGHT: 195.12 LBS | DIASTOLIC BLOOD PRESSURE: 80 MMHG | BODY MASS INDEX: 30.62 KG/M2 | TEMPERATURE: 97.3 F

## 2021-09-08 DIAGNOSIS — J20.9 ACUTE BRONCHITIS, UNSPECIFIED ORGANISM: Primary | ICD-10-CM

## 2021-09-08 DIAGNOSIS — R06.09 DYSPNEA ON EXERTION: ICD-10-CM

## 2021-09-08 DIAGNOSIS — R05.3 PERSISTENT DRY COUGH: ICD-10-CM

## 2021-09-08 PROCEDURE — 99214 OFFICE O/P EST MOD 30 MIN: CPT | Performed by: NURSE PRACTITIONER

## 2021-09-08 RX ORDER — BENZONATATE 100 MG/1
100 CAPSULE ORAL 3 TIMES DAILY PRN
Qty: 30 CAPSULE | Refills: 0 | Status: SHIPPED | OUTPATIENT
Start: 2021-09-08 | End: 2021-09-08

## 2021-09-08 RX ORDER — BENZONATATE 100 MG/1
100 CAPSULE ORAL 3 TIMES DAILY PRN
Qty: 30 CAPSULE | Refills: 0 | Status: SHIPPED | OUTPATIENT
Start: 2021-09-08 | End: 2022-01-24

## 2021-09-08 NOTE — PROGRESS NOTES
"  Office Visit      Patient Name: Radha Whelan  : 1940   MRN: [id number]   Care Team: Patient Care Team:  Farhan Schaefer MD as PCP - General (Internal Medicine)  Nik Chaudhari MD as Consulting Physician (General Surgery)    Chief Complaint  Cough (dry, ongoing x 4 weeks, shallow breathing ) and Shortness of Breath    Subjective     Subjective      Radha Whelan presents to [facility] for dry cough. Symptoms began about 4 weeks ago. Endorses dry cough, shortness of breath on exertion, and wheezing. Yesterday did experience some pain with deep inhalation that has since resolved.   Denies fatigue, fever, orthopnea,  chills, abdominal pain, nausea, worsening lower extremity edema, head congestion, and sinus drainage.   She has been fully vaccinated for COVID-19 and has had no known exposure that she knows of.   She has tried some OTC cough medication twice- didn't seem to help and doesn't like taking sweet medications.   Has permanent pacemaker and history of CHF. Has appointment with Dr. Moore in 2 weeks for follow-up. Wears CPAP at night and symptoms are not bothersome when she lies down and is able to wear her CPAP without problem. Does not use any oxygen. Weight is stable when compared to 3 months ago.     Review of Systems   Constitutional: Negative for chills, fatigue and fever.   HENT: Negative for congestion, postnasal drip, sinus pressure, sneezing, sore throat, swollen glands and trouble swallowing.    Respiratory: Positive for cough, shortness of breath and wheezing.    Cardiovascular: Positive for leg swelling. Negative for chest pain.   Gastrointestinal: Negative for abdominal pain, diarrhea, nausea and vomiting.   Neurological: Negative for headache.   Psychiatric/Behavioral: Negative for sleep disturbance.       Objective     Objective   Vital Signs:   /80   Pulse 63   Temp 97.3 °F (36.3 °C) (Temporal)   Resp 16   Ht 170.2 cm (67\")   Wt 88.5 kg (195 lb " 1.9 oz)   SpO2 96%   BMI 30.56 kg/m²     Physical Exam  Vitals and nursing note reviewed.   Constitutional:       General: She is not in acute distress.     Appearance: Normal appearance. She is not ill-appearing or toxic-appearing.   Eyes:      Pupils: Pupils are equal, round, and reactive to light.   Neck:      Vascular: No carotid bruit.   Cardiovascular:      Rate and Rhythm: Normal rate and regular rhythm.      Heart sounds: Normal heart sounds. No murmur heard.     Pulmonary:      Effort: Pulmonary effort is normal. No respiratory distress.      Breath sounds: Normal breath sounds. No wheezing.      Comments: Tachypnea with exertion    Abdominal:      General: Bowel sounds are normal. There is no distension.      Palpations: Abdomen is soft.      Tenderness: There is no abdominal tenderness.   Musculoskeletal:      Cervical back: Neck supple. No tenderness.   Skin:     General: Skin is warm and dry.      Findings: No rash.   Neurological:      General: No focal deficit present.      Mental Status: She is alert.   Psychiatric:         Mood and Affect: Mood normal.         Behavior: Behavior normal.        Assessment / Plan      Assessment/Plan   Problem List Items Addressed This Visit     None      Visit Diagnoses     Shortness of breath    -  Primary    Relevant Orders    XR Chest 2 View     CBC No Differential (Completed)    Comprehensive metabolic panel (Completed)    proBNP (Completed)    Persistent dry cough        Relevant Orders    XR Chest 2 View     CBC No Differential (Completed)    Comprehensive metabolic panel (Completed)    proBNP (Completed)    Acute bronchitis, unspecified organism        Relevant Medications    benzonatate (Tessalon Perles) 100 MG capsule    albuterol sulfate  (90 Base) MCG/ACT inhaler    Other Relevant Orders    CBC No Differential (Completed)    Comprehensive metabolic panel (Completed)    proBNP (Completed)    Due to duration of symptoms will hold on COVID-19  testing. Suspect acute bronchitis. Will perform chest x-ray today and above labs and escalate treatment as necessary. Recommend azithromycin due to duration of symptoms and she declines today, would like imaging first. Recommend deep breathing exercises, tessalon perles as needed for cough, albuterol inhaler as needed for wheezing or shortness of breath, and close follow-up in 1 week. Other differentials include pneumonia or acute on chronic CHF. ER with any shortness of breath unrelieved by rest or chest pain.            Follow Up   Return if symptoms worsen or fail to improve.  Patient was given instructions and counseling regarding her condition or for health maintenance advice. Please see specific information pulled into the AVS if appropriate.     CURTIS Hernandez  [facility] Psychiatric

## 2021-09-09 ENCOUNTER — HOSPITAL ENCOUNTER (OUTPATIENT)
Dept: GENERAL RADIOLOGY | Facility: HOSPITAL | Age: 81
Discharge: HOME | End: 2021-09-09
Admitting: NURSE PRACTITIONER

## 2021-09-09 PROCEDURE — 71046 X-RAY EXAM CHEST 2 VIEWS: CPT

## 2021-09-09 RX ORDER — ALBUTEROL SULFATE 90 UG/1
2 AEROSOL, METERED RESPIRATORY (INHALATION) EVERY 4 HOURS PRN
Qty: 6.7 G | Refills: 0 | Status: SHIPPED | OUTPATIENT
Start: 2021-09-09 | End: 2022-04-04

## 2021-09-10 ENCOUNTER — TRANSCRIBE ORDERS (OUTPATIENT)
Dept: LAB | Facility: HOSPITAL | Age: 81
End: 2021-09-10

## 2021-09-10 ENCOUNTER — LAB (OUTPATIENT)
Dept: LAB | Facility: HOSPITAL | Age: 81
End: 2021-09-10

## 2021-09-10 ENCOUNTER — TELEPHONE (OUTPATIENT)
Dept: INTERNAL MEDICINE | Facility: CLINIC | Age: 81
End: 2021-09-10

## 2021-09-10 DIAGNOSIS — R05.9 COUGH: Primary | ICD-10-CM

## 2021-09-10 DIAGNOSIS — R06.02 SHORTNESS OF BREATH: ICD-10-CM

## 2021-09-10 LAB
ALBUMIN SERPL-MCNC: 4.2 G/DL (ref 3.5–5.2)
ALBUMIN/GLOB SERPL: 1.7 G/DL
ALP SERPL-CCNC: 64 U/L (ref 39–117)
ALT SERPL-CCNC: 26 U/L (ref 1–33)
AST SERPL-CCNC: 32 U/L (ref 1–32)
BILIRUB SERPL-MCNC: 0.3 MG/DL (ref 0–1.2)
BUN SERPL-MCNC: 18 MG/DL (ref 8–23)
BUN/CREAT SERPL: 18.4 (ref 7–25)
CALCIUM SERPL-MCNC: 9.5 MG/DL (ref 8.6–10.5)
CHLORIDE SERPL-SCNC: 101 MMOL/L (ref 98–107)
CO2 SERPL-SCNC: 26.1 MMOL/L (ref 22–29)
CREAT SERPL-MCNC: 0.98 MG/DL (ref 0.57–1)
ERYTHROCYTE [DISTWIDTH] IN BLOOD BY AUTOMATED COUNT: 13.9 % (ref 12.3–15.4)
GLOBULIN SER CALC-MCNC: 2.5 GM/DL
GLUCOSE SERPL-MCNC: 185 MG/DL (ref 65–99)
HCT VFR BLD AUTO: 38 % (ref 34–46.6)
HGB BLD-MCNC: 12 G/DL (ref 12–15.9)
MCH RBC QN AUTO: 28.4 PG (ref 26.6–33)
MCHC RBC AUTO-ENTMCNC: 31.6 G/DL (ref 31.5–35.7)
MCV RBC AUTO: 89.8 FL (ref 79–97)
NT-PROBNP SERPL-MCNC: 69 PG/ML (ref 0–738)
PLATELET # BLD AUTO: 219 10*3/MM3 (ref 140–450)
POTASSIUM SERPL-SCNC: 4.7 MMOL/L (ref 3.5–5.2)
PROT SERPL-MCNC: 6.7 G/DL (ref 6–8.5)
RBC # BLD AUTO: 4.23 10*6/MM3 (ref 3.77–5.28)
SODIUM SERPL-SCNC: 142 MMOL/L (ref 136–145)
WBC # BLD AUTO: 4.67 10*3/MM3 (ref 3.4–10.8)

## 2021-09-10 PROCEDURE — U0005 INFEC AGEN DETEC AMPLI PROBE: HCPCS | Performed by: NURSE PRACTITIONER

## 2021-09-10 PROCEDURE — U0004 COV-19 TEST NON-CDC HGH THRU: HCPCS | Performed by: NURSE PRACTITIONER

## 2021-09-10 PROCEDURE — C9803 HOPD COVID-19 SPEC COLLECT: HCPCS | Performed by: NURSE PRACTITIONER

## 2021-09-10 RX ORDER — PREDNISONE 20 MG/1
20 TABLET ORAL DAILY
Qty: 10 TABLET | Refills: 0 | Status: ON HOLD | OUTPATIENT
Start: 2021-09-10 | End: 2023-03-27

## 2021-09-10 RX ORDER — LISINOPRIL 40 MG/1
TABLET ORAL
Qty: 90 TABLET | Refills: 3 | Status: SHIPPED | OUTPATIENT
Start: 2021-09-10 | End: 2022-08-29

## 2021-09-10 NOTE — TELEPHONE ENCOUNTER
Called patient regarding mychart message. Family is wanting her to get COVID tested, shortness of breath on exertion is worsening. Labs were normal. X-ray pending. Test ordered, will have performed at the hospital drive thru. I have advised her to go to the ER with any shortness of breath unrelieved by rest or chest pain and she verbalizes understanding. Prednisone burst ordered, discussed side effects at length.

## 2021-09-11 LAB — SARS-COV-2 RNA NOSE QL NAA+PROBE: NOT DETECTED

## 2021-09-15 ENCOUNTER — OFFICE VISIT (OUTPATIENT)
Dept: INTERNAL MEDICINE | Facility: CLINIC | Age: 81
End: 2021-09-15

## 2021-09-15 VITALS
WEIGHT: 195.12 LBS | TEMPERATURE: 96 F | HEART RATE: 85 BPM | BODY MASS INDEX: 30.62 KG/M2 | DIASTOLIC BLOOD PRESSURE: 77 MMHG | OXYGEN SATURATION: 96 % | HEIGHT: 67 IN | SYSTOLIC BLOOD PRESSURE: 146 MMHG | RESPIRATION RATE: 16 BRPM

## 2021-09-15 DIAGNOSIS — R93.89 ABNORMAL CXR (CHEST X-RAY): ICD-10-CM

## 2021-09-15 DIAGNOSIS — I87.8 CHRONIC VENOUS STASIS: ICD-10-CM

## 2021-09-15 DIAGNOSIS — J20.9 ACUTE BRONCHITIS, UNSPECIFIED ORGANISM: Primary | ICD-10-CM

## 2021-09-15 DIAGNOSIS — L98.9 SKIN LESION OF LEFT LOWER EXTREMITY: ICD-10-CM

## 2021-09-15 DIAGNOSIS — R05.3 PERSISTENT COUGH: ICD-10-CM

## 2021-09-15 PROCEDURE — 99213 OFFICE O/P EST LOW 20 MIN: CPT | Performed by: NURSE PRACTITIONER

## 2021-09-15 RX ORDER — LATANOPROST 50 UG/ML
SOLUTION/ DROPS OPHTHALMIC
COMMUNITY
Start: 2021-08-31

## 2021-09-15 NOTE — PROGRESS NOTES
"  Office Visit      Patient Name: Radha Whelan  : 1940   MRN: [id number]   Care Team: Patient Care Team:  Farhan Schaefer MD as PCP - General (Internal Medicine)  Nik Chaudhari MD as Consulting Physician (General Surgery)    Chief Complaint  Shortness of Breath (discuss CXR) and Cough (dry )    Subjective     Subjective      Radha Whelan presents to [facility] for 1 week follow-up. Taking prednisone for acute bronchitis and symptoms are much improved. Shortness of breath on exertion is much improved, went to [facility] for groceries and had no problems. Cough has resolved. Using the albuterol inhaler sometimes twice per day, usually before she goes to bed at night. No adverse effects from the prednisone.   Chest x-ray has been reviewed with her today, concerning for superimposed infiltrate and enlarged cardiac silhouette. She has had no previous CT chest.  She has no acute concerns or complaints today.  Weight is stable.  Concerns with chronic venous ulcer on the left shin.  Has seen wound care clinic previously for management and healed well.  Called to get an appointment and they stated she needed a new referral.  Requesting new referral today.    Review of Systems   Constitutional: Positive for fatigue. Negative for chills and fever.   HENT: Negative for congestion, postnasal drip, sinus pressure, sneezing, sore throat, swollen glands and trouble swallowing.    Respiratory: Negative for cough, shortness of breath and wheezing.    Cardiovascular: Negative for chest pain.   Gastrointestinal: Negative for abdominal pain, diarrhea, nausea and vomiting.   Skin: Positive for skin lesions.   Neurological: Negative for headache.   Psychiatric/Behavioral: Negative for sleep disturbance.       Objective     Objective   Vital Signs:   /77   Pulse 85   Temp 96 °F (35.6 °C) (Temporal)   Resp 16   Ht 170.2 cm (67\")   Wt 88.5 kg (195 lb 1.9 oz)   SpO2 96%   BMI 30.56 kg/m²   "   Physical Exam  Constitutional:       General: She is not in acute distress.     Appearance: Normal appearance. She is not ill-appearing or toxic-appearing.   HENT:      Right Ear: Tympanic membrane and ear canal normal. No middle ear effusion. Tympanic membrane is not bulging.      Left Ear: Tympanic membrane and ear canal normal.  No middle ear effusion. Tympanic membrane is not bulging.      Nose: Nose normal. No congestion or rhinorrhea.      Right Sinus: No maxillary sinus tenderness or frontal sinus tenderness.      Left Sinus: No maxillary sinus tenderness or frontal sinus tenderness.      Mouth/Throat:      Mouth: Mucous membranes are moist.      Pharynx: No posterior oropharyngeal erythema.   Eyes:      Pupils: Pupils are equal, round, and reactive to light.   Cardiovascular:      Rate and Rhythm: Normal rate and regular rhythm.      Heart sounds: Normal heart sounds. No murmur heard.     Pulmonary:      Effort: Pulmonary effort is normal. No respiratory distress.      Breath sounds: Normal breath sounds. No wheezing.      Comments: Fine crackles right base    Abdominal:      General: Bowel sounds are normal. There is no distension.      Palpations: Abdomen is soft.      Tenderness: There is no abdominal tenderness.   Musculoskeletal:      Cervical back: Neck supple. No tenderness.   Lymphadenopathy:      Head:      Right side of head: No submental, submandibular or tonsillar adenopathy.      Left side of head: No submental, submandibular or tonsillar adenopathy.      Cervical: No cervical adenopathy.   Skin:     General: Skin is warm and dry.      Findings: Lesion (Erythematous venous ulcer) present. No rash.          Neurological:      Mental Status: She is alert.   Psychiatric:         Mood and Affect: Mood normal.         Behavior: Behavior normal.          Assessment / Plan      Assessment/Plan   Problem List Items Addressed This Visit     None      Visit Diagnoses     Acute bronchitis, unspecified  organism    -  Primary    Abnormal CXR (chest x-ray)        Relevant Orders    CT Chest Without Contrast    Skin lesion of left lower extremity        Relevant Orders    Ambulatory Referral to Wound Clinic    Chronic venous stasis        Relevant Orders    Ambulatory Referral to Wound Clinic    New referral to wound care clinic provided today.    Persistent cough        Relevant Orders    CT Chest Without Contrast    Due to abnormal chest x-ray results will perform CT of the chest, will hold on contrast due to decreased renal function.  Escalate treatment as needed.  She does have some fine crackles in the right lower base, will hold off on antimicrobial therapy at this time as she is feeling much better with prednisone.  I have advised her to follow-up sooner if symptoms acutely worsen and will consider antimicrobial therapy at that time.  Symptoms overall improved.  Albuterol only as needed and finish full course of prednisone.  Keep follow-up with PCP in 1 month.           Follow Up   Return if symptoms worsen or fail to improve.  Patient was given instructions and counseling regarding her condition or for health maintenance advice. Please see specific information pulled into the AVS if appropriate.     CURTIS Hernandez  [facility] - [place]

## 2021-09-20 ENCOUNTER — HOSPITAL ENCOUNTER (OUTPATIENT)
Dept: PHYSICAL THERAPY | Facility: HOSPITAL | Age: 81
Setting detail: THERAPIES SERIES
Discharge: HOME | End: 2021-09-20

## 2021-09-20 ENCOUNTER — OFFICE VISIT (OUTPATIENT)
Dept: CARDIOLOGY | Facility: CLINIC | Age: 81
End: 2021-09-20

## 2021-09-20 VITALS
SYSTOLIC BLOOD PRESSURE: 128 MMHG | HEIGHT: 67 IN | OXYGEN SATURATION: 94 % | WEIGHT: 194 LBS | HEART RATE: 72 BPM | DIASTOLIC BLOOD PRESSURE: 76 MMHG | BODY MASS INDEX: 30.45 KG/M2

## 2021-09-20 DIAGNOSIS — S81.801D OPEN WOUND OF RIGHT LOWER EXTREMITY, SUBSEQUENT ENCOUNTER: ICD-10-CM

## 2021-09-20 DIAGNOSIS — R00.1 BRADYCARDIA: ICD-10-CM

## 2021-09-20 DIAGNOSIS — S81.802D MULTIPLE OPEN WOUNDS OF LEFT LOWER EXTREMITY, SUBSEQUENT ENCOUNTER: Primary | ICD-10-CM

## 2021-09-20 PROCEDURE — 97161 PT EVAL LOW COMPLEX 20 MIN: CPT | Performed by: PHYSICAL THERAPIST

## 2021-09-20 PROCEDURE — 99213 OFFICE O/P EST LOW 20 MIN: CPT | Performed by: PHYSICIAN ASSISTANT

## 2021-09-20 PROCEDURE — 97597 DBRDMT OPN WND 1ST 20 CM/<: CPT | Performed by: PHYSICAL THERAPIST

## 2021-09-20 PROCEDURE — 93280 PM DEVICE PROGR EVAL DUAL: CPT | Performed by: PHYSICIAN ASSISTANT

## 2021-09-20 NOTE — PROGRESS NOTES
Cardiac Electrophysiology Outpatient Follow Up Note            [facility]    Follow Up Office Visit      Radha Whelan  [id number]  03/15[birth year]2021    Primary Care Physician: Farhan Schaefer MD    Referred By: No ref. provider found    Subjective     Chief Complaint:   There are no diagnoses linked to this encounter.  Chief Complaint   Patient presents with   • Sinus node dysfunction     PROBLEM LIST:   1. Sinus node dysfunction  1. 9/16/2019 24-hour Holter: Average HR 60, range 45-1 07, 1.6% PACs, 0.3% PVCs, rare SVT less than 11 beats with no symptoms  2. Recurrent Arthur Syncope x 2.  First 10/2019 while seated and second 12/5/2019 s/p Rt total hip surgery on transfer to bed.  3. 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 Laboy for sinus node dysfunction.  2. CAD  1. 9/11/2019 echo: EF 42%, grade 1 diastolic dysfunction, RVSP 22  2. 9/16/2019 LHC per AA no evidence of hemodynamically significant CAD, normal EF 55%, 24-hour Holter recommended to assess for bradycardic arrhythmias.  3. HTN  4. LBBB  5. HLD  6. Vasovagal syncope  7. Hx TIAs  1. Committed to DAPT ~2015  8. T2DM  9. Hx DVT ~1970s, temporary ac    History of Present Illness:   Radha Whelan is a 81 y.o. female who presents to  electrophysiology clinic for follow up of her history of sinus node dysfunction status post permanent pacemaker implant.  Upon evaluation today patient reports she is doing very well overall from cardiovascular standpoint.  Patient denies chest pain, palpitations, dizziness, presyncope, or syncope.  She recently started on Prednisone for Bronchitis and she has had some Labile BP recently.     Review of Systems:   Constitutional: No fevers or chills, no recent weight gain or weight loss or fatigue  Eyes: No visual loss, blurred vision, double vision, yellow sclerae.  ENT: No headaches, hearing loss, vertigo, congestion or sore throat.   Cardiovascular: Per HPI  Respiratory: No cough or  "wheezing, no sputum production, no hematemesis   Gastrointestinal: No abdominal pain, no nausea, vomiting, constipation, diarrhea, melena.   Genitourinary: No dysuria, hematuria or increased frequency.  Musculoskeletal:  No gait disturbance, weakness or joint pain or stiffness  Integumentary: No rashes, urticaria, ulcers or sores.   Neurological: No headache, dizziness, syncope, paralysis, ataxia  Psychiatric: No anxiety, or depression  Endocrine: No diaphoresis, cold or heat intolerance. No polyuria or polydipsia.   Hematologic/Lymphatic: No anemia, abnormal bruising or bleeding.       Past Medical History:   Past Medical History:   Diagnosis Date   • Acute on chronic diastolic congestive heart failure (CMS/HCC)    • Arthritis    • Basal cell carcinoma    • Body piercing     both ears   • Cardiomyopathy (CMS/HCC)    • Cataract, bilateral     s/p removal    • Chronic systolic congestive heart failure (CMS/HCC)    • Diabetes mellitus (CMS/HCC)    • DVT (deep venous thrombosis) (CMS/HCC)     1970's- left leg   • Edema     legs- hx of   • Elevated cholesterol    • Fatigue    • Fatigue    • History of left heart catheterization     9/16/19  no stents   • History of migraine headaches    • Hx of exercise stress test 2004   • Hyperlipidemia    • Hypertension    • Hyponatremia    • Impaired functional mobility, balance, gait, and endurance    • LBBB (left bundle branch block)    • LBBB (left bundle branch block)    • Left leg pain     left leg and knee pain    • Muscle spasm     hx of   • Obesity    • ODELL on CPAP     CPAP   • Stroke (CMS/HCC)    • Teeth missing     all of the top, and has 6 bottom teeth left   • Thyroid nodule    • TIA (transient ischemic attack)     x3.  last one was \"a few years ago\"   • Wears dentures     top plate   • Wears glasses     to read       Past Surgical History:   Past Surgical History:   Procedure Laterality Date   • BREAST BIOPSY Left     benign   • CARDIAC CATHETERIZATION      09/16/2019 " PER .   • CARDIAC CATHETERIZATION N/A 9/16/2019    Procedure: Left Heart Cath +/- CBI;  Surgeon: Ashlyn Mays MD;  Location:  [facility];  Service: Cardiovascular   • CARDIAC ELECTROPHYSIOLOGY PROCEDURE N/A 12/6/2019    Procedure: PACEMAKER IMPLANTATION- DC;  Surgeon: Stephan Laboy DO;  Location:  [facility];  Service: Cardiology   • CATARACT EXTRACTION  12/2018    both eyes    • COLONOSCOPY     • INSERT / REPLACE / REMOVE PACEMAKER     • MOUTH SURGERY      all top teeth   • SKIN BIOPSY      basal cell   • TOTAL HIP ARTHROPLASTY Right 12/4/2019    Procedure: HIP ARTHROPLASTY TOTAL RIGHT;  Surgeon: Issa Salgado MD;  Location:  [facility];  Service: Orthopedics   • TUBAL ABDOMINAL LIGATION         Family History:   Family History   Problem Relation Age of Onset   • Diabetes Other    • Hypertension Other    • Cancer Other         malignant neoplasm    • Migraines Other    • Heart disease Mother    • Heart disease Father    • Breast cancer Sister    • Breast cancer Other    • Intracerebral hemorrhage Brother    • Asthma Daughter    • Heart failure Daughter    • Diabetes type II Brother    • No Known Problems Brother    • No Known Problems Sister    • Stroke Sister    • Breast cancer Sister        Social History:   Social History     Socioeconomic History   • Marital status:      Spouse name: Not on file   • Number of children: Not on file   • Years of education: Not on file   • Highest education level: Not on file   Tobacco Use   • Smoking status: Never Smoker   • Smokeless tobacco: Never Used   Vaping Use   • Vaping Use: Never used   Substance and Sexual Activity   • Alcohol use: No   • Drug use: No   • Sexual activity: Never       Medications:     Current Outpatient Medications:   •  acetaminophen (TYLENOL) 325 MG tablet, Take 2 tablets by mouth Every 6 (Six) Hours As Needed for Mild Pain ., Disp: , Rfl:   •  albuterol sulfate  (90 Base) MCG/ACT inhaler,  Inhale 2 puffs Every 4 (Four) Hours As Needed for Wheezing or Shortness of Air., Disp: 6.7 g, Rfl: 0  •  aspirin 81 MG chewable tablet, Chew 81 mg Daily., Disp: , Rfl:   •  Blood Glucose Monitoring Suppl (ONE TOUCH ULTRA 2) w/Device kit, USE AS NEEDED FOR BLOOD    SUGAR MONITORING, Disp: 1 each, Rfl: 0  •  Cholecalciferol (VITAMIN D PO), Take 125 mcg by mouth 2 (Two) Times a Week., Disp: , Rfl:   •  clopidogrel (PLAVIX) 75 MG tablet, TAKE 1 TABLET DAILY, Disp: 90 tablet, Rfl: 3  •  coenzyme Q10 100 MG capsule, Take 400 mg by mouth Every Other Day., Disp: , Rfl:   •  folic acid (FOLVITE) 1 MG tablet, Take 1 tablet by mouth Daily., Disp: 90 tablet, Rfl: 3  •  furosemide (LASIX) 20 MG tablet, TAKE 1 TABLET TWICE A DAY, Disp: 180 tablet, Rfl: 3  •  glipizide (glipiZIDE XL) 2.5 MG 24 hr tablet, Take 1 tablet by mouth Daily., Disp: 90 tablet, Rfl: 3  •  Glucosamine-Chondroit-Vit C-Mn (GLUCOSAMINE CHONDR 1500 COMPLX PO), Take 1 tablet by mouth Daily., Disp: , Rfl:   •  glucose blood (Accu-Chek Kayce Plus) test strip, TEST ONCE DAILY., Disp: 100 each, Rfl: 3  •  glucose monitor monitoring kit, 1 each As Needed (blood sugar)., Disp: 1 each, Rfl: 1  •  Lancets (accu-chek soft touch) lancets, Use as directed, Disp: 100 each, Rfl: 12  •  latanoprost (XALATAN) 0.005 % ophthalmic solution, PLACE 1 DROP IN EACH EYE EVERY DAY AT BEDTIME, Disp: , Rfl:   •  lisinopril (PRINIVIL,ZESTRIL) 40 MG tablet, TAKE 1 TABLET DAILY, Disp: 90 tablet, Rfl: 3  •  lovastatin (MEVACOR) 10 MG tablet, Take 1 tablet by mouth Every Night. (Patient taking differently: Take 10 mg by mouth Every Other Day.), Disp: 90 tablet, Rfl: 3  •  melatonin 3 MG tablet, Take 3 mg by mouth Every Night., Disp: , Rfl:   •  pantoprazole (PROTONIX) 40 MG EC tablet, TAKE 1 TABLET DAILY, Disp: 90 tablet, Rfl: 3  •  polyethylene glycol (MIRALAX) pack packet, Take 17 g by mouth Daily., Disp: , Rfl:   •  tobramycin-dexamethasone (TOBRADEX) 0.3-0.1 % ophthalmic suspension,  "Administer 1 drop into the left eye Daily. (Patient taking differently: Administer 1 drop to both eyes As Needed.), Disp: 5 mL, Rfl: 2  •  vitamin B-12 (CYANOCOBALAMIN) 1000 MCG tablet, Take 1,000 mcg by mouth 2 (Two) Times a Week., Disp: , Rfl:   •  benzonatate (Tessalon Perles) 100 MG capsule, Take 1 capsule by mouth 3 (Three) Times a Day As Needed for Cough., Disp: 30 capsule, Rfl: 0  •  predniSONE (DELTASONE) 20 MG tablet, Take 1 tablet by mouth Daily., Disp: 10 tablet, Rfl: 0  •  Semaglutide (Rybelsus) 3 MG tablet, Take 3 mg by mouth Daily., Disp: 90 tablet, Rfl: 3    Allergies:   Allergies   Allergen Reactions   • Cumini Other (See Comments)     Complex migrane  migraine   • Cheese Other (See Comments)     migrane    Migraine   • Ibuprofen Rash     Swelling all over  \"Swelling all over\"   • Other Other (See Comments)     \"ice cream\"-migranes    \"ice cream\" migraines   • Saccharin Other (See Comments)     migrane    Migraine       Objective   Vital Signs:   Vitals:    09/20/21 1103   BP: 128/76   BP Location: Left arm   Patient Position: Sitting   Pulse: 72   SpO2: 94%   Weight: 88 kg (194 lb)   Height: 170.2 cm (67\")       PHYSICAL EXAM  General appearance: Awake, alert, cooperative  Head: Normocephalic, without obvious abnormality, atraumatic  Eyes: Conjunctivae/corneas clear, EOMs intact  Neck: no adenopathy, no carotid bruit, no JVD and thyroid: not enlarged  Lungs: clear to auscultation bilaterally and no rhonchi or crackles\", ' symmetric  Heart: regular rate and rhythm, S1, S2 normal, no murmur, click, rub or gallop  Abdomen: Soft, non-tender, bowel sounds normal,  no organomegaly  Extremities: extremities normal, atraumatic, no cyanosis or edema  Skin: Skin color, turgor normal, no rashes or lesions  Neurologic: Grossly normal     Lab Results   Component Value Date    GLUCOSE 121 (H) 12/08/2019    CALCIUM 9.5 09/09/2021     09/09/2021    K 4.7 09/09/2021    CO2 26.1 09/09/2021     09/09/2021 "    BUN 18 09/09/2021    CREATININE 0.98 09/09/2021    EGFRIFAFRI 66 09/09/2021    EGFRIFNONA 54 (L) 09/09/2021    BCR 18.4 09/09/2021    ANIONGAP 9.0 12/08/2019     Lab Results   Component Value Date    WBC 4.67 09/09/2021    HGB 12.0 09/09/2021    HCT 38.0 09/09/2021    MCV 89.8 09/09/2021     09/09/2021     Lab Results   Component Value Date    INR 0.97 11/20/2019    PROTIME 13.1 11/20/2019     Lab Results   Component Value Date    TSH 2.490 06/10/2021       ECG 12 Lead    Date/Time: 9/20/2021 11:51 AM  Performed by: Branden Murray PA  Authorized by: Branden Murray PA   Rhythm: sinus rhythm and paced  Rate: normal  QRS axis: normal  Other: no other findings  Other findings: non-specific ST-T wave changes    Clinical impression: abnormal EKG          Assessment & Plan    There are no diagnoses linked to this encounter.     Diagnosis Plan   1. Sinus node dysfunction (CMS/HCC)  Status post pacemaker implant with noted normal function on device interrogation in office today.     2. S/P placement of cardiac pacemaker   device interrogation: [employer] dual-chamber pacemaker at [facility] Normal Interrogation.     This patient's Cardiac Implanted Electronic Device was manually interrogated and reprogrammed during the patient encounter today.  Iterative programming changes were manually made to determine the sensing threshold, pacing threshold, lead impedance as well as underlying cardiac rhythm.  These programming changes were not limited to but included some or all of the following when appropriate: pacing mode, programmed AV delays, blanking periods, and refractory periods.  Data obtained as a result of these manual programing changes informed the patient's CIED permanent programming.     3. Essential hypertension  Controlled in office today with reported control on current medical therapy at home.    Continue lisinopril 40 mg daily as prescribed.     Body mass index is 30.38 kg/m².      Follow Up:  6-month follow-up with [facility] device check.   Electronically signed by PATIENCE Barone, 09/20/21, 11:45 AM EDT.

## 2021-09-20 NOTE — THERAPY EVALUATION
Outpatient Rehabilitation - Wound/Debridement Initial Eval   [place]     Patient Name: Radha Whelan  : 1940  MRN: [id number]  Today's Date: 2021                  Admit Date: 2021    Visit Dx:    ICD-10-CM ICD-9-CM   1. Multiple open wounds of left lower extremity, subsequent encounter  S81.802D V58.89     894.0   2. Open wound of right lower extremity, subsequent encounter  S81.801D V58.89     891.0     LLE lateral       LLE medial         RLE medial     Patient Active Problem List   Diagnosis   • Type 2 diabetes mellitus without complication (CMS/HCC)   • Temporary cerebral vascular dysfunction   • Fatigue   • Pre-syncope   • Hypervitaminosis B6   • Hyponatremia   • ODELL (obstructive sleep apnea)   • Peripheral neuropathy   • Restless legs syndrome   • Edema   • H/O hyperlipidemia   • Acute non-recurrent maxillary sinusitis   • Infection of left tear duct   • Left eye pain   • Mixed hyperlipidemia   • LBBB (left bundle branch block)   • Abnormal echocardiogram   • Cardiomyopathy (CMS/HCC)   • Arthritis   • Chronic systolic congestive heart failure (CMS/HCC)   • S/P hip replacement, right   • Bradycardia   • Acute cystitis with hematuria   • Acute on chronic diastolic congestive heart failure (CMS/HCC)   • Anemia   • Declining functional status   • Hypokalemia   • S/P placement of cardiac pacemaker   • Sinus node dysfunction (CMS/HCC)   • Essential hypertension        Past Medical History:   Diagnosis Date   • Acute on chronic diastolic congestive heart failure (CMS/HCC)    • Arthritis    • Basal cell carcinoma    • Body piercing     both ears   • Cardiomyopathy (CMS/HCC)    • Cataract, bilateral     s/p removal    • Chronic systolic congestive heart failure (CMS/HCC)    • Diabetes mellitus (CMS/HCC)    • DVT (deep venous thrombosis) (CMS/HCC)     - left leg   • Edema     legs- hx of   • Elevated cholesterol    • Fatigue    • Fatigue    • History of left heart catheterization     19   "no stents   • History of migraine headaches    • Hx of exercise stress test 2004   • Hyperlipidemia    • Hypertension    • Hyponatremia    • Impaired functional mobility, balance, gait, and endurance    • LBBB (left bundle branch block)    • LBBB (left bundle branch block)    • Left leg pain     left leg and knee pain    • Muscle spasm     hx of   • Obesity    • ODELL on CPAP     CPAP   • Stroke (CMS/HCC)    • Teeth missing     all of the top, and has 6 bottom teeth left   • Thyroid nodule    • TIA (transient ischemic attack)     x3.  last one was \"a few years ago\"   • Wears dentures     top plate   • Wears glasses     to read        Past Surgical History:   Procedure Laterality Date   • BREAST BIOPSY Left     benign   • CARDIAC CATHETERIZATION      09/16/2019 PER .   • CARDIAC CATHETERIZATION N/A 9/16/2019    Procedure: Left Heart Cath +/- CBI;  Surgeon: Ashlyn Mays MD;  Location:  [facility];  Service: Cardiovascular   • CARDIAC ELECTROPHYSIOLOGY PROCEDURE N/A 12/6/2019    Procedure: PACEMAKER IMPLANTATION- DC;  Surgeon: Stephan Laboy DO;  Location:  [facility];  Service: Cardiology   • CATARACT EXTRACTION  12/2018    both eyes    • COLONOSCOPY     • INSERT / REPLACE / REMOVE PACEMAKER     • MOUTH SURGERY      all top teeth   • SKIN BIOPSY      basal cell   • TOTAL HIP ARTHROPLASTY Right 12/4/2019    Procedure: HIP ARTHROPLASTY TOTAL RIGHT;  Surgeon: Issa Salgado MD;  Location: [facility];  Service: Orthopedics   • TUBAL ABDOMINAL LIGATION         Patient History     Row Name 09/20/21 0800             History    Chief Complaint  Ulcer, wound or other skin conditions;Swelling;Pain  -MW      Type of Pain  Lower Extremity / Leg  -MW      Brief Description of Current Complaint  Pt notes she was seen in July and it seemed like it was all healed up, but then it came back about 2-3 weeks ago.   -MW      Previous treatment for THIS PROBLEM  Medication  -MW      " "Patient/Caregiver Goals  Heal wound;Know what to do to help the symptoms  -MW      Patient's Rating of General Health  Good  -MW      Occupation/sports/leisure activities  Retired  -MW      Patient seeing anyone else for problem(s)?  PCP  -MW      Related/Recent Hospitalizations  No  -MW         Pain     Pain Location  Leg  -MW      Pain at Present  0  -MW      Pain Comments  Hurts when its open/ wet   -MW         Services    Are you currently receiving Home Health services  No  -MW      Do you plan to receive Home Health services in the near future  No  -MW         Daily Activities    Primary Language  English  -MW      Are you able to read  Yes  -MW      Are you able to write  Yes  -MW      How does patient learn best?  Listening;Demonstration  -MW      Teaching needs identified  Management of Condition  -MW      Patient is concerned about/has problems with  Other (comment) wound mgmt  -MW      Barriers to learning  None  -MW      Pt Participated in POC and Goals  Yes  -MW         Safety    Are you being hurt, hit, or frightened by anyone at home or in your life?  No  -MW      Are you being neglected by a caregiver  No  -MW        User Key  (r) = Recorded By, (t) = Taken By, (c) = Cosigned By    Initials Name Provider Type    Becca Head, PT Physical Therapist          EVALUATION  PT Ortho     Row Name 09/20/21 0800       Subjective Comments    Subjective Comments  Near end of tx, pt stated that she felt like she was going to pass out. \"I haven't felt like this since before my pacemaker\"   -MW       Subjective Pain    Able to rate subjective pain?  yes  -MW    Pre-Treatment Pain Level  0  -MW    Post-Treatment Pain Level  0  -MW       Transfers    Sit-Stand Crisp (Transfers)  independent  -MW    Stand-Sit Crisp (Transfers)  independent  -MW    Comment (Transfers)  seated for tx  -MW       Gait/Stairs (Locomotion)    Crisp Level (Gait)  independent  -MW    Comment (Gait/Stairs)  " "reclined on tx table when pt stated she felt like she was going to pass out. BP 60's over 40's with wrist cuff in supine. 80's over 40's with traditional cuff. Called pt's daughter to come to dept. Pt feeling better. 's / 60's in supine, 110's in sitting. Then tranfered back to chair. 120's / 60's in sitting. Pt able to amb back to front office w/o issue. Daughter to escort pt back to car; advised on transport chair option.   -MW      User Key  (r) = Recorded By, (t) = Taken By, (c) = Cosigned By    Initials Name Provider Type    MW Becca Elise, PT Physical Therapist        LDA Wound     Row Name 09/20/21 0800             Wound 09/20/21 0800 Right medial leg Other (comment)    Wound - Properties Group Placement Date: 09/20/21  -MW Placement Time: 0800  -MW Present on Hospital Admission: Y  -MW Side: Right  -MW Orientation: medial  -MW Location: leg  -MW Primary Wound Type: Other  -MW Additional Comments: etiology unclear   -MW    Wound Image  Images linked: 1  -MW      Dressing Appearance  open to air  -MW      Base  dry;scab;moist;pink;granulating moist pink post debridement  -MW      Red (%), Wound Tissue Color  100 post debridement   -MW      Periwound  intact;pink  -MW      Periwound Temperature  warm  -MW      Periwound Skin Turgor  soft  -MW      Edges  open  -MW      Wound Length (cm)  0.8 cm  -MW      Wound Width (cm)  0.7 cm  -MW      Wound Depth (cm)  -- 0.2 cm hypergranulated   -MW      Drainage Characteristics/Odor  serosanguineous  -MW      Drainage Amount  none  -MW      Care, Wound  cleansed with;wound cleanser;debrided  -MW      Dressing Care  dressing applied;silver impregnated;collagen;low-adherent;foam;border dressing Tessa, 4\" optifoam   -MW      Periwound Care  cleansed with pH balanced cleanser;dry periwound area maintained  -MW      Retired Wound - Properties Group Date first assessed: 09/20/21  -MW Time first assessed: 0800  -MW Present on Hospital Admission: Y  -MW Side: Right  " "-MW Location: leg  -MW Primary Wound Type: Other  -MW Additional Comments: etiology unclear   -MW       Wound 07/08/21 1000 Left lateral leg Venous Ulcer    Wound - Properties Group Placement Date: 07/08/21  - Placement Time: 1000  - Present on Hospital Admission: Y  - Side: Left  - Orientation: lateral  - Location: leg  - Primary Wound Type: Venous ulcer  -MC    Wound Image  Images linked: 1  -MW      Dressing Appearance  open to air  -MW      Base  dry;scab;black eschar;moist;pink;subcutaneous dry black thin eschar; moist pink post debridement   -MW      Red (%), Wound Tissue Color  98  -MW      Periwound  intact;dry  -MW      Periwound Temperature  warm  -MW      Periwound Skin Turgor  soft  -MW      Edges  irregular;open  -MW      Wound Length (cm)  1.6 cm total area with skin bridges   -MW      Wound Width (cm)  1.6 cm  -MW      Wound Depth (cm)  0.1 cm  -MW      Drainage Characteristics/Odor  serosanguineous  -MW      Drainage Amount  scant  -MW      Care, Wound  cleansed with;wound cleanser;debrided  -MW      Dressing Care  dressing applied;silver impregnated;collagen;low-adherent;foam;border dressing Tessa, 4\" optifoam   -MW      Periwound Care  cleansed with pH balanced cleanser;dry periwound area maintained  -MW      Retired Wound - Properties Group Date first assessed: 07/08/21  [facility] Time first assessed: 1000  - Present on Hospital Admission: Y  - Side: Left  - Location: leg  - Primary Wound Type: Venous ulcer  -MC       Wound 07/08/21 1000 Left anterior leg Venous Ulcer    Wound - Properties Group Placement Date: 07/08/21  - Placement Time: 1000  - Present on Hospital Admission: Y  - Side: Left  - Orientation: anterior  - Location: leg  - Primary Wound Type: Venous ulcer  -    Wound Image  Images linked: 1  -MW      Dressing Appearance  open to air;dry  -MW      Base  dry;scab;moist;pink;subcutaneous moist pink post debridement  -MW      Periwound  intact;dry;swelling  " "-MW      Periwound Temperature  warm  -MW      Periwound Skin Turgor  soft  -MW      Edges  irregular;open  -MW      Wound Length (cm)  1 cm  -MW      Wound Width (cm)  0.4 cm  -MW      Wound Depth (cm)  0.1 cm  -MW      Drainage Characteristics/Odor  sanguineous  -MW      Drainage Amount  scant  -MW      Care, Wound  cleansed with;wound cleanser;debrided  -MW      Dressing Care  dressing applied;silver impregnated;collagen;low-adherent;foam;border dressing Tessa, 4\" optifoam   -MW      Periwound Care  cleansed with pH balanced cleanser;dry periwound area maintained  -MW      Retired Wound - Properties Group Date first assessed: 07/08/21  - Time first assessed: 1000  - Present on Hospital Admission: Y  - Side: Left  - Location: leg  - Primary Wound Type: Venous ulcer  -      User Key  (r) = Recorded By, (t) = Taken By, (c) = Cosigned By    Initials Name Provider Type    MW Becca Elise, PT Physical Therapist     Gillian Degroot, PT Physical Therapist            WOUND DEBRIDEMENT  Total area of Debridement: ~6 cm2 total   Debridement Site 1  Location- Site 1: LLE wounds   Selective Debridement- Site 1: Wound Surface <20cmsq  Instruments- Site 1: tweezers, #15, scapel  Excised Tissue Description- Site 1: moderate, maximum, other (comment) (crusts, scabs )  Bleeding- Site 1: seeping, scant   Debridement Site 2  Location- Site 2: RLE wound   Selective Debridement- Site 2: Wound Surface <20cmsq  Instruments- Site 2: scapel, #15, tweezers  Excised Tissue Description- Site 2: maximum, other (comment) (thick crust )  Bleeding- Site 2: none             Recommendation and Plan  PT Assessment/Plan     Row Name 09/20/21 0800          PT Assessment    Functional Limitations  Other (comment) wound management   -MW     Impairments  Integumentary integrity;Edema  -     Assessment Comments  Pt returns to PT wound care with recurrent LLE ulcers; lateral site in the same location as previously this year and pt " "reports has \"been there for 2 years.\" Notes it \"seemed to be healed on the outside but then came back in about 2 weeks.\" Pt with only trace edema noted in her feet today, well below the wounds. Overall etiology of recurrence is unclear, but could be related to some repetitive trauma around her home as her skin is fragile so a minor friction or impact could easily reinjure this tissue. She also had a crusted / scabbed area on the Rt medial lower leg at about the same level as the LLE. This wound however displayed hypertrophic granulation buds beneath the crust. Pt with episode of feeling light headed/ like would passout; BP was very low 60's / 40's for several minutes (even in supine); then returned to 120's/60's and pt able to return to seated position and eventually walked to front office w/o issue. Expect improvement with skilled PT wound care for debridement and dressing management. Additionally, further questioning of her routines may help discover the etiology to help prevent additional recurrences.   -MW     Rehab Potential  Fair  -MW     Patient/caregiver participated in establishment of treatment plan and goals  Yes  -MW     Patient would benefit from skilled therapy intervention  Yes  -MW        PT Plan    PT Frequency  1x/week  -MW     Predicted Duration of Therapy Intervention (PT)  8 visits   -     Planned CPT's?  PT EVAL LOW COMPLEXITY: 66261;PT SELF CARE/HOME MGMT/TRAIN EA 15: 61045;PT JUSTIN DEBRIDE OPEN WOUND UP TO 20 CM: 27702;PT NONSELECT DEBRIDE 15 MIN: 24049;PT NLFU MIST: 57824;PT UNNA BOOT: 01408;PT MULTI LAYER COMP SYS LE  -MW     Physical Therapy Interventions (Optional Details)  wound care;patient/family education  -     PT Plan Comments  Debridement, MLW if edema increases; MIST?   -       User Key  (r) = Recorded By, (t) = Taken By, (c) = Cosigned By    Initials Name Provider Type    Becca Head, PT Physical Therapist            Goals  PT OP Goals     Row Name 21 " 0800          PT Short Term Goals    STG Date to Achieve  10/18/21  -MW     STG 1  Pt independent with clean home dressing changes to promote wound healing and decrease risk of infection.   -MW     STG 2  Pt able to verbalize s/s of infection and when to seek urgent or emergent care.   -MW     STG 3  LLE wounds to decrease in area by 25% to demonstrate wound healing.   -MW     STG 4  RLE wound to decrease in area by 25% to demonstrate wound healing.   -MW        Long Term Goals    LTG 1  LLE wounds to decrease in area by > 80% to demonstrate wound healing.   -MW     LTG 2  RLE wound to decrease in area by > 80% to demonstrate wound healing.   -MW     LTG 3  No new sites to emerge.   -MW        Time Calculation    PT Goal Re-Cert Due Date  12/20/21  -MW       User Key  (r) = Recorded By, (t) = Taken By, (c) = Cosigned By    Initials Name Provider Type    MW Becca Elise, PT Physical Therapist          Time Calculation: Start Time: 0800  Untimed Charges  PT Eval/Re-eval Minutes: 60  [id number] debridement: 20  Total Minutes  Untimed Charges Total Minutes: 80   Total Minutes: 80  Therapy Charges for Today     Code Description Service Date Service Provider Modifiers Qty    31372981248 HC PT EVAL LOW COMPLEXITY 4 9/20/2021 Becca Elise, PT GP 1    [id number] HC JUSTIN DEBRIDE OPEN WOUND UP TO 20CM 9/20/2021 Becca Elise, PT GP 1                Becca Elise, PT  9/20/2021

## 2021-09-24 ENCOUNTER — APPOINTMENT (OUTPATIENT)
Dept: CT IMAGING | Facility: HOSPITAL | Age: 81
End: 2021-09-24

## 2021-09-27 ENCOUNTER — APPOINTMENT (OUTPATIENT)
Dept: PHYSICAL THERAPY | Facility: HOSPITAL | Age: 81
End: 2021-09-27

## 2021-09-30 ENCOUNTER — HOSPITAL ENCOUNTER (OUTPATIENT)
Dept: PHYSICAL THERAPY | Facility: HOSPITAL | Age: 81
Setting detail: THERAPIES SERIES
Discharge: HOME | End: 2021-09-30

## 2021-09-30 DIAGNOSIS — S81.801D OPEN WOUND OF RIGHT LOWER EXTREMITY, SUBSEQUENT ENCOUNTER: ICD-10-CM

## 2021-09-30 DIAGNOSIS — R60.0 BILATERAL LOWER EXTREMITY EDEMA: ICD-10-CM

## 2021-09-30 DIAGNOSIS — S81.802D MULTIPLE OPEN WOUNDS OF LEFT LOWER EXTREMITY, SUBSEQUENT ENCOUNTER: Primary | ICD-10-CM

## 2021-09-30 PROCEDURE — 97597 DBRDMT OPN WND 1ST 20 CM/<: CPT

## 2021-10-07 ENCOUNTER — HOSPITAL ENCOUNTER (OUTPATIENT)
Dept: PHYSICAL THERAPY | Facility: HOSPITAL | Age: 81
Setting detail: THERAPIES SERIES
Discharge: HOME | End: 2021-10-07

## 2021-10-07 DIAGNOSIS — S81.802D MULTIPLE OPEN WOUNDS OF LEFT LOWER EXTREMITY, SUBSEQUENT ENCOUNTER: Primary | ICD-10-CM

## 2021-10-07 DIAGNOSIS — S81.801D OPEN WOUND OF RIGHT LOWER EXTREMITY, SUBSEQUENT ENCOUNTER: ICD-10-CM

## 2021-10-07 DIAGNOSIS — R60.0 BILATERAL LOWER EXTREMITY EDEMA: ICD-10-CM

## 2021-10-07 PROCEDURE — 97597 DBRDMT OPN WND 1ST 20 CM/<: CPT

## 2021-10-07 NOTE — THERAPY WOUND CARE TREATMENT
Outpatient Rehabilitation - Wound/Debridement Treatment Note   [place]     Patient Name: Radha Whelan  : 1940  MRN: [id number]  Today's Date: 10/7/2021                 Admit Date: 10/7/2021    Visit Dx:    ICD-10-CM ICD-9-CM   1. Multiple open wounds of left lower extremity, subsequent encounter  S81.802D V58.89     894.0   2. Open wound of right lower extremity, subsequent encounter  S81.801D V58.89     891.0   3. Bilateral lower extremity edema  R60.0 782.3   Lat LLE:    Medial RLE:      Patient Active Problem List   Diagnosis   • Type 2 diabetes mellitus without complication ([facility])   • Temporary cerebral vascular dysfunction   • Fatigue   • Pre-syncope   • Hypervitaminosis B6   • Hyponatremia   • ODELL (obstructive sleep apnea)   • Peripheral neuropathy   • Restless legs syndrome   • Edema   • H/O hyperlipidemia   • Acute non-recurrent maxillary sinusitis   • Infection of left tear duct   • Left eye pain   • Mixed hyperlipidemia   • LBBB (left bundle branch block)   • Abnormal echocardiogram   • Cardiomyopathy ([facility])   • Arthritis   • Chronic systolic congestive heart failure ([facility])   • S/P hip replacement, right   • Bradycardia   • Acute cystitis with hematuria   • Acute on chronic diastolic congestive heart failure ([facility])   • Anemia   • Declining functional status   • Hypokalemia   • S/P placement of cardiac pacemaker   • Sinus node dysfunction ([facility])   • Essential hypertension        Past Medical History:   Diagnosis Date   • Acute on chronic diastolic congestive heart failure (CMS/HCC)    • Arthritis    • Basal cell carcinoma    • Body piercing     both ears   • Cardiomyopathy (CMS/HCC)    • Cataract, bilateral     s/p removal    • Chronic systolic congestive heart failure (CMS/HCC)    • Diabetes mellitus (CMS/HCC)    • DVT (deep venous thrombosis) (CMS/HCC)     - left leg   • Edema     legs- hx of   • Elevated cholesterol    • Fatigue    • Fatigue    • History of left heart catheterization      "9/16/19  no stents   • History of migraine headaches    • Hx of exercise stress test 2004   • Hyperlipidemia    • Hypertension    • Hyponatremia    • Impaired functional mobility, balance, gait, and endurance    • LBBB (left bundle branch block)    • LBBB (left bundle branch block)    • Left leg pain     left leg and knee pain    • Muscle spasm     hx of   • Obesity    • ODELL on CPAP     CPAP   • Stroke (CMS/HCC)    • Teeth missing     all of the top, and has 6 bottom teeth left   • Thyroid nodule    • TIA (transient ischemic attack)     x3.  last one was \"a few years ago\"   • Wears dentures     top plate   • Wears glasses     to read        Past Surgical History:   Procedure Laterality Date   • BREAST BIOPSY Left     benign   • CARDIAC CATHETERIZATION      09/16/2019 PER .   • CARDIAC CATHETERIZATION N/A 9/16/2019    Procedure: Left Heart Cath +/- CBI;  Surgeon: Ashlyn Mays MD;  Location:  [facility];  Service: Cardiovascular   • CARDIAC ELECTROPHYSIOLOGY PROCEDURE N/A 12/6/2019    Procedure: PACEMAKER IMPLANTATION- DC;  Surgeon: Stephan Laboy DO;  Location:  [facility];  Service: Cardiology   • CATARACT EXTRACTION  12/2018    both eyes    • COLONOSCOPY     • INSERT / REPLACE / REMOVE PACEMAKER     • MOUTH SURGERY      all top teeth   • SKIN BIOPSY      basal cell   • TOTAL HIP ARTHROPLASTY Right 12/4/2019    Procedure: HIP ARTHROPLASTY TOTAL RIGHT;  Surgeon: Issa Salgado MD;  Location: [facility] OR;  Service: Orthopedics   • TUBAL ABDOMINAL LIGATION           EVALUATION  PT Ortho     Row Name 10/07/21 4290       Subjective Comments    Subjective Comments  Pt states she is going to [place] for 10 days, will have family available to help with bandaging.  Reports she has been wearing her compression stockings but her  has to help her get them on, and they are only 15-20mmHg.  Reports she did not wear them today due to coming for tx, also did not take her " "diuretic today due to having appt today.  -       Subjective Pain    Able to rate subjective pain?  yes  -    Pre-Treatment Pain Level  0  -JM    Post-Treatment Pain Level  0  -JM       Transfers    Sit-Stand [place] (Transfers)  independent  -JM    Stand-Sit [place] (Transfers)  independent  -JM    Comment (Transfers)  seated for tx  -JM       Gait/Stairs (Locomotion)    [place] Level (Gait)  independent  -      User Key  (r) = Recorded By, (t) = Taken By, (c) = Cosigned By    Initials Name Provider Type    Brittanie Marie PT Physical Therapist          LDA Wound     Row Name 10/07/21 1430             Wound 09/20/21 0800 Right medial leg Other (comment)    Wound - Properties Group Placement Date: 09/20/21  -MW Placement Time: 0800  -MW Present on Hospital Admission: Y  -MW Side: Right  -MW Orientation: medial  -MW Location: leg  -MW Primary Wound Type: Other  - Additional Comments: etiology unclear   -    Wound Image  Images linked: 1  -JM      Dressing Appearance  intact;moist drainage  -      Base  moist;granulating;red;yellow mod hypergranulation, improved  -      Periwound  intact;pink  -      Periwound Temperature  warm  -      Periwound Skin Turgor  soft  -      Edges  open  -      Wound Length (cm)  1 cm  -      Wound Width (cm)  0.8 cm  -      Wound Depth (cm)  -- hypergranulated  -      Drainage Characteristics/Odor  serosanguineous  -      Drainage Amount  scant  -      Care, Wound  cleansed with;wound cleanser;debrided;silver agent applied ag nitrate stick to hypergranulation  -      Dressing Care  dressing applied;collagen;foam;border dressing supa, 4\" optifoam, single size 4 compressogrip  -      Periwound Care  cleansed with pH balanced cleanser;dry periwound area maintained  -      Retired Wound - Properties Group Date first assessed: 09/20/21  -MW Time first assessed: 0800  -MW Present on Hospital Admission: Y  -MW Side: Right  -MW " "Location: leg  -MW Primary Wound Type: Other  -MW Additional Comments: etiology unclear   -MW       Wound 07/08/21 1000 Left lateral leg Venous Ulcer    Wound - Properties Group Placement Date: 07/08/21  - Placement Time: 1000  - Present on Hospital Admission: Y  - Side: Left  - Orientation: lateral  - Location: leg  -MC Primary Wound Type: Venous ulcer  -MC    Wound Image  Images linked: 1  -JM      Dressing Appearance  intact;dried drainage  -JM      Base  dry;moist;pink;epithelialization;yellow mostly eipthelialized, moist/red around periphery  -      Periwound  intact;dry;redness;excoriated mild excoriation around border of dressing  -      Periwound Temperature  warm  -      Periwound Skin Turgor  soft  -JM      Edges  irregular;open  -      Drainage Characteristics/Odor  serosanguineous  -JM      Drainage Amount  scant  -JM      Care, Wound  cleansed with;wound cleanser;debrided  -      Dressing Care  dressing applied;silver impregnated;foam;gauze mepilex ag, 4\" conform, single size 4 compressogrip  -      Periwound Care  barrier ointment applied;cleansed with pH balanced cleanser;dry periwound area maintained zguard to excoriated areas  -JM      Retired Wound - Properties Group Date first assessed: 07/08/21  - Time first assessed: 1000  - Present on Hospital Admission: Y  - Side: Left  - Location: leg  -MC Primary Wound Type: Venous ulcer  -MC       Wound 07/08/21 1000 Left anterior leg Venous Ulcer    Wound - Properties Group Placement Date: 07/08/21  - Placement Time: 1000  - Present on Hospital Admission: Y  - Side: Left  - Orientation: anterior  - Location: leg  - Primary Wound Type: Venous ulcer  -    Base  pink;clean;closed/resurfaced;epithelialization  -      Periwound  intact;dry;swelling  -      Periwound Temperature  warm  -      Periwound Skin Turgor  soft  -      Edges  rolled/closed  -      Drainage Amount  none  -JM      Retired Wound - " Properties Group Date first assessed: 07/08/21  - Time first assessed: 1000  - Present on Hospital Admission: Y  - Side: Left  - Location: leg  - Primary Wound Type: Venous ulcer  -      User Key  (r) = Recorded By, (t) = Taken By, (c) = Cosigned By    Initials Name Provider Type    Becca Head, PT Physical Therapist    Gillian Schafer, PT Physical Therapist    Brittanie Marie, PT Physical Therapist            WOUND DEBRIDEMENT  Total area of Debridement: 3cmsq  Debridement Site 1  Location- Site 1: lat LLE  Selective Debridement- Site 1: Wound Surface <20cmsq  Instruments- Site 1: tweezers  Excised Tissue Description- Site 1: scant, slough, minimum, other (comment) (dry crusts, residual supa)   Debridement Site 2  Location- Site 2: medial RLE  Selective Debridement- Site 2: Wound Surface <20cmsq  Instruments- Site 2: tweezers  Excised Tissue Description- Site 2: scant, slough  Bleeding- Site 2: none         Therapy Education     Row Name 10/07/21 1430             Therapy Education    Education Details  Change to mepilex ag to lateral LLE with conform roll to secure.  Apply zguard to red irritated skin, change dressing every 2-3 days.  Continue supa and optifoam to RLE.  Continue use of compression stockings and diuretic as prescribed.  -JM      Given  Symptoms/condition management;Bandaging/dressing change;Edema management  -      Program  Reinforced  -SANTOS      How Provided  Verbal;Demonstration  -JM      Provided to  Patient  -JM      Level of Understanding  Verbalized  -        User Key  (r) = Recorded By, (t) = Taken By, (c) = Cosigned By    Initials Name Provider Type    Brittanie Marie, PT Physical Therapist          Recommendation and Plan  PT Assessment/Plan     Row Name 10/07/21 1430          PT Assessment    Functional Limitations  Other (comment) wound management   -     Impairments  Integumentary integrity;Edema  -     Assessment Comments  RLE wound with  less hypergranulation with use of ag nitrate stick last tx, PT continued with ag nitrate this tx with supa and optifoam gentle to cover.  Lateral LLE with increased epithelialization but with excoriation around periphery, possibly from silicone border, so PT d/c'd optifoam gentle and dressed with ag foam and conform roll.  PT also added size 4 compressogrips for moderate ankle edema.  Pt using 15-20mmHg compression stockings at home but may need stronger compression.  PT may add full MLW next tx to promote wound healing.  -        PT Plan    PT Frequency  1x/week  -     Physical Therapy Interventions (Optional Details)  patient/family education;wound care  -     PT Plan Comments  debridement, MLW, Ag nitrate to RLE PRN  -       User Key  (r) = Recorded By, (t) = Taken By, (c) = Cosigned By    Initials Name Provider Type    Brittanie Marie, PT Physical Therapist          Goals  PT OP Goals     Row Name 10/07/21 1430          Time Calculation    PT Goal Re-Cert Due Date  12/20/21  -       User Key  (r) = Recorded By, (t) = Taken By, (c) = Cosigned By    Initials Name Provider Type    Brittanie Marie, PT Physical Therapist          PT Goal Re-Cert Due Date: 12/20/21            Time Calculation: Start Time: 1430  Untimed Charges  62383-[place] debridement: 20  Total Minutes  Untimed Charges Total Minutes: 20   Total Minutes: 20  Therapy Charges for Today     Code Description Service Date Service Provider Modifiers Qty    [id number] HC JUSTIN DEBRIDE OPEN WOUND UP TO 20CM 10/7/2021 Brittanie Esquivel PT GP 1                  Brittanie Esquivel, PT  10/7/2021

## 2021-10-20 ENCOUNTER — OFFICE VISIT (OUTPATIENT)
Dept: INTERNAL MEDICINE | Facility: CLINIC | Age: 81
End: 2021-10-20

## 2021-10-20 VITALS
OXYGEN SATURATION: 95 % | HEART RATE: 92 BPM | RESPIRATION RATE: 16 BRPM | WEIGHT: 198 LBS | SYSTOLIC BLOOD PRESSURE: 120 MMHG | DIASTOLIC BLOOD PRESSURE: 70 MMHG | BODY MASS INDEX: 31.08 KG/M2 | HEIGHT: 67 IN | TEMPERATURE: 96.2 F

## 2021-10-20 DIAGNOSIS — I83.008 VENOUS STASIS ULCER OF OTHER PART OF LOWER LEG LIMITED TO BREAKDOWN OF SKIN WITH VARICOSE VEINS, UNSPECIFIED LATERALITY (HCC): ICD-10-CM

## 2021-10-20 DIAGNOSIS — E11.9 TYPE 2 DIABETES MELLITUS WITHOUT COMPLICATION, WITHOUT LONG-TERM CURRENT USE OF INSULIN (HCC): ICD-10-CM

## 2021-10-20 DIAGNOSIS — L97.801 VENOUS STASIS ULCER OF OTHER PART OF LOWER LEG LIMITED TO BREAKDOWN OF SKIN WITH VARICOSE VEINS, UNSPECIFIED LATERALITY (HCC): ICD-10-CM

## 2021-10-20 DIAGNOSIS — Z23 NEED FOR INFLUENZA VACCINATION: Primary | ICD-10-CM

## 2021-10-20 LAB
ALBUMIN SERPL-MCNC: 4 G/DL (ref 3.5–5.2)
ALBUMIN/GLOB SERPL: 1.5 G/DL
ALP SERPL-CCNC: 60 U/L (ref 39–117)
ALT SERPL-CCNC: 16 U/L (ref 1–33)
AST SERPL-CCNC: 19 U/L (ref 1–32)
BASOPHILS # BLD AUTO: 0.02 10*3/MM3 (ref 0–0.2)
BASOPHILS NFR BLD AUTO: 0.4 % (ref 0–1.5)
BILIRUB SERPL-MCNC: 0.5 MG/DL (ref 0–1.2)
BUN SERPL-MCNC: 16 MG/DL (ref 8–23)
BUN/CREAT SERPL: 17 (ref 7–25)
CALCIUM SERPL-MCNC: 8.9 MG/DL (ref 8.6–10.5)
CHLORIDE SERPL-SCNC: 103 MMOL/L (ref 98–107)
CHOLEST SERPL-MCNC: 233 MG/DL (ref 0–200)
CO2 SERPL-SCNC: 27.8 MMOL/L (ref 22–29)
CREAT SERPL-MCNC: 0.94 MG/DL (ref 0.57–1)
EOSINOPHIL # BLD AUTO: 0.12 10*3/MM3 (ref 0–0.4)
EOSINOPHIL NFR BLD AUTO: 2.1 % (ref 0.3–6.2)
ERYTHROCYTE [DISTWIDTH] IN BLOOD BY AUTOMATED COUNT: 14.1 % (ref 12.3–15.4)
GLOBULIN SER CALC-MCNC: 2.6 GM/DL
GLUCOSE SERPL-MCNC: 160 MG/DL (ref 65–99)
HBA1C MFR BLD: 8.1 %
HCT VFR BLD AUTO: 38.4 % (ref 34–46.6)
HDLC SERPL-MCNC: 51 MG/DL (ref 40–60)
HGB BLD-MCNC: 11.7 G/DL (ref 12–15.9)
IMM GRANULOCYTES # BLD AUTO: 0.02 10*3/MM3 (ref 0–0.05)
IMM GRANULOCYTES NFR BLD AUTO: 0.4 % (ref 0–0.5)
LDLC SERPL CALC-MCNC: 157 MG/DL (ref 0–100)
LYMPHOCYTES # BLD AUTO: 2.28 10*3/MM3 (ref 0.7–3.1)
LYMPHOCYTES NFR BLD AUTO: 40.1 % (ref 19.6–45.3)
MCH RBC QN AUTO: 27.7 PG (ref 26.6–33)
MCHC RBC AUTO-ENTMCNC: 30.5 G/DL (ref 31.5–35.7)
MCV RBC AUTO: 91 FL (ref 79–97)
MONOCYTES # BLD AUTO: 0.51 10*3/MM3 (ref 0.1–0.9)
MONOCYTES NFR BLD AUTO: 9 % (ref 5–12)
NEUTROPHILS # BLD AUTO: 2.73 10*3/MM3 (ref 1.7–7)
NEUTROPHILS NFR BLD AUTO: 48 % (ref 42.7–76)
NRBC BLD AUTO-RTO: 0 /100 WBC (ref 0–0.2)
PLATELET # BLD AUTO: 193 10*3/MM3 (ref 140–450)
POTASSIUM SERPL-SCNC: 4.2 MMOL/L (ref 3.5–5.2)
PROT SERPL-MCNC: 6.6 G/DL (ref 6–8.5)
RBC # BLD AUTO: 4.22 10*6/MM3 (ref 3.77–5.28)
SODIUM SERPL-SCNC: 141 MMOL/L (ref 136–145)
T4 FREE SERPL-MCNC: 1.14 NG/DL (ref 0.93–1.7)
TRIGL SERPL-MCNC: 140 MG/DL (ref 0–150)
TSH SERPL DL<=0.005 MIU/L-ACNC: 2.08 UIU/ML (ref 0.27–4.2)
VIT B12 SERPL-MCNC: 563 PG/ML (ref 211–946)
VLDLC SERPL CALC-MCNC: 25 MG/DL (ref 5–40)
WBC # BLD AUTO: 5.68 10*3/MM3 (ref 3.4–10.8)

## 2021-10-20 PROCEDURE — 83036 HEMOGLOBIN GLYCOSYLATED A1C: CPT | Performed by: INTERNAL MEDICINE

## 2021-10-20 PROCEDURE — 90662 IIV NO PRSV INCREASED AG IM: CPT | Performed by: INTERNAL MEDICINE

## 2021-10-20 PROCEDURE — G0008 ADMIN INFLUENZA VIRUS VAC: HCPCS | Performed by: INTERNAL MEDICINE

## 2021-10-20 PROCEDURE — 99214 OFFICE O/P EST MOD 30 MIN: CPT | Performed by: INTERNAL MEDICINE

## 2021-10-20 NOTE — PROGRESS NOTES
Subjective     Patient ID: Radha Whelan is a 81 y.o. female. Patient is here for management of multiple medical problems.     Chief Complaint   Patient presents with   • Diabetes     History of Present Illness   DM    Not well contorlled.  Diabetic ulcers on both shins.      The following portions of the patient's history were reviewed and updated as appropriate: allergies, current medications, past family history, past medical history, past social history, past surgical history and problem list.    Review of Systems   Constitutional: Negative for fatigue.   Psychiatric/Behavioral: Negative for self-injury and sleep disturbance. The patient is not nervous/anxious and is not hyperactive.    All other systems reviewed and are negative.      Current Outpatient Medications:   •  acetaminophen (TYLENOL) 325 MG tablet, Take 2 tablets by mouth Every 6 (Six) Hours As Needed for Mild Pain ., Disp: , Rfl:   •  albuterol sulfate  (90 Base) MCG/ACT inhaler, Inhale 2 puffs Every 4 (Four) Hours As Needed for Wheezing or Shortness of Air., Disp: 6.7 g, Rfl: 0  •  aspirin 81 MG chewable tablet, Chew 81 mg Daily., Disp: , Rfl:   •  benzonatate (Tessalon Perles) 100 MG capsule, Take 1 capsule by mouth 3 (Three) Times a Day As Needed for Cough., Disp: 30 capsule, Rfl: 0  •  Blood Glucose Monitoring Suppl (ONE TOUCH ULTRA 2) w/Device kit, USE AS NEEDED FOR BLOOD    SUGAR MONITORING, Disp: 1 each, Rfl: 0  •  Cholecalciferol (VITAMIN D PO), Take 125 mcg by mouth 2 (Two) Times a Week., Disp: , Rfl:   •  clopidogrel (PLAVIX) 75 MG tablet, TAKE 1 TABLET DAILY, Disp: 90 tablet, Rfl: 3  •  coenzyme Q10 100 MG capsule, Take 400 mg by mouth Every Other Day., Disp: , Rfl:   •  folic acid (FOLVITE) 1 MG tablet, Take 1 tablet by mouth Daily., Disp: 90 tablet, Rfl: 3  •  furosemide (LASIX) 20 MG tablet, TAKE 1 TABLET TWICE A DAY, Disp: 180 tablet, Rfl: 3  •  glipizide (glipiZIDE XL) 2.5 MG 24 hr tablet, Take 1 tablet by mouth Daily., Disp: 90  "tablet, Rfl: 3  •  Glucosamine-Chondroit-Vit C-Mn (GLUCOSAMINE CHONDR 1500 COMPLX PO), Take 1 tablet by mouth Daily., Disp: , Rfl:   •  glucose blood (Accu-Chek Kayce Plus) test strip, TEST ONCE DAILY., Disp: 100 each, Rfl: 3  •  glucose monitor monitoring kit, 1 each As Needed (blood sugar)., Disp: 1 each, Rfl: 1  •  Lancets (accu-chek soft touch) lancets, Use as directed, Disp: 100 each, Rfl: 12  •  latanoprost (XALATAN) 0.005 % ophthalmic solution, PLACE 1 DROP IN EACH EYE EVERY DAY AT BEDTIME, Disp: , Rfl:   •  lisinopril (PRINIVIL,ZESTRIL) 40 MG tablet, TAKE 1 TABLET DAILY, Disp: 90 tablet, Rfl: 3  •  lovastatin (MEVACOR) 10 MG tablet, Take 1 tablet by mouth Every Night. (Patient taking differently: Take 10 mg by mouth Every Other Day.), Disp: 90 tablet, Rfl: 3  •  melatonin 3 MG tablet, Take 3 mg by mouth Every Night., Disp: , Rfl:   •  pantoprazole (PROTONIX) 40 MG EC tablet, TAKE 1 TABLET DAILY, Disp: 90 tablet, Rfl: 3  •  polyethylene glycol (MIRALAX) pack packet, Take 17 g by mouth Daily., Disp: , Rfl:   •  predniSONE (DELTASONE) 20 MG tablet, Take 1 tablet by mouth Daily., Disp: 10 tablet, Rfl: 0  •  tobramycin-dexamethasone (TOBRADEX) 0.3-0.1 % ophthalmic suspension, Administer 1 drop into the left eye Daily. (Patient taking differently: Administer 1 drop to both eyes As Needed.), Disp: 5 mL, Rfl: 2  •  vitamin B-12 (CYANOCOBALAMIN) 1000 MCG tablet, Take 1,000 mcg by mouth 2 (Two) Times a Week., Disp: , Rfl:     Objective      Blood pressure 120/70, pulse 92, temperature 96.2 °F (35.7 °C), resp. rate 16, height 170.2 cm (67\"), weight 89.8 kg (198 lb), SpO2 95 %.    Physical Exam     General Appearance:    Alert, cooperative, no distress, appears stated age   Head:    Normocephalic, without obvious abnormality, atraumatic   Eyes:    PERRL, conjunctiva/corneas clear, EOM's intact   Ears:    Normal TM's and external ear canals, both ears   Nose:   Nares normal, septum midline, mucosa normal, no drainage   " or sinus tenderness   Throat:   Lips, mucosa, and tongue normal; teeth and gums normal   Neck:   Supple, symmetrical, trachea midline, no adenopathy;        thyroid:  No enlargement/tenderness/nodules; no carotid    bruit or JVD   Back:     Symmetric, no curvature, ROM normal, no CVA tenderness   Lungs:     Clear to auscultation bilaterally, respirations unlabored   Chest wall:    No tenderness or deformity   Heart:    Regular rate and rhythm, S1 and S2 normal, no murmur,        rub or gallop   Abdomen:     Soft, non-tender, bowel sounds active all four quadrants,     no masses, no organomegaly   Extremities:   Extremities normal, atraumatic, no cyanosis or edema   Pulses:   2+ and symmetric all extremities   Skin:   Ulcers on mid leg b/l.  Skin color, texture, turgor normal, no rashes or lesions   Lymph nodes:   Cervical, supraclavicular, and axillary nodes normal   Neurologic:   CNII-XII intact. Normal strength, sensation and reflexes       throughout      Results for orders placed or performed in visit on 09/10/21   COVID-19 PCR, [facility], NP SWAB IN [employer] VIRAL TRANSPORT MEDIA/ORAL SWISH 24-30 HR TAT - Swab, Nasopharynx    Specimen: Nasopharynx; Swab   Result Value Ref Range    SARS-CoV-2 DARLENE Not Detected Not Detected         Assessment/Plan   Not able to tolerat rybelus    Pt in the gym now.         Diagnoses and all orders for this visit:    1. Need for influenza vaccination (Primary)  -     Fluzone High-Dose 65+yrs (4817-3630)    2. Type 2 diabetes mellitus without complication, without long-term current use of insulin ([facility])  -     POCT glycated hemoglobin, total  -     Ambulatory Referral to Vascular Surgery  -     Lipid Panel  -     CBC & Differential  -     Vitamin B12  -     Comprehensive Metabolic Panel  -     T4, Free  -     TSH  -     Hemoglobin A1c    3. Venous stasis ulcer of other part of lower leg limited to breakdown of skin with varicose veins, unspecified laterality ([facility])  -     Ambulatory  Referral to Vascular Surgery  -     Lipid Panel  -     CBC & Differential  -     Vitamin B12  -     Comprehensive Metabolic Panel  -     T4, Free  -     TSH  -     Hemoglobin A1c      Return in about 3 months (around 1/20/2022).          There are no Patient Instructions on file for this visit.     Farhan Schaefer MD    Assessment/Plan

## 2021-10-22 ENCOUNTER — HOSPITAL ENCOUNTER (OUTPATIENT)
Dept: PHYSICAL THERAPY | Facility: HOSPITAL | Age: 81
Setting detail: THERAPIES SERIES
Discharge: HOME | End: 2021-10-22

## 2021-10-22 DIAGNOSIS — S81.801D OPEN WOUND OF RIGHT LOWER EXTREMITY, SUBSEQUENT ENCOUNTER: ICD-10-CM

## 2021-10-22 DIAGNOSIS — R60.0 BILATERAL LOWER EXTREMITY EDEMA: ICD-10-CM

## 2021-10-22 DIAGNOSIS — S81.802D MULTIPLE OPEN WOUNDS OF LEFT LOWER EXTREMITY, SUBSEQUENT ENCOUNTER: Primary | ICD-10-CM

## 2021-10-22 PROCEDURE — 97597 DBRDMT OPN WND 1ST 20 CM/<: CPT

## 2021-10-22 PROCEDURE — 29581 APPL MULTLAYER CMPRN SYS LEG: CPT

## 2021-10-22 NOTE — THERAPY PROGRESS REPORT/RE-CERT
Outpatient Rehabilitation - Wound/Debridement Progress Note   [facility]     Patient Name: Radha Whelan  : 1940  MRN: [id number]  Today's Date: 10/22/2021                 Admit Date: 10/22/2021    Visit Dx:    ICD-10-CM ICD-9-CM   1. Multiple open wounds of left lower extremity, subsequent encounter  S81.802D V58.89     894.0   2. Open wound of right lower extremity, subsequent encounter  S81.801D V58.89     891.0   3. Bilateral lower extremity edema  R60.0 782.3   Lat LLE:    Ant LLE:    Med RLE:      Patient Active Problem List   Diagnosis   • Type 2 diabetes mellitus without complication (HCC)   • Temporary cerebral vascular dysfunction   • Fatigue   • Pre-syncope   • Hypervitaminosis B6   • Hyponatremia   • ODELL (obstructive sleep apnea)   • Peripheral neuropathy   • Restless legs syndrome   • Edema   • H/O hyperlipidemia   • Acute non-recurrent maxillary sinusitis   • Infection of left tear duct   • Left eye pain   • Mixed hyperlipidemia   • LBBB (left bundle branch block)   • Abnormal echocardiogram   • Cardiomyopathy (HCC)   • Arthritis   • Chronic systolic congestive heart failure ([facility])   • S/P hip replacement, right   • Bradycardia   • Acute cystitis with hematuria   • Acute on chronic diastolic congestive heart failure ([facility])   • Anemia   • Declining functional status   • Hypokalemia   • S/P placement of cardiac pacemaker   • Sinus node dysfunction ([facility])   • Essential hypertension        Past Medical History:   Diagnosis Date   • Acute on chronic diastolic congestive heart failure (HCC)    • Arthritis    • Basal cell carcinoma    • Body piercing     both ears   • Cardiomyopathy ([facility])    • Cataract, bilateral     s/p removal    • Chronic systolic congestive heart failure ([facility])    • Diabetes mellitus (HCC)    • DVT (deep venous thrombosis) ([facility])     - left leg   • Edema     legs- hx of   • Elevated cholesterol    • Fatigue    • Fatigue    • History of left heart catheterization     19  no  "stents   • History of migraine headaches    • Hx of exercise stress test 2004   • Hyperlipidemia    • Hypertension    • Hyponatremia    • Impaired functional mobility, balance, gait, and endurance    • LBBB (left bundle branch block)    • LBBB (left bundle branch block)    • Left leg pain     left leg and knee pain    • Muscle spasm     hx of   • Obesity    • ODELL on CPAP     CPAP   • Stroke (HCC)    • Teeth missing     all of the top, and has 6 bottom teeth left   • Thyroid nodule    • TIA (transient ischemic attack)     x3.  last one was \"a few years ago\"   • Wears dentures     top plate   • Wears glasses     to read        Past Surgical History:   Procedure Laterality Date   • BREAST BIOPSY Left     benign   • CARDIAC CATHETERIZATION      09/16/2019 PER .   • CARDIAC CATHETERIZATION N/A 9/16/2019    Procedure: Left Heart Cath +/- CBI;  Surgeon: Ashlyn Mays MD;  Location:  [facility];  Service: Cardiovascular   • CARDIAC ELECTROPHYSIOLOGY PROCEDURE N/A 12/6/2019    Procedure: PACEMAKER IMPLANTATION- DC;  Surgeon: Stephan Laboy DO;  Location:  [facility];  Service: Cardiology   • CATARACT EXTRACTION  12/2018    both eyes    • COLONOSCOPY     • INSERT / REPLACE / REMOVE PACEMAKER     • MOUTH SURGERY      all top teeth   • SKIN BIOPSY      basal cell   • TOTAL HIP ARTHROPLASTY Right 12/4/2019    Procedure: HIP ARTHROPLASTY TOTAL RIGHT;  Surgeon: Issa Salgado MD;  Location: [facility] OR;  Service: Orthopedics   • TUBAL ABDOMINAL LIGATION           EVALUATION   PT Ortho     Row Name 10/22/21 8439       Subjective Comments    Subjective Comments Pt reports increased pain in left leg wound.  \"It hurts all the time.\"  Had a follow-up with PCP and has been referred to vascular surgeon awaiting appointment.  -JM       Subjective Pain    Able to rate subjective pain? yes  -JM    Pre-Treatment Pain Level 5  -JM    Post-Treatment Pain Level 5  -JM       Transfers    Sit-Stand " "[place] (Transfers) independent  -    Stand-Sit [place] (Transfers) independent  -    Comment (Transfers) seated for tx  -       Gait/Stairs (Locomotion)    [place] Level (Gait) independent  -          User Key  (r) = Recorded By, (t) = Taken By, (c) = Cosigned By    Initials Name Provider Type    Brittanie Marie PT Physical Therapist                 LDA Wound     Row Name 10/22/21 1345             Wound 09/20/21 0800 Right medial leg Other (comment)    Wound - Properties Group Placement Date: 09/20/21  -MW Placement Time: 0800  -MW Present on Hospital Admission: Y  -MW Side: Right  -MW Orientation: medial  -MW Location: leg  -MW Primary Wound Type: Other  -MW Additional Comments: etiology unclear   -MW      Wound Image  View All Images View Images  -      Dressing Appearance intact; moist drainage  -      Base moist; granulating; red; yellow  mod hypergranulation  -      Periwound intact; pink; macerated; pale white  min maceration  -      Periwound Temperature warm  -      Periwound Skin Turgor soft  -      Edges open  -      Wound Length (cm) 1 cm  -      Wound Width (cm) 0.9 cm  -      Wound Depth (cm) --  hypergranulated approx 0.2cm above skin level  -      Drainage Characteristics/Odor serosanguineous  -      Drainage Amount small  -      Care, Wound cleansed with; wound cleanser; debrided; silver agent applied  ag nitrate stick to hypergranulation  -      Dressing Care dressing applied; silver impregnated; foam; low-adherent; border dressing; multi-layer wrap  mepilex ag, 4\" optifoam, MLW  -      Periwound Care cleansed with pH balanced cleanser; dry periwound area maintained  -      Retired Wound - Properties Group Date first assessed: 09/20/21  -MW Time first assessed: 0800  -MW Present on Hospital Admission: Y  -MW Side: Right  -MW Location: leg  -MW Primary Wound Type: Other  -MW Additional Comments: etiology unclear   -MW              Wound " "07/08/21 1000 Left lateral leg Venous Ulcer    Wound - Properties Group Placement Date: 07/08/21  - Placement Time: 1000  - Present on Hospital Admission: Y  - Side: Left  - Orientation: lateral  - Location: leg  -MC Primary Wound Type: Venous ulcer  -MC      Wound Image  View All Images View Images  -      Dressing Appearance intact; dry  -JM      Base dry; moist; pink; epithelialization; yellow  mostly dry/crusted, small moist open area  -JM      Periwound intact; dry; redness; excoriated  mild excoriation around border of dressing  -JM      Periwound Temperature warm  -JM      Periwound Skin Turgor soft  -JM      Edges irregular; open  -JM      Wound Length (cm) 0.4 cm  -JM      Wound Width (cm) 0.5 cm  -JM      Wound Depth (cm) 0.1 cm  -JM      Drainage Characteristics/Odor serosanguineous  -JM      Drainage Amount scant  -JM      Care, Wound cleansed with; wound cleanser; debrided  -      Dressing Care dressing applied; silver impregnated; foam; gauze; multi-layer wrap  mepilex ag, 4\" conform roll, MLW  -JM      Periwound Care barrier ointment applied; cleansed with pH balanced cleanser; dry periwound area maintained  zguard  -      Retired Wound - Properties Group Date first assessed: 07/08/21  - Time first assessed: 1000  - Present on Hospital Admission: Y  - Side: Left  - Location: leg  - Primary Wound Type: Venous ulcer  -MC              Wound 07/08/21 1000 Left anterior leg Venous Ulcer    Wound - Properties Group Placement Date: 07/08/21  - Placement Time: 1000  - Present on Hospital Admission: Y  - Side: Left  - Orientation: anterior  - Location: leg  - Primary Wound Type: Venous ulcer  -MC      Base pink; clean; closed/resurfaced; epithelialization  -      Periwound intact; dry; swelling  -      Periwound Temperature warm  -      Periwound Skin Turgor soft  -JM      Edges rolled/closed  -JM      Drainage Amount none  -JM      Retired Wound - Properties Group " Date first assessed: 07/08/21  - Time first assessed: 1000  - Present on Hospital Admission: Y  - Side: Left  - Location: leg  - Primary Wound Type: Venous ulcer  -            User Key  (r) = Recorded By, (t) = Taken By, (c) = Cosigned By    Initials Name Provider Type    Becca Head, PT Physical Therapist    [facility] Gillian Degroot, PT Physical Therapist    Brittanie Marie, PT Physical Therapist               Lymphedema     Row Name 10/22/21 3897             Lymphedema Edema Assessment    Pitting Edema Mild  -              Skin Changes/Observations    Lower Extremity Conditions bilateral:; clean; dry; left:; inflamed  -      Lower Extremity Color/Pigment bilateral:; hyperpigmented  -              Lymphedema Pulses/Capillary Refill    Lower Extremity Capillary Refill right:; left:; less than 3 seconds  -              Compression/Skin Care    Compression/Skin Care skin care; wrapping location; bandaging  -      Skin Care washed/dried; lotion applied  -      Wrapping Location lower extremity  -      Wrapping Location LE bilateral:; foot to knee  -      Wrapping Comments size 4 compressogrips doubled distally for gradient compression  -            User Key  (r) = Recorded By, (t) = Taken By, (c) = Cosigned By    Initials Name Provider Type    Brittanie Marie, PT Physical Therapist                WOUND DEBRIDEMENT  Total area of Debridement: 3cmsq  Debridement Site 1  Location- Site 1: lat LLE  Selective Debridement- Site 1: Wound Surface <20cmsq  Instruments- Site 1: tweezers  Excised Tissue Description- Site 1: scant, slough, minimum, other (comment) (loose hypertrophic crusts)  Bleeding- Site 1: none   Debridement Site 2  Location- Site 2: medial RLE  Selective Debridement- Site 2: Wound Surface <20cmsq  Instruments- Site 2: tweezers  Excised Tissue Description- Site 2: scant, slough  Bleeding- Site 2: scant          Therapy Education     Row Name 10/22/21 9637              Therapy Education    Education Details Continue with mepilex ag to both wounds, with small optifoam to cover RLE wound and conform roll to secure LLE wound.  Continue with compressogrips or personal compression stockings to support wound healing.  Plan for continued weekly tx pending any intervention or changes to POC by vascular surgeon.  -      Given Symptoms/condition management; Bandaging/dressing change; Edema management  -      Program Reinforced  -      How Provided Verbal; Demonstration  -      Provided to Patient  -      Level of Understanding Verbalized  -            User Key  (r) = Recorded By, (t) = Taken By, (c) = Cosigned By    Initials Name Provider Type    Brittanie Marie, PT Physical Therapist                Recommendation and Plan   PT Assessment/Plan     Row Name 10/22/21 1977          PT Assessment    Functional Limitations Other (comment)  wound management   -     Impairments Integumentary integrity; Edema  -     Assessment Comments Pt's LLE wound improved with area now mostly dry and crusted with less erythema and less drainage.  PT debrided loose crusts and a small 0.4cm x0.5cm area remains moist and open.  RLE wound with increased area and hypergranulation, likely from lapse in care while pt out of town.  PT continued with ag nitrate stick to area to reduce hypergranulation.  PT applied MLW with compressogrips to support wound healing.  Pt has been referred to vascular surgeon.  Ongoing POC remains appropriate and may be adjusted pending vascular surgeon recommendations.  -     Rehab Potential Fair  -     Patient/caregiver participated in establishment of treatment plan and goals Yes  -     Patient would benefit from skilled therapy intervention Yes  -JM            PT Plan    PT Frequency 1x/week  -     Physical Therapy Interventions (Optional Details) patient/family education; wound care  -     PT Plan Comments debridement, MLW, ag nitrate RLE PRN  -            User Key  (r) = Recorded By, (t) = Taken By, (c) = Cosigned By    Initials Name Provider Type    Brittanie Marie PT Physical Therapist                Goals   PT OP Goals     Row Name 10/22/21 1345          PT Short Term Goals    STG Date to Achieve 11/18/21  -     STG 1 Pt independent with clean home dressing changes to promote wound healing and decrease risk of infection.   -     STG 1 Progress Met  -     STG 2 Pt able to verbalize s/s of infection and when to seek urgent or emergent care.   -     STG 2 Progress Met  -     STG 3 LLE wounds to decrease in area by 25% to demonstrate wound healing.   -     STG 3 Progress Ongoing  -     STG 4 RLE wound to decrease in area by 25% to demonstrate wound healing.   -     STG 4 Progress Ongoing  -            Long Term Goals    LTG Date to Achieve 12/20/21  -     LTG 1 LLE wounds to decrease in area by > 80% to demonstrate wound healing.   -     LTG 1 Progress Ongoing  -     LTG 2 RLE wound to decrease in area by > 80% to demonstrate wound healing.   -     LTG 2 Progress Met; Ongoing  -     LTG 3 No new sites to emerge.   -     LTG 3 Progress Ongoing  -            Time Calculation    PT Goal Re-Cert Due Date 12/20/21  -           User Key  (r) = Recorded By, (t) = Taken By, (c) = Cosigned By    Initials Name Provider Type    Brittanie Marie PT Physical Therapist                  Time Calculation: Start Time: 1345  Untimed Charges  33285-Pquwpdiwmd comp below knee: 10  57263-Gfwnszzly debridement: 20  Total Minutes  Untimed Charges Total Minutes: 30   Total Minutes: 30  Therapy Charges for Today     Code Description Service Date Service Provider Modifiers Qty    27226948780 HC JUSTIN DEBRIDE OPEN WOUND UP TO 20CM 10/22/2021 Brittanie Esquivel, PT GP 1    [id number] HC PT MULTI LAYER COMP SYS BELOW KNEE 10/22/2021 Brittanie Esquivel, PT GP 1                  Brittanie Esquivel, PT  10/22/2021

## 2021-10-25 ENCOUNTER — TELEPHONE (OUTPATIENT)
Dept: INTERNAL MEDICINE | Facility: CLINIC | Age: 81
End: 2021-10-25

## 2021-10-27 ENCOUNTER — HOSPITAL ENCOUNTER (OUTPATIENT)
Dept: PHYSICAL THERAPY | Facility: HOSPITAL | Age: 81
Setting detail: THERAPIES SERIES
Discharge: HOME | End: 2021-10-27

## 2021-10-27 DIAGNOSIS — R60.0 BILATERAL LOWER EXTREMITY EDEMA: ICD-10-CM

## 2021-10-27 DIAGNOSIS — S81.802D MULTIPLE OPEN WOUNDS OF LEFT LOWER EXTREMITY, SUBSEQUENT ENCOUNTER: Primary | ICD-10-CM

## 2021-10-27 DIAGNOSIS — S81.801D OPEN WOUND OF RIGHT LOWER EXTREMITY, SUBSEQUENT ENCOUNTER: ICD-10-CM

## 2021-10-27 PROCEDURE — 97597 DBRDMT OPN WND 1ST 20 CM/<: CPT

## 2021-10-27 PROCEDURE — 29581 APPL MULTLAYER CMPRN SYS LEG: CPT

## 2021-10-27 NOTE — THERAPY WOUND CARE TREATMENT
Outpatient Rehabilitation - Wound/Debridement Treatment Note  ANNIKA Osborn     Patient Name: Radha Whelan  : 1940  MRN: [id number]  Today's Date: 10/27/2021             GISEL FORMAN      Admit Date: 10/27/2021    Visit Dx:    ICD-10-CM ICD-9-CM   1. Multiple open wounds of left lower extremity, subsequent encounter  S81.802D V58.89     894.0   2. Open wound of right lower extremity, subsequent encounter  S81.801D V58.89     891.0   3. Bilateral lower extremity edema  R60.0 782.3       Patient Active Problem List   Diagnosis   • Type 2 diabetes mellitus without complication ([facility])   • Temporary cerebral vascular dysfunction   • Fatigue   • Pre-syncope   • Hypervitaminosis B6   • Hyponatremia   • ODELL (obstructive sleep apnea)   • Peripheral neuropathy   • Restless legs syndrome   • Edema   • H/O hyperlipidemia   • Acute non-recurrent maxillary sinusitis   • Infection of left tear duct   • Left eye pain   • Mixed hyperlipidemia   • LBBB (left bundle branch block)   • Abnormal echocardiogram   • Cardiomyopathy ([facility])   • Arthritis   • Chronic systolic congestive heart failure ([facility])   • S/P hip replacement, right   • Bradycardia   • Acute cystitis with hematuria   • Acute on chronic diastolic congestive heart failure ([facility])   • Anemia   • Declining functional status   • Hypokalemia   • S/P placement of cardiac pacemaker   • Sinus node dysfunction ([facility])   • Essential hypertension        Past Medical History:   Diagnosis Date   • Acute on chronic diastolic congestive heart failure ([facility])    • Arthritis    • Basal cell carcinoma    • Body piercing     both ears   • Cardiomyopathy ([facility])    • Cataract, bilateral     s/p removal    • Chronic systolic congestive heart failure ([facility])    • Diabetes mellitus ([facility])    • DVT (deep venous thrombosis) ([facility])     - left leg   • Edema     legs- hx of   • Elevated cholesterol    • Fatigue    • Fatigue    • History of left heart catheterization     19  no stents   • History  "of migraine headaches    • Hx of exercise stress test 2004   • Hyperlipidemia    • Hypertension    • Hyponatremia    • Impaired functional mobility, balance, gait, and endurance    • LBBB (left bundle branch block)    • LBBB (left bundle branch block)    • Left leg pain     left leg and knee pain    • Muscle spasm     hx of   • Obesity    • ODELL on CPAP     CPAP   • Stroke (HCC)    • Teeth missing     all of the top, and has 6 bottom teeth left   • Thyroid nodule    • TIA (transient ischemic attack)     x3.  last one was \"a few years ago\"   • Wears dentures     top plate   • Wears glasses     to read        Past Surgical History:   Procedure Laterality Date   • BREAST BIOPSY Left     benign   • CARDIAC CATHETERIZATION      09/16/2019 PER .   • CARDIAC CATHETERIZATION N/A 9/16/2019    Procedure: Left Heart Cath +/- CBI;  Surgeon: Ashlyn Mays MD;  Location:  [facility] CATH INVASIVE LOCATION;  Service: Cardiovascular   • CARDIAC ELECTROPHYSIOLOGY PROCEDURE N/A 12/6/2019    Procedure: PACEMAKER IMPLANTATION- DC;  Surgeon: Stephan Laboy DO;  Location: [facility] EP INVASIVE LOCATION;  Service: Cardiology   • CATARACT EXTRACTION  12/2018    both eyes    • COLONOSCOPY     • INSERT / REPLACE / REMOVE PACEMAKER     • MOUTH SURGERY      all top teeth   • SKIN BIOPSY      basal cell   • TOTAL HIP ARTHROPLASTY Right 12/4/2019    Procedure: HIP ARTHROPLASTY TOTAL RIGHT;  Surgeon: Issa Salgado MD;  Location: [facility] OR;  Service: Orthopedics   • TUBAL ABDOMINAL LIGATION           EVALUATION   PT Ortho     Row Name 10/27/21 1500       Subjective Comments    Subjective Comments No complaints or changes since last tx.  -MC       Subjective Pain    Able to rate subjective pain? yes  -MC    Pre-Treatment Pain Level 0  -MC    Post-Treatment Pain Level 0  -MC       Transfers    Sit-Stand [place] (Transfers) independent  -MC    Stand-Sit [place] (Transfers) independent  -MC    Comment (Transfers) seated for tx  -       " "Gait/Stairs (Locomotion)    [place] Level (Gait) independent  -          User Key  (r) = Recorded By, (t) = Taken By, (c) = Cosigned By    Initials Name Provider Type    Gillian Schafer PT Physical Therapist                 LDA Wound     Row Name 10/27/21 1500             Wound 09/20/21 0800 Right medial leg Other (comment)    Wound - Properties Group Placement Date: 09/20/21  -MW Placement Time: 0800  -MW Present on Hospital Admission: Y  -MW Side: Right  -MW Orientation: medial  -MW Location: leg  -MW Primary Wound Type: Other  -MW Additional Comments: etiology unclear   -MW      Wound Image  View All Images View Images  -MC      Dressing Appearance intact; moist drainage  -MC      Base moist; granulating; red; yellow  mod hypergranulation, partially epithelialized  -MC      Periwound intact; pink  -MC      Periwound Temperature warm  -MC      Periwound Skin Turgor soft  -      Edges open  -      Wound Length (cm) 1 cm  -MC      Wound Width (cm) 0.8 cm  -MC      Wound Depth (cm) --  raised  -MC      Drainage Characteristics/Odor serosanguineous  -      Drainage Amount small  -MC      Care, Wound cleansed with; wound cleanser; debrided; silver agent applied  Ag Nitrate  -MC      Dressing Care dressing applied; silver impregnated; low-adherent; foam; gauze  mepilex Ag, 4\" conform, MLW  -MC      Periwound Care cleansed with pH balanced cleanser; dry periwound area maintained  -MC      Retired Wound - Properties Group Date first assessed: 09/20/21  -MW Time first assessed: 0800  -MW Present on Hospital Admission: Y  -MW Side: Right  -MW Location: leg  -MW Primary Wound Type: Other  -MW Additional Comments: etiology unclear   -MW              Wound 07/08/21 1000 Left lateral leg Venous Ulcer    Wound - Properties Group Placement Date: 07/08/21  -MC Placement Time: 1000  -MC Present on Hospital Admission: Y  -MC Side: Left  -MC Orientation: lateral  -MC Location: leg  -MC Primary Wound Type: Venous " "ulcer  -MC      Wound Image  View All Images View Images  -MC      Dressing Appearance intact; dry  -MC      Base dry; moist; pink; epithelialization  pinpoint open area remaining  -MC      Periwound intact; dry; redness  -MC      Periwound Temperature warm  -MC      Periwound Skin Turgor soft  -MC      Edges irregular; open  -MC      Wound Length (cm) 0.1 cm  -MC      Wound Width (cm) 0.1 cm  -MC      Wound Depth (cm) 0.1 cm  -MC      Drainage Characteristics/Odor serosanguineous  -MC      Drainage Amount scant  -MC      Care, Wound cleansed with; wound cleanser; debrided  -MC      Dressing Care dressing applied; silver impregnated; low-adherent; foam; gauze  mepilex Ag, 4\" conform, MLW  -MC      Periwound Care cleansed with pH balanced cleanser; barrier ointment applied  zguard  -      Retired Wound - Properties Group Date first assessed: 07/08/21  - Time first assessed: 1000  - Present on Hospital Admission: Y  - Side: Left  - Location: leg  - Primary Wound Type: Venous ulcer  -MC              Wound 07/08/21 1000 Left anterior leg Venous Ulcer    Wound - Properties Group Placement Date: 07/08/21  - Placement Time: 1000  - Present on Hospital Admission: Y  - Side: Left  - Orientation: anterior  -MC Location: leg  -MC Primary Wound Type: Venous ulcer  -MC      Base pink; clean; closed/resurfaced; epithelialization  -MC      Periwound intact; dry; swelling  -      Periwound Temperature warm  -MC      Periwound Skin Turgor soft  -MC      Edges rolled/closed  -MC      Drainage Amount none  -      Retired Wound - Properties Group Date first assessed: 07/08/21  - Time first assessed: 1000  - Present on Hospital Admission: Y  - Side: Left  - Location: leg  -MC Primary Wound Type: Venous ulcer  -MC            User Key  (r) = Recorded By, (t) = Taken By, (c) = Cosigned By    Initials Name Provider Type    Becca Head, PT Physical Therapist    Gillian Schafer PT Physical " Therapist               Lymphedema     Row Name 10/27/21 1500             Lymphedema Edema Assessment    Ptting Edema Category By severity  -      Pitting Edema Mild  -MC              Skin Changes/Observations    Lower Extremity Conditions bilateral:; clean; dry; left:; inflamed  -MC      Lower Extremity Color/Pigment bilateral:; hyperpigmented  -MC              Lymphedema Pulses/Capillary Refill    Lower Extremity Capillary Refill right:; left:; less than 3 seconds  -MC              Compression/Skin Care    Compression/Skin Care skin care; wrapping location; bandaging  -MC      Skin Care washed/dried; lotion applied  -MC      Wrapping Location lower extremity  -MC      Wrapping Location LE bilateral:; foot to knee  -      Wrapping Comments size 4 compressogrips doubled distally for gradient compression  -MC      Bandage Layers cotton elastic stocking- double layer (comment size)  -MC            User Key  (r) = Recorded By, (t) = Taken By, (c) = Cosigned By    Initials Name Provider Type    Gillian Schafer, PT Physical Therapist                WOUND DEBRIDEMENT  Total area of Debridement: 2 cm2  Debridement Site 1  Location- Site 1: BLE  Selective Debridement- Site 1: Wound Surface <20cmsq  Instruments- Site 1: tweezers  Excised Tissue Description- Site 1: minimum, slough, other (comment) (crust)  Bleeding- Site 1: none              Therapy Education     Row Name 10/27/21 1500             Therapy Education    Education Details May use conform to secure mepilex Ag to both legs if desired  -MC      Given Symptoms/condition management; Bandaging/dressing change; Edema management  -MC      Program Reinforced; Modified  -MC      How Provided Verbal; Demonstration  -MC      Provided to Patient  -MC      Level of Understanding Verbalized  -MC            User Key  (r) = Recorded By, (t) = Taken By, (c) = Cosigned By    Initials Name Provider Type    Gillian Schafer PT Physical Therapist                 Recommendation and Plan   PT Assessment/Plan     Row Name 10/27/21 1500          PT Assessment    Functional Limitations Other (comment)  wound mgmt  -     Impairments Integumentary integrity; Edema  -     Assessment Comments LLE improved, with only a pinpoint open area remaining. RLE wound still raised, but clean, moist, pink, and partially epithelialized at the edges. Given the irregular appearance of the RLE wound, pt may benefit from dermatology follow-up to rule out any pathology. BLE edema well-managed with MLW. Pt will benefit from skilled PT wound care to continue progress.  -     Rehab Potential Fair  -     Patient/caregiver participated in establishment of treatment plan and goals Yes  -     Patient would benefit from skilled therapy intervention Yes  -            PT Plan    PT Frequency 1x/week  -     Physical Therapy Interventions (Optional Details) wound care; patient/family education  -     PT Plan Comments debridement, MLW, Ag Nitrate prn. ?Derm referral for RLE  -           User Key  (r) = Recorded By, (t) = Taken By, (c) = Cosigned By    Initials Name Provider Type    Gillian Schafer, PT Physical Therapist                Goals   PT OP Goals     Row Name 10/27/21 1500          Time Calculation    PT Goal Re-Cert Due Date 12/20/21  -           User Key  (r) = Recorded By, (t) = Taken By, (c) = Cosigned By    Initials Name Provider Type    Gillian Schafer, PT Physical Therapist                PT Goal Re-Cert Due Date: 12/20/21            Time Calculation: Start Time: 1500  Untimed Charges  65506-Lcmvuozozh comp below knee: 10  69292-Fqietzftc debridement: 20  Total Minutes  Untimed Charges Total Minutes: 30   Total Minutes: 30  Therapy Charges for Today     Code Description Service Date Service Provider Modifiers Qty    00226845180 HC PT MULTI LAYER COMP SYS BELOW KNEE 10/27/2021 Gillian Degroot, PT GP 1    [id number] HC JUSTIN DEBRIDE OPEN WOUND UP TO 20CM  10/27/2021 Gillian Degroot, PT GP 1                  Gillian Degroot, PT  10/27/2021

## 2021-11-04 DIAGNOSIS — I67.82 TEMPORARY CEREBRAL VASCULAR DYSFUNCTION: ICD-10-CM

## 2021-11-04 RX ORDER — CLOPIDOGREL BISULFATE 75 MG/1
TABLET ORAL
Qty: 90 TABLET | Refills: 3 | Status: SHIPPED | OUTPATIENT
Start: 2021-11-04 | End: 2022-10-24

## 2021-11-05 ENCOUNTER — HOSPITAL ENCOUNTER (OUTPATIENT)
Dept: PHYSICAL THERAPY | Facility: HOSPITAL | Age: 81
Setting detail: THERAPIES SERIES
Discharge: HOME | End: 2021-11-05

## 2021-11-05 DIAGNOSIS — R60.0 BILATERAL LOWER EXTREMITY EDEMA: ICD-10-CM

## 2021-11-05 DIAGNOSIS — S81.802D MULTIPLE OPEN WOUNDS OF LEFT LOWER EXTREMITY, SUBSEQUENT ENCOUNTER: Primary | ICD-10-CM

## 2021-11-05 DIAGNOSIS — S81.801D OPEN WOUND OF RIGHT LOWER EXTREMITY, SUBSEQUENT ENCOUNTER: ICD-10-CM

## 2021-11-05 PROCEDURE — 29581 APPL MULTLAYER CMPRN SYS LEG: CPT

## 2021-11-05 PROCEDURE — 97597 DBRDMT OPN WND 1ST 20 CM/<: CPT

## 2021-11-05 NOTE — THERAPY WOUND CARE TREATMENT
Outpatient Rehabilitation - Wound/Debridement Treatment Note  ANNIKA Osborn     Patient Name: Radha Whelan  : 1940  MRN: [id number]  Today's Date: 2021             GISEL FORMAN      Admit Date: 2021    Visit Dx:    ICD-10-CM ICD-9-CM   1. Multiple open wounds of left lower extremity, subsequent encounter  S81.802D V58.89     894.0   2. Open wound of right lower extremity, subsequent encounter  S81.801D V58.89     891.0   3. Bilateral lower extremity edema  R60.0 782.3       Patient Active Problem List   Diagnosis   • Type 2 diabetes mellitus without complication ([facility])   • Temporary cerebral vascular dysfunction   • Fatigue   • Pre-syncope   • Hypervitaminosis B6   • Hyponatremia   • ODELL (obstructive sleep apnea)   • Peripheral neuropathy   • Restless legs syndrome   • Edema   • H/O hyperlipidemia   • Acute non-recurrent maxillary sinusitis   • Infection of left tear duct   • Left eye pain   • Mixed hyperlipidemia   • LBBB (left bundle branch block)   • Abnormal echocardiogram   • Cardiomyopathy ([facility])   • Arthritis   • Chronic systolic congestive heart failure ([facility])   • S/P hip replacement, right   • Bradycardia   • Acute cystitis with hematuria   • Acute on chronic diastolic congestive heart failure ([facility])   • Anemia   • Declining functional status   • Hypokalemia   • S/P placement of cardiac pacemaker   • Sinus node dysfunction ([facility])   • Essential hypertension        Past Medical History:   Diagnosis Date   • Acute on chronic diastolic congestive heart failure ([facility])    • Arthritis    • Basal cell carcinoma    • Body piercing     both ears   • Cardiomyopathy ([facility])    • Cataract, bilateral     s/p removal    • Chronic systolic congestive heart failure ([facility])    • Diabetes mellitus ([facility])    • DVT (deep venous thrombosis) ([facility])     - left leg   • Edema     legs- hx of   • Elevated cholesterol    • Fatigue    • Fatigue    • History of left heart catheterization     19  no stents   • History of  "migraine headaches    • Hx of exercise stress test 2004   • Hyperlipidemia    • Hypertension    • Hyponatremia    • Impaired functional mobility, balance, gait, and endurance    • LBBB (left bundle branch block)    • LBBB (left bundle branch block)    • Left leg pain     left leg and knee pain    • Muscle spasm     hx of   • Obesity    • ODELL on CPAP     CPAP   • Stroke (HCC)    • Teeth missing     all of the top, and has 6 bottom teeth left   • Thyroid nodule    • TIA (transient ischemic attack)     x3.  last one was \"a few years ago\"   • Wears dentures     top plate   • Wears glasses     to read        Past Surgical History:   Procedure Laterality Date   • BREAST BIOPSY Left     benign   • CARDIAC CATHETERIZATION      09/16/2019 PER .   • CARDIAC CATHETERIZATION N/A 9/16/2019    Procedure: Left Heart Cath +/- CBI;  Surgeon: Ashlyn Mays MD;  Location:  [facility] CATH INVASIVE LOCATION;  Service: Cardiovascular   • CARDIAC ELECTROPHYSIOLOGY PROCEDURE N/A 12/6/2019    Procedure: PACEMAKER IMPLANTATION- DC;  Surgeon: Stephan Laboy DO;  Location: [facility] EP INVASIVE LOCATION;  Service: Cardiology   • CATARACT EXTRACTION  12/2018    both eyes    • COLONOSCOPY     • INSERT / REPLACE / REMOVE PACEMAKER     • MOUTH SURGERY      all top teeth   • SKIN BIOPSY      basal cell   • TOTAL HIP ARTHROPLASTY Right 12/4/2019    Procedure: HIP ARTHROPLASTY TOTAL RIGHT;  Surgeon: Issa Salgado MD;  Location: [facility] OR;  Service: Orthopedics   • TUBAL ABDOMINAL LIGATION           EVALUATION   PT Ortho     Row Name 11/05/21 5110       Subjective Comments    Subjective Comments Pt reports no notable pain or other complaints.  -MC       Subjective Pain    Able to rate subjective pain? yes  -MC    Pre-Treatment Pain Level 0  -MC    Post-Treatment Pain Level 0  -MC       Transfers    Sit-Stand [place] (Transfers) independent  -MC    Stand-Sit [place] (Transfers) independent  -MC    Comment (Transfers) seated for tx  - "       Gait/Stairs (Locomotion)    [place] Level (Gait) independent  -MC          User Key  (r) = Recorded By, (t) = Taken By, (c) = Cosigned By    Initials Name Provider Type    Gillian Schafer PT Physical Therapist                 LDA Wound     Row Name 11/05/21 1515             [REMOVED] Wound 07/08/21 1000 Left anterior leg Venous Ulcer    Wound - Properties Group Placement Date: 07/08/21  -MC Placement Time: 1000  -MC Present on Hospital Admission: Y  -MC Side: Left  -MC Orientation: anterior  -MC Location: leg  -MC Primary Wound Type: Venous ulcer  -MC Removal Date: 11/05/21  -MC Removal Time: 1515  -MC Wound Outcome: Healed  -MC      Retired Wound - Properties Group Date first assessed: 07/08/21  -MC Time first assessed: 1000  -MC Present on Hospital Admission: Y  -MC Side: Left  -MC Location: leg  -MC Primary Wound Type: Venous ulcer  -MC Resolution Date: 11/05/21  -MC Resolution Time: 1515  -MC Wound Outcome: Healed  -MC              Wound 09/20/21 0800 Right medial leg Other (comment)    Wound - Properties Group Placement Date: 09/20/21  -MW Placement Time: 0800  -MW Present on Hospital Admission: Y  -MW Side: Right  -MW Orientation: medial  -MW Location: leg  -MW Primary Wound Type: Other  -MW Additional Comments: etiology unclear   -MW      Wound Image  View All Images View Images  -MC      Dressing Appearance intact; moist drainage  -MC      Base moist; granulating; red; yellow  mod hypergranulation, partially epithelialized  -MC      Periwound intact; pink  -MC      Periwound Temperature warm  -MC      Periwound Skin Turgor soft  -MC      Edges open  -MC      Drainage Characteristics/Odor serosanguineous  -MC      Drainage Amount small  -MC      Care, Wound cleansed with; wound cleanser  -MC      Dressing Care dressing applied; silver impregnated; low-adherent; foam; gauze; multi-layer wrap  mepilex Ag, conform, MLW  -MC      Periwound Care cleansed with pH balanced cleanser; barrier  ointment applied  [facility]  -      Retired Wound - Properties Group Date first assessed: 09/20/21  - Time first assessed: 0800  -MW Present on Hospital Admission: Y  -MW Side: Right  -MW Location: leg  -MW Primary Wound Type: Other  -MW Additional Comments: etiology unclear   -MW              Wound 07/08/21 1000 Left lateral leg Venous Ulcer    Wound - Properties Group Placement Date: 07/08/21  - Placement Time: 1000  -MC Present on Hospital Admission: Y  -MC Side: Left  - Orientation: lateral  - Location: leg  - Primary Wound Type: Venous ulcer  -      Dressing Appearance intact; moist drainage  -      Base dry; moist; pink; epithelialization  pinpoint open areas remaining  -      Periwound intact; dry; redness  -      Periwound Temperature warm  -      Periwound Skin Turgor soft  -      Edges irregular; open  -      Drainage Characteristics/Odor serosanguineous  -      Drainage Amount scant  -      Care, Wound cleansed with; wound cleanser; debrided  -      Dressing Care dressing applied; silver impregnated; low-adherent; foam  mepilex Ag, conform, MLW  -      Periwound Care cleansed with pH balanced cleanser; barrier ointment applied  [facility] Date first assessed: 07/08/21  - Time first assessed: 1000  -MC Present on Hospital Admission: Y  -MC Side: Left  - Location: leg  - Primary Wound Type: Venous ulcer  -            User Key  (r) = Recorded By, (t) = Taken By, (c) = Cosigned By    Initials Name Provider Type    Becca Head, PT Physical Therapist    Gillian Schafer, SIOBHAN Physical Therapist               Lymphedema     Row Name 11/05/21 0225             Lymphedema Edema Assessment    Ptting Edema Category By severity  -MC      Pitting Edema Mild  -MC              Skin Changes/Observations    Lower Extremity Conditions bilateral:; clean; dry; left:; inflamed  -      Lower Extremity Color/Pigment bilateral:;  hyperpigmented  -              Lymphedema Pulses/Capillary Refill    Lower Extremity Capillary Refill right:; left:; less than 3 seconds  -              Compression/Skin Care    Compression/Skin Care skin care; wrapping location; bandaging  -      Skin Care washed/dried; lotion applied  -MC      Wrapping Location lower extremity  -MC      Wrapping Location LE bilateral:; foot to knee  -MC      Wrapping Comments size 4 compressogrips doubled distally for gradient compression  -MC      Bandage Layers cotton elastic stocking- double layer (comment size)  -MC            User Key  (r) = Recorded By, (t) = Taken By, (c) = Cosigned By    Initials Name Provider Type    Gillian Schafer, PT Physical Therapist                WOUND DEBRIDEMENT  Total area of Debridement: 2 cm2  Debridement Site 1  Location- Site 1: LLE  Selective Debridement- Site 1: Wound Surface <20cmsq  Instruments- Site 1: tweezers  Excised Tissue Description- Site 1: minimum, other (comment) (crust)  Bleeding- Site 1: none              Therapy Education     Row Name 11/05/21 7640             Therapy Education    Education Details Encouraged pt to call her dermatologist and request assessment of RLE wound.  -MC      Given Symptoms/condition management; Bandaging/dressing change; Edema management  -MC      Program Reinforced  -MC      How Provided Verbal; Demonstration  -MC      Provided to Patient  -MC      Level of Understanding Verbalized  -            User Key  (r) = Recorded By, (t) = Taken By, (c) = Cosigned By    Initials Name Provider Type    Gillian Schafer, PT Physical Therapist                Recommendation and Plan   PT Assessment/Plan     Row Name 11/05/21 1161          PT Assessment    Functional Limitations Other (comment)  wound mgmt  -MC     Impairments Integumentary integrity; Edema  -MC     Assessment Comments LLE still with only pinpoint open areas remaining, with some crusting that required debridement today. RLE  wound appears unchanged in hypergranulation/raised wound bed despite tx with Ag Nitrate last week. Recommended pt seek follow up with dermatology to r/o pathology to the RLE. BLE edema remains well-managed with MLW. Pt will continue to benefit from the current POC.  -     Rehab Potential Fair  -     Patient/caregiver participated in establishment of treatment plan and goals Yes  -     Patient would benefit from skilled therapy intervention Yes  -            PT Plan    PT Frequency 1x/week  -     Physical Therapy Interventions (Optional Details) wound care; patient/family education  -     PT Plan Comments debridement, MLW. Follow up on derm referral for RLE if pt hasn't called.  -           User Key  (r) = Recorded By, (t) = Taken By, (c) = Cosigned By    Initials Name Provider Type    Gillian Schafer, PT Physical Therapist                Goals   PT OP Goals     Row Name 11/05/21 1515          Time Calculation    PT Goal Re-Cert Due Date 12/20/21  -           User Key  (r) = Recorded By, (t) = Taken By, (c) = Cosigned By    Initials Name Provider Type    Gillian Schafer, PT Physical Therapist                PT Goal Re-Cert Due Date: 12/20/21            Time Calculation: Start Time: 1515  Untimed Charges  57806-Hclfepwbjc comp below knee: 10  54847-Nwzksvmvy debridement: 10  Total Minutes  Untimed Charges Total Minutes: 20   Total Minutes: 20  Therapy Charges for Today     Code Description Service Date Service Provider Modifiers Qty    64437225250 HC PT MULTI LAYER COMP SYS BELOW KNEE 11/5/2021 Gillian Degroot, PT GP 1    [id number] HC JUSTIN DEBRIDE OPEN WOUND UP TO 20CM 11/5/2021 Gillian Degroot, PT GP 1                  Gillian Degroot PT  11/5/2021

## 2021-11-12 ENCOUNTER — HOSPITAL ENCOUNTER (OUTPATIENT)
Dept: PHYSICAL THERAPY | Facility: HOSPITAL | Age: 81
Setting detail: THERAPIES SERIES
Discharge: HOME | End: 2021-11-12

## 2021-11-12 DIAGNOSIS — R60.0 BILATERAL LOWER EXTREMITY EDEMA: ICD-10-CM

## 2021-11-12 DIAGNOSIS — S81.801D OPEN WOUND OF RIGHT LOWER EXTREMITY, SUBSEQUENT ENCOUNTER: ICD-10-CM

## 2021-11-12 DIAGNOSIS — S81.802D MULTIPLE OPEN WOUNDS OF LEFT LOWER EXTREMITY, SUBSEQUENT ENCOUNTER: Primary | ICD-10-CM

## 2021-11-12 PROCEDURE — 29581 APPL MULTLAYER CMPRN SYS LEG: CPT

## 2021-11-12 PROCEDURE — 97597 DBRDMT OPN WND 1ST 20 CM/<: CPT

## 2021-11-12 NOTE — THERAPY WOUND CARE TREATMENT
Outpatient Rehabilitation - Wound/Debridement Treatment Note   Irena     Patient Name: Radha Whelan  : 1940  MRN: [id number]  Today's Date: 2021          L lateral LE:    R medial LE:           Admit Date: 2021    Visit Dx:    ICD-10-CM ICD-9-CM   1. Multiple open wounds of left lower extremity, subsequent encounter  S81.802D V58.89     894.0   2. Open wound of right lower extremity, subsequent encounter  S81.801D V58.89     891.0   3. Bilateral lower extremity edema  R60.0 782.3       Patient Active Problem List   Diagnosis   • Type 2 diabetes mellitus without complication ([facility])   • Temporary cerebral vascular dysfunction   • Fatigue   • Pre-syncope   • Hypervitaminosis B6   • Hyponatremia   • ODELL (obstructive sleep apnea)   • Peripheral neuropathy   • Restless legs syndrome   • Edema   • H/O hyperlipidemia   • Acute non-recurrent maxillary sinusitis   • Infection of left tear duct   • Left eye pain   • Mixed hyperlipidemia   • LBBB (left bundle branch block)   • Abnormal echocardiogram   • Cardiomyopathy ([facility])   • Arthritis   • Chronic systolic congestive heart failure ([facility])   • S/P hip replacement, right   • Bradycardia   • Acute cystitis with hematuria   • Acute on chronic diastolic congestive heart failure ([facility])   • Anemia   • Declining functional status   • Hypokalemia   • S/P placement of cardiac pacemaker   • Sinus node dysfunction ([facility])   • Essential hypertension        Past Medical History:   Diagnosis Date   • Acute on chronic diastolic congestive heart failure ([facility])    • Arthritis    • Basal cell carcinoma    • Body piercing     both ears   • Cardiomyopathy ([facility])    • Cataract, bilateral     s/p removal    • Chronic systolic congestive heart failure ([facility])    • Diabetes mellitus ([facility])    • DVT (deep venous thrombosis) ([facility])     - left leg   • Edema     legs- hx of   • Elevated cholesterol    • Fatigue    • Fatigue    • History of left heart catheterization     19  no  "stents   • History of migraine headaches    • Hx of exercise stress test 2004   • Hyperlipidemia    • Hypertension    • Hyponatremia    • Impaired functional mobility, balance, gait, and endurance    • LBBB (left bundle branch block)    • LBBB (left bundle branch block)    • Left leg pain     left leg and knee pain    • Muscle spasm     hx of   • Obesity    • ODELL on CPAP     CPAP   • Stroke (H[facility])    • Teeth missing     all of the top, and has 6 bottom teeth left   • Thyroid nodule    • TIA (transient ischemic attack)     x3.  last one was \"a few years ago\"   • Wears dentures     top plate   • Wears glasses     to read        Past Surgical History:   Procedure Laterality Date   • BREAST BIOPSY Left     benign   • CARDIAC CATHETERIZATION      09/16/2019 PER .   • CARDIAC CATHETERIZATION N/A 9/16/2019    Procedure: Left Heart Cath +/- CBI;  Surgeon: Ashlyn Mays MD;  Location:  [facility];  Service: Cardiovascular   • CARDIAC ELECTROPHYSIOLOGY PROCEDURE N/A 12/6/2019    Procedure: PACEMAKER IMPLANTATION- DC;  Surgeon: Stephan Laboy DO;  Location:  [facility];  Service: Cardiology   • CATARACT EXTRACTION  12/2018    both eyes    • COLONOSCOPY     • INSERT / REPLACE / REMOVE PACEMAKER     • MOUTH SURGERY      all top teeth   • SKIN BIOPSY      basal cell   • TOTAL HIP ARTHROPLASTY Right 12/4/2019    Procedure: HIP ARTHROPLASTY TOTAL RIGHT;  Surgeon: Issa Salgado MD;  Location: [facility] OR;  Service: Orthopedics   • TUBAL ABDOMINAL LIGATION           EVALUATION   PT Ortho     Row Name 11/12/21 0356       Subjective Comments    Subjective Comments States she has an appt with surgeon on 11/22/21 and dermatologist on 11/18/21. Wants to cxl future appts here until she meets with surgeon to determine POC.  -MP       Subjective Pain    Able to rate subjective pain? yes  -MP    Pre-Treatment Pain Level 0  -MP    Post-Treatment Pain Level 0  -MP       Transfers    Sit-Stand " [place] (Transfers) independent  -MP    Stand-Sit [place] (Transfers) independent  -MP    Comment (Transfers) seated for tx  -MP       Gait/Stairs (Locomotion)    [place] Level (Gait) independent  -MP          User Key  (r) = Recorded By, (t) = Taken By, (c) = Cosigned By    Initials Name Provider Type    Alvin Christy PT Physical Therapist                 LDA Wound     Row Name 11/12/21 1430             Wound 09/20/21 0800 Right medial leg Other (comment)    Wound - Properties Group Placement Date: 09/20/21  -MW Placement Time: 0800  -MW Present on Hospital Admission: Y  -MW Side: Right  -MW Orientation: medial  -MW Location: leg  -MW Primary Wound Type: Other  -MW Additional Comments: etiology unclear   -MW      Wound Image  View All Images View Images  -MP      Dressing Appearance intact; moist drainage  -MP      Base moist; granulating; red; yellow  mod hypergranulation  -MP      Periwound intact; pink  -MP      Periwound Temperature warm  -MP      Periwound Skin Turgor soft  -MP      Edges open  -MP      Wound Length (cm) 1 cm  -MP      Wound Width (cm) 0.9 cm  -MP      Wound Depth (cm) --  raised  -MP      Drainage Characteristics/Odor serosanguineous  -MP      Drainage Amount small  -MP      Care, Wound cleansed with; wound cleanser; silver agent applied  Ag nitrate  -MP      Dressing Care dressing applied; silver impregnated; foam; gauze  Mepilex Ag, 1'' conform, MLW  -MP      Periwound Care cleansed with pH balanced cleanser; barrier ointment applied; other (see comments)  z guard  -MP      Retired Wound - Properties Group Date first assessed: 09/20/21  -MW Time first assessed: 0800  -MW Present on Hospital Admission: Y  -MW Side: Right  -MW Location: leg  -MW Primary Wound Type: Other  -MW Additional Comments: etiology unclear   -MW              Wound 07/08/21 1000 Left lateral leg Venous Ulcer    Wound - Properties Group Placement Date: 07/08/21  -MC Placement Time: 1000  -MC Present  on Hospital Admission: Y  - Side: Left  - Orientation: lateral  - Location: leg  - Primary Wound Type: Venous ulcer  -      Wound Image  View All Images View Images  -MP      Dressing Appearance intact; moist drainage  -MP      Base dry; moist; pink; epithelialization  pinpoint open areas remaining  -MP      Periwound intact; dry; redness  -MP      Periwound Temperature warm  -MP      Periwound Skin Turgor soft  -MP      Edges irregular; open  -MP      Wound Length (cm) 0.1 cm  scattered  -MP      Wound Width (cm) 0.1 cm  -MP      Wound Depth (cm) 0.1 cm  -MP      Drainage Characteristics/Odor serosanguineous  -MP      Drainage Amount scant  -MP      Care, Wound cleansed with; debrided  -MP      Dressing Care dressing applied; silver impregnated; foam; other (see comments)  Mepilex Ag, 1'' conform, MLW  -MP      Periwound Care cleansed with pH balanced cleanser; barrier ointment applied; other (see comments)  z guard  -MP      Retired Wound - Properties Group Date first assessed: 07/08/21  [facility] Time first assessed: [facility]  - Present on Hospital Admission: Y  - Side: Left  - Location: leg  - Primary Wound Type: Venous ulcer  -            User Key  (r) = Recorded By, (t) = Taken By, (c) = Cosigned By    Initials Name Provider Type    Becca Head, PT Physical Therapist    MC Gillian Degroot, PT Physical Therapist    MP Alvin Moore, PT Physical Therapist               Lymphedema     Row Name 11/12/21 5930             Lymphedema Edema Assessment    Ptting Edema Category By severity  -MP      Pitting Edema Mild  -MP              Skin Changes/Observations    Lower Extremity Conditions bilateral:; clean; dry; left:; inflamed  -MP      Lower Extremity Color/Pigment bilateral:; hyperpigmented  -MP              Lymphedema Pulses/Capillary Refill    Lower Extremity Capillary Refill right:; left:; less than 3 seconds  -MP              Compression/Skin Care    Compression/Skin Care skin care;  "wrapping location; bandaging  -MP      Skin Care washed/dried; lotion applied  -MP      Wrapping Location lower extremity  -MP      Wrapping Location LE bilateral:; foot to knee  -MP      Wrapping Comments wound dressings, size 4 compressogrip doubled and overlapping distally for gradient compression  -MP      Bandage Layers cotton elastic stocking- double layer (comment size)  -MP            User Key  (r) = Recorded By, (t) = Taken By, (c) = Cosigned By    Initials Name Provider Type    Alvin Christy, PT Physical Therapist                WOUND DEBRIDEMENT  Total area of Debridement: 1 cm2  Debridement Site 1  Location- Site 1: LLE  Selective Debridement- Site 1: Wound Surface <20cmsq  Instruments- Site 1: tweezers  Excised Tissue Description- Site 1: minimum, other (comment) (crusts)  Bleeding- Site 1: none              Therapy Education     Row Name 11/12/21 1430             Therapy Education    Education Details Instructed pt to call back to reschedule follow up appt if needed due to pt wanting to cxl next tx.  -MP      Given Symptoms/condition management; Bandaging/dressing change; Edema management  -MP      Program Reinforced  -MP      How Provided Verbal; Demonstration  -MP      Provided to Patient  -MP      Level of Understanding Verbalized  -MP            User Key  (r) = Recorded By, (t) = Taken By, (c) = Cosigned By    Initials Name Provider Type    Alvin Christy, PT Physical Therapist                Recommendation and Plan   PT Assessment/Plan     Row Name 11/12/21 1430          PT Assessment    Functional Limitations Other (comment)  wound mgmt  -MP     Impairments Integumentary integrity; Edema  -MP     Assessment Comments B LE wounds measured stable with little to no change to R medial LE wound hypergranulation after mutliple uses of Ag nitrate. New epithelial tissue noted to L lateral LE wound. Of note, pt reports she will be seeing dermatologist and \"wound surgeon\" (pt unable to elaborate " further) within the next two weeks. Pt states she would like to discuss POC with surgeon before returning for additional tx. Patient would continue to benefit from skilled PT wound care to promote increased wound healing potential.  -MP     Rehab Potential Fair  -MP     Patient/caregiver participated in establishment of treatment plan and goals Yes  -MP     Patient would benefit from skilled therapy intervention Yes  -MP            PT Plan    PT Frequency 1x/week  -MP     Physical Therapy Interventions (Optional Details) patient/family education; wound care  -MP     PT Plan Comments debridement, MLW  -MP           User Key  (r) = Recorded By, (t) = Taken By, (c) = Cosigned By    Initials Name Provider Type    Alvin Christy, PT Physical Therapist                Goals   PT OP Goals     Row Name 11/12/21 1510          Time Calculation    PT Goal Re-Cert Due Date 12/20/21  -MP           User Key  (r) = Recorded By, (t) = Taken By, (c) = Cosigned By    Initials Name Provider Type    Alvin Christy, PT Physical Therapist                PT Goal Re-Cert Due Date: 12/20/21            Time Calculation: Start Time: 1430  Untimed Charges  84543-[place] comp below knee: 10  75214-[place] debridement: 10  Total Minutes  Untimed Charges Total Minutes: 20   Total Minutes: 20  Therapy Charges for Today     Code Description Service Date Service Provider Modifiers Qty    36278270985 HC PT MULTI LAYER COMP SYS BELOW KNEE 11/12/2021 Alvin Moore, PT GP 1    38315126749 HC JUSTIN DEBRIDE OPEN WOUND UP TO 20CM 11/12/2021 Alvin Moore, PT GP 1                  Alvin Moore PT  11/12/2021

## 2021-11-19 ENCOUNTER — APPOINTMENT (OUTPATIENT)
Dept: PHYSICAL THERAPY | Facility: HOSPITAL | Age: 81
End: 2021-11-19

## 2021-12-09 RX ORDER — LOVASTATIN 10 MG/1
TABLET ORAL
Qty: 90 TABLET | Refills: 3 | Status: SHIPPED | OUTPATIENT
Start: 2021-12-09 | End: 2022-11-28

## 2021-12-09 NOTE — TELEPHONE ENCOUNTER
Rx Refill Note  Requested Prescriptions     Pending Prescriptions Disp Refills   • lovastatin (MEVACOR) 10 MG tablet [Pharmacy Med Name: LOVASTATIN TAB 10MG] 90 tablet 3     Sig: TAKE 1 TABLET EVERY NIGHT      Last office visit with prescribing clinician: 10/20/2021      Next office visit with prescribing clinician: 1/24/2022            DESIRAE JAMISON MA  12/09/21, 08:38 EST

## 2021-12-22 RX ORDER — FUROSEMIDE 20 MG/1
TABLET ORAL
Qty: 180 TABLET | Refills: 3 | Status: SHIPPED | OUTPATIENT
Start: 2021-12-22 | End: 2022-11-28

## 2021-12-30 ENCOUNTER — DOCUMENTATION (OUTPATIENT)
Dept: PHYSICAL THERAPY | Facility: HOSPITAL | Age: 81
End: 2021-12-30

## 2021-12-30 DIAGNOSIS — S81.801D OPEN WOUND OF RIGHT LOWER EXTREMITY, SUBSEQUENT ENCOUNTER: ICD-10-CM

## 2021-12-30 DIAGNOSIS — R60.0 BILATERAL LOWER EXTREMITY EDEMA: ICD-10-CM

## 2021-12-30 DIAGNOSIS — S81.802D MULTIPLE OPEN WOUNDS OF LEFT LOWER EXTREMITY, SUBSEQUENT ENCOUNTER: Primary | ICD-10-CM

## 2022-01-20 LAB
ALBUMIN SERPL-MCNC: 4 G/DL (ref 3.5–5.2)
ALBUMIN/GLOB SERPL: 1.2 G/DL
ALP SERPL-CCNC: 68 U/L (ref 39–117)
ALT SERPL-CCNC: 18 U/L (ref 1–33)
AST SERPL-CCNC: 22 U/L (ref 1–32)
BASOPHILS # BLD AUTO: 0.02 10*3/MM3 (ref 0–0.2)
BASOPHILS NFR BLD AUTO: 0.3 % (ref 0–1.5)
BILIRUB SERPL-MCNC: 0.5 MG/DL (ref 0–1.2)
BUN SERPL-MCNC: 26 MG/DL (ref 8–23)
BUN/CREAT SERPL: 23.6 (ref 7–25)
CALCIUM SERPL-MCNC: 9.4 MG/DL (ref 8.6–10.5)
CHLORIDE SERPL-SCNC: 102 MMOL/L (ref 98–107)
CHOLEST SERPL-MCNC: 227 MG/DL (ref 0–200)
CO2 SERPL-SCNC: 27.4 MMOL/L (ref 22–29)
CREAT SERPL-MCNC: 1.1 MG/DL (ref 0.57–1)
EOSINOPHIL # BLD AUTO: 0.1 10*3/MM3 (ref 0–0.4)
EOSINOPHIL NFR BLD AUTO: 1.5 % (ref 0.3–6.2)
ERYTHROCYTE [DISTWIDTH] IN BLOOD BY AUTOMATED COUNT: 13.8 % (ref 12.3–15.4)
GLOBULIN SER CALC-MCNC: 3.4 GM/DL
GLUCOSE SERPL-MCNC: 152 MG/DL (ref 65–99)
HBA1C MFR BLD: 6.7 % (ref 4.8–5.6)
HCT VFR BLD AUTO: 40.5 % (ref 34–46.6)
HDLC SERPL-MCNC: 62 MG/DL (ref 40–60)
HGB BLD-MCNC: 13.1 G/DL (ref 12–15.9)
IMM GRANULOCYTES # BLD AUTO: 0.01 10*3/MM3 (ref 0–0.05)
IMM GRANULOCYTES NFR BLD AUTO: 0.2 % (ref 0–0.5)
LDLC SERPL CALC-MCNC: 147 MG/DL (ref 0–100)
LYMPHOCYTES # BLD AUTO: 2.29 10*3/MM3 (ref 0.7–3.1)
LYMPHOCYTES NFR BLD AUTO: 34.9 % (ref 19.6–45.3)
MCH RBC QN AUTO: 28.6 PG (ref 26.6–33)
MCHC RBC AUTO-ENTMCNC: 32.3 G/DL (ref 31.5–35.7)
MCV RBC AUTO: 88.4 FL (ref 79–97)
MONOCYTES # BLD AUTO: 0.48 10*3/MM3 (ref 0.1–0.9)
MONOCYTES NFR BLD AUTO: 7.3 % (ref 5–12)
NEUTROPHILS # BLD AUTO: 3.66 10*3/MM3 (ref 1.7–7)
NEUTROPHILS NFR BLD AUTO: 55.8 % (ref 42.7–76)
NRBC BLD AUTO-RTO: 0 /100 WBC (ref 0–0.2)
PLATELET # BLD AUTO: 231 10*3/MM3 (ref 140–450)
POTASSIUM SERPL-SCNC: 5 MMOL/L (ref 3.5–5.2)
PROT SERPL-MCNC: 7.4 G/DL (ref 6–8.5)
RBC # BLD AUTO: 4.58 10*6/MM3 (ref 3.77–5.28)
SODIUM SERPL-SCNC: 139 MMOL/L (ref 136–145)
T4 FREE SERPL-MCNC: 1.09 NG/DL (ref 0.93–1.7)
TRIGL SERPL-MCNC: 102 MG/DL (ref 0–150)
TSH SERPL DL<=0.005 MIU/L-ACNC: 1.92 UIU/ML (ref 0.27–4.2)
VIT B12 SERPL-MCNC: 676 PG/ML (ref 211–946)
VLDLC SERPL CALC-MCNC: 18 MG/DL (ref 5–40)
WBC # BLD AUTO: 6.56 10*3/MM3 (ref 3.4–10.8)

## 2022-01-24 ENCOUNTER — OFFICE VISIT (OUTPATIENT)
Dept: INTERNAL MEDICINE | Facility: CLINIC | Age: 82
End: 2022-01-24

## 2022-01-24 VITALS
HEART RATE: 61 BPM | DIASTOLIC BLOOD PRESSURE: 68 MMHG | BODY MASS INDEX: 29.51 KG/M2 | OXYGEN SATURATION: 98 % | RESPIRATION RATE: 16 BRPM | SYSTOLIC BLOOD PRESSURE: 110 MMHG | HEIGHT: 67 IN | WEIGHT: 188 LBS | TEMPERATURE: 98.4 F

## 2022-01-24 DIAGNOSIS — E78.2 MIXED HYPERLIPIDEMIA: Primary | ICD-10-CM

## 2022-01-24 DIAGNOSIS — E11.9 TYPE 2 DIABETES MELLITUS WITHOUT COMPLICATION, WITHOUT LONG-TERM CURRENT USE OF INSULIN: ICD-10-CM

## 2022-01-24 PROCEDURE — 99214 OFFICE O/P EST MOD 30 MIN: CPT | Performed by: INTERNAL MEDICINE

## 2022-01-24 NOTE — PROGRESS NOTES
Subjective     Patient ID: Radha Whelan is a 81 y.o. female. Patient is here for management of multiple medical problems.     Chief Complaint   Patient presents with   • Diabetes     History of Present Illness       DM much improved.      The following portions of the patient's history were reviewed and updated as appropriate: allergies, current medications, past family history, past medical history, past social history, past surgical history and problem list.    Review of Systems   Constitutional: Negative for fatigue.   Psychiatric/Behavioral: Negative for sleep disturbance.   All other systems reviewed and are negative.      Current Outpatient Medications:   •  acetaminophen (TYLENOL) 325 MG tablet, Take 2 tablets by mouth Every 6 (Six) Hours As Needed for Mild Pain ., Disp: , Rfl:   •  albuterol sulfate  (90 Base) MCG/ACT inhaler, Inhale 2 puffs Every 4 (Four) Hours As Needed for Wheezing or Shortness of Air., Disp: 6.7 g, Rfl: 0  •  aspirin 81 MG chewable tablet, Chew 81 mg Daily., Disp: , Rfl:   •  Blood Glucose Monitoring Suppl (ONE TOUCH ULTRA 2) w/Device kit, USE AS NEEDED FOR BLOOD    SUGAR MONITORING, Disp: 1 each, Rfl: 0  •  Cholecalciferol (VITAMIN D PO), Take 125 mcg by mouth 2 (Two) Times a Week., Disp: , Rfl:   •  clopidogrel (PLAVIX) 75 MG tablet, TAKE 1 TABLET DAILY, Disp: 90 tablet, Rfl: 3  •  coenzyme Q10 100 MG capsule, Take 400 mg by mouth Every Other Day., Disp: , Rfl:   •  folic acid (FOLVITE) 1 MG tablet, Take 1 tablet by mouth Daily., Disp: 90 tablet, Rfl: 3  •  furosemide (LASIX) 20 MG tablet, TAKE 1 TABLET TWICE A DAY, Disp: 180 tablet, Rfl: 3  •  glipizide (glipiZIDE XL) 2.5 MG 24 hr tablet, Take 1 tablet by mouth Daily., Disp: 90 tablet, Rfl: 3  •  Glucosamine-Chondroit-Vit C-Mn (GLUCOSAMINE CHONDR 1500 COMPLX PO), Take 1 tablet by mouth Daily., Disp: , Rfl:   •  glucose blood (Accu-Chek Kayce Plus) test strip, TEST ONCE DAILY., Disp: 100 each, Rfl: 3  •  glucose monitor monitoring  "kit, 1 each As Needed (blood sugar)., Disp: 1 each, Rfl: 1  •  Lancets (accu-chek soft touch) lancets, Use as directed, Disp: 100 each, Rfl: 12  •  latanoprost (XALATAN) 0.005 % ophthalmic solution, PLACE 1 DROP IN EACH EYE EVERY DAY AT BEDTIME, Disp: , Rfl:   •  lisinopril (PRINIVIL,ZESTRIL) 40 MG tablet, TAKE 1 TABLET DAILY, Disp: 90 tablet, Rfl: 3  •  lovastatin (MEVACOR) 10 MG tablet, TAKE 1 TABLET EVERY NIGHT, Disp: 90 tablet, Rfl: 3  •  melatonin 3 MG tablet, Take 3 mg by mouth Every Night., Disp: , Rfl:   •  pantoprazole (PROTONIX) 40 MG EC tablet, TAKE 1 TABLET DAILY, Disp: 90 tablet, Rfl: 3  •  polyethylene glycol (MIRALAX) pack packet, Take 17 g by mouth Daily., Disp: , Rfl:   •  predniSONE (DELTASONE) 20 MG tablet, Take 1 tablet by mouth Daily., Disp: 10 tablet, Rfl: 0  •  tobramycin-dexamethasone (TOBRADEX) 0.3-0.1 % ophthalmic suspension, Administer 1 drop into the left eye Daily. (Patient taking differently: Administer 1 drop to both eyes As Needed.), Disp: 5 mL, Rfl: 2  •  vitamin B-12 (CYANOCOBALAMIN) 1000 MCG tablet, Take 1,000 mcg by mouth 2 (Two) Times a Week., Disp: , Rfl:     Objective      Blood pressure 110/68, pulse 61, temperature 98.4 °F (36.9 °C), resp. rate 16, height 170.2 cm (67\"), weight 85.3 kg (188 lb), SpO2 98 %.    Physical Exam     General Appearance:    Alert, cooperative, no distress, appears stated age   Head:    Normocephalic, without obvious abnormality, atraumatic   Eyes:    PERRL, conjunctiva/corneas clear, EOM's intact   Ears:    Normal TM's and external ear canals, both ears   Nose:   Nares normal, septum midline, mucosa normal, no drainage   or sinus tenderness   Throat:   Lips, mucosa, and tongue normal; teeth and gums normal   Neck:   Supple, symmetrical, trachea midline, no adenopathy;        thyroid:  No enlargement/tenderness/nodules; no carotid    bruit or JVD   Back:     Symmetric, no curvature, ROM normal, no CVA tenderness   Lungs:     Clear to auscultation " bilaterally, respirations unlabored   Chest wall:    No tenderness or deformity   Heart:    Regular rate and rhythm, S1 and S2 normal, no murmur,        rub or gallop   Abdomen:     Soft, non-tender, bowel sounds active all four quadrants,     no masses, no organomegaly   Extremities:   Extremities normal, atraumatic, no cyanosis or edema   Pulses:   2+ and symmetric all extremities   Skin:   Skin color, texture, turgor normal, no rashes or lesions   Lymph nodes:   Cervical, supraclavicular, and axillary nodes normal   Neurologic:   CNII-XII intact. Normal strength, sensation and reflexes       throughout      Results for orders placed or performed in visit on 10/20/21   Lipid Panel    Specimen: Blood   Result Value Ref Range    Total Cholesterol 227 (H) 0 - 200 mg/dL    Triglycerides 102 0 - 150 mg/dL    HDL Cholesterol 62 (H) 40 - 60 mg/dL    VLDL Cholesterol Kb 18 5 - 40 mg/dL    LDL Chol Calc (NIH) 147 (H) 0 - 100 mg/dL   Vitamin B12    Specimen: Blood   Result Value Ref Range    Vitamin B-12 676 211 - 946 pg/mL   Comprehensive Metabolic Panel    Specimen: Blood   Result Value Ref Range    Glucose 152 (H) 65 - 99 mg/dL    BUN 26 (H) 8 - 23 mg/dL    Creatinine 1.10 (H) 0.57 - 1.00 mg/dL    eGFR Non African Am 48 (L) >60 mL/min/1.73    eGFR African Am 58 (L) >60 mL/min/1.73    BUN/Creatinine Ratio 23.6 7.0 - 25.0    Sodium 139 136 - 145 mmol/L    Potassium 5.0 3.5 - 5.2 mmol/L    Chloride 102 98 - 107 mmol/L    Total CO2 27.4 22.0 - 29.0 mmol/L    Calcium 9.4 8.6 - 10.5 mg/dL    Total Protein 7.4 6.0 - 8.5 g/dL    Albumin 4.00 3.50 - 5.20 g/dL    Globulin 3.4 gm/dL    A/G Ratio 1.2 g/dL    Total Bilirubin 0.5 0.0 - 1.2 mg/dL    Alkaline Phosphatase 68 39 - 117 U/L    AST (SGOT) 22 1 - 32 U/L    ALT (SGPT) 18 1 - 33 U/L   T4, Free    Specimen: Blood   Result Value Ref Range    Free T4 1.09 0.93 - 1.70 ng/dL   TSH    Specimen: Blood   Result Value Ref Range    TSH 1.920 0.270 - 4.200 uIU/mL   Hemoglobin A1c     Specimen: Blood   Result Value Ref Range    Hemoglobin A1C 6.70 (H) 4.80 - 5.60 %   POCT glycated hemoglobin, total    Specimen: Urine   Result Value Ref Range    Hemoglobin A1C 8.1 %   CBC & Differential    Specimen: Blood   Result Value Ref Range    WBC 6.56 3.40 - 10.80 10*3/mm3    RBC 4.58 3.77 - 5.28 10*6/mm3    Hemoglobin 13.1 12.0 - 15.9 g/dL    Hematocrit 40.5 34.0 - 46.6 %    MCV 88.4 79.0 - 97.0 fL    MCH 28.6 26.6 - 33.0 pg    MCHC 32.3 31.5 - 35.7 g/dL    RDW 13.8 12.3 - 15.4 %    Platelets 231 140 - 450 10*3/mm3    Neutrophil Rel % 55.8 42.7 - 76.0 %    Lymphocyte Rel % 34.9 19.6 - 45.3 %    Monocyte Rel % 7.3 5.0 - 12.0 %    Eosinophil Rel % 1.5 0.3 - 6.2 %    Basophil Rel % 0.3 0.0 - 1.5 %    Neutrophils Absolute 3.66 1.70 - 7.00 10*3/mm3    Lymphocytes Absolute 2.29 0.70 - 3.10 10*3/mm3    Monocytes Absolute 0.48 0.10 - 0.90 10*3/mm3    Eosinophils Absolute 0.10 0.00 - 0.40 10*3/mm3    Basophils Absolute 0.02 0.00 - 0.20 10*3/mm3    Immature Granulocyte Rel % 0.2 0.0 - 0.5 %    Immature Grans Absolute 0.01 0.00 - 0.05 10*3/mm3    nRBC 0.0 0.0 - 0.2 /100 WBC         Assessment/Plan     DM improved.     Will hold on FA replacement.   Not sure why.        Diagnoses and all orders for this visit:    1. Mixed hyperlipidemia (Primary)  -     CBC & Differential  -     Lipid Panel  -     Vitamin B12  -     TSH  -     T4, Free  -     Comprehensive Metabolic Panel  -     Hemoglobin A1c  -     Folate  -     Vitamin B6    2. Type 2 diabetes mellitus without complication, without long-term current use of insulin (HCC)  -     CBC & Differential  -     Lipid Panel  -     Vitamin B12  -     TSH  -     T4, Free  -     Comprehensive Metabolic Panel  -     Hemoglobin A1c  -     Folate  -     Vitamin B6      Return in about 6 months (around 7/24/2022).          There are no Patient Instructions on file for this visit.     Farhan Schaefer MD    Assessment/Plan

## 2022-02-21 ENCOUNTER — HOSPITAL ENCOUNTER (EMERGENCY)
Facility: HOSPITAL | Age: 82
Discharge: HOME | End: 2022-02-21
Attending: EMERGENCY MEDICINE
Payer: MEDICARE

## 2022-02-21 VITALS
TEMPERATURE: 97.8 F | OXYGEN SATURATION: 99 % | DIASTOLIC BLOOD PRESSURE: 67 MMHG | SYSTOLIC BLOOD PRESSURE: 145 MMHG | HEART RATE: 63 BPM

## 2022-02-21 DIAGNOSIS — T81.9XXA COMPLICATION OF PROCEDURE, INITIAL ENCOUNTER: Primary | ICD-10-CM

## 2022-02-21 PROCEDURE — 99282 EMERGENCY DEPT VISIT SF MDM: CPT

## 2022-02-21 ASSESSMENT — PAIN DESCRIPTION - FREQUENCY: FREQUENCY: CONTINUOUS

## 2022-02-21 ASSESSMENT — PAIN DESCRIPTION - LOCATION: LOCATION: LEG

## 2022-02-21 ASSESSMENT — PAIN SCALES - GENERAL: PAINLEVEL_OUTOF10: 3

## 2022-02-21 ASSESSMENT — PAIN DESCRIPTION - ORIENTATION: ORIENTATION: LEFT;LOWER

## 2022-02-21 ASSESSMENT — PAIN DESCRIPTION - PAIN TYPE: TYPE: ACUTE PAIN

## 2022-02-22 NOTE — ED PROVIDER NOTES
[address] [place] [address] Court  eMERGENCY dEPARTMENT eNCOUnter      Pt Name: Luis Guerrero  MRN: [id number]  [place]: 1940  Date ofevaluation: 3/47/3564  Provider: Bina Travis MD    CHIEF COMPLAINT       Chief Complaint   Patient presents with    Wound Check         HISTORY OF PRESENT ILLNESS  (Location/Symptom, Timing/Onset, Context/Setting, Quality, Duration, Modifying Factors, Severity.)   Luis Guerrero is a 79 y.o. female who presents to the emergency department with bleeding from an excisional biopsy/lesion removal by Dr. Ruben Shahid in [place] earlier today. Patient states it started bleeding a couple of hours ago. She has been unable to get the bleeding to stop and it has bled through dressings. She is concerned. Patient is normally on Plavix and aspirin but has not been taking those in anticipation of this procedure. She denies any pain. Nursing notes were reviewed. REVIEW OF SYSTEMS    (2-9systems for level 4, 10 or more for level 5)   ROS:  General:  No fevers, no chills, no weakness  HEENT: No sore throat, runny nose or ear pain  Cardiovascular:  No chest pain, no palpitations  Respiratory:  No shortness of breath, no cough, no wheezing  Gastrointestinal:  No pain, no nausea, no vomiting, no diarrhea  Musculoskeletal:  No muscle pain, no joint pain  Skin: + Bleeding wound. No rash, no easy bruising  Genitourinary:  No dysuria, no hematuria    Except as noted above theremainder of the review of systems was reviewed and negative.        PASTMEDICAL HISTORY     Past Medical History:   Diagnosis Date    Bone spur 2012    right foot    Carcinoma nos     basal cell, skin ca    CVA (cerebral infarction)     Diabetes mellitus type 2, controlled (HCC)     Hyperlipidemia     Hypertension     Hyponatremia     LBBB (left bundle branch block)     PVD [facility]peripheral vascular disease) ([facility])     Restless legs syndrome     Type II or unspecified type diabetes mellitus without mention of complication, not stated as uncontrolled          SURGICAL HISTORY       Past Surgical History:   Procedure Laterality Date    BREAST SURGERY      benign mass    MALIGNANT SKIN LESION EXCISION      PACEMAKER INSERTION      TUBAL LIGATION           CURRENT MEDICATIONS       Previous Medications    ACETAMINOPHEN (TYLENOL) 325 MG TABLET    Take 650 mg by mouth every 6 hours as needed for Pain    CHOLECALCIFEROL (VITAMIN D3) 125 MCG (5000 UT) TABS    Take 1 tablet by mouth Two times weekly    COENZYME Q10 100 MG CAPS CAPSULE    Take 400 mg by mouth every other day    DOCUSATE SODIUM (COLACE) 100 MG CAPSULE    Take 1 capsule by mouth 2 times daily as needed for Constipation    FOLIC ACID (FOLVITE) 1 MG TABLET    Take 1 tablet by mouth daily    FUROSEMIDE (LASIX) 20 MG TABLET    Take 1 tablet by mouth daily    GLUCOSAMINE-CHONDROITIN (GLUCOSAMINE CHONDR COMPLEX PO)    Take 1 tablet by mouth daily     LINACLOTIDE (LINZESS) 145 MCG CAPSULE    Take 1 capsule by mouth every morning (before breakfast)    LINAGLIPTIN (TRADJENTA) 5 MG TABLET    Take 1 tablet by mouth daily    LOVASTATIN (MEVACOR) 10 MG TABLET    Take 10 mg by mouth nightly    MELATONIN 3 MG TABS TABLET    Take 3 mg by mouth nightly    PANTOPRAZOLE (PROTONIX) 40 MG TABLET    Take 1 tablet by mouth daily    PLAVIX 75 MG TABLET    Take 1 tablet by mouth daily.     POLYETHYLENE GLYCOL (MIRALAX) POWDER    Take 17 g by mouth daily    VITAMIN B-12 (CYANOCOBALAMIN) 1000 MCG TABLET    Take 1,000 mcg by mouth Twice a Week       ALLERGIES     Adhesive tape, Advil [ibuprofen], Butter flavor [flavoring agent], Cheese, Cumini, Other, and Saccharin    FAMILY HISTORY       Family History   Problem Relation Age of Onset    Cancer Mother         cervical,skin    Diabetes Mother     Cancer Sister         breast,ovarian    Cancer Sister         breast          SOCIAL HISTORY       Social History     Socioeconomic History    Marital status:      Spouse name: None    reactive to light, EOMI. Cardiac: Heart regular rate, rhythm, no murmurs, rubs, or gallops  Lungs: Lungs are clear to auscultation, there is no wheezing, rhonchi, or rales. Abdomen:Abdomen is soft, nontender, nondistended. Musculoskeletal: Ambulatory  Back: No midline or bony tenderness. Dermatology: there is an elliptical incision with sutures in place from excisional biopsy; wound is actively bleeding from 2 different arterial vessels  Pressure was not controlling bleeding  Sutures will be necessary  Psych: Mentation is grossly normal, cognition is grossly normal. Affect is appropriate. DIAGNOSTIC RESULTS       RADIOLOGY:   Non-plain film images such as CT, Ultrasoundand MRI are read by the radiologist. Plain radiographic images are visualized and preliminarily interpreted by the emergency physician with the below findings:      [] Radiologist's Report Reviewed:  No orders to display         ED BEDSIDE ULTRASOUND:   Performed by ED Physician - none    LABS:  Labs Reviewed - No data to display    I have reviewed and interpreted all of the currently available lab resultsfrom this visit (if applicable):  No results found for this visit on 02/21/22. All other labs were within normal range or not returned as of this dictation. EMERGENCY DEPARTMENT COURSE and DIFFERENTIAL DIAGNOSIS/MDM:   Vitals:    Vitals:    02/21/22 1946   BP: (!) 145/67   Pulse: 63   Temp: 97.8 °F (36.6 °C)   TempSrc: Oral   SpO2: 99%           After 2 figure of 8 sutures placed and surgicell; dressing applied. Patient watched for about 30 minutes and no bleeding through the dressing. Will discharge home. The patient will follow-up with their PCP in 1-2 days for reevaluation. If the patient or family members have any further concerns or any worsening symptoms they will return to the ED for reevaluation.          CONSULTS:  None    PROCEDURES:  Procedures     2 figure-of-eight sutures using 3-0 nylon were placed for the 2 arterial bleeders. Bleeding was much more controlled although there was a small amount of oozing. Surgicel and a pressure dressing were then applied. Hemostasis achieved. CRITICAL CARE TIME    Total Critical Care time was 0 minutes, excluding separately reportable procedures. There was a high probability of clinically significant/life threatening deterioration in the patient's condition which required my urgent intervention. FINAL IMPRESSION      1. Complication of procedure, initial encounter          DISPOSITION/PLAN   DISPOSITION Discharge - Pending Orders Complete 02/21/2022 07:55:01 PM      PATIENT REFERRED TO:  Becki White MD  [facility]  [phone]    Schedule an appointment as soon as possible for a visit in 2 days  For wound re-check      DISCHARGE MEDICATIONS:  New Prescriptions    No medications on file       Comment: Please note this report has been produced using speech recognition software and may contain errors related tothat system including errors in grammar, punctuation, and spelling, as well as words and phrases that may be inappropriate. If there are any questions or concerns please feel free to contact the dictating provider forclarification.     Garry Thompson MD  Attending Emergency Physician                  Garry Thompson MD  02/21/22 [id number]

## 2022-02-22 NOTE — ED NOTES
Patient had a skin cancer removed from her left lower leg today and it won't quit bleeding.       Ginny Hale RN  02/21/22 2006

## 2022-03-14 ENCOUNTER — TRANSCRIBE ORDERS (OUTPATIENT)
Dept: ADMINISTRATIVE | Facility: HOSPITAL | Age: 82
End: 2022-03-14

## 2022-03-14 DIAGNOSIS — Z12.31 VISIT FOR SCREENING MAMMOGRAM: Primary | ICD-10-CM

## 2022-03-18 ENCOUNTER — TELEPHONE (OUTPATIENT)
Dept: CARDIOLOGY | Facility: CLINIC | Age: 82
End: 2022-03-18

## 2022-03-18 NOTE — TELEPHONE ENCOUNTER
Spoke with patient regarding her home monitor not sending in its scheduled reading. We worked together to see if we could get a reading to send through. We were unsuccessful. She told me that her cell adapter didn't have a light coming on it. I asked her if she had received a new one in the last year. She told me she hadn't. I offered to order a new one for her and she was agreeable to this. I asked her to call me when she received the new one and we'll work again to get the monitor to work.

## 2022-03-21 PROCEDURE — 93296 REM INTERROG EVL PM/IDS: CPT | Performed by: INTERNAL MEDICINE

## 2022-03-21 PROCEDURE — 93294 REM INTERROG EVL PM/LDLS PM: CPT | Performed by: INTERNAL MEDICINE

## 2022-04-04 ENCOUNTER — OFFICE VISIT (OUTPATIENT)
Dept: CARDIOLOGY | Facility: CLINIC | Age: 82
End: 2022-04-04

## 2022-04-04 VITALS
SYSTOLIC BLOOD PRESSURE: 125 MMHG | DIASTOLIC BLOOD PRESSURE: 60 MMHG | WEIGHT: 190 LBS | HEART RATE: 72 BPM | HEIGHT: 67 IN | BODY MASS INDEX: 29.82 KG/M2 | OXYGEN SATURATION: 97 %

## 2022-04-04 DIAGNOSIS — I49.5 SINUS NODE DYSFUNCTION: Primary | ICD-10-CM

## 2022-04-04 DIAGNOSIS — I50.42 CHRONIC COMBINED SYSTOLIC AND DIASTOLIC CONGESTIVE HEART FAILURE: ICD-10-CM

## 2022-04-04 DIAGNOSIS — G47.33 OSA (OBSTRUCTIVE SLEEP APNEA): ICD-10-CM

## 2022-04-04 DIAGNOSIS — I10 ESSENTIAL HYPERTENSION: ICD-10-CM

## 2022-04-04 DIAGNOSIS — Z95.0 PRESENCE OF CARDIAC PACEMAKER: ICD-10-CM

## 2022-04-04 PROBLEM — J01.00 ACUTE NON-RECURRENT MAXILLARY SINUSITIS: Status: RESOLVED | Noted: 2018-11-02 | Resolved: 2022-04-04

## 2022-04-04 PROBLEM — E87.6 HYPOKALEMIA: Status: RESOLVED | Noted: 2019-12-19 | Resolved: 2022-04-04

## 2022-04-04 PROBLEM — H57.12 LEFT EYE PAIN: Status: RESOLVED | Noted: 2018-11-02 | Resolved: 2022-04-04

## 2022-04-04 PROBLEM — I25.10 CAD (CORONARY ARTERY DISEASE): Status: ACTIVE | Noted: 2022-04-04

## 2022-04-04 PROBLEM — N30.01 ACUTE CYSTITIS WITH HEMATURIA: Status: RESOLVED | Noted: 2019-12-11 | Resolved: 2022-04-04

## 2022-04-04 PROBLEM — H04.302: Status: RESOLVED | Noted: 2018-11-02 | Resolved: 2022-04-04

## 2022-04-04 PROBLEM — I50.32 CHRONIC DIASTOLIC CONGESTIVE HEART FAILURE (HCC): Status: ACTIVE | Noted: 2019-12-11

## 2022-04-04 PROCEDURE — 93280 PM DEVICE PROGR EVAL DUAL: CPT | Performed by: PHYSICIAN ASSISTANT

## 2022-04-04 PROCEDURE — 99213 OFFICE O/P EST LOW 20 MIN: CPT | Performed by: PHYSICIAN ASSISTANT

## 2022-04-04 NOTE — PROGRESS NOTES
Encounter Date:04/04/2022      Patient ID: Radha Whelan is a 81 y.o. female.    Farhan Schaefer MD    Chief Complaint: Slow Heart Rate and Coronary Artery Disease      PROBLEM LIST:  Patient Active Problem List    Diagnosis Date Noted   • Sinus node dysfunction (HCC) 03/15/2021     Priority: High     Note Last Updated: 4/4/2022     1. 9/16/2019 24-hour Holter: Average HR 60, range 45-1 07, 1.6% PACs, 0.3% PVCs, rare SVT less than 11 beats with no symptoms  2. Recurrent Arthur Syncope x 2.  First 10/2019 while seated and second 12/5/2019 s/p Rt total hip surgery on transfer to bed.  3. 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 Laboy for sinus node dysfunction.     • Presence of cardiac pacemaker 12/11/2019     Priority: High   • CAD (coronary artery disease) 04/04/2022     Priority: Medium     Note Last Updated: 4/4/2022     1. 9/11/2019 echo: EF 42%, grade 1 diastolic dysfunction, RVSP 22  1. 9/16/2019 LHC per AA no evidence of hemodynamically significant CAD, normal EF 55%, 24-hour Holter recommended to assess for bradycardic arrhythmias     • Chronic combined systolic and diastolic congestive heart failure (HCC) 12/11/2019     Priority: Medium     Note Last Updated: 4/4/2022     · Echo 9-: Estimated EF = 42%. Left ventricular diastolic dysfunction (grade I) consistent with impaired relaxation.     • Essential hypertension 03/15/2021     Priority: Low   • ODELL (obstructive sleep apnea) 05/26/2016     Priority: Low   • Anemia 12/19/2019   • Declining functional status 12/10/2019   • Arthritis 12/04/2019   • S/P hip replacement, right 12/04/2019     Note Last Updated: 6/16/2021 12/4/19 for severe OA by Dr Salgado    Formatting of this note might be different from the original.  12/4/19 for severe OA by Dr Salgado     • Mixed hyperlipidemia 08/26/2019   • LBBB (left bundle branch block) 08/26/2019     Note Last Updated: 9/16/2019     · Noted on EKG 8/26/2019     • Temporary cerebral vascular dysfunction  "05/26/2016   • Hypervitaminosis B6 05/26/2016   • Peripheral neuropathy 05/26/2016   • Restless legs syndrome 09/29/2015   • Type 2 diabetes mellitus without complication (HCC) 02/14/2011   • Hyponatremia 02/14/2011               History of Present Illness  Patient presents today for follow-up with a history of sinus node dysfunction status post dual-chamber permanent pacemaker.  She returns today scheduled follow-up.  She has no current complaint exertional chest pain or dyspnea, she denies orthopnea, PND, claudication, lower extremity edema.  She has no awareness of tachyarrhythmias, she denies dizziness or syncope.  She states her blood pressure at home typically runs between 100 to 110 mmHg systolic.  She states she had recent basal cell carcinomas removed from both lower extremities which are healing well.  She also states CPAP compliance.  She is compliant with current medical regimen reports no significant adverse side effects.    Allergies   Allergen Reactions   • Cumini Other (See Comments)     Complex migrane  migraine   • Cheese Other (See Comments)     migrane    Migraine   • Ibuprofen Rash     Swelling all over  \"Swelling all over\"   • Other Other (See Comments)     \"ice cream\"-migranes    \"ice cream\" migraines   • Saccharin Other (See Comments)     migrane    Migraine       Current Outpatient Medications   Medication Instructions   • acetaminophen (TYLENOL) 650 mg, Oral, Every 6 Hours PRN   • aspirin 81 mg, Oral, Daily   • Blood Glucose Monitoring Suppl (ONE TOUCH ULTRA 2) w/Device kit USE AS NEEDED FOR BLOOD    SUGAR MONITORING   • Cholecalciferol (VITAMIN D PO) 125 mcg, Oral, 2 Times Weekly   • clopidogrel (PLAVIX) 75 MG tablet TAKE 1 TABLET DAILY   • coenzyme Q10 400 mg, Oral, Every Other Day   • folic acid (FOLVITE) 1,000 mcg, Oral, Daily   • furosemide (LASIX) 20 MG tablet TAKE 1 TABLET TWICE A DAY   • glipizide (GLIPIZIDE XL) 2.5 mg, Oral, Daily   • Glucosamine-Chondroit-Vit C-Mn (GLUCOSAMINE " "CHONDR 1500 COMPLX PO) 1 tablet, Oral, Daily   • glucose blood (Accu-Chek Kayce Plus) test strip TEST ONCE DAILY.   • glucose monitor monitoring kit 1 each, Does not apply, As Needed   • Lancets (accu-chek soft touch) lancets Use as directed   • latanoprost (XALATAN) 0.005 % ophthalmic solution PLACE 1 DROP IN EACH EYE EVERY DAY AT BEDTIME   • lisinopril (PRINIVIL,ZESTRIL) 40 MG tablet TAKE 1 TABLET DAILY   • lovastatin (MEVACOR) 10 MG tablet TAKE 1 TABLET EVERY NIGHT   • melatonin 3 mg, Oral, Nightly   • pantoprazole (PROTONIX) 40 MG EC tablet TAKE 1 TABLET DAILY   • polyethylene glycol (MIRALAX) pack packet 17 g, Oral, Daily   • predniSONE (DELTASONE) 20 mg, Oral, Daily   • tobramycin-dexamethasone (TOBRADEX) 0.3-0.1 % ophthalmic suspension 1 drop, Left Eye, Daily   • vitamin B-12 (CYANOCOBALAMIN) 1,000 mcg, Oral, 2 Times Weekly       .    Objective:     /60 (BP Location: Left arm, Patient Position: Sitting)   Pulse 72   Ht 170.2 cm (67\")   Wt 86.2 kg (190 lb)   SpO2 97%   BMI 29.76 kg/m²    Body mass index is 29.76 kg/m².     Constitutional:       Appearance: Well-developed.   Pulmonary:      Effort: Pulmonary effort is normal. No respiratory distress.      Breath sounds: Normal breath sounds. No wheezing. No rales.      Comments: Bases clear  Chest:      Chest wall: Not tender to palpatation.   Cardiovascular:      Normal rate. Regular rhythm.      Murmurs: There is no murmur.      No gallop. No click. No rub.   Pulses:     Intact distal pulses.   Edema:     Peripheral edema absent.   Musculoskeletal: Normal range of motion.       Lab Review:                 TSH    TSH 6/10/21 10/19/21 1/19/22   TSH 2.490 2.080 1.920                   Procedures               Assessment:      Diagnosis Plan   1. Sinus node dysfunction (HCC)   normal dual-chamber permanent pacemaker function, DDDR, rate 60.  8 years battery life.  Underlying rhythm sinus rhythm   2. Chronic combined systolic and diastolic congestive " heart failure (HCC)   stable, no current heart failure physical exam findings or symptoms noted.  I would defer repeat echocardiogram given lack of symptoms.   3. Essential hypertension   well managed, continue current medical regimen   4. Presence of cardiac pacemaker    This patient's Cardiac Implanted Electronic Device was manually interrogated and reprogrammed during the patient encounter today.  Iterative programming changes were manually made to determine the sensing threshold, pacing threshold, lead impedance as well as underlying cardiac rhythm.  These programming changes were not limited to but included some or all of the following when appropriate: pacing mode, programmed AV delays, blanking periods, and refractory periods.  Data obtained as a result of these manual programing changes informed the patient's CIED permanent programming.       5. ODELL (obstructive sleep apnea)   CPAP compliant     Plan:     Stable cardiac status.  Continue current medications.   in 6 months, sooner as needed.  Thank you for allowing us to participate in the care of your patient.     Electronically signed by PATIENCE Jack, 04/04/22, 11:28 AM EDT.

## 2022-05-10 ENCOUNTER — HOSPITAL ENCOUNTER (OUTPATIENT)
Dept: MAMMOGRAPHY | Facility: HOSPITAL | Age: 82
End: 2022-05-10

## 2022-06-14 ENCOUNTER — HOSPITAL ENCOUNTER (OUTPATIENT)
Dept: MAMMOGRAPHY | Facility: HOSPITAL | Age: 82
Discharge: HOME | End: 2022-06-14
Admitting: INTERNAL MEDICINE

## 2022-06-14 DIAGNOSIS — Z12.31 VISIT FOR SCREENING MAMMOGRAM: ICD-10-CM

## 2022-06-14 PROCEDURE — 77067 SCR MAMMO BI INCL CAD: CPT

## 2022-06-14 PROCEDURE — 77063 BREAST TOMOSYNTHESIS BI: CPT

## 2022-06-14 PROCEDURE — 77063 BREAST TOMOSYNTHESIS BI: CPT | Performed by: RADIOLOGY

## 2022-06-14 PROCEDURE — 77067 SCR MAMMO BI INCL CAD: CPT | Performed by: RADIOLOGY

## 2022-07-25 ENCOUNTER — OFFICE VISIT (OUTPATIENT)
Dept: INTERNAL MEDICINE | Facility: CLINIC | Age: 82
End: 2022-07-25

## 2022-07-25 VITALS
DIASTOLIC BLOOD PRESSURE: 76 MMHG | BODY MASS INDEX: 29.98 KG/M2 | RESPIRATION RATE: 16 BRPM | OXYGEN SATURATION: 98 % | HEART RATE: 79 BPM | SYSTOLIC BLOOD PRESSURE: 122 MMHG | HEIGHT: 67 IN | TEMPERATURE: 98 F | WEIGHT: 191 LBS

## 2022-07-25 DIAGNOSIS — E11.9 TYPE 2 DIABETES MELLITUS WITHOUT COMPLICATION, WITHOUT LONG-TERM CURRENT USE OF INSULIN: ICD-10-CM

## 2022-07-25 DIAGNOSIS — M67.449 GANGLION CYST OF JOINT OF FINGER: Primary | ICD-10-CM

## 2022-07-25 DIAGNOSIS — Z00.00 ROUTINE GENERAL MEDICAL EXAMINATION AT A HEALTH CARE FACILITY: ICD-10-CM

## 2022-07-25 DIAGNOSIS — Z78.0 POSTMENOPAUSAL: ICD-10-CM

## 2022-07-25 PROCEDURE — 1159F MED LIST DOCD IN RCRD: CPT | Performed by: INTERNAL MEDICINE

## 2022-07-25 PROCEDURE — 1170F FXNL STATUS ASSESSED: CPT | Performed by: INTERNAL MEDICINE

## 2022-07-25 PROCEDURE — G0439 PPPS, SUBSEQ VISIT: HCPCS | Performed by: INTERNAL MEDICINE

## 2022-07-25 RX ORDER — MULTIVITAMIN WITH IRON
1 TABLET ORAL EVERY OTHER DAY
Qty: 90 TABLET | Refills: 3 | Status: SHIPPED | OUTPATIENT
Start: 2022-07-25 | End: 2023-03-28 | Stop reason: HOSPADM

## 2022-07-25 NOTE — PROGRESS NOTES
QUICK REFERENCE INFORMATION:  The ABCs of the Annual Wellness Visit    Medicare Annual Wellness Visit    Subjective   History of Present Illness    Radha Whelan is a 81 y.o. female who presents for an Annual Wellness Visit. In addition, we addressed the following health issues:    Chronic pain: 0/10        PMH, PSH, SocHx, FamHx, Allergies, and Medications: Reviewed and updated.     Outpatient Medications Prior to Visit   Medication Sig Dispense Refill   • acetaminophen (TYLENOL) 325 MG tablet Take 2 tablets by mouth Every 6 (Six) Hours As Needed for Mild Pain .     • aspirin 81 MG chewable tablet Chew 81 mg Daily.     • Blood Glucose Monitoring Suppl (ONE TOUCH ULTRA 2) w/Device kit USE AS NEEDED FOR BLOOD    SUGAR MONITORING 1 each 0   • Cholecalciferol (VITAMIN D PO) Take 125 mcg by mouth 2 (Two) Times a Week.     • clopidogrel (PLAVIX) 75 MG tablet TAKE 1 TABLET DAILY 90 tablet 3   • coenzyme Q10 100 MG capsule Take 400 mg by mouth Every Other Day.     • folic acid (FOLVITE) 1 MG tablet Take 1 tablet by mouth Daily. 90 tablet 3   • furosemide (LASIX) 20 MG tablet TAKE 1 TABLET TWICE A  tablet 3   • glipizide (glipiZIDE XL) 2.5 MG 24 hr tablet Take 1 tablet by mouth Daily. 90 tablet 3   • Glucosamine-Chondroit-Vit C-Mn (GLUCOSAMINE CHONDR 1500 COMPLX PO) Take 1 tablet by mouth Daily.     • glucose blood (Accu-Chek Kayce Plus) test strip TEST ONCE DAILY. 100 each 3   • glucose monitor monitoring kit 1 each As Needed (blood sugar). 1 each 1   • Lancets (accu-chek soft touch) lancets Use as directed 100 each 12   • latanoprost (XALATAN) 0.005 % ophthalmic solution PLACE 1 DROP IN EACH EYE EVERY DAY AT BEDTIME     • lisinopril (PRINIVIL,ZESTRIL) 40 MG tablet TAKE 1 TABLET DAILY 90 tablet 3   • lovastatin (MEVACOR) 10 MG tablet TAKE 1 TABLET EVERY NIGHT 90 tablet 3   • melatonin 3 MG tablet Take 3 mg by mouth Every Night.     • pantoprazole (PROTONIX) 40 MG EC tablet TAKE 1 TABLET DAILY 90 tablet 3   •  polyethylene glycol (MIRALAX) pack packet Take 17 g by mouth Daily.     • predniSONE (DELTASONE) 20 MG tablet Take 1 tablet by mouth Daily. 10 tablet 0   • tobramycin-dexamethasone (TOBRADEX) 0.3-0.1 % ophthalmic suspension Administer 1 drop into the left eye Daily. (Patient taking differently: Administer 1 drop to both eyes As Needed.) 5 mL 2   • vitamin B-12 (CYANOCOBALAMIN) 1000 MCG tablet Take 1,000 mcg by mouth 2 (Two) Times a Week.       No facility-administered medications prior to visit.       Patient Active Problem List   Diagnosis   • Type 2 diabetes mellitus without complication (HCC)   • Temporary cerebral vascular dysfunction   • Hypervitaminosis B6   • Hyponatremia   • ODELL (obstructive sleep apnea)   • Peripheral neuropathy   • Restless legs syndrome   • Mixed hyperlipidemia   • LBBB (left bundle branch block)   • Arthritis   • S/P hip replacement, right   • Chronic combined systolic and diastolic congestive heart failure (HCC)   • Anemia   • Declining functional status   • Presence of cardiac pacemaker   • Sinus node dysfunction (HCC)   • Essential hypertension   • CAD (coronary artery disease)       Health Habits:  Dental Exam. up to date  Eye Exam. up to date  No exam data present  Exercise: 0 times/week.  Current exercise activities include: none and walking    Health Risk Assessment:  The patient has completed a Health Risk Assessment. This has been reviewed with them and has been scanned into the patient's chart.    Current Medical Providers:  Patient Care Team:  Farhan Schaefer MD as PCP - General (Internal Medicine)  Nik Chaudhari MD as Consulting Physician (General Surgery)    The [facility] providers who are involved in the care of this patient are listed above. Additional providers and suppliers are listed below:          Recent Hospitalizations:  No hospitalization(s) within the last year..    Recent Lab Results:  CMP:  Lab Results   Component Value Date    BUN 29 (H) 07/21/2022     CREATININE 1.15 (H) 07/21/2022    EGFRIFNONA 48 (L) 01/19/2022    EGFRIFAFRI 58 (L) 01/19/2022    BCR 25.2 (H) 07/21/2022     07/21/2022    K 4.5 07/21/2022    CO2 25.3 07/21/2022    CALCIUM 8.7 07/21/2022    PROTENTOTREF 7.0 07/21/2022    ALBUMIN 4.00 07/21/2022    LABGLOBREF 3.0 07/21/2022    LABIL2 1.3 07/21/2022    BILITOT 0.3 07/21/2022    ALKPHOS 60 07/21/2022    AST 18 07/21/2022    ALT 12 07/21/2022       LIPID PANEL:  Lab Results   Component Value Date    CHLPL 205 (H) 07/21/2022    TRIG 115 07/21/2022    HDL 56 07/21/2022    VLDL 20 07/21/2022     (H) 07/21/2022       HbA1c:  Lab Results   Component Value Date    HGBA1C 6.90 (H) 07/21/2022       URINE MICROALBUMIN:  Lab Results   Component Value Date    MICROALBUR 5.6 06/10/2021       PSA:  No results found for: PSA    Age-appropriate Screening Schedule:  Refer to the list below for future screening recommendations based on patient's age, sex and/or medical conditions. Orders for these recommended tests are listed in the plan section. The patient has been provided with a written plan.    Health Maintenance   Topic Date Due   • DXA SCAN  Never done   • ZOSTER VACCINE (2 of 2) 04/05/2013   • URINE MICROALBUMIN  06/10/2022   • INFLUENZA VACCINE  10/01/2022   • DIABETIC EYE EXAM  10/11/2022   • HEMOGLOBIN A1C  01/21/2023   • LIPID PANEL  07/21/2023   • TDAP/TD VACCINES (2 - Td or Tdap) 02/08/2024   • MAMMOGRAM  06/14/2024       Depression Screen:   PHQ-2/PHQ-9 Depression Screening 7/25/2022   Retired PHQ-9 Total Score -   Retired Total Score -   Little Interest or Pleasure in Doing Things 0-->not at all   Feeling Down, Depressed or Hopeless 0-->not at all   PHQ-9: Brief Depression Severity Measure Score 0         Functional and Cognitive Screening:  Functional & Cognitive Status 7/25/2022   Do you have difficulty preparing food and eating? No   Do you have difficulty bathing yourself, getting dressed or grooming yourself? No   Do you have  "difficulty using the toilet? No   Do you have difficulty moving around from place to place? No   Do you have trouble with steps or getting out of a bed or a chair? No   Current Diet Well Balanced Diet   Dental Exam Up to date   Eye Exam Up to date   Exercise (times per week) 0 times per week   Current Exercises Include No Regular Exercise   Current Exercise Activities Include -   Do you need help using the phone?  No   Are you deaf or do you have serious difficulty hearing?  No   Do you need help with transportation? No   Do you need help shopping? No   Do you need help preparing meals?  No   Do you need help with housework?  No   Do you need help with laundry? No   Do you need help taking your medications? No   Do you need help managing money? No   Do you ever drive or ride in a car without wearing a seat belt? No   Have you felt unusual stress, anger or loneliness in the last month? No   Who do you live with? Spouse   If you need help, do you have trouble finding someone available to you? No   Have you been bothered in the last four weeks by sexual problems? No   Do you have difficulty concentrating, remembering or making decisions? No       Does the patient have evidence of cognitive impairment? No    Advanced Care Planning:  ACP discussion was held with the patient during this visit. Patient does not have an advance directive, declines further assistance.    Identification of Risk Factors:  Risk factors include: Advance Directive Discussion  Fall Risk.    Review of Systems    Compared to one year ago, the patient feels his physical health is the same.  Compared to one year ago, the patient feels his mental health is the same.    Objective     Physical Exam    Vitals:    07/25/22 1316   BP: 122/76   Pulse: 79   Resp: 16   Temp: 98 °F (36.7 °C)   SpO2: 98%   Weight: 86.6 kg (191 lb)   Height: 170.2 cm (67\")   PainSc: 0-No pain       Body mass index is 29.91 kg/m².  Discussed the patient's BMI with her. The BMI is " in the acceptable range.    Assessment & Plan   Patient Self-Management and Personalized Health Advice  The patient has been provided with information about: diet, exercise and weight management and preventive services including:   · Annual Wellness Visit (AWV).    Visit Diagnoses:    ICD-10-CM ICD-9-CM   1. Ganglion cyst of joint of finger  M67.449 727.41   2. Type 2 diabetes mellitus without complication, without long-term current use of insulin (HCC)  E11.9 250.00   3. Routine general medical examination at a health care facility  Z00.00 V70.0   4. Postmenopausal  Z78.0 V49.81       Orders Placed This Encounter   Procedures   • DEXA Bone Density Axial     Order Specific Question:   Is patient taking or have taken long term Glucocorticoid (steroids)?     Answer:   Yes     Order Specific Question:   Does the patient have rheumatoid arthritis?     Answer:   Yes     Order Specific Question:   Does the patient have secondary osteoporosis?     Answer:   Yes     Order Specific Question:   Reason for Exam:     Answer:   postmenopausal.     Order Specific Question:   Release to patient     Answer:   Immediate   • Lipid Panel   • Vitamin B12     Order Specific Question:   Release to patient     Answer:   Immediate   • Comprehensive Metabolic Panel     Order Specific Question:   Release to patient     Answer:   Immediate   • TSH     Order Specific Question:   Release to patient     Answer:   Immediate   • T4, Free     Order Specific Question:   Release to patient     Answer:   Immediate   • Hemoglobin A1c     Order Specific Question:   Release to patient     Answer:   Immediate   • MicroAlbumin, Urine, Random - Urine, Clean Catch     Order Specific Question:   Release to patient     Answer:   Immediate   • Ambulatory Referral to Orthopedic Surgery     Referral Priority:   Routine     Referral Type:   Consultation     Referral Reason:   Specialty Services Required     Referred to Provider:   Issa Salgado MD     Requested  Specialty:   Orthopedic Surgery     Number of Visits Requested:   1   • CBC & Differential     Order Specific Question:   Manual Differential     Answer:   No       Outpatient Encounter Medications as of 7/25/2022   Medication Sig Dispense Refill   • acetaminophen (TYLENOL) 325 MG tablet Take 2 tablets by mouth Every 6 (Six) Hours As Needed for Mild Pain .     • aspirin 81 MG chewable tablet Chew 81 mg Daily.     • Blood Glucose Monitoring Suppl (ONE TOUCH ULTRA 2) w/Device kit USE AS NEEDED FOR BLOOD    SUGAR MONITORING 1 each 0   • Cholecalciferol (VITAMIN D PO) Take 125 mcg by mouth 2 (Two) Times a Week.     • clopidogrel (PLAVIX) 75 MG tablet TAKE 1 TABLET DAILY 90 tablet 3   • coenzyme Q10 100 MG capsule Take 400 mg by mouth Every Other Day.     • folic acid (FOLVITE) 1 MG tablet Take 1 tablet by mouth Daily. 90 tablet 3   • furosemide (LASIX) 20 MG tablet TAKE 1 TABLET TWICE A  tablet 3   • glipizide (glipiZIDE XL) 2.5 MG 24 hr tablet Take 1 tablet by mouth Daily. 90 tablet 3   • Glucosamine-Chondroit-Vit C-Mn (GLUCOSAMINE CHONDR 1500 COMPLX PO) Take 1 tablet by mouth Daily.     • glucose blood (Accu-Chek Kayce Plus) test strip TEST ONCE DAILY. 100 each 3   • glucose monitor monitoring kit 1 each As Needed (blood sugar). 1 each 1   • Lancets (accu-chek soft touch) lancets Use as directed 100 each 12   • latanoprost (XALATAN) 0.005 % ophthalmic solution PLACE 1 DROP IN EACH EYE EVERY DAY AT BEDTIME     • lisinopril (PRINIVIL,ZESTRIL) 40 MG tablet TAKE 1 TABLET DAILY 90 tablet 3   • lovastatin (MEVACOR) 10 MG tablet TAKE 1 TABLET EVERY NIGHT 90 tablet 3   • melatonin 3 MG tablet Take 3 mg by mouth Every Night.     • pantoprazole (PROTONIX) 40 MG EC tablet TAKE 1 TABLET DAILY 90 tablet 3   • polyethylene glycol (MIRALAX) pack packet Take 17 g by mouth Daily.     • predniSONE (DELTASONE) 20 MG tablet Take 1 tablet by mouth Daily. 10 tablet 0   • tobramycin-dexamethasone (TOBRADEX) 0.3-0.1 % ophthalmic  suspension Administer 1 drop into the left eye Daily. (Patient taking differently: Administer 1 drop to both eyes As Needed.) 5 mL 2   • [DISCONTINUED] vitamin B-12 (CYANOCOBALAMIN) 1000 MCG tablet Take 1,000 mcg by mouth 2 (Two) Times a Week.     • B Complex-C (B-complex with vitamin C) tablet Take 1 tablet by mouth Every Other Day. 90 tablet 3     No facility-administered encounter medications on file as of 7/25/2022.       Reviewed use of high risk medication in the elderly: yes  Reviewed for potential of harmful drug interactions in the elderly: yes    Follow Up:  Return in about 8 months (around 3/25/2023).     An After Visit Summary and PPPS with all of these plans were given to the patient.             Diagnoses and all orders for this visit:    1. Ganglion cyst of joint of finger (Primary)  -     Ambulatory Referral to Orthopedic Surgery  -     Lipid Panel  -     CBC & Differential  -     Vitamin B12  -     Comprehensive Metabolic Panel  -     TSH  -     T4, Free    2. Type 2 diabetes mellitus without complication, without long-term current use of insulin (HCC)  -     Lipid Panel  -     CBC & Differential  -     Vitamin B12  -     Comprehensive Metabolic Panel  -     TSH  -     T4, Free  -     Hemoglobin A1c  -     MicroAlbumin, Urine, Random - Urine, Clean Catch    3. Routine general medical examination at a health care facility    4. Postmenopausal  -     DEXA Bone Density Axial    Other orders  -     B Complex-C (B-complex with vitamin C) tablet; Take 1 tablet by mouth Every Other Day.  Dispense: 90 tablet; Refill: 3

## 2022-07-25 NOTE — PROGRESS NOTES
Subjective     Patient ID: Radha Whelan is a 81 y.o. female. Patient is here for management of multiple medical problems.     Chief Complaint   Patient presents with   • Hyperlipidemia     History of Present Illness     hyperlip    Cyst on finger not getting better      The following portions of the patient's history were reviewed and updated as appropriate: allergies, current medications, past family history, past medical history, past social history, past surgical history and problem list.    Review of Systems   Constitutional: Negative for diaphoresis and fatigue.   Respiratory: Negative for shortness of breath and stridor.    Psychiatric/Behavioral: Negative for self-injury and sleep disturbance. The patient is not nervous/anxious.    All other systems reviewed and are negative.      Current Outpatient Medications:   •  acetaminophen (TYLENOL) 325 MG tablet, Take 2 tablets by mouth Every 6 (Six) Hours As Needed for Mild Pain ., Disp: , Rfl:   •  aspirin 81 MG chewable tablet, Chew 81 mg Daily., Disp: , Rfl:   •  Blood Glucose Monitoring Suppl (ONE TOUCH ULTRA 2) w/Device kit, USE AS NEEDED FOR BLOOD    SUGAR MONITORING, Disp: 1 each, Rfl: 0  •  Cholecalciferol (VITAMIN D PO), Take 125 mcg by mouth 2 (Two) Times a Week., Disp: , Rfl:   •  clopidogrel (PLAVIX) 75 MG tablet, TAKE 1 TABLET DAILY, Disp: 90 tablet, Rfl: 3  •  coenzyme Q10 100 MG capsule, Take 400 mg by mouth Every Other Day., Disp: , Rfl:   •  folic acid (FOLVITE) 1 MG tablet, Take 1 tablet by mouth Daily., Disp: 90 tablet, Rfl: 3  •  furosemide (LASIX) 20 MG tablet, TAKE 1 TABLET TWICE A DAY, Disp: 180 tablet, Rfl: 3  •  glipizide (glipiZIDE XL) 2.5 MG 24 hr tablet, Take 1 tablet by mouth Daily., Disp: 90 tablet, Rfl: 3  •  Glucosamine-Chondroit-Vit C-Mn (GLUCOSAMINE CHONDR 1500 COMPLX PO), Take 1 tablet by mouth Daily., Disp: , Rfl:   •  glucose blood (Accu-Chek Kayce Plus) test strip, TEST ONCE DAILY., Disp: 100 each, Rfl: 3  •  glucose monitor  "monitoring kit, 1 each As Needed (blood sugar)., Disp: 1 each, Rfl: 1  •  Lancets (accu-chek soft touch) lancets, Use as directed, Disp: 100 each, Rfl: 12  •  latanoprost (XALATAN) 0.005 % ophthalmic solution, PLACE 1 DROP IN EACH EYE EVERY DAY AT BEDTIME, Disp: , Rfl:   •  lisinopril (PRINIVIL,ZESTRIL) 40 MG tablet, TAKE 1 TABLET DAILY, Disp: 90 tablet, Rfl: 3  •  lovastatin (MEVACOR) 10 MG tablet, TAKE 1 TABLET EVERY NIGHT, Disp: 90 tablet, Rfl: 3  •  melatonin 3 MG tablet, Take 3 mg by mouth Every Night., Disp: , Rfl:   •  pantoprazole (PROTONIX) 40 MG EC tablet, TAKE 1 TABLET DAILY, Disp: 90 tablet, Rfl: 3  •  polyethylene glycol (MIRALAX) pack packet, Take 17 g by mouth Daily., Disp: , Rfl:   •  predniSONE (DELTASONE) 20 MG tablet, Take 1 tablet by mouth Daily., Disp: 10 tablet, Rfl: 0  •  tobramycin-dexamethasone (TOBRADEX) 0.3-0.1 % ophthalmic suspension, Administer 1 drop into the left eye Daily. (Patient taking differently: Administer 1 drop to both eyes As Needed.), Disp: 5 mL, Rfl: 2  •  B Complex-C (B-complex with vitamin C) tablet, Take 1 tablet by mouth Every Other Day., Disp: 90 tablet, Rfl: 3    Objective      Blood pressure 122/76, pulse 79, temperature 98 °F (36.7 °C), resp. rate 16, height 170.2 cm (67\"), weight 86.6 kg (191 lb), SpO2 98 %.    Physical Exam     General Appearance:    Alert, cooperative, no distress, appears stated age   Head:    Normocephalic, without obvious abnormality, atraumatic   Eyes:    PERRL, conjunctiva/corneas clear, EOM's intact   Ears:    Normal TM's and external ear canals, both ears   Nose:   Nares normal, septum midline, mucosa normal, no drainage   or sinus tenderness   Throat:   Lips, mucosa, and tongue normal; teeth and gums normal   Neck:   Supple, symmetrical, trachea midline, no adenopathy;        thyroid:  No enlargement/tenderness/nodules; no carotid    bruit or JVD   Back:     Symmetric, no curvature, ROM normal, no CVA tenderness   Lungs:     Clear to " auscultation bilaterally, respirations unlabored   Chest wall:    No tenderness or deformity   Heart:    Regular rate and rhythm, S1 and S2 normal, no murmur,        rub or gallop   Abdomen:     Soft, non-tender, bowel sounds active all four quadrants,     no masses, no organomegaly   Extremities:   Extremities normal, atraumatic, no cyanosis or edema   Pulses:   2+ and symmetric all extremities   Skin:   Skin color, texture, turgor normal, no rashes or lesions   Lymph nodes:   Cervical, supraclavicular, and axillary nodes normal   Neurologic:   CNII-XII intact. Normal strength, sensation and reflexes       throughout      Results for orders placed or performed in visit on 01/24/22   Lipid Panel    Specimen: Blood   Result Value Ref Range    Total Cholesterol 205 (H) 0 - 200 mg/dL    Triglycerides 115 0 - 150 mg/dL    HDL Cholesterol 56 40 - 60 mg/dL    VLDL Cholesterol Kb 20 5 - 40 mg/dL    LDL Chol Calc (NIH) 129 (H) 0 - 100 mg/dL   Vitamin B12    Specimen: Blood   Result Value Ref Range    Vitamin B-12 598 211 - 946 pg/mL   TSH    Specimen: Blood   Result Value Ref Range    TSH 2.410 0.270 - 4.200 uIU/mL   T4, Free    Specimen: Blood   Result Value Ref Range    Free T4 1.06 0.93 - 1.70 ng/dL   Comprehensive Metabolic Panel    Specimen: Blood   Result Value Ref Range    Glucose 145 (H) 65 - 99 mg/dL    BUN 29 (H) 8 - 23 mg/dL    Creatinine 1.15 (H) 0.57 - 1.00 mg/dL    EGFR Result 47.7 (L) >60.0 mL/min/1.73    BUN/Creatinine Ratio 25.2 (H) 7.0 - 25.0    Sodium 139 136 - 145 mmol/L    Potassium 4.5 3.5 - 5.2 mmol/L    Chloride 104 98 - 107 mmol/L    Total CO2 25.3 22.0 - 29.0 mmol/L    Calcium 8.7 8.6 - 10.5 mg/dL    Total Protein 7.0 6.0 - 8.5 g/dL    Albumin 4.00 3.50 - 5.20 g/dL    Globulin 3.0 gm/dL    A/G Ratio 1.3 g/dL    Total Bilirubin 0.3 0.0 - 1.2 mg/dL    Alkaline Phosphatase 60 39 - 117 U/L    AST (SGOT) 18 1 - 32 U/L    ALT (SGPT) 12 1 - 33 U/L   Hemoglobin A1c    Specimen: Blood   Result Value Ref  Range    Hemoglobin A1C 6.90 (H) 4.80 - 5.60 %   Folate    Specimen: Blood   Result Value Ref Range    Folate >20.00 4.78 - 24.20 ng/mL   Vitamin B6    Specimen: Blood   Result Value Ref Range    Vitamin B6     CBC & Differential    Specimen: Blood   Result Value Ref Range    WBC 5.44 3.40 - 10.80 10*3/mm3    RBC 3.98 3.77 - 5.28 10*6/mm3    Hemoglobin 11.3 (L) 12.0 - 15.9 g/dL    Hematocrit 35.2 34.0 - 46.6 %    MCV 88.4 79.0 - 97.0 fL    MCH 28.4 26.6 - 33.0 pg    MCHC 32.1 31.5 - 35.7 g/dL    RDW 13.8 12.3 - 15.4 %    Platelets 230 140 - 450 10*3/mm3    Neutrophil Rel % 38.4 (L) 42.7 - 76.0 %    Lymphocyte Rel % 47.6 (H) 19.6 - 45.3 %    Monocyte Rel % 9.2 5.0 - 12.0 %    Eosinophil Rel % 4.0 0.3 - 6.2 %    Basophil Rel % 0.4 0.0 - 1.5 %    Neutrophils Absolute 2.09 1.70 - 7.00 10*3/mm3    Lymphocytes Absolute 2.59 0.70 - 3.10 10*3/mm3    Monocytes Absolute 0.50 0.10 - 0.90 10*3/mm3    Eosinophils Absolute 0.22 0.00 - 0.40 10*3/mm3    Basophils Absolute 0.02 0.00 - 0.20 10*3/mm3    Immature Granulocyte Rel % 0.4 0.0 - 0.5 %    Immature Grans Absolute 0.02 0.00 - 0.05 10*3/mm3    nRBC 0.0 0.0 - 0.2 /100 WBC         Assessment & Plan   Labs stable.         Diagnoses and all orders for this visit:    1. Ganglion cyst of joint of finger (Primary)  -     Ambulatory Referral to Orthopedic Surgery  -     Lipid Panel  -     CBC & Differential  -     Vitamin B12  -     Comprehensive Metabolic Panel  -     TSH  -     T4, Free    2. Type 2 diabetes mellitus without complication, without long-term current use of insulin (HCC)  -     Lipid Panel  -     CBC & Differential  -     Vitamin B12  -     Comprehensive Metabolic Panel  -     TSH  -     T4, Free  -     Hemoglobin A1c    Other orders  -     B Complex-C (B-complex with vitamin C) tablet; Take 1 tablet by mouth Every Other Day.  Dispense: 90 tablet; Refill: 3      Return in about 8 months (around 3/25/2023).          There are no Patient Instructions on file for this  visit.     Farhan Schaefer MD    Assessment & Plan

## 2022-07-26 LAB
ALBUMIN SERPL-MCNC: 4 G/DL (ref 3.5–5.2)
ALBUMIN/GLOB SERPL: 1.3 G/DL
ALP SERPL-CCNC: 60 U/L (ref 39–117)
ALT SERPL-CCNC: 12 U/L (ref 1–33)
AST SERPL-CCNC: 18 U/L (ref 1–32)
BASOPHILS # BLD AUTO: 0.02 10*3/MM3 (ref 0–0.2)
BASOPHILS NFR BLD AUTO: 0.4 % (ref 0–1.5)
BILIRUB SERPL-MCNC: 0.3 MG/DL (ref 0–1.2)
BUN SERPL-MCNC: 29 MG/DL (ref 8–23)
BUN/CREAT SERPL: 25.2 (ref 7–25)
CALCIUM SERPL-MCNC: 8.7 MG/DL (ref 8.6–10.5)
CHLORIDE SERPL-SCNC: 104 MMOL/L (ref 98–107)
CHOLEST SERPL-MCNC: 205 MG/DL (ref 0–200)
CO2 SERPL-SCNC: 25.3 MMOL/L (ref 22–29)
CREAT SERPL-MCNC: 1.15 MG/DL (ref 0.57–1)
EGFRCR SERPLBLD CKD-EPI 2021: 47.7 ML/MIN/1.73
EOSINOPHIL # BLD AUTO: 0.22 10*3/MM3 (ref 0–0.4)
EOSINOPHIL NFR BLD AUTO: 4 % (ref 0.3–6.2)
ERYTHROCYTE [DISTWIDTH] IN BLOOD BY AUTOMATED COUNT: 13.8 % (ref 12.3–15.4)
FOLATE SERPL-MCNC: >20 NG/ML (ref 4.78–24.2)
GLOBULIN SER CALC-MCNC: 3 GM/DL
GLUCOSE SERPL-MCNC: 145 MG/DL (ref 65–99)
HBA1C MFR BLD: 6.9 % (ref 4.8–5.6)
HCT VFR BLD AUTO: 35.2 % (ref 34–46.6)
HDLC SERPL-MCNC: 56 MG/DL (ref 40–60)
HGB BLD-MCNC: 11.3 G/DL (ref 12–15.9)
IMM GRANULOCYTES # BLD AUTO: 0.02 10*3/MM3 (ref 0–0.05)
IMM GRANULOCYTES NFR BLD AUTO: 0.4 % (ref 0–0.5)
LDLC SERPL CALC-MCNC: 129 MG/DL (ref 0–100)
LYMPHOCYTES # BLD AUTO: 2.59 10*3/MM3 (ref 0.7–3.1)
LYMPHOCYTES NFR BLD AUTO: 47.6 % (ref 19.6–45.3)
MCH RBC QN AUTO: 28.4 PG (ref 26.6–33)
MCHC RBC AUTO-ENTMCNC: 32.1 G/DL (ref 31.5–35.7)
MCV RBC AUTO: 88.4 FL (ref 79–97)
MONOCYTES # BLD AUTO: 0.5 10*3/MM3 (ref 0.1–0.9)
MONOCYTES NFR BLD AUTO: 9.2 % (ref 5–12)
NEUTROPHILS # BLD AUTO: 2.09 10*3/MM3 (ref 1.7–7)
NEUTROPHILS NFR BLD AUTO: 38.4 % (ref 42.7–76)
NRBC BLD AUTO-RTO: 0 /100 WBC (ref 0–0.2)
PLATELET # BLD AUTO: 230 10*3/MM3 (ref 140–450)
POTASSIUM SERPL-SCNC: 4.5 MMOL/L (ref 3.5–5.2)
PROT SERPL-MCNC: 7 G/DL (ref 6–8.5)
RBC # BLD AUTO: 3.98 10*6/MM3 (ref 3.77–5.28)
SODIUM SERPL-SCNC: 139 MMOL/L (ref 136–145)
T4 FREE SERPL-MCNC: 1.06 NG/DL (ref 0.93–1.7)
TRIGL SERPL-MCNC: 115 MG/DL (ref 0–150)
TSH SERPL DL<=0.005 MIU/L-ACNC: 2.41 UIU/ML (ref 0.27–4.2)
VIT B12 SERPL-MCNC: 598 PG/ML (ref 211–946)
VIT B6 SERPL-MCNC: 19.7 UG/L (ref 3.4–65.2)
VLDLC SERPL CALC-MCNC: 20 MG/DL (ref 5–40)
WBC # BLD AUTO: 5.44 10*3/MM3 (ref 3.4–10.8)

## 2022-08-01 ENCOUNTER — APPOINTMENT (OUTPATIENT)
Dept: BONE DENSITY | Facility: HOSPITAL | Age: 82
End: 2022-08-01

## 2022-08-01 PROCEDURE — 77080 DXA BONE DENSITY AXIAL: CPT

## 2022-08-01 RX ORDER — PANTOPRAZOLE SODIUM 40 MG/1
TABLET, DELAYED RELEASE ORAL
Qty: 90 TABLET | Refills: 3 | Status: SHIPPED | OUTPATIENT
Start: 2022-08-01 | End: 2023-02-07 | Stop reason: SDUPTHER

## 2022-08-22 DIAGNOSIS — N63.21 MASS OF UPPER OUTER QUADRANT OF LEFT BREAST: Primary | ICD-10-CM

## 2022-08-29 RX ORDER — LISINOPRIL 40 MG/1
TABLET ORAL
Qty: 90 TABLET | Refills: 3 | Status: SHIPPED | OUTPATIENT
Start: 2022-08-29 | End: 2022-12-09 | Stop reason: SDUPTHER

## 2022-09-02 NOTE — PROGRESS NOTES
Patient: Radha Whelan    YOB: 1940    Date: 09/07/2022    Primary Care Provider: Farhan Schaefer MD    Chief Complaint   Patient presents with   • Mass     Left breast       Subjective .     History of present illness:  I saw the patient in the office  today for evaluation and treatment of a left breast mass.  Her recent  mammogram was normal. She states she has a palpable knot in left breast near axilla. She states that it is located near her pace maker.  Ultrasound indicates scar tissue around the pacemaker, no definite mass seen.    The following portions of the patient's history were reviewed and updated as appropriate: allergies, current medications, past family history, past medical history, past social history, past surgical history and problem list.    Review of Systems   Constitutional: Negative for chills, fever and unexpected weight change.   HENT: Negative for hearing loss, trouble swallowing and voice change.    Eyes: Negative for visual disturbance.   Respiratory: Negative for apnea, cough, chest tightness, shortness of breath and wheezing.    Cardiovascular: Negative for chest pain, palpitations and leg swelling.   Gastrointestinal: Negative for abdominal distention, abdominal pain, anal bleeding, blood in stool, constipation, diarrhea, nausea, rectal pain and vomiting.   Endocrine: Negative for cold intolerance and heat intolerance.   Genitourinary: Negative for difficulty urinating, dysuria and flank pain.   Musculoskeletal: Negative for back pain and gait problem.   Skin: Negative for color change, rash and wound.   Neurological: Negative for dizziness, syncope, speech difficulty, weakness, light-headedness, numbness and headaches.   Hematological: Negative for adenopathy. Does not bruise/bleed easily.   Psychiatric/Behavioral: Negative for confusion. The patient is not nervous/anxious.        History:  Past Medical History:   Diagnosis Date   • Arthritis    • Basal cell carcinoma   "  • Cardiomyopathy (HCC)    • Cataract, bilateral     s/p removal    • DVT (deep venous thrombosis) (HCC)     1970's- left leg   • Edema     legs- hx of   • History of migraine headaches    • Hx of exercise stress test 2004   • Hyponatremia    • LBBB (left bundle branch block)    • LBBB (left bundle branch block)    • Left leg pain     left leg and knee pain    • Muscle spasm     hx of   • Obesity    • Teeth missing     all of the top, and has 6 bottom teeth left   • Thyroid nodule    • TIA (transient ischemic attack)     x3.  last one was \"a few years ago\"   • Wears dentures     top plate   • Wears glasses     to read          Past Surgical History:   Procedure Laterality Date   • BREAST BIOPSY Left     benign   • CARDIAC CATHETERIZATION      09/16/2019 PER .   • CARDIAC CATHETERIZATION N/A 09/16/2019    Procedure: Left Heart Cath +/- CBI;  Surgeon: Ashlyn Mays MD;  Location:  [facility];  Service: Cardiovascular   • CARDIAC ELECTROPHYSIOLOGY PROCEDURE N/A 12/06/2019    Procedure: PACEMAKER IMPLANTATION- DC;  Surgeon: Stephan Laboy DO;  Location:  [facility];  Service: Cardiology   • CATARACT EXTRACTION  12/2018    both eyes    • COLONOSCOPY     • INSERT / REPLACE / REMOVE PACEMAKER     • MOUTH SURGERY      all top teeth   • SKIN BIOPSY      basal cell   • TOTAL HIP ARTHROPLASTY Right 12/04/2019    Procedure: HIP ARTHROPLASTY TOTAL RIGHT;  Surgeon: Issa Salgado MD;  Location: [facility] OR;  Service: Orthopedics   • TUBAL ABDOMINAL LIGATION         Family History   Problem Relation Age of Onset   • Diabetes Other    • Hypertension Other    • Cancer Other         malignant neoplasm    • Migraines Other    • Heart disease Mother    • Heart disease Father    • Breast cancer Sister    • Breast cancer Other    • Intracerebral hemorrhage Brother    • Asthma Daughter    • Heart failure Daughter    • Diabetes type II Brother    • No Known Problems Brother    • No Known Problems " "Sister    • Stroke Sister    • Breast cancer Sister        Social History     Tobacco Use   • Smoking status: Never Smoker   • Smokeless tobacco: Never Used   Vaping Use   • Vaping Use: Never used   Substance Use Topics   • Alcohol use: No   • Drug use: No       Allergies:  Allergies   Allergen Reactions   • Cumini Other (See Comments)     Complex migrane  migraine   • Cheese Other (See Comments)     migrane    Migraine   • Ibuprofen Rash     Swelling all over  \"Swelling all over\"   • Other Other (See Comments)     \"ice cream\"-migranes    \"ice cream\" migraines   • Saccharin Other (See Comments)     migrane    Migraine       Medications:     Current Outpatient Medications:   •  acetaminophen (TYLENOL) 325 MG tablet, Take 2 tablets by mouth Every 6 (Six) Hours As Needed for Mild Pain ., Disp: , Rfl:   •  aspirin 81 MG chewable tablet, Chew 81 mg Daily., Disp: , Rfl:   •  B Complex-C (B-complex with vitamin C) tablet, Take 1 tablet by mouth Every Other Day., Disp: 90 tablet, Rfl: 3  •  Blood Glucose Monitoring Suppl (ONE TOUCH ULTRA 2) w/Device kit, USE AS NEEDED FOR BLOOD    SUGAR MONITORING, Disp: 1 each, Rfl: 0  •  Cholecalciferol (VITAMIN D PO), Take 125 mcg by mouth 2 (Two) Times a Week., Disp: , Rfl:   •  clopidogrel (PLAVIX) 75 MG tablet, TAKE 1 TABLET DAILY, Disp: 90 tablet, Rfl: 3  •  coenzyme Q10 100 MG capsule, Take 400 mg by mouth Every Other Day., Disp: , Rfl:   •  folic acid (FOLVITE) 1 MG tablet, Take 1 tablet by mouth Daily., Disp: 90 tablet, Rfl: 3  •  furosemide (LASIX) 20 MG tablet, TAKE 1 TABLET TWICE A DAY, Disp: 180 tablet, Rfl: 3  •  glipizide (glipiZIDE XL) 2.5 MG 24 hr tablet, Take 1 tablet by mouth Daily., Disp: 90 tablet, Rfl: 3  •  Glucosamine-Chondroit-Vit C-Mn (GLUCOSAMINE CHONDR 1500 COMPLX PO), Take 1 tablet by mouth Daily., Disp: , Rfl:   •  glucose blood (Accu-Chek Kayce Plus) test strip, TEST ONCE DAILY., Disp: 100 each, Rfl: 3  •  glucose monitor monitoring kit, 1 each As Needed " "(blood sugar)., Disp: 1 each, Rfl: 1  •  Lancets (accu-chek soft touch) lancets, Use as directed, Disp: 100 each, Rfl: 12  •  latanoprost (XALATAN) 0.005 % ophthalmic solution, PLACE 1 DROP IN EACH EYE EVERY DAY AT BEDTIME, Disp: , Rfl:   •  lisinopril (PRINIVIL,ZESTRIL) 40 MG tablet, TAKE 1 TABLET DAILY, Disp: 90 tablet, Rfl: 3  •  lovastatin (MEVACOR) 10 MG tablet, TAKE 1 TABLET EVERY NIGHT, Disp: 90 tablet, Rfl: 3  •  melatonin 3 MG tablet, Take 3 mg by mouth Every Night., Disp: , Rfl:   •  pantoprazole (PROTONIX) 40 MG EC tablet, TAKE 1 TABLET DAILY, Disp: 90 tablet, Rfl: 3  •  polyethylene glycol (MIRALAX) pack packet, Take 17 g by mouth Daily., Disp: , Rfl:   •  predniSONE (DELTASONE) 20 MG tablet, Take 1 tablet by mouth Daily., Disp: 10 tablet, Rfl: 0  •  tobramycin-dexamethasone (TOBRADEX) 0.3-0.1 % ophthalmic suspension, Administer 1 drop into the left eye Daily. (Patient taking differently: Administer 1 drop to both eyes As Needed.), Disp: 5 mL, Rfl: 2    Objective     Vital Signs:   Vitals:    09/07/22 0938   BP: 128/64   Pulse: 85   Resp: 18   Temp: 97.5 °F (36.4 °C)   TempSrc: Temporal   SpO2: 98%   Weight: 86.3 kg (190 lb 3.2 oz)   Height: 170.2 cm (67\")       Physical Exam:     General Appearance:    Alert, cooperative, in no acute distress   Head:    Normocephalic, without obvious abnormality, atraumatic   Eyes:            Lids and lashes normal, conjunctivae and sclerae normal, no   icterus, no pallor, corneas clear,   Ears:    Ears appear intact with no abnormalities noted   Throat:   No oral lesions, no thrush, oral mucosa moist   Breast:    Pacemaker in good position of the left upper chest, thickness left axilla, no definite mass palpated.   Lungs:     Clear to auscultation,respirations regular, even and                  Unlabored    Heart:    Regular rhythm and normal rate, no murmur, no gallop.   Chest Wall:    No abnormalities observed   Abdomen:     Normal bowel sounds, no masses, no " organomegaly, soft        non-tender, non-distended, no guarding.   Extremities:   Moves all extremities well, no edema, no cyanosis, no             redness   Pulses:   Pulses palpable and equal bilaterally   Skin:   No bleeding, bruising or rash   Lymph nodes:   No palpable adenopathy   Neurologic:   Cranial nerves 2 - 12 grossly intact.           Results Review:   I reviewed the patient's new clinical results.    Review of Systems was reviewed and confirmed as accurate as documented by the MA.    Assessment / Plan:    1. Mass of left breast, unspecified quadrant    2. Other abnormal and inconclusive findings on diagnostic imaging of breast         I did have a detailed and extensive discussion with the patient in the office today and reviewed her recent workup.  I have told the patient that she has normal postoperative scarring from the pacemaker in the left axilla and left shoulder.  Ultrasound and mammogram were both negative.  Patient reassured will follow-up as needed.  Continue yearly mammograms.    Electronically signed by Juani Brooks MD  09/07/22  10:11 EDT

## 2022-09-07 ENCOUNTER — OFFICE VISIT (OUTPATIENT)
Dept: SURGERY | Facility: CLINIC | Age: 82
End: 2022-09-07

## 2022-09-07 VITALS
SYSTOLIC BLOOD PRESSURE: 128 MMHG | HEIGHT: 67 IN | WEIGHT: 190.2 LBS | DIASTOLIC BLOOD PRESSURE: 64 MMHG | OXYGEN SATURATION: 98 % | HEART RATE: 85 BPM | RESPIRATION RATE: 18 BRPM | BODY MASS INDEX: 29.85 KG/M2 | TEMPERATURE: 97.5 F

## 2022-09-07 DIAGNOSIS — N63.20 MASS OF LEFT BREAST, UNSPECIFIED QUADRANT: Primary | ICD-10-CM

## 2022-09-07 DIAGNOSIS — R92.8 OTHER ABNORMAL AND INCONCLUSIVE FINDINGS ON DIAGNOSTIC IMAGING OF BREAST: ICD-10-CM

## 2022-09-07 PROCEDURE — 99203 OFFICE O/P NEW LOW 30 MIN: CPT | Performed by: SURGERY

## 2022-09-12 RX ORDER — GLIPIZIDE 2.5 MG/1
TABLET, EXTENDED RELEASE ORAL
Qty: 90 TABLET | Refills: 3 | Status: SHIPPED | OUTPATIENT
Start: 2022-09-12 | End: 2022-10-19 | Stop reason: SDUPTHER

## 2022-09-22 PROCEDURE — 93296 REM INTERROG EVL PM/IDS: CPT | Performed by: INTERNAL MEDICINE

## 2022-09-22 PROCEDURE — 93294 REM INTERROG EVL PM/LDLS PM: CPT | Performed by: INTERNAL MEDICINE

## 2022-09-28 ENCOUNTER — TELEPHONE (OUTPATIENT)
Dept: INTERNAL MEDICINE | Facility: CLINIC | Age: 82
End: 2022-09-28

## 2022-09-28 NOTE — TELEPHONE ENCOUNTER
Patient did not complete CT  ordered on 9/15/2021. This order is too old to be used and has been cancelled.

## 2022-10-19 RX ORDER — GLIPIZIDE 2.5 MG/1
2.5 TABLET, EXTENDED RELEASE ORAL DAILY
Qty: 90 TABLET | Refills: 3 | Status: SHIPPED | OUTPATIENT
Start: 2022-10-19 | End: 2023-02-07 | Stop reason: SDUPTHER

## 2022-10-23 DIAGNOSIS — I67.82 TEMPORARY CEREBRAL VASCULAR DYSFUNCTION: ICD-10-CM

## 2022-10-24 RX ORDER — CLOPIDOGREL BISULFATE 75 MG/1
TABLET ORAL
Qty: 90 TABLET | Refills: 3 | Status: SHIPPED | OUTPATIENT
Start: 2022-10-24 | End: 2023-02-07 | Stop reason: SDUPTHER

## 2022-11-28 RX ORDER — LOVASTATIN 10 MG/1
TABLET ORAL
Qty: 90 TABLET | Refills: 3 | Status: SHIPPED | OUTPATIENT
Start: 2022-11-28

## 2022-11-28 RX ORDER — FUROSEMIDE 20 MG/1
TABLET ORAL
Qty: 180 TABLET | Refills: 3 | Status: SHIPPED | OUTPATIENT
Start: 2022-11-28 | End: 2023-02-07 | Stop reason: SDUPTHER

## 2022-12-05 ENCOUNTER — OFFICE VISIT (OUTPATIENT)
Dept: CARDIOLOGY | Facility: CLINIC | Age: 82
End: 2022-12-05

## 2022-12-05 VITALS
HEIGHT: 67 IN | HEART RATE: 60 BPM | DIASTOLIC BLOOD PRESSURE: 70 MMHG | OXYGEN SATURATION: 98 % | BODY MASS INDEX: 29.82 KG/M2 | WEIGHT: 190 LBS | SYSTOLIC BLOOD PRESSURE: 144 MMHG

## 2022-12-05 DIAGNOSIS — Z95.0 PRESENCE OF CARDIAC PACEMAKER: Primary | ICD-10-CM

## 2022-12-05 DIAGNOSIS — I10 ESSENTIAL HYPERTENSION: ICD-10-CM

## 2022-12-05 DIAGNOSIS — I49.5 SINUS NODE DYSFUNCTION: ICD-10-CM

## 2022-12-05 DIAGNOSIS — I50.42 CHRONIC COMBINED SYSTOLIC AND DIASTOLIC CONGESTIVE HEART FAILURE: ICD-10-CM

## 2022-12-05 PROCEDURE — 93280 PM DEVICE PROGR EVAL DUAL: CPT | Performed by: PHYSICIAN ASSISTANT

## 2022-12-05 PROCEDURE — 99213 OFFICE O/P EST LOW 20 MIN: CPT | Performed by: PHYSICIAN ASSISTANT

## 2022-12-05 NOTE — PROGRESS NOTES
Encounter Date:12/05/2022      Patient ID: Radha Whelan is a 82 y.o. female.    Farhan Schaefer MD    Chief Complaint: Sinus node dysfunction (HCC)      PROBLEM LIST:  Patient Active Problem List    Diagnosis Date Noted   • Sinus node dysfunction (HCC) 03/15/2021     Priority: High     Note Last Updated: 4/4/2022     1. 9/16/2019 24-hour Holter: Average HR 60, range 45-1 07, 1.6% PACs, 0.3% PVCs, rare SVT less than 11 beats with no symptoms  2. Recurrent Arthur Syncope x 2.  First 10/2019 while seated and second 12/5/2019 s/p Rt total hip surgery on transfer to bed.  3. 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 Laboy for sinus node dysfunction.     • Presence of cardiac pacemaker 12/11/2019     Priority: High   • CAD (coronary artery disease) 04/04/2022     Priority: Medium     Note Last Updated: 4/4/2022     1. 9/11/2019 echo: EF 42%, grade 1 diastolic dysfunction, RVSP 22  1. 9/16/2019 LHC per AA no evidence of hemodynamically significant CAD, normal EF 55%, 24-hour Holter recommended to assess for bradycardic arrhythmias     • Chronic combined systolic and diastolic congestive heart failure (HCC) 12/11/2019     Priority: Medium     Note Last Updated: 4/4/2022     · Echo 9-: Estimated EF = 42%. Left ventricular diastolic dysfunction (grade I) consistent with impaired relaxation.     • Essential hypertension 03/15/2021     Priority: Low   • ODELL (obstructive sleep apnea) 05/26/2016     Priority: Low   • Anemia 12/19/2019   • Declining functional status 12/10/2019   • Arthritis 12/04/2019   • Mixed hyperlipidemia 08/26/2019   • LBBB (left bundle branch block) 08/26/2019     Note Last Updated: 9/16/2019     · Noted on EKG 8/26/2019     • Temporary cerebral vascular dysfunction 05/26/2016   • Hypervitaminosis B6 05/26/2016   • Peripheral neuropathy 05/26/2016   • Restless legs syndrome 09/29/2015   • Type 2 diabetes mellitus without complication ([facility]) 02/14/2011               History of Present  "Illness  Patient presents today for follow-up with a history of sinus node dysfunction with a dual-chamber permanent pacemaker, chronic mixed CHF, hypertension and obstructive sleep apnea.  She returns today for scheduled follow-up.  She has no current complaint of exertional chest pain or dyspnea, denies orthopnea, PND, claudication, lower extreme edema.  She has no awareness of tachyarrhythmias, denies dizziness or syncope.  She does not check her blood pressure regularly at home.  She states compliance current medical regimen reports no significant adverse side effects.    Allergies   Allergen Reactions   • Cumini Other (See Comments)     Complex migrane  migraine   • Cheese Other (See Comments)     migrane    Migraine   • Ibuprofen Rash     Swelling all over  \"Swelling all over\"   • Other Other (See Comments)     \"ice cream\"-migranes    \"ice cream\" migraines   • Saccharin Other (See Comments)     migrane    Migraine       Current Outpatient Medications   Medication Instructions   • acetaminophen (TYLENOL) 650 mg, Oral, Every 6 Hours PRN   • aspirin 81 mg, Oral, Daily   • B Complex-C (B-complex with vitamin C) tablet 1 tablet, Oral, Every Other Day   • Blood Glucose Monitoring Suppl (ONE TOUCH ULTRA 2) w/Device kit USE AS NEEDED FOR BLOOD    SUGAR MONITORING   • Cholecalciferol (VITAMIN D PO) 125 mcg, Oral, 2 Times Weekly   • clopidogrel (PLAVIX) 75 MG tablet TAKE 1 TABLET DAILY   • Coenzyme Q10 400 mg, Oral, Every Other Day   • folic acid (FOLVITE) 1,000 mcg, Oral, Daily   • furosemide (LASIX) 20 MG tablet TAKE 1 TABLET TWICE A DAY   • glipizide (GLUCOTROL XL) 2.5 mg, Oral, Daily   • Glucosamine-Chondroit-Vit C-Mn (GLUCOSAMINE CHONDR 1500 COMPLX PO) 1 tablet, Oral, Daily   • glucose blood (Accu-Chek Kayce Plus) test strip TEST ONCE DAILY.   • glucose monitor monitoring kit 1 each, Does not apply, As Needed   • Lancets (accu-chek soft touch) lancets Use as directed   • latanoprost (XALATAN) 0.005 % ophthalmic " "solution PLACE 1 DROP IN EACH EYE EVERY DAY AT BEDTIME   • lisinopril (PRINIVIL,ZESTRIL) 40 MG tablet TAKE 1 TABLET DAILY   • lovastatin (MEVACOR) 10 MG tablet TAKE 1 TABLET EVERY NIGHT   • melatonin 3 mg, Oral, Nightly   • pantoprazole (PROTONIX) 40 MG EC tablet TAKE 1 TABLET DAILY   • polyethylene glycol (MIRALAX) pack packet 17 g, Oral, Daily   • predniSONE (DELTASONE) 20 mg, Oral, Daily   • tobramycin-dexamethasone (TOBRADEX) 0.3-0.1 % ophthalmic suspension 1 drop, Left Eye, Daily       .    Objective:     /70 (BP Location: Left arm, Patient Position: Sitting, Cuff Size: Adult)   Pulse 60   Ht 170.2 cm (67\")   Wt 86.2 kg (190 lb)   SpO2 98%   BMI 29.76 kg/m²    Body mass index is 29.76 kg/m².     Vitals reviewed.   Constitutional:       Appearance: Well-developed.   Pulmonary:      Effort: Pulmonary effort is normal. No respiratory distress.      Breath sounds: Normal breath sounds. No wheezing. No rales.      Comments: Bases clear  Chest:      Chest wall: Not tender to palpatation.   Cardiovascular:      Normal rate. Regular rhythm.      Murmurs: There is no murmur.      No gallop. No click. No rub.   Pulses:     Intact distal pulses.   Edema:     Peripheral edema absent.   Musculoskeletal: Normal range of motion.       Lab Review:                 TSH    TSH 1/19/22 7/21/22   TSH 1.920 2.410                   Procedures               Assessment:      Diagnosis Plan   1. Presence of cardiac pacemaker    This patient's Cardiac Implanted Electronic Device was manually interrogated and reprogrammed during the patient encounter today.  Iterative programming changes were manually made to determine the sensing threshold, pacing threshold, lead impedance as well as underlying cardiac rhythm.  These programming changes were not limited to but included some or all of the following when appropriate: pacing mode, programmed AV delays, blanking periods, and refractory periods.  Data obtained as a result of these " manual programing changes informed the patient's CIED permanent programming.          2. Sinus node dysfunction (HCC)   normal functioning dual-chamber permanent pacemaker      3. Chronic combined systolic and diastolic congestive heart failure (HCC)   well managed, euvolemic, continue current medical regimen      4. Essential hypertension   overall appears well managed, continue current medical regimen        Plan:     Stable cardiac status.  Continue current medications.   in 6 months, sooner as needed.  Thank you for allowing us to participate in the care of your patient.     Electronically signed by PATIENCE Jack, 12/05/22, 10:19 AM EST.

## 2022-12-09 RX ORDER — LISINOPRIL 40 MG/1
40 TABLET ORAL DAILY
Qty: 90 TABLET | Refills: 3 | Status: SHIPPED | OUTPATIENT
Start: 2022-12-09 | End: 2023-02-09 | Stop reason: SDUPTHER

## 2023-01-26 ENCOUNTER — TRANSCRIBE ORDERS (OUTPATIENT)
Dept: ADMINISTRATIVE | Facility: HOSPITAL | Age: 83
End: 2023-01-26
Payer: MEDICARE

## 2023-01-26 DIAGNOSIS — M51.36 DEGENERATION OF LUMBAR INTERVERTEBRAL DISC: Primary | ICD-10-CM

## 2023-01-31 ENCOUNTER — PATIENT MESSAGE (OUTPATIENT)
Dept: INTERNAL MEDICINE | Facility: CLINIC | Age: 83
End: 2023-01-31
Payer: MEDICARE

## 2023-01-31 DIAGNOSIS — E11.9 TYPE 2 DIABETES MELLITUS WITHOUT COMPLICATION, WITHOUT LONG-TERM CURRENT USE OF INSULIN: Primary | ICD-10-CM

## 2023-01-31 NOTE — TELEPHONE ENCOUNTER
From: Radha Whelan  To: Farhan Schaefer  Sent: 1/31/2023 6:03 PM EST  Subject: Medication     My [employer] [facility] for my prescriptions. Would you please sent a prescription for ONETOUCH Ultra test strips to [facility] pharmacy. Thanks

## 2023-02-01 ENCOUNTER — HOSPITAL ENCOUNTER (OUTPATIENT)
Dept: CT IMAGING | Facility: HOSPITAL | Age: 83
Discharge: HOME | End: 2023-02-01
Admitting: PHYSICIAN ASSISTANT
Payer: MEDICARE

## 2023-02-01 DIAGNOSIS — M51.36 DEGENERATION OF LUMBAR INTERVERTEBRAL DISC: ICD-10-CM

## 2023-02-01 LAB — CREAT BLDA-MCNC: 1.2 MG/DL (ref 0.6–1.3)

## 2023-02-01 PROCEDURE — 82565 ASSAY OF CREATININE: CPT

## 2023-02-01 PROCEDURE — 25010000002 IOPAMIDOL 61 % SOLUTION: Performed by: PHYSICIAN ASSISTANT

## 2023-02-01 PROCEDURE — 72132 CT LUMBAR SPINE W/DYE: CPT

## 2023-02-01 RX ADMIN — IOPAMIDOL 95 ML: 612 INJECTION, SOLUTION INTRAVENOUS at 10:33

## 2023-02-07 ENCOUNTER — PATIENT MESSAGE (OUTPATIENT)
Dept: INTERNAL MEDICINE | Facility: CLINIC | Age: 83
End: 2023-02-07
Payer: MEDICARE

## 2023-02-07 DIAGNOSIS — I67.82 TEMPORARY CEREBRAL VASCULAR DYSFUNCTION: ICD-10-CM

## 2023-02-07 RX ORDER — CLOPIDOGREL BISULFATE 75 MG/1
75 TABLET ORAL DAILY
Qty: 90 TABLET | Refills: 3 | Status: SHIPPED | OUTPATIENT
Start: 2023-02-07 | End: 2023-02-09 | Stop reason: SDUPTHER

## 2023-02-07 RX ORDER — PANTOPRAZOLE SODIUM 40 MG/1
40 TABLET, DELAYED RELEASE ORAL DAILY
Qty: 90 TABLET | Refills: 3 | Status: SHIPPED | OUTPATIENT
Start: 2023-02-07

## 2023-02-07 RX ORDER — GLIPIZIDE 2.5 MG/1
2.5 TABLET, EXTENDED RELEASE ORAL DAILY
Qty: 90 TABLET | Refills: 3 | Status: SHIPPED | OUTPATIENT
Start: 2023-02-07

## 2023-02-07 RX ORDER — FUROSEMIDE 20 MG/1
20 TABLET ORAL 2 TIMES DAILY
Qty: 180 TABLET | Refills: 3 | Status: ON HOLD | OUTPATIENT
Start: 2023-02-07 | End: 2023-03-28 | Stop reason: SDUPTHER

## 2023-02-07 NOTE — TELEPHONE ENCOUNTER
From: Radha Whelan  To: Farhan Schaefer  Sent: 2/7/2023 2:03 PM EST  Subject: New pharmacy     [employer] does not use [facility] anymore. I need 4 prescription refilled.Glipizide 2.5mg. Pantoprazole sodium 40 mg. Clopidogrel tab 75 mg. and. Lisinopril tab. 40 mg. Would you please send new prescriptions to [facility]. Thank you

## 2023-02-09 DIAGNOSIS — I67.82 TEMPORARY CEREBRAL VASCULAR DYSFUNCTION: ICD-10-CM

## 2023-02-10 RX ORDER — CLOPIDOGREL BISULFATE 75 MG/1
75 TABLET ORAL DAILY
Qty: 90 TABLET | Refills: 3 | Status: SHIPPED | OUTPATIENT
Start: 2023-02-10

## 2023-02-10 RX ORDER — LISINOPRIL 40 MG/1
40 TABLET ORAL DAILY
Qty: 90 TABLET | Refills: 3 | Status: SHIPPED | OUTPATIENT
Start: 2023-02-10

## 2023-03-23 PROCEDURE — 93296 REM INTERROG EVL PM/IDS: CPT | Performed by: INTERNAL MEDICINE

## 2023-03-23 PROCEDURE — 93294 REM INTERROG EVL PM/LDLS PM: CPT | Performed by: INTERNAL MEDICINE

## 2023-03-27 ENCOUNTER — APPOINTMENT (OUTPATIENT)
Dept: CARDIOLOGY | Facility: HOSPITAL | Age: 83
End: 2023-03-27
Payer: MEDICARE

## 2023-03-27 ENCOUNTER — HOSPITAL ENCOUNTER (OUTPATIENT)
Facility: HOSPITAL | Age: 83
Setting detail: OBSERVATION
Discharge: HOME | End: 2023-03-28
Attending: EMERGENCY MEDICINE | Admitting: FAMILY MEDICINE
Payer: MEDICARE

## 2023-03-27 ENCOUNTER — APPOINTMENT (OUTPATIENT)
Dept: GENERAL RADIOLOGY | Facility: HOSPITAL | Age: 83
End: 2023-03-27
Payer: MEDICARE

## 2023-03-27 DIAGNOSIS — R77.8 ELEVATED TROPONIN: ICD-10-CM

## 2023-03-27 DIAGNOSIS — R06.09 DYSPNEA ON EXERTION: Primary | ICD-10-CM

## 2023-03-27 LAB
ALBUMIN SERPL-MCNC: 3.7 G/DL (ref 3.5–5.2)
ALBUMIN/GLOB SERPL: 1 G/DL
ALP SERPL-CCNC: 76 U/L (ref 39–117)
ALT SERPL W P-5'-P-CCNC: 14 U/L (ref 1–33)
ANION GAP SERPL CALCULATED.3IONS-SCNC: 13.2 MMOL/L (ref 5–15)
AST SERPL-CCNC: 17 U/L (ref 1–32)
B PARAPERT DNA SPEC QL NAA+PROBE: NOT DETECTED
B PERT DNA SPEC QL NAA+PROBE: NOT DETECTED
BASOPHILS # BLD AUTO: 0.03 10*3/MM3 (ref 0–0.2)
BASOPHILS NFR BLD AUTO: 0.4 % (ref 0–1.5)
BILIRUB SERPL-MCNC: 0.6 MG/DL (ref 0–1.2)
BUN SERPL-MCNC: 18 MG/DL (ref 8–23)
BUN/CREAT SERPL: 17.5 (ref 7–25)
C PNEUM DNA NPH QL NAA+NON-PROBE: NOT DETECTED
CALCIUM SPEC-SCNC: 9.2 MG/DL (ref 8.6–10.5)
CHLORIDE SERPL-SCNC: 96 MMOL/L (ref 98–107)
CO2 SERPL-SCNC: 22.8 MMOL/L (ref 22–29)
CREAT SERPL-MCNC: 1.03 MG/DL (ref 0.57–1)
DEPRECATED RDW RBC AUTO: 48.7 FL (ref 37–54)
EGFRCR SERPLBLD CKD-EPI 2021: 54.1 ML/MIN/1.73
EOSINOPHIL # BLD AUTO: 0.06 10*3/MM3 (ref 0–0.4)
EOSINOPHIL NFR BLD AUTO: 0.7 % (ref 0.3–6.2)
ERYTHROCYTE [DISTWIDTH] IN BLOOD BY AUTOMATED COUNT: 15.1 % (ref 12.3–15.4)
FLUAV SUBTYP SPEC NAA+PROBE: NOT DETECTED
FLUBV RNA ISLT QL NAA+PROBE: NOT DETECTED
GLOBULIN UR ELPH-MCNC: 3.7 GM/DL
GLUCOSE BLDC GLUCOMTR-MCNC: 230 MG/DL (ref 70–130)
GLUCOSE SERPL-MCNC: 170 MG/DL (ref 65–99)
HADV DNA SPEC NAA+PROBE: NOT DETECTED
HBA1C MFR BLD: 8.1 % (ref 4.8–5.6)
HCOV 229E RNA SPEC QL NAA+PROBE: NOT DETECTED
HCOV HKU1 RNA SPEC QL NAA+PROBE: NOT DETECTED
HCOV NL63 RNA SPEC QL NAA+PROBE: NOT DETECTED
HCOV OC43 RNA SPEC QL NAA+PROBE: NOT DETECTED
HCT VFR BLD AUTO: 40.2 % (ref 34–46.6)
HGB BLD-MCNC: 13.3 G/DL (ref 12–15.9)
HMPV RNA NPH QL NAA+NON-PROBE: NOT DETECTED
HOLD SPECIMEN: NORMAL
HOLD SPECIMEN: NORMAL
HPIV1 RNA ISLT QL NAA+PROBE: NOT DETECTED
HPIV2 RNA SPEC QL NAA+PROBE: NOT DETECTED
HPIV3 RNA NPH QL NAA+PROBE: NOT DETECTED
HPIV4 P GENE NPH QL NAA+PROBE: NOT DETECTED
IMM GRANULOCYTES # BLD AUTO: 0.05 10*3/MM3 (ref 0–0.05)
IMM GRANULOCYTES NFR BLD AUTO: 0.6 % (ref 0–0.5)
LYMPHOCYTES # BLD AUTO: 2.4 10*3/MM3 (ref 0.7–3.1)
LYMPHOCYTES NFR BLD AUTO: 29.7 % (ref 19.6–45.3)
M PNEUMO IGG SER IA-ACNC: NOT DETECTED
MCH RBC QN AUTO: 29.4 PG (ref 26.6–33)
MCHC RBC AUTO-ENTMCNC: 33.1 G/DL (ref 31.5–35.7)
MCV RBC AUTO: 88.7 FL (ref 79–97)
MONOCYTES # BLD AUTO: 0.62 10*3/MM3 (ref 0.1–0.9)
MONOCYTES NFR BLD AUTO: 7.7 % (ref 5–12)
NEUTROPHILS NFR BLD AUTO: 4.92 10*3/MM3 (ref 1.7–7)
NEUTROPHILS NFR BLD AUTO: 60.9 % (ref 42.7–76)
NRBC BLD AUTO-RTO: 0 /100 WBC (ref 0–0.2)
NT-PROBNP SERPL-MCNC: 204.5 PG/ML (ref 0–1800)
PLATELET # BLD AUTO: 151 10*3/MM3 (ref 140–450)
PMV BLD AUTO: 8.2 FL (ref 6–12)
POTASSIUM SERPL-SCNC: 4 MMOL/L (ref 3.5–5.2)
PROT SERPL-MCNC: 7.4 G/DL (ref 6–8.5)
RBC # BLD AUTO: 4.53 10*6/MM3 (ref 3.77–5.28)
RHINOVIRUS RNA SPEC NAA+PROBE: NOT DETECTED
RSV RNA NPH QL NAA+NON-PROBE: NOT DETECTED
SARS-COV-2 RNA NPH QL NAA+NON-PROBE: NOT DETECTED
SODIUM SERPL-SCNC: 132 MMOL/L (ref 136–145)
TROPONIN T SERPL HS-MCNC: 17 NG/L
TROPONIN T SERPL HS-MCNC: 32 NG/L
WBC NRBC COR # BLD: 8.08 10*3/MM3 (ref 3.4–10.8)
WHOLE BLOOD HOLD COAG: NORMAL
WHOLE BLOOD HOLD SPECIMEN: NORMAL

## 2023-03-27 PROCEDURE — 0202U NFCT DS 22 TRGT SARS-COV-2: CPT | Performed by: EMERGENCY MEDICINE

## 2023-03-27 PROCEDURE — 36415 COLL VENOUS BLD VENIPUNCTURE: CPT

## 2023-03-27 PROCEDURE — 63710000001 INSULIN ASPART PER 5 UNITS: Performed by: INTERNAL MEDICINE

## 2023-03-27 PROCEDURE — G0378 HOSPITAL OBSERVATION PER HR: HCPCS

## 2023-03-27 PROCEDURE — 94640 AIRWAY INHALATION TREATMENT: CPT

## 2023-03-27 PROCEDURE — 93005 ELECTROCARDIOGRAM TRACING: CPT | Performed by: EMERGENCY MEDICINE

## 2023-03-27 PROCEDURE — 25010000002 SULFUR HEXAFLUORIDE MICROSPH 60.7-25 MG RECONSTITUTED SUSPENSION: Performed by: INTERNAL MEDICINE

## 2023-03-27 PROCEDURE — 83036 HEMOGLOBIN GLYCOSYLATED A1C: CPT | Performed by: INTERNAL MEDICINE

## 2023-03-27 PROCEDURE — 93306 TTE W/DOPPLER COMPLETE: CPT

## 2023-03-27 PROCEDURE — 93306 TTE W/DOPPLER COMPLETE: CPT | Performed by: INTERNAL MEDICINE

## 2023-03-27 PROCEDURE — 25010000002 ENOXAPARIN PER 10 MG: Performed by: INTERNAL MEDICINE

## 2023-03-27 PROCEDURE — 82962 GLUCOSE BLOOD TEST: CPT

## 2023-03-27 PROCEDURE — 63710000001 INSULIN DETEMIR PER 5 UNITS: Performed by: INTERNAL MEDICINE

## 2023-03-27 PROCEDURE — 96372 THER/PROPH/DIAG INJ SC/IM: CPT

## 2023-03-27 PROCEDURE — 80053 COMPREHEN METABOLIC PANEL: CPT | Performed by: EMERGENCY MEDICINE

## 2023-03-27 PROCEDURE — 84484 ASSAY OF TROPONIN QUANT: CPT | Performed by: EMERGENCY MEDICINE

## 2023-03-27 PROCEDURE — 99285 EMERGENCY DEPT VISIT HI MDM: CPT

## 2023-03-27 PROCEDURE — 83880 ASSAY OF NATRIURETIC PEPTIDE: CPT | Performed by: EMERGENCY MEDICINE

## 2023-03-27 PROCEDURE — 99223 1ST HOSP IP/OBS HIGH 75: CPT | Performed by: INTERNAL MEDICINE

## 2023-03-27 PROCEDURE — 85025 COMPLETE CBC W/AUTO DIFF WBC: CPT | Performed by: EMERGENCY MEDICINE

## 2023-03-27 PROCEDURE — 71045 X-RAY EXAM CHEST 1 VIEW: CPT

## 2023-03-27 RX ORDER — LATANOPROST 50 UG/ML
1 SOLUTION/ DROPS OPHTHALMIC NIGHTLY
Status: DISCONTINUED | OUTPATIENT
Start: 2023-03-27 | End: 2023-03-28 | Stop reason: HOSPADM

## 2023-03-27 RX ORDER — LISINOPRIL 20 MG/1
20 TABLET ORAL
Status: DISCONTINUED | OUTPATIENT
Start: 2023-03-27 | End: 2023-03-28 | Stop reason: HOSPADM

## 2023-03-27 RX ORDER — ASPIRIN 81 MG/1
81 TABLET, CHEWABLE ORAL DAILY
Status: DISCONTINUED | OUTPATIENT
Start: 2023-03-27 | End: 2023-03-28 | Stop reason: HOSPADM

## 2023-03-27 RX ORDER — BISACODYL 10 MG
10 SUPPOSITORY, RECTAL RECTAL DAILY PRN
Status: DISCONTINUED | OUTPATIENT
Start: 2023-03-27 | End: 2023-03-28 | Stop reason: HOSPADM

## 2023-03-27 RX ORDER — ACETAMINOPHEN 160 MG/5ML
650 SOLUTION ORAL EVERY 4 HOURS PRN
Status: DISCONTINUED | OUTPATIENT
Start: 2023-03-27 | End: 2023-03-28 | Stop reason: HOSPADM

## 2023-03-27 RX ORDER — POLYETHYLENE GLYCOL 3350 17 G/17G
17 POWDER, FOR SOLUTION ORAL DAILY PRN
Status: DISCONTINUED | OUTPATIENT
Start: 2023-03-27 | End: 2023-03-28 | Stop reason: HOSPADM

## 2023-03-27 RX ORDER — SODIUM CHLORIDE 9 MG/ML
40 INJECTION, SOLUTION INTRAVENOUS AS NEEDED
Status: DISCONTINUED | OUTPATIENT
Start: 2023-03-27 | End: 2023-03-28 | Stop reason: HOSPADM

## 2023-03-27 RX ORDER — SODIUM CHLORIDE 0.9 % (FLUSH) 0.9 %
10 SYRINGE (ML) INJECTION AS NEEDED
Status: DISCONTINUED | OUTPATIENT
Start: 2023-03-27 | End: 2023-03-28 | Stop reason: HOSPADM

## 2023-03-27 RX ORDER — CHOLECALCIFEROL (VITAMIN D3) 125 MCG
5 CAPSULE ORAL NIGHTLY PRN
Status: DISCONTINUED | OUTPATIENT
Start: 2023-03-27 | End: 2023-03-28 | Stop reason: HOSPADM

## 2023-03-27 RX ORDER — BISACODYL 5 MG/1
5 TABLET, DELAYED RELEASE ORAL DAILY PRN
Status: DISCONTINUED | OUTPATIENT
Start: 2023-03-27 | End: 2023-03-28 | Stop reason: HOSPADM

## 2023-03-27 RX ORDER — ONDANSETRON 2 MG/ML
4 INJECTION INTRAMUSCULAR; INTRAVENOUS EVERY 6 HOURS PRN
Status: DISCONTINUED | OUTPATIENT
Start: 2023-03-27 | End: 2023-03-28 | Stop reason: HOSPADM

## 2023-03-27 RX ORDER — ACETAMINOPHEN 650 MG/1
650 SUPPOSITORY RECTAL EVERY 4 HOURS PRN
Status: DISCONTINUED | OUTPATIENT
Start: 2023-03-27 | End: 2023-03-28 | Stop reason: HOSPADM

## 2023-03-27 RX ORDER — DEXTROSE MONOHYDRATE 25 G/50ML
25 INJECTION, SOLUTION INTRAVENOUS
Status: DISCONTINUED | OUTPATIENT
Start: 2023-03-27 | End: 2023-03-28 | Stop reason: HOSPADM

## 2023-03-27 RX ORDER — FUROSEMIDE 20 MG/1
20 TABLET ORAL 2 TIMES DAILY
Status: DISCONTINUED | OUTPATIENT
Start: 2023-03-27 | End: 2023-03-28 | Stop reason: HOSPADM

## 2023-03-27 RX ORDER — SODIUM CHLORIDE 0.9 % (FLUSH) 0.9 %
3-10 SYRINGE (ML) INJECTION AS NEEDED
Status: DISCONTINUED | OUTPATIENT
Start: 2023-03-27 | End: 2023-03-28 | Stop reason: HOSPADM

## 2023-03-27 RX ORDER — ACETAMINOPHEN 325 MG/1
650 TABLET ORAL EVERY 4 HOURS PRN
Status: DISCONTINUED | OUTPATIENT
Start: 2023-03-27 | End: 2023-03-28 | Stop reason: HOSPADM

## 2023-03-27 RX ORDER — SODIUM CHLORIDE 0.9 % (FLUSH) 0.9 %
3 SYRINGE (ML) INJECTION EVERY 12 HOURS SCHEDULED
Status: DISCONTINUED | OUTPATIENT
Start: 2023-03-27 | End: 2023-03-28 | Stop reason: HOSPADM

## 2023-03-27 RX ORDER — AMOXICILLIN 250 MG
2 CAPSULE ORAL 2 TIMES DAILY
Status: DISCONTINUED | OUTPATIENT
Start: 2023-03-27 | End: 2023-03-28 | Stop reason: HOSPADM

## 2023-03-27 RX ORDER — INSULIN ASPART 100 [IU]/ML
0-9 INJECTION, SOLUTION INTRAVENOUS; SUBCUTANEOUS
Status: DISCONTINUED | OUTPATIENT
Start: 2023-03-27 | End: 2023-03-28 | Stop reason: HOSPADM

## 2023-03-27 RX ORDER — ENOXAPARIN SODIUM 100 MG/ML
40 INJECTION SUBCUTANEOUS EVERY 24 HOURS
Status: DISCONTINUED | OUTPATIENT
Start: 2023-03-27 | End: 2023-03-28 | Stop reason: HOSPADM

## 2023-03-27 RX ORDER — NICOTINE POLACRILEX 4 MG
15 LOZENGE BUCCAL
Status: DISCONTINUED | OUTPATIENT
Start: 2023-03-27 | End: 2023-03-28 | Stop reason: HOSPADM

## 2023-03-27 RX ORDER — IPRATROPIUM BROMIDE AND ALBUTEROL SULFATE 2.5; .5 MG/3ML; MG/3ML
3 SOLUTION RESPIRATORY (INHALATION) ONCE
Status: COMPLETED | OUTPATIENT
Start: 2023-03-27 | End: 2023-03-27

## 2023-03-27 RX ORDER — CLOPIDOGREL BISULFATE 75 MG/1
75 TABLET ORAL DAILY
Status: DISCONTINUED | OUTPATIENT
Start: 2023-03-27 | End: 2023-03-28 | Stop reason: HOSPADM

## 2023-03-27 RX ORDER — PANTOPRAZOLE SODIUM 40 MG/1
40 TABLET, DELAYED RELEASE ORAL DAILY
Status: DISCONTINUED | OUTPATIENT
Start: 2023-03-27 | End: 2023-03-28 | Stop reason: HOSPADM

## 2023-03-27 RX ADMIN — LISINOPRIL 20 MG: 20 TABLET ORAL at 13:45

## 2023-03-27 RX ADMIN — INSULIN DETEMIR 5 UNITS: 100 INJECTION, SOLUTION SUBCUTANEOUS at 20:52

## 2023-03-27 RX ADMIN — Medication 5 MG: at 21:53

## 2023-03-27 RX ADMIN — LATANOPROST 1 DROP: 50 SOLUTION OPHTHALMIC at 20:51

## 2023-03-27 RX ADMIN — SENNOSIDES AND DOCUSATE SODIUM 2 TABLET: 50; 8.6 TABLET ORAL at 20:51

## 2023-03-27 RX ADMIN — ASPIRIN 81 MG CHEWABLE TABLET 81 MG: 81 TABLET CHEWABLE at 13:45

## 2023-03-27 RX ADMIN — Medication 3 ML: at 20:51

## 2023-03-27 RX ADMIN — CLOPIDOGREL BISULFATE 75 MG: 75 TABLET ORAL at 13:45

## 2023-03-27 RX ADMIN — INSULIN ASPART 4 UNITS: 100 INJECTION, SOLUTION INTRAVENOUS; SUBCUTANEOUS at 17:13

## 2023-03-27 RX ADMIN — ACETAMINOPHEN 650 MG: 325 TABLET, FILM COATED ORAL at 20:55

## 2023-03-27 RX ADMIN — FUROSEMIDE 20 MG: 20 TABLET ORAL at 20:50

## 2023-03-27 RX ADMIN — IPRATROPIUM BROMIDE AND ALBUTEROL SULFATE 3 ML: 2.5; .5 SOLUTION RESPIRATORY (INHALATION) at 09:57

## 2023-03-27 RX ADMIN — PANTOPRAZOLE SODIUM 40 MG: 40 TABLET, DELAYED RELEASE ORAL at 13:45

## 2023-03-27 RX ADMIN — SULFUR HEXAFLUORIDE 2 ML: KIT at 17:18

## 2023-03-27 RX ADMIN — ENOXAPARIN SODIUM 40 MG: 100 INJECTION SUBCUTANEOUS at 17:13

## 2023-03-27 NOTE — PLAN OF CARE
Goal Outcome Evaluation:      Assumed care of patient at 1600 as primary RN traveled with vented pt. PT alert and oriented X4, resting in bed for ECHO at bedside. Vitals WDL on room air.

## 2023-03-27 NOTE — H&P
[facility]   HISTORY AND PHYSICAL      Name:  Radha Whelan   Age:  82 y.o.  Sex:  female  :  1940  MRN:  [id number]   Visit Number:  [id number]  Admission Date:  3/27/2023  Date Of Service:  23  Primary Care Physician:  Farhan Schaefer MD    Chief Complaint:     Shortness of breath.    History Of Presenting Illness:      Ms. Whelan is a pleasant 82-year-old female with history of hypertension, diabetes mellitus type 2, history of TIA, sick sinus syndrome status post pacemaker was brought to the emergency room by her daughter with symptoms of shortness of breath of 1 week duration.  Patient states that about 2 weeks ago she did have a fall at her home after tripping on her stairs and fell flat on her face causing bruising of both of her cheekbones.  At that time, she did not come to the hospital or seek medical attention.  About a week ago, she went to her grandchild's school for her parent meeting and apparently walked up 4 flights of stairs after which she started having shortness of breath.  Since then she has had progressive worsening of her shortness of breath especially on exertion.  She denies any chest pain or chest tightness.  Patient states that when she had a hip fracture, she had cardiac catheterization about 3 years ago and it apparently was within normal limits.    In the emergency room, she was afebrile and hemodynamically stable saturating at 97% on room air.  Blood work done in the emergency room was fairly unremarkable except for glucose of 170, sodium 132.  Initial troponin was 17 with a repeat at 32.  CBC was unremarkable.  Respiratory panel including COVID-19 was negative.  Chest x-ray was fairly unremarkable and in fact showed slightly improved interstitial disease compared to prior.  EKG showed paced rhythm without any significant ST-T changes.  Patient received bronchodilator therapy in the emergency room.  Due to worsening of her troponin levels, she is  currently being admitted to the medical floor with telemetry.    Review Of Systems:    All systems were reviewed and negative except as mentioned in history of presenting illness, assessment and plan.    Past Medical History: Patient  has a past medical history of Arthritis, Basal cell carcinoma, Cardiomyopathy (HCC), Cataract, bilateral, Diabetes mellitus (HCC), DVT (deep venous thrombosis) (HCC), Edema, History of migraine headaches, exercise stress test (2004), Hypertension, Hyponatremia, LBBB (left bundle branch block), LBBB (left bundle branch block), Left leg pain, Muscle spasm, Obesity, Teeth missing, Thyroid nodule, TIA (transient ischemic attack), Wears dentures, and Wears glasses.    Past Surgical History: Patient  has a past surgical history that includes Tubal ligation; Cataract extraction (12/2018); Cardiac catheterization; Cardiac catheterization (N/A, 09/16/2019); Breast biopsy (Left); Colonoscopy; Skin biopsy; Mouth surgery; Cardiac electrophysiology procedure (N/A, 12/06/2019); Total hip arthroplasty (Right, 12/04/2019); and Insert / replace / remove pacemaker.    Social History: Patient  reports that she has never smoked. She has never used smokeless tobacco. She reports that she does not drink alcohol and does not use drugs.    Family History:  Patient family history includes Asthma in her daughter; Breast cancer in her sister, sister and another family member; Cancer in an other family member; Diabetes in an other family member; Diabetes type II in her brother; Heart disease in her father, mother, and sister; Heart failure in her daughter; Hypertension in an other family member; Intracerebral hemorrhage in her brother; Migraines in an other family member; No Known Problems in her brother; Stroke in her sister.    Allergies:      Cumini, Cheese, Ibuprofen, Other, and Saccharin    Home Medications:    Prior to Admission Medications     Prescriptions Last Dose Informant Patient Reported? Taking?     acetaminophen (TYLENOL) 325 MG tablet Past Week  No Yes    Take 2 tablets by mouth Every 6 (Six) Hours As Needed for Mild Pain .    aspirin 81 MG chewable tablet 3/26/2023  Yes Yes    Chew 1 tablet Daily.    B Complex-C (B-complex with vitamin C) tablet Past Week  No Yes    Take 1 tablet by mouth Every Other Day.    Patient taking differently:  Take 1 tablet by mouth 2 (Two) Times a Week.    Blood Glucose Monitoring Suppl (ONE TOUCH ULTRA 2) w/Device kit 3/26/2023  No Yes    USE AS NEEDED FOR BLOOD    SUGAR MONITORING    Cholecalciferol (VITAMIN D PO) Past Week Self Yes Yes    Take 125 mcg by mouth 2 (Two) Times a Week.    clopidogrel (PLAVIX) 75 MG tablet 3/26/2023  No Yes    Take 1 tablet by mouth Daily.    Coenzyme Q10 200 MG capsule 3/26/2023 Self Yes Yes    Take 400 mg by mouth Every Other Day.    folic acid (FOLVITE) 1 MG tablet   No No    Take 1 tablet by mouth Daily.    furosemide (LASIX) 20 MG tablet 3/26/2023  No Yes    Take 1 tablet by mouth 2 (Two) Times a Day.    glipizide (GLUCOTROL XL) 2.5 MG 24 hr tablet 3/26/2023  No Yes    Take 1 tablet by mouth Daily.    Glucosamine-Chondroit-Vit C-Mn (GLUCOSAMINE CHONDR 1500 COMPLX PO) More than a month Self Yes No    Take 1 tablet by mouth Daily.    glucose blood (Accu-Chek Kayce Plus) test strip 3/26/2023  No Yes    TEST ONCE DAILY.    glucose monitor monitoring kit 3/26/2023  No Yes    1 each As Needed (blood sugar).    Lancets (accu-chek soft touch) lancets 3/26/2023  No Yes    Use as directed    latanoprost (XALATAN) 0.005 % ophthalmic solution 3/26/2023  Yes Yes    PLACE 1 DROP IN EACH EYE EVERY DAY AT BEDTIME    lisinopril (PRINIVIL,ZESTRIL) 40 MG tablet 3/26/2023  No Yes    Take 1 tablet by mouth Daily.    lovastatin (MEVACOR) 10 MG tablet Patient Taking Differently  No Yes    TAKE 1 TABLET EVERY NIGHT    Patient taking differently:  1 tablet. TAKES ONES EVERY OTHER NIGHTS    melatonin 3 MG tablet 3/26/2023 Self Yes Yes    Take 1 tablet by mouth Every  "Night.    pantoprazole (PROTONIX) 40 MG EC tablet 3/26/2023  No Yes    Take 1 tablet by mouth Daily.    polyethylene glycol (MIRALAX) pack packet   No No    Take 17 g by mouth Daily.    predniSONE (DELTASONE) 20 MG tablet   No No    Take 1 tablet by mouth Daily.    tobramycin-dexamethasone (TOBRADEX) 0.3-0.1 % ophthalmic suspension 3/26/2023  No Yes    Administer 1 drop into the left eye Daily.    Patient taking differently:  Administer 1 drop to both eyes As Needed.        Vital Signs:  Temp:  [97.3 °F (36.3 °C)-98.9 °F (37.2 °C)] 98.9 °F (37.2 °C)  Heart Rate:  [60-90] 62  Resp:  [16-18] 18  BP: (137-157)/(69-92) 148/82        03/27/23  0713   Weight: 83 kg (183 lb)     Body mass index is 28.66 kg/m².    Physical Exam:     Most recent vital Signs: /82 (BP Location: Left arm, Patient Position: Lying)   Pulse 62   Temp 98.9 °F (37.2 °C) (Axillary)   Resp 18   Ht 170.2 cm (67\")   Wt 83 kg (183 lb)   SpO2 99%   BMI 28.66 kg/m²     Physical Exam  Constitutional:       General: She is not in acute distress.     Appearance: She is obese. She is not ill-appearing.   HENT:      Head: Normocephalic.      Comments: Ecchymosis noted on the face below both eyes due to her recent fall.     Right Ear: External ear normal.      Left Ear: External ear normal.      Nose: Nose normal.      Mouth/Throat:      Mouth: Mucous membranes are moist.   Eyes:      Extraocular Movements: Extraocular movements intact.      Conjunctiva/sclera: Conjunctivae normal.   Cardiovascular:      Rate and Rhythm: Normal rate and regular rhythm.      Pulses: Normal pulses.      Heart sounds: Normal heart sounds. No murmur heard.     Comments: Pacemaker is palpated on the left upper chest.  Pulmonary:      Effort: Pulmonary effort is normal.      Breath sounds: Rales present. No wheezing.      Comments: Occasional basal crackles heard especially on the right base.  Abdominal:      General: Bowel sounds are normal.      Palpations: Abdomen is " soft.      Tenderness: There is no abdominal tenderness. There is no guarding or rebound.      Comments: Obese abdomen   Musculoskeletal:         General: Normal range of motion.      Cervical back: Neck supple.      Right lower leg: No edema.      Left lower leg: No edema.   Skin:     General: Skin is warm.      Findings: No erythema or rash.   Neurological:      General: No focal deficit present.      Mental Status: She is alert and oriented to person, place, and time. Mental status is at baseline.   Psychiatric:         Mood and Affect: Mood normal.         Behavior: Behavior normal.       Laboratory data:    I have reviewed the labs done in the emergency room.    Results from last 7 days   Lab Units 03/27/23  0727   SODIUM mmol/L 132*   POTASSIUM mmol/L 4.0   CHLORIDE mmol/L 96*   CO2 mmol/L 22.8   BUN mg/dL 18   CREATININE mg/dL 1.03*   CALCIUM mg/dL 9.2   BILIRUBIN mg/dL 0.6   ALK PHOS U/L 76   ALT (SGPT) U/L 14   AST (SGOT) U/L 17   GLUCOSE mg/dL 170*     Results from last 7 days   Lab Units 03/27/23  0727   WBC 10*3/mm3 8.08   HEMOGLOBIN g/dL 13.3   HEMATOCRIT % 40.2   PLATELETS 10*3/mm3 151         Results from last 7 days   Lab Units 03/27/23  0945 03/27/23  0727   HSTROP T ng/L 32* 17*     Results from last 7 days   Lab Units 03/27/23  0727   PROBNP pg/mL 204.5     EKG:      EKG done in the emergency room was reviewed by me and it showed sinus rhythm at 67 bpm.  Left axis deviation noted with broad QRS complexes suggestive of left bundle branch block.  Repeat EKG done in the emergency room showed atrially paced rhythm at 61 bpm with pacemaker related wide QRS complexes noted.    Radiology:    XR Chest 1 View    Result Date: 3/27/2023  PROCEDURE: XR CHEST 1 VW-  HISTORY: SOA Triage Protocol  COMPARISON: 09/09/2021.  FINDINGS: The heart is mildly enlarged, similar to prior exam. 2-lead pacemaker again noted. There is mild interstitial disease bilaterally with linear densities left lower lung; finding stable  to slightly improved compared to prior study.. The lungs are clear. The mediastinum is unremarkable. There is no pneumothorax.  There are no acute osseous abnormalities.      Stable to slightly improved interstitial disease compared to prior..    This report was signed and finalized on 3/27/2023 8:14 AM by Hiwot Alves MD.    Assessment:    1. Dyspnea on exertion with borderline elevation of troponin levels, POA.  2. Suspected underlying diastolic heart failure, POA.  3. Essential hypertension.  4. Diabetes mellitus type 2.  5. Sick sinus syndrome status post pacemaker.    Plan:    Ms. Whelan is currently being admitted to the medical floor with telemetry for observation.    Shortness of breath/elevated troponin levels.  - Exact etiology of the patient's shortness of breath is uncertain but differential includes coronary artery disease, hypertensive heart disease, diastolic heart failure and viral bronchitis.  - We will obtain a 2D echocardiogram to assess her left ventricular function.  - We will repeat her troponin level in the morning.  - We will continue aspirin, Plavix, lovastatin and lisinopril.    Diabetes mellitus type 2.  - Continue low-dose Levemir and subcutaneous insulin protocol.  - We will obtain hemoglobin A1c levels.    Essential hypertension.  - Continue lisinopril.    We will consult physical therapy.  Discussed with nursing staff at the bedside.  Patient lives with her  and uses a walker to ambulate.  We will consult physical therapy.    Risk Assessment: Moderate  DVT Prophylaxis: Enoxaparin  Code Status: Full  Diet: Cardiac diabetic    Advance Care Planning      ACP discussion was held with the patient during this visit. Patient does not have an advance directive, information provided.         Wong Page MD  03/27/23  16:43 EDT    Dictated utilizing Dragon dictation.

## 2023-03-28 ENCOUNTER — READMISSION MANAGEMENT (OUTPATIENT)
Dept: CALL CENTER | Facility: HOSPITAL | Age: 83
End: 2023-03-28
Payer: MEDICARE

## 2023-03-28 VITALS
BODY MASS INDEX: 29.48 KG/M2 | HEIGHT: 67 IN | OXYGEN SATURATION: 95 % | SYSTOLIC BLOOD PRESSURE: 118 MMHG | WEIGHT: 187.83 LBS | DIASTOLIC BLOOD PRESSURE: 65 MMHG | HEART RATE: 65 BPM | TEMPERATURE: 98.9 F | RESPIRATION RATE: 22 BRPM

## 2023-03-28 PROBLEM — I50.22 CHRONIC HFREF (HEART FAILURE WITH REDUCED EJECTION FRACTION): Status: ACTIVE | Noted: 2023-03-28

## 2023-03-28 LAB
ANION GAP SERPL CALCULATED.3IONS-SCNC: 10.9 MMOL/L (ref 5–15)
BH CV ECHO MEAS - AO MAX PG: 6.4 MMHG
BH CV ECHO MEAS - AO MEAN PG: 3 MMHG
BH CV ECHO MEAS - AO ROOT DIAM: 3.1 CM
BH CV ECHO MEAS - AO V2 MAX: 126 CM/SEC
BH CV ECHO MEAS - AO V2 VTI: 25 CM
BH CV ECHO MEAS - AVA(I,D): 1.89 CM2
BH CV ECHO MEAS - EDV(CUBED): 75.2 ML
BH CV ECHO MEAS - EDV(MOD-SP2): 65.8 ML
BH CV ECHO MEAS - EDV(MOD-SP4): 91.4 ML
BH CV ECHO MEAS - EF(MOD-BP): 39.9 %
BH CV ECHO MEAS - EF(MOD-SP2): 38.6 %
BH CV ECHO MEAS - EF(MOD-SP4): 39.6 %
BH CV ECHO MEAS - ESV(CUBED): 44.7 ML
BH CV ECHO MEAS - ESV(MOD-SP2): 40.4 ML
BH CV ECHO MEAS - ESV(MOD-SP4): 55.2 ML
BH CV ECHO MEAS - FS: 15.9 %
BH CV ECHO MEAS - IVS/LVPW: 1.16 CM
BH CV ECHO MEAS - IVSD: 0.95 CM
BH CV ECHO MEAS - LA DIMENSION: 3 CM
BH CV ECHO MEAS - LAT PEAK E' VEL: 3.4 CM/SEC
BH CV ECHO MEAS - LV DIASTOLIC VOL/BSA (35-75): 46.9 CM2
BH CV ECHO MEAS - LV MASS(C)D: 116.9 GRAMS
BH CV ECHO MEAS - LV MAX PG: 2.6 MMHG
BH CV ECHO MEAS - LV MEAN PG: 2 MMHG
BH CV ECHO MEAS - LV SYSTOLIC VOL/BSA (12-30): 28.3 CM2
BH CV ECHO MEAS - LV V1 MAX: 80.9 CM/SEC
BH CV ECHO MEAS - LV V1 VTI: 17.2 CM
BH CV ECHO MEAS - LVIDD: 4.2 CM
BH CV ECHO MEAS - LVIDS: 3.6 CM
BH CV ECHO MEAS - LVOT AREA: 2.7 CM2
BH CV ECHO MEAS - LVOT DIAM: 1.87 CM
BH CV ECHO MEAS - LVPWD: 0.82 CM
BH CV ECHO MEAS - MED PEAK E' VEL: 3.4 CM/SEC
BH CV ECHO MEAS - MV A MAX VEL: 93.8 CM/SEC
BH CV ECHO MEAS - MV DEC TIME: 0.18 MSEC
BH CV ECHO MEAS - MV E MAX VEL: 48.4 CM/SEC
BH CV ECHO MEAS - MV E/A: 0.52
BH CV ECHO MEAS - MV MAX PG: 3.8 MMHG
BH CV ECHO MEAS - MV MEAN PG: 1 MMHG
BH CV ECHO MEAS - MV V2 VTI: 23.9 CM
BH CV ECHO MEAS - MVA(VTI): 1.98 CM2
BH CV ECHO MEAS - PA ACC TIME: 0.07 SEC
BH CV ECHO MEAS - PA PR(ACCEL): 48.8 MMHG
BH CV ECHO MEAS - PA V2 MAX: 109 CM/SEC
BH CV ECHO MEAS - PI END-D VEL: 107 CM/SEC
BH CV ECHO MEAS - RAP SYSTOLE: 3 MMHG
BH CV ECHO MEAS - RV MAX PG: 1.38 MMHG
BH CV ECHO MEAS - RV V1 MAX: 58.7 CM/SEC
BH CV ECHO MEAS - RV V1 VTI: 11.1 CM
BH CV ECHO MEAS - RVSP: 32 MMHG
BH CV ECHO MEAS - SI(MOD-SP2): 13 ML/M2
BH CV ECHO MEAS - SI(MOD-SP4): 18.6 ML/M2
BH CV ECHO MEAS - SV(LVOT): 47.2 ML
BH CV ECHO MEAS - SV(MOD-SP2): 25.4 ML
BH CV ECHO MEAS - SV(MOD-SP4): 36.2 ML
BH CV ECHO MEAS - TAPSE (>1.6): 1.77 CM
BH CV ECHO MEAS - TR MAX PG: 28.5 MMHG
BH CV ECHO MEAS - TR MAX VEL: 267 CM/SEC
BH CV ECHO MEASUREMENTS AVERAGE E/E' RATIO: 14.24
BH CV XLRA - RV BASE: 2.7 CM
BH CV XLRA - TDI S': 9.6 CM/SEC
BUN SERPL-MCNC: 16 MG/DL (ref 8–23)
BUN/CREAT SERPL: 16.5 (ref 7–25)
CALCIUM SPEC-SCNC: 8.8 MG/DL (ref 8.6–10.5)
CHLORIDE SERPL-SCNC: 96 MMOL/L (ref 98–107)
CO2 SERPL-SCNC: 24.1 MMOL/L (ref 22–29)
CREAT SERPL-MCNC: 0.97 MG/DL (ref 0.57–1)
DEPRECATED RDW RBC AUTO: 50.5 FL (ref 37–54)
EGFRCR SERPLBLD CKD-EPI 2021: 58.1 ML/MIN/1.73
ERYTHROCYTE [DISTWIDTH] IN BLOOD BY AUTOMATED COUNT: 15.3 % (ref 12.3–15.4)
GEN 5 2HR TROPONIN T REFLEX: 22 NG/L
GLUCOSE BLDC GLUCOMTR-MCNC: 137 MG/DL (ref 70–130)
GLUCOSE BLDC GLUCOMTR-MCNC: 149 MG/DL (ref 70–130)
GLUCOSE SERPL-MCNC: 143 MG/DL (ref 65–99)
HCT VFR BLD AUTO: 38.9 % (ref 34–46.6)
HGB BLD-MCNC: 12.6 G/DL (ref 12–15.9)
LEFT ATRIUM VOLUME INDEX: 19 ML/M2
LV EF 2D ECHO EST: 40 %
MAXIMAL PREDICTED HEART RATE: 137 BPM
MCH RBC QN AUTO: 29.9 PG (ref 26.6–33)
MCHC RBC AUTO-ENTMCNC: 32.4 G/DL (ref 31.5–35.7)
MCV RBC AUTO: 92.2 FL (ref 79–97)
PLATELET # BLD AUTO: 136 10*3/MM3 (ref 140–450)
PMV BLD AUTO: 9 FL (ref 6–12)
POTASSIUM SERPL-SCNC: 4.4 MMOL/L (ref 3.5–5.2)
RBC # BLD AUTO: 4.22 10*6/MM3 (ref 3.77–5.28)
SODIUM SERPL-SCNC: 131 MMOL/L (ref 136–145)
STRESS TARGET HR: 116 BPM
TROPONIN T DELTA: -6 NG/L
TROPONIN T SERPL HS-MCNC: 28 NG/L
WBC NRBC COR # BLD: 5.68 10*3/MM3 (ref 3.4–10.8)

## 2023-03-28 PROCEDURE — 82962 GLUCOSE BLOOD TEST: CPT

## 2023-03-28 PROCEDURE — 80048 BASIC METABOLIC PNL TOTAL CA: CPT | Performed by: INTERNAL MEDICINE

## 2023-03-28 PROCEDURE — 99238 HOSP IP/OBS DSCHRG MGMT 30/<: CPT | Performed by: FAMILY MEDICINE

## 2023-03-28 PROCEDURE — 97161 PT EVAL LOW COMPLEX 20 MIN: CPT

## 2023-03-28 PROCEDURE — 85027 COMPLETE CBC AUTOMATED: CPT | Performed by: INTERNAL MEDICINE

## 2023-03-28 PROCEDURE — G0378 HOSPITAL OBSERVATION PER HR: HCPCS

## 2023-03-28 PROCEDURE — 94799 UNLISTED PULMONARY SVC/PX: CPT

## 2023-03-28 PROCEDURE — 84484 ASSAY OF TROPONIN QUANT: CPT | Performed by: FAMILY MEDICINE

## 2023-03-28 PROCEDURE — 94660 CPAP INITIATION&MGMT: CPT

## 2023-03-28 RX ORDER — TOBRAMYCIN AND DEXAMETHASONE 3; 1 MG/ML; MG/ML
1 SUSPENSION/ DROPS OPHTHALMIC AS NEEDED
Start: 2023-03-28

## 2023-03-28 RX ORDER — FUROSEMIDE 20 MG/1
40 TABLET ORAL DAILY
Qty: 180 TABLET | Refills: 3
Start: 2023-03-28

## 2023-03-28 RX ADMIN — ASPIRIN 81 MG CHEWABLE TABLET 81 MG: 81 TABLET CHEWABLE at 08:45

## 2023-03-28 RX ADMIN — SENNOSIDES AND DOCUSATE SODIUM 2 TABLET: 50; 8.6 TABLET ORAL at 08:46

## 2023-03-28 RX ADMIN — Medication 3 ML: at 11:36

## 2023-03-28 RX ADMIN — PANTOPRAZOLE SODIUM 40 MG: 40 TABLET, DELAYED RELEASE ORAL at 08:45

## 2023-03-28 RX ADMIN — LISINOPRIL 20 MG: 20 TABLET ORAL at 08:45

## 2023-03-28 RX ADMIN — FUROSEMIDE 20 MG: 20 TABLET ORAL at 08:46

## 2023-03-28 RX ADMIN — CLOPIDOGREL BISULFATE 75 MG: 75 TABLET ORAL at 08:45

## 2023-03-28 NOTE — OUTREACH NOTE
Prep Survey    Flowsheet Row Responses   [facility] patient discharged from? [place]   Is LACE score < 7 ? Yes   Eligibility [facility]   Date of Admission 03/27/23   Date of Discharge 03/28/23   Discharge Disposition Home or Self Care   Discharge diagnosis Dyspnea on exertion   Does the patient have one of the following disease processes/diagnoses(primary or secondary)? Other   Does the patient have Home health ordered? No   Is there a DME ordered? No   Prep survey completed? Yes          Yen DAVIS - Registered Nurse

## 2023-03-28 NOTE — PROGRESS NOTES
"Adult Nutrition  Assessment/PES    Patient Name:  Radha Whelan  YOB: 1940  MRN: [id number]  Admit Date:  3/27/2023    Assessment Date:  3/28/2023    Comments:    Screened for DM dx. Pt with PMHx significant for T2DM, HTN, hx of TIA presented to [facility] yesterday 3/27 with c/o SOB. A1c 8.1%. Normal weight for age per BMI 29.41. No recent weight changes per nutrition screen. Currently receiving a cardiac, diabetic diet. PO intakes averaging 41.7% x 3 meals. RD to order Boost Glucose Control BID to supplement intakes. Of note, pt allergic to saccharin. Glucose Control contains sucralose and is OK.     Recs:   1. Encourage PO intake on current diet as medically indicated and tolerated  2. Provide Boost Glucose Control BID and encourage intake  3. Monitor and replace electrolytes PRN    RD to F/U and available PRN.      Reason for Assessment     Row Name 03/28/23 1106          Reason for Assessment    Reason For Assessment diagnosis/disease state     Diagnosis diabetes diagnosis/complications                  Labs/Tests/Procedures/Meds     Row Name 03/28/23 1106          Labs/Procedures/Meds    Lab Results Reviewed reviewed, pertinent     Lab Results Comments Low: Na+, Cl-; High: A1c, Glu        Medications    Pertinent Medications Reviewed reviewed, pertinent     Pertinent Medications Comments lovenox, lasix, insulin, protonix, docusate, NaCl                Physical Findings     Row Name 03/28/23 1107          Physical Findings    Overall Physical Appearance normal weight for age per BMI 29.41, Eitan score 20                Estimated/Assessed Needs - Anthropometrics     Row Name 03/28/23 1108          Anthropometrics    Height for Calculation 1.702 m (5' 7.01\")     Weight for Calculation 85 kg (187 lb 6.3 oz)  current        Estimated/Assessed Needs    Additional Documentation Fluid Requirements (Group);KCAL/KG (Group);Estimated Calorie Needs (Group);Protein Requirements (Group)        Estimated Calorie Needs "    Estimated Calorie Requirement (kcal/day) 2,125-2,550     Estimated Calorie Need Method kcal/kg        KCAL/KG    KCAL/KG 25 Kcal/Kg (kcal);30 Kcal/Kg (kcal)     25 Kcal/Kg (kcal) 2125     30 Kcal/Kg (kcal) 2550        Protein Requirements    Weight Used For Protein Calculations 85 kg (187 lb 6.3 oz)  current     Est Protein Requirement Amount (gms/kg) 1.0 gm protein     Estimated Protein Requirements (gms/day) 85        Fluid Requirements    Fluid Requirements (mL/day) 2125  1 mL/kcal     RDA Method (mL) 2125                Nutrition Prescription Ordered     Row Name 03/28/23 1108          Nutrition Prescription PO    Current PO Diet Regular     Fluid Consistency Thin     Common Modifiers Cardiac;Consistent Carbohydrate                Evaluation of Received Nutrient/Fluid Intake     Row Name 03/28/23 1109          Intake Assessment    Energy/Calorie Requirement Assessment not meeting needs     Protein Requirement Assessment not meeting needs     Fluid Requirement Assessment not meeting needs        PO Evaluation    Number of Days PO Intake Evaluated 2 days     Number of Meals 3     % PO Intake 41.7                   Problem/Interventions:   Problem 1     Row Name 03/28/23 1110          Nutrition Diagnoses Problem 1    Problem 1 Predicted Suboptimal Intake     Etiology (related to) Factors Affecting Nutrition     Signs/Symptoms (evidenced by) PO Intake     Percent (%) intake recorded 41.7 %     Over number of meals 3                Problem 2     Row Name 03/28/23 1110          Nutrition Diagnoses Problem 2    Problem 2 Altered Nutrition Related to Labs     Etiology (related to) Medical Diagnosis     Endocrine DM     Signs/Symptoms (evidenced by) Biochemical     Specific Labs Noted Glucose;HgbA1C                    Intervention Goal     Row Name 03/28/23 1111          Intervention Goal    General Maintain nutrition;Meet nutritional needs for age/condition;Disease management/therapy;Improved nutrition related lab(s)      PO Tolerate PO;Meet estimated needs;Increase intake     Weight Maintain weight                Nutrition Intervention     Row Name 03/28/23 1111          Nutrition Intervention    RD/Tech Action Follow Tx progress;Encourage intake;Recommend/ordered;Care plan reviewd     Recommended/Ordered Supplement                Nutrition Prescription     Row Name 03/28/23 1111          Nutrition Prescription PO    PO Prescription Begin/change supplement     Supplement Boost Glucose Control     Supplement Frequency 2 times a day     New PO Prescription Ordered? Yes        Other Orders    Obtain Weight Daily     Obtain Weight Ordered? No, recommended     Supplement Vitamin mineral supplement     Supplement Ordered? No, recommended     Labs K+;Phos;Mg++;Na+     Labs Ordered? No, recommended                Education/Evaluation     Row Name 03/28/23 1122          Education    Education Will Instruct as appropriate        Monitor/Evaluation    Monitor Per protocol;PO intake;Supplement intake;Pertinent labs;Symptoms;Skin status;Weight                 Electronically signed by:  Lynn Gamble RD  03/28/23 11:22 EDT

## 2023-03-28 NOTE — PLAN OF CARE
Goal Outcome Evaluation:  Plan of Care Reviewed With: (P) patient        Progress: (P) no change  Outcome Evaluation: (P) Pt was agreeable and participated in IE this date. Pt was supine in the bed w/ daughter present in the room for IE. Pt reports she lives w/ her  in a home w/ one step to enter the home. Pt reports she is independent w/ ADL's, household and community mobility w/out an AD, and driving. Pt reports she has a SPC and RW at home but does not use them for mobility. Pt performed supine to sit w/ Mod I and HOB elevated. Pt performed STS x3 independently w/ gait belt. Pt ambulated ~350' w/ SBA and gait belt. Pt was able to maintain O2 sats >90% during ambulation. Pt is currently at baseline for mobility and does not have any skilled PT needs at this time. PT recommends pt continue ambulation in the halls w/ nursing in order to prevent deconditioning secondary to being in the bed. Pt is motivated to continue mobility during inpatient stay. PT will sign off at this time.

## 2023-03-28 NOTE — DISCHARGE SUMMARY
[facility]   DISCHARGE SUMMARY      Name:  Radha Whelan   Age:  82 y.o.  Sex:  female  :  1940  MRN:  [id number]   Visit Number:  [id number]    Admission Date:  3/27/2023  Date of Discharge:  3/28/2023  Primary Care Physician:  Farhan Schaefer MD    Important issues to note:    Patient will have early follow-up with her primary cardiologist office in the next 2 to 3 weeks.  Patient may benefit from initiation of Entresto after following up with cardiology  She may also benefit from repeat stress testing or coronary angiography per her primary cardiology    Discharge Diagnoses:     1. Dyspnea on exertion with borderline elevation of troponin levels, POA.  2. Systolic congestive heart failure, not in acute exacerbation  3. Essential hypertension.  4. Diabetes mellitus type 2.  5. Sick sinus syndrome status post pacemaker.    Problem List:     Active Hospital Problems    Diagnosis  POA   • **Dyspnea on exertion [R06.09]  Yes   • Chronic HFrEF (heart failure with reduced ejection fraction) ([facility]) [I50.22]  Unknown   • Essential hypertension [I10]  Yes   • Presence of cardiac pacemaker [Z95.0]  Yes   • LBBB (left bundle branch block) [I44.7]  Yes   • ODELL (obstructive sleep apnea) [G47.33]  Yes   • Restless legs syndrome [G25.81]  Yes   • Type 2 diabetes mellitus without complication ([facility]) [E11.9]  Yes      Resolved Hospital Problems   No resolved problems to display.     Presenting Problem:    Chief Complaint   Patient presents with   • Shortness of Breath      Consults:     Consulting Physician(s)             None          Procedures Performed:        History of presenting illness/Hospital Course:    Patient is an 82-year-old female with a history of sick sinus syndrome, chronic systolic congestive heart failure, hypertension, type 2 diabetes, who had presented from home due to increasing shortness of breath with exertion.  The patient's initial ER work-up was demonstrating mildly  elevated troponin, paced EKG, no obvious ischemic changes.  Chest x-ray was showing stable to improved chronic interstitial lung changes.    The patient was admitted for observation due to her mildly elevated troponin levels.  She underwent a 2D echocardiogram which did demonstrate an ejection fraction of 35 to 40%.  Her most recent coronary angiography was in 2019 with mild nonobstructive disease as well as ejection fraction at that time around 42%.  After further discussion with the patient and her daughter, she has had this progressive shortness of breath for at least a few months now.  Her symptomatology is concerning likely for progressive chronic systolic congestive heart failure.  I did talk with Dr. Frost about her echo results, he recommended she follow-up with her primary cardiologist Dr. Mays.  The patient may benefit at that time of being started on Entresto versus potentially needing a heart cath and/or stress test.  Also noted, that if patient has any further issues after being seen by her cardiologist, she may benefit from pulmonology evaluation with full PFTs.    I also recommended patient continue on Lasix at 40 mg a day in the morning as she does not wish to take it 20 mg twice a day.  She can follow-up with Dr. Schaefer otherwise.  Plan of care was discussed with the patient    Vital Signs:    Temp:  [97.9 °F (36.6 °C)-98.9 °F (37.2 °C)] 98.9 °F (37.2 °C)  Heart Rate:  [60-70] 65  Resp:  [18-21] 18  BP: (106-141)/(54-85) 127/66    Physical Exam:    General Appearance:  Alert and cooperative.  No acute distress   Head:  Atraumatic and normocephalic.   Eyes: Conjunctivae and sclerae normal, no icterus. No pallor.   Ears:  Ears with no abnormalities noted.   Throat: No oral lesions, no thrush, oral mucosa moist.   Neck: Supple, trachea midline, no thyromegaly.   Back:   No kyphoscoliosis present. No tenderness to palpation.   Lungs:   Breath sounds heard bilaterally equally.  No crackles or  wheezing. No Pleural rub or bronchial breathing.   Heart:  Normal S1 and S2, no murmur, no gallop, no rub. No JVD.   Abdomen:   Normal bowel sounds, no masses, no organomegaly. Soft, nontender, nondistended, no rebound tenderness.   Extremities: Supple, no edema, no cyanosis, no clubbing.   Pulses: Pulses palpable bilaterally.   Skin: No bleeding or rash.   Neurologic: Alert and oriented x 3. No facial asymmetry. Moves all four limbs. No tremors.     Pertinent Lab Results:     Results from last 7 days   Lab Units 03/28/23  0425 03/27/23  0727   SODIUM mmol/L 131* 132*   POTASSIUM mmol/L 4.4 4.0   CHLORIDE mmol/L 96* 96*   CO2 mmol/L 24.1 22.8   BUN mg/dL 16 18   CREATININE mg/dL 0.97 1.03*   CALCIUM mg/dL 8.8 9.2   BILIRUBIN mg/dL  --  0.6   ALK PHOS U/L  --  76   ALT (SGPT) U/L  --  14   AST (SGOT) U/L  --  17   GLUCOSE mg/dL 143* 170*     Results from last 7 days   Lab Units 03/28/23  0425 03/27/23  0727   WBC 10*3/mm3 5.68 8.08   HEMOGLOBIN g/dL 12.6 13.3   HEMATOCRIT % 38.9 40.2   PLATELETS 10*3/mm3 136* 151         Results from last 7 days   Lab Units 03/28/23  0922 03/28/23  0425 03/27/23  0945   HSTROP T ng/L 22* 28* 32*     Results from last 7 days   Lab Units 03/27/23  0727   PROBNP pg/mL 204.5                       Pertinent Radiology Results:    Imaging Results (All)     Procedure Component Value Units Date/Time    XR Chest 1 View [[id number]] Collected: 03/27/23 0813     Updated: 03/27/23 0816    Narrative:      PROCEDURE: XR CHEST 1 VW-     HISTORY: SOA Triage Protocol     COMPARISON: 09/09/2021.     FINDINGS: The heart is mildly enlarged, similar to prior exam. 2-lead  pacemaker again noted. There is mild interstitial disease bilaterally  with linear densities left lower lung; finding stable to slightly  improved compared to prior study.. The lungs are clear. The mediastinum  is unremarkable. There is no pneumothorax.  There are no acute osseous  abnormalities.       Impression:      Stable to slightly  improved interstitial disease compared to  prior..           This report was signed and finalized on 3/27/2023 8:14 AM by Hiwot Alves MD.          Echo:    Results for orders placed during the hospital encounter of 03/27/23    Adult Transthoracic Echo Complete w/ Color, Spectral and Contrast if Necessary Per Protocol    Interpretation Summary  •  Left ventricular ejection fraction appears to be 36 - 40%.  •  The left ventricular cavity is moderately dilated.  •  Left ventricular diastolic function is consistent with (grade I) impaired relaxation.  •  There is moderate calcification of the aortic valve mainly affecting the non-coronary, left coronary and right coronary cusp(s).  •  Estimated right ventricular systolic pressure from tricuspid regurgitation is normal (<35 mmHg).    Condition on Discharge:      Stable.    Code status during the hospital stay:    Code Status and Medical Interventions:   Ordered at: 03/27/23 1650     Code Status (Patient has no pulse and is not breathing):    CPR (Attempt to Resuscitate)     Medical Interventions (Patient has pulse or is breathing):    Full Support     Discharge Disposition:    Home or Self Care    Discharge Medications:       Discharge Medications      Changes to Medications      Instructions Start Date   furosemide 20 MG tablet  Commonly known as: LASIX  What changed:   · how much to take  · when to take this   40 mg, Oral, Daily      lovastatin 10 MG tablet  Commonly known as: MEVACOR  What changed:   · how to take this  · when to take this  · additional instructions   TAKE 1 TABLET EVERY NIGHT      tobramycin-dexamethasone 0.3-0.1 % ophthalmic suspension  Commonly known as: TOBRADEX  What changed:   · how to take this  · when to take this  · reasons to take this   1 drop, Both Eyes, As Needed         Continue These Medications      Instructions Start Date   accu-chek soft touch lancets   Use as directed      acetaminophen 325 MG tablet  Commonly known as: TYLENOL    650 mg, Oral, Every 6 Hours PRN      aspirin 81 MG chewable tablet   81 mg, Oral, Daily      clopidogrel 75 MG tablet  Commonly known as: PLAVIX   75 mg, Oral, Daily      Coenzyme Q10 200 MG capsule   400 mg, Oral, Every Other Day      glipizide 2.5 MG 24 hr tablet  Commonly known as: GLUCOTROL XL   2.5 mg, Oral, Daily      GLUCOSAMINE CHONDR 1500 COMPLX PO   1 tablet, Oral, Daily      glucose blood test strip  Commonly known as: Accu-Chek Kayce Plus   TEST ONCE DAILY.      glucose monitor monitoring kit   1 each, Does not apply, As Needed      latanoprost 0.005 % ophthalmic solution  Commonly known as: XALATAN   PLACE 1 DROP IN EACH EYE EVERY DAY AT BEDTIME      lisinopril 40 MG tablet  Commonly known as: PRINIVIL,ZESTRIL   40 mg, Oral, Daily      melatonin 3 MG tablet   3 mg, Oral, Nightly      ONE TOUCH ULTRA 2 w/Device kit   USE AS NEEDED FOR BLOOD    SUGAR MONITORING      pantoprazole 40 MG EC tablet  Commonly known as: PROTONIX   40 mg, Oral, Daily      VITAMIN D PO   125 mcg, Oral, 2 Times Weekly         Stop These Medications    B-complex with vitamin C tablet          Discharge Diet:   Diabetic    Activity at Discharge:   As tolerated    Follow-up Appointments:     Contact information for follow-up providers     Farhan Schaefer MD .    Specialty: Internal Medicine  Contact information:  [address]  [facility] [address]  [facility] [address] [phone]             Ashlyn Mays MD Follow up.    Specialties: Cardiology, Interventional Cardiology  Why: As early as possible, hospital f/u for HFREF  Contact information:  [address]  [address]  [facility] [address]  [phone]                   Contact information for after-discharge care     Durable Medical Equipment     [facility] .    Service: Durable Medical Equipment  Contact information:  [address]  [facility] [address] [phone]                           Future Appointments   Date Time Provider Department  [place]   4/18/2023  1:30 PM Selina Constantino PA-C [facility]   6/12/2023  1:30 PM Stephan Laboy,  [facility]     Test Results Pending at Discharge:           Rosemary Toro DO  03/28/23  14:28 EDT    Time: I spent 25 minutes on this discharge activity which included: face-to-face encounter with the patient, reviewing the data in the system, coordination of the care with the nursing staff as well as consultants, documentation, and entering orders.     Dictated utilizing Dragon dictation.

## 2023-03-28 NOTE — THERAPY DISCHARGE NOTE
"Patient Name: Radha Whelan  : 1940    MRN: [id number]                              Today's Date: 3/28/2023       Admit Date: 3/27/2023    Visit Dx:     ICD-10-CM ICD-9-CM   1. Dyspnea on exertion  R06.09 786.09   2. Elevated troponin  R77.8 790.6     Patient Active Problem List   Diagnosis   • Type 2 diabetes mellitus without complication (HCC)   • Temporary cerebral vascular dysfunction   • Hypervitaminosis B6   • ODELL (obstructive sleep apnea)   • Peripheral neuropathy   • Restless legs syndrome   • Mixed hyperlipidemia   • LBBB (left bundle branch block)   • Arthritis   • Chronic combined systolic and diastolic congestive heart failure (HCC)   • Anemia   • Declining functional status   • Presence of cardiac pacemaker   • Sinus node dysfunction (HCC)   • Essential hypertension   • CAD (coronary artery disease)   • Dyspnea on exertion     Past Medical History:   Diagnosis Date   • Arthritis    • Basal cell carcinoma    • Cardiomyopathy (HCC)    • Cataract, bilateral     s/p removal    • Diabetes mellitus (HCC)    • DVT (deep venous thrombosis) ([facility])     - left leg   • Edema     legs- hx of   • History of migraine headaches    • Hx of exercise stress test    • Hypertension    • Hyponatremia    • Impaired mobility    • LBBB (left bundle branch block)    • LBBB (left bundle branch block)    • Left leg pain     left leg and knee pain    • Muscle spasm     hx of   • Obesity    • Teeth missing     all of the top, and has 6 bottom teeth left   • Thyroid nodule    • TIA (transient ischemic attack)     x3.  last one was \"a few years ago\"   • Wears dentures     top plate   • Wears glasses     to read     Past Surgical History:   Procedure Laterality Date   • BREAST BIOPSY Left     benign   • CARDIAC CATHETERIZATION      2019 PER .   • CARDIAC CATHETERIZATION N/A 2019    Procedure: Left Heart Cath +/- CBI;  Surgeon: Ashlyn Mays MD;  Location: [facility] CATH INVASIVE LOCATION;  Service: " Cardiovascular   • CARDIAC ELECTROPHYSIOLOGY PROCEDURE N/A 12/06/2019    Procedure: PACEMAKER IMPLANTATION- DC;  Surgeon: Stephan Laboy DO;  Location: [facility] EP INVASIVE LOCATION;  Service: Cardiology   • CATARACT EXTRACTION  12/2018    both eyes    • COLONOSCOPY     • INSERT / REPLACE / REMOVE PACEMAKER     • MOUTH SURGERY      all top teeth   • SKIN BIOPSY      basal cell   • TOTAL HIP ARTHROPLASTY Right 12/04/2019    Procedure: HIP ARTHROPLASTY TOTAL RIGHT;  Surgeon: Issa Salgado MD;  Location: [facility] OR;  Service: Orthopedics   • TUBAL ABDOMINAL LIGATION        General Information     Row Name 03/28/23 1130          Physical Therapy Time and Intention    Document Type discharge evaluation/summary (P)   -LG     Mode of Treatment physical therapy (P)   -LG     Row Name 03/28/23 1130          General Information    Patient Profile Reviewed yes (P)   -LG     Prior Level of Function independent:;ADL's;all household mobility;community mobility;driving;using stairs (P)   -LG     Existing Precautions/Restrictions fall (P)   -LG     Barriers to Rehab none identified (P)   -LG     Row Name 03/28/23 1130          Living Environment    People in Home spouse (P)   -LG     Row Name 03/28/23 1130          Home Main Entrance    Number of Stairs, Main Entrance one (P)   -LG     Stair Railings, Main Entrance none (P)   -LG     Row Name 03/28/23 1130          Stairs Within Home, Primary    Number of Stairs, Within Home, Primary twelve;other (see comments) (P)   12 steps to the basement but pt does not use the stairs  -     Row Name 03/28/23 1130          Cognition    Orientation Status (Cognition) oriented x 4 (P)   -LG           User Key  (r) = Recorded By, (t) = Taken By, (c) = Cosigned By    Initials Name Provider Type    LG Kimmy Solomon, PT Student PT Student               Mobility     Row Name 03/28/23 1131          Bed Mobility    Bed Mobility supine-sit (P)   -LG     Supine-Sit [place] (Bed Mobility)  modified independence (P)   -LG     Assistive Device (Bed Mobility) head of bed elevated (P)   -LG     Row Name 03/28/23 1131          Bed-Chair Transfer    Bed-Chair [place] (Transfers) not tested (P)   -LG     Row Name 03/28/23 1131          Sit-Stand Transfer    Sit-Stand [place] (Transfers) independent (P)   -LG     Assistive Device (Sit-Stand Transfers) other (see comments) (P)   gait belt  -LG     Comment, (Sit-Stand Transfer) x3 (P)   -LG     [facility] Name 03/28/23 1131          Gait/Stairs (Locomotion)    [place] Level (Gait) standby assist (P)   -LG     Assistive Device (Gait) other (see comments) (P)   gait belt  -LG     Distance in Feet (Gait) 350' (P)   -LG     Deviations/Abnormal Patterns (Gait) antalgic (P)   -LG     [place] Level (Stairs) not tested (P)   -LG           User Key  (r) = Recorded By, (t) = Taken By, (c) = Cosigned By    Initials Name Provider Type    LG Kimmy Solomon, PT Student PT Student               Obj/Interventions     [facility] Name 03/28/23 1133          Range of Motion Comprehensive    General Range of Motion bilateral lower extremity ROM WFL (P)   -LG     Row Name 03/28/23 1133          Strength Comprehensive (MMT)    Comment, General Manual Muscle Testing (MMT) Assessment BLE grossly 4/5 MMT (P)   -[facility] Name 03/28/23 1133          Balance    Balance Assessment sitting static balance;sit to stand dynamic balance;standing dynamic balance;standing static balance (P)   -LG     Static Sitting Balance independent (P)   -LG     Position, Sitting Balance unsupported;sitting edge of bed (P)   -LG     Sit to Stand Dynamic Balance independent (P)   -LG     Static Standing Balance independent (P)   -LG     Dynamic Standing Balance independent (P)   -LG     Position/Device Used, Standing Balance unsupported (P)   -LG     Row Name 03/28/23 1133          Sensory Assessment (Somatosensory)    Sensory Assessment (Somatosensory) sensation intact (P)   -LG           User Key   (r) = Recorded By, (t) = Taken By, (c) = Cosigned By    Initials Name Provider Type    LG Kimmy Solomon, PT Student PT Student               Goals/Plan    No documentation.                Clinical Impression     Row Name 03/28/23 1133          Pain    Pretreatment Pain Rating 0/10 - no pain (P)   -LG     Posttreatment Pain Rating 0/10 - no pain (P)   -LG     Row Name 03/28/23 1133          Plan of Care Review    Plan of Care Reviewed With patient (P)   -LG     Progress no change (P)   -LG     Outcome Evaluation Pt was agreeable and participated in IE this date. Pt was supine in the bed w/ daughter present in the room for IE. Pt reports she lives w/ her  in a home w/ one step to enter the home. Pt reports she is independent w/ ADL's, household and community mobility w/out an AD, and driving. Pt reports she has a SPC and RW at home but does not use them for mobility. Pt performed supine to sit w/ Mod I and HOB elevated. Pt performed STS x3 independently w/ gait belt. Pt ambulated ~350' w/ SBA and gait belt. Pt was able to maintain O2 sats >90% during ambulation. Pt is currently at baseline for mobility and does not have any skilled PT needs at this time. PT recommends pt continue ambulation in the halls w/ nursing in order to prevent deconditioning secondary to being in the bed. Pt is motivated to continue mobility during inpatient stay. PT will sign off at this time. (P)   -LG     Row Name 03/28/23 1133          Therapy Assessment/Plan (PT)    Patient/Family Therapy Goals Statement (PT) To go home (P)   -LG     Criteria for Skilled Interventions Met (PT) no;no problems identified which require skilled intervention (P)   -LG     Therapy Frequency (PT) evaluation only (P)   -LG     Row Name 03/28/23 1133          Vital Signs    O2 Delivery Pre Treatment room air (P)   -LG     O2 Delivery Intra Treatment room air (P)   -LG     O2 Delivery Post Treatment room air (P)   -LG     Pre Patient Position Supine (P)    -LG     Intra Patient Position Standing (P)   -LG     Post Patient Position Sitting (P)   -LG     Row Name 03/28/23 1133          Positioning and Restraints    Pre-Treatment Position in bed (P)   -LG     Post Treatment Position chair (P)   -LG     In Chair reclined;sitting;call light within reach;encouraged to call for assist;with family/caregiver (P)   -LG           User Key  (r) = Recorded By, (t) = Taken By, (c) = Cosigned By    Initials Name Provider Type    Kimmy Saucedo, PT Student PT Student               Outcome Measures     Row Name 03/28/23 1139          How much help from another person do you currently need...    Turning from your back to your side while in flat bed without using bedrails? 4 (P)   -LG     Moving from lying on back to sitting on the side of a flat bed without bedrails? 4 (P)   -LG     Moving to and from a bed to a chair (including a wheelchair)? 4 (P)   -LG     Standing up from a chair using your arms (e.g., wheelchair, bedside chair)? 4 (P)   -LG     Climbing 3-5 steps with a railing? 4 (P)   -LG     To walk in hospital room? 4 (P)   -LG     AM-PAC 6 Clicks Score (PT) 24 (P)   -LG     Highest level of mobility 8 --> Walked 250 feet or more (P)   -     Row Name 03/28/23 1139          Functional Assessment    Outcome Measure Options AM-PAC 6 Clicks Basic Mobility (PT) (P)   -LG           User Key  (r) = Recorded By, (t) = Taken By, (c) = Cosigned By    Initials Name Provider Type    Kimmy Saucedo, PT Student PT Student              Physical Therapy Education     Title: PT OT SLP Therapies (In Progress)     Topic: Physical Therapy (In Progress)     Point: Mobility training (Done)     Learning Progress Summary           Patient AcceptanceYULIET VU by SAMI at 3/28/2023 1139    Comment: Importance of mobility                   Point: Home exercise program (Not Started)     Learner Progress:  Not documented in this visit.          Point: Body mechanics (Not Started)     Learner  Progress:  Not documented in this visit.          Point: Precautions (Not Started)     Learner Progress:  Not documented in this visit.                      User Key     Initials Effective Dates Name Provider Type Discipline    LG 12/29/22 -  Kimmy Solomon, PT Student PT Student PT              PT Recommendation and Plan     Plan of Care Reviewed With: (P) patient  Progress: (P) no change  Outcome Evaluation: (P) Pt was agreeable and participated in IE this date. Pt was supine in the bed w/ daughter present in the room for IE. Pt reports she lives w/ her  in a home w/ one step to enter the home. Pt reports she is independent w/ ADL's, household and community mobility w/out an AD, and driving. Pt reports she has a SPC and RW at home but does not use them for mobility. Pt performed supine to sit w/ Mod I and HOB elevated. Pt performed STS x3 independently w/ gait belt. Pt ambulated ~350' w/ SBA and gait belt. Pt was able to maintain O2 sats >90% during ambulation. Pt is currently at baseline for mobility and does not have any skilled PT needs at this time. PT recommends pt continue ambulation in the halls w/ nursing in order to prevent deconditioning secondary to being in the bed. Pt is motivated to continue mobility during inpatient stay. PT will sign off at this time.     Time Calculation:    PT Charges     Row Name 03/28/23 1141             Time Calculation    Start Time 1040 (P)   -LG      PT Received On 03/28/23 (P)   -LG         Untimed Charges    PT Eval/Re-eval Minutes 53 (P)   -LG         Total Minutes    Untimed Charges Total Minutes 53 (P)   -LG       Total Minutes 53 (P)   -LG            User Key  (r) = Recorded By, (t) = Taken By, (c) = Cosigned By    Initials Name Provider Type    LG Kimmy Solomon, PT Student PT Student              Therapy Charges for Today     Code Description Service Date Service Provider Modifiers Qty    [id number] HC PT EVAL LOW COMPLEXITY 4 3/28/2023 Kimmy Solomon,  PT Student GP 1          PT G-Codes  Outcome Measure Options: (P) AM-PAC 6 Clicks Basic Mobility (PT)  AM-PAC 6 Clicks Score (PT): (P) 24    PT Discharge Summary  Anticipated Discharge Disposition (PT): (P) home    Kimmy Solomon, PT Student  3/28/2023

## 2023-03-28 NOTE — DISCHARGE INSTRUCTIONS
Patient will have early follow-up with her primary cardiologist office in the next 2 to 3 weeks.  Patient may benefit from initiation of Entresto after following up with cardiology  She may also benefit from repeat stress testing or coronary angiography per her primary cardiology

## 2023-03-28 NOTE — CASE MANAGEMENT/SOCIAL WORK
Discharge Planning Assessment   [facility]     Patient Name: Radha Whelan  MRN: [id number]  Today's Date: 3/28/2023    Admit Date: 3/27/2023    Plan: Pt plans to dc home with spouse transporting. Pt denies DC needs at this time. Dtr requests pt appts with cardiologists Aslam and Asbo be moved up if possible.   Discharge Needs Assessment     Row Name 03/28/23 1210       Living Environment    People in Home spouse    Name(s) of People in Home Dougie Wehlan/spouse    Current Living Arrangements home    Duration at Residence 50+ yrs    Potentially Unsafe Housing Conditions unable to assess    Primary Care Provided by self    Provides Primary Care For no one    Family Caregiver if Needed spouse;grandchild(nataliia), adult    Family Caregiver Names Dougie/spouse, Delores/dtr    Quality of Family Relationships helpful;involved    Able to Return to Prior Arrangements yes    Living Arrangement Comments single level home with 1 step into entrance and stairs into basement, she does not climb often       Resource/Environmental Concerns    Resource/Environmental Concerns none    Transportation Concerns none       Food Insecurity    Within the past 12 months, you worried that your food would run out before you got the money to buy more. Never true    Within the past 12 months, the food you bought just didn't last and you didn't have money to get more. Never true       Transition Planning    Patient/Family Anticipates Transition to home with family    Patient/Family Anticipated Services at Transition none    Transportation Anticipated family or friend will provide       Discharge Needs Assessment    Readmission Within the Last 30 Days no previous admission in last 30 days    Equipment Currently Used at Home bp cuff;glucometer;grab bar;cpap;walker, rolling;other (see comments)  walk in shower with seat    Concerns to be Addressed no discharge needs identified    Anticipated Changes Related to Illness none    Equipment Needed After  Discharge none    Provided Post Acute Provider List? N/A    Provided Post Acute Provider Quality & Resource List? N/A    Current Discharge Risk chronically ill               Discharge Plan     Row Name 03/28/23 1215       Plan    Plan Pt plans to dc home with spouse transporting. Pt denies DC needs at this time. Dtr requests pt appts with cardiologists Aslam and Asbo be moved up if possible.    Roadmap to Recovery Yes    Plan Comments Cm spoke with pt, spouse/Dougie/[phone] and dtr/Delores Zaidi/[phone] at bedside to complete DCP. Pt does not have LW or POA. PCP is THAI Schaefer and she uses [facility]. She declines meds to bed. Pt lives in single level home + basement with spouse and functions independently. She has a rolling walker she used after hip replacement 3 yrs ago, glucometer, BP cuff, scale, walk in shower with seat and grab bar. She uses CPAP obtained from [facility]. No O2. Pt has reliable transportation and still drives. Pt plans to dc home with spouse transporting. She denies Dc needs. Roadmap to Recovery provided to pt.              Continued Care and Services - Admitted Since 3/27/2023    Coordination has not been started for this encounter.          Demographic Summary     Row Name 03/28/23 1205       General Information    Admission Type observation    Arrived From emergency department    Required Notices Provided Observation Status Notice    Referral Source admission list    Reason for Consult discharge planning    Preferred Language English       Contact Information    Permission Granted to Share Info With family/designee    Contact Information Obtained for     Contact Information Comments Dougie Czsbq-vdjiys-[phone]; Delores ZaidiRifoucbr-oxt-[phone]               Functional Status     Row Name 03/28/23 1208       Functional Status    Usual Activity Tolerance good    Current Activity Tolerance moderate    Functional Status Comments walks and functions independelty at  baseline. Has walker she does not use since ortho sx 3 yrs ago       Physical Activity    On average, how many days per week do you engage in moderate to strenuous exercise (like a brisk walk)? 0 days    On average, how many minutes do you engage in exercise at this level? 0 min    Number of minutes of exercise per week 0       Assessment of Health Literacy    How often do you have someone help you read hospital materials? Sometimes    How often do you have problems learning about your medical condition because of difficulty understanding written information? Sometimes    How often do you have a problem understanding what is told to you about your medical condition? Sometimes    How confident are you filling out medical forms by yourself? Quite a bit    Health Literacy Good       Functional Status, IADL    Medications independent    Meal Preparation independent    Housekeeping independent    Laundry independent    Shopping independent       Mental Status    General Appearance WDL WDL       Mental Status Summary    Recent Changes in Mental Status/Cognitive Functioning no changes       Employment/    Current or Previous Occupation not applicable               Psychosocial     Row Name 03/28/23 1203       Developmental Stage (Eriksson's)    Developmental Stage Stage 8 (64 years-death/Late Adulthood) Integrity vs. Despair               Abuse/Neglect    No documentation.                Legal    No documentation.                Substance Abuse    No documentation.                Patient Forms    No documentation.                   Liz Nelson, APRN

## 2023-03-29 ENCOUNTER — TRANSITIONAL CARE MANAGEMENT TELEPHONE ENCOUNTER (OUTPATIENT)
Dept: CALL CENTER | Facility: HOSPITAL | Age: 83
End: 2023-03-29
Payer: MEDICARE

## 2023-03-29 NOTE — OUTREACH NOTE
Call Center TCM Note    Flowsheet Row Responses   [facility] patient discharged from? [facility]   Does the patient have one of the following disease processes/diagnoses(primary or secondary)? Other   TCM attempt successful? Yes   Call start time 1220   Call end time 1231   Discharge diagnosis Dyspnea on exertion   Person spoke with today (if not patient) and relationship pt   Meds reviewed with patient/caregiver? Yes   Is the patient having any side effects they believe may be caused by any medication additions or changes? No   Does the patient have all medications ordered at discharge? N/A   Is the patient taking all medications as directed (includes completed medication regime)? Yes   Comments Cardiology 4/18/23 at 1:30 PM   Does the patient have an appointment with their PCP within 7 days of discharge? Yes  [4/4/2023 at 9:30 AM]   Psychosocial issues? No   Did the patient receive a copy of their discharge instructions? Yes   Nursing interventions Reviewed instructions with patient   What is the patient's perception of their health status since discharge? Improving   Is the patient/caregiver able to teach back signs and symptoms related to disease process for when to call PCP? Yes   Is the patient/caregiver able to teach back signs and symptoms related to disease process for when to call 911? Yes   Is the patient/caregiver able to teach back the hierarchy of who to call/visit for symptoms/problems? PCP, Specialist, Home health nurse, Urgent Care, ED, 911 Yes   If the patient is a current smoker, are they able to teach back resources for cessation? Not a smoker   TCM call completed? Yes   Wrap up additional comments Pt states she is doing better, but is SOB, and legs feel weak with exertion. Educated pt on heart failure warning s/s, importance of weighing daily, and when to call cardiologist,  pt verbalized understanding. Reviewed AVS/medications with pt. Pt verified PCP hospital fu appt on 4/4/23, and  Cardiology fu appt on 4/18/23.   Call end time 1231   Would this patient benefit from a Referral to [facility] Social Work? No   Is the patient interested in additional calls from an ambulatory ?  NOTE:  applies to high risk patients requiring additional follow-up. No          Elsa Gregg RN    3/29/2023, 12:33 EDT

## 2023-04-05 NOTE — ED PROVIDER NOTES
"Subjective   History of Present Illness  82-year-old female presents to the ED with a chief complaint of shortness of breath.  Patient states that she has had increasing shortness of breath with exertion for the last week worsening over the last 2 days.  States that she can only walk a few feet before feeling very short of breath.  No cough or wheeze        Review of Systems   Respiratory: Positive for shortness of breath.    Cardiovascular: Negative for chest pain and palpitations.   All other systems reviewed and are negative.      Past Medical History:   Diagnosis Date   • Arthritis    • Basal cell carcinoma    • Cardiomyopathy    • Cataract, bilateral     s/p removal    • Chronic HFrEF (heart failure with reduced ejection fraction) 3/28/2023   • Diabetes mellitus    • DVT (deep venous thrombosis)     1970's- left leg   • Edema     legs- hx of   • History of migraine headaches    • Hx of exercise stress test 2004   • Hypertension    • Hyponatremia    • Impaired mobility    • LBBB (left bundle branch block)    • LBBB (left bundle branch block)    • Left leg pain     left leg and knee pain    • Muscle spasm     hx of   • Obesity    • Teeth missing     all of the top, and has 6 bottom teeth left   • Thyroid nodule    • TIA (transient ischemic attack)     x3.  last one was \"a few years ago\"   • Wears dentures     top plate   • Wears glasses     to read       Allergies   Allergen Reactions   • Cumini Other (See Comments)     Complex migrane  migraine   • Cheese Other (See Comments)     migrane    Migraine   • Ibuprofen Rash     Swelling all over  \"Swelling all over\"   • Other Other (See Comments)     \"ice cream\"-migranes    \"ice cream\" migraines   • Saccharin Other (See Comments)     migrane    Migraine       Past Surgical History:   Procedure Laterality Date   • BREAST BIOPSY Left     benign   • CARDIAC CATHETERIZATION      09/16/2019 PER .   • CARDIAC CATHETERIZATION N/A 09/16/2019    Procedure: Left Heart " Cath +/- CBI;  Surgeon: Ashlyn Mays MD;  Location:  [facility];  Service: Cardiovascular   • CARDIAC ELECTROPHYSIOLOGY PROCEDURE N/A 12/06/2019    Procedure: PACEMAKER IMPLANTATION- DC;  Surgeon: Stephan Laboy DO;  Location:  [facility];  Service: Cardiology   • CATARACT EXTRACTION  12/2018    both eyes    • COLONOSCOPY     • INSERT / REPLACE / REMOVE PACEMAKER     • MOUTH SURGERY      all top teeth   • SKIN BIOPSY      basal cell   • TOTAL HIP ARTHROPLASTY Right 12/04/2019    Procedure: HIP ARTHROPLASTY TOTAL RIGHT;  Surgeon: Issa Salgado MD;  Location:  [facility];  Service: Orthopedics   • TUBAL ABDOMINAL LIGATION         Family History   Problem Relation Age of Onset   • Heart disease Mother    • Heart disease Father    • Breast cancer Sister    • Heart disease Sister    • Stroke Sister    • Breast cancer Sister    • Intracerebral hemorrhage Brother    • Diabetes type II Brother    • No Known Problems Brother    • Asthma Daughter    • Heart failure Daughter    • Diabetes Other    • Hypertension Other    • Cancer Other         malignant neoplasm    • Migraines Other    • Breast cancer Other        Social History     Socioeconomic History   • Marital status:    Tobacco Use   • Smoking status: Never   • Smokeless tobacco: Never   Vaping Use   • Vaping Use: Never used   Substance and Sexual Activity   • Alcohol use: No   • Drug use: No   • Sexual activity: Never           Objective   Physical Exam  Vitals and nursing note reviewed.   Constitutional:       Comments: Elderly-appearing   HENT:      Head: Normocephalic and atraumatic.   Cardiovascular:      Rate and Rhythm: Normal rate and regular rhythm.   Pulmonary:      Effort: Pulmonary effort is normal. No tachypnea.      Breath sounds: Normal breath sounds. No wheezing.   Neurological:      Mental Status: She is alert.         Procedures           ED Course  ED Course as of 04/05/23 0900   Mon Mar 27, 2023   1043 EKG  interpreted by me.  Sinus rhythm.  Rate of 61.  Nonspecific Q wave.  Nonspecific T wave changes lateral leads.  Abnormal EKG [CG]      ED Course User Index  [CG] Guillermo Denney, DO                                           MDM  Chief complaint: Shortness of breath    Initial impression of presenting illness: 82-year-old female complaining of increasing dyspnea on exertion    Comorbidities requiring consideration and/or management: Arthritis, cardiomyopathy, heart failure, diabetes, edema, hypertension, impaired mobility, other    Differential diagnosis includes but not limited to: STEMI, NSTEMI, ACS, heart failure exacerbation, debility,    Patient arrives hemodynamically stable, afebrile, without respiratory distress with initial vitals interpreted by myself.    Pertinent features from physical exam  :Physical Exam  Vitals and nursing note reviewed.   Constitutional:       Comments: Elderly-appearing   HENT:      Head: Normocephalic and atraumatic.   Cardiovascular:      Rate and Rhythm: Normal rate and regular rhythm.   Pulmonary:      Effort: Pulmonary effort is normal. No tachypnea.      Breath sounds: Normal breath sounds. No wheezing.   Neurological:      Mental Status: She is alert.         Initial diagnostic plan: CBC, proBNP, respiratory panel, chest x-ray, EKG,    Results from initial plan were reviewed and interpreted by myself and include: CBC is reassuring with normal hemoglobin white blood cell count and platelets, CMP is reassuring, respiratory panel is negative, proBNP of 204, initial troponin indeterminate range, repeat troponin shows slightly uptrending troponin      Diagnostic information from other sources includes: Review of previous visits, prior labs, prior imaging, available notes from prior evaluations or visits with specialists, medication list, allergies, past medical history, past surgical history    Interventions in the ED included: Continuous cardiac monitoring, continuous pulse  oximetry    Reevaluation: Resting comfortably    Results/clinical rationale were discussed with patient and family at bedside    Consultations and discussions of results with other physicians: Dr. Page, hospitalist, patient be admitted for further evaluation medical management    Discussion of results/management/plan: Suspect multifactorial component of dyspnea on exertion, increasing troponin.  Will admit for further evaluation medical management.    Disposition plan: Admission.    Final diagnoses:   Dyspnea on exertion   Elevated troponin       ED Disposition  ED Disposition     ED Disposition   Decision to Admit    Condition   --    Comment   Level of Care: Telemetry [5]   Diagnosis: Dyspnea on exertion [484588]   Admitting Physician: PAUL PAGE [[id number]]   Attending Physician: PAUL PAGE [[id number]]               Farhan Schaefer MD  [address]  [address]  [facility] [id number] [phone]      Follow up appt April 4th @ 9:30 with CURTIS Guallpa Azhar, MD  [address] [facility]  [address]  [facility] [id number] [phone]    Follow up  As early as possible, hospital f/u for HFREF         Medication List      Changed    furosemide 20 MG tablet  Commonly known as: LASIX  Take 2 tablets by mouth Daily.  What changed:   · how much to take  · when to take this     lovastatin 10 MG tablet  Commonly known as: MEVACOR  TAKE 1 TABLET EVERY NIGHT  What changed:   · how to take this  · when to take this  · additional instructions     tobramycin-dexamethasone 0.3-0.1 % ophthalmic suspension  Commonly known as: TOBRADEX  Administer 1 drop to both eyes As Needed (dryness).  What changed:   · how to take this  · when to take this  · reasons to take this        Stop    B-complex with vitamin C tablet           Where to Get Your Medications      Information about where to get these medications is not yet available    Ask your nurse or doctor about these medications  · furosemide 20 MG  tablet  · tobramycin-dexamethasone 0.3-0.1 % ophthalmic suspension          Guillermo Denney,   04/05/23 0900

## 2023-04-05 NOTE — PROGRESS NOTES
[facility] Cardiology    Encounter Date: 2023    Patient ID: Radha Whelan is a 82 y.o. female.  : 1940     PCP: Farhan Schaefer MD       Chief Complaint: hospital follow up, chest pain    PROBLEM LIST:  1. Nonobstructive coronary artery disease  a. Echo 2019: EF 43%, grade 1 diastolic dysfunction  b. LHC 2019: No hemodynamically significant CAD  2. Chronic combined systolic and diastolic heart failure  a. Echo 3/27/2023: EF 36-40% LV moderately dilated, grade 1 diastolic dysfunction, moderate calcification of the aortic valve  3. Sinus node dysfunction  a. 24-hour Holter 2019: Average heart rate 60, range , rare SVT lasting 11 beats with no symptoms  b. Recurrent wilfrido syncope x2 once 10/2019 and again 2019  c. [facility] DDD ampm implant 2019, Dr. Laboy  4. Hypertension      History of Present Illness  Patient presents today for follow-up with a history of nonobstructive coronary artery disease, heart failure reduced ejection fraction, sinus node dysfunction and cardiac risk factors. Since last visit, patient was seen at [facility] for increasing dyspnea on exertion.  She was found with elevated troponin and was admitted to the hospital for further evaluation and management.  She underwent echocardiogram which revealed EF of 35-40%.     Upon discussion with the patient today, she reports that she has been having progressive dyspnea on exertion for the last several months.  She was previously completely independent with her ADLs but now is reliant on her daughter to cook and clean for her as she cannot do this without feeling short of breath.  She notes associated orthopnea and she cannot lie flat.  She did have a proBNP drawn in [place] and this was within normal limits.  She denies any significant bilateral lower extremity edema.  There was no pleural effusion or pulmonary edema on chest x-ray.  She did have 2 episodes of NSVT on her last  "device interrogation 3/2023.    Allergies   Allergen Reactions   • Cumini Other (See Comments)     Complex migrane  migraine   • Cheese Other (See Comments)     migrane    Migraine   • Ibuprofen Rash     Swelling all over  \"Swelling all over\"   • Other Other (See Comments)     \"ice cream\"-migranes    \"ice cream\" migraines   • Saccharin Other (See Comments)     migrane    Migraine         Current Outpatient Medications:   •  acetaminophen (TYLENOL) 325 MG tablet, Take 2 tablets by mouth Every 6 (Six) Hours As Needed for Mild Pain ., Disp: , Rfl:   •  aspirin 81 MG chewable tablet, Chew 1 tablet Daily., Disp: , Rfl:   •  Blood Glucose Monitoring Suppl (ONE TOUCH ULTRA 2) w/Device kit, USE AS NEEDED FOR BLOOD    SUGAR MONITORING, Disp: 1 each, Rfl: 0  •  Cholecalciferol (VITAMIN D PO), Take 125 mcg by mouth 2 (Two) Times a Week., Disp: , Rfl:   •  clopidogrel (PLAVIX) 75 MG tablet, Take 1 tablet by mouth Daily., Disp: 90 tablet, Rfl: 3  •  Coenzyme Q10 200 MG capsule, Take 400 mg by mouth Every Other Day., Disp: , Rfl:   •  furosemide (LASIX) 20 MG tablet, Take 2 tablets by mouth Daily., Disp: 180 tablet, Rfl: 3  •  glipizide (GLUCOTROL XL) 2.5 MG 24 hr tablet, Take 1 tablet by mouth Daily., Disp: 90 tablet, Rfl: 3  •  Glucosamine-Chondroit-Vit C-Mn (GLUCOSAMINE CHONDR 1500 COMPLX PO), Take 1 tablet by mouth Daily., Disp: , Rfl:   •  glucose blood (Accu-Chek Kayce Plus) test strip, TEST ONCE DAILY., Disp: 100 each, Rfl: 3  •  glucose monitor monitoring kit, 1 each As Needed (blood sugar)., Disp: 1 each, Rfl: 1  •  Lancets (accu-chek soft touch) lancets, Use as directed, Disp: 100 each, Rfl: 12  •  latanoprost (XALATAN) 0.005 % ophthalmic solution, PLACE 1 DROP IN EACH EYE EVERY DAY AT BEDTIME, Disp: , Rfl:   •  lisinopril (PRINIVIL,ZESTRIL) 40 MG tablet, Take 1 tablet by mouth Daily., Disp: 90 tablet, Rfl: 3  •  lovastatin (MEVACOR) 10 MG tablet, TAKE 1 TABLET EVERY NIGHT (Patient taking differently: 1 tablet. TAKES " "ONES EVERY OTHER NIGHTS), Disp: 90 tablet, Rfl: 3  •  melatonin 3 MG tablet, Take 1 tablet by mouth Every Night., Disp: , Rfl:   •  pantoprazole (PROTONIX) 40 MG EC tablet, Take 1 tablet by mouth Daily., Disp: 90 tablet, Rfl: 3  •  tobramycin-dexamethasone (TOBRADEX) 0.3-0.1 % ophthalmic suspension, Administer 1 drop to both eyes As Needed (dryness)., Disp: , Rfl:   •  metoprolol tartrate (LOPRESSOR) 25 MG tablet, Take 0.5 tablets by mouth 2 (Two) Times a Day., Disp: 90 tablet, Rfl: 0  •  ranolazine (Ranexa) 500 MG 12 hr tablet, Take 1 tablet by mouth 2 (Two) Times a Day., Disp: 60 tablet, Rfl: 0    The following portions of the patient's history were reviewed and updated as appropriate: allergies, current medications, past family history, past medical history, past social history, past surgical history and problem list.    ROS  12 point ROS negative except for that listed in the HPI.        Objective:     /62 (BP Location: Left arm, Patient Position: Sitting, Cuff Size: Adult)   Pulse 66   Ht 170.2 cm (67\")   Wt 83 kg (183 lb)   SpO2 97%   BMI 28.66 kg/m²      Physical Exam  Constitutional: Patient appears well-developed and well-nourished.   HENT: HEENT exam unremarkable.   Neck: Neck supple. No JVD present. No carotid bruits.   Cardiovascular: Normal rate, regular rhythm and normal heart sounds. No murmur heard.   2+ symmetric pulses.   Pulmonary/Chest: Breath sounds normal. Does not exhibit tenderness.   Abdominal: Abdomen benign.   Musculoskeletal: Does not exhibit edema.   Neurological: Neurological exam unremarkable.   Vitals reviewed.    Data Review:   Lab Results   Component Value Date    GLUCOSE 143 (H) 03/28/2023    BUN 16 03/28/2023    CREATININE 0.97 03/28/2023    EGFRIFNONA 48 (L) 01/19/2022    EGFRIFAFRI 58 (L) 01/19/2022    BCR 16.5 03/28/2023    K 4.4 03/28/2023    CO2 24.1 03/28/2023    CALCIUM 8.8 03/28/2023    ALBUMIN 3.7 03/27/2023    AST 17 03/27/2023    ALT 14 03/27/2023     Lab " Results   Component Value Date    CHOL 224 (H) 09/16/2019    CHLPL 205 (H) 07/21/2022    TRIG 115 07/21/2022    HDL 56 07/21/2022     (H) 07/21/2022      Lab Results   Component Value Date    WBC 5.68 03/28/2023    RBC 4.22 03/28/2023    HGB 12.6 03/28/2023    HCT 38.9 03/28/2023    MCV 92.2 03/28/2023     (L) 03/28/2023     Lab Results   Component Value Date    TSH 2.410 07/21/2022     Lab Results   Component Value Date    HGBA1C 8.10 (H) 03/27/2023          ECG 12 Lead    Date/Time: 4/6/2023 3:15 PM  Performed by: Selina Constantino PA-C  Authorized by: Selina Constantino PA-C   Comparison: compared with previous ECG from 3/27/2023  Similar to previous ECG  Rhythm: sinus rhythm  Rate: normal  BPM: 66  Conduction: left bundle branch block    Clinical impression: abnormal EKG                   Assessment:      Diagnosis Plan   1. NSTEMI, initial episode of care   recent NSTEMI at [facility] with ongoing significant dyspnea on exertion. New LBBB   2. Coronary artery disease involving native coronary artery of native heart with other form of angina pectoris   history of nonobstructive coronary artery disease on [facility] 2019 now with progressive dyspnea on exertion and recent NSTEMI concerning for coronary artery disease.   3. Acute on chronic HFrEF (heart failure with reduced ejection fraction)   echocardiogram at [place] with further reduced ejection fraction.  She did have a proBNP that was within normal limits.   4. Dyspnea on exertion   as above     Plan:   Patient with features of progressive angina concerning for coronary artery disease.  She had an echocardiogram at [facility] that revealed further reduced ejection fraction.  We will proceed with left heart catheterization for ischemic evaluation with catheter-based intervention if necessary.  The risk, benefits, and alternatives were discussed with the patient she wishes to proceed.  We will proceed via right radial.  She  is scheduled for tomorrow morning so we will not add any further medications at this time.  Further recommendations to follow heart catheterization.  Continue current medications.   FU after procedure, sooner as needed.  Thank you for allowing us to participate in the care of your patient.         Selina Constantino PA-C      Please note that portions of this note may have been completed with a voice recognition program. Efforts were made to edit the dictations, but occasionally words are mistranscribed.

## 2023-04-05 NOTE — H&P (VIEW-ONLY)
[facility] Cardiology    Encounter Date: 2023    Patient ID: Radha Whelan is a 82 y.o. female.  : 1940     PCP: Farhan Schaefer MD       Chief Complaint: hospital follow up, chest pain    PROBLEM LIST:  1. Nonobstructive coronary artery disease  a. Echo 2019: EF 43%, grade 1 diastolic dysfunction  b. LHC 2019: No hemodynamically significant CAD  2. Chronic combined systolic and diastolic heart failure  a. Echo 3/27/2023: EF 36-40% LV moderately dilated, grade 1 diastolic dysfunction, moderate calcification of the aortic valve  3. Sinus node dysfunction  a. 24-hour Holter 2019: Average heart rate 60, range , rare SVT lasting 11 beats with no symptoms  b. Recurrent wilfrido syncope x2 once 10/2019 and again 2019  c. [facility] DDD ampm implant 2019, Dr. Laboy  4. Hypertension      History of Present Illness  Patient presents today for follow-up with a history of nonobstructive coronary artery disease, heart failure reduced ejection fraction, sinus node dysfunction and cardiac risk factors. Since last visit, patient was seen at [facility] for increasing dyspnea on exertion.  She was found with elevated troponin and was admitted to the hospital for further evaluation and management.  She underwent echocardiogram which revealed EF of 35-40%.     Upon discussion with the patient today, she reports that she has been having progressive dyspnea on exertion for the last several months.  She was previously completely independent with her ADLs but now is reliant on her daughter to cook and clean for her as she cannot do this without feeling short of breath.  She notes associated orthopnea and she cannot lie flat.  She did have a proBNP drawn in [place] and this was within normal limits.  She denies any significant bilateral lower extremity edema.  There was no pleural effusion or pulmonary edema on chest x-ray.  She did have 2 episodes of NSVT on her last  "device interrogation 3/2023.    Allergies   Allergen Reactions   • Cumini Other (See Comments)     Complex migrane  migraine   • Cheese Other (See Comments)     migrane    Migraine   • Ibuprofen Rash     Swelling all over  \"Swelling all over\"   • Other Other (See Comments)     \"ice cream\"-migranes    \"ice cream\" migraines   • Saccharin Other (See Comments)     migrane    Migraine         Current Outpatient Medications:   •  acetaminophen (TYLENOL) 325 MG tablet, Take 2 tablets by mouth Every 6 (Six) Hours As Needed for Mild Pain ., Disp: , Rfl:   •  aspirin 81 MG chewable tablet, Chew 1 tablet Daily., Disp: , Rfl:   •  Blood Glucose Monitoring Suppl (ONE TOUCH ULTRA 2) w/Device kit, USE AS NEEDED FOR BLOOD    SUGAR MONITORING, Disp: 1 each, Rfl: 0  •  Cholecalciferol (VITAMIN D PO), Take 125 mcg by mouth 2 (Two) Times a Week., Disp: , Rfl:   •  clopidogrel (PLAVIX) 75 MG tablet, Take 1 tablet by mouth Daily., Disp: 90 tablet, Rfl: 3  •  Coenzyme Q10 200 MG capsule, Take 400 mg by mouth Every Other Day., Disp: , Rfl:   •  furosemide (LASIX) 20 MG tablet, Take 2 tablets by mouth Daily., Disp: 180 tablet, Rfl: 3  •  glipizide (GLUCOTROL XL) 2.5 MG 24 hr tablet, Take 1 tablet by mouth Daily., Disp: 90 tablet, Rfl: 3  •  Glucosamine-Chondroit-Vit C-Mn (GLUCOSAMINE CHONDR 1500 COMPLX PO), Take 1 tablet by mouth Daily., Disp: , Rfl:   •  glucose blood (Accu-Chek Kayce Plus) test strip, TEST ONCE DAILY., Disp: 100 each, Rfl: 3  •  glucose monitor monitoring kit, 1 each As Needed (blood sugar)., Disp: 1 each, Rfl: 1  •  Lancets (accu-chek soft touch) lancets, Use as directed, Disp: 100 each, Rfl: 12  •  latanoprost (XALATAN) 0.005 % ophthalmic solution, PLACE 1 DROP IN EACH EYE EVERY DAY AT BEDTIME, Disp: , Rfl:   •  lisinopril (PRINIVIL,ZESTRIL) 40 MG tablet, Take 1 tablet by mouth Daily., Disp: 90 tablet, Rfl: 3  •  lovastatin (MEVACOR) 10 MG tablet, TAKE 1 TABLET EVERY NIGHT (Patient taking differently: 1 tablet. TAKES " "ONES EVERY OTHER NIGHTS), Disp: 90 tablet, Rfl: 3  •  melatonin 3 MG tablet, Take 1 tablet by mouth Every Night., Disp: , Rfl:   •  pantoprazole (PROTONIX) 40 MG EC tablet, Take 1 tablet by mouth Daily., Disp: 90 tablet, Rfl: 3  •  tobramycin-dexamethasone (TOBRADEX) 0.3-0.1 % ophthalmic suspension, Administer 1 drop to both eyes As Needed (dryness)., Disp: , Rfl:   •  metoprolol tartrate (LOPRESSOR) 25 MG tablet, Take 0.5 tablets by mouth 2 (Two) Times a Day., Disp: 90 tablet, Rfl: 0  •  ranolazine (Ranexa) 500 MG 12 hr tablet, Take 1 tablet by mouth 2 (Two) Times a Day., Disp: 60 tablet, Rfl: 0    The following portions of the patient's history were reviewed and updated as appropriate: allergies, current medications, past family history, past medical history, past social history, past surgical history and problem list.    ROS  12 point ROS negative except for that listed in the HPI.        Objective:     /62 (BP Location: Left arm, Patient Position: Sitting, Cuff Size: Adult)   Pulse 66   Ht 170.2 cm (67\")   Wt 83 kg (183 lb)   SpO2 97%   BMI 28.66 kg/m²      Physical Exam  Constitutional: Patient appears well-developed and well-nourished.   HENT: HEENT exam unremarkable.   Neck: Neck supple. No JVD present. No carotid bruits.   Cardiovascular: Normal rate, regular rhythm and normal heart sounds. No murmur heard.   2+ symmetric pulses.   Pulmonary/Chest: Breath sounds normal. Does not exhibit tenderness.   Abdominal: Abdomen benign.   Musculoskeletal: Does not exhibit edema.   Neurological: Neurological exam unremarkable.   Vitals reviewed.    Data Review:   Lab Results   Component Value Date    GLUCOSE 143 (H) 03/28/2023    BUN 16 03/28/2023    CREATININE 0.97 03/28/2023    EGFRIFNONA 48 (L) 01/19/2022    EGFRIFAFRI 58 (L) 01/19/2022    BCR 16.5 03/28/2023    K 4.4 03/28/2023    CO2 24.1 03/28/2023    CALCIUM 8.8 03/28/2023    ALBUMIN 3.7 03/27/2023    AST 17 03/27/2023    ALT 14 03/27/2023     Lab " Results   Component Value Date    CHOL 224 (H) 09/16/2019    CHLPL 205 (H) 07/21/2022    TRIG 115 07/21/2022    HDL 56 07/21/2022     (H) 07/21/2022      Lab Results   Component Value Date    WBC 5.68 03/28/2023    RBC 4.22 03/28/2023    HGB 12.6 03/28/2023    HCT 38.9 03/28/2023    MCV 92.2 03/28/2023     (L) 03/28/2023     Lab Results   Component Value Date    TSH 2.410 07/21/2022     Lab Results   Component Value Date    HGBA1C 8.10 (H) 03/27/2023          ECG 12 Lead    Date/Time: 4/6/2023 3:15 PM  Performed by: Selina Constantino PA-C  Authorized by: Selina Constantino PA-C   Comparison: compared with previous ECG from 3/27/2023  Similar to previous ECG  Rhythm: sinus rhythm  Rate: normal  BPM: 66  Conduction: left bundle branch block    Clinical impression: abnormal EKG                   Assessment:      Diagnosis Plan   1. NSTEMI, initial episode of care   recent NSTEMI at [facility] with ongoing significant dyspnea on exertion. New LBBB   2. Coronary artery disease involving native coronary artery of native heart with other form of angina pectoris   history of nonobstructive coronary artery disease on [facility] 2019 now with progressive dyspnea on exertion and recent NSTEMI concerning for coronary artery disease.   3. Acute on chronic HFrEF (heart failure with reduced ejection fraction)   echocardiogram at [place] with further reduced ejection fraction.  She did have a proBNP that was within normal limits.   4. Dyspnea on exertion   as above     Plan:   Patient with features of progressive angina concerning for coronary artery disease.  She had an echocardiogram at [facility] that revealed further reduced ejection fraction.  We will proceed with left heart catheterization for ischemic evaluation with catheter-based intervention if necessary.  The risk, benefits, and alternatives were discussed with the patient she wishes to proceed.  We will proceed via right radial.  She  is scheduled for tomorrow morning so we will not add any further medications at this time.  Further recommendations to follow heart catheterization.  Continue current medications.   FU after procedure, sooner as needed.  Thank you for allowing us to participate in the care of your patient.         Selina Constantino PA-C      Please note that portions of this note may have been completed with a voice recognition program. Efforts were made to edit the dictations, but occasionally words are mistranscribed.

## 2023-04-06 ENCOUNTER — OFFICE VISIT (OUTPATIENT)
Dept: CARDIOLOGY | Facility: CLINIC | Age: 83
End: 2023-04-06
Payer: MEDICARE

## 2023-04-06 VITALS
SYSTOLIC BLOOD PRESSURE: 122 MMHG | BODY MASS INDEX: 28.72 KG/M2 | HEART RATE: 66 BPM | OXYGEN SATURATION: 97 % | HEIGHT: 67 IN | DIASTOLIC BLOOD PRESSURE: 62 MMHG | WEIGHT: 183 LBS

## 2023-04-06 DIAGNOSIS — I21.4 NSTEMI, INITIAL EPISODE OF CARE: Primary | ICD-10-CM

## 2023-04-06 DIAGNOSIS — I50.23 ACUTE ON CHRONIC HFREF (HEART FAILURE WITH REDUCED EJECTION FRACTION): ICD-10-CM

## 2023-04-06 DIAGNOSIS — R06.09 DYSPNEA ON EXERTION: ICD-10-CM

## 2023-04-06 DIAGNOSIS — I25.118 CORONARY ARTERY DISEASE INVOLVING NATIVE CORONARY ARTERY OF NATIVE HEART WITH OTHER FORM OF ANGINA PECTORIS: ICD-10-CM

## 2023-04-06 PROBLEM — I50.43 ACUTE ON CHRONIC COMBINED SYSTOLIC AND DIASTOLIC CONGESTIVE HEART FAILURE: Status: ACTIVE | Noted: 2019-12-11

## 2023-04-06 PROCEDURE — 3074F SYST BP LT 130 MM HG: CPT

## 2023-04-06 PROCEDURE — 3078F DIAST BP <80 MM HG: CPT

## 2023-04-06 PROCEDURE — 99214 OFFICE O/P EST MOD 30 MIN: CPT

## 2023-04-06 PROCEDURE — 93000 ELECTROCARDIOGRAM COMPLETE: CPT

## 2023-04-06 RX ORDER — RANOLAZINE 500 MG/1
500 TABLET, EXTENDED RELEASE ORAL 2 TIMES DAILY
Qty: 60 TABLET | Refills: 0 | Status: ON HOLD | OUTPATIENT
Start: 2023-04-06 | End: 2023-04-07

## 2023-04-07 ENCOUNTER — HOSPITAL ENCOUNTER (OUTPATIENT)
Facility: HOSPITAL | Age: 83
Setting detail: HOSPITAL OUTPATIENT SURGERY
Discharge: HOME | End: 2023-04-07
Attending: INTERNAL MEDICINE | Admitting: INTERNAL MEDICINE
Payer: MEDICARE

## 2023-04-07 VITALS
HEART RATE: 70 BPM | TEMPERATURE: 97.7 F | WEIGHT: 183.86 LBS | OXYGEN SATURATION: 95 % | DIASTOLIC BLOOD PRESSURE: 72 MMHG | SYSTOLIC BLOOD PRESSURE: 149 MMHG | HEIGHT: 67 IN | RESPIRATION RATE: 18 BRPM | BODY MASS INDEX: 28.86 KG/M2

## 2023-04-07 DIAGNOSIS — I21.4 NSTEMI, INITIAL EPISODE OF CARE: ICD-10-CM

## 2023-04-07 DIAGNOSIS — I50.22 CHRONIC HFREF (HEART FAILURE WITH REDUCED EJECTION FRACTION): Primary | ICD-10-CM

## 2023-04-07 DIAGNOSIS — I50.23 ACUTE ON CHRONIC HFREF (HEART FAILURE WITH REDUCED EJECTION FRACTION): ICD-10-CM

## 2023-04-07 LAB — GLUCOSE BLDC GLUCOMTR-MCNC: 149 MG/DL (ref 70–130)

## 2023-04-07 PROCEDURE — 25010000002 MIDAZOLAM PER 1 MG: Performed by: INTERNAL MEDICINE

## 2023-04-07 PROCEDURE — 25010000002 HEPARIN (PORCINE) PER 1000 UNITS: Performed by: INTERNAL MEDICINE

## 2023-04-07 PROCEDURE — 93458 L HRT ARTERY/VENTRICLE ANGIO: CPT | Performed by: INTERNAL MEDICINE

## 2023-04-07 PROCEDURE — 0 LIDOCAINE 1 % SOLUTION: Performed by: INTERNAL MEDICINE

## 2023-04-07 PROCEDURE — C1894 INTRO/SHEATH, NON-LASER: HCPCS | Performed by: INTERNAL MEDICINE

## 2023-04-07 PROCEDURE — 82962 GLUCOSE BLOOD TEST: CPT

## 2023-04-07 PROCEDURE — 25510000001 IOPAMIDOL PER 1 ML: Performed by: INTERNAL MEDICINE

## 2023-04-07 PROCEDURE — C1769 GUIDE WIRE: HCPCS | Performed by: INTERNAL MEDICINE

## 2023-04-07 RX ORDER — RANOLAZINE 500 MG/1
TABLET, EXTENDED RELEASE ORAL
Qty: 180 TABLET | Refills: 0 | OUTPATIENT
Start: 2023-04-07 | End: 2023-04-07 | Stop reason: HOSPADM

## 2023-04-07 RX ORDER — HEPARIN SODIUM 1000 [USP'U]/ML
INJECTION, SOLUTION INTRAVENOUS; SUBCUTANEOUS
Status: DISCONTINUED | OUTPATIENT
Start: 2023-04-07 | End: 2023-04-07 | Stop reason: HOSPADM

## 2023-04-07 RX ORDER — SODIUM CHLORIDE 9 MG/ML
100 INJECTION, SOLUTION INTRAVENOUS CONTINUOUS
Status: DISCONTINUED | OUTPATIENT
Start: 2023-04-07 | End: 2023-04-07 | Stop reason: HOSPADM

## 2023-04-07 RX ORDER — SODIUM CHLORIDE 9 MG/ML
1-3 INJECTION, SOLUTION INTRAVENOUS CONTINUOUS
Status: DISCONTINUED | OUTPATIENT
Start: 2023-04-07 | End: 2023-04-07 | Stop reason: HOSPADM

## 2023-04-07 RX ORDER — ACETAMINOPHEN 325 MG/1
650 TABLET ORAL EVERY 4 HOURS PRN
Status: DISCONTINUED | OUTPATIENT
Start: 2023-04-07 | End: 2023-04-07 | Stop reason: HOSPADM

## 2023-04-07 RX ORDER — ASPIRIN 81 MG/1
81 TABLET ORAL EVERY MORNING
COMMUNITY

## 2023-04-07 RX ORDER — NICARDIPINE HCL-0.9% SOD CHLOR 1 MG/10 ML
SYRINGE (ML) INTRAVENOUS
Status: DISCONTINUED | OUTPATIENT
Start: 2023-04-07 | End: 2023-04-07 | Stop reason: HOSPADM

## 2023-04-07 RX ORDER — ASPIRIN 325 MG
325 TABLET, DELAYED RELEASE (ENTERIC COATED) ORAL DAILY
Status: DISCONTINUED | OUTPATIENT
Start: 2023-04-07 | End: 2023-04-07 | Stop reason: HOSPADM

## 2023-04-07 RX ORDER — MIDAZOLAM HYDROCHLORIDE 1 MG/ML
INJECTION INTRAMUSCULAR; INTRAVENOUS
Status: DISCONTINUED | OUTPATIENT
Start: 2023-04-07 | End: 2023-04-07 | Stop reason: HOSPADM

## 2023-04-07 RX ORDER — LIDOCAINE HYDROCHLORIDE 10 MG/ML
INJECTION, SOLUTION INFILTRATION; PERINEURAL
Status: DISCONTINUED | OUTPATIENT
Start: 2023-04-07 | End: 2023-04-07 | Stop reason: HOSPADM

## 2023-04-07 RX ADMIN — ASPIRIN 325 MG: 325 TABLET, COATED ORAL at 08:19

## 2023-04-07 RX ADMIN — SODIUM CHLORIDE 3 ML/KG/HR: 9 INJECTION, SOLUTION INTRAVENOUS at 08:17

## 2023-04-10 DIAGNOSIS — I67.82 TEMPORARY CEREBRAL VASCULAR DYSFUNCTION: ICD-10-CM

## 2023-04-10 RX ORDER — GLIPIZIDE 2.5 MG/1
2.5 TABLET, EXTENDED RELEASE ORAL DAILY
Qty: 90 TABLET | Refills: 3 | Status: SHIPPED | OUTPATIENT
Start: 2023-04-10

## 2023-04-10 RX ORDER — PANTOPRAZOLE SODIUM 40 MG/1
40 TABLET, DELAYED RELEASE ORAL DAILY
Qty: 90 TABLET | Refills: 3 | Status: SHIPPED | OUTPATIENT
Start: 2023-04-10

## 2023-04-10 RX ORDER — LOVASTATIN 10 MG/1
10 TABLET ORAL NIGHTLY
Qty: 90 TABLET | Refills: 3 | Status: SHIPPED | OUTPATIENT
Start: 2023-04-10

## 2023-04-10 RX ORDER — CLOPIDOGREL BISULFATE 75 MG/1
75 TABLET ORAL DAILY
Qty: 90 TABLET | Refills: 3 | Status: SHIPPED | OUTPATIENT
Start: 2023-04-10

## 2023-04-10 RX ORDER — LISINOPRIL 40 MG/1
40 TABLET ORAL DAILY
Qty: 90 TABLET | Refills: 3 | Status: SHIPPED | OUTPATIENT
Start: 2023-04-10

## 2023-05-03 ENCOUNTER — TRANSCRIBE ORDERS (OUTPATIENT)
Dept: ADMINISTRATIVE | Facility: HOSPITAL | Age: 83
End: 2023-05-03
Payer: MEDICARE

## 2023-05-03 DIAGNOSIS — Z12.31 BREAST CANCER SCREENING BY MAMMOGRAM: Primary | ICD-10-CM

## 2023-05-15 NOTE — PROGRESS NOTES
[facility] Cardiology    Encounter Date: 2023    Patient ID: Radha Whelan is a 82 y.o. female.  : 1940     PCP: Farhan Schaefer MD       Chief Complaint: SP [facility]       PROBLEM LIST:  1. Nonobstructive coronary artery disease  a. Echo 2019: EF 43%, grade 1 diastolic dysfunction  b. LHC 2019: No hemodynamically significant CAD  c. [facility], 2023: EF 55%. Nonobstructive plaque disease involving multiple vessels without any evidence of hemodynamically significant coronary disease. Normal hemodynamics.  2. Chronic combined systolic and diastolic heart failure  a. Echo 2023: EF 36-40% LV moderately dilated, grade 1 diastolic dysfunction, moderate calcification of the aortic valve  3. Sinus node dysfunction  a. 24h Holter 2019: Average heart rate 60, range , rare SVT lasting 11 beats with no symptoms  b. Recurrent wilfrido syncope x2 once 10/2019 and again 2019  c. [facility] DDD ampm implant 2019, Dr. Laboy  4. Hypertension    History of Present Illness  Patient presents today for a follow-up with a history of CAD, HFrEF, and cardiac risk factors, status post recent cardiac catheterization study. Since last evaluation patient reports feeling a bit better however still has been experiencing shortness of breath when bending over or when walking across a room. . Her blood pressure has been well controlled at home and she states that her shortness of breath has improved since starting metoprolol however she has to pace herself and limit her activities after minimal exertion.    She plans to follow up with her PCP next month and she has not been referred to a pulmonologist in the past. Patient denies chest pain, orthopnea, palpitations, edema, dizziness, and syncope.     Allergies   Allergen Reactions   • Cumini Other (See Comments)     Complex migrane  migraine   • Cheese Other (See Comments)     migrane    Migraine   • Ibuprofen Rash     Swelling all  "over  \"Swelling all over\"   • Other Other (See Comments)     \"ice cream\"-migranes    \"ice cream\" migraines   • Saccharin Other (See Comments)     migrane    Migraine         Current Outpatient Medications:   •  acetaminophen (TYLENOL) 325 MG tablet, Take 2 tablets by mouth Every 6 (Six) Hours As Needed for Mild Pain ., Disp: , Rfl:   •  aspirin 81 MG EC tablet, Take 1 tablet by mouth Every Morning., Disp: , Rfl:   •  Blood Glucose Monitoring Suppl (ONE TOUCH ULTRA 2) w/Device kit, USE AS NEEDED FOR BLOOD    SUGAR MONITORING, Disp: 1 each, Rfl: 0  •  Cholecalciferol (VITAMIN D PO), Take 125 mcg by mouth 2 (Two) Times a Week., Disp: , Rfl:   •  clopidogrel (PLAVIX) 75 MG tablet, Take 1 tablet by mouth Daily., Disp: 90 tablet, Rfl: 3  •  Coenzyme Q10 200 MG capsule, Take 400 mg by mouth Every Other Day., Disp: , Rfl:   •  furosemide (LASIX) 20 MG tablet, Take 2 tablets by mouth Daily. (Patient taking differently: Take 2 tablets by mouth Every Other Day.), Disp: 180 tablet, Rfl: 3  •  glipizide (GLUCOTROL XL) 2.5 MG 24 hr tablet, Take 1 tablet by mouth Daily., Disp: 90 tablet, Rfl: 3  •  glucose blood (Accu-Chek Kayce Plus) test strip, TEST ONCE DAILY., Disp: 100 each, Rfl: 3  •  latanoprost (XALATAN) 0.005 % ophthalmic solution, PLACE 1 DROP IN EACH EYE EVERY DAY AT BEDTIME, Disp: , Rfl:   •  lisinopril (PRINIVIL,ZESTRIL) 40 MG tablet, Take 1 tablet by mouth Daily., Disp: 90 tablet, Rfl: 3  •  lovastatin (MEVACOR) 10 MG tablet, Take 1 tablet by mouth Every Night. (Patient taking differently: Take 1 tablet by mouth Every Other Day.), Disp: 90 tablet, Rfl: 3  •  melatonin 3 MG tablet, Take 1 tablet by mouth Every Night., Disp: , Rfl:   •  metoprolol tartrate (LOPRESSOR) 25 MG tablet, Take 1 tablet by mouth 2 (Two) Times a Day., Disp: 180 tablet, Rfl: 1  •  pantoprazole (PROTONIX) 40 MG EC tablet, Take 1 tablet by mouth Daily., Disp: 90 tablet, Rfl: 3  •  tobramycin-dexamethasone (TOBRADEX) 0.3-0.1 % ophthalmic " "suspension, Administer 1 drop to both eyes As Needed (dryness)., Disp: , Rfl:   •  Glucosamine-Chondroit-Vit C-Mn (GLUCOSAMINE CHONDR 1500 COMPLX PO), Take 1 tablet by mouth Daily. (Patient not taking: Reported on 5/19/2023), Disp: , Rfl:   •  glucose monitor monitoring kit, 1 each As Needed (blood sugar)., Disp: 1 each, Rfl: 1  •  Lancets (accu-chek soft touch) lancets, Use as directed, Disp: 100 each, Rfl: 12    The following portions of the patient's history were reviewed and updated as appropriate: allergies, current medications, past family history, past medical history, past social history, past surgical history and problem list.    ROS  Review of Systems   14 point ROS negative except for that listed in the HPI.         Objective:     /70 (BP Location: Left arm, Patient Position: Sitting)   Pulse 61   Ht 170.2 cm (67\")   Wt 83.9 kg (185 lb)   SpO2 97%   BMI 28.98 kg/m²      Physical Exam  Constitutional: Patient appears well-developed and well-nourished.   HENT: HEENT exam unremarkable.   Neck: Neck supple. No JVD present. No carotid bruits.   Cardiovascular: Normal rate, regular rhythm and normal heart sounds. No murmur heard.   2+ symmetric pulses.   Pulmonary/Chest: Breath sounds normal. Does not exhibit tenderness.   Abdominal: Abdomen benign.   Musculoskeletal: Does not exhibit edema.   Neurological: Neurological exam unremarkable.   Vitals reviewed.    Data Review:   Lab Results   Component Value Date    GLUCOSE 143 (H) 03/28/2023    BUN 16 03/28/2023    CREATININE 0.97 03/28/2023    EGFR 58.1 (L) 03/28/2023    BCR 16.5 03/28/2023     (L) 03/28/2023    K 4.4 03/28/2023    CO2 24.1 03/28/2023    CALCIUM 8.8 03/28/2023    ALBUMIN 3.7 03/27/2023    AST 17 03/27/2023    ALT 14 03/27/2023     Lab Results   Component Value Date    CHLPL 205 (H) 07/21/2022    TRIG 115 07/21/2022    HDL 56 07/21/2022     (H) 07/21/2022      Lab Results   Component Value Date    WBC 5.68 03/28/2023    RBC " 4.22 03/28/2023    HGB 12.6 03/28/2023    HCT 38.9 03/28/2023    MCV 92.2 03/28/2023     (L) 03/28/2023     Lab Results   Component Value Date    TSH 2.410 07/21/2022     Lab Results   Component Value Date    HGBA1C 8.10 (H) 03/27/2023         Diagnosis Plan   1. Coronary artery disease involving native coronary artery of native heart with other form of angina pectoris   unremarkable cardiac ablation study discussed, EF was normal, patient was reassured.  Recommend continuing medical management   2. Shortness of breath  Ambulatory Referral to Pulmonology, discussed that her dyspnea is multifactorial, clinically euvolemic on stable dose of diuretic, we will further increase the dose of metoprolol to better control the exertional heart rate.   3. Essential hypertension   well-controlled, continue current antihypertensive therapy.   4. Mixed hyperlipidemia   continue diet and exercise, follow-up with PCP for reassessment.               Plan:   Stable cardiac status.  No significant angina.  Exertional dyspnea is multifactorial.  Could be due to combination of diastolic CHF and deconditioning as well as underlying pulmonary disease  I have recommended regular exercise for aerobic conditioning, we will increase metoprolol to 25 mg twice daily for better control of heart rate.  Patient is to be referred to pulmonologist in [place] through [facility].   Continue all other current medications.   FU in 6 MO, sooner as needed.  Thank you for allowing us to participate in the care of your patient.     Scribed for Ashlyn Mays MD by Myrna Napier. 5/19/2023  13:03 EDT    IAshlyn MD, personally performed the services described in this documentation as scribed by the above named individual in my presence, and it is both accurate and complete.  5/19/2023  13:09 EDT      Please note that portions of this note may have been completed with a voice recognition program. Efforts were made to edit the  dictations, but occasionally words are mistranscribed.

## 2023-05-19 ENCOUNTER — OFFICE VISIT (OUTPATIENT)
Dept: CARDIOLOGY | Facility: CLINIC | Age: 83
End: 2023-05-19
Payer: MEDICARE

## 2023-05-19 VITALS
HEART RATE: 61 BPM | WEIGHT: 185 LBS | DIASTOLIC BLOOD PRESSURE: 70 MMHG | HEIGHT: 67 IN | SYSTOLIC BLOOD PRESSURE: 116 MMHG | OXYGEN SATURATION: 97 % | BODY MASS INDEX: 29.03 KG/M2

## 2023-05-19 DIAGNOSIS — I25.118 CORONARY ARTERY DISEASE INVOLVING NATIVE CORONARY ARTERY OF NATIVE HEART WITH OTHER FORM OF ANGINA PECTORIS: Primary | ICD-10-CM

## 2023-05-19 DIAGNOSIS — R06.02 SHORTNESS OF BREATH: ICD-10-CM

## 2023-05-19 DIAGNOSIS — I10 ESSENTIAL HYPERTENSION: ICD-10-CM

## 2023-05-19 DIAGNOSIS — E78.2 MIXED HYPERLIPIDEMIA: ICD-10-CM

## 2023-05-19 RX ORDER — RANOLAZINE 500 MG/1
1 TABLET, EXTENDED RELEASE ORAL EVERY 12 HOURS SCHEDULED
COMMUNITY
Start: 2023-04-07 | End: 2023-05-19

## 2023-06-12 ENCOUNTER — OFFICE VISIT (OUTPATIENT)
Dept: CARDIOLOGY | Facility: CLINIC | Age: 83
End: 2023-06-12
Payer: MEDICARE

## 2023-06-12 VITALS
HEART RATE: 60 BPM | WEIGHT: 189 LBS | BODY MASS INDEX: 29.66 KG/M2 | OXYGEN SATURATION: 96 % | SYSTOLIC BLOOD PRESSURE: 136 MMHG | HEIGHT: 67 IN | DIASTOLIC BLOOD PRESSURE: 72 MMHG

## 2023-06-12 DIAGNOSIS — I49.5 SINUS NODE DYSFUNCTION: Primary | ICD-10-CM

## 2023-06-12 DIAGNOSIS — G47.33 OSA (OBSTRUCTIVE SLEEP APNEA): ICD-10-CM

## 2023-06-12 DIAGNOSIS — I10 ESSENTIAL HYPERTENSION: ICD-10-CM

## 2023-06-12 DIAGNOSIS — Z95.0 PRESENCE OF CARDIAC PACEMAKER: ICD-10-CM

## 2023-06-12 DIAGNOSIS — I50.22 CHRONIC HFREF (HEART FAILURE WITH REDUCED EJECTION FRACTION): ICD-10-CM

## 2023-06-12 PROBLEM — I21.4 NSTEMI, INITIAL EPISODE OF CARE: Status: RESOLVED | Noted: 2023-04-06 | Resolved: 2023-06-12

## 2023-06-12 PROBLEM — R06.09 DYSPNEA ON EXERTION: Status: RESOLVED | Noted: 2023-03-27 | Resolved: 2023-06-12

## 2023-06-15 ENCOUNTER — HOSPITAL ENCOUNTER (OUTPATIENT)
Dept: MAMMOGRAPHY | Facility: HOSPITAL | Age: 83
Discharge: HOME | End: 2023-06-15
Admitting: INTERNAL MEDICINE
Payer: MEDICARE

## 2023-06-15 DIAGNOSIS — Z12.31 BREAST CANCER SCREENING BY MAMMOGRAM: ICD-10-CM

## 2023-06-15 PROCEDURE — 77063 BREAST TOMOSYNTHESIS BI: CPT

## 2023-06-15 PROCEDURE — 77067 SCR MAMMO BI INCL CAD: CPT

## 2023-06-16 LAB
ALBUMIN SERPL-MCNC: 4.1 G/DL (ref 3.6–4.6)
ALBUMIN/GLOB SERPL: 1.3 {RATIO} (ref 1.2–2.2)
ALP SERPL-CCNC: 63 IU/L (ref 44–121)
ALT SERPL-CCNC: 14 IU/L (ref 0–32)
AST SERPL-CCNC: 17 IU/L (ref 0–40)
BASOPHILS # BLD AUTO: 0 X10E3/UL (ref 0–0.2)
BASOPHILS NFR BLD AUTO: 0 %
BILIRUB SERPL-MCNC: 0.5 MG/DL (ref 0–1.2)
BUN SERPL-MCNC: 17 MG/DL (ref 8–27)
BUN/CREAT SERPL: 16 (ref 12–28)
CALCIUM SERPL-MCNC: 9.5 MG/DL (ref 8.7–10.3)
CHLORIDE SERPL-SCNC: 97 MMOL/L (ref 96–106)
CHOLEST SERPL-MCNC: 246 MG/DL (ref 100–199)
CO2 SERPL-SCNC: 20 MMOL/L (ref 20–29)
CREAT SERPL-MCNC: 1.04 MG/DL (ref 0.57–1)
EGFRCR SERPLBLD CKD-EPI 2021: 53 ML/MIN/1.73
EOSINOPHIL # BLD AUTO: 0 X10E3/UL (ref 0–0.4)
EOSINOPHIL NFR BLD AUTO: 0 %
ERYTHROCYTE [DISTWIDTH] IN BLOOD BY AUTOMATED COUNT: 13.2 % (ref 11.7–15.4)
GLOBULIN SER CALC-MCNC: 3.2 G/DL (ref 1.5–4.5)
GLUCOSE SERPL-MCNC: 301 MG/DL (ref 70–99)
HBA1C MFR BLD: 8.1 % (ref 4.8–5.6)
HCT VFR BLD AUTO: 38.4 % (ref 34–46.6)
HDLC SERPL-MCNC: 70 MG/DL
HGB BLD-MCNC: 13 G/DL (ref 11.1–15.9)
IMM GRANULOCYTES # BLD AUTO: 0.1 X10E3/UL (ref 0–0.1)
IMM GRANULOCYTES NFR BLD AUTO: 1 %
LDLC SERPL CALC-MCNC: 159 MG/DL (ref 0–99)
LYMPHOCYTES # BLD AUTO: 1.4 X10E3/UL (ref 0.7–3.1)
LYMPHOCYTES NFR BLD AUTO: 23 %
MCH RBC QN AUTO: 30.4 PG (ref 26.6–33)
MCHC RBC AUTO-ENTMCNC: 33.9 G/DL (ref 31.5–35.7)
MCV RBC AUTO: 90 FL (ref 79–97)
MONOCYTES # BLD AUTO: 0.1 X10E3/UL (ref 0.1–0.9)
MONOCYTES NFR BLD AUTO: 2 %
NEUTROPHILS # BLD AUTO: 4.6 X10E3/UL (ref 1.4–7)
NEUTROPHILS NFR BLD AUTO: 74 %
PLATELET # BLD AUTO: 197 X10E3/UL (ref 150–450)
POTASSIUM SERPL-SCNC: 4.5 MMOL/L (ref 3.5–5.2)
PROT SERPL-MCNC: 7.3 G/DL (ref 6–8.5)
RBC # BLD AUTO: 4.28 X10E6/UL (ref 3.77–5.28)
SODIUM SERPL-SCNC: 134 MMOL/L (ref 134–144)
T4 FREE SERPL-MCNC: 1.02 NG/DL (ref 0.82–1.77)
TRIGL SERPL-MCNC: 100 MG/DL (ref 0–149)
TSH SERPL DL<=0.005 MIU/L-ACNC: 1.08 UIU/ML (ref 0.45–4.5)
VIT B12 SERPL-MCNC: 422 PG/ML (ref 232–1245)
VLDLC SERPL CALC-MCNC: 17 MG/DL (ref 5–40)
WBC # BLD AUTO: 6.2 X10E3/UL (ref 3.4–10.8)

## 2023-06-19 ENCOUNTER — OFFICE VISIT (OUTPATIENT)
Dept: INTERNAL MEDICINE | Facility: CLINIC | Age: 83
End: 2023-06-19
Payer: MEDICARE

## 2023-06-19 VITALS
TEMPERATURE: 96.6 F | BODY MASS INDEX: 29.51 KG/M2 | DIASTOLIC BLOOD PRESSURE: 84 MMHG | HEIGHT: 67 IN | WEIGHT: 188 LBS | SYSTOLIC BLOOD PRESSURE: 140 MMHG | HEART RATE: 66 BPM | OXYGEN SATURATION: 98 % | RESPIRATION RATE: 16 BRPM

## 2023-06-19 DIAGNOSIS — I40.9 SUBACUTE MYOCARDITIS, UNSPECIFIED MYOCARDITIS TYPE: Primary | ICD-10-CM

## 2023-06-19 DIAGNOSIS — G47.33 OSA ON CPAP: ICD-10-CM

## 2023-06-19 DIAGNOSIS — E11.9 TYPE 2 DIABETES MELLITUS WITHOUT COMPLICATION, WITHOUT LONG-TERM CURRENT USE OF INSULIN: ICD-10-CM

## 2023-06-19 DIAGNOSIS — Z99.89 OSA ON CPAP: ICD-10-CM

## 2023-06-19 DIAGNOSIS — H10.9 CONJUNCTIVITIS OF LEFT EYE, UNSPECIFIED CONJUNCTIVITIS TYPE: ICD-10-CM

## 2023-06-19 LAB
ALBUMIN UR-MCNC: <1.2 MG/DL
TROPONIN T SERPL HS-MCNC: 13 NG/L

## 2023-06-19 PROCEDURE — 3079F DIAST BP 80-89 MM HG: CPT | Performed by: INTERNAL MEDICINE

## 2023-06-19 PROCEDURE — 3077F SYST BP >= 140 MM HG: CPT | Performed by: INTERNAL MEDICINE

## 2023-06-19 PROCEDURE — 99214 OFFICE O/P EST MOD 30 MIN: CPT | Performed by: INTERNAL MEDICINE

## 2023-06-19 PROCEDURE — 36415 COLL VENOUS BLD VENIPUNCTURE: CPT | Performed by: INTERNAL MEDICINE

## 2023-06-19 NOTE — PROGRESS NOTES
Subjective     Patient ID: Radha Whelan is a 82 y.o. female. Patient is here for management of multiple medical problems.     Chief Complaint   Patient presents with    Hyperlipidemia     History of Present Illness   \      Hyperlip    Very soa with 4 fights of stairs. 2 days latter. Came to hospital.  MI found.  Heart cath neg.     Bs elvated and hx of knee injections.    Kaden on cpap            The following portions of the patient's history were reviewed and updated as appropriate: allergies, current medications, past family history, past medical history, past social history, past surgical history and problem list.    Review of Systems    Current Outpatient Medications:     acetaminophen (TYLENOL) 325 MG tablet, Take 2 tablets by mouth Every 6 (Six) Hours As Needed for Mild Pain ., Disp: , Rfl:     aspirin 81 MG EC tablet, Take 1 tablet by mouth Every Morning., Disp: , Rfl:     Blood Glucose Monitoring Suppl (ONE TOUCH ULTRA 2) w/Device kit, USE AS NEEDED FOR BLOOD    SUGAR MONITORING, Disp: 1 each, Rfl: 0    clopidogrel (PLAVIX) 75 MG tablet, Take 1 tablet by mouth Daily., Disp: 90 tablet, Rfl: 3    Coenzyme Q10 200 MG capsule, Take 400 mg by mouth Every Other Day., Disp: , Rfl:     furosemide (LASIX) 20 MG tablet, Take 2 tablets by mouth Daily. (Patient taking differently: Take 2 tablets by mouth Every Other Day.), Disp: 180 tablet, Rfl: 3    glipizide (GLUCOTROL XL) 2.5 MG 24 hr tablet, Take 1 tablet by mouth Daily., Disp: 90 tablet, Rfl: 3    Glucosamine-Chondroit-Vit C-Mn (GLUCOSAMINE CHONDR 1500 COMPLX PO), Take 1 tablet by mouth Daily., Disp: , Rfl:     glucose blood (Accu-Chek Kayce Plus) test strip, TEST ONCE DAILY., Disp: 100 each, Rfl: 3    glucose monitor monitoring kit, 1 each As Needed (blood sugar)., Disp: 1 each, Rfl: 1    Lancets (accu-chek soft touch) lancets, Use as directed, Disp: 100 each, Rfl: 12    latanoprost (XALATAN) 0.005 % ophthalmic solution, PLACE 1 DROP IN EACH EYE EVERY DAY AT  "BEDTIME, Disp: , Rfl:     lisinopril (PRINIVIL,ZESTRIL) 40 MG tablet, Take 1 tablet by mouth Daily., Disp: 90 tablet, Rfl: 3    lovastatin (MEVACOR) 10 MG tablet, Take 1 tablet by mouth Every Night. (Patient taking differently: Take 1 tablet by mouth Every Other Day.), Disp: 90 tablet, Rfl: 3    melatonin 3 MG tablet, Take 1 tablet by mouth Every Night., Disp: , Rfl:     metoprolol tartrate (LOPRESSOR) 25 MG tablet, Take 1 tablet by mouth 2 (Two) Times a Day., Disp: 180 tablet, Rfl: 1    pantoprazole (PROTONIX) 40 MG EC tablet, Take 1 tablet by mouth Daily., Disp: 90 tablet, Rfl: 3    tobramycin-dexamethasone (TOBRADEX) 0.3-0.1 % ophthalmic suspension, Administer 1 drop to both eyes As Needed (dryness)., Disp: , Rfl:     Objective      Blood pressure 140/84, pulse 66, temperature 96.6 °F (35.9 °C), resp. rate 16, height 170.2 cm (67\"), weight 85.3 kg (188 lb), SpO2 98 %.    Physical Exam     General Appearance:    Alert, cooperative, no distress, appears stated age   Head:    Normocephalic, without obvious abnormality, atraumatic   Eyes:    PERRL, conjunctiva/corneas clear, EOM's intact   Ears:    Normal TM's and external ear canals, both ears   Nose:   Nares normal, septum midline, mucosa normal, no drainage   or sinus tenderness   Throat:   Lips, mucosa, and tongue normal; teeth and gums normal   Neck:   Supple, symmetrical, trachea midline, no adenopathy;        thyroid:  No enlargement/tenderness/nodules; no carotid    bruit or JVD   Back:     Symmetric, no curvature, ROM normal, no CVA tenderness   Lungs:     Clear to auscultation bilaterally, respirations unlabored   Chest wall:    No tenderness or deformity   Heart:    Regular rate and rhythm, S1 and S2 normal, no murmur,        rub or gallop   Abdomen:     Soft, non-tender, bowel sounds active all four quadrants,     no masses, no organomegaly   Extremities:   Extremities normal, atraumatic, no cyanosis or edema   Pulses:   2+ and symmetric all extremities "   Skin:   Skin color, texture, turgor normal, no rashes or lesions   Lymph nodes:   Cervical, supraclavicular, and axillary nodes normal   Neurologic:   CNII-XII intact. Normal strength, sensation and reflexes       throughout      Results for orders placed or performed during the hospital encounter of 04/07/23   POC Glucose Once    Specimen: Blood   Result Value Ref Range    Glucose 149 (H) 70 - 130 mg/dL         Assessment & Plan     Soa with elevated trop.  Cath neg.  Last covid vaccine. 12/2/22. Moderna x 4.         Left ventricular ejection fraction appears to be 36 - 40%.    The left ventricular cavity is moderately dilated.    Left ventricular diastolic function is consistent with (grade I) impaired relaxation.    There is moderate calcification of the aortic valve mainly affecting the non-coronary, left coronary and right coronary cusp(s).    Estimated right ventricular systolic pressure from tricuspid regurgitation is normal (<35 mmHg).     Followed by cardiology. Has seen.     Conjunctivis will try her drops and go to Dr Trotter if not better tomorrow.         Diagnoses and all orders for this visit:    1. Subacute myocarditis, unspecified myocarditis type (Primary)  -     High Sensitivity Troponin T  -     Ambulatory Referral to Cardiology    2. Conjunctivitis of left eye, unspecified conjunctivitis type    3. Type 2 diabetes mellitus without complication, without long-term current use of insulin    4. ODELL on CPAP      Return in about 6 weeks (around 7/31/2023).          There are no Patient Instructions on file for this visit.     Farhan Schaefer MD    Assessment & Plan

## 2023-06-29 PROBLEM — I47.20 VENTRICULAR TACHYCARDIA, UNSPECIFIED: Status: ACTIVE | Noted: 2023-06-29

## 2023-06-29 PROBLEM — T50.B95A: Status: ACTIVE | Noted: 2023-06-29

## 2023-06-29 PROBLEM — I51.4: Status: ACTIVE | Noted: 2023-06-29

## 2023-06-29 PROBLEM — B33.22 MYOCARDITIS DUE TO COVID-19 VIRUS: Status: ACTIVE | Noted: 2023-06-29

## 2023-06-29 PROBLEM — I40.0 MYOCARDITIS DUE TO COVID-19 VIRUS: Status: ACTIVE | Noted: 2023-06-29

## 2023-06-29 PROBLEM — U07.1 MYOCARDITIS DUE TO COVID-19 VIRUS: Status: ACTIVE | Noted: 2023-06-29

## 2023-07-27 ENCOUNTER — HOSPITAL ENCOUNTER (OUTPATIENT)
Dept: CT IMAGING | Facility: HOSPITAL | Age: 83
Discharge: HOME | End: 2023-07-27
Admitting: INTERNAL MEDICINE
Payer: MEDICARE

## 2023-07-27 DIAGNOSIS — Z86.16 HISTORY OF COVID-19: ICD-10-CM

## 2023-07-27 DIAGNOSIS — R06.02 SHORTNESS OF BREATH: ICD-10-CM

## 2023-07-27 DIAGNOSIS — J84.9 ILD (INTERSTITIAL LUNG DISEASE): ICD-10-CM

## 2023-07-27 PROCEDURE — 71250 CT THORAX DX C-: CPT

## 2023-07-31 ENCOUNTER — TELEPHONE (OUTPATIENT)
Dept: PULMONOLOGY | Facility: CLINIC | Age: 83
End: 2023-07-31
Payer: MEDICARE

## 2023-07-31 ENCOUNTER — LAB (OUTPATIENT)
Dept: LAB | Facility: HOSPITAL | Age: 83
End: 2023-07-31
Payer: MEDICARE

## 2023-07-31 ENCOUNTER — OFFICE VISIT (OUTPATIENT)
Dept: INTERNAL MEDICINE | Facility: CLINIC | Age: 83
End: 2023-07-31
Payer: MEDICARE

## 2023-07-31 ENCOUNTER — TELEPHONE (OUTPATIENT)
Dept: INTERNAL MEDICINE | Facility: CLINIC | Age: 83
End: 2023-07-31

## 2023-07-31 VITALS
SYSTOLIC BLOOD PRESSURE: 124 MMHG | OXYGEN SATURATION: 97 % | HEIGHT: 67 IN | TEMPERATURE: 96.7 F | RESPIRATION RATE: 16 BRPM | WEIGHT: 189 LBS | BODY MASS INDEX: 29.66 KG/M2 | DIASTOLIC BLOOD PRESSURE: 72 MMHG | HEART RATE: 77 BPM

## 2023-07-31 DIAGNOSIS — J84.9 ILD (INTERSTITIAL LUNG DISEASE): Primary | ICD-10-CM

## 2023-07-31 DIAGNOSIS — J84.9 INTERSTITIAL LUNG DISEASE: Primary | ICD-10-CM

## 2023-07-31 DIAGNOSIS — J84.9 ILD (INTERSTITIAL LUNG DISEASE): ICD-10-CM

## 2023-07-31 DIAGNOSIS — G47.34 NOCTURNAL HYPOXIA: Primary | ICD-10-CM

## 2023-07-31 LAB — ERYTHROCYTE [SEDIMENTATION RATE] IN BLOOD: 44 MM/HR (ref 0–30)

## 2023-07-31 PROCEDURE — 82784 ASSAY IGA/IGD/IGG/IGM EACH: CPT

## 2023-07-31 PROCEDURE — 86140 C-REACTIVE PROTEIN: CPT

## 2023-07-31 PROCEDURE — 99214 OFFICE O/P EST MOD 30 MIN: CPT | Performed by: INTERNAL MEDICINE

## 2023-07-31 PROCEDURE — 82787 IGG 1 2 3 OR 4 EACH: CPT

## 2023-07-31 PROCEDURE — 86200 CCP ANTIBODY: CPT

## 2023-07-31 PROCEDURE — 3078F DIAST BP <80 MM HG: CPT | Performed by: INTERNAL MEDICINE

## 2023-07-31 PROCEDURE — 82785 ASSAY OF IGE: CPT

## 2023-07-31 PROCEDURE — 3074F SYST BP LT 130 MM HG: CPT | Performed by: INTERNAL MEDICINE

## 2023-07-31 PROCEDURE — 86038 ANTINUCLEAR ANTIBODIES: CPT

## 2023-07-31 PROCEDURE — 85652 RBC SED RATE AUTOMATED: CPT

## 2023-07-31 PROCEDURE — 86235 NUCLEAR ANTIGEN ANTIBODY: CPT

## 2023-07-31 RX ORDER — TOBRAMYCIN AND DEXAMETHASONE 3; 1 MG/ML; MG/ML
1 SUSPENSION/ DROPS OPHTHALMIC AS NEEDED
Qty: 5 ML | Refills: 1 | Status: SHIPPED | OUTPATIENT
Start: 2023-07-31

## 2023-07-31 RX ORDER — ALBUTEROL SULFATE 90 UG/1
2 AEROSOL, METERED RESPIRATORY (INHALATION) EVERY 4 HOURS PRN
Qty: 18 G | Refills: 2 | Status: SHIPPED | OUTPATIENT
Start: 2023-07-31

## 2023-07-31 NOTE — TELEPHONE ENCOUNTER
Caller: [id number] - [place], [place] - [address] AT [address] [place]  - [phone]  - [phone] FX    Relationship: Pharmacy    Best call back number: [phone]     What was the call regarding:   [facility] WOULD LIKE A CALL BACK TO GET DETAIL DIRECTION'S ON PATIENT'S MEDICATION FOR INSURANCE TO COVER     tobramycin-dexamethasone (TOBRADEX) 0.3-0.1 % ophthalmic suspension

## 2023-08-01 DIAGNOSIS — G47.33 OBSTRUCTIVE SLEEP APNEA: Primary | ICD-10-CM

## 2023-08-01 DIAGNOSIS — G47.34 NOCTURNAL HYPOXIA: ICD-10-CM

## 2023-08-01 LAB — CRP SERPL-MCNC: 0.65 MG/DL (ref 0–0.5)

## 2023-08-02 LAB
ALBUMIN SERPL-MCNC: 4 G/DL (ref 3.5–5.2)
ALBUMIN/GLOB SERPL: 1.3 G/DL
ALP SERPL-CCNC: 60 U/L (ref 39–117)
ALT SERPL-CCNC: 14 U/L (ref 1–33)
ANA HOMOGEN TITR SER: ABNORMAL {TITER}
ANA SER QL IF: POSITIVE
ANA SER QL: NEGATIVE
AST SERPL-CCNC: 15 U/L (ref 1–32)
BASOPHILS # BLD AUTO: 0.04 10*3/MM3 (ref 0–0.2)
BASOPHILS NFR BLD AUTO: 0.6 % (ref 0–1.5)
BILIRUB SERPL-MCNC: 0.5 MG/DL (ref 0–1.2)
BUN SERPL-MCNC: 16 MG/DL (ref 8–23)
BUN/CREAT SERPL: 15.1 (ref 7–25)
CALCIUM SERPL-MCNC: 9.8 MG/DL (ref 8.6–10.5)
CCP IGA+IGG SERPL IA-ACNC: 7 UNITS (ref 0–19)
CHLORIDE SERPL-SCNC: 98 MMOL/L (ref 98–107)
CO2 SERPL-SCNC: 27.6 MMOL/L (ref 22–29)
CREAT SERPL-MCNC: 1.06 MG/DL (ref 0.57–1)
EGFRCR SERPLBLD CKD-EPI 2021: 52.2 ML/MIN/1.73
ENA JO1 AB SER-ACNC: <0.2 AI (ref 0–0.9)
ENA SCL70 AB SER-ACNC: <0.2 AI (ref 0–0.9)
EOSINOPHIL # BLD AUTO: 0.18 10*3/MM3 (ref 0–0.4)
EOSINOPHIL NFR BLD AUTO: 2.8 % (ref 0.3–6.2)
ERYTHROCYTE [DISTWIDTH] IN BLOOD BY AUTOMATED COUNT: 12.9 % (ref 12.3–15.4)
GLOBULIN SER CALC-MCNC: 3.1 GM/DL
GLUCOSE SERPL-MCNC: 212 MG/DL (ref 65–99)
HCT VFR BLD AUTO: 38.9 % (ref 34–46.6)
HGB BLD-MCNC: 12.7 G/DL (ref 12–15.9)
IGG SERPL-MCNC: 943 MG/DL (ref 586–1602)
IGG1 SER-MCNC: 634 MG/DL (ref 248–810)
IGG2 SER-MCNC: 169 MG/DL (ref 130–555)
IGG3 SER-MCNC: 21 MG/DL (ref 15–102)
IGG4 SER-MCNC: 1 MG/DL (ref 2–96)
IMM GRANULOCYTES # BLD AUTO: 0.03 10*3/MM3 (ref 0–0.05)
IMM GRANULOCYTES NFR BLD AUTO: 0.5 % (ref 0–0.5)
LYMPHOCYTES # BLD AUTO: 2.56 10*3/MM3 (ref 0.7–3.1)
LYMPHOCYTES NFR BLD AUTO: 40.3 % (ref 19.6–45.3)
Lab: ABNORMAL
MCH RBC QN AUTO: 30.2 PG (ref 26.6–33)
MCHC RBC AUTO-ENTMCNC: 32.6 G/DL (ref 31.5–35.7)
MCV RBC AUTO: 92.6 FL (ref 79–97)
MONOCYTES # BLD AUTO: 0.46 10*3/MM3 (ref 0.1–0.9)
MONOCYTES NFR BLD AUTO: 7.2 % (ref 5–12)
NEUTROPHILS # BLD AUTO: 3.09 10*3/MM3 (ref 1.7–7)
NEUTROPHILS NFR BLD AUTO: 48.6 % (ref 42.7–76)
NRBC BLD AUTO-RTO: 0 /100 WBC (ref 0–0.2)
PLATELET # BLD AUTO: 225 10*3/MM3 (ref 140–450)
POTASSIUM SERPL-SCNC: 4.7 MMOL/L (ref 3.5–5.2)
PROT SERPL-MCNC: 7.1 G/DL (ref 6–8.5)
RBC # BLD AUTO: 4.2 10*6/MM3 (ref 3.77–5.28)
SODIUM SERPL-SCNC: 139 MMOL/L (ref 136–145)
STRONGYLOIDES IGG SER QL IA: NEGATIVE
WBC # BLD AUTO: 6.36 10*3/MM3 (ref 3.4–10.8)

## 2023-08-04 ENCOUNTER — TELEPHONE (OUTPATIENT)
Dept: CARDIOLOGY | Facility: CLINIC | Age: 83
End: 2023-08-04
Payer: MEDICARE

## 2023-08-04 LAB — IGE SERPL-ACNC: 3 IU/ML (ref 6–495)

## 2023-08-07 ENCOUNTER — OFFICE VISIT (OUTPATIENT)
Dept: INTERNAL MEDICINE | Facility: CLINIC | Age: 83
End: 2023-08-07
Payer: MEDICARE

## 2023-08-07 VITALS
DIASTOLIC BLOOD PRESSURE: 62 MMHG | WEIGHT: 188 LBS | HEART RATE: 62 BPM | SYSTOLIC BLOOD PRESSURE: 130 MMHG | OXYGEN SATURATION: 97 % | BODY MASS INDEX: 29.51 KG/M2 | HEIGHT: 67 IN | RESPIRATION RATE: 16 BRPM | TEMPERATURE: 96.1 F

## 2023-08-07 DIAGNOSIS — I50.22 SYSTOLIC CHF, CHRONIC: ICD-10-CM

## 2023-08-07 DIAGNOSIS — R06.02 SHORTNESS OF BREATH: ICD-10-CM

## 2023-08-07 DIAGNOSIS — J84.9 ILD (INTERSTITIAL LUNG DISEASE): Primary | ICD-10-CM

## 2023-08-07 PROCEDURE — 99214 OFFICE O/P EST MOD 30 MIN: CPT | Performed by: INTERNAL MEDICINE

## 2023-08-07 PROCEDURE — 3075F SYST BP GE 130 - 139MM HG: CPT | Performed by: INTERNAL MEDICINE

## 2023-08-07 PROCEDURE — 3078F DIAST BP <80 MM HG: CPT | Performed by: INTERNAL MEDICINE

## 2023-08-07 RX ORDER — DOXYCYCLINE HYCLATE 100 MG/1
100 CAPSULE ORAL 2 TIMES DAILY
Qty: 20 CAPSULE | Refills: 0 | Status: SHIPPED | OUTPATIENT
Start: 2023-08-07

## 2023-08-07 NOTE — PROGRESS NOTES
Subjective     Patient ID: Radha Whelan is a 83 y.o. female. Patient is here for management of multiple medical problems.     ILD    History of Present Illness       ILD unclear what type.        The following portions of the patient's history were reviewed and updated as appropriate: allergies, current medications, past family history, past medical history, past social history, past surgical history and problem list.    Review of Systems    Current Outpatient Medications:     acetaminophen (TYLENOL) 325 MG tablet, Take 2 tablets by mouth Every 6 (Six) Hours As Needed for Mild Pain ., Disp: , Rfl:     albuterol sulfate  (90 Base) MCG/ACT inhaler, Inhale 2 puffs Every 4 (Four) Hours As Needed for Wheezing., Disp: 18 g, Rfl: 2    amLODIPine (Norvasc) 2.5 MG tablet, Take 1 tablet by mouth Daily., Disp: 30 tablet, Rfl: 5    aspirin 81 MG EC tablet, Take 1 tablet by mouth Every Morning., Disp: , Rfl:     Blood Glucose Monitoring Suppl (ONE TOUCH ULTRA 2) w/Device kit, USE AS NEEDED FOR BLOOD    SUGAR MONITORING, Disp: 1 each, Rfl: 0    bumetanide (BUMEX) 1 MG tablet, Take 1 tablet by mouth Daily., Disp: 90 tablet, Rfl: 3    clopidogrel (PLAVIX) 75 MG tablet, Take 1 tablet by mouth Daily., Disp: 90 tablet, Rfl: 3    Coenzyme Q10 200 MG capsule, Take 400 mg by mouth Every Other Day., Disp: , Rfl:     glipizide (GLUCOTROL XL) 2.5 MG 24 hr tablet, Take 1 tablet by mouth Daily., Disp: 90 tablet, Rfl: 3    Glucosamine-Chondroit-Vit C-Mn (GLUCOSAMINE CHONDR 1500 COMPLX PO), Take 1 tablet by mouth Daily., Disp: , Rfl:     glucose blood (Accu-Chek Kayce Plus) test strip, TEST ONCE DAILY., Disp: 100 each, Rfl: 3    glucose monitor monitoring kit, 1 each As Needed (blood sugar)., Disp: 1 each, Rfl: 1    Lancets (accu-chek soft touch) lancets, Use as directed, Disp: 100 each, Rfl: 12    latanoprost (XALATAN) 0.005 % ophthalmic solution, PLACE 1 DROP IN EACH EYE EVERY DAY AT BEDTIME, Disp: , Rfl:     lisinopril  "(PRINIVIL,ZESTRIL) 40 MG tablet, Take 1 tablet by mouth Daily., Disp: 90 tablet, Rfl: 3    lovastatin (MEVACOR) 10 MG tablet, Take 1 tablet by mouth Every Night. (Patient taking differently: Take 1 tablet by mouth Every Other Day.), Disp: 90 tablet, Rfl: 3    melatonin 3 MG tablet, Take 1 tablet by mouth Every Night., Disp: , Rfl:     metoprolol tartrate (LOPRESSOR) 50 MG tablet, Take 1 tablet by mouth 2 (Two) Times a Day., Disp: 180 tablet, Rfl: 3    Omega-3 Fatty Acids (Fish Oil) 300 MG capsule, Take  by mouth., Disp: , Rfl:     pantoprazole (PROTONIX) 40 MG EC tablet, Take 1 tablet by mouth Daily., Disp: 90 tablet, Rfl: 3    tobramycin-dexamethasone (TOBRADEX) 0.3-0.1 % ophthalmic suspension, Administer 1 drop to both eyes As Needed (dryness)., Disp: 5 mL, Rfl: 1    vitamin D3 125 MCG (5000 UT) capsule capsule, Take 1 capsule by mouth Daily., Disp: , Rfl:     doxycycline (VIBRAMYCIN) 100 MG capsule, Take 1 capsule by mouth 2 (Two) Times a Day., Disp: 20 capsule, Rfl: 0    Objective      Blood pressure 130/62, pulse 62, temperature 96.1 øF (35.6 øC), resp. rate 16, height 170.2 cm (67\"), weight 85.3 kg (188 lb), SpO2 97 %.    Physical Exam     General Appearance:    Alert, cooperative, no distress, appears stated age   Head:    Normocephalic, without obvious abnormality, atraumatic   Eyes:    PERRL, conjunctiva/corneas clear, EOM's intact   Ears:    Normal TM's and external ear canals, both ears   Nose:   Nares normal, septum midline, mucosa normal, no drainage   or sinus tenderness   Throat:   Lips, mucosa, and tongue normal; teeth and gums normal   Neck:   Supple, symmetrical, trachea midline, no adenopathy;        thyroid:  No enlargement/tenderness/nodules; no carotid    bruit or JVD   Back:     Symmetric, no curvature, ROM normal, no CVA tenderness   Lungs:     Clear to auscultation bilaterally, respirations unlabored   Chest wall:    No tenderness or deformity   Heart:    Regular rate and rhythm, S1 and S2 " normal, no murmur,        rub or gallop   Abdomen:     Soft, non-tender, bowel sounds active all four quadrants,     no masses, no organomegaly   Extremities:   Extremities normal, atraumatic, no cyanosis or edema   Pulses:   2+ and symmetric all extremities   Skin:   Skin color, texture, turgor normal, no rashes or lesions   Lymph nodes:   Cervical, supraclavicular, and axillary nodes normal   Neurologic:   CNII-XII intact. Normal strength, sensation and reflexes       throughout      Results for orders placed or performed in visit on 07/31/23   Sedimentation Rate    Specimen: Blood   Result Value Ref Range    Sed Rate 44 (H) 0 - 30 mm/hr   Anti-La 1 Antibody, IgG    Specimen: Blood   Result Value Ref Range    LA-1 IgG <0.2 0.0 - 0.9 AI   Anti-scleroderma Antibody    Specimen: Blood   Result Value Ref Range    Antiscleroderma-70 Antibodies <0.2 0.0 - 0.9 AI   LATOSHA With / DsDNA, RNP, Sjogrens A / B, Ardon    Specimen: Blood   Result Value Ref Range    LATOSHA Direct Negative Negative   Cyclic Citrul Peptide Antibody, IgG / IgA    Specimen: Blood   Result Value Ref Range    CCP Antibodies IgG/IgA 7 0 - 19 units   IgG subclasses (1-4)    Specimen: Blood   Result Value Ref Range    IgG 943 586 - 1602 mg/dL    IgG Subclass 1 634 248 - 810 mg/dL    IgG Subclass 2 169 130 - 555 mg/dL    IgG Subclass 3 21 15 - 102 mg/dL    IgG Subclass 4 1 (L) 2 - 96 mg/dL   C-reactive Protein    Specimen: Blood   Result Value Ref Range    C-Reactive Protein 0.65 (H) 0.00 - 0.50 mg/dL   IgE    Specimen: Blood   Result Value Ref Range    IgE 3 (L) 6 - 495 IU/mL         Assessment & Plan     Walking oxygen stat 83% at 3 min 30 sec.  Recovered with 2 litter N/C.  Walking with 2 litters o2 dropped to 90%.  Pt was increased to 3 litters.  O2 lowest 93%  Will order oxygen 3 litters with activity.  Will need oxygen protable o2.   Will continue oxygen tied into cpap as Dr Dias ordered.  Insurance resusing.  Now with abnormal 6 min walk..              ILD unclear what is the cause. Will run course of doxy while sorting out.            Diagnoses and all orders for this visit:    1. ILD (interstitial lung disease) (Primary)  -     Walking Oximetry  -     Oxygen Therapy; Future    Other orders  -     doxycycline (VIBRAMYCIN) 100 MG capsule; Take 1 capsule by mouth 2 (Two) Times a Day.  Dispense: 20 capsule; Refill: 0      No follow-ups on file.          There are no Patient Instructions on file for this visit.     Farhan Schaefer MD    Assessment & Plan

## 2023-08-11 ENCOUNTER — OFFICE VISIT (OUTPATIENT)
Dept: PULMONOLOGY | Facility: CLINIC | Age: 83
End: 2023-08-11
Payer: MEDICARE

## 2023-08-11 VITALS
DIASTOLIC BLOOD PRESSURE: 64 MMHG | HEART RATE: 60 BPM | BODY MASS INDEX: 29.82 KG/M2 | SYSTOLIC BLOOD PRESSURE: 138 MMHG | HEIGHT: 67 IN | TEMPERATURE: 97.3 F | WEIGHT: 190 LBS | OXYGEN SATURATION: 97 %

## 2023-08-11 DIAGNOSIS — R76.8 ANA POSITIVE: ICD-10-CM

## 2023-08-11 DIAGNOSIS — J84.112 IPF (IDIOPATHIC PULMONARY FIBROSIS): Primary | ICD-10-CM

## 2023-08-11 DIAGNOSIS — R09.02 EXERCISE HYPOXEMIA: ICD-10-CM

## 2023-08-11 DIAGNOSIS — J98.4 RESTRICTIVE LUNG DISEASE: ICD-10-CM

## 2023-08-11 NOTE — PROGRESS NOTES
Follow Up Office Visit      Patient Name: Radha Whelan    Chief Complaint:    Chief Complaint   Patient presents with    Shortness of Breath    Follow-up       History of Present Illness: Radha Whelan is a 83 y.o. female who is here today for follow up of shortness of breath.  Since last visit, chest CT scan suggested IPF.  Patient would like to pursue antifibrotic therapy.  LATOSHA was recently positive.  Patient denies any previously diagnosed rheumatologic conditions.  She does use albuterol occasionally, which is beneficial.  No history of COPD/asthma.  She notes less cough currently.  No wheezing, no fevers, no hemoptysis.  Titration sleep study pending.    Supplemental Oxygen: 2L qhs    Subjective      Review of Systems:  Review of Systems   Constitutional:  Negative for fever and unexpected weight change.   Respiratory:  Positive for cough and shortness of breath. Negative for wheezing.    Cardiovascular:  Negative for chest pain.      Past Medical History:   Past Medical History:   Diagnosis Date    Arthritis     Asthma     Basal cell carcinoma     Cardiomyopathy     Cataract, bilateral     s/p removal     Chronic HFrEF (heart failure with reduced ejection fraction) 03/28/2023    Diabetes mellitus     DVT (deep venous thrombosis)     1970's- left leg    Edema     legs- hx of    History of migraine headaches     Hx of exercise stress test 2004    Hypertension     Hyponatremia     Impaired mobility     LBBB (left bundle branch block)     LBBB (left bundle branch block)     Left leg pain     left leg and knee pain     Muscle spasm     hx of    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  last one was \"a few years ago\"    Wears dentures     top plate    Wears glasses     to read       Past Surgical History:   Past Surgical History:   Procedure Laterality Date    BREAST BIOPSY Left     benign    CARDIAC CATHETERIZATION      09/16/2019 PER .    CARDIAC CATHETERIZATION N/A 09/16/2019    Procedure: Left Heart Cath +/- CBI;  Surgeon: Ashlyn Mays MD;  Location:  [facility];  Service: Cardiovascular    CARDIAC CATHETERIZATION N/A 04/07/2023    Procedure: Left Heart Cath;  Surgeon: Ashlyn Mays MD;  Location:  [facility];  Service: Cardiology;  Laterality: N/A;    CARDIAC ELECTROPHYSIOLOGY PROCEDURE N/A 12/06/2019    Procedure: PACEMAKER IMPLANTATION- DC;  Surgeon: Setphan Laboy DO;  Location:  [facility];  Service: Cardiology    CATARACT EXTRACTION  12/2018    both eyes     COLONOSCOPY      INSERT / REPLACE / REMOVE PACEMAKER      MOUTH SURGERY      all top teeth    SKIN BIOPSY      basal cell    TOTAL HIP ARTHROPLASTY Right 12/04/2019    Procedure: HIP ARTHROPLASTY TOTAL RIGHT;  Surgeon: Issa Salgado MD;  Location: [facility] OR;  Service: Orthopedics    TUBAL ABDOMINAL LIGATION         Family History:   Family History   Problem Relation Age of Onset    Heart disease Mother     Diabetes Mother         Passed away at age 81 yrs. Heart attact    Heart disease Father     Breast cancer Sister     Heart disease Sister     Stroke Sister     Breast cancer Sister     Intracerebral hemorrhage Brother     Diabetes type II Brother     No Known Problems Brother     Asthma Daughter     Heart failure Daughter     Diabetes Other     Hypertension Other     Cancer Other         malignant neoplasm     Migraines Other     Breast cancer Other        Social History:   Social History     Socioeconomic History    Marital status:    Tobacco Use    Smoking status: Never     Passive exposure: Never    Smokeless tobacco: Never    " Tobacco comments:     I have never smoked.   Vaping Use    Vaping Use: Never used   Substance and Sexual Activity    Alcohol use: Never    Drug use: Never    Sexual activity: Not Currently     Partners: Male     Comment: I am 82 years old . No sex for a few years       Current Medications:     Current Outpatient Medications:     acetaminophen (TYLENOL) 325 MG tablet, Take 2 tablets by mouth Every 6 (Six) Hours As Needed for Mild Pain ., Disp: , Rfl:     albuterol sulfate  (90 Base) MCG/ACT inhaler, Inhale 2 puffs Every 4 (Four) Hours As Needed for Wheezing., Disp: 18 g, Rfl: 2    amLODIPine (Norvasc) 2.5 MG tablet, Take 1 tablet by mouth Daily., Disp: 30 tablet, Rfl: 5    aspirin 81 MG EC tablet, Take 1 tablet by mouth Every Morning., Disp: , Rfl:     Blood Glucose Monitoring Suppl (ONE TOUCH ULTRA 2) w/Device kit, USE AS NEEDED FOR BLOOD    SUGAR MONITORING, Disp: 1 each, Rfl: 0    bumetanide (BUMEX) 1 MG tablet, Take 1 tablet by mouth Daily., Disp: 90 tablet, Rfl: 3    clopidogrel (PLAVIX) 75 MG tablet, Take 1 tablet by mouth Daily., Disp: 90 tablet, Rfl: 3    Coenzyme Q10 200 MG capsule, Take 400 mg by mouth Every Other Day., Disp: , Rfl:     doxycycline (VIBRAMYCIN) 100 MG capsule, Take 1 capsule by mouth 2 (Two) Times a Day., Disp: 20 capsule, Rfl: 0    glipizide (GLUCOTROL XL) 2.5 MG 24 hr tablet, Take 1 tablet by mouth Daily., Disp: 90 tablet, Rfl: 3    Glucosamine-Chondroit-Vit C-Mn (GLUCOSAMINE CHONDR 1500 COMPLX PO), Take 1 tablet by mouth Daily., Disp: , Rfl:     glucose blood (Accu-Chek Kayce Plus) test strip, TEST ONCE DAILY., Disp: 100 each, Rfl: 3    glucose monitor monitoring kit, 1 each As Needed (blood sugar)., Disp: 1 each, Rfl: 1    Lancets (accu-chek soft touch) lancets, Use as directed, Disp: 100 each, Rfl: 12    latanoprost (XALATAN) 0.005 % ophthalmic solution, PLACE 1 DROP IN EACH EYE EVERY DAY AT BEDTIME, Disp: , Rfl:     lisinopril (PRINIVIL,ZESTRIL) 40 MG tablet, Take 1 tablet by  "mouth Daily., Disp: 90 tablet, Rfl: 3    lovastatin (MEVACOR) 10 MG tablet, Take 1 tablet by mouth Every Night. (Patient taking differently: Take 1 tablet by mouth Every Other Day.), Disp: 90 tablet, Rfl: 3    melatonin 3 MG tablet, Take 1 tablet by mouth Every Night., Disp: , Rfl:     metoprolol tartrate (LOPRESSOR) 50 MG tablet, Take 1 tablet by mouth 2 (Two) Times a Day., Disp: 180 tablet, Rfl: 3    Omega-3 Fatty Acids (Fish Oil) 300 MG capsule, Take  by mouth., Disp: , Rfl:     pantoprazole (PROTONIX) 40 MG EC tablet, Take 1 tablet by mouth Daily., Disp: 90 tablet, Rfl: 3    tobramycin-dexamethasone (TOBRADEX) 0.3-0.1 % ophthalmic suspension, Administer 1 drop to both eyes As Needed (dryness)., Disp: 5 mL, Rfl: 1    vitamin D3 125 MCG (5000 UT) capsule capsule, Take 1 capsule by mouth Daily., Disp: , Rfl:      Allergies:   Allergies   Allergen Reactions    Covid-19 Mrna Vacc (Moderna) Other (See Comments)     Myocarditits      Cumini Other (See Comments)     Complex migrane  migraine    Cheese Other (See Comments)     migrane    Migraine    Ibuprofen Rash     Swelling all over  \"Swelling all over\"    Other Other (See Comments)     \"ice cream\"-migranes    \"ice cream\" migraines    Saccharin Other (See Comments)     migrane    Migraine       Objective     Physical Exam:  Vital Signs:   Vitals:    08/11/23 1312   BP: 138/64   Pulse: 60   Temp: 97.3 øF (36.3 øC)   SpO2: 97%   Weight: 86.2 kg (190 lb)   Height: 170.2 cm (67\")     Body mass index is 29.76 kg/mý.    Physical Exam  Vitals reviewed.   Constitutional:       General: She is not in acute distress.     Appearance: She is not toxic-appearing.   HENT:      Head: Normocephalic and atraumatic.      Mouth/Throat:      Mouth: Mucous membranes are moist.   Eyes:      Extraocular Movements: Extraocular movements intact.      Conjunctiva/sclera: Conjunctivae normal.      Pupils: Pupils are equal, round, and reactive to light.   Cardiovascular:      Rate and Rhythm: " Normal rate.      Heart sounds: Normal heart sounds.   Pulmonary:      Effort: Pulmonary effort is normal.      Breath sounds: Rales present.   Abdominal:      General: There is no distension.      Palpations: Abdomen is soft.   Musculoskeletal:         General: No swelling.      Cervical back: Neck supple.   Skin:     General: Skin is warm and dry.      Findings: No rash.   Neurological:      General: No focal deficit present.      Mental Status: She is alert and oriented to person, place, and time.   Psychiatric:         Mood and Affect: Mood normal.         Behavior: Behavior normal.     Results Review:   July 2023 high-resolution chest CT scan showed findings suggestive of probable UIP.    LATOSHA by IFA, Reflex to Titer and Pattern (Order #[id number]) on 7/31/23     Lab Results   Component Value Date    GLUCOSE 212 (H) 07/31/2023    BUN 16 07/31/2023    CREATININE 1.06 (H) 07/31/2023    EGFRRESULT 52.2 (L) 07/31/2023    EGFR 58.1 (L) 03/28/2023    BCR 15.1 07/31/2023    K 4.7 07/31/2023    CO2 27.6 07/31/2023    CALCIUM 9.8 07/31/2023    PROTENTOTREF 7.1 07/31/2023    ALBUMIN 4.0 07/31/2023    BILITOT 0.5 07/31/2023    AST 15 07/31/2023    ALT 14 07/31/2023     Assessment / Plan      Assessment/Plan:   Diagnoses and all orders for this visit:    1. IPF (idiopathic pulmonary fibrosis) (Primary)  -     Oxygen Therapy  -     Ambulatory Referral to Disease State Management  Discussed recent chest CT scan results with patient. Discussed treatment options and patient would like to proceed with Ofev. Risk and benefits of the medication were discussed with patient. Plan to obtain repeat LFTs after patient has been started on the medication.    2. Exercise hypoxemia  -     6 Minute Walk Test; Future  -     Oxygen Therapy  Walk test performed today revealed the need for supplemental O2. Rx written for portable oxygen concentrator for improved mobility.     3. Restrictive lung disease  -     Oxygen Therapy  -     Ambulatory  Referral to Rheumatology  Secondary to IPF.    4. LATOSHA positive  -     Ambulatory Referral to Rheumatology  Refer to rheumatology for further evaluation and management.     Follow Up:   As previously scheduled  The patient was counseled on diagnostic results, risks and benefits of treatment options, risk factor modifications and the importance of treatment compliance. The patient was advised to contact the clinic with concerns or worsening symptoms.     CURTIS Montes De Oca   Pulmonary Medicine [facility]

## 2023-08-16 ENCOUNTER — OFFICE VISIT (OUTPATIENT)
Dept: INTERNAL MEDICINE | Facility: CLINIC | Age: 83
End: 2023-08-16
Payer: MEDICARE

## 2023-08-16 VITALS
WEIGHT: 188 LBS | BODY MASS INDEX: 29.51 KG/M2 | SYSTOLIC BLOOD PRESSURE: 106 MMHG | OXYGEN SATURATION: 96 % | HEIGHT: 67 IN | TEMPERATURE: 96 F | HEART RATE: 71 BPM | DIASTOLIC BLOOD PRESSURE: 70 MMHG | RESPIRATION RATE: 16 BRPM

## 2023-08-16 DIAGNOSIS — J84.112 IPF (IDIOPATHIC PULMONARY FIBROSIS): Primary | ICD-10-CM

## 2023-08-16 PROCEDURE — 3078F DIAST BP <80 MM HG: CPT | Performed by: INTERNAL MEDICINE

## 2023-08-16 PROCEDURE — 3074F SYST BP LT 130 MM HG: CPT | Performed by: INTERNAL MEDICINE

## 2023-08-16 PROCEDURE — 99213 OFFICE O/P EST LOW 20 MIN: CPT | Performed by: INTERNAL MEDICINE

## 2023-08-16 NOTE — PROGRESS NOTES
Subjective     Patient ID: Radha Whelan is a 83 y.o. female. Patient is here for management of multiple medical problems.     Chief Complaint   Patient presents with    ILD     History of Present Illness   ILD   Has apt with Dr Dias next week Monday.  Will get started on new med soon.        The following portions of the patient's history were reviewed and updated as appropriate: allergies, current medications, past family history, past medical history, past social history, past surgical history and problem list.    Review of Systems    Current Outpatient Medications:     acetaminophen (TYLENOL) 325 MG tablet, Take 2 tablets by mouth Every 6 (Six) Hours As Needed for Mild Pain ., Disp: , Rfl:     albuterol sulfate  (90 Base) MCG/ACT inhaler, Inhale 2 puffs Every 4 (Four) Hours As Needed for Wheezing., Disp: 18 g, Rfl: 2    amLODIPine (Norvasc) 2.5 MG tablet, Take 1 tablet by mouth Daily., Disp: 30 tablet, Rfl: 5    aspirin 81 MG EC tablet, Take 1 tablet by mouth Every Morning., Disp: , Rfl:     Blood Glucose Monitoring Suppl (ONE TOUCH ULTRA 2) w/Device kit, USE AS NEEDED FOR BLOOD    SUGAR MONITORING, Disp: 1 each, Rfl: 0    bumetanide (BUMEX) 1 MG tablet, Take 1 tablet by mouth Daily., Disp: 90 tablet, Rfl: 3    clopidogrel (PLAVIX) 75 MG tablet, Take 1 tablet by mouth Daily., Disp: 90 tablet, Rfl: 3    Coenzyme Q10 200 MG capsule, Take 400 mg by mouth Every Other Day., Disp: , Rfl:     doxycycline (VIBRAMYCIN) 100 MG capsule, Take 1 capsule by mouth 2 (Two) Times a Day., Disp: 20 capsule, Rfl: 0    glipizide (GLUCOTROL XL) 2.5 MG 24 hr tablet, Take 1 tablet by mouth Daily., Disp: 90 tablet, Rfl: 3    Glucosamine-Chondroit-Vit C-Mn (GLUCOSAMINE CHONDR 1500 COMPLX PO), Take 1 tablet by mouth Daily., Disp: , Rfl:     glucose blood (Accu-Chek Kayce Plus) test strip, TEST ONCE DAILY., Disp: 100 each, Rfl: 3    glucose monitor monitoring kit, 1 each As Needed (blood sugar)., Disp: 1 each, Rfl: 1    Lancets  "(accu-chek soft touch) lancets, Use as directed, Disp: 100 each, Rfl: 12    latanoprost (XALATAN) 0.005 % ophthalmic solution, PLACE 1 DROP IN EACH EYE EVERY DAY AT BEDTIME, Disp: , Rfl:     lisinopril (PRINIVIL,ZESTRIL) 40 MG tablet, Take 1 tablet by mouth Daily., Disp: 90 tablet, Rfl: 3    lovastatin (MEVACOR) 10 MG tablet, Take 1 tablet by mouth Every Night. (Patient taking differently: Take 1 tablet by mouth Every Other Day.), Disp: 90 tablet, Rfl: 3    melatonin 3 MG tablet, Take 1 tablet by mouth Every Night., Disp: , Rfl:     metoprolol tartrate (LOPRESSOR) 50 MG tablet, Take 1 tablet by mouth 2 (Two) Times a Day., Disp: 180 tablet, Rfl: 3    Omega-3 Fatty Acids (Fish Oil) 300 MG capsule, Take  by mouth., Disp: , Rfl:     pantoprazole (PROTONIX) 40 MG EC tablet, Take 1 tablet by mouth Daily., Disp: 90 tablet, Rfl: 3    tobramycin-dexamethasone (TOBRADEX) 0.3-0.1 % ophthalmic suspension, Administer 1 drop to both eyes As Needed (dryness)., Disp: 5 mL, Rfl: 1    vitamin D3 125 MCG (5000 UT) capsule capsule, Take 1 capsule by mouth Daily., Disp: , Rfl:     Objective      Blood pressure 106/70, pulse 71, temperature 96 øF (35.6 øC), resp. rate 16, height 170.2 cm (67\"), weight 85.3 kg (188 lb), SpO2 96 %.    Physical Exam     General Appearance:    Alert, cooperative, no distress, appears stated age   Head:    Normocephalic, without obvious abnormality, atraumatic   Eyes:    PERRL, conjunctiva/corneas clear, EOM's intact   Ears:    Normal TM's and external ear canals, both ears   Nose:   Nares normal, septum midline, mucosa normal, no drainage   or sinus tenderness   Throat:   Lips, mucosa, and tongue normal; teeth and gums normal   Neck:   Supple, symmetrical, trachea midline, no adenopathy;        thyroid:  No enlargement/tenderness/nodules; no carotid    bruit or JVD   Back:     Symmetric, no curvature, ROM normal, no CVA tenderness   Lungs:     Fine inspiratory crackle right lower 1/2. Minimal cracles in " lower left lung auscultation bilaterally, respirations unlabored   Chest wall:    No tenderness or deformity   Heart:    Regular rate and rhythm, S1 and S2 normal, no murmur,        rub or gallop   Abdomen:     Soft, non-tender, bowel sounds active all four quadrants,     no masses, no organomegaly   Extremities:   Extremities normal, atraumatic, no cyanosis or edema   Pulses:   2+ and symmetric all extremities   Skin:   Skin color, texture, turgor normal, no rashes or lesions   Lymph nodes:   Cervical, supraclavicular, and axillary nodes normal   Neurologic:   CNII-XII intact. Normal strength, sensation and reflexes       throughout      Results for orders placed or performed in visit on 07/31/23   Sedimentation Rate    Specimen: Blood   Result Value Ref Range    Sed Rate 44 (H) 0 - 30 mm/hr   Anti-La 1 Antibody, IgG    Specimen: Blood   Result Value Ref Range    LA-1 IgG <0.2 0.0 - 0.9 AI   Anti-scleroderma Antibody    Specimen: Blood   Result Value Ref Range    Antiscleroderma-70 Antibodies <0.2 0.0 - 0.9 AI   LATOSHA With / DsDNA, RNP, Sjogrens A / B, Ardon    Specimen: Blood   Result Value Ref Range    LATOSHA Direct Negative Negative   Cyclic Citrul Peptide Antibody, IgG / IgA    Specimen: Blood   Result Value Ref Range    CCP Antibodies IgG/IgA 7 0 - 19 units   IgG subclasses (1-4)    Specimen: Blood   Result Value Ref Range    IgG 943 586 - 1602 mg/dL    IgG Subclass 1 634 248 - 810 mg/dL    IgG Subclass 2 169 130 - 555 mg/dL    IgG Subclass 3 21 15 - 102 mg/dL    IgG Subclass 4 1 (L) 2 - 96 mg/dL   C-reactive Protein    Specimen: Blood   Result Value Ref Range    C-Reactive Protein 0.65 (H) 0.00 - 0.50 mg/dL   IgE    Specimen: Blood   Result Value Ref Range    IgE 3 (L) 6 - 495 IU/mL         Assessment & Plan     Oxygen dependent.    Pulmonary got her set up with oxygen.      Rheumatology set up thru pulmonary.           Diagnoses and all orders for this visit:    1. IPF (idiopathic pulmonary fibrosis)  (Primary)      Return in about 6 weeks (around 9/27/2023).          There are no Patient Instructions on file for this visit.     Farhan Schaefer MD    Assessment & Plan

## 2023-08-25 ENCOUNTER — TELEPHONE (OUTPATIENT)
Dept: PULMONOLOGY | Facility: CLINIC | Age: 83
End: 2023-08-25

## 2023-08-25 NOTE — TELEPHONE ENCOUNTER
Hub staff attempted to follow warm transfer process and was unsuccessful     Caller: Delores Zaidi    Relationship to patient: Emergency Contact    Best call back number: [phone]    Patient is needing: PT DAUGHTER CALLED IN TO CHECK IF ORDERS HAVE BEEN SENT FOR OXYGEN FOR THE PT. THE DAUGHTER DELORES WAS IN CONTACT WITH JULIANNA AT [facility]. JULIANNA STATED THAT THERE ARE NO ORDERS THERE FOR THE OXYGEN THAT IS BEING REQUESTED. PT DAUGHTER DELORES STATED THAT THE PT IS HAVING A HARD TIME BREATHING AND THE PT APT FOR 8/21 WAS CANCELED BUT NOT RESCHEDULED. CONTACTED THE CLINICAL OFFICE TO SEE IF PT CAN RESCHEDULE . CLINICAL OFFICE STATED THAT DR. MERINO WILL GET BACK IN TOUCH WITH PT.

## 2023-08-30 ENCOUNTER — TELEPHONE (OUTPATIENT)
Dept: PULMONOLOGY | Facility: CLINIC | Age: 83
End: 2023-08-30

## 2023-08-30 NOTE — TELEPHONE ENCOUNTER
Provider: MAHENDRA MERINO MD   Caller: GEE  Relationship to Patient: PRANAV   Phone Number: [phone]  Reason for Call: THEY WOULD LIKE LAST OFFICE NOTE AND ORDERS FOR OXYGEN PLEASE FAX [phone]

## 2023-09-05 ENCOUNTER — TELEPHONE (OUTPATIENT)
Dept: PULMONOLOGY | Facility: CLINIC | Age: 83
End: 2023-09-05
Payer: MEDICARE

## 2023-09-05 RX ORDER — PREDNISONE 10 MG/1
TABLET ORAL
Qty: 50 TABLET | Refills: 0 | Status: SHIPPED | OUTPATIENT
Start: 2023-09-05

## 2023-09-05 NOTE — TELEPHONE ENCOUNTER
Spoke to patient about her portable oxygen. She should receive it this week from [employer]. She is going to call our office for instructions on how to use it when she receives it.

## 2023-09-07 ENCOUNTER — TELEPHONE (OUTPATIENT)
Dept: PULMONOLOGY | Facility: CLINIC | Age: 83
End: 2023-09-07
Payer: MEDICARE

## 2023-09-07 NOTE — TELEPHONE ENCOUNTER
Caller: Delores Zaidi    Relationship to patient: Emergency Contact    Best call back number: [phone]    Patient is needing: PTS INSURANCE SAID RX NEEDS TO GO TO [facility],PTS INSURANCE SAID THEY WILL BE REACHING OUT TO [facility] TO REQUEST A NEW PRESCRIPTION     PTS EMERGENCY CONTACT WANTS A CALL BACK

## 2023-09-08 ENCOUNTER — TELEPHONE (OUTPATIENT)
Dept: PULMONOLOGY | Facility: CLINIC | Age: 83
End: 2023-09-08
Payer: MEDICARE

## 2023-09-08 ENCOUNTER — SPECIALTY PHARMACY (OUTPATIENT)
Dept: PHARMACY | Facility: HOSPITAL | Age: 83
End: 2023-09-08
Payer: MEDICARE

## 2023-09-08 NOTE — TELEPHONE ENCOUNTER
Returned the call to the Pts emergency contact. I called to confirm to see exactly which medication pt needs sent to [facility]. Pts contact stated they needed a new rx for ofev. This is a specialty medicine and is being took care of at the [facility]. Pt was transferred to the clinic to speak with Nirav.

## 2023-09-08 NOTE — TELEPHONE ENCOUNTER
I CALLED THE PATIENT'S DAUGHTER ON 9/6/23. I LET HER KNOW THAT WE HAD RECEIVED HER MESSAGE AND THAT WE WERE WORKING ON [employer] FOR HER MOTHER'S OXYGEN ORDER AND THE PRESCRIPTION OFEV.    I EXPLAINED TO HER THAT THE AUTH FOR OFEV CAN OFTEN TAKE MORE THAN A MONTH TO OBTAIN.    HER DAUGHTER ASKED THAT IF IN THE MEANTIME IF THERE WAS SOMETHING ELSE THAT COULD BE DONE TO HELP HER MOTHER UNTIL WE COULD GET THE PRESCRIPTION AUTHED. I TOLD TH PATIENT THAT I WOULD SPEAK WITH DR. MERINO AND CALL HER RIGHT BACK.    DR. MERINO TOLD ME TO LET THE PATIENT KNOW THAT HE WOULD CALL SOMETHING IN FOR HER AND I RELAYED THIS MESSAGE TO HER DAUGHTER.    I ASKED THE DAUGHTER IF SHE FELT THAT HER MOTHER NEEDED TO BE SEEN AND SHE SAID THAT SHE THOUGHT EVERYTHING WOULD BE FINE AND THEY WOULD WAIT TO SEE WHAT DR. MERINO CALLED IN AND SEE IF IT HELPS.    I TOLD HER DAUGHTER TO PLEASE CALL OF IF ANYTHING CHANGES OR IF HER MOTHER GOT ANY WORSE.

## 2023-09-11 ENCOUNTER — TELEPHONE (OUTPATIENT)
Dept: PULMONOLOGY | Facility: CLINIC | Age: 83
End: 2023-09-11

## 2023-09-11 DIAGNOSIS — J84.112 IPF (IDIOPATHIC PULMONARY FIBROSIS): Primary | ICD-10-CM

## 2023-09-11 RX ORDER — NINTEDANIB 150 MG/1
1 CAPSULE ORAL 2 TIMES DAILY
Qty: 180 CAPSULE | Refills: 3 | Status: SHIPPED | OUTPATIENT
Start: 2023-09-11

## 2023-09-11 NOTE — TELEPHONE ENCOUNTER
Patient called and stated that she need prescription for Ofev to [facility].  She sates that they have it ready to ship, but need prescription before they can send it out.    Informed patient, per CURTIS Arellano that it is fine to take Ofev with her prednisone.

## 2023-09-13 NOTE — TELEPHONE ENCOUNTER
Informed patient that Lora said it was fine to take Ofev with the prednisone.   Medicare Wellness Visit, Male    The best way to live healthy is to have a lifestyle where you eat a well-balanced diet, exercise regularly, limit alcohol use, and quit all forms of tobacco/nicotine, if applicable. Regular preventive services are another way to keep healthy. Preventive services (vaccines, screening tests, monitoring & exams) can help personalize your care plan, which helps you manage your own care. Screening tests can find health problems at the earliest stages, when they are easiest to treat. [facility] follows the current, evidence-based guidelines published by the [facility] Virgilio ([facility]) when recommending preventive services for our patients. Because we follow these guidelines, sometimes recommendations change over time as research supports it. (For example, a prostate screening blood test is no longer routinely recommended for men with no symptoms). Of course, you and your doctor may decide to screen more often for some diseases, based on your risk and co-morbidities (chronic disease you are already diagnosed with). Preventive services for you include:  - Medicare offers their members a free annual wellness visit, which is time for you and your primary care provider to discuss and plan for your preventive service needs. Take advantage of this benefit every year!  -All adults over age 72 should receive the recommended pneumonia vaccines. Current USPSTF guidelines recommend a series of two vaccines for the best pneumonia protection.   -All adults should have a flu vaccine yearly and tetanus vaccine every 10 years.  -All adults age 48 and older should receive the shingles vaccines (series of two vaccines).        -All adults age 38-68 who are overweight should have a diabetes screening test once every three years.   -Other screening tests & preventive services for persons with diabetes include: an eye exam to screen for diabetic retinopathy, a kidney function test, a foot exam, and stricter control over your cholesterol.   -Cardiovascular screening for adults with routine risk involves an electrocardiogram (ECG) at intervals determined by the provider.   -Colorectal cancer screening should be done for adults age 54-65 with no increased risk factors for colorectal cancer. There are a number of acceptable methods of screening for this type of cancer. Each test has its own benefits and drawbacks. Discuss with your provider what is most appropriate for you during your annual wellness visit. The different tests include: colonoscopy (considered the best screening method), a fecal occult blood test, a fecal DNA test, and sigmoidoscopy.  -All adults born between [facility] 

## 2023-10-02 ENCOUNTER — HOSPITAL ENCOUNTER (OUTPATIENT)
Dept: SLEEP MEDICINE | Facility: HOSPITAL | Age: 83
Discharge: HOME | End: 2023-10-02
Admitting: INTERNAL MEDICINE
Payer: MEDICARE

## 2023-10-02 DIAGNOSIS — G47.33 OBSTRUCTIVE SLEEP APNEA: ICD-10-CM

## 2023-10-02 DIAGNOSIS — G47.34 NOCTURNAL HYPOXIA: ICD-10-CM

## 2023-10-02 PROCEDURE — 95811 POLYSOM 6/>YRS CPAP 4/> PARM: CPT

## 2023-10-04 DIAGNOSIS — G47.33 OSA (OBSTRUCTIVE SLEEP APNEA): Primary | ICD-10-CM

## 2023-10-09 ENCOUNTER — OFFICE VISIT (OUTPATIENT)
Dept: INTERNAL MEDICINE | Facility: CLINIC | Age: 83
End: 2023-10-09
Payer: MEDICARE

## 2023-10-09 VITALS
RESPIRATION RATE: 16 BRPM | WEIGHT: 183 LBS | HEART RATE: 75 BPM | HEIGHT: 67 IN | DIASTOLIC BLOOD PRESSURE: 80 MMHG | TEMPERATURE: 96.4 F | BODY MASS INDEX: 28.72 KG/M2 | SYSTOLIC BLOOD PRESSURE: 124 MMHG | OXYGEN SATURATION: 95 %

## 2023-10-09 DIAGNOSIS — I50.22 CHRONIC HFREF (HEART FAILURE WITH REDUCED EJECTION FRACTION): ICD-10-CM

## 2023-10-09 DIAGNOSIS — E11.9 TYPE 2 DIABETES MELLITUS WITHOUT COMPLICATION, WITHOUT LONG-TERM CURRENT USE OF INSULIN: ICD-10-CM

## 2023-10-09 DIAGNOSIS — L98.9 SKIN LESION: Primary | ICD-10-CM

## 2023-10-09 DIAGNOSIS — J84.112 IPF (IDIOPATHIC PULMONARY FIBROSIS): ICD-10-CM

## 2023-10-09 PROCEDURE — 3074F SYST BP LT 130 MM HG: CPT | Performed by: INTERNAL MEDICINE

## 2023-10-09 PROCEDURE — 1170F FXNL STATUS ASSESSED: CPT | Performed by: INTERNAL MEDICINE

## 2023-10-09 PROCEDURE — G0439 PPPS, SUBSEQ VISIT: HCPCS | Performed by: INTERNAL MEDICINE

## 2023-10-09 PROCEDURE — 3079F DIAST BP 80-89 MM HG: CPT | Performed by: INTERNAL MEDICINE

## 2023-10-09 PROCEDURE — G0008 ADMIN INFLUENZA VIRUS VAC: HCPCS | Performed by: INTERNAL MEDICINE

## 2023-10-09 PROCEDURE — 90662 IIV NO PRSV INCREASED AG IM: CPT | Performed by: INTERNAL MEDICINE

## 2023-10-09 NOTE — PROGRESS NOTES
The ABCs of the Annual Wellness Visit  Subsequent Medicare Wellness Visit    Chevy Whelan is a 83 y.o. female who presents for a Subsequent Medicare Wellness Visit.    The following portions of the patient's history were reviewed and   updated as appropriate: allergies, current medications, past family history, past medical history, past social history, past surgical history, and problem list.    Compared to one year ago, the patient feels her physical   health is worse.    Compared to one year ago, the patient feels her mental   health is the same.    Recent Hospitalizations:  This patient has had a [facility] admission record on file within the last 365 days.    Current Medical Providers:  Patient Care Team:  Farhan Schaefer MD as PCP - General (Internal Medicine)  Nik Chaudhari MD as Consulting Physician (General Surgery)  Juani Brooks MD as Consulting Physician (General Surgery)    Outpatient Medications Prior to Visit   Medication Sig Dispense Refill    acetaminophen (TYLENOL) 325 MG tablet Take 2 tablets by mouth Every 6 (Six) Hours As Needed for Mild Pain .      albuterol sulfate  (90 Base) MCG/ACT inhaler Inhale 2 puffs Every 4 (Four) Hours As Needed for Wheezing. 18 g 2    amLODIPine (Norvasc) 2.5 MG tablet Take 1 tablet by mouth Daily. 30 tablet 5    Blood Glucose Monitoring Suppl (ONE TOUCH ULTRA 2) w/Device kit USE AS NEEDED FOR BLOOD    SUGAR MONITORING 1 each 0    bumetanide (BUMEX) 1 MG tablet Take 1 tablet by mouth Daily. 90 tablet 3    clopidogrel (PLAVIX) 75 MG tablet Take 1 tablet by mouth Daily. 90 tablet 3    Coenzyme Q10 200 MG capsule Take 400 mg by mouth Every Other Day.      glipizide (GLUCOTROL XL) 2.5 MG 24 hr tablet Take 1 tablet by mouth Daily. 90 tablet 3    glucose blood (Accu-Chek Kayce Plus) test strip TEST ONCE DAILY. 100 each 3    glucose monitor monitoring kit 1 each As Needed (blood sugar). 1 each 1    Lancets (accu-chek soft touch) lancets  Use as directed 100 each 12    latanoprost (XALATAN) 0.005 % ophthalmic solution PLACE 1 DROP IN EACH EYE EVERY DAY AT BEDTIME      lisinopril (PRINIVIL,ZESTRIL) 40 MG tablet Take 1 tablet by mouth Daily. 90 tablet 3    lovastatin (MEVACOR) 10 MG tablet Take 1 tablet by mouth Every Night. (Patient taking differently: Take 1 tablet by mouth Every Other Day.) 90 tablet 3    melatonin 3 MG tablet Take 1 tablet by mouth Every Night.      metoprolol tartrate (LOPRESSOR) 50 MG tablet Take 1 tablet by mouth 2 (Two) Times a Day. 180 tablet 3    Nintedanib Esylate (Ofev) 150 MG capsule Take 150 mg by mouth 2 (Two) Times a Day. Take with food 180 capsule 3    Omega-3 Fatty Acids (Fish Oil) 300 MG capsule Take  by mouth.      pantoprazole (PROTONIX) 40 MG EC tablet Take 1 tablet by mouth Daily. 90 tablet 3    tobramycin-dexamethasone (TOBRADEX) 0.3-0.1 % ophthalmic suspension Administer 1 drop to both eyes As Needed (dryness). 5 mL 1    vitamin D3 125 MCG (5000 UT) capsule capsule Take 1 capsule by mouth Daily.      aspirin 81 MG EC tablet Take 1 tablet by mouth Every Morning.      Glucosamine-Chondroit-Vit C-Mn (GLUCOSAMINE CHONDR 1500 COMPLX PO) Take 1 tablet by mouth Daily.      predniSONE (DELTASONE) 10 MG tablet Take 4 tablets for 5 days THEN 3 tablets for 5 days THEN 2 tablets for 5 days THEN 1 tablet for 5 days. 50 tablet 0     No facility-administered medications prior to visit.       No opioid medication identified on active medication list. I have reviewed chart for other potential  high risk medication/s and harmful drug interactions in the elderly.        Aspirin is not on active medication list.  Aspirin use is indicated based on review of current medical condition/s. Pros and cons of this therapy have been discussed with this patient. Benefits of this medication outweigh potential harm.  Patient has been instructed to start taking this medication..    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  ACP discussion was held with the patient during this visit. Patient does not have an advance directive, declines further assistance.     Objective    Vitals:    10/09/23 1342   BP: 124/80   Pulse: 75   Resp: 16   Temp: 96.4 øF (35.8 øC)   SpO2: 95%   Weight: 83 kg (183 lb)   Height: 170.2 cm (67\")   PainSc: 0-No pain   PainLoc: Head     Estimated body mass index is 28.66 kg/mý as calculated from the following:    Height as of this encounter: 170.2 cm (67\").    Weight as of this encounter: 83 kg (183 lb).           Does the patient have evidence of cognitive impairment?   No            HEALTH RISK ASSESSMENT    Smoking Status:  Social History     Tobacco Use   Smoking Status Never    Passive exposure: Never   Smokeless Tobacco Never   Tobacco Comments    I have never smoked.     Alcohol Consumption:  Social History     Substance and Sexual Activity   Alcohol Use Never     Fall Risk Screen:    STEADI Fall Risk Assessment was completed, and patient is at MODERATE risk for falls. Assessment completed on:10/9/2023    Depression Screening:      10/9/2023     1:57 PM   PHQ-2/PHQ-9 Depression Screening   Little Interest or Pleasure in Doing Things 0-->not at all   Feeling Down, Depressed or Hopeless 0-->not at all   PHQ-9: Brief Depression " Severity Measure Score 0       Health Habits and Functional and Cognitive Screening:      10/9/2023     1:53 PM   Functional & Cognitive Status   Do you have difficulty preparing food and eating? No   Do you have difficulty bathing yourself, getting dressed or grooming yourself? No   Do you have difficulty using the toilet? No   Do you have difficulty moving around from place to place? Yes   Do you have trouble with steps or getting out of a bed or a chair? Yes   Current Diet Well Balanced Diet   Dental Exam Up to date   Eye Exam Up to date   Exercise (times per week) 0 times per week   Current Exercises Include No Regular Exercise        Exercise Comment Patient is unable to walk without being winded   Do you need help using the phone?  No   Are you deaf or do you have serious difficulty hearing?  Yes   Do you need help to go to places out of walking distance? Yes   Do you need help shopping? No   Do you need help preparing meals?  No   Do you need help with housework?  No   Do you need help with laundry? No   Do you need help taking your medications? No   Do you need help managing money? No   Do you ever drive or ride in a car without wearing a seat belt? No   Have you felt unusual stress, anger or loneliness in the last month? No   Who do you live with? Spouse   If you need help, do you have trouble finding someone available to you? No   Have you been bothered in the last four weeks by sexual problems? No   Do you have difficulty concentrating, remembering or making decisions? No       Age-appropriate Screening Schedule:  Refer to the list below for future screening recommendations based on patient's age, sex and/or medical conditions. Orders for these recommended tests are listed in the plan section. The patient has been provided with a written plan.    Health Maintenance   Topic Date Due    Pneumococcal Vaccine 65+ (2 - PCV) 06/17/2012    DIABETIC EYE EXAM  10/11/2022    COVID-19 Vaccine (4 - 2023-24 season)  09/01/2023    ZOSTER VACCINE (2 of 2) 06/19/2024 (Originally 4/5/2013)    HEMOGLOBIN A1C  12/15/2023    TDAP/TD VACCINES (2 - Td or Tdap) 02/08/2024    LIPID PANEL  06/15/2024    URINE MICROALBUMIN  06/19/2024    BMI FOLLOWUP  06/19/2024    DXA SCAN  08/01/2024    ANNUAL WELLNESS VISIT  10/09/2024    INFLUENZA VACCINE  Completed                  CMS Preventative Services Quick Reference  Risk Factors Identified During Encounter:    Fall Risk-High or Moderate: Discussed Fall Prevention in the home  Inactivity/Sedentary: Patient was advised to exercise at least 150 minutes a week per CDC recommendations. Very limitis for exercise as pt is on oxygen for IPF.    Diet stable.        The above risks/problems have been discussed with the patient.  Pertinent information has been shared with the patient in the After Visit Summary.    Diagnoses and all orders for this visit:    1. Skin lesion (Primary)  -     Ambulatory Referral to Dermatology    2. IPF (idiopathic pulmonary fibrosis)    3. Type 2 diabetes mellitus without complication, without long-term current use of insulin    4. Chronic HFrEF (heart failure with reduced ejection fraction)    Other orders  -     Fluzone High-Dose 65+yrs (2930-2301)        Follow Up:   Next Medicare Wellness visit to be scheduled in 1 year.      An After Visit Summary and PPPS were made available to the patient.

## 2023-10-18 ENCOUNTER — LAB (OUTPATIENT)
Dept: LAB | Facility: HOSPITAL | Age: 83
End: 2023-10-18
Payer: MEDICARE

## 2023-10-18 ENCOUNTER — APPOINTMENT (OUTPATIENT)
Dept: GENERAL RADIOLOGY | Facility: HOSPITAL | Age: 83
End: 2023-10-18
Payer: MEDICARE

## 2023-10-18 ENCOUNTER — HOSPITAL ENCOUNTER (EMERGENCY)
Facility: HOSPITAL | Age: 83
Discharge: HOME | End: 2023-10-18
Attending: EMERGENCY MEDICINE | Admitting: STUDENT IN AN ORGANIZED HEALTH CARE EDUCATION/TRAINING PROGRAM
Payer: MEDICARE

## 2023-10-18 ENCOUNTER — OFFICE VISIT (OUTPATIENT)
Dept: INTERNAL MEDICINE | Facility: CLINIC | Age: 83
End: 2023-10-18
Payer: MEDICARE

## 2023-10-18 ENCOUNTER — APPOINTMENT (OUTPATIENT)
Dept: CT IMAGING | Facility: HOSPITAL | Age: 83
End: 2023-10-18
Payer: MEDICARE

## 2023-10-18 VITALS
SYSTOLIC BLOOD PRESSURE: 142 MMHG | DIASTOLIC BLOOD PRESSURE: 94 MMHG | OXYGEN SATURATION: 97 % | BODY MASS INDEX: 28.66 KG/M2 | RESPIRATION RATE: 18 BRPM | WEIGHT: 182.6 LBS | TEMPERATURE: 97.9 F | HEART RATE: 60 BPM | HEIGHT: 67 IN

## 2023-10-18 VITALS
HEART RATE: 81 BPM | WEIGHT: 183 LBS | BODY MASS INDEX: 28.72 KG/M2 | TEMPERATURE: 96.5 F | SYSTOLIC BLOOD PRESSURE: 138 MMHG | DIASTOLIC BLOOD PRESSURE: 64 MMHG | HEIGHT: 67 IN | RESPIRATION RATE: 16 BRPM | OXYGEN SATURATION: 98 %

## 2023-10-18 DIAGNOSIS — R05.2 SUBACUTE COUGH: Primary | ICD-10-CM

## 2023-10-18 DIAGNOSIS — R06.02 SHORT OF BREATH ON EXERTION: ICD-10-CM

## 2023-10-18 DIAGNOSIS — R07.2 PRECORDIAL PAIN: ICD-10-CM

## 2023-10-18 DIAGNOSIS — J84.112 IPF (IDIOPATHIC PULMONARY FIBROSIS): ICD-10-CM

## 2023-10-18 DIAGNOSIS — I26.93 SINGLE SUBSEGMENTAL PULMONARY EMBOLISM WITHOUT ACUTE COR PULMONALE: Primary | ICD-10-CM

## 2023-10-18 LAB
ALBUMIN SERPL-MCNC: 3.7 G/DL (ref 3.5–5.2)
ALBUMIN SERPL-MCNC: 3.8 G/DL (ref 3.5–5.2)
ALBUMIN/GLOB SERPL: 1 G/DL
ALBUMIN/GLOB SERPL: 1.1 G/DL
ALP SERPL-CCNC: 91 U/L (ref 39–117)
ALP SERPL-CCNC: 94 U/L (ref 39–117)
ALT SERPL W P-5'-P-CCNC: 81 U/L (ref 1–33)
ALT SERPL W P-5'-P-CCNC: 88 U/L (ref 1–33)
ANION GAP SERPL CALCULATED.3IONS-SCNC: 11.1 MMOL/L (ref 5–15)
ANION GAP SERPL CALCULATED.3IONS-SCNC: 12.8 MMOL/L (ref 5–15)
AST SERPL-CCNC: 28 U/L (ref 1–32)
AST SERPL-CCNC: 28 U/L (ref 1–32)
BASOPHILS # BLD AUTO: 0.02 10*3/MM3 (ref 0–0.2)
BASOPHILS # BLD AUTO: 0.03 10*3/MM3 (ref 0–0.2)
BASOPHILS NFR BLD AUTO: 0.2 % (ref 0–1.5)
BASOPHILS NFR BLD AUTO: 0.4 % (ref 0–1.5)
BILIRUB SERPL-MCNC: 0.7 MG/DL (ref 0–1.2)
BILIRUB SERPL-MCNC: 0.7 MG/DL (ref 0–1.2)
BUN SERPL-MCNC: 19 MG/DL (ref 8–23)
BUN SERPL-MCNC: 19 MG/DL (ref 8–23)
BUN/CREAT SERPL: 17.8 (ref 7–25)
BUN/CREAT SERPL: 18.3 (ref 7–25)
CALCIUM SPEC-SCNC: 8.8 MG/DL (ref 8.6–10.5)
CALCIUM SPEC-SCNC: 9.1 MG/DL (ref 8.6–10.5)
CHLORIDE SERPL-SCNC: 92 MMOL/L (ref 98–107)
CHLORIDE SERPL-SCNC: 92 MMOL/L (ref 98–107)
CO2 SERPL-SCNC: 30.2 MMOL/L (ref 22–29)
CO2 SERPL-SCNC: 30.9 MMOL/L (ref 22–29)
CREAT SERPL-MCNC: 1.04 MG/DL (ref 0.57–1)
CREAT SERPL-MCNC: 1.07 MG/DL (ref 0.57–1)
D DIMER PPP FEU-MCNC: 3.93 MCGFEU/ML (ref 0–0.84)
DEPRECATED RDW RBC AUTO: 44.7 FL (ref 37–54)
DEPRECATED RDW RBC AUTO: 48.8 FL (ref 37–54)
EGFRCR SERPLBLD CKD-EPI 2021: 51.3 ML/MIN/1.73
EGFRCR SERPLBLD CKD-EPI 2021: 53.1 ML/MIN/1.73
EOSINOPHIL # BLD AUTO: 0.18 10*3/MM3 (ref 0–0.4)
EOSINOPHIL # BLD AUTO: 0.26 10*3/MM3 (ref 0–0.4)
EOSINOPHIL NFR BLD AUTO: 2.1 % (ref 0.3–6.2)
EOSINOPHIL NFR BLD AUTO: 3.3 % (ref 0.3–6.2)
ERYTHROCYTE [DISTWIDTH] IN BLOOD BY AUTOMATED COUNT: 13.7 % (ref 12.3–15.4)
ERYTHROCYTE [DISTWIDTH] IN BLOOD BY AUTOMATED COUNT: 14.7 % (ref 12.3–15.4)
GLOBULIN UR ELPH-MCNC: 3.6 GM/DL
GLOBULIN UR ELPH-MCNC: 3.6 GM/DL
GLUCOSE SERPL-MCNC: 231 MG/DL (ref 65–99)
GLUCOSE SERPL-MCNC: 434 MG/DL (ref 65–99)
HBA1C MFR BLD: 11 % (ref 4.8–5.6)
HCT VFR BLD AUTO: 40.5 % (ref 34–46.6)
HCT VFR BLD AUTO: 42.1 % (ref 34–46.6)
HGB BLD-MCNC: 13.5 G/DL (ref 12–15.9)
HGB BLD-MCNC: 14 G/DL (ref 12–15.9)
HOLD SPECIMEN: NORMAL
HOLD SPECIMEN: NORMAL
IMM GRANULOCYTES # BLD AUTO: 0.07 10*3/MM3 (ref 0–0.05)
IMM GRANULOCYTES # BLD AUTO: 0.07 10*3/MM3 (ref 0–0.05)
IMM GRANULOCYTES NFR BLD AUTO: 0.8 % (ref 0–0.5)
IMM GRANULOCYTES NFR BLD AUTO: 0.9 % (ref 0–0.5)
LYMPHOCYTES # BLD AUTO: 1.95 10*3/MM3 (ref 0.7–3.1)
LYMPHOCYTES # BLD AUTO: 2.9 10*3/MM3 (ref 0.7–3.1)
LYMPHOCYTES NFR BLD AUTO: 22.3 % (ref 19.6–45.3)
LYMPHOCYTES NFR BLD AUTO: 37.2 % (ref 19.6–45.3)
MCH RBC QN AUTO: 30.2 PG (ref 26.6–33)
MCH RBC QN AUTO: 30.4 PG (ref 26.6–33)
MCHC RBC AUTO-ENTMCNC: 33.3 G/DL (ref 31.5–35.7)
MCHC RBC AUTO-ENTMCNC: 33.3 G/DL (ref 31.5–35.7)
MCV RBC AUTO: 90.6 FL (ref 79–97)
MCV RBC AUTO: 91.3 FL (ref 79–97)
MONOCYTES # BLD AUTO: 0.64 10*3/MM3 (ref 0.1–0.9)
MONOCYTES # BLD AUTO: 0.69 10*3/MM3 (ref 0.1–0.9)
MONOCYTES NFR BLD AUTO: 7.9 % (ref 5–12)
MONOCYTES NFR BLD AUTO: 8.2 % (ref 5–12)
NEUTROPHILS NFR BLD AUTO: 3.89 10*3/MM3 (ref 1.7–7)
NEUTROPHILS NFR BLD AUTO: 5.83 10*3/MM3 (ref 1.7–7)
NEUTROPHILS NFR BLD AUTO: 50 % (ref 42.7–76)
NEUTROPHILS NFR BLD AUTO: 66.7 % (ref 42.7–76)
NRBC BLD AUTO-RTO: 0 /100 WBC (ref 0–0.2)
NRBC BLD AUTO-RTO: 0.1 /100 WBC (ref 0–0.2)
NT-PROBNP SERPL-MCNC: 104.1 PG/ML (ref 0–1800)
PLATELET # BLD AUTO: 208 10*3/MM3 (ref 140–450)
PLATELET # BLD AUTO: 233 10*3/MM3 (ref 140–450)
PMV BLD AUTO: 9.1 FL (ref 6–12)
PMV BLD AUTO: 9.8 FL (ref 6–12)
POTASSIUM SERPL-SCNC: 3.3 MMOL/L (ref 3.5–5.2)
POTASSIUM SERPL-SCNC: 3.5 MMOL/L (ref 3.5–5.2)
PROT SERPL-MCNC: 7.3 G/DL (ref 6–8.5)
PROT SERPL-MCNC: 7.4 G/DL (ref 6–8.5)
RBC # BLD AUTO: 4.47 10*6/MM3 (ref 3.77–5.28)
RBC # BLD AUTO: 4.61 10*6/MM3 (ref 3.77–5.28)
SODIUM SERPL-SCNC: 134 MMOL/L (ref 136–145)
SODIUM SERPL-SCNC: 135 MMOL/L (ref 136–145)
TROPONIN T SERPL HS-MCNC: 16 NG/L
TROPONIN T SERPL HS-MCNC: 17 NG/L
TSH SERPL DL<=0.05 MIU/L-ACNC: 1.57 UIU/ML (ref 0.27–4.2)
VIT B12 BLD-MCNC: 634 PG/ML (ref 211–946)
WBC NRBC COR # BLD: 7.79 10*3/MM3 (ref 3.4–10.8)
WBC NRBC COR # BLD: 8.74 10*3/MM3 (ref 3.4–10.8)
WHOLE BLOOD HOLD COAG: NORMAL
WHOLE BLOOD HOLD SPECIMEN: NORMAL

## 2023-10-18 PROCEDURE — 83880 ASSAY OF NATRIURETIC PEPTIDE: CPT | Performed by: EMERGENCY MEDICINE

## 2023-10-18 PROCEDURE — 93005 ELECTROCARDIOGRAM TRACING: CPT | Performed by: EMERGENCY MEDICINE

## 2023-10-18 PROCEDURE — 25010000002 ENOXAPARIN PER 10 MG: Performed by: NURSE PRACTITIONER

## 2023-10-18 PROCEDURE — 3078F DIAST BP <80 MM HG: CPT | Performed by: INTERNAL MEDICINE

## 2023-10-18 PROCEDURE — 83036 HEMOGLOBIN GLYCOSYLATED A1C: CPT | Performed by: INTERNAL MEDICINE

## 2023-10-18 PROCEDURE — 71045 X-RAY EXAM CHEST 1 VIEW: CPT

## 2023-10-18 PROCEDURE — 25510000001 IOPAMIDOL 61 % SOLUTION: Performed by: STUDENT IN AN ORGANIZED HEALTH CARE EDUCATION/TRAINING PROGRAM

## 2023-10-18 PROCEDURE — 84484 ASSAY OF TROPONIN QUANT: CPT | Performed by: EMERGENCY MEDICINE

## 2023-10-18 PROCEDURE — 3075F SYST BP GE 130 - 139MM HG: CPT | Performed by: INTERNAL MEDICINE

## 2023-10-18 PROCEDURE — 85025 COMPLETE CBC W/AUTO DIFF WBC: CPT | Performed by: INTERNAL MEDICINE

## 2023-10-18 PROCEDURE — 85379 FIBRIN DEGRADATION QUANT: CPT | Performed by: INTERNAL MEDICINE

## 2023-10-18 PROCEDURE — 71275 CT ANGIOGRAPHY CHEST: CPT

## 2023-10-18 PROCEDURE — 99214 OFFICE O/P EST MOD 30 MIN: CPT | Performed by: INTERNAL MEDICINE

## 2023-10-18 PROCEDURE — 93005 ELECTROCARDIOGRAM TRACING: CPT

## 2023-10-18 PROCEDURE — 85025 COMPLETE CBC W/AUTO DIFF WBC: CPT

## 2023-10-18 PROCEDURE — 36415 COLL VENOUS BLD VENIPUNCTURE: CPT | Performed by: INTERNAL MEDICINE

## 2023-10-18 PROCEDURE — 84484 ASSAY OF TROPONIN QUANT: CPT | Performed by: NURSE PRACTITIONER

## 2023-10-18 PROCEDURE — 96372 THER/PROPH/DIAG INJ SC/IM: CPT

## 2023-10-18 PROCEDURE — 80053 COMPREHEN METABOLIC PANEL: CPT | Performed by: EMERGENCY MEDICINE

## 2023-10-18 PROCEDURE — 80053 COMPREHEN METABOLIC PANEL: CPT | Performed by: INTERNAL MEDICINE

## 2023-10-18 PROCEDURE — 84443 ASSAY THYROID STIM HORMONE: CPT | Performed by: INTERNAL MEDICINE

## 2023-10-18 PROCEDURE — 36415 COLL VENOUS BLD VENIPUNCTURE: CPT

## 2023-10-18 PROCEDURE — 99285 EMERGENCY DEPT VISIT HI MDM: CPT

## 2023-10-18 PROCEDURE — 82607 VITAMIN B-12: CPT | Performed by: INTERNAL MEDICINE

## 2023-10-18 RX ORDER — ENOXAPARIN SODIUM 100 MG/ML
1 INJECTION SUBCUTANEOUS ONCE
Status: COMPLETED | OUTPATIENT
Start: 2023-10-18 | End: 2023-10-18

## 2023-10-18 RX ORDER — BENZONATATE 100 MG/1
100 CAPSULE ORAL 3 TIMES DAILY PRN
Qty: 60 CAPSULE | Refills: 0 | Status: SHIPPED | OUTPATIENT
Start: 2023-10-18

## 2023-10-18 RX ORDER — CLARITHROMYCIN 500 MG/1
500 TABLET, COATED ORAL EVERY 12 HOURS SCHEDULED
Qty: 20 TABLET | Refills: 0 | Status: SHIPPED | OUTPATIENT
Start: 2023-10-18 | End: 2023-10-23

## 2023-10-18 RX ORDER — METHYLPREDNISOLONE 4 MG/1
TABLET ORAL
Qty: 21 TABLET | Refills: 0 | Status: SHIPPED | OUTPATIENT
Start: 2023-10-18 | End: 2023-10-23

## 2023-10-18 RX ORDER — SODIUM CHLORIDE 0.9 % (FLUSH) 0.9 %
10 SYRINGE (ML) INJECTION AS NEEDED
Status: DISCONTINUED | OUTPATIENT
Start: 2023-10-18 | End: 2023-10-19 | Stop reason: HOSPADM

## 2023-10-18 RX ADMIN — ENOXAPARIN SODIUM 80 MG: 100 INJECTION SUBCUTANEOUS at 21:02

## 2023-10-18 RX ADMIN — IOPAMIDOL 100 ML: 612 INJECTION, SOLUTION INTRAVENOUS at 19:45

## 2023-10-18 NOTE — PROGRESS NOTES
Subjective     Patient ID: Radha Whelan is a 83 y.o. female. Patient is here for management of multiple medical problems.     Chief Complaint   Patient presents with    Cough    URI     History of Present Illness   Cough and soa. Dry cough. No sputum. Soa chest pain on right side.         The following portions of the patient's history were reviewed and updated as appropriate: allergies, current medications, past family history, past medical history, past social history, past surgical history and problem list.    Review of Systems    Current Outpatient Medications:     acetaminophen (TYLENOL) 325 MG tablet, Take 2 tablets by mouth Every 6 (Six) Hours As Needed for Mild Pain ., Disp: , Rfl:     albuterol sulfate  (90 Base) MCG/ACT inhaler, Inhale 2 puffs Every 4 (Four) Hours As Needed for Wheezing., Disp: 18 g, Rfl: 2    amLODIPine (Norvasc) 2.5 MG tablet, Take 1 tablet by mouth Daily., Disp: 30 tablet, Rfl: 5    Blood Glucose Monitoring Suppl (ONE TOUCH ULTRA 2) w/Device kit, USE AS NEEDED FOR BLOOD    SUGAR MONITORING, Disp: 1 each, Rfl: 0    bumetanide (BUMEX) 1 MG tablet, Take 1 tablet by mouth Daily., Disp: 90 tablet, Rfl: 3    clopidogrel (PLAVIX) 75 MG tablet, Take 1 tablet by mouth Daily., Disp: 90 tablet, Rfl: 3    Coenzyme Q10 200 MG capsule, Take 400 mg by mouth Every Other Day., Disp: , Rfl:     glipizide (GLUCOTROL XL) 2.5 MG 24 hr tablet, Take 1 tablet by mouth Daily., Disp: 90 tablet, Rfl: 3    glucose blood (Accu-Chek Kayce Plus) test strip, TEST ONCE DAILY., Disp: 100 each, Rfl: 3    glucose monitor monitoring kit, 1 each As Needed (blood sugar)., Disp: 1 each, Rfl: 1    Lancets (accu-chek soft touch) lancets, Use as directed, Disp: 100 each, Rfl: 12    latanoprost (XALATAN) 0.005 % ophthalmic solution, PLACE 1 DROP IN EACH EYE EVERY DAY AT BEDTIME, Disp: , Rfl:     lisinopril (PRINIVIL,ZESTRIL) 40 MG tablet, Take 1 tablet by mouth Daily., Disp: 90 tablet, Rfl: 3    lovastatin (MEVACOR) 10 MG  "tablet, Take 1 tablet by mouth Every Night. (Patient taking differently: Take 1 tablet by mouth Every Other Day.), Disp: 90 tablet, Rfl: 3    melatonin 3 MG tablet, Take 1 tablet by mouth Every Night., Disp: , Rfl:     metoprolol tartrate (LOPRESSOR) 50 MG tablet, Take 1 tablet by mouth 2 (Two) Times a Day., Disp: 180 tablet, Rfl: 3    Nintedanib Esylate (Ofev) 150 MG capsule, Take 150 mg by mouth 2 (Two) Times a Day. Take with food, Disp: 180 capsule, Rfl: 3    Omega-3 Fatty Acids (Fish Oil) 300 MG capsule, Take  by mouth., Disp: , Rfl:     pantoprazole (PROTONIX) 40 MG EC tablet, Take 1 tablet by mouth Daily., Disp: 90 tablet, Rfl: 3    tobramycin-dexamethasone (TOBRADEX) 0.3-0.1 % ophthalmic suspension, Administer 1 drop to both eyes As Needed (dryness)., Disp: 5 mL, Rfl: 1    vitamin D3 125 MCG (5000 UT) capsule capsule, Take 1 capsule by mouth Daily., Disp: , Rfl:     benzonatate (Tessalon Perles) 100 MG capsule, Take 1 capsule by mouth 3 (Three) Times a Day As Needed for Cough., Disp: 60 capsule, Rfl: 0    clarithromycin (BIAXIN) 500 MG tablet, Take 1 tablet by mouth Every 12 (Twelve) Hours., Disp: 20 tablet, Rfl: 0    methylPREDNISolone (MEDROL) 4 MG dose pack, Take as directed on package instructions., Disp: 21 tablet, Rfl: 0    Objective      Blood pressure 138/64, pulse 81, temperature 96.5 °F (35.8 °C), resp. rate 16, height 170.2 cm (67\"), weight 83 kg (183 lb), SpO2 98%.    Physical Exam     General Appearance:    Alert, cooperative, no distress, appears stated age   Head:    Normocephalic, without obvious abnormality, atraumatic   Eyes:    PERRL, conjunctiva/corneas clear, EOM's intact   Ears:    Normal TM's and external ear canals, both ears   Nose:   Nares normal, septum midline, mucosa normal, no drainage   or sinus tenderness   Throat:   Lips, mucosa, and tongue normal; teeth and gums normal   Neck:   Supple, symmetrical, trachea midline, no adenopathy;        thyroid:  No " enlargement/tenderness/nodules; no carotid    bruit or JVD   Back:     Symmetric, no curvature, ROM normal, no CVA tenderness   Lungs:     Crackles lower 1/2 both lungs. o auscultation bilaterally, respirations unlabored. On portable oxygen concentrator.     Chest wall:    No tenderness or deformity   Heart:    Regular rate and rhythm, S1 and S2 normal, no murmur,        rub or gallop   Abdomen:     Soft, non-tender, bowel sounds active all four quadrants,     no masses, no organomegaly   Extremities:   Extremities normal, atraumatic, no cyanosis or edema   Pulses:   2+ and symmetric all extremities   Skin:   Skin color, texture, turgor normal, no rashes or lesions   Lymph nodes:   Cervical, supraclavicular, and axillary nodes normal   Neurologic:   CNII-XII intact. Normal strength, sensation and reflexes       throughout      Results for orders placed or performed in visit on 07/31/23   Sedimentation Rate    Specimen: Blood   Result Value Ref Range    Sed Rate 44 (H) 0 - 30 mm/hr   Anti-La 1 Antibody, IgG    Specimen: Blood   Result Value Ref Range    LA-1 IgG <0.2 0.0 - 0.9 AI   Anti-scleroderma Antibody    Specimen: Blood   Result Value Ref Range    Antiscleroderma-70 Antibodies <0.2 0.0 - 0.9 AI   LATOSHA With / DsDNA, RNP, Sjogrens A / B, Ardon    Specimen: Blood   Result Value Ref Range    LATOSHA Direct Negative Negative   Cyclic Citrul Peptide Antibody, IgG / IgA    Specimen: Blood   Result Value Ref Range    CCP Antibodies IgG/IgA 7 0 - 19 units   IgG subclasses (1-4)    Specimen: Blood   Result Value Ref Range    IgG 943 586 - 1602 mg/dL    IgG Subclass 1 634 248 - 810 mg/dL    IgG Subclass 2 169 130 - 555 mg/dL    IgG Subclass 3 21 15 - 102 mg/dL    IgG Subclass 4 1 (L) 2 - 96 mg/dL   C-reactive Protein    Specimen: Blood   Result Value Ref Range    C-Reactive Protein 0.65 (H) 0.00 - 0.50 mg/dL   IgE    Specimen: Blood   Result Value Ref Range    IgE 3 (L) 6 - 495 IU/mL         Assessment & Plan     Cough soa and  cp. Get d dimer    Hx of IPF and using ofve.      Pt may need to get established with new provider due to insurance.      Worked in [employer].         Diagnoses and all orders for this visit:    1. Subacute cough (Primary)  -     POCT SARS-CoV-2 + Flu Antigen JOSH  -     D-dimer, Quantitative    2. Short of breath on exertion  -     POCT SARS-CoV-2 + Flu Antigen JOSH  -     D-dimer, Quantitative    3. Precordial pain  -     POCT SARS-CoV-2 + Flu Antigen JOSH  -     D-dimer, Quantitative    4. IPF (idiopathic pulmonary fibrosis)  -     Cancel: Ambulatory Referral to Pulmonology    Other orders  -     clarithromycin (BIAXIN) 500 MG tablet; Take 1 tablet by mouth Every 12 (Twelve) Hours.  Dispense: 20 tablet; Refill: 0  -     benzonatate (Tessalon Perles) 100 MG capsule; Take 1 capsule by mouth 3 (Three) Times a Day As Needed for Cough.  Dispense: 60 capsule; Refill: 0  -     methylPREDNISolone (MEDROL) 4 MG dose pack; Take as directed on package instructions.  Dispense: 21 tablet; Refill: 0      No follow-ups on file.          There are no Patient Instructions on file for this visit.     Farhan Schaefer MD    Assessment & Plan

## 2023-10-19 NOTE — ED PROVIDER NOTES
Subjective  History of Present Illness:    Chief Complaint: Elevated D-dimer  History of Present Illness: This is an 83-year-old female patient comes into the ED today complaining of abnormal lab from primary care provider.  Patient states she was seen by her primary care provider yesterday had some lab work done was called by her PCP today told her to come to the emergency room for evaluation due to elevated D-dimer.  Patient states she has had worsening shortness of breath does have a history of pulmonary fibrosis does wear oxygen 100% of the time.      Nurses Notes reviewed and agree, including vitals, allergies, social history and prior medical history.       Allergies:    Covid-19 mrna vacc (moderna), Cumini, Cheese, Ibuprofen, Other, and Saccharin      Past Surgical History:   Procedure Laterality Date    BREAST BIOPSY Left     benign    CARDIAC CATHETERIZATION      09/16/2019 PER .    CARDIAC CATHETERIZATION N/A 09/16/2019    Procedure: Left Heart Cath +/- CBI;  Surgeon: Ashlyn Mays MD;  Location:  [facility] CATH [facility]VASIVE [facility]ATION;  Service: Cardiovascular    CARDIAC CATHETERIZATION N/A 04/07/2023    Procedure: Left Heart Cath;  Surgeon: Ashlyn Mays MD;  Location:  [facility];  Service: Cardiology;  Laterality: N/A;    CARDIAC ELECTROPHYSIOLOGY PROCEDURE N/A 12/06/2019    Procedure: PACEMAKER IMPLANTATION- DC;  Surgeon: Stephan Laboy DO;  Location: [facility] EP INVASIVE LOCATION;  Service: Cardiology    CATARACT EXTRACTION  12/2018    both eyes     COLONOSCOPY      INSERT / REPLACE / REMOVE PACEMAKER      MOUTH SURGERY      all top teeth    SKIN BIOPSY      basal cell    TOTAL HIP ARTHROPLASTY Right 12/04/2019    Procedure: HIP ARTHROPLASTY TOTAL RIGHT;  Surgeon: Issa Salgado MD;  Location: [facility] OR;  Service: Orthopedics    TUBAL ABDOMINAL LIGATION           Social History     Socioeconomic History    Marital status:    Tobacco Use    Smoking status: Never     Passive  exposure: Never    Smokeless tobacco: Never    Tobacco comments:     I have never smoked.   Vaping Use    Vaping Use: Never used   Substance and Sexual Activity    Alcohol use: Never    Drug use: Never    Sexual activity: Not Currently     Partners: Male     Comment: I am 82 years old . No sex for a few years         Family History   Problem Relation Age of Onset    Heart disease Mother     Diabetes Mother         Passed away at age 81 yrs. Heart attact    Heart disease Father     Breast cancer Sister     Heart disease Sister     Stroke Sister     Breast cancer Sister     Intracerebral hemorrhage Brother     Diabetes type II Brother     No Known Problems Brother     Asthma Daughter     Heart failure Daughter     Diabetes Other     Hypertension Other     Cancer Other         malignant neoplasm     Migraines Other     Breast cancer Other        REVIEW OF SYSTEMS: All systems reviewed and not pertinent unless noted.    Review of Systems    Objective    Physical Exam      Procedures    ED Course:    ED Course as of 10/18/23 2244   Wed Oct 18, 2023   1906 EKG interpreted by me reveals sinus rhythm 81 nonspecific T wave changes.  No ectopy, no obvious ischemic changes. [PF]      ED Course User Index  [PF] Chava Carpio, DO       Lab Results (last 24 hours)       Procedure Component Value Units Date/Time    Vitamin B12 [[id number]]  (Normal) Collected: 10/18/23 1219    Specimen: Blood Updated: 10/18/23 1938     Vitamin B-12 634 pg/mL     Narrative:      Results may be falsely increased if patient taking Biotin.      CBC & Differential [[id number]]  (Abnormal) Collected: 10/18/23 1219    Specimen: Blood Updated: 10/18/23 1828    Narrative:      The following orders were created for panel order CBC & Differential.  Procedure                               Abnormality         Status                     ---------                               -----------         ------                     CBC Auto Differential[[id number]]         Abnormal            Final result                 Please view results for these tests on the individual orders.    Comprehensive Metabolic Panel [[id number]]  (Abnormal) Collected: 10/18/23 1219    Specimen: Blood Updated: 10/18/23 2033     Glucose 434 mg/dL      BUN 19 mg/dL      Creatinine 1.04 mg/dL      Sodium 135 mmol/L      Potassium 3.3 mmol/L      Chloride 92 mmol/L      CO2 30.2 mmol/L      Calcium 9.1 mg/dL      Total Protein 7.4 g/dL      Albumin 3.8 g/dL      ALT (SGPT) 88 U/L      AST (SGOT) 28 U/L      Alkaline Phosphatase 91 U/L      Total Bilirubin 0.7 mg/dL      Globulin 3.6 gm/dL      A/G Ratio 1.1 g/dL      BUN/Creatinine Ratio 18.3     Anion Gap 12.8 mmol/L      eGFR 53.1 mL/min/1.73     Narrative:      GFR Normal >60  Chronic Kidney Disease <60  Kidney Failure <15    The GFR formula is only valid for adults with stable renal function between ages 18 and 70.    TSH [[id number]]  (Normal) Collected: 10/18/23 1219    Specimen: Blood Updated: 10/18/23 1928     TSH 1.570 uIU/mL     Hemoglobin A1c [[id number]]  (Abnormal) Collected: 10/18/23 1219    Specimen: Blood Updated: 10/18/23 2130     Hemoglobin A1C 11.00 %     Narrative:      Hemoglobin A1C Ranges:    Increased Risk for Diabetes  5.7% to 6.4%  Diabetes                     >= 6.5%  Diabetic Goal                < 7.0%    D-dimer, Quantitative [[id number]]  (Abnormal) Collected: 10/18/23 1219    Specimen: Blood Updated: 10/18/23 1302     D-Dimer, Quantitative 3.93 MCGFEU/mL     Narrative:      According to the assay 's published package insert, a normal (<0.50 MCGFEU/mL) D-dimer result in conjunction with a non-high clinical probability assessment, excludes deep vein thrombosis (DVT) and pulmonary embolism (PE) with high sensitivity.    D-dimer values increase with age and this can make VTE exclusion of an older population difficult. To address this, the [employer], based on best available evidence and recent  "guidelines, recommends that clinicians use age-adjusted D-dimer thresholds in patients greater than 50 years of age with: a) a low probability of PE who do not meet all Pulmonary Embolism Rule Out Criteria, or b) in those with intermediate probability of PE.   The formula for an age-adjusted D-dimer cut-off is \"age/100\".  For example, a 60 year old patient would have an age-adjusted cut-off of 0.60 MCGFEU/mL and an 80 year old 0.80 MCGFEU/mL.    CBC Auto Differential [[id number]]  (Abnormal) Collected: 10/18/23 1219    Specimen: Blood Updated: 10/18/23 1828     WBC 8.74 10*3/mm3      RBC 4.61 10*6/mm3      Hemoglobin 14.0 g/dL      Hematocrit 42.1 %      MCV 91.3 fL      MCH 30.4 pg      MCHC 33.3 g/dL      RDW 13.7 %      RDW-SD 44.7 fl      MPV 9.8 fL      Platelets 233 10*3/mm3      Neutrophil % 66.7 %      Lymphocyte % 22.3 %      Monocyte % 7.9 %      Eosinophil % 2.1 %      Basophil % 0.2 %      Immature Grans % 0.8 %      Neutrophils, Absolute 5.83 10*3/mm3      Lymphocytes, Absolute 1.95 10*3/mm3      Monocytes, Absolute 0.69 10*3/mm3      Eosinophils, Absolute 0.18 10*3/mm3      Basophils, Absolute 0.02 10*3/mm3      Immature Grans, Absolute 0.07 10*3/mm3      nRBC 0.1 /100 WBC     CBC & Differential [[id number]]  (Abnormal) Collected: 10/18/23 1823    Specimen: Blood Updated: 10/18/23 1832    Narrative:      The following orders were created for panel order CBC & Differential.  Procedure                               Abnormality         Status                     ---------                               -----------         ------                     CBC Auto Differential[[id number]]        Abnormal            Final result                 Please view results for these tests on the individual orders.    Comprehensive Metabolic Panel [[id number]]  (Abnormal) Collected: 10/18/23 1823    Specimen: Blood Updated: 10/18/23 1851     Glucose 231 mg/dL      Comment: Glucose >180, Hemoglobin A1C recommended.        BUN 19 " mg/dL      Creatinine 1.07 mg/dL      Sodium 134 mmol/L      Potassium 3.5 mmol/L      Chloride 92 mmol/L      CO2 30.9 mmol/L      Calcium 8.8 mg/dL      Total Protein 7.3 g/dL      Albumin 3.7 g/dL      ALT (SGPT) 81 U/L      AST (SGOT) 28 U/L      Alkaline Phosphatase 94 U/L      Total Bilirubin 0.7 mg/dL      Globulin 3.6 gm/dL      A/G Ratio 1.0 g/dL      BUN/Creatinine Ratio 17.8     Anion Gap 11.1 mmol/L      eGFR 51.3 mL/min/1.73     Narrative:      GFR Normal >60  Chronic Kidney Disease <60  Kidney Failure <15    The GFR formula is only valid for adults with stable renal function between ages 18 and 70.    BNP [[id number]]  (Normal) Collected: 10/18/23 1823    Specimen: Blood Updated: 10/18/23 1854     proBNP 104.1 pg/mL     Narrative:      This assay is used as an aid in the diagnosis of individuals suspected of having heart failure. It can be used as an aid in the diagnosis of acute decompensated heart failure (ADHF) in patients presenting with signs and symptoms of ADHF to the emergency department (ED). In addition, NT-proBNP of <300 pg/mL indicates ADHF is not likely.    Age Range Result Interpretation  NT-proBNP Concentration (pg/mL:      <50             Positive            >450                   Gray                 300-450                    Negative             <300    50-75           Positive            >900                  Gray                300-900                  Negative            <300      >75             Positive            >1800                  Gray                300-1800                  Negative            <300    Single High Sensitivity Troponin T [[id number]]  (Abnormal) Collected: 10/18/23 1823    Specimen: Blood Updated: 10/18/23 1854     HS Troponin T 16 ng/L     Narrative:      High Sensitive Troponin T Reference Range:  <10.0 ng/L- Negative Female for AMI  <15.0 ng/L- Negative Male for AMI  >=10 - Abnormal Female indicating possible myocardial injury.  >=15 - Abnormal Male  indicating possible myocardial injury.   Clinicians would have to utilize clinical acumen, EKG, Troponin, and serial changes to determine if it is an Acute Myocardial Infarction or myocardial injury due to an underlying chronic condition.         CBC Auto Differential [[id number]]  (Abnormal) Collected: 10/18/23 1823    Specimen: Blood Updated: 10/18/23 1832     WBC 7.79 10*3/mm3      RBC 4.47 10*6/mm3      Hemoglobin 13.5 g/dL      Hematocrit 40.5 %      MCV 90.6 fL      MCH 30.2 pg      MCHC 33.3 g/dL      RDW 14.7 %      RDW-SD 48.8 fl      MPV 9.1 fL      Platelets 208 10*3/mm3      Neutrophil % 50.0 %      Lymphocyte % 37.2 %      Monocyte % 8.2 %      Eosinophil % 3.3 %      Basophil % 0.4 %      Immature Grans % 0.9 %      Neutrophils, Absolute 3.89 10*3/mm3      Lymphocytes, Absolute 2.90 10*3/mm3      Monocytes, Absolute 0.64 10*3/mm3      Eosinophils, Absolute 0.26 10*3/mm3      Basophils, Absolute 0.03 10*3/mm3      Immature Grans, Absolute 0.07 10*3/mm3      nRBC 0.0 /100 WBC     High Sensitivity Troponin T [[id number]]  (Abnormal) Collected: 10/18/23 2145    Specimen: Blood Updated: 10/18/23 2220     HS Troponin T 17 ng/L      Comment: Specimen hemolyzed.  Results may be affected.       Narrative:      High Sensitive Troponin T Reference Range:  <10.0 ng/L- Negative Female for AMI  <15.0 ng/L- Negative Male for AMI  >=10 - Abnormal Female indicating possible myocardial injury.  >=15 - Abnormal Male indicating possible myocardial injury.   Clinicians would have to utilize clinical acumen, EKG, Troponin, and serial changes to determine if it is an Acute Myocardial Infarction or myocardial injury due to an underlying chronic condition.                  CT Angiogram Chest Pulmonary Embolism    Result Date: 10/18/2023  FINAL REPORT TECHNIQUE: Thin section axial images were obtained through the chest and during the arterial phase of IV contrast administration. Coronal 3-D MIP reconstructed images were also  provided. CLINICAL HISTORY: Pulmonary embolism (PE) suspected, positive D-dimer Family reports dr called with elevated D-dimer. SENT TO ER, HX OF SOA. FINDINGS: There is a filling defect consistent with an acute pulmonary embolus involving the distal left pulmonary artery extending into the left lower lobe pulmonary artery.  There is no evidence of right heart strain. The thoracic aorta is normal.  There is no confluent airspace consolidation.  There is groundglass opacity in the right greater than left upper lobes that may be pulmonary edema.  The lungs are otherwise clear. There is no pleural disease, adenopathy or significant osseous abnormality.     Impression: Pulmonary embolism. This critical finding was given to the [facility] doctor at 8:11 PM. Authenticated and Electronically Signed by Norman Miller M.D. on 10/18/2023 08:15:59 PM      Medical Decision Making  This is an 83-year-old female patient comes into the ED today complaining of abnormal lab from primary care provider.  Patient states she was seen by her primary care provider yesterday had some lab work done was called by her PCP today told her to come to the emergency room for evaluation due to elevated D-dimer.  Patient states she has had worsening shortness of breath does have a history of pulmonary fibrosis does wear oxygen 100% of the time.    DDX: includes but is not limited to:    Amount and/or Complexity of Data Reviewed  Labs: ordered. Decision-making details documented in ED Course.     Details: I have personally reviewed and documented all results  Radiology: ordered. Decision-making details documented in ED Course.     Details: I have personally reviewed and documented all results  ECG/medicine tests: ordered. Decision-making details documented in ED Course.     Details: I have personally reviewed and documented all results  Discussion of management or test interpretation with external provider(s): Discussed assessment, treatment and  plan with ER attending    Risk  Prescription drug management.  Risk Details: Patient has been diagnosed with [place] emboli and will be discharged home.  Patient requested to follow-up with primary care provider within the next 7 days for reevaluation.                  Final diagnoses:   Single subsegmental pulmonary embolism without acute cor pulmonale          Grant Sandoval, APRN  10/18/23 [id number]

## 2023-10-22 ENCOUNTER — APPOINTMENT (OUTPATIENT)
Dept: GENERAL RADIOLOGY | Facility: HOSPITAL | Age: 83
End: 2023-10-22
Payer: MEDICARE

## 2023-10-22 ENCOUNTER — HOSPITAL ENCOUNTER (EMERGENCY)
Facility: HOSPITAL | Age: 83
Discharge: HOME | End: 2023-10-22
Attending: EMERGENCY MEDICINE | Admitting: EMERGENCY MEDICINE
Payer: MEDICARE

## 2023-10-22 ENCOUNTER — APPOINTMENT (OUTPATIENT)
Dept: CT IMAGING | Facility: HOSPITAL | Age: 83
End: 2023-10-22
Payer: MEDICARE

## 2023-10-22 VITALS
DIASTOLIC BLOOD PRESSURE: 83 MMHG | BODY MASS INDEX: 28.56 KG/M2 | SYSTOLIC BLOOD PRESSURE: 138 MMHG | HEIGHT: 67 IN | OXYGEN SATURATION: 98 % | RESPIRATION RATE: 19 BRPM | TEMPERATURE: 97.7 F | HEART RATE: 78 BPM | WEIGHT: 182 LBS

## 2023-10-22 DIAGNOSIS — R06.00 DYSPNEA, UNSPECIFIED TYPE: Primary | ICD-10-CM

## 2023-10-22 LAB
ALBUMIN SERPL-MCNC: 3.6 G/DL (ref 3.5–5.2)
ALBUMIN/GLOB SERPL: 1.2 G/DL
ALP SERPL-CCNC: 96 U/L (ref 39–117)
ALT SERPL W P-5'-P-CCNC: 144 U/L (ref 1–33)
ANION GAP SERPL CALCULATED.3IONS-SCNC: 12.9 MMOL/L (ref 5–15)
AST SERPL-CCNC: 243 U/L (ref 1–32)
BASOPHILS # BLD AUTO: 0.01 10*3/MM3 (ref 0–0.2)
BASOPHILS NFR BLD AUTO: 0.2 % (ref 0–1.5)
BILIRUB SERPL-MCNC: 0.5 MG/DL (ref 0–1.2)
BUN SERPL-MCNC: 22 MG/DL (ref 8–23)
BUN/CREAT SERPL: 20.8 (ref 7–25)
CALCIUM SPEC-SCNC: 8.5 MG/DL (ref 8.6–10.5)
CHLORIDE SERPL-SCNC: 95 MMOL/L (ref 98–107)
CO2 SERPL-SCNC: 28.1 MMOL/L (ref 22–29)
CREAT SERPL-MCNC: 1.06 MG/DL (ref 0.57–1)
DEPRECATED RDW RBC AUTO: 48.1 FL (ref 37–54)
EGFRCR SERPLBLD CKD-EPI 2021: 51.9 ML/MIN/1.73
EOSINOPHIL # BLD AUTO: 0.09 10*3/MM3 (ref 0–0.4)
EOSINOPHIL NFR BLD AUTO: 1.5 % (ref 0.3–6.2)
ERYTHROCYTE [DISTWIDTH] IN BLOOD BY AUTOMATED COUNT: 14.6 % (ref 12.3–15.4)
GLOBULIN UR ELPH-MCNC: 3 GM/DL
GLUCOSE SERPL-MCNC: 410 MG/DL (ref 65–99)
HCT VFR BLD AUTO: 37.9 % (ref 34–46.6)
HGB BLD-MCNC: 12.4 G/DL (ref 12–15.9)
HOLD SPECIMEN: NORMAL
HOLD SPECIMEN: NORMAL
IMM GRANULOCYTES # BLD AUTO: 0.06 10*3/MM3 (ref 0–0.05)
IMM GRANULOCYTES NFR BLD AUTO: 1 % (ref 0–0.5)
LYMPHOCYTES # BLD AUTO: 2.03 10*3/MM3 (ref 0.7–3.1)
LYMPHOCYTES NFR BLD AUTO: 32.8 % (ref 19.6–45.3)
MCH RBC QN AUTO: 30.2 PG (ref 26.6–33)
MCHC RBC AUTO-ENTMCNC: 32.7 G/DL (ref 31.5–35.7)
MCV RBC AUTO: 92.2 FL (ref 79–97)
MONOCYTES # BLD AUTO: 0.59 10*3/MM3 (ref 0.1–0.9)
MONOCYTES NFR BLD AUTO: 9.5 % (ref 5–12)
NEUTROPHILS NFR BLD AUTO: 3.41 10*3/MM3 (ref 1.7–7)
NEUTROPHILS NFR BLD AUTO: 55 % (ref 42.7–76)
NRBC BLD AUTO-RTO: 0 /100 WBC (ref 0–0.2)
NT-PROBNP SERPL-MCNC: 117.3 PG/ML (ref 0–1800)
PLATELET # BLD AUTO: 194 10*3/MM3 (ref 140–450)
PMV BLD AUTO: 9.6 FL (ref 6–12)
POTASSIUM SERPL-SCNC: 3.7 MMOL/L (ref 3.5–5.2)
PROT SERPL-MCNC: 6.6 G/DL (ref 6–8.5)
RBC # BLD AUTO: 4.11 10*6/MM3 (ref 3.77–5.28)
SODIUM SERPL-SCNC: 136 MMOL/L (ref 136–145)
TROPONIN T SERPL HS-MCNC: 15 NG/L
TROPONIN T SERPL HS-MCNC: 15 NG/L
WBC NRBC COR # BLD: 6.19 10*3/MM3 (ref 3.4–10.8)
WHOLE BLOOD HOLD COAG: NORMAL
WHOLE BLOOD HOLD SPECIMEN: NORMAL

## 2023-10-22 PROCEDURE — 84484 ASSAY OF TROPONIN QUANT: CPT | Performed by: EMERGENCY MEDICINE

## 2023-10-22 PROCEDURE — 25510000001 IOPAMIDOL 61 % SOLUTION: Performed by: EMERGENCY MEDICINE

## 2023-10-22 PROCEDURE — 84484 ASSAY OF TROPONIN QUANT: CPT

## 2023-10-22 PROCEDURE — 85025 COMPLETE CBC W/AUTO DIFF WBC: CPT

## 2023-10-22 PROCEDURE — 80053 COMPREHEN METABOLIC PANEL: CPT

## 2023-10-22 PROCEDURE — 83880 ASSAY OF NATRIURETIC PEPTIDE: CPT

## 2023-10-22 PROCEDURE — 99285 EMERGENCY DEPT VISIT HI MDM: CPT

## 2023-10-22 PROCEDURE — 71275 CT ANGIOGRAPHY CHEST: CPT

## 2023-10-22 PROCEDURE — 63710000001 INSULIN REGULAR HUMAN PER 5 UNITS: Performed by: EMERGENCY MEDICINE

## 2023-10-22 PROCEDURE — 36415 COLL VENOUS BLD VENIPUNCTURE: CPT

## 2023-10-22 PROCEDURE — 71045 X-RAY EXAM CHEST 1 VIEW: CPT

## 2023-10-22 PROCEDURE — 25810000003 SODIUM CHLORIDE 0.9 % SOLUTION: Performed by: EMERGENCY MEDICINE

## 2023-10-22 PROCEDURE — 93005 ELECTROCARDIOGRAM TRACING: CPT

## 2023-10-22 RX ORDER — SODIUM CHLORIDE 0.9 % (FLUSH) 0.9 %
10 SYRINGE (ML) INJECTION AS NEEDED
Status: DISCONTINUED | OUTPATIENT
Start: 2023-10-22 | End: 2023-10-22 | Stop reason: HOSPADM

## 2023-10-22 RX ADMIN — HUMAN INSULIN 10 UNITS: 100 INJECTION, SOLUTION SUBCUTANEOUS at 18:11

## 2023-10-22 RX ADMIN — IOPAMIDOL 100 ML: 612 INJECTION, SOLUTION INTRAVENOUS at 17:16

## 2023-10-22 RX ADMIN — SODIUM CHLORIDE 1000 ML: 9 INJECTION, SOLUTION INTRAVENOUS at 18:09

## 2023-10-22 NOTE — ED PROVIDER NOTES
"TRIAGE CHIEF COMPLAINT:     Nursing and triage notes reviewed    Chief Complaint   Patient presents with    Shortness of Breath      HPI: Rdaha Whelan is a 83 y.o. female who presents to the emergency department complaining of shortness of breath and chest discomfort.  Patient was in the emergency department several days previously and was diagnosed with a left-sided pulmonary embolism.  Patient was stable at the time and was initiated on anticoagulation.  She states she has had some shortness of breath and intermittent discomfort on the left side of her chest since however today it is felt worse.  She denies coughing up any blood.    REVIEW OF SYSTEMS: All other systems reviewed and are negative     PAST MEDICAL HISTORY:   Past Medical History:   Diagnosis Date    Arthritis     Asthma     Basal cell carcinoma     Cardiomyopathy     Cataract, bilateral     s/p removal     Chronic HFrEF (heart failure with reduced ejection fraction) 03/28/2023    Diabetes mellitus     DVT (deep venous thrombosis)     1970's- left leg    Edema     legs- hx of    History of migraine headaches     Hx of exercise stress test 2004    Hypertension     Hyponatremia     Impaired mobility     LBBB (left bundle branch block)     LBBB (left bundle branch block)     Left leg pain     left leg and knee pain     Muscle spasm     hx of    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  last one was \"a few years ago\"    Wears dentures     top plate    Wears glasses     to read        FAMILY HISTORY:   Family History   Problem Relation Age of Onset    Heart disease Mother     Diabetes Mother         Passed " away at age 81 yrs. Heart attact    Heart disease Father     Breast cancer Sister     Heart disease Sister     Stroke Sister     Breast cancer Sister     Intracerebral hemorrhage Brother     Diabetes type II Brother     No Known Problems Brother     Asthma Daughter     Heart failure Daughter     Diabetes Other     Hypertension Other     Cancer Other         malignant neoplasm     Migraines Other     Breast cancer Other         SOCIAL HISTORY:   Social History     Socioeconomic History    Marital status:    Tobacco Use    Smoking status: Never     Passive exposure: Never    Smokeless tobacco: Never    Tobacco comments:     I have never smoked.   Vaping Use    Vaping Use: Never used   Substance and Sexual Activity    Alcohol use: Never    Drug use: Never    Sexual activity: Not Currently     Partners: Male     Comment: I am 82 years old . No sex for a few years        SURGICAL HISTORY:   Past Surgical History:   Procedure Laterality Date    BREAST BIOPSY Left     benign    CARDIAC CATHETERIZATION      09/16/2019 PER .    CARDIAC CATHETERIZATION N/A 09/16/2019    Procedure: Left Heart Cath +/- CBI;  Surgeon: Ashlyn Mays MD;  Location:  [facility];  Service: Cardiovascular    CARDIAC CATHETERIZATION N/A 04/07/2023    Procedure: Left Heart Cath;  Surgeon: Ashlyn Mays MD;  Location:  [facility];  Service: Cardiology;  Laterality: N/A;    CARDIAC ELECTROPHYSIOLOGY PROCEDURE N/A 12/06/2019    Procedure: PACEMAKER IMPLANTATION- DC;  Surgeon: Stephan Laboy DO;  Location:  [facility] EP INVASIVE LOCATION;  Service: Cardiology    CATARACT EXTRACTION  12/2018    both eyes     COLONOSCOPY      INSERT / REPLACE / REMOVE PACEMAKER      MOUTH SURGERY      all top teeth    SKIN BIOPSY      basal cell    TOTAL HIP ARTHROPLASTY Right 12/04/2019    Procedure: HIP ARTHROPLASTY TOTAL RIGHT;  Surgeon: Issa Salgado MD;  Location: [facility] OR;  Service: Orthopedics    TUBAL ABDOMINAL  LIGATION          CURRENT MEDICATIONS:      Medication List        CONTINUE taking these medications      accu-chek soft touch lancets  Use as directed     glucose monitor monitoring kit  1 each As Needed (blood sugar).     ONE TOUCH ULTRA 2 w/Device kit  USE AS NEEDED FOR BLOOD    SUGAR MONITORING            ASK your doctor about these medications      acetaminophen 325 MG tablet  Commonly known as: TYLENOL  Take 2 tablets by mouth Every 6 (Six) Hours As Needed for Mild Pain .     albuterol sulfate  (90 Base) MCG/ACT inhaler  Commonly known as: PROVENTIL HFA;VENTOLIN HFA;PROAIR HFA  Inhale 2 puffs Every 4 (Four) Hours As Needed for Wheezing.     amLODIPine 2.5 MG tablet  Commonly known as: Norvasc  Take 1 tablet by mouth Daily.     Apixaban Starter Pack tablet therapy pack  Take two 5 mg tablets by mouth every 12 hours for 7 days. Followed by one 5 mg tablet every 12 hours. (Dispense starter pack if available)     benzonatate 100 MG capsule  Commonly known as: Tessalon Perles  Take 1 capsule by mouth 3 (Three) Times a Day As Needed for Cough.     bumetanide 1 MG tablet  Commonly known as: BUMEX  Take 1 tablet by mouth Daily.     clarithromycin 500 MG tablet  Commonly known as: BIAXIN  Take 1 tablet by mouth Every 12 (Twelve) Hours.     clopidogrel 75 MG tablet  Commonly known as: PLAVIX  Take 1 tablet by mouth Daily.     Coenzyme Q10 200 MG capsule     Fish Oil 300 MG capsule     glipizide 2.5 MG 24 hr tablet  Commonly known as: GLUCOTROL XL  Take 1 tablet by mouth Daily.     glucose blood test strip  Commonly known as: Accu-Chek Kayce Plus  TEST ONCE DAILY.     latanoprost 0.005 % ophthalmic solution  Commonly known as: XALATAN     lisinopril 40 MG tablet  Commonly known as: PRINIVIL,ZESTRIL  Take 1 tablet by mouth Daily.     lovastatin 10 MG tablet  Commonly known as: MEVACOR  Take 1 tablet by mouth Every Night.     melatonin 3 MG tablet     methylPREDNISolone 4 MG dose pack  Commonly known as:  MEDROL  Take as directed on package instructions.     metoprolol tartrate 50 MG tablet  Commonly known as: LOPRESSOR  Take 1 tablet by mouth 2 (Two) Times a Day.     Ofev 150 MG capsule  Generic drug: Nintedanib Esylate  Take 150 mg by mouth 2 (Two) Times a Day. Take with food     pantoprazole 40 MG EC tablet  Commonly known as: PROTONIX  Take 1 tablet by mouth Daily.     tobramycin-dexAMETHasone 0.3-0.1 % ophthalmic suspension  Commonly known as: TOBRADEX  Administer 1 drop to both eyes As Needed (dryness).     vitamin D3 125 MCG (5000 UT) capsule capsule               ALLERGIES: Covid-19 mrna vacc (moderna), Cumini, Cheese, Ibuprofen, Other, and Saccharin     PHYSICAL EXAM:   VITAL SIGNS:   Vitals:    10/22/23 1630   BP: 113/70   Pulse: 81   Resp:    Temp:    SpO2: 98%      CONSTITUTIONAL: Awake, oriented, appears nontoxic   HENT: Atraumatic, normocephalic, oral mucosa pink and moist, airway patent. Nares patent without drainage. External ears normal.   EYES: Conjunctivae clear   NECK: Trachea midline   CARDIOVASCULAR: Normal heart rate, Normal rhythm, No murmurs, rubs, gallops   PULMONARY/CHEST: A few scattered coarse lung sounds but overall good air movement with no increased work of breathing.  ABDOMINAL: Nondistended, soft, nontender - no rebound or guarding.   NEUROLOGIC: Nonfocal, moving all four extremities, no gross sensory or motor deficits.   EXTREMITIES: No clubbing, cyanosis, or edema   SKIN: Warm, Dry, No erythema, No rash     ED COURSE / MEDICAL DECISION MAKING:   Radha Whelan is a 83 y.o. female who presents to the emergency department for evaluation of shortness of breath and chest discomfort.  Patient is nondistressed on arrival in the emergency department.  Vital signs are stable.  Patient breathing comfortably on room air placed on 2 L of oxygen for comfort as she does wear this at home.    Differential diagnosis includes worsening pulmonary embolism, pain from pulmonary embolism, pulmonary  infarct, pneumonia, heart strain among other etiologies.    A repeat CT pulmonary angiogram was ordered for further evaluation of the patient's presentation.    Diagnostic information from other sources: Family, chart review    Interventions: Morphine    EKG interpreted by me reveals a sinus rhythm with a rate of 62 bpm.  There is intraventricular conduction delay.  This is an abnormal appearing EKG.    Narrative: Patient presents with chest pain and shortness of breath.  Labs reveal elevated blood glucose.  Troponin at 15.  Labs otherwise around baseline.  CT scan per radiology report is unchanged when compared to previous.  Some edema or pneumonia or scarring at the lung apices bilaterally, similar to previous.  Labs are not indicative of an infection.  I will not initiate antibiotics, patient has follow-up with her primary care provider tomorrow.  Patient comfortable with going home.  Vital signs have remained stable on multiple rechecks.  Will discharge with return precautions.      DECISION TO DISCHARGE/ADMIT: see ED care timeline     FINAL IMPRESSION:   1 --pulmonary embolism  2 --   3 --     Electronically signed by: Cici Fontenot MD, 10/22/2023 16:37 Cici Mcdaniel MD  10/22/23 1916

## 2023-10-23 ENCOUNTER — OFFICE VISIT (OUTPATIENT)
Dept: INTERNAL MEDICINE | Facility: CLINIC | Age: 83
End: 2023-10-23
Payer: MEDICARE

## 2023-10-23 VITALS
HEIGHT: 67 IN | WEIGHT: 182 LBS | RESPIRATION RATE: 18 BRPM | SYSTOLIC BLOOD PRESSURE: 138 MMHG | OXYGEN SATURATION: 96 % | TEMPERATURE: 96.9 F | BODY MASS INDEX: 28.56 KG/M2 | HEART RATE: 65 BPM | DIASTOLIC BLOOD PRESSURE: 74 MMHG

## 2023-10-23 DIAGNOSIS — E11.65 TYPE 2 DIABETES MELLITUS WITH HYPERGLYCEMIA, WITHOUT LONG-TERM CURRENT USE OF INSULIN: ICD-10-CM

## 2023-10-23 DIAGNOSIS — I26.99 OTHER PULMONARY EMBOLISM WITHOUT ACUTE COR PULMONALE, UNSPECIFIED CHRONICITY: Primary | ICD-10-CM

## 2023-10-23 RX ORDER — APIXABAN 5 MG/1
5 TABLET, FILM COATED ORAL EVERY 12 HOURS SCHEDULED
COMMUNITY
Start: 2023-10-19 | End: 2023-10-23 | Stop reason: SDUPTHER

## 2023-10-23 RX ORDER — APIXABAN 5 MG/1
5 TABLET, FILM COATED ORAL EVERY 12 HOURS SCHEDULED
Qty: 180 TABLET | Refills: 3 | Status: SHIPPED | OUTPATIENT
Start: 2023-10-23

## 2023-10-23 NOTE — PROGRESS NOTES
Subjective     Patient ID: Radha Whelan is a 83 y.o. female. Patient is here for management of multiple medical problems.     Chief Complaint   Patient presents with    Hospital Follow Up Visit     Pulmonary Embolism     History of Present Illness   Hosptital f/u.     Recnetly dicscoverd PE.     Unclear etiology.      The following portions of the patient's history were reviewed and updated as appropriate: allergies, current medications, past family history, past medical history, past social history, past surgical history and problem list.    Review of Systems    Current Outpatient Medications:     acetaminophen (TYLENOL) 325 MG tablet, Take 2 tablets by mouth Every 6 (Six) Hours As Needed for Mild Pain ., Disp: , Rfl:     albuterol sulfate  (90 Base) MCG/ACT inhaler, Inhale 2 puffs Every 4 (Four) Hours As Needed for Wheezing., Disp: 18 g, Rfl: 2    amLODIPine (Norvasc) 2.5 MG tablet, Take 1 tablet by mouth Daily., Disp: 30 tablet, Rfl: 5    benzonatate (Tessalon Perles) 100 MG capsule, Take 1 capsule by mouth 3 (Three) Times a Day As Needed for Cough., Disp: 60 capsule, Rfl: 0    Blood Glucose Monitoring Suppl (ONE TOUCH ULTRA 2) w/Device kit, USE AS NEEDED FOR BLOOD    SUGAR MONITORING, Disp: 1 each, Rfl: 0    bumetanide (BUMEX) 1 MG tablet, Take 1 tablet by mouth Daily., Disp: 90 tablet, Rfl: 3    clopidogrel (PLAVIX) 75 MG tablet, Take 1 tablet by mouth Daily., Disp: 90 tablet, Rfl: 3    Coenzyme Q10 200 MG capsule, Take 400 mg by mouth Every Other Day., Disp: , Rfl:     Eliquis 5 MG tablet tablet, Take 1 tablet by mouth Every 12 (Twelve) Hours., Disp: 180 tablet, Rfl: 3    glipizide (GLUCOTROL XL) 2.5 MG 24 hr tablet, Take 1 tablet by mouth Daily., Disp: 90 tablet, Rfl: 3    glucose blood (Accu-Chek Kayce Plus) test strip, TEST ONCE DAILY., Disp: 100 each, Rfl: 3    glucose monitor monitoring kit, 1 each As Needed (blood sugar)., Disp: 1 each, Rfl: 1    Lancets (accu-chek soft touch) lancets, Use as  "directed, Disp: 100 each, Rfl: 12    latanoprost (XALATAN) 0.005 % ophthalmic solution, PLACE 1 DROP IN EACH EYE EVERY DAY AT BEDTIME, Disp: , Rfl:     lisinopril (PRINIVIL,ZESTRIL) 40 MG tablet, Take 1 tablet by mouth Daily., Disp: 90 tablet, Rfl: 3    lovastatin (MEVACOR) 10 MG tablet, Take 1 tablet by mouth Every Night. (Patient taking differently: Take 1 tablet by mouth Every Other Day.), Disp: 90 tablet, Rfl: 3    melatonin 3 MG tablet, Take 1 tablet by mouth Every Night., Disp: , Rfl:     metoprolol tartrate (LOPRESSOR) 50 MG tablet, Take 1 tablet by mouth 2 (Two) Times a Day., Disp: 180 tablet, Rfl: 3    Nintedanib Esylate (Ofev) 150 MG capsule, Take 150 mg by mouth 2 (Two) Times a Day. Take with food, Disp: 180 capsule, Rfl: 3    Omega-3 Fatty Acids (Fish Oil) 300 MG capsule, Take  by mouth., Disp: , Rfl:     pantoprazole (PROTONIX) 40 MG EC tablet, Take 1 tablet by mouth Daily., Disp: 90 tablet, Rfl: 3    tobramycin-dexamethasone (TOBRADEX) 0.3-0.1 % ophthalmic suspension, Administer 1 drop to both eyes As Needed (dryness)., Disp: 5 mL, Rfl: 1    vitamin D3 125 MCG (5000 UT) capsule capsule, Take 1 capsule by mouth Daily., Disp: , Rfl:     empagliflozin (Jardiance) 10 MG tablet tablet, Take 1 tablet by mouth Daily., Disp: 90 tablet, Rfl: 3  No current facility-administered medications for this visit.    Objective      Blood pressure 138/74, pulse 65, temperature 96.9 °F (36.1 °C), resp. rate 18, height 170.2 cm (67\"), weight 82.6 kg (182 lb), SpO2 96%.    Physical Exam     General Appearance:    Alert, cooperative, no distress, appears stated age   Head:    Normocephalic, without obvious abnormality, atraumatic   Eyes:    PERRL, conjunctiva/corneas clear, EOM's intact   Ears:    Normal TM's and external ear canals, both ears   Nose:   Nares normal, septum midline, mucosa normal, no drainage   or sinus tenderness   Throat:   Lips, mucosa, and tongue normal; teeth and gums normal   Neck:   Supple, " symmetrical, trachea midline, no adenopathy;        thyroid:  No enlargement/tenderness/nodules; no carotid    bruit or JVD   Back:     Symmetric, no curvature, ROM normal, no CVA tenderness   Lungs:     Crackle lower 2/3   to auscultation bilaterally, respirations unlabored   Chest wall:    No tenderness or deformity   Heart:    Regular rate and rhythm, S1 and S2 normal, no murmur,        rub or gallop   Abdomen:     Soft, non-tender, bowel sounds active all four quadrants,     no masses, no organomegaly   Extremities:   Extremities normal, atraumatic, no cyanosis or edema   Pulses:   2+ and symmetric all extremities   Skin:   Skin color, texture, turgor normal, no rashes or lesions   Lymph nodes:   Cervical, supraclavicular, and axillary nodes normal   Neurologic:   CNII-XII intact. Normal strength, sensation and reflexes       throughout      Results for orders placed or performed during the hospital encounter of 10/22/23   Comprehensive Metabolic Panel    Specimen: Blood   Result Value Ref Range    Glucose 410 (C) 65 - 99 mg/dL    BUN 22 8 - 23 mg/dL    Creatinine 1.06 (H) 0.57 - 1.00 mg/dL    Sodium 136 136 - 145 mmol/L    Potassium 3.7 3.5 - 5.2 mmol/L    Chloride 95 (L) 98 - 107 mmol/L    CO2 28.1 22.0 - 29.0 mmol/L    Calcium 8.5 (L) 8.6 - 10.5 mg/dL    Total Protein 6.6 6.0 - 8.5 g/dL    Albumin 3.6 3.5 - 5.2 g/dL    ALT (SGPT) 144 (H) 1 - 33 U/L    AST (SGOT) 243 (H) 1 - 32 U/L    Alkaline Phosphatase 96 39 - 117 U/L    Total Bilirubin 0.5 0.0 - 1.2 mg/dL    Globulin 3.0 gm/dL    A/G Ratio 1.2 g/dL    BUN/Creatinine Ratio 20.8 7.0 - 25.0    Anion Gap 12.9 5.0 - 15.0 mmol/L    eGFR 51.9 (L) >60.0 mL/min/1.73   BNP    Specimen: Blood   Result Value Ref Range    proBNP 117.3 0.0 - 1,800.0 pg/mL   Single High Sensitivity Troponin T    Specimen: Blood   Result Value Ref Range    HS Troponin T 15 (H) <10 ng/L   CBC Auto Differential    Specimen: Blood   Result Value Ref Range    WBC 6.19 3.40 - 10.80 10*3/mm3     RBC 4.11 3.77 - 5.28 10*6/mm3    Hemoglobin 12.4 12.0 - 15.9 g/dL    Hematocrit 37.9 34.0 - 46.6 %    MCV 92.2 79.0 - 97.0 fL    MCH 30.2 26.6 - 33.0 pg    MCHC 32.7 31.5 - 35.7 g/dL    RDW 14.6 12.3 - 15.4 %    RDW-SD 48.1 37.0 - 54.0 fl    MPV 9.6 6.0 - 12.0 fL    Platelets 194 140 - 450 10*3/mm3    Neutrophil % 55.0 42.7 - 76.0 %    Lymphocyte % 32.8 19.6 - 45.3 %    Monocyte % 9.5 5.0 - 12.0 %    Eosinophil % 1.5 0.3 - 6.2 %    Basophil % 0.2 0.0 - 1.5 %    Immature Grans % 1.0 (H) 0.0 - 0.5 %    Neutrophils, Absolute 3.41 1.70 - 7.00 10*3/mm3    Lymphocytes, Absolute 2.03 0.70 - 3.10 10*3/mm3    Monocytes, Absolute 0.59 0.10 - 0.90 10*3/mm3    Eosinophils, Absolute 0.09 0.00 - 0.40 10*3/mm3    Basophils, Absolute 0.01 0.00 - 0.20 10*3/mm3    Immature Grans, Absolute 0.06 (H) 0.00 - 0.05 10*3/mm3    nRBC 0.0 0.0 - 0.2 /100 WBC   Single High Sensitivity Troponin T    Specimen: Blood   Result Value Ref Range    HS Troponin T 15 (H) <10 ng/L   Green Top (Gel)   Result Value Ref Range    Extra Tube Hold for add-ons.    Lavender Top   Result Value Ref Range    Extra Tube hold for add-on    Gold Top - SST   Result Value Ref Range    Extra Tube Hold for add-ons.    Light Blue Top   Result Value Ref Range    Extra Tube Hold for add-ons.          Assessment & Plan   Unprovoked PE.  Life long anticoagulation.    DM not well controlled.  Start jardiance. Stay on glip for now.   Increase jardiance as tolerated.          Diagnoses and all orders for this visit:    1. Other pulmonary embolism without acute cor pulmonale, unspecified chronicity (Primary)    2. Type 2 diabetes mellitus with hyperglycemia, without long-term current use of insulin    Other orders  -     empagliflozin (Jardiance) 10 MG tablet tablet; Take 1 tablet by mouth Daily.  Dispense: 90 tablet; Refill: 3  -     Eliquis 5 MG tablet tablet; Take 1 tablet by mouth Every 12 (Twelve) Hours.  Dispense: 180 tablet; Refill: 3      Return in about 6 months (around  4/23/2024).          There are no Patient Instructions on file for this visit.     Farhan Schaefer MD    Assessment & Plan       Answers submitted by the patient for this visit:  Primary Reason for Visit (Submitted on 10/22/2023)  What is the primary reason for your visit?: Shortness of Breath  Ashlyn Mays MD Geile, Michael A, MD  I agree, thank you.          Previous Messages       ----- Message -----  From: Farhan Schaefer MD  Sent: 10/20/2023   9:05 AM EDT  To: Ashlyn Mays MD; Eliana Escalona MA    Pt just had PE.   Eliquis started.  Is supposed to be on DATP for CAD.  Looks like just on plavix.  I will recommend she stay on the plavix and eliquis and hold asa if on.  If you have a different opinion please let me and the pt know.    Eliana. Can you have her continue plavix with Eliqis and make sure the pharmacy dispensed it.

## 2023-10-25 ENCOUNTER — OFFICE VISIT (OUTPATIENT)
Dept: PULMONOLOGY | Facility: CLINIC | Age: 83
End: 2023-10-25
Payer: MEDICARE

## 2023-10-25 VITALS
BODY MASS INDEX: 28.44 KG/M2 | WEIGHT: 181.2 LBS | HEART RATE: 63 BPM | DIASTOLIC BLOOD PRESSURE: 68 MMHG | SYSTOLIC BLOOD PRESSURE: 121 MMHG | OXYGEN SATURATION: 95 % | HEIGHT: 67 IN | RESPIRATION RATE: 18 BRPM

## 2023-10-25 DIAGNOSIS — R76.8 ANA POSITIVE: ICD-10-CM

## 2023-10-25 DIAGNOSIS — G47.33 OSA (OBSTRUCTIVE SLEEP APNEA): ICD-10-CM

## 2023-10-25 DIAGNOSIS — Z86.711 HISTORY OF PULMONARY EMBOLISM: ICD-10-CM

## 2023-10-25 DIAGNOSIS — R09.02 EXERCISE HYPOXEMIA: ICD-10-CM

## 2023-10-25 DIAGNOSIS — J84.112 IPF (IDIOPATHIC PULMONARY FIBROSIS): Primary | ICD-10-CM

## 2023-10-25 PROCEDURE — 3074F SYST BP LT 130 MM HG: CPT | Performed by: INTERNAL MEDICINE

## 2023-10-25 PROCEDURE — 3078F DIAST BP <80 MM HG: CPT | Performed by: INTERNAL MEDICINE

## 2023-10-25 PROCEDURE — 99214 OFFICE O/P EST MOD 30 MIN: CPT | Performed by: INTERNAL MEDICINE

## 2023-10-25 NOTE — PROGRESS NOTES
"  Chief Complaint   Patient presents with    Shortness of Breath    Follow-up       Subjective   Radha Whelan is a 83 y.o. female.   Patient comes in today to follow-up on pulmonary fibrosis, obstructive sleep apnea and hypoxemia.    The patient says that she continues to have shortness of breath with exertion but when asked, she does not use oxygen on a regular basis and actually tries to go without oxygen as she \"does not want to get used to it\".    The patient is using her PAP device on a regular basis without any significant issues.    The patient was recently diagnosed with pulmonary embolism and is currently on anticoagulation.      The following portions of the patient's history were reviewed and updated as appropriate: allergies, current medications, past family history, past medical history, past social history, and past surgical history.    Review of Systems   HENT:  Negative for sinus pressure, sneezing and sore throat.    Respiratory:  Positive for cough and shortness of breath. Negative for chest tightness and wheezing.    Cardiovascular:  Negative for palpitations and leg swelling.       Objective   Visit Vitals  /68   Pulse 63   Resp 18   Ht 170.2 cm (67\") Comment: pt reported   Wt 82.2 kg (181 lb 3.2 oz)   SpO2 95% Comment: on RA at rest   BMI 28.38 kg/m²         BMI Readings from Last 3 Encounters:   10/25/23 28.38 kg/m²   10/23/23 28.51 kg/m²   10/22/23 28.51 kg/m²       Physical Exam  Vitals reviewed.   Constitutional:       Appearance: She is well-developed.   HENT:      Head: Atraumatic.      Mouth/Throat:      Comments: Oropharynx was crowded.  Cardiovascular:      Comments: PPM noted.   Pulmonary:      Effort: Pulmonary effort is normal.      Comments: Somewhat hyperresonant to percussion.  Somewhat decreased air entry.  Mild scattered crackles noted.   Musculoskeletal:      Comments: Used a wheelchair.    Neurological:      Mental Status: She is alert and oriented to person, place, and " time.           Assessment & Plan   Diagnoses and all orders for this visit:    1. IPF (idiopathic pulmonary fibrosis) (Primary)  -     Hepatic Function Panel; Future    2. ODELL (obstructive sleep apnea)    3. Exercise hypoxemia    4. LATOSHA positive    5. History of pulmonary embolism           Return in about 3 months (around 1/25/2024) for Recheck, For Elver Conteh ([place]).    DISCUSSION (if any):  I reviewed the patient's ER summary, dated 10/18/2023, that mentioned pulmonary embolism    I have personally reviewed images of the last chest x-ray and available CT.    Last CT scan results was reviewed in great detail with the patient.  Results for orders placed during the hospital encounter of 10/22/23    CT Angiogram Chest Pulmonary Embolism    Narrative  FINAL REPORT    TECHNIQUE:  Multiple axial CT images were obtained through the chest  following IV contrast using a CTA/PE protocol.  3D/MIP  reconstruction images were also performed. This study was  performed with techniques to keep radiation doses as low as  reasonably achievable (ALARA). Individualized dose reduction  techniques using automated exposure control or adjustment of mA  and/or kV according to the patient's size were employed.    CLINICAL HISTORY:  soa, recent PE    FINDINGS:  PAs and aorta: Bilateral pulmonary emboli, not significantly changed from the prior examination. These are well seen on images 41 and 42 series 4.  The volume of clot is essentially unchanged from the prior examination.   Thoracic aorta is unremarkable.    Heart/mediastinum: No evidence for right heart strain.  No pericardial effusion.  The heart is normal in size.  A left-sided pacer is in place.    Lungs: There are diffuse groundglass opacities more prominent in the upper lobes and on the right side.  Multifocal patchy airspace opacities.    Lymph nodes: No pathologically enlarged thoracic lymph nodes.    Pleura: No pneumothorax or pleural effusion.    Chest Wall: No  chest wall contusion.    Bones: No acute fracture.    Upper abdomen: There is a small hiatal hernia.  Vicarious excretion of contrast is seen within the gallbladder.    Impression  Bilateral pulmonary emboli, not significantly changed from the prior study.    Mild to moderate groundglass opacities and multifocal airspace opacities, with appearance most consistent with pulmonary edema with superimposed infectious process.    Authenticated and Electronically Signed by MD Key Richard on 10/22/2023 06:29:01 PM      I also reviewed her last echocardiogram and shared the results with her.   Results for orders placed during the hospital encounter of 03/27/23    Adult Transthoracic Echo Complete w/ Color, Spectral and Contrast if Necessary Per Protocol    Interpretation Summary    Left ventricular ejection fraction appears to be 36 - 40%.    The left ventricular cavity is moderately dilated.    Left ventricular diastolic function is consistent with (grade I) impaired relaxation.    There is moderate calcification of the aortic valve mainly affecting the non-coronary, left coronary and right coronary cusp(s).    Estimated right ventricular systolic pressure from tricuspid regurgitation is normal (<35 mmHg).      Results for orders placed during the hospital encounter of 09/11/19    Adult Transthoracic Echo Complete W/ Cont if Necessary Per Protocol    Interpretation Summary  · Estimated EF = 42%.  · Left ventricular diastolic dysfunction (grade I) consistent with impaired relaxation.  · Calculated right ventricular systolic pressure from tricuspid regurgitation is 22.0 mmHg.      Laboratory workup also showed   Lab Results   Component Value Date    HGB 12.4 10/22/2023    HGB 13.5 10/18/2023    HGB 14.0 10/18/2023   ,   Lab Results   Component Value Date    HCT 37.9 10/22/2023    HCT 40.5 10/18/2023    HCT 42.1 10/18/2023       Lab Results   Component Value Date    EOSABS 0.09 10/22/2023    EOSABS 0.26 10/18/2023     EOSABS 0.18 10/18/2023    &  Lab Results   Component Value Date    IGE 3 (L) 07/31/2023     Laboratory workup also showed   Lab Results   Component Value Date    CO2 28.1 10/22/2023   ,   Lab Results   Component Value Date    HGBA1C 11.00 (H) 10/18/2023    HGBA1C 8.1 (H) 06/15/2023    HGBA1C 8.10 (H) 03/27/2023   ,   Lab Results   Component Value Date    TSH 1.570 10/18/2023    TSH 1.080 06/15/2023    TSH 2.410 07/21/2022       ===========================  ===========================    I had a long discussion with the patient in the presence of her daughter and told the patient and her daughter that given the fact that she had bilateral pulmonary emboli, and the fact that she has idiopathic pulmonary fibrosis, I would suggest anticoagulation for lifelong use.    I will however, decrease the dose of Eliquis, to 2.5 mg after 3 months of use.    This approach of a lower dose for long-term use has been reviewed and multiple studies and found to be as effective, with less side effects of bleeding, and given her advanced age, I would suggest a lower dose regimen after 3 months are completed.    She will need to continue OFEV on a regular basis, given the findings of UIP/IPF on her last HRCT.    I also had a long discussion with her regarding the need to use oxygen with exertion especially given her symptoms of worsening dyspnea on exertion which could very well be from exercise hypoxemia.    As far as her CPAP is concerned, she had a very good response to CPAP at a final pressure of 6 on the most recent titration study and have advised her to continue CPAP at the current pressure.    She is up-to-date with influenza and pneumonia vaccines.  I suggested considering Prevnar 20.      Dictated utilizing Dragon dictation.    This document was electronically signed by Karina Dias MD on 10/25/23 at 10:22 EDT

## 2023-10-27 ENCOUNTER — TELEPHONE (OUTPATIENT)
Dept: CASE MANAGEMENT | Facility: OTHER | Age: 83
End: 2023-10-27
Payer: MEDICARE

## 2023-10-27 NOTE — TELEPHONE ENCOUNTER
WEN CM outreach with Patient; left a voicemail requesting a return call to GRISELDA Adams with [facility] at [phone].

## 2023-10-30 ENCOUNTER — TELEPHONE (OUTPATIENT)
Dept: CASE MANAGEMENT | Facility: OTHER | Age: 83
End: 2023-10-30
Payer: MEDICARE

## 2023-10-30 ENCOUNTER — PATIENT OUTREACH (OUTPATIENT)
Dept: CASE MANAGEMENT | Facility: OTHER | Age: 83
End: 2023-10-30
Payer: MEDICARE

## 2023-10-30 DIAGNOSIS — E11.9 TYPE 2 DIABETES MELLITUS WITHOUT COMPLICATION, UNSPECIFIED WHETHER LONG TERM INSULIN USE: Primary | ICD-10-CM

## 2023-10-30 DIAGNOSIS — E11.9 TYPE 2 DIABETES MELLITUS WITHOUT COMPLICATION, UNSPECIFIED WHETHER LONG TERM INSULIN USE: ICD-10-CM

## 2023-10-30 DIAGNOSIS — I50.42 CHRONIC COMBINED SYSTOLIC AND DIASTOLIC CONGESTIVE HEART FAILURE: Primary | ICD-10-CM

## 2023-10-30 RX ORDER — FLUCONAZOLE 100 MG/1
100 TABLET ORAL DAILY
Qty: 1 TABLET | Refills: 0 | Status: SHIPPED | OUTPATIENT
Start: 2023-10-30

## 2023-10-30 NOTE — TELEPHONE ENCOUNTER
WEN CM notified Patient that her prescription request had been sent to [facility] as requested. Pt V/U and appreciation of call.

## 2023-10-30 NOTE — OUTREACH NOTE
AMBULATORY CASE MANAGEMENT NOTE    Name and Relationship of Patient/Support Person: TipRadha - Self    Patient Outreach    WEN KELLOGG outreach with Patient. Patient had an ED visit with [facility] on 10/22/23. Patient was treated and discharged to home with primary care follow up as instructed. Completed follow up with primary care on 10/23/23 and medication changes from primary care include the additions of Jardiance 10 mg.     AVS, education, medications and recommended F/U reviewed. Patient V/U and compliance with continuous oxygen, CPAP and prescribed medication. Reports that she has developed a yeast infection since starting the Jardiance. Denies broken skin in areas affected by yeast.    Reviewed recent labs including A1c of 11.0. Patient attributes elevated A1c to recent use of steroids and cortisone injections. Reports that she has testing supplies and is monitoring BG levels. Patient declined assistance with lowering A1c or managing diabetes. She is agreeable to a follow up call regarding current status.    Encouraged Patient to contact the office for any worsening symptoms, questions or concerns.    Care Coordination    Care coordination with primary care. WEN KELLOGG requested treatment recommendations for current yeast infection from provider, Dr. Schaefer.    Angie MAX  Ambulatory Case Management    10/30/2023, 09:26 EDT

## 2023-11-06 ENCOUNTER — PATIENT OUTREACH (OUTPATIENT)
Dept: CASE MANAGEMENT | Facility: OTHER | Age: 83
End: 2023-11-06
Payer: MEDICARE

## 2023-11-06 DIAGNOSIS — I50.42 CHRONIC COMBINED SYSTOLIC AND DIASTOLIC CONGESTIVE HEART FAILURE: ICD-10-CM

## 2023-11-06 DIAGNOSIS — E11.9 TYPE 2 DIABETES MELLITUS WITHOUT COMPLICATION, UNSPECIFIED WHETHER LONG TERM INSULIN USE: Primary | ICD-10-CM

## 2023-11-06 NOTE — OUTREACH NOTE
AMBULATORY CASE MANAGEMENT NOTE    Name and Relationship of Patient/Support Person: Radha Whelan - Self    Patient Outreach    AMB  outreach with Patient. Patient reports compliance with prescribed medication and states that the one dose of diflucan healed the yeast infection. Patient denies any open areas, redness or heat. Reviewed new medication Jardiance. Patient V/U and stated that she would contact the office at any early signs of yeast infections or UTI.    Angie MAX  Ambulatory Case Management    11/6/2023, 09:34 EST

## 2023-11-14 RX ORDER — FLUCONAZOLE 100 MG/1
100 TABLET ORAL DAILY
Qty: 1 TABLET | Refills: 0 | Status: SHIPPED | OUTPATIENT
Start: 2023-11-14

## 2023-11-16 ENCOUNTER — PATIENT MESSAGE (OUTPATIENT)
Dept: INTERNAL MEDICINE | Facility: CLINIC | Age: 83
End: 2023-11-16
Payer: MEDICARE

## 2023-11-16 NOTE — TELEPHONE ENCOUNTER
From: Radha Whelan  To: Farhan Schaefer  Sent: 11/16/2023 8:52 AM EST  Subject: Jardisnce    The Fluconazole pill helped a little, but the yeast infection is worse than ever. My sugar is 164 and I would like to stop Jardance and get my sugar on down with dieting. Also there is a little bleeding.

## 2023-11-30 LAB
ALBUMIN SERPL-MCNC: 4.1 G/DL (ref 3.5–5.2)
ALBUMIN/GLOB SERPL: 1.6 G/DL
ALP SERPL-CCNC: 63 U/L (ref 39–117)
ALT SERPL-CCNC: 45 U/L (ref 1–33)
AST SERPL-CCNC: 32 U/L (ref 1–32)
BASOPHILS # BLD AUTO: 0.03 10*3/MM3 (ref 0–0.2)
BASOPHILS NFR BLD AUTO: 0.4 % (ref 0–1.5)
BILIRUB SERPL-MCNC: 0.4 MG/DL (ref 0–1.2)
BUN SERPL-MCNC: 23 MG/DL (ref 8–23)
BUN/CREAT SERPL: 20 (ref 7–25)
CALCIUM SERPL-MCNC: 9.3 MG/DL (ref 8.6–10.5)
CHLORIDE SERPL-SCNC: 100 MMOL/L (ref 98–107)
CO2 SERPL-SCNC: 28.6 MMOL/L (ref 22–29)
CREAT SERPL-MCNC: 1.15 MG/DL (ref 0.57–1)
D DIMER PPP FEU-MCNC: 0.93 MCGFEU/ML (ref 0–0.84)
EGFRCR SERPLBLD CKD-EPI 2021: 47.1 ML/MIN/1.73
EOSINOPHIL # BLD AUTO: 0.2 10*3/MM3 (ref 0–0.4)
EOSINOPHIL NFR BLD AUTO: 2.7 % (ref 0.3–6.2)
ERYTHROCYTE [DISTWIDTH] IN BLOOD BY AUTOMATED COUNT: 14.4 % (ref 12.3–15.4)
GLOBULIN SER CALC-MCNC: 2.6 GM/DL
GLUCOSE SERPL-MCNC: 169 MG/DL (ref 65–99)
HBA1C MFR BLD: 9.1 % (ref 4.8–5.6)
HCT VFR BLD AUTO: 39 % (ref 34–46.6)
HGB BLD-MCNC: 13.1 G/DL (ref 12–15.9)
IMM GRANULOCYTES # BLD AUTO: 0.02 10*3/MM3 (ref 0–0.05)
IMM GRANULOCYTES NFR BLD AUTO: 0.3 % (ref 0–0.5)
LYMPHOCYTES # BLD AUTO: 2.88 10*3/MM3 (ref 0.7–3.1)
LYMPHOCYTES NFR BLD AUTO: 38.8 % (ref 19.6–45.3)
MCH RBC QN AUTO: 31.1 PG (ref 26.6–33)
MCHC RBC AUTO-ENTMCNC: 33.6 G/DL (ref 31.5–35.7)
MCV RBC AUTO: 92.6 FL (ref 79–97)
MONOCYTES # BLD AUTO: 0.64 10*3/MM3 (ref 0.1–0.9)
MONOCYTES NFR BLD AUTO: 8.6 % (ref 5–12)
NEUTROPHILS # BLD AUTO: 3.66 10*3/MM3 (ref 1.7–7)
NEUTROPHILS NFR BLD AUTO: 49.2 % (ref 42.7–76)
NRBC BLD AUTO-RTO: 0 /100 WBC (ref 0–0.2)
PLATELET # BLD AUTO: 239 10*3/MM3 (ref 140–450)
POTASSIUM SERPL-SCNC: 4.4 MMOL/L (ref 3.5–5.2)
PROT SERPL-MCNC: 6.7 G/DL (ref 6–8.5)
RBC # BLD AUTO: 4.21 10*6/MM3 (ref 3.77–5.28)
SODIUM SERPL-SCNC: 140 MMOL/L (ref 136–145)
TSH SERPL DL<=0.005 MIU/L-ACNC: 5.91 UIU/ML (ref 0.27–4.2)
VIT B12 SERPL-MCNC: 375 PG/ML (ref 211–946)
WBC # BLD AUTO: 7.43 10*3/MM3 (ref 3.4–10.8)

## 2023-12-01 ENCOUNTER — OFFICE VISIT (OUTPATIENT)
Dept: INTERNAL MEDICINE | Facility: CLINIC | Age: 83
End: 2023-12-01
Payer: MEDICARE

## 2023-12-01 VITALS
OXYGEN SATURATION: 96 % | TEMPERATURE: 96.5 F | HEIGHT: 67 IN | HEART RATE: 62 BPM | WEIGHT: 178 LBS | SYSTOLIC BLOOD PRESSURE: 102 MMHG | BODY MASS INDEX: 27.94 KG/M2 | DIASTOLIC BLOOD PRESSURE: 66 MMHG

## 2023-12-01 DIAGNOSIS — R79.9 ABNORMAL FINDING OF BLOOD CHEMISTRY, UNSPECIFIED: ICD-10-CM

## 2023-12-01 DIAGNOSIS — J84.10 PULMONARY FIBROSIS: ICD-10-CM

## 2023-12-01 DIAGNOSIS — I50.42 CHRONIC COMBINED SYSTOLIC AND DIASTOLIC CONGESTIVE HEART FAILURE: ICD-10-CM

## 2023-12-01 DIAGNOSIS — I26.94 MULTIPLE SUBSEGMENTAL PULMONARY EMBOLI WITHOUT ACUTE COR PULMONALE: Primary | ICD-10-CM

## 2023-12-01 PROBLEM — I26.99 PULMONARY EMBOLI: Status: ACTIVE | Noted: 2023-12-01

## 2023-12-01 PROCEDURE — 3078F DIAST BP <80 MM HG: CPT | Performed by: INTERNAL MEDICINE

## 2023-12-01 PROCEDURE — 99214 OFFICE O/P EST MOD 30 MIN: CPT | Performed by: INTERNAL MEDICINE

## 2023-12-01 PROCEDURE — 3074F SYST BP LT 130 MM HG: CPT | Performed by: INTERNAL MEDICINE

## 2023-12-01 NOTE — PROGRESS NOTES
Subjective     Patient ID: Radha Whelan is a 83 y.o. female. Patient is here for management of multiple medical problems.     Chief Complaint   Patient presents with    Pulmonary Embolism     History of Present Illness     PE    Pulmonary fibrosis.         The following portions of the patient's history were reviewed and updated as appropriate: allergies, current medications, past family history, past medical history, past social history, past surgical history and problem list.    Review of Systems    Current Outpatient Medications:     acetaminophen (TYLENOL) 325 MG tablet, Take 2 tablets by mouth Every 6 (Six) Hours As Needed for Mild Pain ., Disp: , Rfl:     albuterol sulfate  (90 Base) MCG/ACT inhaler, Inhale 2 puffs Every 4 (Four) Hours As Needed for Wheezing., Disp: 18 g, Rfl: 2    amLODIPine (Norvasc) 2.5 MG tablet, Take 1 tablet by mouth Daily., Disp: 30 tablet, Rfl: 5    Blood Glucose Monitoring Suppl (ONE TOUCH ULTRA 2) w/Device kit, USE AS NEEDED FOR BLOOD    SUGAR MONITORING, Disp: 1 each, Rfl: 0    bumetanide (BUMEX) 1 MG tablet, Take 1 tablet by mouth Daily., Disp: 90 tablet, Rfl: 3    clopidogrel (PLAVIX) 75 MG tablet, Take 1 tablet by mouth Daily., Disp: 90 tablet, Rfl: 3    Coenzyme Q10 200 MG capsule, Take 400 mg by mouth Every Other Day., Disp: , Rfl:     Eliquis 5 MG tablet tablet, Take 1 tablet by mouth Every 12 (Twelve) Hours., Disp: 180 tablet, Rfl: 3    empagliflozin (Jardiance) 10 MG tablet tablet, Take 1 tablet by mouth Daily., Disp: 90 tablet, Rfl: 3    glipizide (GLUCOTROL XL) 2.5 MG 24 hr tablet, Take 1 tablet by mouth Daily., Disp: 90 tablet, Rfl: 3    glucose blood (Accu-Chek Kayce Plus) test strip, TEST ONCE DAILY., Disp: 100 each, Rfl: 3    glucose monitor monitoring kit, 1 each As Needed (blood sugar)., Disp: 1 each, Rfl: 1    Lancets (accu-chek soft touch) lancets, Use as directed, Disp: 100 each, Rfl: 12    latanoprost (XALATAN) 0.005 % ophthalmic solution, PLACE 1 DROP IN  "EACH EYE EVERY DAY AT BEDTIME, Disp: , Rfl:     lisinopril (PRINIVIL,ZESTRIL) 40 MG tablet, Take 1 tablet by mouth Daily., Disp: 90 tablet, Rfl: 3    lovastatin (MEVACOR) 10 MG tablet, Take 1 tablet by mouth Every Night. (Patient taking differently: Take 1 tablet by mouth Every Other Day.), Disp: 90 tablet, Rfl: 3    melatonin 3 MG tablet, Take 1 tablet by mouth Every Night., Disp: , Rfl:     metoprolol tartrate (LOPRESSOR) 50 MG tablet, Take 1 tablet by mouth 2 (Two) Times a Day., Disp: 180 tablet, Rfl: 3    Nintedanib Esylate (Ofev) 150 MG capsule, Take 150 mg by mouth 2 (Two) Times a Day. Take with food, Disp: 180 capsule, Rfl: 3    Omega-3 Fatty Acids (Fish Oil) 300 MG capsule, Take  by mouth., Disp: , Rfl:     pantoprazole (PROTONIX) 40 MG EC tablet, Take 1 tablet by mouth Daily., Disp: 90 tablet, Rfl: 3    tobramycin-dexamethasone (TOBRADEX) 0.3-0.1 % ophthalmic suspension, Administer 1 drop to both eyes As Needed (dryness)., Disp: 5 mL, Rfl: 1    vitamin D3 125 MCG (5000 UT) capsule capsule, Take 1 capsule by mouth Daily., Disp: , Rfl:     Objective      Blood pressure 102/66, pulse 62, temperature 96.5 °F (35.8 °C), height 170.2 cm (67\"), weight 80.7 kg (178 lb), SpO2 96%.            Physical Exam     General Appearance:    Alert, cooperative, no distress, appears stated age   Head:    Normocephalic, without obvious abnormality, atraumatic   Eyes:    PERRL, conjunctiva/corneas clear, EOM's intact   Ears:    Normal TM's and external ear canals, both ears   Nose:   Nares normal, septum midline, mucosa normal, no drainage   or sinus tenderness   Throat:   Lips, mucosa, and tongue normal; teeth and gums normal   Neck:   Supple, symmetrical, trachea midline, no adenopathy;        thyroid:  No enlargement/tenderness/nodules; no carotid    bruit or JVD   Back:     Symmetric, no curvature, ROM normal, no CVA tenderness   Lungs:     Clear to auscultation bilaterally, respirations unlabored   Chest wall:    No " tenderness or deformity   Heart:    Regular rate and rhythm, S1 and S2 normal, no murmur,        rub or gallop   Abdomen:     Soft, non-tender, bowel sounds active all four quadrants,     no masses, no organomegaly   Extremities:   Extremities normal, atraumatic, no cyanosis or edema   Pulses:   2+ and symmetric all extremities   Skin:   Skin color, texture, turgor normal, no rashes or lesions   Lymph nodes:   Cervical, supraclavicular, and axillary nodes normal   Neurologic:   CNII-XII intact. Normal strength, sensation and reflexes       throughout      Results for orders placed or performed during the hospital encounter of 10/22/23   Comprehensive Metabolic Panel    Specimen: Blood   Result Value Ref Range    Glucose 410 (C) 65 - 99 mg/dL    BUN 22 8 - 23 mg/dL    Creatinine 1.06 (H) 0.57 - 1.00 mg/dL    Sodium 136 136 - 145 mmol/L    Potassium 3.7 3.5 - 5.2 mmol/L    Chloride 95 (L) 98 - 107 mmol/L    CO2 28.1 22.0 - 29.0 mmol/L    Calcium 8.5 (L) 8.6 - 10.5 mg/dL    Total Protein 6.6 6.0 - 8.5 g/dL    Albumin 3.6 3.5 - 5.2 g/dL    ALT (SGPT) 144 (H) 1 - 33 U/L    AST (SGOT) 243 (H) 1 - 32 U/L    Alkaline Phosphatase 96 39 - 117 U/L    Total Bilirubin 0.5 0.0 - 1.2 mg/dL    Globulin 3.0 gm/dL    A/G Ratio 1.2 g/dL    BUN/Creatinine Ratio 20.8 7.0 - 25.0    Anion Gap 12.9 5.0 - 15.0 mmol/L    eGFR 51.9 (L) >60.0 mL/min/1.73   BNP    Specimen: Blood   Result Value Ref Range    proBNP 117.3 0.0 - 1,800.0 pg/mL   Single High Sensitivity Troponin T    Specimen: Blood   Result Value Ref Range    HS Troponin T 15 (H) <10 ng/L   CBC Auto Differential    Specimen: Blood   Result Value Ref Range    WBC 6.19 3.40 - 10.80 10*3/mm3    RBC 4.11 3.77 - 5.28 10*6/mm3    Hemoglobin 12.4 12.0 - 15.9 g/dL    Hematocrit 37.9 34.0 - 46.6 %    MCV 92.2 79.0 - 97.0 fL    MCH 30.2 26.6 - 33.0 pg    MCHC 32.7 31.5 - 35.7 g/dL    RDW 14.6 12.3 - 15.4 %    RDW-SD 48.1 37.0 - 54.0 fl    MPV 9.6 6.0 - 12.0 fL    Platelets 194 140 - 450  10*3/mm3    Neutrophil % 55.0 42.7 - 76.0 %    Lymphocyte % 32.8 19.6 - 45.3 %    Monocyte % 9.5 5.0 - 12.0 %    Eosinophil % 1.5 0.3 - 6.2 %    Basophil % 0.2 0.0 - 1.5 %    Immature Grans % 1.0 (H) 0.0 - 0.5 %    Neutrophils, Absolute 3.41 1.70 - 7.00 10*3/mm3    Lymphocytes, Absolute 2.03 0.70 - 3.10 10*3/mm3    Monocytes, Absolute 0.59 0.10 - 0.90 10*3/mm3    Eosinophils, Absolute 0.09 0.00 - 0.40 10*3/mm3    Basophils, Absolute 0.01 0.00 - 0.20 10*3/mm3    Immature Grans, Absolute 0.06 (H) 0.00 - 0.05 10*3/mm3    nRBC 0.0 0.0 - 0.2 /100 WBC   Single High Sensitivity Troponin T    Specimen: Blood   Result Value Ref Range    HS Troponin T 15 (H) <10 ng/L   Green Top (Gel)   Result Value Ref Range    Extra Tube Hold for add-ons.    Lavender Top   Result Value Ref Range    Extra Tube hold for add-on    Gold Top - SST   Result Value Ref Range    Extra Tube Hold for add-ons.    Light Blue Top   Result Value Ref Range    Extra Tube Hold for add-ons.          Assessment & Plan     Hemoglobin A1C  4.80 - 5.60 % 9.10 High  11.00 High  8.1 High  R, CM 8.10 High  6.90 High  CM 6.70 High        Pt will come for POC ha1c in 1-2 weeks.  Off steroids. Ha1c declining.      D dimer trending down. Pt feeling better.          Diagnoses and all orders for this visit:    1. Multiple subsegmental pulmonary emboli without acute cor pulmonale (Primary)  -     CBC & Differential  -     Vitamin B12  -     Comprehensive Metabolic Panel  -     TSH  -     Lipid Panel  -     Hemoglobin A1c    2. Chronic combined systolic and diastolic congestive heart failure  -     CBC & Differential  -     Vitamin B12  -     Comprehensive Metabolic Panel  -     TSH  -     Lipid Panel  -     Hemoglobin A1c    3. Pulmonary fibrosis  -     CBC & Differential  -     Vitamin B12  -     Comprehensive Metabolic Panel  -     TSH  -     Lipid Panel  -     Hemoglobin A1c    4. Abnormal finding of blood chemistry, unspecified  -     Hemoglobin A1c      Return in  about 6 months (around 6/1/2024).          There are no Patient Instructions on file for this visit.     Farhan Schaefer MD    Assessment & Plan

## 2023-12-04 ENCOUNTER — PATIENT OUTREACH (OUTPATIENT)
Dept: CASE MANAGEMENT | Facility: OTHER | Age: 83
End: 2023-12-04
Payer: MEDICARE

## 2023-12-04 DIAGNOSIS — E11.9 TYPE 2 DIABETES MELLITUS WITHOUT COMPLICATION, UNSPECIFIED WHETHER LONG TERM INSULIN USE: Primary | ICD-10-CM

## 2023-12-04 DIAGNOSIS — I50.42 CHRONIC COMBINED SYSTOLIC AND DIASTOLIC CONGESTIVE HEART FAILURE: ICD-10-CM

## 2023-12-04 NOTE — OUTREACH NOTE
AMBULATORY CASE MANAGEMENT NOTE    Care Coordination    No urgent care or emergency department visits noted. Patient has scheduled follow up with primary care provider. She has not contacted [facility] CM for any concerns or assistance within the past 30 days. Will close HRCM; Patient goals achieved.     Angie MAX  Ambulatory Case Management    12/4/2023, 11:05 EST

## 2023-12-05 RX ORDER — ALBUTEROL SULFATE 90 UG/1
AEROSOL, METERED RESPIRATORY (INHALATION)
Qty: 6.7 G | Refills: 0 | Status: SHIPPED | OUTPATIENT
Start: 2023-12-05

## 2023-12-11 RX ORDER — BUMETANIDE 1 MG/1
1 TABLET ORAL DAILY
Qty: 90 TABLET | Refills: 3 | Status: SHIPPED | OUTPATIENT
Start: 2023-12-11

## 2023-12-14 ENCOUNTER — CLINICAL SUPPORT (OUTPATIENT)
Dept: INTERNAL MEDICINE | Facility: CLINIC | Age: 83
End: 2023-12-14
Payer: MEDICARE

## 2023-12-14 DIAGNOSIS — E11.65 TYPE 2 DIABETES MELLITUS WITH HYPERGLYCEMIA, WITHOUT LONG-TERM CURRENT USE OF INSULIN: Primary | ICD-10-CM

## 2023-12-14 LAB
EXPIRATION DATE: ABNORMAL
HBA1C MFR BLD: 8.8 % (ref 4.5–5.7)
Lab: ABNORMAL

## 2023-12-14 RX ORDER — AMLODIPINE BESYLATE 2.5 MG/1
2.5 TABLET ORAL DAILY
Qty: 30 TABLET | Refills: 5 | Status: SHIPPED | OUTPATIENT
Start: 2023-12-14

## 2023-12-15 ENCOUNTER — PATIENT MESSAGE (OUTPATIENT)
Dept: INTERNAL MEDICINE | Facility: CLINIC | Age: 83
End: 2023-12-15
Payer: MEDICARE

## 2023-12-15 NOTE — TELEPHONE ENCOUNTER
From: Radha Whelan  To: Farhan Schaefer  Sent: 12/15/2023 8:55 AM EST  Subject: A1C    My sugar was 128 yesterday morning when I took it at home. I didn’t fast because I didn’t know it was for my A1C   I thought Eliana was going to do a finger stick in the office. Do you want me to do it again?

## 2023-12-18 ENCOUNTER — OFFICE VISIT (OUTPATIENT)
Dept: CARDIOLOGY | Facility: CLINIC | Age: 83
End: 2023-12-18
Payer: MEDICARE

## 2023-12-18 VITALS
HEART RATE: 83 BPM | SYSTOLIC BLOOD PRESSURE: 148 MMHG | WEIGHT: 184 LBS | DIASTOLIC BLOOD PRESSURE: 72 MMHG | HEIGHT: 67 IN | OXYGEN SATURATION: 95 % | BODY MASS INDEX: 28.88 KG/M2

## 2023-12-18 DIAGNOSIS — I49.1 PAC (PREMATURE ATRIAL CONTRACTION): ICD-10-CM

## 2023-12-18 DIAGNOSIS — I10 ESSENTIAL HYPERTENSION: ICD-10-CM

## 2023-12-18 DIAGNOSIS — Z95.0 PRESENCE OF CARDIAC PACEMAKER: ICD-10-CM

## 2023-12-18 DIAGNOSIS — I49.5 SINUS NODE DYSFUNCTION: Primary | ICD-10-CM

## 2023-12-18 PROBLEM — Z79.01 CHRONIC ANTICOAGULATION: Status: ACTIVE | Noted: 2023-12-18

## 2023-12-18 PROBLEM — J84.10 PULMONARY FIBROSIS: Status: ACTIVE | Noted: 2023-12-18

## 2023-12-18 NOTE — LETTER
December 18, 2023     Farhan Schaefer MD  [address]  [place] [address]    Patient: Radha Whelan   YOB: 1940   Date of Visit: 12/18/2023     Dear Farhan Schaefer MD:       Thank you for referring Radha Whelan to me for evaluation. Below are the relevant portions of my assessment and plan of care.    If you have questions, please do not hesitate to call me. I look forward to following Radha along with you.         Sincerely,        Stephan Laboy, DO        CC: No Recipients    Branden Woodson PA  12/18/23 1418  Sign when Signing Visit          Encounter Date:12/18/2023      Patient ID: Radha Whelan is a 83 y.o. female.    Farhan Schaefer MD    Cheif Complaint EP: Sinus node dysfunction and Pacemaker check    PROBLEM LIST:  Patient Active Problem List    Diagnosis Date Noted   • Sinus node dysfunction 03/15/2021     Priority: High     Note Last Updated: 4/4/2022 9/16/2019 24-hour Holter: Average HR 60, range 45-1 07, 1.6% PACs, 0.3% PVCs, rare SVT less than 11 beats with no symptoms  Recurrent Arthur Syncope x 2.  First 10/2019 while seated and second 12/5/2019 s/p Rt total hip surgery on transfer to bed.  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 Laboy for sinus node dysfunction.     • CAD (coronary artery disease) 04/04/2022     Priority: Medium     Note Last Updated: 6/12/2023 9/11/2019 echo: EF 42%, grade 1 diastolic dysfunction, RVSP 22  9/16/2019 [facility] per AA no evidence of hemodynamically significant CAD, normal EF 55%, 24-hour Holter recommended to assess for bradycardic arrhythmias  [facility], 4/7/2023: Nonobstructive plaque disease involving multiple vessels without any evidence of hemodynamically significant coronary disease.  Preserved left ventricular systolic function, estimated EF 55%.     • Chronic combined systolic and diastolic congestive heart failure 12/11/2019     Priority: Medium     Note Last Updated: 6/12/2023     Echo 9-: Estimated EF = 42%. Left ventricular  diastolic dysfunction (grade I) consistent with impaired relaxation.  Echo, 3/27/2023: •  Left ventricular ejection fraction appears to be 36 - 40%. The left ventricular cavity is moderately dilated. Left ventricular diastolic function is consistent with (grade I) impaired relaxation.     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  She returns today for scheduled electrophysiology follow-up.  Since last seen by cardiology she had a PE and it was started on Eliquis.  She has no awareness of palpitations but notes that her blood pressure cuff sometimes tells her she has an irregular heart rhythm.  Her blood pressures at home have been running about 110 mmHg systolic.  Because of her blood pressure readings she has not been taking her metoprolol twice daily.  She states she has otherwise been compliant with her medical regimen.    Allergies   Allergen Reactions   • Covid-19 Mrna Vacc (Moderna) Other (See Comments)     Myocarditits     • Cumini Other (See Comments)     Complex migrane  migraine   •  "Cheese Other (See Comments)     migrane    Migraine   • Ibuprofen Rash     Swelling all over  \"Swelling all over\"   • Other Other (See Comments)     \"ice cream\"-migranes    \"ice cream\" migraines   • Saccharin Other (See Comments)     migrane    Migraine       Current Outpatient Medications   Medication Instructions   • acetaminophen (TYLENOL) 650 mg, Oral, Every 6 Hours PRN   • albuterol sulfate  (90 Base) MCG/ACT inhaler INHALE 2 PUFFS INTO THE LUNGS EVERY 4 HOURS AS NEEDED   • amLODIPine (NORVASC) 2.5 mg, Oral, Daily   • Blood Glucose Monitoring Suppl (ONE TOUCH ULTRA 2) w/Device kit USE AS NEEDED FOR BLOOD    SUGAR MONITORING   • bumetanide (BUMEX) 1 mg, Oral, Daily   • clopidogrel (PLAVIX) 75 mg, Oral, Daily   • Coenzyme Q10 400 mg, Oral, Every Other Day   • Eliquis 5 mg, Oral, Every 12 Hours Scheduled   • glipizide (GLUCOTROL XL) 2.5 mg, Oral, Daily   • glucose blood (Accu-Chek Kayce Plus) test strip TEST ONCE DAILY.   • glucose monitor monitoring kit 1 each, Does not apply, As Needed   • Lancets (accu-chek soft touch) lancets Use as directed   • latanoprost (XALATAN) 0.005 % ophthalmic solution PLACE 1 DROP IN EACH EYE EVERY DAY AT BEDTIME   • lisinopril (PRINIVIL,ZESTRIL) 40 mg, Oral, Daily   • lovastatin (MEVACOR) 10 mg, Oral, Nightly   • melatonin 3 mg, Oral, Nightly   • metoprolol tartrate (LOPRESSOR) 50 mg, Oral, 2 Times Daily   • Ofev 150 mg, Oral, 2 Times Daily, Take with food   • Omega-3 Fatty Acids (Fish Oil) 300 MG capsule Oral   • pantoprazole (PROTONIX) 40 mg, Oral, Daily   • tobramycin-dexamethasone (TOBRADEX) 0.3-0.1 % ophthalmic suspension 1 drop, Both Eyes, As Needed   • vitamin D3 5,000 Units, Oral, Daily       .    Objective:     /72 (BP Location: Left arm, Patient Position: Sitting)   Pulse 83   Ht 170.2 cm (67\")   Wt 83.5 kg (184 lb)   SpO2 95% Comment: on 3L o2  BMI 28.82 kg/m²    Body mass index is 28.82 kg/m².     Constitutional:       Appearance: Well-developed. "   Pulmonary:      Effort: Pulmonary effort is normal. No respiratory distress.      Breath sounds: Normal breath sounds. No wheezing. No rales.      Comments: Bases clear  Chest:      Chest wall: Not tender to palpatation.   Cardiovascular:      Normal rate. Irregular rhythm.      Murmurs: There is no murmur.      No gallop.  No click. No rub.   Pulses:     Intact distal pulses.   Edema:     Peripheral edema absent.   Musculoskeletal: Normal range of motion.       Lab Review:     Lab Results   Component Value Date    GLUCOSE 169 (H) 11/29/2023    BUN 23 11/29/2023    CREATININE 1.15 (H) 11/29/2023    EGFRRESULT 47.1 (L) 11/29/2023    EGFR 51.9 (L) 10/22/2023    BCR 20.0 11/29/2023    K 4.4 11/29/2023    CO2 28.6 11/29/2023    CALCIUM 9.3 11/29/2023    PROTENTOTREF 6.7 11/29/2023    ALBUMIN 4.1 11/29/2023    BILITOT 0.4 11/29/2023    AST 32 11/29/2023    ALT 45 (H) 11/29/2023     Lab Results   Component Value Date    WBC 7.43 11/29/2023    HGB 13.1 11/29/2023    HCT 39.0 11/29/2023    MCV 92.6 11/29/2023     11/29/2023     Lab Results   Component Value Date    TSH 5.910 (H) 11/29/2023             ECG 12 Lead ED Triage Standing Order; SOA    Date/Time: 12/18/2023 2:14 PM  Performed by: Branden Woodson PA    Authorized by: Emergency, Triage Protocol, MD  Rhythm: paced  Ectopy: atrial premature contractions  Rate: normal  BPM: 67  Conduction: left bundle branch block  Pacing: atrial sensed rhythm  Clinical impression: abnormal EKG                     Assessment:      Diagnosis Plan   1. Sinus node dysfunction  Normal functioning dual-chamber permanent pacemaker with 19% right atrial pacing, less than 1% RV pacing.  Normal lead parameters.  6 years battery life.  No events.      2. Presence of cardiac pacemaker    This patient's Cardiac Implanted Electronic Device was manually interrogated and reprogrammed during the patient encounter today.  Iterative programming changes were manually made to determine the  sensing threshold, pacing threshold, lead impedance as well as underlying cardiac rhythm.  These programming changes were not limited to but included some or all of the following when appropriate: pacing mode, programmed AV delays, blanking periods, and refractory periods.  Data obtained as a result of these manual programing changes informed the patient's CIED permanent programming.        3. Essential hypertension  Overall well managed.  I discovered that she is taking metoprolol tartrate once daily because of blood pressures typically running about 110 mmHg.  I have asked her to take the metoprolol twice daily.  If her blood pressure gets too low I would rather her cut it in half to 25 mg and continue to take it twice daily.      4. PAC (premature atrial contraction)  Asymptomatic, continue current medical regimen        Plan:     Stable cardiac status.  Continue current medications.   in 6 months, sooner as needed.  Thank you for allowing us to participate in the care of your patient.     Electronically signed by PATIENCE Michael, 12/18/23, 2:18 PM EST.

## 2023-12-18 NOTE — PROGRESS NOTES
Encounter Date:12/18/2023      Patient ID: Radha Whelan is a 83 y.o. female.    Farhan Schaefer MD    Cheif Complaint EP: Sinus node dysfunction and Pacemaker check    PROBLEM LIST:  Patient Active Problem List    Diagnosis Date Noted    Sinus node dysfunction 03/15/2021     Priority: High     Note Last Updated: 4/4/2022 9/16/2019 24-hour Holter: Average HR 60, range 45-1 07, 1.6% PACs, 0.3% PVCs, rare SVT less than 11 beats with no symptoms  Recurrent Arthur Syncope x 2.  First 10/2019 while seated and second 12/5/2019 s/p Rt total hip surgery on transfer to bed.  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 Laboy for sinus node dysfunction.      CAD (coronary artery disease) 04/04/2022     Priority: Medium     Note Last Updated: 6/12/2023 9/11/2019 echo: EF 42%, grade 1 diastolic dysfunction, RVSP 22  9/16/2019 LH per AA no evidence of hemodynamically significant CAD, normal EF 55%, 24-hour Holter recommended to assess for bradycardic arrhythmias  [facility], 4/7/2023: Nonobstructive plaque disease involving multiple vessels without any evidence of hemodynamically significant coronary disease.  Preserved left ventricular systolic function, estimated EF 55%.      Chronic combined systolic and diastolic congestive heart failure 12/11/2019     Priority: Medium     Note Last Updated: 6/12/2023     Echo 9-: Estimated EF = 42%. Left ventricular diastolic dysfunction (grade I) consistent with impaired relaxation.  Echo, 3/27/2023:   Left ventricular ejection fraction appears to be 36 - 40%. The left ventricular cavity is moderately dilated. Left ventricular diastolic function is consistent with (grade I) impaired relaxation.      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  She returns today for scheduled electrophysiology follow-up.  Since last seen by cardiology she had a PE and it was started on Eliquis.  She has no awareness of palpitations but notes that her blood pressure cuff sometimes tells her she has an irregular heart rhythm.  Her blood pressures at home have been running about 110 mmHg systolic.  Because of her blood pressure readings she has not been taking her metoprolol twice daily.  She states she has otherwise been compliant with her medical regimen.    Allergies   Allergen Reactions    Covid-19 Mrna Vacc (Moderna) Other (See Comments)     Myocarditits      Cumini Other (See Comments)     Complex migrane  migraine    Cheese Other (See Comments)     migrane    Migraine    Ibuprofen Rash     Swelling all over  \"Swelling all over\"    Other Other (See Comments)     \"ice cream\"-migranes    \"ice cream\" migraines    Saccharin Other (See Comments)     migrane    Migraine       Current Outpatient Medications   Medication Instructions    acetaminophen (TYLENOL) 650 mg, Oral, Every 6 Hours PRN    albuterol sulfate  (90 Base) MCG/ACT inhaler INHALE 2 PUFFS INTO THE LUNGS EVERY 4 HOURS AS NEEDED    amLODIPine (NORVASC) 2.5 mg, Oral, Daily    Blood Glucose Monitoring Suppl (ONE TOUCH ULTRA 2) w/Device kit USE AS " "NEEDED FOR BLOOD    SUGAR MONITORING    bumetanide (BUMEX) 1 mg, Oral, Daily    clopidogrel (PLAVIX) 75 mg, Oral, Daily    Coenzyme Q10 400 mg, Oral, Every Other Day    Eliquis 5 mg, Oral, Every 12 Hours Scheduled    glipizide (GLUCOTROL XL) 2.5 mg, Oral, Daily    glucose blood (Accu-Chek Kayce Plus) test strip TEST ONCE DAILY.    glucose monitor monitoring kit 1 each, Does not apply, As Needed    Lancets (accu-chek soft touch) lancets Use as directed    latanoprost (XALATAN) 0.005 % ophthalmic solution PLACE 1 DROP IN EACH EYE EVERY DAY AT BEDTIME    lisinopril (PRINIVIL,ZESTRIL) 40 mg, Oral, Daily    lovastatin (MEVACOR) 10 mg, Oral, Nightly    melatonin 3 mg, Oral, Nightly    metoprolol tartrate (LOPRESSOR) 50 mg, Oral, 2 Times Daily    Ofev 150 mg, Oral, 2 Times Daily, Take with food    Omega-3 Fatty Acids (Fish Oil) 300 MG capsule Oral    pantoprazole (PROTONIX) 40 mg, Oral, Daily    tobramycin-dexamethasone (TOBRADEX) 0.3-0.1 % ophthalmic suspension 1 drop, Both Eyes, As Needed    vitamin D3 5,000 Units, Oral, Daily       .    Objective:     /72 (BP Location: Left arm, Patient Position: Sitting)   Pulse 83   Ht 170.2 cm (67\")   Wt 83.5 kg (184 lb)   SpO2 95% Comment: on 3L o2  BMI 28.82 kg/m²    Body mass index is 28.82 kg/m².     Constitutional:       Appearance: Well-developed.   Pulmonary:      Effort: Pulmonary effort is normal. No respiratory distress.      Breath sounds: Normal breath sounds. No wheezing. No rales.      Comments: Bases clear  Chest:      Chest wall: Not tender to palpatation.   Cardiovascular:      Normal rate. Irregular rhythm.      Murmurs: There is no murmur.      No gallop.  No click. No rub.   Pulses:     Intact distal pulses.   Edema:     Peripheral edema absent.   Musculoskeletal: Normal range of motion.       Lab Review:     Lab Results   Component Value Date    GLUCOSE 169 (H) 11/29/2023    BUN 23 11/29/2023    CREATININE 1.15 (H) 11/29/2023    EGFRRESULT 47.1 (L) " 11/29/2023    EGFR 51.9 (L) 10/22/2023    BCR 20.0 11/29/2023    K 4.4 11/29/2023    CO2 28.6 11/29/2023    CALCIUM 9.3 11/29/2023    PROTENTOTREF 6.7 11/29/2023    ALBUMIN 4.1 11/29/2023    BILITOT 0.4 11/29/2023    AST 32 11/29/2023    ALT 45 (H) 11/29/2023     Lab Results   Component Value Date    WBC 7.43 11/29/2023    HGB 13.1 11/29/2023    HCT 39.0 11/29/2023    MCV 92.6 11/29/2023     11/29/2023     Lab Results   Component Value Date    TSH 5.910 (H) 11/29/2023             ECG 12 Lead ED Triage Standing Order; SOA    Date/Time: 12/18/2023 2:14 PM  Performed by: Branden Woodson PA    Authorized by: Emergency, Triage Protocol, MD  Rhythm: paced  Ectopy: atrial premature contractions  Rate: normal  BPM: 67  Conduction: left bundle branch block  Pacing: atrial sensed rhythm  Clinical impression: abnormal EKG                     Assessment:      Diagnosis Plan   1. Sinus node dysfunction  Normal functioning dual-chamber permanent pacemaker with 19% right atrial pacing, less than 1% RV pacing.  Normal lead parameters.  6 years battery life.  No events.      2. Presence of cardiac pacemaker    This patient's Cardiac Implanted Electronic Device was manually interrogated and reprogrammed during the patient encounter today.  Iterative programming changes were manually made to determine the sensing threshold, pacing threshold, lead impedance as well as underlying cardiac rhythm.  These programming changes were not limited to but included some or all of the following when appropriate: pacing mode, programmed AV delays, blanking periods, and refractory periods.  Data obtained as a result of these manual programing changes informed the patient's CIED permanent programming.        3. Essential hypertension  Overall well managed.  I discovered that she is taking metoprolol tartrate once daily because of blood pressures typically running about 110 mmHg.  I have asked her to take the metoprolol twice daily.  If her  blood pressure gets too low I would rather her cut it in half to 25 mg and continue to take it twice daily.      4. PAC (premature atrial contraction)  Asymptomatic, continue current medical regimen        Plan:     Stable cardiac status.  Continue current medications.   in 6 months, sooner as needed.  Thank you for allowing us to participate in the care of your patient.     Electronically signed by PATIENCE Michael, 12/18/23, 2:18 PM EST.

## 2023-12-27 ENCOUNTER — TELEPHONE (OUTPATIENT)
Dept: PULMONOLOGY | Facility: CLINIC | Age: 83
End: 2023-12-27

## 2023-12-27 NOTE — TELEPHONE ENCOUNTER
Hub staff attempted to follow warm transfer process and was unsuccessful     Caller: Radha Whelan    Relationship to patient: Self    Best call back number: [phone] (ROYCE) PLEASE CALL    Patient is needing: PT IS NEEDING INFORMATION FOR PT ASSISTANCE FORMS. PT IS NEEDING A CALL BACK. PT FORMS ARE DUE BY THE BEGINNING OF THE YEAR. PLEASE ADVISE.

## 2023-12-30 ENCOUNTER — PATIENT MESSAGE (OUTPATIENT)
Dept: INTERNAL MEDICINE | Facility: CLINIC | Age: 83
End: 2023-12-30
Payer: MEDICARE

## 2024-01-02 DIAGNOSIS — J84.112 IPF (IDIOPATHIC PULMONARY FIBROSIS): ICD-10-CM

## 2024-01-02 RX ORDER — ALBUTEROL SULFATE 90 UG/1
AEROSOL, METERED RESPIRATORY (INHALATION)
Qty: 6.7 G | Refills: 0 | Status: SHIPPED | OUTPATIENT
Start: 2024-01-02

## 2024-01-02 RX ORDER — NINTEDANIB 150 MG/1
1 CAPSULE ORAL 2 TIMES DAILY
Qty: 180 CAPSULE | Refills: 3 | Status: SHIPPED | OUTPATIENT
Start: 2024-01-02

## 2024-01-05 ENCOUNTER — OFFICE VISIT (OUTPATIENT)
Dept: CARDIOLOGY | Facility: CLINIC | Age: 84
End: 2024-01-05
Payer: MEDICARE

## 2024-01-05 VITALS
WEIGHT: 181.2 LBS | OXYGEN SATURATION: 97 % | DIASTOLIC BLOOD PRESSURE: 62 MMHG | HEART RATE: 80 BPM | SYSTOLIC BLOOD PRESSURE: 112 MMHG | BODY MASS INDEX: 28.44 KG/M2 | HEIGHT: 67 IN

## 2024-01-05 DIAGNOSIS — I50.32 CHRONIC HEART FAILURE WITH PRESERVED EJECTION FRACTION (HFPEF): Primary | ICD-10-CM

## 2024-01-05 DIAGNOSIS — E78.2 MIXED HYPERLIPIDEMIA: ICD-10-CM

## 2024-01-05 DIAGNOSIS — I10 ESSENTIAL HYPERTENSION: ICD-10-CM

## 2024-01-05 DIAGNOSIS — I25.10 CORONARY ARTERY DISEASE INVOLVING NATIVE CORONARY ARTERY OF NATIVE HEART WITHOUT ANGINA PECTORIS: ICD-10-CM

## 2024-01-05 PROBLEM — I50.42 CHRONIC COMBINED SYSTOLIC AND DIASTOLIC CONGESTIVE HEART FAILURE: Status: RESOLVED | Noted: 2019-12-11 | Resolved: 2024-01-05

## 2024-01-05 NOTE — PROGRESS NOTES
[facility] Cardiology    Encounter Date: 2024    Patient ID: Radha Whelan is a 83 y.o. female.  : 1940     PCP: Farhan Schaefer MD       Chief Complaint: Coronary Artery Disease and Shortness of Breath      PROBLEM LIST:  Nonobstructive coronary artery disease  Echo 2019: EF 43%, grade 1 diastolic dysfunction  [facility] 2019: No hemodynamically significant CAD  [facility], 2023: EF 55%. Nonobstructive plaque disease involving multiple vessels without any evidence of hemodynamically significant coronary disease. Normal hemodynamics.  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 Laboy  Hypertension  Pulmonary embolism, started on anticoagulation.  Pulmonary fibrosis, on supplemental oxygen.     History of Present Illness  Patient presents today for a follow-up with a history of CAD and cardiac risk factors. Since last visit, patient has been doing well overall from a cardiovascular standpoint. She reports that she was noting irregular heartbeats on blood pressure cuff and was seen by Dr. Laboy on 2023 where her EKG showed atrial paced rhythm for which no further treatment was recommended.  Since her last evaluation with us patient has been started on Eliquis for PE and has also been diagnosed with pulmonary fibrosis and is now on oxygen.  States that her breathing has improved with the use of oxygen.  She continues to use diuretics for lower extremity edema and volume overload.  Few weeks ago she had noted puffiness of face and worsening edema, she cut back on salt and took extra Bumex and edema has completely  "resolved.    No current chest pain palpitations dizziness or syncope.    Allergies   Allergen Reactions    Covid-19 Mrna Vacc (Moderna) Other (See Comments)     Myocarditits      Cumini Other (See Comments)     Complex migrane  migraine    Cheese Other (See Comments)     migrane    Migraine    Ibuprofen Rash     Swelling all over  \"Swelling all over\"    Other Other (See Comments)     \"ice cream\"-migranes    \"ice cream\" migraines    Saccharin Other (See Comments)     migrane    Migraine         Current Outpatient Medications:     acetaminophen (TYLENOL) 325 MG tablet, Take 2 tablets by mouth Every 6 (Six) Hours As Needed for Mild Pain ., Disp: , Rfl:     albuterol sulfate  (90 Base) MCG/ACT inhaler, INHALE 2 PUFFS INTO THE LUNGS EVERY 4 HOURS AS NEEDED, Disp: 6.7 g, Rfl: 0    amLODIPine (NORVASC) 2.5 MG tablet, TAKE 1 TABLET BY MOUTH DAILY, Disp: 30 tablet, Rfl: 5    Blood Glucose Monitoring Suppl (ONE TOUCH ULTRA 2) w/Device kit, USE AS NEEDED FOR BLOOD    SUGAR MONITORING, Disp: 1 each, Rfl: 0    bumetanide (BUMEX) 1 MG tablet, Take 1 tablet by mouth Daily., Disp: 90 tablet, Rfl: 3    clopidogrel (PLAVIX) 75 MG tablet, Take 1 tablet by mouth Daily., Disp: 90 tablet, Rfl: 3    Coenzyme Q10 200 MG capsule, Take 200 mg by mouth Every Other Day., Disp: , Rfl:     Eliquis 5 MG tablet tablet, Take 1 tablet by mouth Every 12 (Twelve) Hours., Disp: 180 tablet, Rfl: 3    glipizide (GLUCOTROL XL) 2.5 MG 24 hr tablet, Take 1 tablet by mouth Daily., Disp: 90 tablet, Rfl: 3    glucose blood (Accu-Chek Kayce Plus) test strip, TEST ONCE DAILY., Disp: 100 each, Rfl: 3    glucose monitor monitoring kit, 1 each As Needed (blood sugar)., Disp: 1 each, Rfl: 1    Lancets (accu-chek soft touch) lancets, Use as directed, Disp: 100 each, Rfl: 12    latanoprost (XALATAN) 0.005 % ophthalmic solution, PLACE 1 DROP IN EACH EYE EVERY DAY AT BEDTIME, Disp: , Rfl:     lisinopril (PRINIVIL,ZESTRIL) 40 MG tablet, Take 1 tablet by mouth " "Daily., Disp: 90 tablet, Rfl: 3    lovastatin (MEVACOR) 10 MG tablet, Take 1 tablet by mouth Every Night. (Patient taking differently: Take 1 tablet by mouth Every Other Day.), Disp: 90 tablet, Rfl: 3    melatonin 3 MG tablet, Take 1 tablet by mouth Every Night., Disp: , Rfl:     metoprolol tartrate (LOPRESSOR) 50 MG tablet, Take 1 tablet by mouth 2 (Two) Times a Day., Disp: 180 tablet, Rfl: 3    Nintedanib Esylate (Ofev) 150 MG capsule, Take 150 mg by mouth 2 (Two) Times a Day. Take with food, Disp: 180 capsule, Rfl: 3    Omega-3 Fatty Acids (Fish Oil) 300 MG capsule, Take 300 mg by mouth Daily., Disp: , Rfl:     pantoprazole (PROTONIX) 40 MG EC tablet, Take 1 tablet by mouth Daily., Disp: 90 tablet, Rfl: 3    tobramycin-dexamethasone (TOBRADEX) 0.3-0.1 % ophthalmic suspension, Administer 1 drop to both eyes As Needed (dryness)., Disp: 5 mL, Rfl: 1    vitamin D3 125 MCG (5000 UT) capsule capsule, Take 1 capsule by mouth. Twice a week, Disp: , Rfl:     The following portions of the patient's history were reviewed and updated as appropriate: allergies, current medications, past family history, past medical history, past social history, past surgical history and problem list.    ROS  Review of Systems   14 point ROS negative except for that listed in the HPI.         Objective:     /62 (BP Location: Left arm, Patient Position: Sitting)   Pulse 80   Ht 170.2 cm (67.01\")   Wt 82.2 kg (181 lb 3.2 oz)   SpO2 97%   BMI 28.37 kg/m²      Physical Exam  Constitutional: Patient appears well-developed and well-nourished.   HENT: HEENT exam unremarkable.   Neck: Neck supple. No JVD present. No carotid bruits.   Cardiovascular: Normal rate, regular rhythm and normal heart sounds. No murmur heard.   2+ symmetric pulses.   Pulmonary/Chest: Breath sounds normal. Does not exhibit tenderness.   Abdominal: Abdomen benign.   Musculoskeletal: Does not exhibit edema.   Neurological: Neurological exam unremarkable.   Vitals " reviewed.    Data Review:   Lab Results   Component Value Date    GLUCOSE 169 (H) 11/29/2023    BUN 23 11/29/2023    CREATININE 1.15 (H) 11/29/2023    EGFR 51.9 (L) 10/22/2023    BCR 20.0 11/29/2023     11/29/2023    K 4.4 11/29/2023    CO2 28.6 11/29/2023    CALCIUM 9.3 11/29/2023    ALBUMIN 4.1 11/29/2023    AST 32 11/29/2023    ALT 45 (H) 11/29/2023     Lab Results   Component Value Date    CHLPL 246 (H) 06/15/2023    TRIG 100 06/15/2023    HDL 70 06/15/2023     (H) 06/15/2023      Lab Results   Component Value Date    WBC 7.43 11/29/2023    RBC 4.21 11/29/2023    HGB 13.1 11/29/2023    HCT 39.0 11/29/2023    MCV 92.6 11/29/2023     11/29/2023     Lab Results   Component Value Date    TSH 5.910 (H) 11/29/2023     Lab Results   Component Value Date    HGBA1C 8.8 (A) 12/14/2023            Assessment:      Diagnosis Plan   1. Chronic heart failure with preserved ejection fraction (HFpEF)  Stable/well compensated currently, continue current medical regimen.      2. Coronary artery disease involving native coronary artery of native heart without angina pectoris  No angina, continue current medical therapy and risk factor management.      3. Essential hypertension  Well-controlled, continue current antihypertensive therapy.      4. Mixed hyperlipidemia  Uncontrolled however she is statin intolerant, better dietary compliance and regular exercise recommended for further improvement.  Follow-up with PCP for reevaluation.          Plan:   Stable cardiac status.  No angina, CHF is well compensated.  Continue current medications.   FU in 6 MO, sooner as needed.  Thank you for allowing us to participate in the care of your patient.   Statin intolerance, can only tolerate low dose statin    Scribed for Ashlyn Mays MD by Nichole Murphy. 1/5/2024 14:13 EST    INDU, Ashlyn Mays MD, personally performed the services described in this documentation as scribed by the above named individual in my presence, and it is  both accurate and complete.  1/5/2024  14:31 EST      Please note that portions of this note may have been completed with a voice recognition program. Efforts were made to edit the dictations, but occasionally words are mistranscribed.

## 2024-01-16 RX ORDER — ALBUTEROL SULFATE 90 UG/1
AEROSOL, METERED RESPIRATORY (INHALATION)
Qty: 6.7 G | Refills: 0 | OUTPATIENT
Start: 2024-01-16

## 2024-01-22 ENCOUNTER — OFFICE VISIT (OUTPATIENT)
Dept: PULMONOLOGY | Facility: CLINIC | Age: 84
End: 2024-01-22
Payer: MEDICARE

## 2024-01-22 ENCOUNTER — LAB (OUTPATIENT)
Dept: LAB | Facility: HOSPITAL | Age: 84
End: 2024-01-22
Payer: MEDICARE

## 2024-01-22 VITALS
HEART RATE: 65 BPM | SYSTOLIC BLOOD PRESSURE: 122 MMHG | DIASTOLIC BLOOD PRESSURE: 70 MMHG | BODY MASS INDEX: 28.41 KG/M2 | OXYGEN SATURATION: 97 % | WEIGHT: 181 LBS | HEIGHT: 67 IN | RESPIRATION RATE: 14 BRPM

## 2024-01-22 DIAGNOSIS — G47.33 OBSTRUCTIVE SLEEP APNEA: ICD-10-CM

## 2024-01-22 DIAGNOSIS — J98.4 RESTRICTIVE LUNG DISEASE: ICD-10-CM

## 2024-01-22 DIAGNOSIS — Z86.711 HISTORY OF PULMONARY EMBOLISM: ICD-10-CM

## 2024-01-22 DIAGNOSIS — R09.02 EXERCISE HYPOXEMIA: ICD-10-CM

## 2024-01-22 DIAGNOSIS — J84.112 IPF (IDIOPATHIC PULMONARY FIBROSIS): ICD-10-CM

## 2024-01-22 DIAGNOSIS — Z79.899 DRUG THERAPY: ICD-10-CM

## 2024-01-22 DIAGNOSIS — J84.112 IPF (IDIOPATHIC PULMONARY FIBROSIS): Primary | ICD-10-CM

## 2024-01-22 DIAGNOSIS — G47.34 NOCTURNAL HYPOXIA: ICD-10-CM

## 2024-01-22 PROCEDURE — 36415 COLL VENOUS BLD VENIPUNCTURE: CPT

## 2024-01-22 PROCEDURE — 3078F DIAST BP <80 MM HG: CPT | Performed by: NURSE PRACTITIONER

## 2024-01-22 PROCEDURE — 3074F SYST BP LT 130 MM HG: CPT | Performed by: NURSE PRACTITIONER

## 2024-01-22 PROCEDURE — 80076 HEPATIC FUNCTION PANEL: CPT

## 2024-01-22 PROCEDURE — 99214 OFFICE O/P EST MOD 30 MIN: CPT | Performed by: NURSE PRACTITIONER

## 2024-01-22 RX ORDER — EMPAGLIFLOZIN 10 MG/1
TABLET, FILM COATED ORAL
COMMUNITY
Start: 2024-01-18

## 2024-01-22 NOTE — PROGRESS NOTES
"Chief Complaint   Patient presents with    Shortness of Breath    Follow-up         Subjective   Radha Whelan is a 83 y.o. female.   The patient comes in today for f/u of ODELL and SOB.       She uses HOWIE 1-2 times a week. She does not use them on a regular basis.     She is doing well with Ofev. She feels Ofev has helped her breathing.  She has had a few episodes of diarrhea recently but is not having diarrhea on a regular basis.    She uses POC when she leaves home but hesitates to use it at home.  Her daughter states she takes it with her when she leaves home but she does not use it when she is up doing things around the home.    She uses CPAP nightly and states she would not be able to sleep without the machine.  She feels much more rested with her machine.    She was told she could decrease her Eliquis but a new prescription has not been sent to the pharmacy yet.  At this time, she continues Eliquis 5 mg twice a day.    The following portions of the patient's history were reviewed and updated as appropriate: allergies, current medications, past family history, past medical history, past social history, and past surgical history.      Review of Systems   HENT:  Negative for sinus pressure, sneezing and sore throat.    Respiratory:  Positive for shortness of breath. Negative for cough, chest tightness and wheezing.        Objective   Visit Vitals  /70   Pulse 65   Resp 14   Ht 170.2 cm (67\") Comment: pt reported   Wt 82.1 kg (181 lb)   SpO2 97% Comment: on room air (REST)   BMI 28.35 kg/m²         Physical Exam  Vitals reviewed.   HENT:      Head: Atraumatic.      Mouth/Throat:      Mouth: Mucous membranes are moist.      Comments: Crowded oropharynx.   Eyes:      Extraocular Movements: Extraocular movements intact.   Cardiovascular:      Rate and Rhythm: Normal rate and regular rhythm.   Pulmonary:      Effort: Pulmonary effort is normal. No respiratory distress.      Comments: Good air entry. Rales notes in " bilateral bases.   Skin:     General: Skin is warm.   Neurological:      Mental Status: She is alert and oriented to person, place, and time.           Assessment & Plan   Diagnoses and all orders for this visit:    1. IPF (idiopathic pulmonary fibrosis) (Primary)  -     Hepatic Function Panel; Future  -     Hepatic Function Panel; Future    2. History of pulmonary embolism  Comments:  lifelong therapy    3. Exercise hypoxemia    4. Restrictive lung disease    5. Obstructive sleep apnea    6. Nocturnal hypoxia    7. Drug therapy  -     Hepatic Function Panel; Future  -     Hepatic Function Panel; Future    Other orders  -     apixaban (ELIQUIS) 2.5 MG tablet tablet; Take 1 tablet by mouth 2 (Two) Times a Day.  Dispense: 180 tablet; Refill: 2           Return in about 5 months (around 6/22/2024) for Recheck, For Dr. Dias, labs.    DISCUSSION (if any):  PFT 7/2023 consistent with no obstruction.    On 6 MWT, she required 3 L of oxygen with activity.  I have discussed in detail that if she is up doing anything in her home such as washing dishes or doing laundry she should be wearing the oxygen.  She verbalizes understanding.    She should continue using 2 L of oxygen at night bled into her CPAP machine.    She should continue Ofev as ordered and rescue inhaler as needed for shortness of breath and wheezing.    I have ordered hepatic panel to be completed today if at all possible.  It appears her last hepatic panel was performed in October 2023.  She will need to have hepatic panel every 3 months while taking Ofev.    I have ordered a hepatic panel for April also.     Latest Reference Range & Units Most Recent   Alkaline Phosphatase 39 - 117 U/L 63  11/29/23 08:14   Total Protein 6.0 - 8.5 g/dL 6.7  11/29/23 08:14   Albumin 3.5 - 5.2 g/dL 4.1  11/29/23 08:14   Globulin gm/dL 3.0  10/22/23 15:58   A/G Ratio g/dL 1.6  11/29/23 08:14   AST (SGOT) 1 - 32 U/L 32  11/29/23 08:14   ALT (SGPT) 1 - 33 U/L 45 (H)  11/29/23 08:14    Total Bilirubin 0.0 - 1.2 mg/dL 0.4  11/29/23 08:14       She has been on eliquis since 10/2023 due to bilateral pulmonary embolisms.  She has been on Eliquis 5 mg twice a day for 3+ months and can decrease to Eliquis 2.5 mg twice a day.  I will send a prescription for the lower dose of Eliquis.  Study Result    Narrative & Impression   FINAL REPORT     TECHNIQUE:  Multiple axial CT images were obtained through the chest  following IV contrast using a CTA/PE protocol.  3D/MIP  reconstruction images were also performed. This study was  performed with techniques to keep radiation doses as low as  reasonably achievable (ALARA). Individualized dose reduction  techniques using automated exposure control or adjustment of mA  and/or kV according to the patient's size were employed.     CLINICAL HISTORY:  soa, recent PE     FINDINGS:  PAs and aorta: Bilateral pulmonary emboli, not significantly changed from the prior examination. These are well seen on images 41 and 42 series 4.  The volume of clot is essentially unchanged from the prior examination.   Thoracic aorta is unremarkable.     Heart/mediastinum: No evidence for right heart strain.  No pericardial effusion.  The heart is normal in size.  A left-sided pacer is in place.     Lungs: There are diffuse groundglass opacities more prominent in the upper lobes and on the right side.  Multifocal patchy airspace opacities.     Lymph nodes: No pathologically enlarged thoracic lymph nodes.     Pleura: No pneumothorax or pleural effusion.     Chest Wall: No chest wall contusion.     Bones: No acute fracture.     Upper abdomen: There is a small hiatal hernia.  Vicarious excretion of contrast is seen within the gallbladder.     IMPRESSION:  Bilateral pulmonary emboli, not significantly changed from the prior study.     Mild to moderate groundglass opacities and multifocal airspace opacities, with appearance most consistent with pulmonary edema with superimposed infectious  process.     Authenticated and Electronically Signed by MD Yesi,  Hugo on 10/22/2023 06:29:01 PM       I reviewed last HRCT result.    Study Result    Narrative & Impression   PROCEDURE: CT CHEST HI RESOLUTION DIAGNOSTIC-     HISTORY: Interstitial lung disease; R06.02-Shortness of breath;  J84.9-Interstitial pulmonary disease, unspecified; Z86.16-Personal  history of COVID-19     COMPARISON: None .     PROCEDURE: 1 mm axial images were obtained at 10 mm increments of the  chest.     FINDINGS: There is mild honeycombing in the posterior basal regions.  There are scattered areas of traction bronchiectasis. There is bilateral  multifocal peripheral subpleural scarring. There is an implantable  cardiac device in place. The heart size is normal. There is no  pericardial or pleural effusion.     IMPRESSION:  Findings suggestive of probable usual interstitial  pneumonia.           DLP:  CTDI: 1.34        This study was performed with techniques to keep radiation doses as low  as reasonably achievable (ALARA). Individualized dose reduction  techniques using automated exposure control or adjustment of mA and/or  kV according to the patient size were employed.              Images were reviewed, interpreted, and dictated by MAXWELL Yancey  Transcribed by Olivia Arreola PA-C.     This report was signed and finalized on 7/28/2023 2:43 PM by MAXWELL Molina.       I am not able to find her original sleep study but I will asked the medical assistant to request the sleep study from the patient's [employer] company.    Her titration study completed October 2023 showed a good response at a pressure of 6 cm.  However she is still on AutoPap 6 to 15 cm.    She is very compliant with AutoPap at 100%.  Residual AHI is 1.2/h.  I will not change AutoPap settings at this time.    She will need to continue using oxygen at night bled into her CPAP machine.    Dictated utilizing Dragon dictation.    This document was  electronically signed by CURTIS Dumont January 22, 2024  16:04 EST

## 2024-01-22 NOTE — PROGRESS NOTES
"Chief Complaint   Patient presents with    Shortness of Breath    Follow-up         Subjective   Radha Whelan is a 83 y.o. female.   The patient comes in today for f/u of ODELL and SOB.       She uses HOWIE 1-2 times a week. She does not use them on a regular basis.     She is doing well with Ofev. She feels Ofev has helped her breathing.  She has had a few episodes of diarrhea recently but is not having diarrhea on a regular basis.    She uses POC when she leaves home but hesitates to use it at home.  Her daughter states she takes it with her when she leaves home but she does not use it when she is up doing things around the home.    She uses CPAP nightly and states she would not be able to sleep without the machine.  She feels much more rested with her machine.    She was told she could decrease her Eliquis but a new prescription has not been sent to the pharmacy yet.  At this time, she continues Eliquis 5 mg twice a day.    The following portions of the patient's history were reviewed and updated as appropriate: allergies, current medications, past family history, past medical history, past social history, and past surgical history.      Review of Systems   HENT:  Negative for sinus pressure, sneezing and sore throat.    Respiratory:  Positive for shortness of breath. Negative for cough, chest tightness and wheezing.        Objective   Visit Vitals  /70   Pulse 65   Resp 14   Ht 170.2 cm (67\") Comment: pt reported   Wt 82.1 kg (181 lb)   SpO2 97% Comment: on room air (REST)   BMI 28.35 kg/m²         Physical Exam  Vitals reviewed.   HENT:      Head: Atraumatic.      Mouth/Throat:      Mouth: Mucous membranes are moist.      Comments: Crowded oropharynx.   Eyes:      Extraocular Movements: Extraocular movements intact.   Cardiovascular:      Rate and Rhythm: Normal rate and regular rhythm.   Pulmonary:      Effort: Pulmonary effort is normal. No respiratory distress.      Comments: Good air entry. Rales notes in " bilateral bases.   Skin:     General: Skin is warm.   Neurological:      Mental Status: She is alert and oriented to person, place, and time.           Assessment & Plan   Diagnoses and all orders for this visit:    1. IPF (idiopathic pulmonary fibrosis) (Primary)  -     Hepatic Function Panel; Future  -     Hepatic Function Panel; Future    2. History of pulmonary embolism  Comments:  lifelong therapy    3. Exercise hypoxemia    4. Restrictive lung disease    5. Obstructive sleep apnea    6. Nocturnal hypoxia    7. Drug therapy  -     Hepatic Function Panel; Future  -     Hepatic Function Panel; Future    Other orders  -     apixaban (ELIQUIS) 2.5 MG tablet tablet; Take 1 tablet by mouth 2 (Two) Times a Day.  Dispense: 180 tablet; Refill: 2           Return in about 5 months (around 6/22/2024) for Recheck, For Dr. Dias, labs.    DISCUSSION (if any):  PFT 7/2023 consistent with no obstruction.    On 6 MWT, she required 3 L of oxygen with activity.  I have discussed in detail that if she is up doing anything in her home such as washing dishes or doing laundry she should be wearing the oxygen.  She verbalizes understanding.    She should continue using 2 L of oxygen at night bled into her CPAP machine.    She should continue Ofev as ordered and rescue inhaler as needed for shortness of breath and wheezing.    I have ordered hepatic panel to be completed today if at all possible.  It appears her last hepatic panel was performed in October 2023.  She will need to have hepatic panel every 3 months while taking Ofev.    I have ordered a hepatic panel for April also.     Latest Reference Range & Units Most Recent   Alkaline Phosphatase 39 - 117 U/L 63  11/29/23 08:14   Total Protein 6.0 - 8.5 g/dL 6.7  11/29/23 08:14   Albumin 3.5 - 5.2 g/dL 4.1  11/29/23 08:14   Globulin gm/dL 3.0  10/22/23 15:58   A/G Ratio g/dL 1.6  11/29/23 08:14   AST (SGOT) 1 - 32 U/L 32  11/29/23 08:14   ALT (SGPT) 1 - 33 U/L 45 (H)  11/29/23 08:14    Total Bilirubin 0.0 - 1.2 mg/dL 0.4  11/29/23 08:14       She has been on eliquis since 10/2023 due to bilateral pulmonary embolisms.  She has been on Eliquis 5 mg twice a day for 3+ months and can decrease to Eliquis 2.5 mg twice a day.  I will send a prescription for the lower dose of Eliquis.  Study Result    Narrative & Impression   FINAL REPORT     TECHNIQUE:  Multiple axial CT images were obtained through the chest  following IV contrast using a CTA/PE protocol.  3D/MIP  reconstruction images were also performed. This study was  performed with techniques to keep radiation doses as low as  reasonably achievable (ALARA). Individualized dose reduction  techniques using automated exposure control or adjustment of mA  and/or kV according to the patient's size were employed.     CLINICAL HISTORY:  soa, recent PE     FINDINGS:  PAs and aorta: Bilateral pulmonary emboli, not significantly changed from the prior examination. These are well seen on images 41 and 42 series 4.  The volume of clot is essentially unchanged from the prior examination.   Thoracic aorta is unremarkable.     Heart/mediastinum: No evidence for right heart strain.  No pericardial effusion.  The heart is normal in size.  A left-sided pacer is in place.     Lungs: There are diffuse groundglass opacities more prominent in the upper lobes and on the right side.  Multifocal patchy airspace opacities.     Lymph nodes: No pathologically enlarged thoracic lymph nodes.     Pleura: No pneumothorax or pleural effusion.     Chest Wall: No chest wall contusion.     Bones: No acute fracture.     Upper abdomen: There is a small hiatal hernia.  Vicarious excretion of contrast is seen within the gallbladder.     IMPRESSION:  Bilateral pulmonary emboli, not significantly changed from the prior study.     Mild to moderate groundglass opacities and multifocal airspace opacities, with appearance most consistent with pulmonary edema with superimposed infectious  process.     Authenticated and Electronically Signed by MD Yesi,  Hugo on 10/22/2023 06:29:01 PM       I reviewed last HRCT result.    Study Result    Narrative & Impression   PROCEDURE: CT CHEST HI RESOLUTION DIAGNOSTIC-     HISTORY: Interstitial lung disease; R06.02-Shortness of breath;  J84.9-Interstitial pulmonary disease, unspecified; Z86.16-Personal  history of COVID-19     COMPARISON: None .     PROCEDURE: 1 mm axial images were obtained at 10 mm increments of the  chest.     FINDINGS: There is mild honeycombing in the posterior basal regions.  There are scattered areas of traction bronchiectasis. There is bilateral  multifocal peripheral subpleural scarring. There is an implantable  cardiac device in place. The heart size is normal. There is no  pericardial or pleural effusion.     IMPRESSION:  Findings suggestive of probable usual interstitial  pneumonia.           DLP:  CTDI: 1.34        This study was performed with techniques to keep radiation doses as low  as reasonably achievable (ALARA). Individualized dose reduction  techniques using automated exposure control or adjustment of mA and/or  kV according to the patient size were employed.              Images were reviewed, interpreted, and dictated by MAXWELL Yancey  Transcribed by Olivia Arreola PA-C.     This report was signed and finalized on 7/28/2023 2:43 PM by MAXWELL Molina.       I am not able to find her original sleep study but I will asked the medical assistant to request the sleep study from the patient's [employer] company.    Her titration study completed October 2023 showed a good response at a pressure of 6 cm.  However she is still on AutoPap 6 to 15 cm.    She is very compliant with AutoPap at 100%.  Residual AHI is 1.2/h.  I will not change AutoPap settings at this time.    She will need to continue using oxygen at night bled into her CPAP machine.    Dictated utilizing Dragon dictation.    This document was  electronically signed by CURTIS Dumont January 22, 2024  15:56 EST

## 2024-01-23 LAB
ALBUMIN SERPL-MCNC: 4.3 G/DL (ref 3.5–5.2)
ALP SERPL-CCNC: 61 U/L (ref 39–117)
ALT SERPL W P-5'-P-CCNC: 36 U/L (ref 1–33)
AST SERPL-CCNC: 39 U/L (ref 1–32)
BILIRUB CONJ SERPL-MCNC: <0.2 MG/DL (ref 0–0.3)
BILIRUB INDIRECT SERPL-MCNC: ABNORMAL MG/DL
BILIRUB SERPL-MCNC: 0.4 MG/DL (ref 0–1.2)
PROT SERPL-MCNC: 7.5 G/DL (ref 6–8.5)

## 2024-01-24 DIAGNOSIS — R09.02 EXERCISE HYPOXEMIA: ICD-10-CM

## 2024-03-03 DIAGNOSIS — I67.82 TEMPORARY CEREBRAL VASCULAR DYSFUNCTION: ICD-10-CM

## 2024-03-04 RX ORDER — GLIPIZIDE 2.5 MG/1
2.5 TABLET, EXTENDED RELEASE ORAL DAILY
Qty: 90 TABLET | Refills: 3 | Status: SHIPPED | OUTPATIENT
Start: 2024-03-04

## 2024-03-04 RX ORDER — PANTOPRAZOLE SODIUM 40 MG/1
40 TABLET, DELAYED RELEASE ORAL DAILY
Qty: 90 TABLET | Refills: 3 | Status: SHIPPED | OUTPATIENT
Start: 2024-03-04

## 2024-03-04 RX ORDER — CLOPIDOGREL BISULFATE 75 MG/1
75 TABLET ORAL DAILY
Qty: 90 TABLET | Refills: 3 | Status: SHIPPED | OUTPATIENT
Start: 2024-03-04

## 2024-03-07 RX ORDER — METOPROLOL TARTRATE 50 MG/1
50 TABLET, FILM COATED ORAL 2 TIMES DAILY
Qty: 180 TABLET | Refills: 3 | Status: SHIPPED | OUTPATIENT
Start: 2024-03-07

## 2024-03-19 ENCOUNTER — OFFICE VISIT (OUTPATIENT)
Dept: INTERNAL MEDICINE | Facility: CLINIC | Age: 84
End: 2024-03-19
Payer: MEDICARE

## 2024-03-19 VITALS
HEART RATE: 78 BPM | RESPIRATION RATE: 16 BRPM | OXYGEN SATURATION: 99 % | WEIGHT: 182 LBS | HEIGHT: 67 IN | BODY MASS INDEX: 28.56 KG/M2 | TEMPERATURE: 96.2 F

## 2024-03-19 DIAGNOSIS — R06.2 WHEEZING: ICD-10-CM

## 2024-03-19 DIAGNOSIS — J84.9 ILD (INTERSTITIAL LUNG DISEASE): ICD-10-CM

## 2024-03-19 DIAGNOSIS — E55.9 VITAMIN D DEFICIENCY, UNSPECIFIED: ICD-10-CM

## 2024-03-19 DIAGNOSIS — R05.1 ACUTE COUGH: Primary | ICD-10-CM

## 2024-03-19 DIAGNOSIS — E11.65 TYPE 2 DIABETES MELLITUS WITH HYPERGLYCEMIA, WITHOUT LONG-TERM CURRENT USE OF INSULIN: ICD-10-CM

## 2024-03-19 DIAGNOSIS — J18.9 COMMUNITY ACQUIRED PNEUMONIA OF RIGHT MIDDLE LOBE OF LUNG: ICD-10-CM

## 2024-03-19 DIAGNOSIS — J10.1 INFLUENZA A: ICD-10-CM

## 2024-03-19 LAB
EXPIRATION DATE: ABNORMAL
EXPIRATION DATE: NORMAL
FLUAV AG NPH QL: POSITIVE
FLUBV AG NPH QL: NEGATIVE
INTERNAL CONTROL: ABNORMAL
INTERNAL CONTROL: NORMAL
Lab: ABNORMAL
Lab: NORMAL
SARS-COV-2 AG UPPER RESP QL IA.RAPID: NOT DETECTED

## 2024-03-19 RX ORDER — DOXYCYCLINE HYCLATE 100 MG/1
100 CAPSULE ORAL 2 TIMES DAILY
Qty: 20 CAPSULE | Refills: 0 | Status: SHIPPED | OUTPATIENT
Start: 2024-03-19

## 2024-03-19 RX ORDER — LEVOCETIRIZINE DIHYDROCHLORIDE 5 MG/1
5 TABLET, FILM COATED ORAL EVERY EVENING
Qty: 30 TABLET | Refills: 5 | Status: SHIPPED | OUTPATIENT
Start: 2024-03-19

## 2024-03-19 NOTE — PROGRESS NOTES
Subjective     Patient ID: Radha Whelan is a 83 y.o. female. Patient is here for management of multiple medical problems.     Chief Complaint   Patient presents with    Cough     Dry Cough started last week    Wheezing    Fever     Low grade fever running at night.     History of Present Illness   Cough with low grade temp and chills. Started last night. Know IPF.          The following portions of the patient's history were reviewed and updated as appropriate: allergies, current medications, past family history, past medical history, past social history, past surgical history and problem list.    Review of Systems    Current Outpatient Medications:     acetaminophen (TYLENOL) 325 MG tablet, Take 2 tablets by mouth Every 6 (Six) Hours As Needed for Mild Pain ., Disp: , Rfl:     albuterol sulfate  (90 Base) MCG/ACT inhaler, INHALE 2 PUFFS INTO THE LUNGS EVERY 4 HOURS AS NEEDED, Disp: 6.7 g, Rfl: 0    amLODIPine (NORVASC) 2.5 MG tablet, TAKE 1 TABLET BY MOUTH DAILY, Disp: 30 tablet, Rfl: 5    apixaban (ELIQUIS) 2.5 MG tablet tablet, Take 1 tablet by mouth 2 (Two) Times a Day., Disp: 180 tablet, Rfl: 2    Blood Glucose Monitoring Suppl (ONE TOUCH ULTRA 2) w/Device kit, USE AS NEEDED FOR BLOOD    SUGAR MONITORING, Disp: 1 each, Rfl: 0    bumetanide (BUMEX) 1 MG tablet, Take 1 tablet by mouth Daily., Disp: 90 tablet, Rfl: 3    clopidogrel (PLAVIX) 75 MG tablet, TAKE 1 TABLET BY MOUTH DAILY, Disp: 90 tablet, Rfl: 3    Coenzyme Q10 200 MG capsule, Take 200 mg by mouth Every Other Day., Disp: , Rfl:     glipizide (GLUCOTROL XL) 2.5 MG 24 hr tablet, TAKE 1 TABLET BY MOUTH DAILY, Disp: 90 tablet, Rfl: 3    glucose blood (Accu-Chek Kayce Plus) test strip, TEST ONCE DAILY., Disp: 100 each, Rfl: 3    glucose monitor monitoring kit, 1 each As Needed (blood sugar)., Disp: 1 each, Rfl: 1    Jardiance 10 MG tablet tablet, , Disp: , Rfl:     Lancets (accu-chek soft touch) lancets, Use as directed, Disp: 100 each, Rfl: 12     "latanoprost (XALATAN) 0.005 % ophthalmic solution, PLACE 1 DROP IN EACH EYE EVERY DAY AT BEDTIME, Disp: , Rfl:     lisinopril (PRINIVIL,ZESTRIL) 40 MG tablet, Take 1 tablet by mouth Daily., Disp: 90 tablet, Rfl: 3    lovastatin (MEVACOR) 10 MG tablet, Take 1 tablet by mouth Every Night. (Patient taking differently: Take 1 tablet by mouth Every Other Day.), Disp: 90 tablet, Rfl: 3    melatonin 3 MG tablet, Take 1 tablet by mouth Every Night., Disp: , Rfl:     metoprolol tartrate (LOPRESSOR) 50 MG tablet, TAKE 1 TABLET BY MOUTH TWICE DAILY, Disp: 180 tablet, Rfl: 3    Nintedanib Esylate (Ofev) 150 MG capsule, Take 150 mg by mouth 2 (Two) Times a Day. Take with food, Disp: 180 capsule, Rfl: 3    Omega-3 Fatty Acids (Fish Oil) 300 MG capsule, Take 300 mg by mouth Daily., Disp: , Rfl:     pantoprazole (PROTONIX) 40 MG EC tablet, TAKE 1 TABLET BY MOUTH DAILY, Disp: 90 tablet, Rfl: 3    tobramycin-dexamethasone (TOBRADEX) 0.3-0.1 % ophthalmic suspension, Administer 1 drop to both eyes As Needed (dryness)., Disp: 5 mL, Rfl: 1    vitamin D3 125 MCG (5000 UT) capsule capsule, Take 1 capsule by mouth. Twice a week, Disp: , Rfl:     doxycycline (VIBRAMYCIN) 100 MG capsule, Take 1 capsule by mouth 2 (Two) Times a Day., Disp: 20 capsule, Rfl: 0    levocetirizine (XYZAL) 5 MG tablet, Take 1 tablet by mouth Every Evening., Disp: 30 tablet, Rfl: 5    Objective      Pulse 78, temperature 96.2 °F (35.7 °C), resp. rate 16, height 170.2 cm (67\"), weight 82.6 kg (182 lb), SpO2 99%.            Physical Exam     General Appearance:    Alert, cooperative, no distress, appears stated age   Head:    Normocephalic, without obvious abnormality, atraumatic   Eyes:    PERRL, conjunctiva/corneas clear, EOM's intact   Ears:    Normal TM's and external ear canals, both ears   Nose:   Nares normal, septum midline, mucosa normal, no drainage   or sinus tenderness   Throat:   Lips, mucosa, and tongue normal; teeth and gums normal   Neck:   Supple, " symmetrical, trachea midline, no adenopathy;        thyroid:  No enlargement/tenderness/nodules; no carotid    bruit or JVD   Back:     Symmetric, no curvature, ROM normal, no CVA tenderness   Lungs:     Clear to auscultation bilaterally, respirations unlabored   Chest wall:    No tenderness or deformity   Heart:    Regular rate and rhythm, S1 and S2 normal, no murmur,        rub or gallop   Abdomen:     Soft, non-tender, bowel sounds active all four quadrants,     no masses, no organomegaly   Extremities:   Extremities normal, atraumatic, no cyanosis or edema   Pulses:   2+ and symmetric all extremities   Skin:   Skin color, texture, turgor normal, no rashes or lesions   Lymph nodes:   Cervical, supraclavicular, and axillary nodes normal   Neurologic:   CNII-XII intact. Normal strength, sensation and reflexes       throughout      Results for orders placed or performed in visit on 01/22/24   Hepatic Function Panel    Specimen: Blood   Result Value Ref Range    Total Protein 7.5 6.0 - 8.5 g/dL    Albumin 4.3 3.5 - 5.2 g/dL    ALT (SGPT) 36 (H) 1 - 33 U/L    AST (SGOT) 39 (H) 1 - 32 U/L    Alkaline Phosphatase 61 39 - 117 U/L    Total Bilirubin 0.4 0.0 - 1.2 mg/dL    Bilirubin, Direct <0.2 0.0 - 0.3 mg/dL    Bilirubin, Indirect           Assessment & Plan   Flu a by labs.  Will cover for secondary bacterial infection. Pt not very sick. Will hold on tamiflu with unclear drug interaction.      Dry cough with fever. Unalbe to get much up.   Diagnoses and all orders for this visit:    1. Acute cough (Primary)  -     POCT Influenza A/B  -     POCT SARS-CoV-2 Antigen JOSH  -     Lipid Panel  -     Vitamin B12  -     CBC & Differential  -     Comprehensive Metabolic Panel  -     TSH  -     Hemoglobin A1c  -     MicroAlbumin, Urine, Random - Urine, Clean Catch  -     Vitamin D,25-Hydroxy    2. Wheezing  -     POCT Influenza A/B  -     POCT SARS-CoV-2 Antigen JOSH  -     Lipid Panel  -     Vitamin B12  -     CBC &  Differential  -     Comprehensive Metabolic Panel  -     TSH  -     Hemoglobin A1c  -     MicroAlbumin, Urine, Random - Urine, Clean Catch  -     Vitamin D,25-Hydroxy    3. Community acquired pneumonia of right middle lobe of lung  -     Lipid Panel  -     Vitamin B12  -     CBC & Differential  -     Comprehensive Metabolic Panel  -     TSH  -     Hemoglobin A1c  -     MicroAlbumin, Urine, Random - Urine, Clean Catch  -     Vitamin D,25-Hydroxy    4. Type 2 diabetes mellitus with hyperglycemia, without long-term current use of insulin  -     Lipid Panel  -     Vitamin B12  -     CBC & Differential  -     Comprehensive Metabolic Panel  -     TSH  -     Hemoglobin A1c  -     MicroAlbumin, Urine, Random - Urine, Clean Catch  -     Vitamin D,25-Hydroxy    5. ILD (interstitial lung disease)  -     Lipid Panel  -     Vitamin B12  -     CBC & Differential  -     Comprehensive Metabolic Panel  -     TSH  -     Hemoglobin A1c  -     MicroAlbumin, Urine, Random - Urine, Clean Catch  -     Vitamin D,25-Hydroxy    6. Vitamin D deficiency, unspecified  -     Vitamin D,25-Hydroxy    7. Influenza A    Other orders  -     doxycycline (VIBRAMYCIN) 100 MG capsule; Take 1 capsule by mouth 2 (Two) Times a Day.  Dispense: 20 capsule; Refill: 0  -     levocetirizine (XYZAL) 5 MG tablet; Take 1 tablet by mouth Every Evening.  Dispense: 30 tablet; Refill: 5      Return if symptoms worsen or fail to improve has f/u  next month.          There are no Patient Instructions on file for this visit.     Farhan Schaefer MD    Assessment & Plan

## 2024-03-21 PROCEDURE — 93294 REM INTERROG EVL PM/LDLS PM: CPT | Performed by: INTERNAL MEDICINE

## 2024-03-21 PROCEDURE — 93296 REM INTERROG EVL PM/IDS: CPT | Performed by: INTERNAL MEDICINE

## 2024-03-22 RX ORDER — DEXTROMETHORPHAN HYDROBROMIDE AND PROMETHAZINE HYDROCHLORIDE 15; 6.25 MG/5ML; MG/5ML
5 SYRUP ORAL 4 TIMES DAILY PRN
Qty: 240 ML | Refills: 0 | Status: SHIPPED | OUTPATIENT
Start: 2024-03-22

## 2024-04-10 LAB
25(OH)D3+25(OH)D2 SERPL-MCNC: 30.7 NG/ML (ref 30–100)
ALBUMIN SERPL-MCNC: 3.5 G/DL (ref 3.5–5.2)
ALBUMIN/GLOB SERPL: 1.3 G/DL
ALP SERPL-CCNC: 82 U/L (ref 39–117)
ALT SERPL-CCNC: 108 U/L (ref 1–33)
AST SERPL-CCNC: 75 U/L (ref 1–32)
BASOPHILS # BLD AUTO: 0.03 10*3/MM3 (ref 0–0.2)
BASOPHILS NFR BLD AUTO: 0.5 % (ref 0–1.5)
BILIRUB SERPL-MCNC: 0.3 MG/DL (ref 0–1.2)
BUN SERPL-MCNC: 16 MG/DL (ref 8–23)
BUN/CREAT SERPL: 14.7 (ref 7–25)
CALCIUM SERPL-MCNC: 9.3 MG/DL (ref 8.6–10.5)
CHLORIDE SERPL-SCNC: 104 MMOL/L (ref 98–107)
CHOLEST SERPL-MCNC: 207 MG/DL (ref 0–200)
CO2 SERPL-SCNC: 29.2 MMOL/L (ref 22–29)
CREAT SERPL-MCNC: 1.09 MG/DL (ref 0.57–1)
EGFRCR SERPLBLD CKD-EPI 2021: 50.2 ML/MIN/1.73
EOSINOPHIL # BLD AUTO: 0.26 10*3/MM3 (ref 0–0.4)
EOSINOPHIL NFR BLD AUTO: 4.4 % (ref 0.3–6.2)
ERYTHROCYTE [DISTWIDTH] IN BLOOD BY AUTOMATED COUNT: 14.2 % (ref 12.3–15.4)
GLOBULIN SER CALC-MCNC: 2.6 GM/DL
GLUCOSE SERPL-MCNC: 139 MG/DL (ref 65–99)
HBA1C MFR BLD: 7.8 % (ref 4.8–5.6)
HCT VFR BLD AUTO: 36.5 % (ref 34–46.6)
HDLC SERPL-MCNC: 56 MG/DL (ref 40–60)
HGB BLD-MCNC: 11.6 G/DL (ref 12–15.9)
IMM GRANULOCYTES # BLD AUTO: 0.03 10*3/MM3 (ref 0–0.05)
IMM GRANULOCYTES NFR BLD AUTO: 0.5 % (ref 0–0.5)
LDLC SERPL CALC-MCNC: 117 MG/DL (ref 0–100)
LYMPHOCYTES # BLD AUTO: 2.87 10*3/MM3 (ref 0.7–3.1)
LYMPHOCYTES NFR BLD AUTO: 48.3 % (ref 19.6–45.3)
MCH RBC QN AUTO: 30.1 PG (ref 26.6–33)
MCHC RBC AUTO-ENTMCNC: 31.8 G/DL (ref 31.5–35.7)
MCV RBC AUTO: 94.8 FL (ref 79–97)
MICROALBUMIN UR-MCNC: 16.8 UG/ML
MONOCYTES # BLD AUTO: 0.47 10*3/MM3 (ref 0.1–0.9)
MONOCYTES NFR BLD AUTO: 7.9 % (ref 5–12)
NEUTROPHILS # BLD AUTO: 2.28 10*3/MM3 (ref 1.7–7)
NEUTROPHILS NFR BLD AUTO: 38.4 % (ref 42.7–76)
NRBC BLD AUTO-RTO: 0 /100 WBC (ref 0–0.2)
PLATELET # BLD AUTO: 219 10*3/MM3 (ref 140–450)
POTASSIUM SERPL-SCNC: 4.6 MMOL/L (ref 3.5–5.2)
PROT SERPL-MCNC: 6.1 G/DL (ref 6–8.5)
RBC # BLD AUTO: 3.85 10*6/MM3 (ref 3.77–5.28)
SODIUM SERPL-SCNC: 144 MMOL/L (ref 136–145)
TRIGL SERPL-MCNC: 195 MG/DL (ref 0–150)
TSH SERPL DL<=0.005 MIU/L-ACNC: 5.94 UIU/ML (ref 0.27–4.2)
VIT B12 SERPL-MCNC: 513 PG/ML (ref 211–946)
VLDLC SERPL CALC-MCNC: 34 MG/DL (ref 5–40)
WBC # BLD AUTO: 5.94 10*3/MM3 (ref 3.4–10.8)

## 2024-04-15 ENCOUNTER — OFFICE VISIT (OUTPATIENT)
Dept: INTERNAL MEDICINE | Facility: CLINIC | Age: 84
End: 2024-04-15
Payer: MEDICARE

## 2024-04-15 VITALS
DIASTOLIC BLOOD PRESSURE: 54 MMHG | WEIGHT: 177 LBS | RESPIRATION RATE: 17 BRPM | OXYGEN SATURATION: 98 % | TEMPERATURE: 97.5 F | SYSTOLIC BLOOD PRESSURE: 100 MMHG | HEIGHT: 67 IN | BODY MASS INDEX: 27.78 KG/M2 | HEART RATE: 60 BPM

## 2024-04-15 DIAGNOSIS — R74.8 ELEVATED LIVER ENZYMES: ICD-10-CM

## 2024-04-15 DIAGNOSIS — E11.9 TYPE 2 DIABETES MELLITUS WITHOUT COMPLICATION, WITHOUT LONG-TERM CURRENT USE OF INSULIN: ICD-10-CM

## 2024-04-15 DIAGNOSIS — J84.112 IPF (IDIOPATHIC PULMONARY FIBROSIS): ICD-10-CM

## 2024-04-15 DIAGNOSIS — I50.32 CHRONIC HEART FAILURE WITH PRESERVED EJECTION FRACTION (HFPEF): Primary | ICD-10-CM

## 2024-04-15 DIAGNOSIS — G47.33 OSA ON CPAP: ICD-10-CM

## 2024-04-15 DIAGNOSIS — R53.81 DEBILITY: ICD-10-CM

## 2024-04-15 PROCEDURE — G2211 COMPLEX E/M VISIT ADD ON: HCPCS | Performed by: INTERNAL MEDICINE

## 2024-04-15 PROCEDURE — 99214 OFFICE O/P EST MOD 30 MIN: CPT | Performed by: INTERNAL MEDICINE

## 2024-04-15 PROCEDURE — 3074F SYST BP LT 130 MM HG: CPT | Performed by: INTERNAL MEDICINE

## 2024-04-15 PROCEDURE — 3078F DIAST BP <80 MM HG: CPT | Performed by: INTERNAL MEDICINE

## 2024-04-15 NOTE — PROGRESS NOTES
Subjective     Patient ID: Radha Whelan is a 83 y.o. female. Patient is here for management of multiple medical problems.     IPF    History of Present Illness   Ipf    Dm    Elevated liver enzymes      The following portions of the patient's history were reviewed and updated as appropriate: allergies, current medications, past family history, past medical history, past social history, past surgical history and problem list.    Review of Systems    Current Outpatient Medications:     acetaminophen (TYLENOL) 325 MG tablet, Take 2 tablets by mouth Every 6 (Six) Hours As Needed for Mild Pain ., Disp: , Rfl:     albuterol sulfate  (90 Base) MCG/ACT inhaler, INHALE 2 PUFFS INTO THE LUNGS EVERY 4 HOURS AS NEEDED, Disp: 6.7 g, Rfl: 0    amLODIPine (NORVASC) 2.5 MG tablet, TAKE 1 TABLET BY MOUTH DAILY, Disp: 30 tablet, Rfl: 5    apixaban (ELIQUIS) 2.5 MG tablet tablet, Take 1 tablet by mouth 2 (Two) Times a Day., Disp: 180 tablet, Rfl: 2    Blood Glucose Monitoring Suppl (ONE TOUCH ULTRA 2) w/Device kit, USE AS NEEDED FOR BLOOD    SUGAR MONITORING, Disp: 1 each, Rfl: 0    bumetanide (BUMEX) 1 MG tablet, Take 1 tablet by mouth Daily., Disp: 90 tablet, Rfl: 3    clopidogrel (PLAVIX) 75 MG tablet, TAKE 1 TABLET BY MOUTH DAILY, Disp: 90 tablet, Rfl: 3    Coenzyme Q10 200 MG capsule, Take 200 mg by mouth Every Other Day., Disp: , Rfl:     doxycycline (VIBRAMYCIN) 100 MG capsule, Take 1 capsule by mouth 2 (Two) Times a Day., Disp: 20 capsule, Rfl: 0    glipizide (GLUCOTROL XL) 2.5 MG 24 hr tablet, TAKE 1 TABLET BY MOUTH DAILY, Disp: 90 tablet, Rfl: 3    glucose blood (Accu-Chek Kayce Plus) test strip, TEST ONCE DAILY., Disp: 100 each, Rfl: 3    glucose monitor monitoring kit, 1 each As Needed (blood sugar)., Disp: 1 each, Rfl: 1    Jardiance 10 MG tablet tablet, , Disp: , Rfl:     Lancets (accu-chek soft touch) lancets, Use as directed, Disp: 100 each, Rfl: 12    latanoprost (XALATAN) 0.005 % ophthalmic solution, PLACE 1  "DROP IN EACH EYE EVERY DAY AT BEDTIME, Disp: , Rfl:     levocetirizine (XYZAL) 5 MG tablet, Take 1 tablet by mouth Every Evening., Disp: 30 tablet, Rfl: 5    lisinopril (PRINIVIL,ZESTRIL) 40 MG tablet, Take 1 tablet by mouth Daily., Disp: 90 tablet, Rfl: 3    lovastatin (MEVACOR) 10 MG tablet, Take 1 tablet by mouth Every Night. (Patient taking differently: Take 1 tablet by mouth Every Other Day.), Disp: 90 tablet, Rfl: 3    melatonin 3 MG tablet, Take 1 tablet by mouth Every Night., Disp: , Rfl:     metoprolol tartrate (LOPRESSOR) 50 MG tablet, TAKE 1 TABLET BY MOUTH TWICE DAILY, Disp: 180 tablet, Rfl: 3    Nintedanib Esylate (Ofev) 150 MG capsule, Take 150 mg by mouth 2 (Two) Times a Day. Take with food, Disp: 180 capsule, Rfl: 3    Omega-3 Fatty Acids (Fish Oil) 300 MG capsule, Take 300 mg by mouth Daily., Disp: , Rfl:     pantoprazole (PROTONIX) 40 MG EC tablet, TAKE 1 TABLET BY MOUTH DAILY, Disp: 90 tablet, Rfl: 3    promethazine-dextromethorphan (PROMETHAZINE-DM) 6.25-15 MG/5ML syrup, Take 5 mL by mouth 4 (Four) Times a Day As Needed for Cough., Disp: 240 mL, Rfl: 0    tobramycin-dexamethasone (TOBRADEX) 0.3-0.1 % ophthalmic suspension, Administer 1 drop to both eyes As Needed (dryness)., Disp: 5 mL, Rfl: 1    vitamin D3 125 MCG (5000 UT) capsule capsule, Take 1 capsule by mouth. Twice a week, Disp: , Rfl:     Objective      Blood pressure 100/54, pulse 60, temperature 97.5 °F (36.4 °C), resp. rate 17, height 170.2 cm (67\"), weight 80.3 kg (177 lb), SpO2 98%.            Physical Exam     General Appearance:    Alert, cooperative, no distress, appears stated age   Head:    Normocephalic, without obvious abnormality, atraumatic   Eyes:    PERRL, conjunctiva/corneas clear, EOM's intact   Ears:    Normal TM's and external ear canals, both ears   Nose:   Nares normal, septum midline, mucosa normal, no drainage   or sinus tenderness   Throat:   Lips, mucosa, and tongue normal; teeth and gums normal   Neck:   Supple, " symmetrical, trachea midline, no adenopathy;        thyroid:  No enlargement/tenderness/nodules; no carotid    bruit or JVD   Back:     Symmetric, no curvature, ROM normal, no CVA tenderness   Lungs:     Clear to auscultation bilaterally, respirations unlabored   Chest wall:    No tenderness or deformity   Heart:    Regular rate and rhythm, S1 and S2 normal, no murmur,        rub or gallop   Abdomen:     Soft, non-tender, bowel sounds active all four quadrants,     no masses, no organomegaly   Extremities:   Extremities normal, atraumatic, no cyanosis or edema   Pulses:   2+ and symmetric all extremities   Skin:   Skin color, texture, turgor normal, no rashes or lesions   Lymph nodes:   Cervical, supraclavicular, and axillary nodes normal   Neurologic:   CNII-XII intact. Normal strength, sensation and reflexes       throughout      Results for orders placed or performed in visit on 03/19/24   Lipid Panel    Specimen: Blood   Result Value Ref Range    Total Cholesterol 207 (H) 0 - 200 mg/dL    Triglycerides 195 (H) 0 - 150 mg/dL    HDL Cholesterol 56 40 - 60 mg/dL    VLDL Cholesterol Kb 34 5 - 40 mg/dL    LDL Chol Calc (NIH) 117 (H) 0 - 100 mg/dL   Vitamin B12    Specimen: Blood   Result Value Ref Range    Vitamin B-12 513 211 - 946 pg/mL   Comprehensive Metabolic Panel    Specimen: Blood   Result Value Ref Range    Glucose 139 (H) 65 - 99 mg/dL    BUN 16 8 - 23 mg/dL    Creatinine 1.09 (H) 0.57 - 1.00 mg/dL    EGFR Result 50.2 (L) >60.0 mL/min/1.73    BUN/Creatinine Ratio 14.7 7.0 - 25.0    Sodium 144 136 - 145 mmol/L    Potassium 4.6 3.5 - 5.2 mmol/L    Chloride 104 98 - 107 mmol/L    Total CO2 29.2 (H) 22.0 - 29.0 mmol/L    Calcium 9.3 8.6 - 10.5 mg/dL    Total Protein 6.1 6.0 - 8.5 g/dL    Albumin 3.5 3.5 - 5.2 g/dL    Globulin 2.6 gm/dL    A/G Ratio 1.3 g/dL    Total Bilirubin 0.3 0.0 - 1.2 mg/dL    Alkaline Phosphatase 82 39 - 117 U/L    AST (SGOT) 75 (H) 1 - 32 U/L    ALT (SGPT) 108 (H) 1 - 33 U/L   TSH     Specimen: Blood   Result Value Ref Range    TSH 5.940 (H) 0.270 - 4.200 uIU/mL   Hemoglobin A1c    Specimen: Blood   Result Value Ref Range    Hemoglobin A1C 7.80 (H) 4.80 - 5.60 %   MicroAlbumin, Urine, Random - Urine, Clean Catch    Specimen: Urine, Clean Catch   Result Value Ref Range    Microalbumin, Urine 16.8 Not Estab. ug/mL   Vitamin D,25-Hydroxy    Specimen: Blood   Result Value Ref Range    25 Hydroxy, Vitamin D 30.7 30.0 - 100.0 ng/ml   POCT Influenza A/B    Specimen: Swab   Result Value Ref Range    Rapid Influenza A Ag Positive (A) Negative    Rapid Influenza B Ag Negative Negative    Internal Control Passed Passed    Lot Number [id number]     Expiration Date 12/01/2025    POCT SARS-CoV-2 Antigen JOSH    Specimen: Swab   Result Value Ref Range    SARS Antigen Not Detected Not Detected, Presumptive Negative    Internal Control Passed Passed    Lot Number [id number]     Expiration Date 06/11/2024    CBC & Differential    Specimen: Blood   Result Value Ref Range    WBC 5.94 3.40 - 10.80 10*3/mm3    RBC 3.85 3.77 - 5.28 10*6/mm3    Hemoglobin 11.6 (L) 12.0 - 15.9 g/dL    Hematocrit 36.5 34.0 - 46.6 %    MCV 94.8 79.0 - 97.0 fL    MCH 30.1 26.6 - 33.0 pg    MCHC 31.8 31.5 - 35.7 g/dL    RDW 14.2 12.3 - 15.4 %    Platelets 219 140 - 450 10*3/mm3    Neutrophil Rel % 38.4 (L) 42.7 - 76.0 %    Lymphocyte Rel % 48.3 (H) 19.6 - 45.3 %    Monocyte Rel % 7.9 5.0 - 12.0 %    Eosinophil Rel % 4.4 0.3 - 6.2 %    Basophil Rel % 0.5 0.0 - 1.5 %    Neutrophils Absolute 2.28 1.70 - 7.00 10*3/mm3    Lymphocytes Absolute 2.87 0.70 - 3.10 10*3/mm3    Monocytes Absolute 0.47 0.10 - 0.90 10*3/mm3    Eosinophils Absolute 0.26 0.00 - 0.40 10*3/mm3    Basophils Absolute 0.03 0.00 - 0.20 10*3/mm3    Immature Granulocyte Rel % 0.5 0.0 - 0.5 %    Immature Grans Absolute 0.03 0.00 - 0.05 10*3/mm3    nRBC 0.0 0.0 - 0.2 /100 WBC         Assessment & Plan   Ipf    Dm    Elevated liver enzymes    If lft's is from ofev. It may be worth  staying on the med as her breathing is doing well.              Diagnoses and all orders for this visit:    1. Chronic heart failure with preserved ejection fraction (HFpEF) (Primary)    2. IPF (idiopathic pulmonary fibrosis)    3. Elevated liver enzymes    4. Type 2 diabetes mellitus without complication, without long-term current use of insulin    5. ODELL on CPAP      No follow-ups on file.          There are no Patient Instructions on file for this visit.     Farhan Schaefer MD    Assessment & Plan       Answers submitted by the patient for this visit:  Other (Submitted on 4/8/2024)  Please describe your symptoms.: A follow up on my fibrosis of the lungs  Have you had these symptoms before?: Yes  How long have you been having these symptoms?: Greater than 2 weeks  Primary Reason for Visit (Submitted on 4/8/2024)  What is the primary reason for your visit?: Other

## 2024-04-24 ENCOUNTER — HOSPITAL ENCOUNTER (OUTPATIENT)
Dept: PHYSICAL THERAPY | Facility: HOSPITAL | Age: 84
Setting detail: THERAPIES SERIES
Discharge: HOME | End: 2024-04-24
Payer: MEDICARE

## 2024-04-24 PROCEDURE — 97161 PT EVAL LOW COMPLEX 20 MIN: CPT

## 2024-04-24 RX ORDER — LISINOPRIL 40 MG/1
40 TABLET ORAL DAILY
Qty: 90 TABLET | Refills: 3 | Status: SHIPPED | OUTPATIENT
Start: 2024-04-24

## 2024-04-24 NOTE — PROGRESS NOTES
Intervention: Decreased functional mobility , Decreased strength, Decreased posture, Decreased balance, Decreased endurance    Statement of Medical Necessity: Physical Therapy is both indicated and medically necessary as outlined in the POC to increase the likelihood of meeting the functionally related goals stated below.     Patient's Activity Tolerance: Patient tolerated treatment well, Patient limited by fatigue, Patient limited by endurance        Patient's rehabilitation potential/prognosis is considered to be: Fair    Factors which may impact rehabilitation potential include: Age, Sex        GOALS     Patient Goal(s):    Short Term Goals Completed by 4 weeks Goal Status   Pt to be I with HEP     Pt to reach 1500ML with incentive spirometer     Pt to improve 5xSTS 14seconds or less     Pt to demonstrate correct purselip breathing technique                                             Long Term Goals Completed by 8 weeks Goal Status   Pt to reach 2300 ML with incentive spirometer     Pt to complete a 6min walk test with maintaining sp02 above 92% without using portable 02.     Pt to improve gross BLE strength to 4+/5     Pt to tolerate ascedinding/descending stairs with mod I and reports of minimal difficulty     Pt to report ambulating community distances without feeling winded                                        TREATMENT PLAN       Requires PT Follow-Up: Yes    Pt. actively involved in establishing Plan of Care and Goals: Yes  Patient/ Caregiver education and instruction:Goals, PT Role, Plan of Care, Evaluative findings, General Safety, Posture             Treatment may include any combination of the following: Current Treatment Recommendations: Endurance training, Strengthening, Balance training, Stair training, Neuromuscular re-education, Home exercise program     Frequency / Duration:  Patient to be seen 1x week for 8 weeks        Eval Complexity:    Decision Making: Low Complexity    PT Treatment to be

## 2024-05-10 ENCOUNTER — TRANSCRIBE ORDERS (OUTPATIENT)
Dept: ADMINISTRATIVE | Facility: HOSPITAL | Age: 84
End: 2024-05-10
Payer: MEDICARE

## 2024-05-10 DIAGNOSIS — Z12.31 VISIT FOR SCREENING MAMMOGRAM: Primary | ICD-10-CM

## 2024-05-22 ENCOUNTER — HOSPITAL ENCOUNTER (OUTPATIENT)
Facility: HOSPITAL | Age: 84
Setting detail: OBSERVATION
Discharge: HOME | End: 2024-05-24
Attending: STUDENT IN AN ORGANIZED HEALTH CARE EDUCATION/TRAINING PROGRAM | Admitting: INTERNAL MEDICINE
Payer: MEDICARE

## 2024-05-22 ENCOUNTER — APPOINTMENT (OUTPATIENT)
Dept: CT IMAGING | Facility: HOSPITAL | Age: 84
End: 2024-05-22
Payer: MEDICARE

## 2024-05-22 ENCOUNTER — APPOINTMENT (OUTPATIENT)
Dept: ULTRASOUND IMAGING | Facility: HOSPITAL | Age: 84
End: 2024-05-22
Payer: MEDICARE

## 2024-05-22 DIAGNOSIS — K52.9 ENTERITIS: ICD-10-CM

## 2024-05-22 DIAGNOSIS — N17.9 ACUTE KIDNEY INJURY: Primary | ICD-10-CM

## 2024-05-22 DIAGNOSIS — R19.7 NAUSEA VOMITING AND DIARRHEA: ICD-10-CM

## 2024-05-22 DIAGNOSIS — R11.2 NAUSEA VOMITING AND DIARRHEA: ICD-10-CM

## 2024-05-22 LAB
ALBUMIN SERPL-MCNC: 3.9 G/DL (ref 3.5–5.2)
ALBUMIN/GLOB SERPL: 1.2 G/DL
ALP SERPL-CCNC: 87 U/L (ref 39–117)
ALT SERPL W P-5'-P-CCNC: 44 U/L (ref 1–33)
ANION GAP SERPL CALCULATED.3IONS-SCNC: 18.5 MMOL/L (ref 5–15)
AST SERPL-CCNC: 39 U/L (ref 1–32)
BASOPHILS # BLD AUTO: 0.04 10*3/MM3 (ref 0–0.2)
BASOPHILS NFR BLD AUTO: 0.5 % (ref 0–1.5)
BILIRUB SERPL-MCNC: 0.7 MG/DL (ref 0–1.2)
BUN SERPL-MCNC: 25 MG/DL (ref 8–23)
BUN/CREAT SERPL: 11.1 (ref 7–25)
C DIFF GDH + TOXINS A+B STL QL IA.RAPID: NEGATIVE
C DIFF GDH + TOXINS A+B STL QL IA.RAPID: NEGATIVE
CALCIUM SPEC-SCNC: 8.9 MG/DL (ref 8.6–10.5)
CHLORIDE SERPL-SCNC: 98 MMOL/L (ref 98–107)
CO2 SERPL-SCNC: 22.5 MMOL/L (ref 22–29)
CREAT SERPL-MCNC: 2.25 MG/DL (ref 0.57–1)
D-LACTATE SERPL-SCNC: 1.8 MMOL/L (ref 0.5–2)
D-LACTATE SERPL-SCNC: 2.4 MMOL/L (ref 0.5–2)
DEPRECATED RDW RBC AUTO: 54.1 FL (ref 37–54)
EGFRCR SERPLBLD CKD-EPI 2021: 21 ML/MIN/1.73
EOSINOPHIL # BLD AUTO: 0.05 10*3/MM3 (ref 0–0.4)
EOSINOPHIL NFR BLD AUTO: 0.7 % (ref 0.3–6.2)
ERYTHROCYTE [DISTWIDTH] IN BLOOD BY AUTOMATED COUNT: 15.3 % (ref 12.3–15.4)
GEN 5 2HR TROPONIN T REFLEX: 17 NG/L
GLOBULIN UR ELPH-MCNC: 3.2 GM/DL
GLUCOSE BLDC GLUCOMTR-MCNC: 116 MG/DL (ref 70–130)
GLUCOSE BLDC GLUCOMTR-MCNC: 121 MG/DL (ref 70–130)
GLUCOSE SERPL-MCNC: 206 MG/DL (ref 65–99)
HBA1C MFR BLD: 7.5 % (ref 4.8–5.6)
HCT VFR BLD AUTO: 39.7 % (ref 34–46.6)
HGB BLD-MCNC: 13.2 G/DL (ref 12–15.9)
HOLD SPECIMEN: NORMAL
HOLD SPECIMEN: NORMAL
IMM GRANULOCYTES # BLD AUTO: 0.02 10*3/MM3 (ref 0–0.05)
IMM GRANULOCYTES NFR BLD AUTO: 0.3 % (ref 0–0.5)
LIPASE SERPL-CCNC: 8 U/L (ref 13–60)
LYMPHOCYTES # BLD AUTO: 1.59 10*3/MM3 (ref 0.7–3.1)
LYMPHOCYTES NFR BLD AUTO: 21.2 % (ref 19.6–45.3)
MCH RBC QN AUTO: 31.8 PG (ref 26.6–33)
MCHC RBC AUTO-ENTMCNC: 33.2 G/DL (ref 31.5–35.7)
MCV RBC AUTO: 95.7 FL (ref 79–97)
MONOCYTES # BLD AUTO: 0.77 10*3/MM3 (ref 0.1–0.9)
MONOCYTES NFR BLD AUTO: 10.3 % (ref 5–12)
NEUTROPHILS NFR BLD AUTO: 5.03 10*3/MM3 (ref 1.7–7)
NEUTROPHILS NFR BLD AUTO: 67 % (ref 42.7–76)
NRBC BLD AUTO-RTO: 0 /100 WBC (ref 0–0.2)
PLATELET # BLD AUTO: 246 10*3/MM3 (ref 140–450)
PMV BLD AUTO: 9 FL (ref 6–12)
POTASSIUM SERPL-SCNC: 3.8 MMOL/L (ref 3.5–5.2)
PROT SERPL-MCNC: 7.1 G/DL (ref 6–8.5)
RBC # BLD AUTO: 4.15 10*6/MM3 (ref 3.77–5.28)
SODIUM SERPL-SCNC: 139 MMOL/L (ref 136–145)
TROPONIN T DELTA: -3 NG/L
TROPONIN T SERPL HS-MCNC: 20 NG/L
WBC NRBC COR # BLD AUTO: 7.5 10*3/MM3 (ref 3.4–10.8)
WHOLE BLOOD HOLD COAG: NORMAL
WHOLE BLOOD HOLD SPECIMEN: NORMAL

## 2024-05-22 PROCEDURE — 82948 REAGENT STRIP/BLOOD GLUCOSE: CPT

## 2024-05-22 PROCEDURE — 25010000002 ONDANSETRON PER 1 MG: Performed by: PHYSICIAN ASSISTANT

## 2024-05-22 PROCEDURE — 87324 CLOSTRIDIUM AG IA: CPT | Performed by: PHYSICIAN ASSISTANT

## 2024-05-22 PROCEDURE — G0378 HOSPITAL OBSERVATION PER HR: HCPCS

## 2024-05-22 PROCEDURE — 87507 IADNA-DNA/RNA PROBE TQ 12-25: CPT | Performed by: PHYSICIAN ASSISTANT

## 2024-05-22 PROCEDURE — 25810000003 SODIUM CHLORIDE 0.9 % SOLUTION: Performed by: NURSE PRACTITIONER

## 2024-05-22 PROCEDURE — 85025 COMPLETE CBC W/AUTO DIFF WBC: CPT | Performed by: STUDENT IN AN ORGANIZED HEALTH CARE EDUCATION/TRAINING PROGRAM

## 2024-05-22 PROCEDURE — 80053 COMPREHEN METABOLIC PANEL: CPT | Performed by: STUDENT IN AN ORGANIZED HEALTH CARE EDUCATION/TRAINING PROGRAM

## 2024-05-22 PROCEDURE — 93005 ELECTROCARDIOGRAM TRACING: CPT | Performed by: STUDENT IN AN ORGANIZED HEALTH CARE EDUCATION/TRAINING PROGRAM

## 2024-05-22 PROCEDURE — 36415 COLL VENOUS BLD VENIPUNCTURE: CPT

## 2024-05-22 PROCEDURE — 74176 CT ABD & PELVIS W/O CONTRAST: CPT

## 2024-05-22 PROCEDURE — 87449 NOS EACH ORGANISM AG IA: CPT | Performed by: PHYSICIAN ASSISTANT

## 2024-05-22 PROCEDURE — 99222 1ST HOSP IP/OBS MODERATE 55: CPT | Performed by: NURSE PRACTITIONER

## 2024-05-22 PROCEDURE — 83690 ASSAY OF LIPASE: CPT | Performed by: STUDENT IN AN ORGANIZED HEALTH CARE EDUCATION/TRAINING PROGRAM

## 2024-05-22 PROCEDURE — 84484 ASSAY OF TROPONIN QUANT: CPT | Performed by: STUDENT IN AN ORGANIZED HEALTH CARE EDUCATION/TRAINING PROGRAM

## 2024-05-22 PROCEDURE — 96374 THER/PROPH/DIAG INJ IV PUSH: CPT

## 2024-05-22 PROCEDURE — 83036 HEMOGLOBIN GLYCOSYLATED A1C: CPT | Performed by: NURSE PRACTITIONER

## 2024-05-22 PROCEDURE — 83605 ASSAY OF LACTIC ACID: CPT | Performed by: STUDENT IN AN ORGANIZED HEALTH CARE EDUCATION/TRAINING PROGRAM

## 2024-05-22 PROCEDURE — 99285 EMERGENCY DEPT VISIT HI MDM: CPT

## 2024-05-22 PROCEDURE — 25810000003 SODIUM CHLORIDE 0.9 % SOLUTION: Performed by: STUDENT IN AN ORGANIZED HEALTH CARE EDUCATION/TRAINING PROGRAM

## 2024-05-22 PROCEDURE — 76705 ECHO EXAM OF ABDOMEN: CPT

## 2024-05-22 RX ORDER — METOPROLOL TARTRATE 50 MG/1
50 TABLET, FILM COATED ORAL 2 TIMES DAILY
Status: DISCONTINUED | OUTPATIENT
Start: 2024-05-22 | End: 2024-05-24 | Stop reason: HOSPADM

## 2024-05-22 RX ORDER — ONDANSETRON 2 MG/ML
4 INJECTION INTRAMUSCULAR; INTRAVENOUS EVERY 6 HOURS PRN
Status: DISCONTINUED | OUTPATIENT
Start: 2024-05-22 | End: 2024-05-24 | Stop reason: HOSPADM

## 2024-05-22 RX ORDER — SODIUM CHLORIDE 0.9 % (FLUSH) 0.9 %
10 SYRINGE (ML) INJECTION AS NEEDED
Status: DISCONTINUED | OUTPATIENT
Start: 2024-05-22 | End: 2024-05-24 | Stop reason: HOSPADM

## 2024-05-22 RX ORDER — SODIUM CHLORIDE 9 MG/ML
40 INJECTION, SOLUTION INTRAVENOUS AS NEEDED
Status: DISCONTINUED | OUTPATIENT
Start: 2024-05-22 | End: 2024-05-24 | Stop reason: HOSPADM

## 2024-05-22 RX ORDER — SODIUM CHLORIDE 9 MG/ML
75 INJECTION, SOLUTION INTRAVENOUS CONTINUOUS
Status: DISCONTINUED | OUTPATIENT
Start: 2024-05-22 | End: 2024-05-23

## 2024-05-22 RX ORDER — NICOTINE POLACRILEX 4 MG
15 LOZENGE BUCCAL
Status: DISCONTINUED | OUTPATIENT
Start: 2024-05-22 | End: 2024-05-24 | Stop reason: HOSPADM

## 2024-05-22 RX ORDER — ONDANSETRON 2 MG/ML
4 INJECTION INTRAMUSCULAR; INTRAVENOUS ONCE
Status: COMPLETED | OUTPATIENT
Start: 2024-05-22 | End: 2024-05-22

## 2024-05-22 RX ORDER — DEXTROSE MONOHYDRATE 25 G/50ML
25 INJECTION, SOLUTION INTRAVENOUS
Status: DISCONTINUED | OUTPATIENT
Start: 2024-05-22 | End: 2024-05-24 | Stop reason: HOSPADM

## 2024-05-22 RX ORDER — SODIUM CHLORIDE 0.9 % (FLUSH) 0.9 %
10 SYRINGE (ML) INJECTION EVERY 12 HOURS SCHEDULED
Status: DISCONTINUED | OUTPATIENT
Start: 2024-05-22 | End: 2024-05-24 | Stop reason: HOSPADM

## 2024-05-22 RX ORDER — NITROGLYCERIN 0.4 MG/1
0.4 TABLET SUBLINGUAL
Status: DISCONTINUED | OUTPATIENT
Start: 2024-05-22 | End: 2024-05-24 | Stop reason: HOSPADM

## 2024-05-22 RX ORDER — ACETAMINOPHEN 325 MG/1
650 TABLET ORAL EVERY 4 HOURS PRN
Status: DISCONTINUED | OUTPATIENT
Start: 2024-05-22 | End: 2024-05-24 | Stop reason: HOSPADM

## 2024-05-22 RX ORDER — ONDANSETRON 4 MG/1
4 TABLET, ORALLY DISINTEGRATING ORAL EVERY 6 HOURS PRN
Status: DISCONTINUED | OUTPATIENT
Start: 2024-05-22 | End: 2024-05-24 | Stop reason: HOSPADM

## 2024-05-22 RX ORDER — INSULIN LISPRO 100 [IU]/ML
2-7 INJECTION, SOLUTION INTRAVENOUS; SUBCUTANEOUS
Status: DISCONTINUED | OUTPATIENT
Start: 2024-05-22 | End: 2024-05-24 | Stop reason: HOSPADM

## 2024-05-22 RX ORDER — PANTOPRAZOLE SODIUM 40 MG/1
40 TABLET, DELAYED RELEASE ORAL DAILY
Status: DISCONTINUED | OUTPATIENT
Start: 2024-05-22 | End: 2024-05-24 | Stop reason: HOSPADM

## 2024-05-22 RX ADMIN — PANTOPRAZOLE SODIUM 40 MG: 40 TABLET, DELAYED RELEASE ORAL at 14:41

## 2024-05-22 RX ADMIN — Medication 10 ML: at 14:30

## 2024-05-22 RX ADMIN — APIXABAN 2.5 MG: 2.5 TABLET, FILM COATED ORAL at 22:50

## 2024-05-22 RX ADMIN — ONDANSETRON 4 MG: 2 INJECTION INTRAMUSCULAR; INTRAVENOUS at 10:25

## 2024-05-22 RX ADMIN — Medication 10 ML: at 22:51

## 2024-05-22 RX ADMIN — SODIUM CHLORIDE 1000 ML: 9 INJECTION, SOLUTION INTRAVENOUS at 10:01

## 2024-05-22 RX ADMIN — SODIUM CHLORIDE 75 ML/HR: 9 INJECTION, SOLUTION INTRAVENOUS at 14:41

## 2024-05-22 NOTE — ED PROVIDER NOTES
Subjective:  Chief Complaint:  N/V/D    History of Present Illness:  Patient is a 89-year-old female with history of arthritis, CAD, diabetes, DVT, hypertension, hyperlipidemia, myocarditis, NSTEMI, among others presenting to the ER with complaints of nausea, vomiting, diarrhea since Friday.  Denies any abdominal pain, fevers, urinary symptoms.  Patient states she is not on any medication, Ofev, for pulmonary fibrosis and it is known to cause diarrhea.  She states that she has been on it for 8 months and did not have any issues until now.  Does not feel it is related.  Has not had any medications for symptoms as she states she cannot hold anything down.  She is on Eliquis 2.5 mg twice daily.  Denies any chest pain or increased shortness of breath.  She is on oxygen with any activity but does not usually require it at rest.  Denies additional symptoms or complaints at this time.      Nurses Notes reviewed and agree, including vitals, allergies, social history and prior medical history.     REVIEW OF SYSTEMS: All systems reviewed and not pertinent unless noted.  Review of Systems   Gastrointestinal:  Positive for diarrhea, nausea and vomiting.   All other systems reviewed and are negative.      Past Medical History:   Diagnosis Date    Arthritis     Arthritis 12/04/2019    Asthma     Basal cell carcinoma     CAD (coronary artery disease) 04/04/2022    Cardiomyopathy     Cataract, bilateral     s/p removal     Chronic HFrEF (heart failure with reduced ejection fraction) 03/28/2023    Diabetes mellitus     DVT (deep venous thrombosis)     1970's- left leg    Edema     legs- hx of    History of migraine headaches     Hx of exercise stress test 2004    Hypertension     Hyponatremia     Impaired mobility     LBBB (left bundle branch block)     LBBB (left bundle branch block)     Left leg pain     left leg and knee pain     Mixed hyperlipidemia 08/26/2019    Muscle spasm     hx of    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  last one was \"a few years ago\"    Wears dentures     top plate    Wears glasses     to read       Allergies:    Covid-19 mrna vacc (moderna), Cumini, Cheese, Ibuprofen, Other, and Saccharin      Past Surgical History:   Procedure Laterality Date    BREAST BIOPSY Left     benign    CARDIAC CATHETERIZATION      09/16/2019 PER .    CARDIAC CATHETERIZATION N/A 09/16/2019    Procedure: Left Heart Cath +/- CBI;  Surgeon: Ashlyn Mays MD;  Location:  [facility];  Service: Cardiovascular    CARDIAC CATHETERIZATION N/A 04/07/2023    Procedure: Left Heart Cath;  Surgeon: Ashlyn Mays MD;  Location:  [facility];  Service: Cardiology;  Laterality: N/A;    CARDIAC ELECTROPHYSIOLOGY PROCEDURE N/A 12/06/2019    Procedure: PACEMAKER IMPLANTATION- DC;  Surgeon: Stephan Laboy DO;  Location:  [facility];  Service: Cardiology    CATARACT EXTRACTION  12/2018    both eyes     COLONOSCOPY      INSERT / REPLACE / REMOVE PACEMAKER      MOUTH SURGERY      all top teeth    SKIN BIOPSY      basal cell    TOTAL HIP ARTHROPLASTY Right 12/04/2019    Procedure: HIP ARTHROPLASTY TOTAL RIGHT;  Surgeon: Issa Salgado MD;  Location[facility] OR;  Service: Orthopedics    TUBAL ABDOMINAL LIGATION           Social History     Socioeconomic History    Marital status:    Tobacco Use    Smoking status: Never     Passive exposure: Never    Smokeless tobacco: Never    Tobacco comments:     I have never smoked. " "  Vaping Use    Vaping status: Never Used   Substance and Sexual Activity    Alcohol use: Never    Drug use: Never    Sexual activity: Not Currently     Partners: Male     Comment: I am 82 years old . No sex for a few years         Family History   Problem Relation Age of Onset    Heart disease Mother     Diabetes Mother         Passed away at age 81 yrs. Heart attact    Heart disease Father     Breast cancer Sister     Heart disease Sister     Stroke Sister     Breast cancer Sister     Intracerebral hemorrhage Brother     Diabetes type II Brother     No Known Problems Brother     Asthma Daughter     Heart failure Daughter     Diabetes Other     Hypertension Other     Cancer Other         malignant neoplasm     Migraines Other     Breast cancer Other        Objective  Physical Exam:  BP 98/54   Pulse 66   Temp 97.6 °F (36.4 °C) (Oral)   Resp 18   Ht 170.2 cm (67\")   Wt 77.1 kg (170 lb)   SpO2 98%   BMI 26.63 kg/m²      Physical Exam  Vitals and nursing note reviewed.   Constitutional:       General: She is not in acute distress.     Appearance: She is not toxic-appearing.   HENT:      Head: Normocephalic and atraumatic.      Right Ear: External ear normal.      Left Ear: External ear normal.      Nose: Nose normal.   Eyes:      Extraocular Movements: Extraocular movements intact.      Conjunctiva/sclera: Conjunctivae normal.   Cardiovascular:      Rate and Rhythm: Normal rate.   Pulmonary:      Effort: Pulmonary effort is normal. No respiratory distress.   Abdominal:      General: There is no distension.      Palpations: Abdomen is soft.      Tenderness: There is no abdominal tenderness. There is no guarding or rebound.   Musculoskeletal:         General: Normal range of motion.      Cervical back: Normal range of motion and neck supple.   Skin:     General: Skin is warm and dry.   Neurological:      General: No focal deficit present.      Mental Status: She is alert and oriented to person, place, and time. "   Psychiatric:         Mood and Affect: Mood normal.         Behavior: Behavior normal.         Procedures    ED Course:    ED Course as of 05/22/24 1337   Wed May 22, 2024   1033 GFR 21. Advised doing CT without contrast. [AP]      ED Course User Index  [AP] Nga Garnica PA-C       Lab Results (last 24 hours)       Procedure Component Value Units Date/Time    CBC & Differential [[id number]]  (Abnormal) Collected: 05/22/24 0959    Specimen: Blood Updated: 05/22/24 1007    Narrative:      The following orders were created for panel order CBC & Differential.  Procedure                               Abnormality         Status                     ---------                               -----------         ------                     CBC Auto Differential[[id number]]        Abnormal            Final result                 Please view results for these tests on the individual orders.    Comprehensive Metabolic Panel [[id number]]  (Abnormal) Collected: 05/22/24 0959    Specimen: Blood Updated: 05/22/24 1028     Glucose 206 mg/dL      Comment: Glucose >180, Hemoglobin A1C recommended.        BUN 25 mg/dL      Creatinine 2.25 mg/dL      Sodium 139 mmol/L      Potassium 3.8 mmol/L      Chloride 98 mmol/L      CO2 22.5 mmol/L      Calcium 8.9 mg/dL      Total Protein 7.1 g/dL      Albumin 3.9 g/dL      ALT (SGPT) 44 U/L      AST (SGOT) 39 U/L      Alkaline Phosphatase 87 U/L      Total Bilirubin 0.7 mg/dL      Globulin 3.2 gm/dL      A/G Ratio 1.2 g/dL      BUN/Creatinine Ratio 11.1     Anion Gap 18.5 mmol/L      eGFR 21.0 mL/min/1.73     Narrative:      GFR Normal >60  Chronic Kidney Disease <60  Kidney Failure <15    The GFR formula is only valid for adults with stable renal function between ages 18 and 70.    High Sensitivity Troponin T [[id number]]  (Abnormal) Collected: 05/22/24 0959    Specimen: Blood Updated: 05/22/24 1028     HS Troponin T 20 ng/L     Narrative:      High Sensitive Troponin T Reference Range:  <14.0  ng/L- Negative Female for AMI  <22.0 ng/L- Negative Male for AMI  >=14 - Abnormal Female indicating possible myocardial injury.  >=22 - Abnormal Male indicating possible myocardial injury.   Clinicians would have to utilize clinical acumen, EKG, Troponin, and serial changes to determine if it is an Acute Myocardial Infarction or myocardial injury due to an underlying chronic condition.         Lactic Acid, Plasma [[id number]]  (Abnormal) Collected: 05/22/24 0959    Specimen: Blood Updated: 05/22/24 1030     Lactate 2.4 mmol/L     Lipase [[id number]]  (Abnormal) Collected: 05/22/24 0959    Specimen: Blood Updated: 05/22/24 1028     Lipase 8 U/L     CBC Auto Differential [[id number]]  (Abnormal) Collected: 05/22/24 0959    Specimen: Blood Updated: 05/22/24 1007     WBC 7.50 10*3/mm3      RBC 4.15 10*6/mm3      Hemoglobin 13.2 g/dL      Hematocrit 39.7 %      MCV 95.7 fL      MCH 31.8 pg      MCHC 33.2 g/dL      RDW 15.3 %      RDW-SD 54.1 fl      MPV 9.0 fL      Platelets 246 10*3/mm3      Neutrophil % 67.0 %      Lymphocyte % 21.2 %      Monocyte % 10.3 %      Eosinophil % 0.7 %      Basophil % 0.5 %      Immature Grans % 0.3 %      Neutrophils, Absolute 5.03 10*3/mm3      Lymphocytes, Absolute 1.59 10*3/mm3      Monocytes, Absolute 0.77 10*3/mm3      Eosinophils, Absolute 0.05 10*3/mm3      Basophils, Absolute 0.04 10*3/mm3      Immature Grans, Absolute 0.02 10*3/mm3      nRBC 0.0 /100 WBC     High Sensitivity Troponin T 2Hr [[id number]]  (Abnormal) Collected: 05/22/24 1244    Specimen: Blood Updated: 05/22/24 1317     HS Troponin T 17 ng/L      Troponin T Delta -3 ng/L     Narrative:      High Sensitive Troponin T Reference Range:  <14.0 ng/L- Negative Female for AMI  <22.0 ng/L- Negative Male for AMI  >=14 - Abnormal Female indicating possible myocardial injury.  >=22 - Abnormal Male indicating possible myocardial injury.   Clinicians would have to utilize clinical acumen, EKG, Troponin, and serial changes to  determine if it is an Acute Myocardial Infarction or myocardial injury due to an underlying chronic condition.         STAT Lactic Acid, Reflex [[id number]]  (Normal) Collected: 05/22/24 1244    Specimen: Blood Updated: 05/22/24 1313     Lactate 1.8 mmol/L              US Gallbladder    Result Date: 5/22/2024  RIGHT UPPER QUADRANT ULTRASOUND  HISTORY: gallstones, distended gallbladder on CT, n/v/d with no acute abdominal pain  COMPARISON: CT abdomen pelvis performed the same day.  PROCEDURE: Ultrasound images of the right upper quadrant were obtained.  FINDINGS:  The liver parenchyma is of proper echogenicity.  There is no focal abnormality. There is no wall thickening of the gallbladder. There are multiple small stones with sludge in the gallbladder correlating with CT findings. No evidence of ductal dilatation is identified. Limited images of the pancreas are  obscured by bowel gas. Right kidney measures 8.8 cm in length and appears normal. [  ]      Impression: Cholelithiasis and sludge.    This report was signed and finalized on 5/22/2024 1:01 PM by Hiwot Alves MD.      CT Abdomen Pelvis Without Contrast    Result Date: 5/22/2024  PROCEDURE: CT ABDOMEN PELVIS WO CONTRAST-  HISTORY: N/V/D, nausea, vomiting, diarrhea  COMPARISON: None.  PROCEDURE: Axial images were obtained from the lung bases to the pubic symphysis by computed tomography. This study was performed with techniques to keep radiation doses as low as reasonably achievable, (ALARA). Individualized dose reduction techniques using automated exposure control or adjustment of mA and/or kV according to the patient size were employed.  FINDINGS:  ABDOMEN: In the visualized lung bases there is mild bronchiectasis and honeycombing suggesting pulmonary fibrosis. Pacemaker leads are partially visualized. Vascular calcifications noted. The heart is proper size. The limited non-contrast images of the liver are unremarkable. Gallbladder is mildly distended and  contains material of high attenuation which is dependent, suspect multiple small gallstones; recommend gallbladder ultrasound. The spleen is unremarkable. No adrenal masses are seen. The aorta is normal in caliber. There is no significant free fluid or adenopathy.  There is no nephrolithiasis. There is no hydronephrosis.  PELVIS: The GI tract demonstrates small hiatal hernia. Air-fluid levels are identified in nondistended small and large bowel, consider enteritis. Uterus is midline. There is streak artifact from total right hip arthroplasty. The appendix is identified and appears normal. The urinary bladder is unremarkable. There is no fluid or adenopathy.      Impression: Distended gallbladder with probable numerous small gallstones; recommend gallbladder ultrasound.  Bowel gas pattern suggesting enteritis.   CTDI: 6.57 mGy DLP:332.81 mGy.cm  This report was signed and finalized on 5/22/2024 11:20 AM by Hiwot Alves MD.          MDM     Amount and/or Complexity of Data Reviewed  Decide to obtain previous medical records or to obtain history from someone other than the patient: yes    Patient evaluated in the ER for nausea, vomiting, diarrhea x 3 days.  She is hypotensive but otherwise hemodynamically stable, afebrile, nontoxic-appearing on exam.  Differential diagnosis includes but is not limited to dehydration, electrolyte abnormalities, viral gastroenteritis, colitis, among others.  Initial plan includes CBC, CMP, lipase, CT abdomen pelvis with contrast.  Lactic acid and troponin as well as EKG were placed by triage protocol.  Suspect lactate will be high given nausea, vomiting, diarrhea x 3 days.  Will give patient 1 L IV fluids and 4 mg IV Zofran.    Lactate elevated initially at 2.4.  Repeat lactate within normal limits.  Creatinine noted to be 2.25 with baseline slightly above 1.  Troponin is chronically elevated.  Patient remained slightly hypotensive even after 1 L of fluids.  Discussed with hospitalist,  Dr. Page, who has graciously accepted the patient for admission for further evaluation and management.  Recommended GI panel and C. difficile testing be added. These were ordered. Patient admitted for further evaluation and management.    Final diagnoses:   Acute kidney injury   Nausea vomiting and diarrhea   Enteritis          Nga Garnica, PA-SUSAN  05/22/24 [id number]

## 2024-05-22 NOTE — H&P
[facility]      Name:  Radha Whelan   Age:  83 y.o.  Sex:  female  :  1940  MRN:  [id number]   Visit Number:  [id number]  Admission Date:  2024  Date Of Service:  24  Primary Care Physician:  Farhan Schaefer MD    Chief Complaint:     Nausea/Vomiting/diarrhea    History Of Presenting Illness:      Ms. Whelan is a pleasant 83-year-old female with pertinent past medical history of coronary artery disease, chronic respiratory failure on 2 L nasal cannula, DVT/PE on Eliquis, sick sinus syndrome status post pacemaker placement, diabetes mellitus type 2, pulmonary fibrosis on Ofev, HFrEF, who presented to [facility] due to nausea, vomiting, and diarrhea for the past 3 days.  Patient denied associated abdominal pain, fever, melena, hematochezia, or urinary symptoms.  Patient with recent travel to [facility] where her symptoms began.  Patient  also having similar symptoms.    Upon ED presentation patient hypotensive with blood pressure 87/51, otherwise on room air hemodynamically stable.  Pertinent labs and imaging included creatinine of 2.2 with baseline 1.0, elevation of LFTs mildly which appears to be chronic, initial troponin 20 with repeat 17, lactic Tabor 2.4 with repeat 1.8, lipase 8, CT abdomen pelvis noted distended gallbladder with probable numerous small gallstones, bowel gas pattern suggesting enteritis, ultrasound gallbladder noted cholelithiasis with sludge.  Patient was given 1 L fluid bolus with Zofran.  Hospitalist consulted for further medical management due to acute kidney injury.    Review Of Systems:    All systems were reviewed and negative except as mentioned in history of presenting illness, assessment and plan.    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    Past Surgical History: Patient  has a past surgical history that includes Tubal ligation; Cataract extraction (12/2018); Cardiac catheterization; Cardiac catheterization (N/A, 09/16/2019); Breast biopsy (Left); Colonoscopy; Skin biopsy; Mouth surgery; Cardiac electrophysiology procedure (N/A, 12/06/2019); Total hip arthroplasty (Right, 12/04/2019); Insert / replace / remove pacemaker; and Cardiac catheterization (N/A, 04/07/2023).    Social History: Patient  reports that she has never smoked. She has never been exposed to tobacco smoke. She has never used smokeless tobacco. She reports that she does not drink alcohol and does not use drugs.    Family History:  Patient's family history has been reviewed and found to be noncontributory.     Allergies:      Covid-19 mrna vacc (moderna), Cumini, Cheese, Ibuprofen, Other, and Saccharin    Home Medications:    Prior to Admission Medications       Prescriptions Last Dose Informant Patient Reported? Taking?    acetaminophen (TYLENOL) 325 MG tablet   No No    Take 2 tablets by mouth Every 6 (Six) Hours As Needed for Mild Pain .    albuterol sulfate  (90 Base) MCG/ACT inhaler   No No     INHALE 2 PUFFS INTO THE LUNGS EVERY 4 HOURS AS NEEDED    amLODIPine (NORVASC) 2.5 MG tablet   No No    TAKE 1 TABLET BY MOUTH DAILY    apixaban (ELIQUIS) 2.5 MG tablet tablet   No No    Take 1 tablet by mouth 2 (Two) Times a Day.    Blood Glucose Monitoring Suppl (ONE TOUCH ULTRA 2) w/Device kit   No No    USE AS NEEDED FOR BLOOD    SUGAR MONITORING    bumetanide (BUMEX) 1 MG tablet   No No    Take 1 tablet by mouth Daily.    clopidogrel (PLAVIX) 75 MG tablet   No No    TAKE 1 TABLET BY MOUTH DAILY    Coenzyme Q10 200 MG capsule  Self Yes No    Take 200 mg by mouth Every Other Day.    doxycycline (VIBRAMYCIN) 100 MG capsule   No No    Take 1 capsule by mouth 2 (Two) Times a Day.    glipizide (GLUCOTROL XL) 2.5 MG 24 hr tablet   No No    TAKE 1 TABLET BY MOUTH DAILY    glucose blood (Accu-Chek Kayce Plus) test strip   No No    TEST ONCE DAILY.    glucose monitor monitoring kit   No No    1 each As Needed (blood sugar).    Jardiance 10 MG tablet tablet   Yes No    Lancets (accu-chek soft touch) lancets   No No    Use as directed    latanoprost (XALATAN) 0.005 % ophthalmic solution   Yes No    PLACE 1 DROP IN EACH EYE EVERY DAY AT BEDTIME    levocetirizine (XYZAL) 5 MG tablet   No No    Take 1 tablet by mouth Every Evening.    lisinopril (PRINIVIL,ZESTRIL) 40 MG tablet   No No    TAKE 1 TABLET BY MOUTH DAILY    lovastatin (MEVACOR) 10 MG tablet   No No    Take 1 tablet by mouth Every Night.    Patient taking differently:  Take 1 tablet by mouth Every Other Day.    melatonin 3 MG tablet  Self Yes No    Take 1 tablet by mouth Every Night.    metoprolol tartrate (LOPRESSOR) 50 MG tablet   No No    TAKE 1 TABLET BY MOUTH TWICE DAILY    Nintedanib Esylate (Ofev) 150 MG capsule   No No    Take 150 mg by mouth 2 (Two) Times a Day. Take with food    Omega-3 Fatty Acids (Fish Oil) 300 MG capsule   Yes No    Take 300 mg by mouth Daily.    pantoprazole (PROTONIX) 40 MG EC tablet   No No    TAKE 1 TABLET BY MOUTH DAILY     "promethazine-dextromethorphan (PROMETHAZINE-DM) 6.25-15 MG/5ML syrup   No No    Take 5 mL by mouth 4 (Four) Times a Day As Needed for Cough.    tobramycin-dexamethasone (TOBRADEX) 0.3-0.1 % ophthalmic suspension   No No    Administer 1 drop to both eyes As Needed (dryness).    vitamin D3 125 MCG (5000 UT) capsule capsule   Yes No    Take 1 capsule by mouth. Twice a week          ED Medications:    Medications   sodium chloride 0.9 % flush 10 mL (has no administration in time range)   sodium chloride 0.9 % bolus 1,000 mL (0 mL Intravenous Stopped 5/22/24 1244)   ondansetron (ZOFRAN) injection 4 mg (4 mg Intravenous Given 5/22/24 1025)     Vital Signs:  Temp:  [97.6 °F (36.4 °C)] 97.6 °F (36.4 °C)  Heart Rate:  [63-96] 66  Resp:  [18] 18  BP: ()/(47-55) 98/54        05/22/24  0922   Weight: 77.1 kg (170 lb)     Body mass index is 26.63 kg/m².    Physical Exam:     Most recent vital Signs: BP 98/54   Pulse 66   Temp 97.6 °F (36.4 °C) (Oral)   Resp 18   Ht 170.2 cm (67\")   Wt 77.1 kg (170 lb)   SpO2 98%   BMI 26.63 kg/m²     Physical Exam  Vitals and nursing note reviewed.   Constitutional:       Comments: Chronically ill elderly female.   HENT:      Nose: Nose normal.      Mouth/Throat:      Mouth: Mucous membranes are dry.   Eyes:      Pupils: Pupils are equal, round, and reactive to light.   Cardiovascular:      Rate and Rhythm: Normal rate and regular rhythm.      Pulses: Normal pulses.      Heart sounds: Normal heart sounds.      Comments: Pacemaker noted left chest wall.  Pulmonary:      Effort: Pulmonary effort is normal.      Breath sounds: Normal breath sounds.      Comments: On 2 L nasal cannula chronically.  Abdominal:      General: Bowel sounds are normal.      Palpations: Abdomen is soft.      Tenderness: There is no abdominal tenderness.   Musculoskeletal:         General: Normal range of motion.      Cervical back: Normal range of motion.   Skin:     General: Skin is warm.      Capillary " Refill: Capillary refill takes less than 2 seconds.   Neurological:      General: No focal deficit present.      Mental Status: She is alert and oriented to person, place, and time.   Psychiatric:         Mood and Affect: Mood normal.         Laboratory data:    I have reviewed the labs done in the emergency room.    Results from last 7 days   Lab Units 05/22/24  0959   SODIUM mmol/L 139   POTASSIUM mmol/L 3.8   CHLORIDE mmol/L 98   CO2 mmol/L 22.5   BUN mg/dL 25*   CREATININE mg/dL 2.25*   CALCIUM mg/dL 8.9   BILIRUBIN mg/dL 0.7   ALK PHOS U/L 87   ALT (SGPT) U/L 44*   AST (SGOT) U/L 39*   GLUCOSE mg/dL 206*     Results from last 7 days   Lab Units 05/22/24  0959   WBC 10*3/mm3 7.50   HEMOGLOBIN g/dL 13.2   HEMATOCRIT % 39.7   PLATELETS 10*3/mm3 246         Results from last 7 days   Lab Units 05/22/24  1244 05/22/24  0959   HSTROP T ng/L 17* 20*             Results from last 7 days   Lab Units 05/22/24  0959   LIPASE U/L 8*       EKG:      Sinus rhythm with bundle branch block bpm 97    Radiology:    US Gallbladder    Result Date: 5/22/2024  RIGHT UPPER QUADRANT ULTRASOUND  HISTORY: gallstones, distended gallbladder on CT, n/v/d with no acute abdominal pain  COMPARISON: CT abdomen pelvis performed the same day.  PROCEDURE: Ultrasound images of the right upper quadrant were obtained.  FINDINGS:  The liver parenchyma is of proper echogenicity.  There is no focal abnormality. There is no wall thickening of the gallbladder. There are multiple small stones with sludge in the gallbladder correlating with CT findings. No evidence of ductal dilatation is identified. Limited images of the pancreas are  obscured by bowel gas. Right kidney measures 8.8 cm in length and appears normal. [  ]      Cholelithiasis and sludge.    This report was signed and finalized on 5/22/2024 1:01 PM by Hiwot Alves MD.      CT Abdomen Pelvis Without Contrast    Result Date: 5/22/2024  PROCEDURE: CT ABDOMEN PELVIS WO CONTRAST-  HISTORY: N/V/D,  nausea, vomiting, diarrhea  COMPARISON: None.  PROCEDURE: Axial images were obtained from the lung bases to the pubic symphysis by computed tomography. This study was performed with techniques to keep radiation doses as low as reasonably achievable, (ALARA). Individualized dose reduction techniques using automated exposure control or adjustment of mA and/or kV according to the patient size were employed.  FINDINGS:  ABDOMEN: In the visualized lung bases there is mild bronchiectasis and honeycombing suggesting pulmonary fibrosis. Pacemaker leads are partially visualized. Vascular calcifications noted. The heart is proper size. The limited non-contrast images of the liver are unremarkable. Gallbladder is mildly distended and contains material of high attenuation which is dependent, suspect multiple small gallstones; recommend gallbladder ultrasound. The spleen is unremarkable. No adrenal masses are seen. The aorta is normal in caliber. There is no significant free fluid or adenopathy.  There is no nephrolithiasis. There is no hydronephrosis.  PELVIS: The GI tract demonstrates small hiatal hernia. Air-fluid levels are identified in nondistended small and large bowel, consider enteritis. Uterus is midline. There is streak artifact from total right hip arthroplasty. The appendix is identified and appears normal. The urinary bladder is unremarkable. There is no fluid or adenopathy.      Distended gallbladder with probable numerous small gallstones; recommend gallbladder ultrasound.  Bowel gas pattern suggesting enteritis.   CTDI: 6.57 mGy DLP:332.81 mGy.cm  This report was signed and finalized on 5/22/2024 11:20 AM by Hiwot Alves MD.       Assessment:    Gastroenteritis, POA  Acute kidney injury secondary to above, POA  Pulmonary fibrosis on Ofev  Elevation of LFTs likely secondary to Ofev  History of DVT/PE on Eliquis  Sick sinus syndrome status post pacemaker placement  Diabetes mellitus type 2  HFrEF    Plan:    Acute  gastroenteritis  -Given  also having similar symptoms with recent travel.  GI panel and C. difficile also pending.  -Otherwise reassuring labs and vitals noted with no leukocytosis and afebrile.  -Gentle fluids given history of HFrEF.  -Given improvement since admission will go ahead and schedule Eliquis however monitor for hematemesis/hematochezia.  Hold Plavix for now.  -CT and ultrasound did note cholelithiasis however no current abdominal pain.  -Zofran.    Acute kidney injury  -Baseline 1 with current 2.2.  -Liter bolus given in ED.  Will continue gentle fluids.  -Hold Bumex/lisinopril for now.  -Hold all antihypertensives given hypotension.    Pulmonary fibrosis on Ofev  Elevation of LFTs likely secondary to Ofev  History of DVT/PE on Eliquis  Sick sinus syndrome status post pacemaker placement  Diabetes mellitus type 2  HFrEF  -LFTs currently at baseline since starting Ofev.  -Continue home medications as appropriate.  -ACHS fingerstick blood sugars with sliding scale.  -Further orders pending clinical course.      Risk Assessment: High  DVT Prophylaxis: Eliquis  Code Status: Full code  Diet: Clears advance as tolerated    Advance Care Planning   ACP discussion was declined by the patient. Patient does not have an advance directive, declines further assistance.           Vanita Sharpe, APRN  05/22/24  13:43 EDT    Dictated utilizing Dragon dictation.

## 2024-05-23 PROBLEM — N17.9 ACUTE KIDNEY FAILURE, UNSPECIFIED: Status: ACTIVE | Noted: 2024-05-23

## 2024-05-23 LAB
ADV 40+41 DNA STL QL NAA+NON-PROBE: NOT DETECTED
ANION GAP SERPL CALCULATED.3IONS-SCNC: 9.8 MMOL/L (ref 5–15)
ASTRO TYP 1-8 RNA STL QL NAA+NON-PROBE: NOT DETECTED
BASOPHILS # BLD AUTO: 0.02 10*3/MM3 (ref 0–0.2)
BASOPHILS NFR BLD AUTO: 0.3 % (ref 0–1.5)
BUN SERPL-MCNC: 22 MG/DL (ref 8–23)
BUN/CREAT SERPL: 15.3 (ref 7–25)
C CAYETANENSIS DNA STL QL NAA+NON-PROBE: NOT DETECTED
C COLI+JEJ+UPSA DNA STL QL NAA+NON-PROBE: NOT DETECTED
CALCIUM SPEC-SCNC: 8 MG/DL (ref 8.6–10.5)
CHLORIDE SERPL-SCNC: 109 MMOL/L (ref 98–107)
CO2 SERPL-SCNC: 20.2 MMOL/L (ref 22–29)
CREAT SERPL-MCNC: 1.44 MG/DL (ref 0.57–1)
CRYPTOSP DNA STL QL NAA+NON-PROBE: NOT DETECTED
D-LACTATE SERPL-SCNC: 0.7 MMOL/L (ref 0.5–2)
DEPRECATED RDW RBC AUTO: 54.8 FL (ref 37–54)
E HISTOLYT DNA STL QL NAA+NON-PROBE: NOT DETECTED
EAEC PAA PLAS AGGR+AATA ST NAA+NON-PRB: NOT DETECTED
EC STX1+STX2 GENES STL QL NAA+NON-PROBE: NOT DETECTED
EGFRCR SERPLBLD CKD-EPI 2021: 35.9 ML/MIN/1.73
EOSINOPHIL # BLD AUTO: 0.16 10*3/MM3 (ref 0–0.4)
EOSINOPHIL NFR BLD AUTO: 2.8 % (ref 0.3–6.2)
EPEC EAE GENE STL QL NAA+NON-PROBE: DETECTED
ERYTHROCYTE [DISTWIDTH] IN BLOOD BY AUTOMATED COUNT: 15.2 % (ref 12.3–15.4)
ETEC LTA+ST1A+ST1B TOX ST NAA+NON-PROBE: NOT DETECTED
G LAMBLIA DNA STL QL NAA+NON-PROBE: NOT DETECTED
GLUCOSE BLDC GLUCOMTR-MCNC: 111 MG/DL (ref 70–130)
GLUCOSE BLDC GLUCOMTR-MCNC: 122 MG/DL (ref 70–130)
GLUCOSE BLDC GLUCOMTR-MCNC: 133 MG/DL (ref 70–130)
GLUCOSE BLDC GLUCOMTR-MCNC: 159 MG/DL (ref 70–130)
GLUCOSE SERPL-MCNC: 112 MG/DL (ref 65–99)
HCT VFR BLD AUTO: 33.5 % (ref 34–46.6)
HGB BLD-MCNC: 10.9 G/DL (ref 12–15.9)
IMM GRANULOCYTES # BLD AUTO: 0.02 10*3/MM3 (ref 0–0.05)
IMM GRANULOCYTES NFR BLD AUTO: 0.3 % (ref 0–0.5)
LYMPHOCYTES # BLD AUTO: 2.05 10*3/MM3 (ref 0.7–3.1)
LYMPHOCYTES NFR BLD AUTO: 35.3 % (ref 19.6–45.3)
MCH RBC QN AUTO: 31.9 PG (ref 26.6–33)
MCHC RBC AUTO-ENTMCNC: 32.5 G/DL (ref 31.5–35.7)
MCV RBC AUTO: 98 FL (ref 79–97)
MONOCYTES # BLD AUTO: 0.68 10*3/MM3 (ref 0.1–0.9)
MONOCYTES NFR BLD AUTO: 11.7 % (ref 5–12)
NEUTROPHILS NFR BLD AUTO: 2.87 10*3/MM3 (ref 1.7–7)
NEUTROPHILS NFR BLD AUTO: 49.6 % (ref 42.7–76)
NOROVIRUS GI+II RNA STL QL NAA+NON-PROBE: NOT DETECTED
NRBC BLD AUTO-RTO: 0 /100 WBC (ref 0–0.2)
P SHIGELLOIDES DNA STL QL NAA+NON-PROBE: NOT DETECTED
PLATELET # BLD AUTO: 193 10*3/MM3 (ref 140–450)
PMV BLD AUTO: 9.1 FL (ref 6–12)
POTASSIUM SERPL-SCNC: 3.1 MMOL/L (ref 3.5–5.2)
RBC # BLD AUTO: 3.42 10*6/MM3 (ref 3.77–5.28)
RVA RNA STL QL NAA+NON-PROBE: NOT DETECTED
S ENT+BONG DNA STL QL NAA+NON-PROBE: NOT DETECTED
SAPO I+II+IV+V RNA STL QL NAA+NON-PROBE: NOT DETECTED
SHIGELLA SP+EIEC IPAH ST NAA+NON-PROBE: NOT DETECTED
SODIUM SERPL-SCNC: 139 MMOL/L (ref 136–145)
V CHOL+PARA+VUL DNA STL QL NAA+NON-PROBE: NOT DETECTED
V CHOLERAE DNA STL QL NAA+NON-PROBE: NOT DETECTED
WBC NRBC COR # BLD AUTO: 5.8 10*3/MM3 (ref 3.4–10.8)
Y ENTEROCOL DNA STL QL NAA+NON-PROBE: NOT DETECTED

## 2024-05-23 PROCEDURE — 25810000003 SODIUM CHLORIDE 0.9 % SOLUTION: Performed by: INTERNAL MEDICINE

## 2024-05-23 PROCEDURE — 87040 BLOOD CULTURE FOR BACTERIA: CPT | Performed by: INTERNAL MEDICINE

## 2024-05-23 PROCEDURE — 83605 ASSAY OF LACTIC ACID: CPT | Performed by: INTERNAL MEDICINE

## 2024-05-23 PROCEDURE — 85025 COMPLETE CBC W/AUTO DIFF WBC: CPT | Performed by: NURSE PRACTITIONER

## 2024-05-23 PROCEDURE — 82948 REAGENT STRIP/BLOOD GLUCOSE: CPT | Performed by: NURSE PRACTITIONER

## 2024-05-23 PROCEDURE — 25010000002 AZITHROMYCIN PER 500 MG: Performed by: INTERNAL MEDICINE

## 2024-05-23 PROCEDURE — 82948 REAGENT STRIP/BLOOD GLUCOSE: CPT

## 2024-05-23 PROCEDURE — G0378 HOSPITAL OBSERVATION PER HR: HCPCS

## 2024-05-23 PROCEDURE — 99232 SBSQ HOSP IP/OBS MODERATE 35: CPT | Performed by: NURSE PRACTITIONER

## 2024-05-23 PROCEDURE — 80048 BASIC METABOLIC PNL TOTAL CA: CPT | Performed by: NURSE PRACTITIONER

## 2024-05-23 RX ORDER — CLOPIDOGREL BISULFATE 75 MG/1
75 TABLET ORAL DAILY
Status: DISCONTINUED | OUTPATIENT
Start: 2024-05-23 | End: 2024-05-24 | Stop reason: HOSPADM

## 2024-05-23 RX ORDER — POTASSIUM CHLORIDE 20 MEQ/1
40 TABLET, EXTENDED RELEASE ORAL EVERY 4 HOURS
Status: COMPLETED | OUTPATIENT
Start: 2024-05-23 | End: 2024-05-23

## 2024-05-23 RX ADMIN — Medication 10 ML: at 09:10

## 2024-05-23 RX ADMIN — METOPROLOL TARTRATE 50 MG: 50 TABLET, FILM COATED ORAL at 09:09

## 2024-05-23 RX ADMIN — POTASSIUM CHLORIDE 40 MEQ: 1500 TABLET, EXTENDED RELEASE ORAL at 21:03

## 2024-05-23 RX ADMIN — POTASSIUM CHLORIDE 40 MEQ: 1500 TABLET, EXTENDED RELEASE ORAL at 09:09

## 2024-05-23 RX ADMIN — POTASSIUM CHLORIDE 40 MEQ: 1500 TABLET, EXTENDED RELEASE ORAL at 12:17

## 2024-05-23 RX ADMIN — CLOPIDOGREL BISULFATE 75 MG: 75 TABLET ORAL at 12:17

## 2024-05-23 RX ADMIN — Medication 10 ML: at 20:48

## 2024-05-23 RX ADMIN — PANTOPRAZOLE SODIUM 40 MG: 40 TABLET, DELAYED RELEASE ORAL at 09:09

## 2024-05-23 RX ADMIN — APIXABAN 2.5 MG: 2.5 TABLET, FILM COATED ORAL at 09:09

## 2024-05-23 RX ADMIN — APIXABAN 2.5 MG: 2.5 TABLET, FILM COATED ORAL at 21:03

## 2024-05-23 NOTE — THERAPY DISCHARGE NOTE
PT order was received.  Chart review revealed the patient has an AMPAC score of 24 with resulting HLM of 8; which indicates she does not require PT services.  PT order is being completed.

## 2024-05-23 NOTE — CASE MANAGEMENT/SOCIAL WORK
Discharge Planning Assessment  [facility]     Patient Name: Radha Whelan  MRN: [id number]  Today's Date: 5/23/2024    Admit Date: 5/22/2024    Plan: Dcp initiated at bedside, pt's daughters Delores and Afua present, pt agreeable to their input. Pt provided demographics. She and her  have been at current residence for 50+ yrs. Dr. Schaefer is her primary, she use [employer] [facility] or [facility]  for pharmacy, is agreeable to meds to bed. She has no POA, AD, or financial stressors. Her home DME is an [employer] and Cpap. She drives some, her spouse and daughters provide transportation. She plans to return home at [place].   Discharge Needs Assessment       Row Name 05/23/24 1116       Living Environment    People in Home spouse    Name(s) of People in Home Kinddaniele Tip    Current Living Arrangements home    Duration at Residence 50+ yrs    Potentially Unsafe Housing Conditions none    In the past 12 months has the electric, gas, oil, or water company threatened to shut off services in your home? No    Primary Care Provided by self    Family Caregiver if Needed child(nataliia), adult;spouse    Family Caregiver Names daughters Delores and Afua    Quality of Family Relationships helpful;involved    Able to Return to Prior Arrangements yes       Resource/Environmental Concerns    Resource/Environmental Concerns none    Transportation Concerns no car       Transportation Needs    In the past 12 months, has lack of transportation kept you from medical appointments or from getting medications? no    In the past 12 months, has lack of transportation kept you from meetings, work, or from getting things needed for daily living? No       Food Insecurity    Within the past 12 months, you worried that your food would run out before you got the money to buy more. Never true    Within the past 12 months, the food you bought just didn't last and you didn't have money to get more. Never true       Transition Planning    Patient/Family Anticipates Transition  to home with family       Discharge Needs Assessment    Readmission Within the Last 30 Days no previous admission in last 30 days    Equipment Currently Used at Home other (see comments)  [employer]    Concerns to be Addressed denies needs/concerns at this time;no discharge needs identified    Anticipated Changes Related to Illness none    Equipment Needed After Discharge none                   Discharge Plan       Row Name 05/23/24 1120       Plan    Plan Dcp initiated at bedside, pt's daughters Delores and Afua present, pt agreeable to their input. Pt provided demographics. She and her  have been at current residence for 50+ yrs. Dr. Schaefer is her primary, she use [employer] or [facility] for pharmacy, is agreeable to meds to bed. She has no POA, AD, or financial stressors. Her home DME is an [employer] and Cpap. She drives some, her spouse and daughters provide transportation. She plans to return home at [place].                  Continued Care and Services - Admitted Since 5/22/2024    No active coordination exists for this encounter.          Demographic Summary       Row Name 05/23/24 1114       General Information    Admission Type observation    Arrived From emergency department    Required Notices Provided Observation Status Notice    Referral Source admission list    Reason for Consult discharge planning    Preferred Language English       Contact Information    Permission Granted to Share Info With family/designee                   Functional Status       Row Name 05/23/24 1115       Functional Status    Usual Activity Tolerance moderate    Current Activity Tolerance fair       Physical Activity    On average, how many days per week do you engage in moderate to strenuous exercise (like a brisk walk)? 0 days    On average, how many minutes do you engage in exercise at this level? 0 min    Number of minutes of exercise per week 0       Functional Status, IADL    Medications independent    Meal Preparation independent     Housekeeping independent    Laundry independent    Shopping independent    IADL Comments ambulates without asistive device, has [employer]       Mental Status    General Appearance WDL WDL       Mental Status Summary    Recent Changes in Mental Status/Cognitive Functioning no changes       Employment/    Employment Status retired                   Psychosocial    No documentation.                  Abuse/Neglect    No documentation.                  Legal    No documentation.                  Substance Abuse    No documentation.                  Patient Forms    No documentation.                     Jessica Denney RN

## 2024-05-23 NOTE — PLAN OF CARE
Problem: Adult Inpatient Plan of Care  Goal: Plan of Care Review  Outcome: Ongoing, Progressing  Goal: Patient-Specific Goal (Individualized)  Outcome: Ongoing, Progressing  Goal: Absence of Hospital-Acquired Illness or Injury  Outcome: Ongoing, Progressing  Intervention: Identify and Manage Fall Risk  Recent Flowsheet Documentation  Taken 5/23/2024 1600 by Yamel Liu RN  Safety Promotion/Fall Prevention:   nonskid shoes/slippers when out of bed   safety round/check completed  Taken 5/23/2024 1400 by Yamel Liu RN  Safety Promotion/Fall Prevention:   safety round/check completed   room organization consistent  Taken 5/23/2024 1200 by Yamel Liu RN  Safety Promotion/Fall Prevention:   nonskid shoes/slippers when out of bed   safety round/check completed  Taken 5/23/2024 1000 by Yamel Liu RN  Safety Promotion/Fall Prevention:   safety round/check completed   room organization consistent  Taken 5/23/2024 0800 by Yamel Liu RN  Safety Promotion/Fall Prevention:   safety round/check completed   room organization consistent  Intervention: Prevent Skin Injury  Recent Flowsheet Documentation  Taken 5/23/2024 1600 by Yamel Liu RN  Body Position: position changed independently  Taken 5/23/2024 1400 by Yamel Liu RN  Body Position: position changed independently  Taken 5/23/2024 1200 by Yamel Liu RN  Body Position: position changed independently  Taken 5/23/2024 1000 by Yamel Liu RN  Body Position: position changed independently  Taken 5/23/2024 0800 by Yamel Liu RN  Body Position: position changed independently  Intervention: Prevent Infection  Recent Flowsheet Documentation  Taken 5/23/2024 1400 by Yamel Liu RN  Infection Prevention:   single patient room provided   rest/sleep promoted  Taken 5/23/2024 1000 by Yamel Liu RN  Infection Prevention:   single patient room provided   rest/sleep promoted  Taken 5/23/2024 0800 by Noe  Yamel LONG, RN  Infection Prevention:   single patient room provided   rest/sleep promoted  Goal: Optimal Comfort and Wellbeing  Outcome: Ongoing, Progressing  Intervention: Provide Person-Centered Care  Recent Flowsheet Documentation  Taken 5/23/2024 0800 by Yamel Liu, RN  Trust Relationship/Rapport:   care explained   choices provided  Goal: Readiness for Transition of Care  Outcome: Ongoing, Progressing     Problem: Asthma Comorbidity  Goal: Maintenance of Asthma Control  Outcome: Ongoing, Progressing     Problem: Behavioral Health Comorbidity  Goal: Maintenance of Behavioral Health Symptom Control  Outcome: Ongoing, Progressing     Problem: COPD (Chronic Obstructive Pulmonary Disease) Comorbidity  Goal: Maintenance of COPD Symptom Control  Outcome: Ongoing, Progressing     Problem: Diabetes Comorbidity  Goal: Blood Glucose Level Within Targeted Range  Outcome: Ongoing, Progressing     Problem: Heart Failure Comorbidity  Goal: Maintenance of Heart Failure Symptom Control  Outcome: Ongoing, Progressing     Problem: Hypertension Comorbidity  Goal: Blood Pressure in Desired Range  Outcome: Ongoing, Progressing     Problem: Obstructive Sleep Apnea Risk or Actual Comorbidity Management  Goal: Unobstructed Breathing During Sleep  Outcome: Ongoing, Progressing     Problem: Osteoarthritis Comorbidity  Goal: Maintenance of Osteoarthritis Symptom Control  Outcome: Ongoing, Progressing     Problem: Pain Chronic (Persistent) (Comorbidity Management)  Goal: Acceptable Pain Control and Functional Ability  Outcome: Ongoing, Progressing     Problem: Seizure Disorder Comorbidity  Goal: Maintenance of Seizure Control  Outcome: Ongoing, Progressing   Goal Outcome Evaluation:

## 2024-05-23 NOTE — PROGRESS NOTES
[facility] HOSPITALIST    PROGRESS NOTE    Name:  Radha Whelan   Age:  83 y.o.  Sex:  female  :  1940  MRN:  [id number]   Visit Number:  [id number]  Admission Date:  2024  Date Of Service:  24  Primary Care Physician:  Farhan Schaefer MD     LOS: 0 days :    Chief Complaint:      Nausea, vomiting, diarrhea    Subjective:    Patient seen and examined.  Overall feeling well today and requesting to be discharged home.  Advised would likely continue 1 further day with gentle fluids given continued JASPER.  Was noted to have some diarrhea overnight as well.  She is agreeable.    Hospital Course:    Ms. Whelan is a pleasant 83-year-old female with pertinent past medical history of coronary artery disease, chronic respiratory failure on 2 L nasal cannula, DVT/PE on Eliquis, sick sinus syndrome status post pacemaker placement, diabetes mellitus type 2, pulmonary fibrosis on Ofev, HFrEF, who presented to [facility] due to nausea, vomiting, and diarrhea for the past 3 days.  Patient denied associated abdominal pain, fever, melena, hematochezia, or urinary symptoms.  Patient with recent travel to [facility] where her symptoms began.  Patient  also having similar symptoms.     Upon ED presentation patient hypotensive with blood pressure 87/51, otherwise on room air hemodynamically stable.  Pertinent labs and imaging included creatinine of 2.2 with baseline 1.0, elevation of LFTs mildly which appears to be chronic, initial troponin 20 with repeat 17, lactic Tabor 2.4 with repeat 1.8, lipase 8, CT abdomen pelvis noted distended gallbladder with probable numerous small gallstones, bowel gas pattern suggesting enteritis, ultrasound gallbladder noted cholelithiasis with sludge.  Patient was given 1 L fluid bolus with Zofran.  Hospitalist consulted for further medical management due to acute kidney injury.  Patient noted to have EPEC on GI panel.  Overall diarrhea and vomiting improved.  Able to  tolerate clear liquid diet and requesting advancement.  Creatinine improved with supportive fluids.    Review of Systems:     All systems were reviewed and negative except as mentioned in subjective, assessment and plan.    Vital Signs:    Temp:  [97.3 °F (36.3 °C)-98 °F (36.7 °C)] 97.6 °F (36.4 °C)  Heart Rate:  [63-71] 64  Resp:  [18] 18  BP: ()/(46-55) 107/51    Intake and output:    I/O last 3 completed shifts:  In: 360 [P.O.:360]  Out: -   I/O this shift:  In: 600 [P.O.:600]  Out: -     Physical Examination:    General Appearance:  Alert and cooperative.  Chronically ill elderly female.   Head:  Atraumatic and normocephalic.   Eyes: Conjunctivae and sclerae normal, no icterus. No pallor.   Throat: No oral lesions, no thrush, oral mucosa moist.   Neck: Supple, trachea midline, no thyromegaly.   Lungs:   Breath sounds heard bilaterally equally.  No wheezing or crackles. No Pleural rub or bronchial breathing.  On 2 L nasal cannula unlabored.  Pacemaker to left chest wall.   Heart:  Normal S1 and S2, no murmur, no gallop, no rub. No JVD.   Abdomen:   Normal bowel sounds, no masses, no organomegaly. Soft, nontender, nondistended, no rebound tenderness.  No abdominal pain noted with deep palpation.   Extremities: Supple, no edema, no cyanosis, no clubbing.   Skin: No bleeding or rash.   Neurologic: Alert and oriented x 3. No facial asymmetry. Moves all four limbs. No tremors.      Laboratory results:    Results from last 7 days   Lab Units 05/23/24  0721 05/22/24  0959   SODIUM mmol/L 139 139   POTASSIUM mmol/L 3.1* 3.8   CHLORIDE mmol/L 109* 98   CO2 mmol/L 20.2* 22.5   BUN mg/dL 22 25*   CREATININE mg/dL 1.44* 2.25*   CALCIUM mg/dL 8.0* 8.9   BILIRUBIN mg/dL  --  0.7   ALK PHOS U/L  --  87   ALT (SGPT) U/L  --  44*   AST (SGOT) U/L  --  39*   GLUCOSE mg/dL 112* 206*     Results from last 7 days   Lab Units 05/23/24  0721 05/22/24  0959   WBC 10*3/mm3 5.80 7.50   HEMOGLOBIN g/dL 10.9* 13.2   HEMATOCRIT % 33.5*  "39.7   PLATELETS 10*3/mm3 193 246         Results from last 7 days   Lab Units 05/22/24  1244 05/22/24  0959   HSTROP T ng/L 17* 20*         No results for input(s): \"PHART\", \"QET6WVU\", \"PO2ART\", \"BOO9VEP\", \"BASEEXCESS\" in the last 8760 hours.   I have reviewed the patient's laboratory results.    Radiology results:    US Gallbladder    Result Date: 5/22/2024  RIGHT UPPER QUADRANT ULTRASOUND  HISTORY: gallstones, distended gallbladder on CT, n/v/d with no acute abdominal pain  COMPARISON: CT abdomen pelvis performed the same day.  PROCEDURE: Ultrasound images of the right upper quadrant were obtained.  FINDINGS:  The liver parenchyma is of proper echogenicity.  There is no focal abnormality. There is no wall thickening of the gallbladder. There are multiple small stones with sludge in the gallbladder correlating with CT findings. No evidence of ductal dilatation is identified. Limited images of the pancreas are  obscured by bowel gas. Right kidney measures 8.8 cm in length and appears normal. [  ]      Impression: Cholelithiasis and sludge.    This report was signed and finalized on 5/22/2024 1:01 PM by Hiwot Alves MD.      CT Abdomen Pelvis Without Contrast    Result Date: 5/22/2024  PROCEDURE: CT ABDOMEN PELVIS WO CONTRAST-  HISTORY: N/V/D, nausea, vomiting, diarrhea  COMPARISON: None.  PROCEDURE: Axial images were obtained from the lung bases to the pubic symphysis by computed tomography. This study was performed with techniques to keep radiation doses as low as reasonably achievable, (ALARA). Individualized dose reduction techniques using automated exposure control or adjustment of mA and/or kV according to the patient size were employed.  FINDINGS:  ABDOMEN: In the visualized lung bases there is mild bronchiectasis and honeycombing suggesting pulmonary fibrosis. Pacemaker leads are partially visualized. Vascular calcifications noted. The heart is proper size. The limited non-contrast images of the liver are " unremarkable. Gallbladder is mildly distended and contains material of high attenuation which is dependent, suspect multiple small gallstones; recommend gallbladder ultrasound. The spleen is unremarkable. No adrenal masses are seen. The aorta is normal in caliber. There is no significant free fluid or adenopathy.  There is no nephrolithiasis. There is no hydronephrosis.  PELVIS: The GI tract demonstrates small hiatal hernia. Air-fluid levels are identified in nondistended small and large bowel, consider enteritis. Uterus is midline. There is streak artifact from total right hip arthroplasty. The appendix is identified and appears normal. The urinary bladder is unremarkable. There is no fluid or adenopathy.      Impression: Distended gallbladder with probable numerous small gallstones; recommend gallbladder ultrasound.  Bowel gas pattern suggesting enteritis.   CTDI: 6.57 mGy DLP:332.81 mGy.cm  This report was signed and finalized on 5/22/2024 11:20 AM by Hiwot Alves MD.     I have reviewed the patient's radiology reports.    Medication Review:     I have reviewed the patient's active and prn medications.     Problem List:      JASPER (acute kidney injury)    Acute kidney failure, unspecified      Assessment:  Enteropathic E. coli gastroenteritis, POA  Acute kidney injury secondary to above, POA  Pulmonary fibrosis on Ofev  Elevation of LFTs likely secondary to Ofev  History of DVT/PE on Eliquis  Sick sinus syndrome status post pacemaker placement  Diabetes mellitus type 2  HFrEF      Plan:    Acute gastroenteritis  -EPEC.  -Otherwise reassuring labs and vitals noted with no leukocytosis and afebrile.  -Gentle fluids given history of HFrEF.  -Given improvement since admission will go ahead and schedule Eliquis/Plavix however monitor for hematemesis/hematochezia.    -CT and ultrasound did note cholelithiasis however no current abdominal pain.  -Zofran.     Acute kidney injury  -Baseline 1 with current 2.2.-Improved to  1.4 this morning.  -Liter bolus given in ED.  Will continue gentle fluids.  -Hold Bumex/lisinopril for now.  -Hold all antihypertensives given hypotension.     Pulmonary fibrosis on Ofev  Elevation of LFTs likely secondary to Ofev  History of DVT/PE on Eliquis  Sick sinus syndrome status post pacemaker placement  Diabetes mellitus type 2  HFrEF  -LFTs currently at baseline since starting Ofev.  -Continue home medications as appropriate.  -ACHS fingerstick blood sugars with sliding scale.    I have reviewed the copied text and it is accurate as of 5/23/2024        DVT Prophylaxis: Eliquis  Code Status: Full code  Diet: Clears advance as tolerated  Discharge Plan: Likely home tomorrow.    Vanita Sharpe, APRN  05/23/24  10:33 EDT    Dictated utilizing Dragon dictation.

## 2024-05-24 ENCOUNTER — READMISSION MANAGEMENT (OUTPATIENT)
Dept: CALL CENTER | Facility: HOSPITAL | Age: 84
End: 2024-05-24
Payer: MEDICARE

## 2024-05-24 VITALS
RESPIRATION RATE: 18 BRPM | BODY MASS INDEX: 27.92 KG/M2 | HEIGHT: 67 IN | WEIGHT: 177.91 LBS | SYSTOLIC BLOOD PRESSURE: 139 MMHG | TEMPERATURE: 97.6 F | DIASTOLIC BLOOD PRESSURE: 68 MMHG | HEART RATE: 75 BPM | OXYGEN SATURATION: 95 %

## 2024-05-24 LAB
ANION GAP SERPL CALCULATED.3IONS-SCNC: 6.2 MMOL/L (ref 5–15)
BASOPHILS # BLD AUTO: 0.01 10*3/MM3 (ref 0–0.2)
BASOPHILS NFR BLD AUTO: 0.2 % (ref 0–1.5)
BUN SERPL-MCNC: 16 MG/DL (ref 8–23)
BUN/CREAT SERPL: 16.3 (ref 7–25)
CALCIUM SPEC-SCNC: 8.3 MG/DL (ref 8.6–10.5)
CHLORIDE SERPL-SCNC: 111 MMOL/L (ref 98–107)
CO2 SERPL-SCNC: 20.8 MMOL/L (ref 22–29)
CREAT SERPL-MCNC: 0.98 MG/DL (ref 0.57–1)
DEPRECATED RDW RBC AUTO: 53.3 FL (ref 37–54)
EGFRCR SERPLBLD CKD-EPI 2021: 57 ML/MIN/1.73
EOSINOPHIL # BLD AUTO: 0.16 10*3/MM3 (ref 0–0.4)
EOSINOPHIL NFR BLD AUTO: 3.1 % (ref 0.3–6.2)
ERYTHROCYTE [DISTWIDTH] IN BLOOD BY AUTOMATED COUNT: 15 % (ref 12.3–15.4)
GLUCOSE BLDC GLUCOMTR-MCNC: 107 MG/DL (ref 70–130)
GLUCOSE SERPL-MCNC: 110 MG/DL (ref 65–99)
HCT VFR BLD AUTO: 30.7 % (ref 34–46.6)
HGB BLD-MCNC: 10.2 G/DL (ref 12–15.9)
IMM GRANULOCYTES # BLD AUTO: 0.01 10*3/MM3 (ref 0–0.05)
IMM GRANULOCYTES NFR BLD AUTO: 0.2 % (ref 0–0.5)
LYMPHOCYTES # BLD AUTO: 2.64 10*3/MM3 (ref 0.7–3.1)
LYMPHOCYTES NFR BLD AUTO: 51.1 % (ref 19.6–45.3)
MCH RBC QN AUTO: 32 PG (ref 26.6–33)
MCHC RBC AUTO-ENTMCNC: 33.2 G/DL (ref 31.5–35.7)
MCV RBC AUTO: 96.2 FL (ref 79–97)
MONOCYTES # BLD AUTO: 0.44 10*3/MM3 (ref 0.1–0.9)
MONOCYTES NFR BLD AUTO: 8.5 % (ref 5–12)
NEUTROPHILS NFR BLD AUTO: 1.91 10*3/MM3 (ref 1.7–7)
NEUTROPHILS NFR BLD AUTO: 36.9 % (ref 42.7–76)
NRBC BLD AUTO-RTO: 0 /100 WBC (ref 0–0.2)
PLATELET # BLD AUTO: 190 10*3/MM3 (ref 140–450)
PMV BLD AUTO: 9.3 FL (ref 6–12)
POTASSIUM SERPL-SCNC: 4.5 MMOL/L (ref 3.5–5.2)
POTASSIUM SERPL-SCNC: 4.7 MMOL/L (ref 3.5–5.2)
RBC # BLD AUTO: 3.19 10*6/MM3 (ref 3.77–5.28)
SODIUM SERPL-SCNC: 138 MMOL/L (ref 136–145)
WBC NRBC COR # BLD AUTO: 5.17 10*3/MM3 (ref 3.4–10.8)

## 2024-05-24 PROCEDURE — 80048 BASIC METABOLIC PNL TOTAL CA: CPT | Performed by: NURSE PRACTITIONER

## 2024-05-24 PROCEDURE — 82948 REAGENT STRIP/BLOOD GLUCOSE: CPT | Performed by: NURSE PRACTITIONER

## 2024-05-24 PROCEDURE — 84132 ASSAY OF SERUM POTASSIUM: CPT | Performed by: INTERNAL MEDICINE

## 2024-05-24 PROCEDURE — G0378 HOSPITAL OBSERVATION PER HR: HCPCS

## 2024-05-24 PROCEDURE — 85025 COMPLETE CBC W/AUTO DIFF WBC: CPT | Performed by: NURSE PRACTITIONER

## 2024-05-24 PROCEDURE — 99239 HOSP IP/OBS DSCHRG MGMT >30: CPT | Performed by: NURSE PRACTITIONER

## 2024-05-24 RX ORDER — ONDANSETRON 4 MG/1
4 TABLET, FILM COATED ORAL EVERY 8 HOURS PRN
Qty: 10 TABLET | Refills: 0 | Status: SHIPPED | OUTPATIENT
Start: 2024-05-24

## 2024-05-24 RX ADMIN — PANTOPRAZOLE SODIUM 40 MG: 40 TABLET, DELAYED RELEASE ORAL at 08:32

## 2024-05-24 RX ADMIN — CLOPIDOGREL BISULFATE 75 MG: 75 TABLET ORAL at 08:32

## 2024-05-24 RX ADMIN — METOPROLOL TARTRATE 50 MG: 50 TABLET, FILM COATED ORAL at 08:32

## 2024-05-24 RX ADMIN — Medication 10 ML: at 08:32

## 2024-05-24 RX ADMIN — APIXABAN 2.5 MG: 2.5 TABLET, FILM COATED ORAL at 08:32

## 2024-05-24 NOTE — DISCHARGE SUMMARY
[facility]   DISCHARGE SUMMARY      Name:  Radha Whelan   Age:  83 y.o.  Sex:  female  :  1940  MRN:  [id number]   Visit Number:  [id number]    Admission Date:  2024  Date of Discharge:  2024  Primary Care Physician:  Farhan Schaefer MD    Important issues to note:    -Patient admitted due to intractable nausea, vomiting, and diarrhea due to acute gastroenteritis from enteropathic E. coli.  -Patient improved with supportive care of IV fluids/Zofran.  -Antihypertensives/Bumex held as patient hypotensive upon admission.  Upon discharge patient normotensive and advised to monitor blood pressure closely and may reinitiate antihypertensives as long as p.o. intake remains adequate.  -Strict return precautions given.  -Follow-up with PCP in 3 to 5 days.    Discharge Diagnoses:     Enteropathic E. coli gastroenteritis, POA  Acute kidney injury secondary to above, POA  Pulmonary fibrosis on Ofev  Elevation of LFTs likely secondary to Ofev  History of DVT/PE on Eliquis  Sick sinus syndrome status post pacemaker placement  Diabetes mellitus type 2  HFrEF    Problem List:     Active Hospital Problems    Diagnosis  POA    **JASPER (acute kidney injury) [N17.9]  Yes    Acute kidney failure, unspecified [N17.9]  Yes      Resolved Hospital Problems   No resolved problems to display.     Presenting Problem:    Chief Complaint   Patient presents with    Vomiting     Pt reports worsening N/V/D since Friday. Denies any abd pain.       Consults:     Consulting Physician(s)                     None              Procedures Performed:        History of presenting illness/Hospital Course:    Ms. Whelan is a pleasant 83-year-old female with pertinent past medical history of coronary artery disease, chronic respiratory failure on 2 L nasal cannula, DVT/PE on Eliquis, sick sinus syndrome status post pacemaker placement, diabetes mellitus type 2, pulmonary fibrosis on Ofev, HFrEF, who presented to [facility]  due to nausea, vomiting, and diarrhea for the past 3 days.  Patient denied associated abdominal pain, fever, melena, hematochezia, or urinary symptoms.  Patient with recent travel to [facility] where her symptoms began.  Son-in-law also having similar symptoms.     Upon ED presentation patient hypotensive with blood pressure 87/51, otherwise on room air hemodynamically stable.  Pertinent labs and imaging included creatinine of 2.2 with baseline 1.0, elevation of LFTs mildly which appears to be chronic, initial troponin 20 with repeat 17, lactic Tabor 2.4 with repeat 1.8, lipase 8, CT abdomen pelvis noted distended gallbladder with probable numerous small gallstones, bowel gas pattern suggesting enteritis, ultrasound gallbladder noted cholelithiasis with sludge.  Patient was given 1 L fluid bolus with Zofran.  Hospitalist consulted for further medical management due to acute kidney injury.  Patient noted to have EPEC on GI panel.  Overall diarrhea and vomiting improved.  Able to tolerate clear liquid diet and requesting advancement.  Creatinine improved with supportive fluids.  Patient to monitor blood pressure closely.  Currently normotensive upon discharge.  Advised can restart home medications as usual as long as p.o. intake remains adequate.  She will follow-up with PCP in 3 to 5 days.  Strict return precautions given.    Vital Signs:    Temp:  [96.9 °F (36.1 °C)-98.5 °F (36.9 °C)] 97.6 °F (36.4 °C)  Heart Rate:  [67-85] 75  Resp:  [18] 18  BP: (103-139)/(57-68) 139/68    Physical Exam:    General Appearance:  Alert and cooperative.  Chronically ill elderly female.   Head:  Atraumatic and normocephalic.   Eyes: Conjunctivae and sclerae normal, no icterus. No pallor.   Ears:  Ears with no abnormalities noted.   Throat: No oral lesions, no thrush, oral mucosa moist.   Neck: Supple, trachea midline, no thyromegaly.   Back:   No kyphoscoliosis present. No tenderness to palpation.   Lungs:   Breath sounds heard  bilaterally equally.  No crackles or wheezing. No Pleural rub or bronchial breathing.  On 2 L unlabored.  Pacemaker to left chest wall.   Heart:  Normal S1 and S2, no murmur, no gallop, no rub. No JVD.   Abdomen:   Normal bowel sounds, no masses, no organomegaly. Soft, nontender, nondistended, no rebound tenderness.   Extremities: Supple, no edema, no cyanosis, no clubbing.   Pulses: Pulses palpable bilaterally.   Skin: No bleeding or rash.   Neurologic: Alert and oriented x 3. No facial asymmetry. Moves all four limbs. No tremors.     Pertinent Lab Results:     Results from last 7 days   Lab Units 05/24/24  0705 05/24/24  0042 05/23/24  0721 05/22/24  0959   SODIUM mmol/L 138  --  139 139   POTASSIUM mmol/L 4.7 4.5 3.1* 3.8   CHLORIDE mmol/L 111*  --  109* 98   CO2 mmol/L 20.8*  --  20.2* 22.5   BUN mg/dL 16  --  22 25*   CREATININE mg/dL 0.98  --  1.44* 2.25*   CALCIUM mg/dL 8.3*  --  8.0* 8.9   BILIRUBIN mg/dL  --   --   --  0.7   ALK PHOS U/L  --   --   --  87   ALT (SGPT) U/L  --   --   --  44*   AST (SGOT) U/L  --   --   --  39*   GLUCOSE mg/dL 110*  --  112* 206*     Results from last 7 days   Lab Units 05/24/24  0705 05/23/24  0721 05/22/24  0959   WBC 10*3/mm3 5.17 5.80 7.50   HEMOGLOBIN g/dL 10.2* 10.9* 13.2   HEMATOCRIT % 30.7* 33.5* 39.7   PLATELETS 10*3/mm3 190 193 246         Results from last 7 days   Lab Units 05/22/24  1244 05/22/24  0959   HSTROP T ng/L 17* 20*             Results from last 7 days   Lab Units 05/22/24  0959   LIPASE U/L 8*         Results from last 7 days   Lab Units 05/23/24  0136 05/23/24  0120   BLOODCX  No growth at 24 hours No growth at 24 hours       Pertinent Radiology Results:    Imaging Results (All)       Procedure Component Value Units Date/Time    US Gallbladder [[id number]] Collected: 05/22/24 1300     Updated: 05/22/24 1303    Narrative:      RIGHT UPPER QUADRANT ULTRASOUND     HISTORY: gallstones, distended gallbladder on CT, n/v/d with no acute  abdominal pain      COMPARISON: CT abdomen pelvis performed the same day.     PROCEDURE: Ultrasound images of the right upper quadrant were obtained.     FINDINGS:  The liver parenchyma is of proper echogenicity.  There is no  focal abnormality. There is no wall thickening of the gallbladder. There  are multiple small stones with sludge in the gallbladder correlating  with CT findings. No evidence of ductal dilatation is identified.  Limited images of the pancreas are  obscured by bowel gas. Right kidney  measures 8.8 cm in length and appears normal. [  ]       Impression:      Cholelithiasis and sludge.           This report was signed and finalized on 5/22/2024 1:01 PM by Hiwot Alves MD.       CT Abdomen Pelvis Without Contrast [[id number]] Collected: 05/22/24 1110     Updated: 05/22/24 1122    Narrative:      PROCEDURE: CT ABDOMEN PELVIS WO CONTRAST-     HISTORY: N/V/D, nausea, vomiting, diarrhea     COMPARISON: None.     PROCEDURE: Axial images were obtained from the lung bases to the pubic  symphysis by computed tomography. This study was performed with  techniques to keep radiation doses as low as reasonably achievable,  (ALARA). Individualized dose reduction techniques using automated  exposure control or adjustment of mA and/or kV according to the patient  size were employed.     FINDINGS:     ABDOMEN: In the visualized lung bases there is mild bronchiectasis and  honeycombing suggesting pulmonary fibrosis. Pacemaker leads are  partially visualized. Vascular calcifications noted. The heart is proper  size. The limited non-contrast images of the liver are unremarkable.  Gallbladder is mildly distended and contains material of high  attenuation which is dependent, suspect multiple small gallstones;  recommend gallbladder ultrasound. The spleen is unremarkable. No adrenal  masses are seen. The aorta is normal in caliber. There is no significant  free fluid or adenopathy.  There is no nephrolithiasis. There is  no  hydronephrosis.     PELVIS: The GI tract demonstrates small hiatal hernia. Air-fluid levels  are identified in nondistended small and large bowel, consider  enteritis. Uterus is midline. There is streak artifact from total right  hip arthroplasty. The appendix is identified and appears normal. The  urinary bladder is unremarkable. There is no fluid or adenopathy.        Impression:      Distended gallbladder with probable numerous small  gallstones; recommend gallbladder ultrasound.     Bowel gas pattern suggesting enteritis.        CTDI: 6.57 mGy  DLP:332.81 mGy.cm     This report was signed and finalized on 5/22/2024 11:20 AM by Hiwot Alves MD.               Echo:    Results for orders placed during the hospital encounter of 03/27/23    Adult Transthoracic Echo Complete w/ Color, Spectral and Contrast if Necessary Per Protocol    Interpretation Summary    Left ventricular ejection fraction appears to be 36 - 40%.    The left ventricular cavity is moderately dilated.    Left ventricular diastolic function is consistent with (grade I) impaired relaxation.    There is moderate calcification of the aortic valve mainly affecting the non-coronary, left coronary and right coronary cusp(s).    Estimated right ventricular systolic pressure from tricuspid regurgitation is normal (<35 mmHg).    Condition on Discharge:      Stable.    Code status during the hospital stay:    Code Status and Medical Interventions:   Ordered at: 05/22/24 1427     Level Of Support Discussed With:    Patient     Code Status (Patient has no pulse and is not breathing):    CPR (Attempt to Resuscitate)     Medical Interventions (Patient has pulse or is breathing):    Full Support     Discharge Disposition:    Home or Self Care    Discharge Medications:       Discharge Medications        New Medications        Instructions Start Date   ondansetron 4 MG tablet  Commonly known as: Zofran   4 mg, Oral, Every 8 Hours PRN             Changes to  Medications        Instructions Start Date   lovastatin 10 MG tablet  Commonly known as: MEVACOR  What changed: when to take this   10 mg, Oral, Nightly             Continue These Medications        Instructions Start Date   accu-chek soft touch lancets   Use as directed      acetaminophen 325 MG tablet  Commonly known as: TYLENOL   650 mg, Oral, Every 6 Hours PRN      albuterol sulfate  (90 Base) MCG/ACT inhaler  Commonly known as: PROVENTIL HFA;VENTOLIN HFA;PROAIR HFA   INHALE 2 PUFFS INTO THE LUNGS EVERY 4 HOURS AS NEEDED      amLODIPine 2.5 MG tablet  Commonly known as: NORVASC   2.5 mg, Oral, Daily      apixaban 2.5 MG tablet tablet  Commonly known as: ELIQUIS   2.5 mg, Oral, 2 Times Daily      bumetanide 1 MG tablet  Commonly known as: BUMEX   1 mg, Oral, Daily      clopidogrel 75 MG tablet  Commonly known as: PLAVIX   75 mg, Oral, Daily      Coenzyme Q10 200 MG capsule   200 mg, Oral, Every Other Day      Fish Oil 300 MG capsule   300 mg, Daily      glipizide 2.5 MG 24 hr tablet  Commonly known as: GLUCOTROL XL   2.5 mg, Oral, Daily      glucose blood test strip  Commonly known as: Accu-Chek Kayce Plus   TEST ONCE DAILY.      glucose monitor monitoring kit   1 each, Does not apply, As Needed      latanoprost 0.005 % ophthalmic solution  Commonly known as: XALATAN   PLACE 1 DROP IN EACH EYE EVERY DAY AT BEDTIME      lisinopril 40 MG tablet  Commonly known as: PRINIVIL,ZESTRIL   40 mg, Oral, Daily      melatonin 3 MG tablet   3 mg, Oral, Nightly      metoprolol tartrate 50 MG tablet  Commonly known as: LOPRESSOR   50 mg, Oral, 2 Times Daily      Ofev 150 MG capsule  Generic drug: Nintedanib Esylate   150 mg, Oral, 2 Times Daily, Take with food      ONE TOUCH ULTRA 2 w/Device kit   USE AS NEEDED FOR BLOOD    SUGAR MONITORING      pantoprazole 40 MG EC tablet  Commonly known as: PROTONIX   40 mg, Oral, Daily      promethazine-dextromethorphan 6.25-15 MG/5ML syrup  Commonly known as: PROMETHAZINE-DM   5 mL,  Oral, 4 Times Daily PRN      tobramycin-dexAMETHasone 0.3-0.1 % ophthalmic suspension  Commonly known as: TOBRADEX   1 drop, Both Eyes, As Needed      vitamin D3 125 MCG (5000 UT) capsule capsule   5,000 Units, Oral, Twice a week             Stop These Medications      doxycycline 100 MG capsule  Commonly known as: VIBRAMYCIN     Jardiance 10 MG tablet tablet  Generic drug: empagliflozin     levocetirizine 5 MG tablet  Commonly known as: XYZAL            Discharge Diet:     Diet Instructions       Diet: Diabetic Diets, Cardiac Diets; Healthy Heart (2-3 Na+); Thin (IDDSI 0); Consistent Carbohydrate      Discharge Diet:  Diabetic Diets  Cardiac Diets       Cardiac Diet: Healthy Heart (2-3 Na+)    Fluid Consistency: Thin (IDDSI 0)    Diabetic Diet: Consistent Carbohydrate          Activity at Discharge:     Activity Instructions       Activity as Tolerated            Follow-up Appointments:     Follow-up Information       Viky Gonzalez PA-C. Go on 5/31/2024.    Specialties: Physician Assistant, Family Medicine  Why: @ 4:00  Contact information:  [address] [place], [place] [id number]  [phone]                           Future Appointments   Date Time Provider Department Center   5/31/2024  4:00 PM Viky Gonzalez PA-C [facility]   6/19/2024  2:00 PM Karina Dias MD [facility]   6/20/2024  3:00 PM [facility] (Cl   6/24/2024  1:30 PM Stephan Laboy DO [facility]   7/12/2024 12:15 PM Ashlyn Mays MD [facility]   10/18/2024  3:30 PM Farhan Schaefer MD [facility]     Test Results Pending at Discharge:    Pending Labs       Order Current Status    Blood Culture - Blood, Arm, Left Preliminary result    Blood Culture - Blood, Arm, Left Preliminary result               Vanita Sharpe, APRN  05/24/24  10:44 EDT    Time: I spent 50 minutes on this discharge activity which included: face-to-face encounter with the patient, reviewing the data  in the system, coordination of the care with the nursing staff as well as consultants, documentation, and entering orders.     Dictated utilizing Dragon dictation.

## 2024-05-24 NOTE — DISCHARGE INSTRUCTIONS
Patient to be discharged home.  Increase water intake.  Continue all usual medications. Monitor BP closely.  Follow with PCP as scheduled.   Arash Oviedo MD, PGY2  Imaging unremarkable with no obvious source of sx pain. Likely MSK muscle strain. Strength has improved with pain medications. Pt feeling better. Will give orthopedic follow up. Pt in agreement with plan. Answered all questions and gave return precautions.

## 2024-05-24 NOTE — OUTREACH NOTE
Prep Survey      Flowsheet Row Responses   [facility] patient discharged from? [place]   Is LACE score < 7 ? Yes   Eligibility [facility]   Date of Admission 05/22/24   Date of Discharge 05/24/24   Discharge Disposition Home or Self Care   Discharge diagnosis JASPER (acute kidney injury)   Does the patient have one of the following disease processes/diagnoses(primary or secondary)? Other   Does the patient have Home health ordered? No   Is there a DME ordered? No   Prep survey completed? Yes            Kimberli ROOT - Registered Nurse

## 2024-05-24 NOTE — CASE MANAGEMENT/SOCIAL WORK
Case Management Discharge Note                Selected Continued Care - Admitted Since 5/22/2024       Destination    No services have been selected for the patient.                Durable Medical Equipment    No services have been selected for the patient.                Dialysis/Infusion    No services have been selected for the patient.                Home Medical Care    No services have been selected for the patient.                Therapy    No services have been selected for the patient.                Community Resources    No services have been selected for the patient.                Community & [facility]    No services have been selected for the patient.                    Transportation Services  Private: Car    Final Discharge Disposition Code: 01 - home or self-care

## 2024-05-28 ENCOUNTER — TRANSITIONAL CARE MANAGEMENT TELEPHONE ENCOUNTER (OUTPATIENT)
Dept: CALL CENTER | Facility: HOSPITAL | Age: 84
End: 2024-05-28
Payer: MEDICARE

## 2024-05-28 LAB
BACTERIA SPEC AEROBE CULT: NORMAL
BACTERIA SPEC AEROBE CULT: NORMAL

## 2024-05-28 NOTE — OUTREACH NOTE
Call Center TCM Note      Flowsheet Row Responses   [facility] patient discharged from? [facility]   Does the patient have one of the following disease processes/diagnoses(primary or secondary)? Other   TCM attempt successful? Yes   Call start time 1304   Call end time 1307   Discharge diagnosis JASPER (acute kidney injury)   Meds reviewed with patient/caregiver? Yes   Is the patient having any side effects they believe may be caused by any medication additions or changes? No   Does the patient have all medications ordered at discharge? Yes   Is the patient taking all medications as directed (includes completed medication regime)? Yes   Comments Hosp dc fu apt on 5/31/24 with PCP   Does the patient have an appointment with their PCP within 7-14 days of discharge? Yes   Has home health visited the patient within 72 hours of discharge? N/A   Psychosocial issues? No   Did the patient receive a copy of their discharge instructions? Yes   Nursing interventions Reviewed instructions with patient   What is the patient's perception of their health status since discharge? Improving  [diarrhea]   Is the patient/caregiver able to teach back signs and symptoms related to disease process for when to call PCP? Yes   Is the patient/caregiver able to teach back signs and symptoms related to disease process for when to call 911? Yes   Is the patient/caregiver able to teach back the hierarchy of who to call/visit for symptoms/problems? PCP, Specialist, Home health nurse, Urgent Care, ED, 911 Yes   If the patient is a current smoker, are they able to teach back resources for cessation? Not a smoker   TCM call completed? Yes   Call end time 1307            Loly Vega RN    5/28/2024, 13:12 EDT

## 2024-05-31 ENCOUNTER — OFFICE VISIT (OUTPATIENT)
Dept: INTERNAL MEDICINE | Facility: CLINIC | Age: 84
End: 2024-05-31
Payer: MEDICARE

## 2024-05-31 VITALS
BODY MASS INDEX: 27 KG/M2 | RESPIRATION RATE: 20 BRPM | WEIGHT: 172 LBS | DIASTOLIC BLOOD PRESSURE: 68 MMHG | HEART RATE: 60 BPM | SYSTOLIC BLOOD PRESSURE: 122 MMHG | TEMPERATURE: 98.2 F | HEIGHT: 67 IN | OXYGEN SATURATION: 95 %

## 2024-05-31 DIAGNOSIS — N17.9 AKI (ACUTE KIDNEY INJURY): ICD-10-CM

## 2024-05-31 DIAGNOSIS — A04.4 E. COLI GASTROENTERITIS: Primary | ICD-10-CM

## 2024-05-31 PROCEDURE — 99495 TRANSJ CARE MGMT MOD F2F 14D: CPT | Performed by: PHYSICIAN ASSISTANT

## 2024-05-31 PROCEDURE — 3078F DIAST BP <80 MM HG: CPT | Performed by: PHYSICIAN ASSISTANT

## 2024-05-31 PROCEDURE — 1159F MED LIST DOCD IN RCRD: CPT | Performed by: PHYSICIAN ASSISTANT

## 2024-05-31 PROCEDURE — 1160F RVW MEDS BY RX/DR IN RCRD: CPT | Performed by: PHYSICIAN ASSISTANT

## 2024-05-31 PROCEDURE — 1111F DSCHRG MED/CURRENT MED MERGE: CPT | Performed by: PHYSICIAN ASSISTANT

## 2024-05-31 PROCEDURE — 1126F AMNT PAIN NOTED NONE PRSNT: CPT | Performed by: PHYSICIAN ASSISTANT

## 2024-05-31 PROCEDURE — 3074F SYST BP LT 130 MM HG: CPT | Performed by: PHYSICIAN ASSISTANT

## 2024-05-31 NOTE — PROGRESS NOTES
"Transitional Care Follow Up Visit  Subjective     Radha Whelan is a 83 y.o. female who presents for a transitional care management visit.    Within 48 business hours after discharge our office contacted her via telephone to coordinate her care and needs.      I reviewed and discussed the details of that call along with the discharge summary, hospital problems, inpatient lab results, inpatient diagnostic studies, and consultation reports with Radha.     Current outpatient and discharge medications have been reconciled for the patient.  Reviewed by: Viky Gonzalez PA-C          5/24/2024     5:08 PM   Date of TCM Phone Call   [facility]   Date of Admission 5/22/2024   Date of Discharge 5/24/2024   Discharge Disposition Home or Self Care     Risk for Readmission (LACE) Score: 6 (5/24/2024  6:00 AM)      History of Present Illness   Course During Hospital Stay: Presented to [facility] ED on 5/22/2024 with intractable nausea, vomiting and diarrhea due to acute gastroenteritis from enteropathic E. coli.  She was hypotensive at the time and found to have acute kidney injury secondary to volume loss from gastroenteritis.  Antihypertensives and Bumex were held due to hypotension and she was advised to monitor blood pressure following discharge and resume antihypertensives when p.o. intake adequate.  Discharged home 5/24/2024.    Normotensive today, has resumed all antihypertensives. Diarrhea resolved completely 3 days ago. Appetite continues to improve and eating well. Drinking plenty of fluids. Denies abdominal pain.        The following portions of the patient's history were reviewed and updated as appropriate: allergies, current medications, past family history, past medical history, past social history, past surgical history, and problem list.    Review of Systems    Objective   /68   Pulse 60   Temp 98.2 °F (36.8 °C)   Resp 20   Ht 170.2 cm (67\")   Wt 78 kg (172 lb)   " SpO2 95%   BMI 26.94 kg/m²   Physical Exam  Vitals and nursing note reviewed.   Constitutional:       General: She is not in acute distress.     Appearance: Normal appearance. She is well-developed. She is not ill-appearing, toxic-appearing or diaphoretic.      Interventions: Nasal cannula in place.   HENT:      Head: Normocephalic and atraumatic.      Right Ear: External ear normal.      Left Ear: External ear normal.   Eyes:      General: No scleral icterus.     Extraocular Movements: Extraocular movements intact.      Conjunctiva/sclera: Conjunctivae normal.      Pupils: Pupils are equal, round, and reactive to light.   Cardiovascular:      Rate and Rhythm: Normal rate and regular rhythm.      Heart sounds: Normal heart sounds. No murmur heard.     No friction rub. No gallop.   Pulmonary:      Effort: Pulmonary effort is normal. No respiratory distress.      Breath sounds: Normal breath sounds. No wheezing, rhonchi or rales.   Chest:      Chest wall: No tenderness.   Abdominal:      General: Bowel sounds are normal. There is no distension.      Palpations: Abdomen is soft.      Tenderness: There is no abdominal tenderness. There is no right CVA tenderness, left CVA tenderness or guarding.   Musculoskeletal:         General: No tenderness or deformity. Normal range of motion.      Cervical back: Normal range of motion and neck supple.      Right lower leg: No edema.      Left lower leg: No edema.   Skin:     General: Skin is warm and dry.      Capillary Refill: Capillary refill takes less than 2 seconds.      Coloration: Skin is not jaundiced.      Findings: No rash.   Neurological:      Mental Status: She is alert and oriented to person, place, and time. Mental status is at baseline.      Cranial Nerves: No cranial nerve deficit.      Sensory: No sensory deficit.      Motor: No abnormal muscle tone.      Coordination: Coordination normal.      Deep Tendon Reflexes: Reflexes normal.   Psychiatric:         Mood  and Affect: Mood normal.         Behavior: Behavior normal. Behavior is cooperative.         Thought Content: Thought content normal.         Judgment: Judgment normal.         Assessment & Plan   Diagnoses and all orders for this visit:    1. E. coli gastroenteritis (Primary)    2. JASPER (acute kidney injury)      Gastroenteritis due to enteric E. coli resolved, no longer symptomatic as diarrhea resolved 3 days ago.  Advised to advance diet as tolerated and stay well-hydrated.  Acute kidney injury present upon admission resolved prior to discharge.  Hypotension present on admission resolved and antihypertensives resumed.    Hospital admission records reviewed including labs and imaging.    Transitional Care Management Certification  I certify that the following are true:  Communication was made within 2 business days of discharge.  Complexity of Medical Decision Making is moderate.  Face to face visit occurred within 14 days.

## 2024-06-05 RX ORDER — BUMETANIDE 1 MG/1
1 TABLET ORAL DAILY
Qty: 90 TABLET | Refills: 3 | Status: SHIPPED | OUTPATIENT
Start: 2024-06-05 | End: 2024-06-06 | Stop reason: SDUPTHER

## 2024-06-06 RX ORDER — BUMETANIDE 1 MG/1
1 TABLET ORAL DAILY
Qty: 90 TABLET | Refills: 3 | Status: SHIPPED | OUTPATIENT
Start: 2024-06-06

## 2024-06-12 RX ORDER — AMLODIPINE BESYLATE 2.5 MG/1
2.5 TABLET ORAL DAILY
Qty: 30 TABLET | Refills: 5 | Status: SHIPPED | OUTPATIENT
Start: 2024-06-12

## 2024-06-19 ENCOUNTER — OFFICE VISIT (OUTPATIENT)
Dept: PULMONOLOGY | Facility: CLINIC | Age: 84
End: 2024-06-19
Payer: MEDICARE

## 2024-06-19 VITALS
RESPIRATION RATE: 18 BRPM | HEIGHT: 67 IN | SYSTOLIC BLOOD PRESSURE: 118 MMHG | HEART RATE: 70 BPM | BODY MASS INDEX: 26.9 KG/M2 | WEIGHT: 171.4 LBS | OXYGEN SATURATION: 98 % | DIASTOLIC BLOOD PRESSURE: 70 MMHG

## 2024-06-19 DIAGNOSIS — J84.112 IPF (IDIOPATHIC PULMONARY FIBROSIS): Primary | ICD-10-CM

## 2024-06-19 DIAGNOSIS — G47.34 NOCTURNAL HYPOXIA: ICD-10-CM

## 2024-06-19 DIAGNOSIS — J98.4 RESTRICTIVE LUNG DISEASE: ICD-10-CM

## 2024-06-19 DIAGNOSIS — Z86.711 HISTORY OF PULMONARY EMBOLISM: ICD-10-CM

## 2024-06-19 DIAGNOSIS — G47.33 OBSTRUCTIVE SLEEP APNEA: ICD-10-CM

## 2024-06-19 PROCEDURE — 3074F SYST BP LT 130 MM HG: CPT | Performed by: INTERNAL MEDICINE

## 2024-06-19 PROCEDURE — 99214 OFFICE O/P EST MOD 30 MIN: CPT | Performed by: INTERNAL MEDICINE

## 2024-06-19 PROCEDURE — 3078F DIAST BP <80 MM HG: CPT | Performed by: INTERNAL MEDICINE

## 2024-06-19 NOTE — PROGRESS NOTES
"  Chief Complaint   Patient presents with    Shortness of Breath    Follow-up         Subjective   Radha Whelan is a 83 y.o. female.   Patient was evaluated today for follow up of shortness of breath, ODELL and IPF/UIP.     Patient says that her symptoms have been stable since the last clinic visit. she reports no recent exacerbations.      She is tolerating Ofev but is having diarrhea.     Patient doesn't report any issues with the PAP device. The patient describes no significant issues with her mask either.     Patient says that the compliance with the use of the equipment is good.     Patient says that her symptoms of fatigue & daytime sleepiness have been helped greatly with the use of PAP, as prescribed.     The patient says that she is using oxygen as prescribed and feels that it appears to be helping some of her symptoms.      The following portions of the patient's history were reviewed and updated as appropriate: allergies, current medications, past family history, past medical history, past social history, past surgical history, and problem list.    Review of Systems   HENT:  Negative for sinus pressure, sneezing and sore throat.    Respiratory:  Positive for shortness of breath. Negative for cough, chest tightness and wheezing.    Psychiatric/Behavioral:  Negative for sleep disturbance.        Objective   Visit Vitals  /70   Pulse 70   Resp 18   Ht 170.2 cm (67.01\") Comment: pt reported   Wt 77.7 kg (171 lb 6.4 oz)   SpO2 98% Comment: on RA at rest   BMI 26.84 kg/m²         BMI Readings from Last 3 Encounters:   06/19/24 26.84 kg/m²   05/31/24 26.94 kg/m²   05/24/24 27.86 kg/m²       Physical Exam  Vitals reviewed.   Constitutional:       Appearance: She is well-developed.   HENT:      Head: Atraumatic.      Mouth/Throat:      Comments: Oropharynx was crowded.  Cardiovascular:      Comments: PPM noted.   Pulmonary:      Effort: Pulmonary effort is normal.      Comments: Somewhat hyperresonant to " percussion.  Somewhat decreased air entry.  Mild scattered crackles noted.   Musculoskeletal:      Comments: Used a wheelchair.    Neurological:      Mental Status: She is alert and oriented to person, place, and time.         Assessment & Plan   Diagnoses and all orders for this visit:    1. IPF (idiopathic pulmonary fibrosis) (Primary)  -     Hepatic Function Panel; Future  -     CT Chest Hi Resolution Diagnostic; Future  -     Converted Six Minute Walk; Future    2. History of pulmonary embolism    3. Restrictive lung disease  -     Hepatic Function Panel; Future  -     CT Chest Hi Resolution Diagnostic; Future  -     Converted Six Minute Walk; Future    4. Obstructive sleep apnea    5. Nocturnal hypoxia           Return in about 3 months (around 9/26/2024) for Recheck, Imaging, Labs, For Lora Gayle), ...Also 8 mths w/Yady.    DISCUSSION (if any):  Last CT scan results was reviewed in great detail with the patient.  Results for orders placed during the hospital encounter of 10/22/23    CT Angiogram Chest Pulmonary Embolism    Narrative  FINAL REPORT    TECHNIQUE:  Multiple axial CT images were obtained through the chest  following IV contrast using a CTA/PE protocol.  3D/MIP  reconstruction images were also performed. This study was  performed with techniques to keep radiation doses as low as  reasonably achievable (ALARA). Individualized dose reduction  techniques using automated exposure control or adjustment of mA  and/or kV according to the patient's size were employed.    CLINICAL HISTORY:  soa, recent PE    FINDINGS:  PAs and aorta: Bilateral pulmonary emboli, not significantly changed from the prior examination. These are well seen on images 41 and 42 series 4.  The volume of clot is essentially unchanged from the prior examination.   Thoracic aorta is unremarkable.    Heart/mediastinum: No evidence for right heart strain.  No pericardial effusion.  The heart is normal in size.  A left-sided  pacer is in place.    Lungs: There are diffuse groundglass opacities more prominent in the upper lobes and on the right side.  Multifocal patchy airspace opacities.    Lymph nodes: No pathologically enlarged thoracic lymph nodes.    Pleura: No pneumothorax or pleural effusion.    Chest Wall: No chest wall contusion.    Bones: No acute fracture.    Upper abdomen: There is a small hiatal hernia.  Vicarious excretion of contrast is seen within the gallbladder.    Impression  Bilateral pulmonary emboli, not significantly changed from the prior study.    Mild to moderate groundglass opacities and multifocal airspace opacities, with appearance most consistent with pulmonary edema with superimposed infectious process.    Authenticated and Electronically Signed by MD Key Richard on 10/22/2023 06:29:01 PM      Results for orders placed during the hospital encounter of 07/27/23    CT Chest Hi Resolution Diagnostic    Narrative  PROCEDURE: CT CHEST HI RESOLUTION DIAGNOSTIC-    HISTORY: Interstitial lung disease; R06.02-Shortness of breath;  J84.9-Interstitial pulmonary disease, unspecified; Z86.16-Personal  history of COVID-19    COMPARISON: None .    PROCEDURE: 1 mm axial images were obtained at 10 mm increments of the  chest.    FINDINGS: There is mild honeycombing in the posterior basal regions.  There are scattered areas of traction bronchiectasis. There is bilateral  multifocal peripheral subpleural scarring. There is an implantable  cardiac device in place. The heart size is normal. There is no  pericardial or pleural effusion.    Impression  Findings suggestive of probable usual interstitial  pneumonia.        DLP:  CTDI: 1.34      This study was performed with techniques to keep radiation doses as low  as reasonably achievable (ALARA). Individualized dose reduction  techniques using automated exposure control or adjustment of mA and/or  kV according to the patient size were employed.          Images were  reviewed, interpreted, and dictated by MAXWELL Yancey  Transcribed by Olivia Arreola PA-C.    This report was signed and finalized on 7/28/2023 2:43 PM by MAXWELL Molina.      LFTs reviewed.   AST was 39  ALT was 44    Latest available PFTs were reviewed.  Consistent with severe restriction. Performed in July 2023.    PFTs will be ordered to be done upon follow up.    We have reviewed her pulmonary medications in great detail.    She will need to continue Ofev.    Compliance with medications stressed.     Side effects of prescribed medications discussed with the patient    Will repeat LFTs in late August 2024    HRCT before next appointment.     Latest PAP device provided in Sept 2023  DME company: [employer]    Current PAP settings: 6-15  Current mask type: nasal pillows    Compliance data was obtained from the her device/DME company.    Continue PAP device on a regular basis, at least 4 hours or more per night.    Sleep hygiene measures were discussed    The patient was advised to continue using oxygen with exercise & at night.    She will need to continue Eliquis for life long use, due to PE.     Vaccination status addressed.    Up-to-date with influenza vaccinations.     Up-to-date with pneumonia vaccinations.     It was recommended that the patient consider Prevnar-20 vaccination and discuss it with her PCP, if not already obtained.       Dictated utilizing Dragon dictation.    This document was electronically signed by Kairna Dias MD on 06/19/24 at 14:33 EDT

## 2024-06-20 ENCOUNTER — HOSPITAL ENCOUNTER (OUTPATIENT)
Dept: MAMMOGRAPHY | Facility: HOSPITAL | Age: 84
Discharge: HOME | End: 2024-06-20
Admitting: INTERNAL MEDICINE
Payer: MEDICARE

## 2024-06-20 DIAGNOSIS — Z12.31 VISIT FOR SCREENING MAMMOGRAM: ICD-10-CM

## 2024-06-20 PROCEDURE — 77063 BREAST TOMOSYNTHESIS BI: CPT

## 2024-06-20 PROCEDURE — 77067 SCR MAMMO BI INCL CAD: CPT

## 2024-06-26 ENCOUNTER — TELEPHONE (OUTPATIENT)
Dept: PULMONOLOGY | Facility: CLINIC | Age: 84
End: 2024-06-26

## 2024-06-26 NOTE — TELEPHONE ENCOUNTER
Hub staff attempted to follow warm transfer process and was unsuccessful     Caller: VICKY    Relationship to patient:     Best call back number: [phone]     Patient is needing: REF NUMBER [id number] FAXING REQUEST FOR WRITTEN PRESCRIPTION TODAY, PLEASE ADVISE. FAX BACK TO #[phone]

## 2024-07-06 DIAGNOSIS — E11.9 TYPE 2 DIABETES MELLITUS WITHOUT COMPLICATION, WITHOUT LONG-TERM CURRENT USE OF INSULIN: ICD-10-CM

## 2024-07-08 RX ORDER — BLOOD SUGAR DIAGNOSTIC
STRIP MISCELLANEOUS
Qty: 100 EACH | Refills: 3 | Status: SHIPPED | OUTPATIENT
Start: 2024-07-08

## 2024-07-11 DIAGNOSIS — E11.9 TYPE 2 DIABETES MELLITUS WITHOUT COMPLICATION, WITHOUT LONG-TERM CURRENT USE OF INSULIN: ICD-10-CM

## 2024-07-11 RX ORDER — BLOOD SUGAR DIAGNOSTIC
STRIP MISCELLANEOUS
Qty: 100 EACH | Refills: 3 | OUTPATIENT
Start: 2024-07-11

## 2024-07-12 ENCOUNTER — OFFICE VISIT (OUTPATIENT)
Dept: CARDIOLOGY | Facility: CLINIC | Age: 84
End: 2024-07-12
Payer: MEDICARE

## 2024-07-12 DIAGNOSIS — I25.10 CORONARY ARTERY DISEASE INVOLVING NATIVE CORONARY ARTERY OF NATIVE HEART WITHOUT ANGINA PECTORIS: Primary | ICD-10-CM

## 2024-07-12 DIAGNOSIS — I50.32 CHRONIC HEART FAILURE WITH PRESERVED EJECTION FRACTION (HFPEF): ICD-10-CM

## 2024-07-12 DIAGNOSIS — I10 ESSENTIAL HYPERTENSION: ICD-10-CM

## 2024-07-12 DIAGNOSIS — E78.5 DYSLIPIDEMIA: ICD-10-CM

## 2024-07-12 RX ORDER — METOPROLOL SUCCINATE 50 MG/1
50 TABLET, EXTENDED RELEASE ORAL DAILY
Qty: 90 TABLET | Refills: 3 | Status: SHIPPED | OUTPATIENT
Start: 2024-07-12

## 2024-07-12 NOTE — PROGRESS NOTES
"[facility] Cardiology    Encounter Date: 2024    Patient ID: Radha Whelan is a 83 y.o. female.  : 1940     PCP: Farhan Schaefer MD       Chief Complaint: Chronic heart failure with preserved ejection fraction (HFp      PROBLEM LIST:  Nonobstructive coronary artery disease  Echo 2019: EF 43%, grade 1 diastolic dysfunction  C 2019: No hemodynamically significant CAD  [facility], 2023: EF 55%. Nonobstructive plaque disease involving multiple vessels without any evidence of hemodynamically significant coronary disease. Normal hemodynamics.  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 Laboy  Hypertension  Pulmonary embolism, started on anticoagulation.  Pulmonary fibrosis, on supplemental oxygen.     History of Present Illness  Patient presents today for a 6 month follow-up with a history of CAD, HFpEF, and cardiac risk factors. Since last visit,     Allergies   Allergen Reactions    Covid-19 Mrna Vacc (Moderna) Other (See Comments)     Myocarditits      Cumini Other (See Comments)     Complex migrane  migraine    Cheese Other (See Comments)     migrane    Migraine    Ibuprofen Rash     Swelling all over  \"Swelling all over\"    Other Other (See Comments)     \"ice cream\"-migranes    \"ice cream\" migraines    Saccharin Other (See Comments)     migrane    Migraine         Current Outpatient Medications:     acetaminophen (TYLENOL) 325 MG tablet, Take 2 tablets by mouth Every 6 (Six) Hours As Needed for Mild Pain ., Disp: , Rfl:     albuterol sulfate  (90 Base) MCG/ACT inhaler, INHALE 2 PUFFS INTO THE LUNGS EVERY " 4 HOURS AS NEEDED, Disp: 6.7 g, Rfl: 0    amLODIPine (NORVASC) 2.5 MG tablet, TAKE 1 TABLET BY MOUTH DAILY, Disp: 30 tablet, Rfl: 5    apixaban (ELIQUIS) 2.5 MG tablet tablet, Take 1 tablet by mouth 2 (Two) Times a Day., Disp: 180 tablet, Rfl: 2    Blood Glucose Monitoring Suppl (ONE TOUCH ULTRA 2) w/Device kit, USE AS NEEDED FOR BLOOD    SUGAR MONITORING, Disp: 1 each, Rfl: 0    bumetanide (BUMEX) 1 MG tablet, Take 1 tablet by mouth Daily., Disp: 90 tablet, Rfl: 3    clopidogrel (PLAVIX) 75 MG tablet, TAKE 1 TABLET BY MOUTH DAILY, Disp: 90 tablet, Rfl: 3    Coenzyme Q10 200 MG capsule, Take 200 mg by mouth Every Other Day., Disp: , Rfl:     glipizide (GLUCOTROL XL) 2.5 MG 24 hr tablet, TAKE 1 TABLET BY MOUTH DAILY, Disp: 90 tablet, Rfl: 3    glucose blood (Accu-Chek Kayce Plus) test strip, TEST ONCE DAILY., Disp: 100 each, Rfl: 3    glucose monitor monitoring kit, 1 each As Needed (blood sugar)., Disp: 1 each, Rfl: 1    Lancets (accu-chek soft touch) lancets, Use as directed, Disp: 100 each, Rfl: 12    latanoprost (XALATAN) 0.005 % ophthalmic solution, PLACE 1 DROP IN EACH EYE EVERY DAY AT BEDTIME, Disp: , Rfl:     lisinopril (PRINIVIL,ZESTRIL) 40 MG tablet, TAKE 1 TABLET BY MOUTH DAILY, Disp: 90 tablet, Rfl: 3    lovastatin (MEVACOR) 10 MG tablet, Take 1 tablet by mouth Every Night. (Patient taking differently: Take 1 tablet by mouth Every Other Day.), Disp: 90 tablet, Rfl: 3    melatonin 3 MG tablet, Take 1 tablet by mouth Every Night., Disp: , Rfl:     Nintedanib Esylate (Ofev) 150 MG capsule, Take 150 mg by mouth 2 (Two) Times a Day. Take with food, Disp: 180 capsule, Rfl: 3    pantoprazole (PROTONIX) 40 MG EC tablet, TAKE 1 TABLET BY MOUTH DAILY, Disp: 90 tablet, Rfl: 3    tobramycin-dexamethasone (TOBRADEX) 0.3-0.1 % ophthalmic suspension, Administer 1 drop to both eyes As Needed (dryness)., Disp: 5 mL, Rfl: 1    vitamin D3 125 MCG (5000 UT) capsule capsule, Take 1 capsule by mouth. Twice a week, Disp: , Rfl:      metoprolol succinate XL (TOPROL-XL) 50 MG 24 hr tablet, Take 1 tablet by mouth Daily., Disp: 90 tablet, Rfl: 3    Omega-3 Fatty Acids (Fish Oil) 300 MG capsule, Take 300 mg by mouth Daily., Disp: , Rfl:     ondansetron (Zofran) 4 MG tablet, Take 1 tablet by mouth Every 8 (Eight) Hours As Needed for Nausea or Vomiting. (Patient not taking: Reported on 7/12/2024), Disp: 10 tablet, Rfl: 0    promethazine-dextromethorphan (PROMETHAZINE-DM) 6.25-15 MG/5ML syrup, Take 5 mL by mouth 4 (Four) Times a Day As Needed for Cough., Disp: 240 mL, Rfl: 0    The following portions of the patient's history were reviewed and updated as appropriate: allergies, current medications, past family history, past medical history, past social history, past surgical history and problem list.    ROS  Review of Systems   14 point ROS negative except for that listed in the HPI.         Objective:     There were no vitals taken for this visit.     Physical Exam  Constitutional: Patient appears well-developed and well-nourished.   HENT: HEENT exam unremarkable.   Neck: Neck supple. No JVD present. No carotid bruits.   Cardiovascular: Normal rate, regular rhythm and normal heart sounds. No murmur heard.   2+ symmetric pulses.   Pulmonary/Chest: Breath sounds normal. Does not exhibit tenderness.   Abdominal: Abdomen benign.   Musculoskeletal: Does not exhibit edema.   Neurological: Neurological exam unremarkable.   Vitals reviewed.    Data Review:   Lab Results   Component Value Date    GLUCOSE 110 (H) 05/24/2024    BUN 16 05/24/2024    CREATININE 0.98 05/24/2024    EGFR 57.0 (L) 05/24/2024    BCR 16.3 05/24/2024     05/24/2024    K 4.7 05/24/2024    CO2 20.8 (L) 05/24/2024    CALCIUM 8.3 (L) 05/24/2024    ALBUMIN 3.9 05/22/2024    AST 39 (H) 05/22/2024    ALT 44 (H) 05/22/2024     Lab Results   Component Value Date    CHLPL 207 (H) 04/09/2024    TRIG 195 (H) 04/09/2024    HDL 56 04/09/2024     (H) 04/09/2024      Lab Results    Component Value Date    WBC 5.17 05/24/2024    RBC 3.19 (L) 05/24/2024    HGB 10.2 (L) 05/24/2024    HCT 30.7 (L) 05/24/2024    MCV 96.2 05/24/2024     05/24/2024     Lab Results   Component Value Date    TSH 5.940 (H) 04/09/2024     Lab Results   Component Value Date    HGBA1C 7.50 (H) 05/22/2024        Procedures       Advance Care Planning   ACP discussion was declined by the patient. Patient does not have an advance directive, declines further assistance.           Assessment:      Diagnosis Plan   1. Coronary artery disease involving native coronary artery of native heart without angina pectoris        2. Chronic heart failure with preserved ejection fraction (HFpEF)        3. Essential hypertension        4. Dyslipidemia          Plan:   Stable cardiac status.   Continue current medications.   FU in  MO, sooner as needed.  Thank you for allowing us to participate in the care of your patient.             Please note that portions of this note may have been completed with a voice recognition program. Efforts were made to edit the dictations, but occasionally words are mistranscribed.

## 2024-07-26 ENCOUNTER — HOSPITAL ENCOUNTER (OUTPATIENT)
Dept: CT IMAGING | Facility: HOSPITAL | Age: 84
Discharge: HOME | End: 2024-07-26
Payer: MEDICARE

## 2024-07-26 DIAGNOSIS — J84.112 IPF (IDIOPATHIC PULMONARY FIBROSIS): ICD-10-CM

## 2024-07-26 DIAGNOSIS — J98.4 RESTRICTIVE LUNG DISEASE: ICD-10-CM

## 2024-07-26 PROCEDURE — 71250 CT THORAX DX C-: CPT

## 2024-08-05 ENCOUNTER — OFFICE VISIT (OUTPATIENT)
Dept: CARDIOLOGY | Facility: CLINIC | Age: 84
End: 2024-08-05
Payer: MEDICARE

## 2024-08-05 VITALS
HEIGHT: 67 IN | WEIGHT: 173 LBS | DIASTOLIC BLOOD PRESSURE: 56 MMHG | SYSTOLIC BLOOD PRESSURE: 108 MMHG | HEART RATE: 91 BPM | BODY MASS INDEX: 27.15 KG/M2 | OXYGEN SATURATION: 94 %

## 2024-08-05 DIAGNOSIS — G47.33 OSA ON CPAP: ICD-10-CM

## 2024-08-05 DIAGNOSIS — I10 ESSENTIAL HYPERTENSION: ICD-10-CM

## 2024-08-05 DIAGNOSIS — I49.5 SINUS NODE DYSFUNCTION: Primary | ICD-10-CM

## 2024-08-05 DIAGNOSIS — Z95.0 PRESENCE OF CARDIAC PACEMAKER: ICD-10-CM

## 2024-08-05 PROBLEM — N17.9 ACUTE KIDNEY FAILURE, UNSPECIFIED: Status: RESOLVED | Noted: 2024-05-23 | Resolved: 2024-08-05

## 2024-08-05 PROBLEM — N17.9 AKI (ACUTE KIDNEY INJURY): Status: RESOLVED | Noted: 2024-05-22 | Resolved: 2024-08-05

## 2024-08-05 PROCEDURE — 3078F DIAST BP <80 MM HG: CPT | Performed by: PHYSICIAN ASSISTANT

## 2024-08-05 PROCEDURE — 1160F RVW MEDS BY RX/DR IN RCRD: CPT | Performed by: PHYSICIAN ASSISTANT

## 2024-08-05 PROCEDURE — 1159F MED LIST DOCD IN RCRD: CPT | Performed by: PHYSICIAN ASSISTANT

## 2024-08-05 PROCEDURE — 3074F SYST BP LT 130 MM HG: CPT | Performed by: PHYSICIAN ASSISTANT

## 2024-08-05 PROCEDURE — 93280 PM DEVICE PROGR EVAL DUAL: CPT | Performed by: PHYSICIAN ASSISTANT

## 2024-08-05 PROCEDURE — 99213 OFFICE O/P EST LOW 20 MIN: CPT | Performed by: PHYSICIAN ASSISTANT

## 2024-08-05 NOTE — PROGRESS NOTES
Encounter Date:08/05/2024      Patient ID: Radha Whelan is a 84 y.o. female.    Farhan Schaefer MD    Cheif Complaint EP: Sinus node dysfunction and Pacemaker check    PROBLEM LIST:  Patient Active Problem List    Diagnosis Date Noted    Sinus node dysfunction 03/15/2021     Priority: High     Note Last Updated: 4/4/2022 9/16/2019 24-hour Holter: Average HR 60, range 45-1 07, 1.6% PACs, 0.3% PVCs, rare SVT less than 11 beats with no symptoms  Recurrent Arthur Syncope x 2.  First 10/2019 while seated and second 12/5/2019 s/p Rt total hip surgery on transfer to bed.  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 Laboy for sinus node dysfunction.      Chronic heart failure with preserved ejection fraction (HFpEF) 01/05/2024     Priority: Medium    CAD (coronary artery disease) 04/04/2022     Priority: Medium     Note Last Updated: 6/12/2023 9/11/2019 echo: EF 42%, grade 1 diastolic dysfunction, RVSP 22  9/16/2019 LHC per [facility] no evidence of hemodynamically significant CAD, normal EF 55%, 24-hour Holter recommended to assess for bradycardic arrhythmias  [facility], 4/7/2023: Nonobstructive plaque disease involving multiple vessels without any evidence of hemodynamically significant coronary disease.  Preserved left ventricular systolic function, estimated EF 55%.      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  She returns a schedule electrophysiology follow-up.  She is doing quite well.  She has no awareness of palpitations, no dizziness no syncope.  Her blood pressure at home typically runs between 116 to 130 mmHg systolic.  She states compliance current medical regimen reports no significant adverse side effects.    Allergies   Allergen Reactions    Covid-19 Mrna Vacc (Moderna) Other (See Comments)     Myocarditits      Cumini Other (See Comments)     Complex migrane    Cheese Other (See Comments)     migrane    Ibuprofen Rash     Swelling all over    Other Other (See Comments)     \"ice cream\"-migranes    Saccharin Other (See Comments)     migrane       Current Outpatient Medications   Medication Instructions    acetaminophen (TYLENOL) 650 mg, Oral, Every 6 Hours PRN    albuterol sulfate  (90 Base) MCG/ACT inhaler INHALE 2 PUFFS INTO THE LUNGS EVERY 4 HOURS AS NEEDED    apixaban (ELIQUIS) 2.5 mg, Oral, 2 Times Daily    Blood Glucose Monitoring Suppl (ONE TOUCH ULTRA 2) w/Device kit USE AS NEEDED FOR BLOOD    SUGAR MONITORING    bumetanide (BUMEX) 1 mg, Oral, Daily    clopidogrel (PLAVIX) 75 mg, Oral, Daily    Coenzyme Q10 200 mg, Oral, Every Other Day    glipizide (GLUCOTROL XL) 2.5 mg, Oral, Daily    glucose monitor monitoring kit 1 each, Does not apply, As Needed    Lancets (accu-chek soft touch) lancets Use as directed    latanoprost (XALATAN) 0.005 % ophthalmic solution PLACE 1 DROP IN EACH EYE EVERY DAY AT BEDTIME    lisinopril (PRINIVIL,ZESTRIL) 40 mg, Oral, Daily    lovastatin (MEVACOR) 10 mg, Oral, Nightly    melatonin 3 mg, Oral, Nightly    metoprolol succinate XL (TOPROL-XL) 50 mg, Oral, Daily    Ofev 150 mg, Oral, " "2 Times Daily, Take with food    pantoprazole (PROTONIX) 40 mg, Oral, Daily    tobramycin-dexamethasone (TOBRADEX) 0.3-0.1 % ophthalmic suspension 1 drop, Both Eyes, As Needed    vitamin D3 5,000 Units, Oral, Twice a week       .    Objective:     /56 (BP Location: Left arm, Patient Position: Sitting)   Pulse 91   Ht 170.2 cm (67.01\")   Wt 78.5 kg (173 lb)   SpO2 94%   BMI 27.09 kg/m²    Body mass index is 27.09 kg/m².     Vitals reviewed.   Constitutional:       Appearance: Well-developed.   Pulmonary:      Effort: Pulmonary effort is normal. No respiratory distress.      Breath sounds: Normal breath sounds. No wheezing. No rales.      Comments: Bases clear  Chest:      Chest wall: Not tender to palpatation.   Cardiovascular:      Normal rate. Regular rhythm.      Murmurs: There is no murmur.      No gallop.  No click. No rub.   Pulses:     Intact distal pulses.   Edema:     Peripheral edema absent.   Musculoskeletal: Normal range of motion.       Lab Review:     Lab Results   Component Value Date    GLUCOSE 110 (H) 05/24/2024    BUN 16 05/24/2024    CREATININE 0.98 05/24/2024    EGFRRESULT 50.2 (L) 04/09/2024    EGFR 57.0 (L) 05/24/2024    BCR 16.3 05/24/2024    K 4.7 05/24/2024    CO2 20.8 (L) 05/24/2024    CALCIUM 8.3 (L) 05/24/2024    PROTENTOTREF 6.1 04/09/2024    ALBUMIN 3.9 05/22/2024    BILITOT 0.7 05/22/2024    AST 39 (H) 05/22/2024    ALT 44 (H) 05/22/2024     Lab Results   Component Value Date    WBC 5.17 05/24/2024    HGB 10.2 (L) 05/24/2024    HCT 30.7 (L) 05/24/2024    MCV 96.2 05/24/2024     05/24/2024     Lab Results   Component Value Date    TSH 5.940 (H) 04/09/2024           Procedures               Assessment:      Diagnosis Plan   1. Sinus node dysfunction        2. Presence of cardiac pacemaker        3. Essential hypertension        4. ODELL on CPAP          Plan:     Advance Care Planning   ACP discussion was declined by the patient. Patient has an advance directive (not in " EMR), copy requested.           Stable cardiac status.  Continue current medications.   in 6 months, sooner as needed.  Thank you for allowing us to participate in the care of your patient.     Electronically signed by PATIENCE Michael, 08/05/24, 2:00 PM EDT.

## 2024-08-15 ENCOUNTER — OFFICE VISIT (OUTPATIENT)
Dept: INTERNAL MEDICINE | Facility: CLINIC | Age: 84
End: 2024-08-15
Payer: MEDICARE

## 2024-08-15 VITALS
SYSTOLIC BLOOD PRESSURE: 102 MMHG | BODY MASS INDEX: 26.68 KG/M2 | WEIGHT: 170 LBS | RESPIRATION RATE: 17 BRPM | DIASTOLIC BLOOD PRESSURE: 60 MMHG | HEIGHT: 67 IN | OXYGEN SATURATION: 99 % | HEART RATE: 59 BPM

## 2024-08-15 DIAGNOSIS — B37.89 CANDIDIASIS OF ANUS: Primary | ICD-10-CM

## 2024-08-15 PROBLEM — B37.9 CANDIDIASIS: Status: RESOLVED | Noted: 2024-08-15 | Resolved: 2024-08-15

## 2024-08-15 PROBLEM — B37.9 CANDIDIASIS: Status: ACTIVE | Noted: 2024-08-15

## 2024-08-15 PROCEDURE — 3074F SYST BP LT 130 MM HG: CPT | Performed by: STUDENT IN AN ORGANIZED HEALTH CARE EDUCATION/TRAINING PROGRAM

## 2024-08-15 PROCEDURE — 1126F AMNT PAIN NOTED NONE PRSNT: CPT | Performed by: STUDENT IN AN ORGANIZED HEALTH CARE EDUCATION/TRAINING PROGRAM

## 2024-08-15 PROCEDURE — 3078F DIAST BP <80 MM HG: CPT | Performed by: STUDENT IN AN ORGANIZED HEALTH CARE EDUCATION/TRAINING PROGRAM

## 2024-08-15 PROCEDURE — 99214 OFFICE O/P EST MOD 30 MIN: CPT | Performed by: STUDENT IN AN ORGANIZED HEALTH CARE EDUCATION/TRAINING PROGRAM

## 2024-08-15 RX ORDER — CLOTRIMAZOLE 1 G/ML
SOLUTION TOPICAL 2 TIMES DAILY
Qty: 60 ML | Refills: 0 | Status: SHIPPED | OUTPATIENT
Start: 2024-08-15

## 2024-08-15 RX ORDER — PETROLATUM,WHITE
1 OINTMENT IN PACKET (GRAM) TOPICAL AS NEEDED
Qty: 500 G | Refills: 1 | Status: SHIPPED | OUTPATIENT
Start: 2024-08-15

## 2024-08-15 NOTE — ASSESSMENT & PLAN NOTE
Start clotrimazole cream twice a day as prescribed for the next 10 days.  If worsening or if with any sacral ulcer, advised to call clinic.  Patient agreed and showed complete understanding  Apply Vaseline for protective skin barrier

## 2024-08-15 NOTE — PROGRESS NOTES
Office Note     Name: Radha Whelan    : 1940     MRN: [id number]     Chief Complaint  Rash (On right buttock)    Subjective     History of Present Illness:  Radha Whelan is a 84 y.o. female who presents today for sacral rash x 2 weeks. Pain when sitting down. Drove to and from [place] for 6 hours      Family History:   Family History   Problem Relation Age of Onset    Heart disease Mother     Diabetes Mother         Passed away at age 81 yrs. Heart attact    Heart disease Father     Breast cancer Sister     Heart disease Sister     Stroke Sister     Breast cancer Sister     Intracerebral hemorrhage Brother     Diabetes type II Brother     No Known Problems Brother     Asthma Daughter     Heart failure Daughter     Diabetes Other     Hypertension Other     Cancer Other         malignant neoplasm     Migraines Other     Breast cancer Other        Social History:   Social History     Socioeconomic History    Marital status:    Tobacco Use    Smoking status: Never     Passive exposure: Never    Smokeless tobacco: Never    Tobacco comments:     I have never smoked.   Vaping Use    Vaping status: Never Used   Substance and Sexual Activity    Alcohol use: Never    Drug use: Never    Sexual activity: Not Currently     Partners: Male     Comment: I am 82 years old . No sex for a few years       Health Maintenance   Topic Date Due    DIABETIC EYE EXAM  10/11/2022    TDAP/TD VACCINES (2 - Td or Tdap) 2024    DXA SCAN  2024    INFLUENZA VACCINE  2024    ANNUAL WELLNESS VISIT  10/09/2024    RSV Vaccine - Adults (1 - 1-dose 60+ series) 2025 (Originally 8/3/1999)    COVID-19 Vaccine ( - -24 season) 08/15/2025 (Originally 2023)    ZOSTER VACCINE (2 of 2) 08/15/2025 (Originally 2013)    HEMOGLOBIN A1C  2024    LIPID PANEL  2025    URINE MICROALBUMIN  2025    BMI FOLLOWUP  04/15/2025    Pneumococcal Vaccine 65+  Completed       Objective     Vital Signs  /60    "Pulse 59   Resp 17   Ht 170.2 cm (67.01\")   Wt 77.1 kg (170 lb)   SpO2 99%   BMI 26.62 kg/m²   Estimated body mass index is 26.62 kg/m² as calculated from the following:    Height as of this encounter: 170.2 cm (67.01\").    Weight as of this encounter: 77.1 kg (170 lb).        Physical Exam  Vitals and nursing note reviewed. Exam conducted with a chaperone present.   Skin:     Comments: Minimal erythema with white flakes in the area no ulcerations or scarring          Procedures     Assessment and Plan     Diagnoses and all orders for this visit:    1. Candidiasis of anus (Primary)  Assessment & Plan:  Start clotrimazole cream twice a day as prescribed for the next 10 days.  If worsening or if with any sacral ulcer, advised to call clinic.  Patient agreed and showed complete understanding  Apply Vaseline for protective skin barrier    Orders:  -     clotrimazole (LOTRIMIN) 1 % external solution; Apply  topically to the appropriate area as directed 2 (Two) Times a Day.  Dispense: 60 mL; Refill: 0  -     white petrolatum ointment; Apply 1 Application topically to the appropriate area as directed As Needed for Dry Skin. On buttock area  Dispense: 500 g; Refill: 1         Counseling was given to patient for the following topics: instructions for management, risks and benefits of treatment options, and importance of treatment compliance.    Follow Up  No follow-ups on file.    MD RAMBO [facility]  [address] [place] [id number] [phone]  "

## 2024-08-20 ENCOUNTER — OFFICE VISIT (OUTPATIENT)
Dept: INTERNAL MEDICINE | Facility: CLINIC | Age: 84
End: 2024-08-20
Payer: MEDICARE

## 2024-08-20 ENCOUNTER — TELEPHONE (OUTPATIENT)
Dept: INTERNAL MEDICINE | Facility: CLINIC | Age: 84
End: 2024-08-20
Payer: MEDICARE

## 2024-08-20 VITALS
HEART RATE: 84 BPM | RESPIRATION RATE: 16 BRPM | OXYGEN SATURATION: 96 % | DIASTOLIC BLOOD PRESSURE: 55 MMHG | HEIGHT: 67 IN | BODY MASS INDEX: 26.68 KG/M2 | SYSTOLIC BLOOD PRESSURE: 102 MMHG | WEIGHT: 170 LBS

## 2024-08-20 DIAGNOSIS — B37.89 CANDIDIASIS OF ANUS: Primary | ICD-10-CM

## 2024-08-20 PROCEDURE — 3078F DIAST BP <80 MM HG: CPT | Performed by: STUDENT IN AN ORGANIZED HEALTH CARE EDUCATION/TRAINING PROGRAM

## 2024-08-20 PROCEDURE — 1126F AMNT PAIN NOTED NONE PRSNT: CPT | Performed by: STUDENT IN AN ORGANIZED HEALTH CARE EDUCATION/TRAINING PROGRAM

## 2024-08-20 PROCEDURE — 3074F SYST BP LT 130 MM HG: CPT | Performed by: STUDENT IN AN ORGANIZED HEALTH CARE EDUCATION/TRAINING PROGRAM

## 2024-08-20 PROCEDURE — 99214 OFFICE O/P EST MOD 30 MIN: CPT | Performed by: STUDENT IN AN ORGANIZED HEALTH CARE EDUCATION/TRAINING PROGRAM

## 2024-08-20 RX ORDER — FLUCONAZOLE 150 MG/1
150 TABLET ORAL DAILY
Qty: 2 TABLET | Refills: 0 | Status: SHIPPED | OUTPATIENT
Start: 2024-08-20 | End: 2024-08-21 | Stop reason: SDUPTHER

## 2024-08-20 NOTE — TELEPHONE ENCOUNTER
Left VM asking patient to return call if she would like to schedule appointment. Has appointment today with .

## 2024-08-20 NOTE — PROGRESS NOTES
Office Note     Name: Rdaha Whelan    : 1940     MRN: [id number]     Chief Complaint  Rash (On rear end not getting better)    Subjective     History of Present Illness:  Radha Whelan is a 84 y.o. female who presents today for sacral rash, notes burning and pain.  Has only been using clotrimazole cream for about 3 days now.      Family History:   Family History   Problem Relation Age of Onset    Heart disease Mother     Diabetes Mother         Passed away at age 81 yrs. Heart attact    Heart disease Father     Breast cancer Sister     Heart disease Sister     Stroke Sister     Breast cancer Sister     Intracerebral hemorrhage Brother     Diabetes type II Brother     No Known Problems Brother     Asthma Daughter     Heart failure Daughter     Diabetes Other     Hypertension Other     Cancer Other         malignant neoplasm     Migraines Other     Breast cancer Other        Social History:   Social History     Socioeconomic History    Marital status:    Tobacco Use    Smoking status: Never     Passive exposure: Never    Smokeless tobacco: Never    Tobacco comments:     I have never smoked.   Vaping Use    Vaping status: Never Used   Substance and Sexual Activity    Alcohol use: Never    Drug use: Never    Sexual activity: Not Currently     Partners: Male     Comment: I am 82 years old . No sex for a few years       Health Maintenance   Topic Date Due    DIABETIC EYE EXAM  10/11/2022    TDAP/TD VACCINES (2 - Td or Tdap) 2024    DXA SCAN  2024    INFLUENZA VACCINE  2024    ANNUAL WELLNESS VISIT  10/09/2024    RSV Vaccine - Adults (1 - 1-dose 60+ series) 2025 (Originally 8/3/1999)    COVID-19 Vaccine (4 - -24 season) 08/15/2025 (Originally 2023)    ZOSTER VACCINE (2 of 2) 08/15/2025 (Originally 2013)    HEMOGLOBIN A1C  2024    LIPID PANEL  2025    URINE MICROALBUMIN  2025    BMI FOLLOWUP  04/15/2025    MAMMOGRAM  2026    Pneumococcal Vaccine 65+   "Completed       Objective     Vital Signs  /55   Pulse 84   Resp 16   Ht 170.2 cm (67.01\")   Wt 77.1 kg (170 lb)   SpO2 96%   BMI 26.62 kg/m²   Estimated body mass index is 26.62 kg/m² as calculated from the following:    Height as of this encounter: 170.2 cm (67.01\").    Weight as of this encounter: 77.1 kg (170 lb).        Physical Exam  Vitals and nursing note reviewed.   Skin:     Comments: Erythematous perianal area with white scales.  No ulceration or eschar, no open wound, no discharge.          Procedures     Assessment and Plan     Diagnoses and all orders for this visit:    1. Candidiasis of anus (Primary)  Assessment & Plan:  Start clotrimazole cream twice a day as prescribed for the next 10 days.  Apply Vaseline for protective skin barrier  Start Diflucan as prescribed  Will refer to dermatology    Orders:  -     Ambulatory Referral to Dermatology  -     fluconazole (Diflucan) 150 MG tablet; Take 1 tablet by mouth Daily for 2 doses.  Dispense: 2 tablet; Refill: 0         Counseling was given to patient for the following topics: instructions for management, risks and benefits of treatment options, and importance of treatment compliance.    Follow Up  No follow-ups on file.    MD RAMBO Rich [facility]  [address] [place] [id number] [phone]  "

## 2024-08-20 NOTE — ASSESSMENT & PLAN NOTE
Start clotrimazole cream twice a day as prescribed for the next 10 days.  Apply Vaseline for protective skin barrier  Start Diflucan as prescribed  Will refer to dermatology

## 2024-08-20 NOTE — TELEPHONE ENCOUNTER
Patient is requesting to be worked in with Dr. Silvano warren.  She has a sore on her butt that she saw Dr. Garcia for a week ago and it is not any better.  It is very sore and uncomfortable and she is requesting to be worked in asap.  Prefers afternoon because she lives in [place].

## 2024-08-21 DIAGNOSIS — B37.89 CANDIDIASIS OF ANUS: ICD-10-CM

## 2024-08-21 RX ORDER — FLUCONAZOLE 150 MG/1
150 TABLET ORAL DAILY
Qty: 2 TABLET | Refills: 0 | Status: SHIPPED | OUTPATIENT
Start: 2024-08-21 | End: 2024-08-23

## 2024-08-28 ENCOUNTER — OFFICE VISIT (OUTPATIENT)
Dept: INTERNAL MEDICINE | Facility: CLINIC | Age: 84
End: 2024-08-28
Payer: MEDICARE

## 2024-08-28 VITALS
WEIGHT: 171 LBS | OXYGEN SATURATION: 97 % | DIASTOLIC BLOOD PRESSURE: 60 MMHG | BODY MASS INDEX: 26.84 KG/M2 | TEMPERATURE: 95.7 F | HEIGHT: 67 IN | RESPIRATION RATE: 16 BRPM | SYSTOLIC BLOOD PRESSURE: 110 MMHG | HEART RATE: 88 BPM

## 2024-08-28 DIAGNOSIS — B37.89 CANDIDIASIS OF ANUS: Primary | ICD-10-CM

## 2024-08-28 PROCEDURE — 1125F AMNT PAIN NOTED PAIN PRSNT: CPT | Performed by: INTERNAL MEDICINE

## 2024-08-28 PROCEDURE — 3074F SYST BP LT 130 MM HG: CPT | Performed by: INTERNAL MEDICINE

## 2024-08-28 PROCEDURE — 3078F DIAST BP <80 MM HG: CPT | Performed by: INTERNAL MEDICINE

## 2024-08-28 PROCEDURE — 99213 OFFICE O/P EST LOW 20 MIN: CPT | Performed by: INTERNAL MEDICINE

## 2024-08-28 NOTE — PROGRESS NOTES
Subjective     Patient ID: Radha Whelan is a 84 y.o. female. Patient is here for management of multiple medical problems.     Chief Complaint   Patient presents with    Candidiasis of anus     History of Present Illness     The following portions of the patient's history were reviewed and updated as appropriate: allergies, current medications, past family history, past medical history, past social history, past surgical history and problem list.    Review of Systems    Current Outpatient Medications:     acetaminophen (TYLENOL) 325 MG tablet, Take 2 tablets by mouth Every 6 (Six) Hours As Needed for Mild Pain ., Disp: , Rfl:     albuterol sulfate  (90 Base) MCG/ACT inhaler, INHALE 2 PUFFS INTO THE LUNGS EVERY 4 HOURS AS NEEDED, Disp: 6.7 g, Rfl: 0    apixaban (ELIQUIS) 2.5 MG tablet tablet, Take 1 tablet by mouth 2 (Two) Times a Day., Disp: 180 tablet, Rfl: 2    Blood Glucose Monitoring Suppl (ONE TOUCH ULTRA 2) w/Device kit, USE AS NEEDED FOR BLOOD    SUGAR MONITORING, Disp: 1 each, Rfl: 0    bumetanide (BUMEX) 1 MG tablet, Take 1 tablet by mouth Daily., Disp: 90 tablet, Rfl: 3    clopidogrel (PLAVIX) 75 MG tablet, TAKE 1 TABLET BY MOUTH DAILY, Disp: 90 tablet, Rfl: 3    clotrimazole (LOTRIMIN) 1 % external solution, Apply  topically to the appropriate area as directed 2 (Two) Times a Day., Disp: 60 mL, Rfl: 0    Coenzyme Q10 200 MG capsule, Take 200 mg by mouth Every Other Day., Disp: , Rfl:     glipizide (GLUCOTROL XL) 2.5 MG 24 hr tablet, TAKE 1 TABLET BY MOUTH DAILY, Disp: 90 tablet, Rfl: 3    glucose blood test strip, Use as instructed, Disp: 100 each, Rfl: 12    glucose monitor monitoring kit, 1 each As Needed (blood sugar)., Disp: 1 each, Rfl: 1    Lancets (accu-chek soft touch) lancets, Use as directed, Disp: 100 each, Rfl: 12    latanoprost (XALATAN) 0.005 % ophthalmic solution, PLACE 1 DROP IN EACH EYE EVERY DAY AT BEDTIME, Disp: , Rfl:     lisinopril (PRINIVIL,ZESTRIL) 40 MG tablet, TAKE 1 TABLET BY  "MOUTH DAILY, Disp: 90 tablet, Rfl: 3    lovastatin (MEVACOR) 10 MG tablet, Take 1 tablet by mouth Every Night. (Patient taking differently: Take 1 tablet by mouth Every Other Day.), Disp: 90 tablet, Rfl: 3    melatonin 3 MG tablet, Take 1 tablet by mouth Every Night., Disp: , Rfl:     metoprolol succinate XL (TOPROL-XL) 50 MG 24 hr tablet, Take 1 tablet by mouth Daily., Disp: 90 tablet, Rfl: 3    Nintedanib Esylate (Ofev) 150 MG capsule, Take 150 mg by mouth 2 (Two) Times a Day. Take with food, Disp: 180 capsule, Rfl: 3    pantoprazole (PROTONIX) 40 MG EC tablet, TAKE 1 TABLET BY MOUTH DAILY, Disp: 90 tablet, Rfl: 3    tobramycin-dexamethasone (TOBRADEX) 0.3-0.1 % ophthalmic suspension, Administer 1 drop to both eyes As Needed (dryness)., Disp: 5 mL, Rfl: 1    vitamin D3 125 MCG (5000 UT) capsule capsule, Take 1 capsule by mouth. Twice a week, Disp: , Rfl:     white petrolatum ointment, Apply 1 Application topically to the appropriate area as directed As Needed for Dry Skin. On buttock area, Disp: 500 g, Rfl: 1    Objective      Blood pressure 110/60, pulse 88, temperature 95.7 °F (35.4 °C), resp. rate 16, height 170.2 cm (67.01\"), weight 77.6 kg (171 lb), SpO2 97%.            Physical Exam     General Appearance:    Alert, cooperative, no distress, appears stated age   Head:    Normocephalic, without obvious abnormality, atraumatic   Eyes:    PERRL, conjunctiva/corneas clear, EOM's intact   Ears:    Normal TM's and external ear canals, both ears   Nose:   Nares normal, septum midline, mucosa normal, no drainage   or sinus tenderness   Throat:   Lips, mucosa, and tongue normal; teeth and gums normal   Neck:   Supple, symmetrical, trachea midline, no adenopathy;        thyroid:  No enlargement/tenderness/nodules; no carotid    bruit or JVD   Back:     Symmetric, no curvature, ROM normal, no CVA tenderness   Lungs:     Clear to auscultation bilaterally, respirations unlabored   Chest wall:    No tenderness or " deformity   Heart:    Regular rate and rhythm, S1 and S2 normal, no murmur,        rub or gallop   Abdomen:     Soft, non-tender, bowel sounds active all four quadrants,     no masses, no organomegaly   Extremities:   Extremities normal, atraumatic, no cyanosis or edema   Pulses:   2+ and symmetric all extremities   Skin:   Skin color, texture, turgor normal, no rashes or lesions   Lymph nodes:   Cervical, supraclavicular, and axillary nodes normal   Neurologic:   CNII-XII intact. Normal strength, sensation and reflexes       throughout      Results for orders placed or performed during the hospital encounter of 05/22/24   Gastrointestinal Panel, PCR - Stool, Per Rectum    Specimen: Per Rectum; Stool   Result Value Ref Range    Campylobacter Not Detected Not Detected    Plesiomonas shigelloides Not Detected Not Detected    Salmonella Not Detected Not Detected    Vibrio Not Detected Not Detected    Vibrio cholerae Not Detected Not Detected    Yersinia enterocolitica Not Detected Not Detected    Enteroaggregative E. coli (EAEC) Not Detected Not Detected    Enteropathogenic E. coli (EPEC) Detected (A) Not Detected    Enterotoxigenic E. coli (ETEC) lt/st Not Detected Not Detected    Shiga-like toxin-producing E. coli (STEC) stx1/stx2 Not Detected Not Detected    Shigella/Enteroinvasive E. coli (EIEC) Not Detected Not Detected    Cryptosporidium Not Detected Not Detected    Cyclospora cayetanensis Not Detected Not Detected    Entamoeba histolytica Not Detected Not Detected    Giardia lamblia Not Detected Not Detected    Adenovirus F40/41 Not Detected Not Detected    Astrovirus Not Detected Not Detected    Norovirus GI/GII Not Detected Not Detected    Rotavirus A Not Detected Not Detected    Sapovirus (I, II, IV or V) Not Detected Not Detected   Clostridioides difficile EIA - Stool, Per Rectum    Specimen: Per Rectum; Stool   Result Value Ref Range    C Diff GDH Ag Negative Negative    C.diff Toxin Ag Negative Negative    Blood Culture - Blood, Arm, Left    Specimen: Arm, Left; Blood   Result Value Ref Range    Blood Culture No growth at 5 days    Blood Culture - Blood, Arm, Left    Specimen: Arm, Left; Blood   Result Value Ref Range    Blood Culture No growth at 5 days    Comprehensive Metabolic Panel    Specimen: Blood   Result Value Ref Range    Glucose 206 (H) 65 - 99 mg/dL    BUN 25 (H) 8 - 23 mg/dL    Creatinine 2.25 (H) 0.57 - 1.00 mg/dL    Sodium 139 136 - 145 mmol/L    Potassium 3.8 3.5 - 5.2 mmol/L    Chloride 98 98 - 107 mmol/L    CO2 22.5 22.0 - 29.0 mmol/L    Calcium 8.9 8.6 - 10.5 mg/dL    Total Protein 7.1 6.0 - 8.5 g/dL    Albumin 3.9 3.5 - 5.2 g/dL    ALT (SGPT) 44 (H) 1 - 33 U/L    AST (SGOT) 39 (H) 1 - 32 U/L    Alkaline Phosphatase 87 39 - 117 U/L    Total Bilirubin 0.7 0.0 - 1.2 mg/dL    Globulin 3.2 gm/dL    A/G Ratio 1.2 g/dL    BUN/Creatinine Ratio 11.1 7.0 - 25.0    Anion Gap 18.5 (H) 5.0 - 15.0 mmol/L    eGFR 21.0 (L) >60.0 mL/min/1.73   High Sensitivity Troponin T    Specimen: Blood   Result Value Ref Range    HS Troponin T 20 (H) <14 ng/L   Lactic Acid, Plasma    Specimen: Blood   Result Value Ref Range    Lactate 2.4 (C) 0.5 - 2.0 mmol/L   Lipase    Specimen: Blood   Result Value Ref Range    Lipase 8 (L) 13 - 60 U/L   CBC Auto Differential    Specimen: Blood   Result Value Ref Range    WBC 7.50 3.40 - 10.80 10*3/mm3    RBC 4.15 3.77 - 5.28 10*6/mm3    Hemoglobin 13.2 12.0 - 15.9 g/dL    Hematocrit 39.7 34.0 - 46.6 %    MCV 95.7 79.0 - 97.0 fL    MCH 31.8 26.6 - 33.0 pg    MCHC 33.2 31.5 - 35.7 g/dL    RDW 15.3 12.3 - 15.4 %    RDW-SD 54.1 (H) 37.0 - 54.0 fl    MPV 9.0 6.0 - 12.0 fL    Platelets 246 140 - 450 10*3/mm3    Neutrophil % 67.0 42.7 - 76.0 %    Lymphocyte % 21.2 19.6 - 45.3 %    Monocyte % 10.3 5.0 - 12.0 %    Eosinophil % 0.7 0.3 - 6.2 %    Basophil % 0.5 0.0 - 1.5 %    Immature Grans % 0.3 0.0 - 0.5 %    Neutrophils, Absolute 5.03 1.70 - 7.00 10*3/mm3    Lymphocytes, Absolute 1.59 0.70 -  3.10 10*3/mm3    Monocytes, Absolute 0.77 0.10 - 0.90 10*3/mm3    Eosinophils, Absolute 0.05 0.00 - 0.40 10*3/mm3    Basophils, Absolute 0.04 0.00 - 0.20 10*3/mm3    Immature Grans, Absolute 0.02 0.00 - 0.05 10*3/mm3    nRBC 0.0 0.0 - 0.2 /100 WBC   High Sensitivity Troponin T 2Hr    Specimen: Blood   Result Value Ref Range    HS Troponin T 17 (H) <14 ng/L    Troponin T Delta -3 >=-4 - <+4 ng/L   STAT Lactic Acid, Reflex    Specimen: Blood   Result Value Ref Range    Lactate 1.8 0.5 - 2.0 mmol/L   Hemoglobin A1c    Specimen: Blood   Result Value Ref Range    Hemoglobin A1C 7.50 (H) 4.80 - 5.60 %   Basic Metabolic Panel    Specimen: Blood   Result Value Ref Range    Glucose 112 (H) 65 - 99 mg/dL    BUN 22 8 - 23 mg/dL    Creatinine 1.44 (H) 0.57 - 1.00 mg/dL    Sodium 139 136 - 145 mmol/L    Potassium 3.1 (L) 3.5 - 5.2 mmol/L    Chloride 109 (H) 98 - 107 mmol/L    CO2 20.2 (L) 22.0 - 29.0 mmol/L    Calcium 8.0 (L) 8.6 - 10.5 mg/dL    BUN/Creatinine Ratio 15.3 7.0 - 25.0    Anion Gap 9.8 5.0 - 15.0 mmol/L    eGFR 35.9 (L) >60.0 mL/min/1.73   CBC Auto Differential    Specimen: Blood   Result Value Ref Range    WBC 5.80 3.40 - 10.80 10*3/mm3    RBC 3.42 (L) 3.77 - 5.28 10*6/mm3    Hemoglobin 10.9 (L) 12.0 - 15.9 g/dL    Hematocrit 33.5 (L) 34.0 - 46.6 %    MCV 98.0 (H) 79.0 - 97.0 fL    MCH 31.9 26.6 - 33.0 pg    MCHC 32.5 31.5 - 35.7 g/dL    RDW 15.2 12.3 - 15.4 %    RDW-SD 54.8 (H) 37.0 - 54.0 fl    MPV 9.1 6.0 - 12.0 fL    Platelets 193 140 - 450 10*3/mm3    Neutrophil % 49.6 42.7 - 76.0 %    Lymphocyte % 35.3 19.6 - 45.3 %    Monocyte % 11.7 5.0 - 12.0 %    Eosinophil % 2.8 0.3 - 6.2 %    Basophil % 0.3 0.0 - 1.5 %    Immature Grans % 0.3 0.0 - 0.5 %    Neutrophils, Absolute 2.87 1.70 - 7.00 10*3/mm3    Lymphocytes, Absolute 2.05 0.70 - 3.10 10*3/mm3    Monocytes, Absolute 0.68 0.10 - 0.90 10*3/mm3    Eosinophils, Absolute 0.16 0.00 - 0.40 10*3/mm3    Basophils, Absolute 0.02 0.00 - 0.20 10*3/mm3    Immature Grans,  Absolute 0.02 0.00 - 0.05 10*3/mm3    nRBC 0.0 0.0 - 0.2 /100 WBC   Lactic Acid, Plasma    Specimen: Arm, Left; Blood   Result Value Ref Range    Lactate 0.7 0.5 - 2.0 mmol/L   Potassium    Specimen: Blood   Result Value Ref Range    Potassium 4.5 3.5 - 5.2 mmol/L   Basic Metabolic Panel    Specimen: Blood   Result Value Ref Range    Glucose 110 (H) 65 - 99 mg/dL    BUN 16 8 - 23 mg/dL    Creatinine 0.98 0.57 - 1.00 mg/dL    Sodium 138 136 - 145 mmol/L    Potassium 4.7 3.5 - 5.2 mmol/L    Chloride 111 (H) 98 - 107 mmol/L    CO2 20.8 (L) 22.0 - 29.0 mmol/L    Calcium 8.3 (L) 8.6 - 10.5 mg/dL    BUN/Creatinine Ratio 16.3 7.0 - 25.0    Anion Gap 6.2 5.0 - 15.0 mmol/L    eGFR 57.0 (L) >60.0 mL/min/1.73   CBC Auto Differential    Specimen: Blood   Result Value Ref Range    WBC 5.17 3.40 - 10.80 10*3/mm3    RBC 3.19 (L) 3.77 - 5.28 10*6/mm3    Hemoglobin 10.2 (L) 12.0 - 15.9 g/dL    Hematocrit 30.7 (L) 34.0 - 46.6 %    MCV 96.2 79.0 - 97.0 fL    MCH 32.0 26.6 - 33.0 pg    MCHC 33.2 31.5 - 35.7 g/dL    RDW 15.0 12.3 - 15.4 %    RDW-SD 53.3 37.0 - 54.0 fl    MPV 9.3 6.0 - 12.0 fL    Platelets 190 140 - 450 10*3/mm3    Neutrophil % 36.9 (L) 42.7 - 76.0 %    Lymphocyte % 51.1 (H) 19.6 - 45.3 %    Monocyte % 8.5 5.0 - 12.0 %    Eosinophil % 3.1 0.3 - 6.2 %    Basophil % 0.2 0.0 - 1.5 %    Immature Grans % 0.2 0.0 - 0.5 %    Neutrophils, Absolute 1.91 1.70 - 7.00 10*3/mm3    Lymphocytes, Absolute 2.64 0.70 - 3.10 10*3/mm3    Monocytes, Absolute 0.44 0.10 - 0.90 10*3/mm3    Eosinophils, Absolute 0.16 0.00 - 0.40 10*3/mm3    Basophils, Absolute 0.01 0.00 - 0.20 10*3/mm3    Immature Grans, Absolute 0.01 0.00 - 0.05 10*3/mm3    nRBC 0.0 0.0 - 0.2 /100 WBC   POC Glucose 4x Daily Before Meals & at Bedtime    Specimen: Blood   Result Value Ref Range    Glucose 111 70 - 130 mg/dL   POC Glucose 4x Daily Before Meals & at Bedtime    Specimen: Blood   Result Value Ref Range    Glucose 122 70 - 130 mg/dL   POC Glucose Once    Specimen:  Blood   Result Value Ref Range    Glucose 121 70 - 130 mg/dL   POC Glucose Once    Specimen: Blood   Result Value Ref Range    Glucose 116 70 - 130 mg/dL   POC Glucose 4x Daily Before Meals & at Bedtime    Specimen: Blood   Result Value Ref Range    Glucose 107 70 - 130 mg/dL   POC Glucose Once    Specimen: Blood   Result Value Ref Range    Glucose 133 (H) 70 - 130 mg/dL   POC Glucose Once    Specimen: Blood   Result Value Ref Range    Glucose 159 (H) 70 - 130 mg/dL   Green Top (Gel)   Result Value Ref Range    Extra Tube Hold for add-ons.    Lavender Top   Result Value Ref Range    Extra Tube hold for add-on    Gold Top - SST   Result Value Ref Range    Extra Tube Hold for add-ons.    Light Blue Top   Result Value Ref Range    Extra Tube Hold for add-ons.          Assessment & Plan     Using vassaline in a clotimazole and had 2 diflucan. Not     Will change to butt past.   Epison salt sitz bath.        DM  BS runnign better.    Has f/u  10/9      Diagnoses and all orders for this visit:    1. Candidiasis of anus (Primary)      No follow-ups on file.          There are no Patient Instructions on file for this visit.     Farhan Schaefer MD    Assessment & Plan

## 2024-09-16 ENCOUNTER — LAB (OUTPATIENT)
Dept: LAB | Facility: HOSPITAL | Age: 84
End: 2024-09-16
Payer: MEDICARE

## 2024-09-16 ENCOUNTER — OFFICE VISIT (OUTPATIENT)
Dept: PULMONOLOGY | Facility: CLINIC | Age: 84
End: 2024-09-16
Payer: MEDICARE

## 2024-09-16 VITALS
OXYGEN SATURATION: 98 % | SYSTOLIC BLOOD PRESSURE: 112 MMHG | BODY MASS INDEX: 26.68 KG/M2 | WEIGHT: 170 LBS | HEART RATE: 71 BPM | HEIGHT: 67 IN | DIASTOLIC BLOOD PRESSURE: 72 MMHG

## 2024-09-16 DIAGNOSIS — G47.33 OBSTRUCTIVE SLEEP APNEA: ICD-10-CM

## 2024-09-16 DIAGNOSIS — Z86.711 HISTORY OF PULMONARY EMBOLISM: ICD-10-CM

## 2024-09-16 DIAGNOSIS — J84.112 IPF (IDIOPATHIC PULMONARY FIBROSIS): Primary | ICD-10-CM

## 2024-09-16 DIAGNOSIS — R09.02 EXERCISE HYPOXEMIA: ICD-10-CM

## 2024-09-16 DIAGNOSIS — J98.4 RESTRICTIVE LUNG DISEASE: ICD-10-CM

## 2024-09-16 DIAGNOSIS — J84.112 IPF (IDIOPATHIC PULMONARY FIBROSIS): ICD-10-CM

## 2024-09-16 DIAGNOSIS — Z79.899 HIGH RISK MEDICATION USE: ICD-10-CM

## 2024-09-16 PROCEDURE — 99214 OFFICE O/P EST MOD 30 MIN: CPT | Performed by: NURSE PRACTITIONER

## 2024-09-16 PROCEDURE — 3074F SYST BP LT 130 MM HG: CPT | Performed by: NURSE PRACTITIONER

## 2024-09-16 PROCEDURE — 80076 HEPATIC FUNCTION PANEL: CPT

## 2024-09-16 PROCEDURE — 3078F DIAST BP <80 MM HG: CPT | Performed by: NURSE PRACTITIONER

## 2024-09-16 RX ORDER — LOPERAMIDE HCL 2 MG
2 CAPSULE ORAL 4 TIMES DAILY PRN
COMMUNITY

## 2024-09-16 RX ORDER — AMLODIPINE BESYLATE 2.5 MG/1
TABLET ORAL
COMMUNITY
Start: 2024-09-09

## 2024-09-17 LAB
ALBUMIN SERPL-MCNC: 4.1 G/DL (ref 3.5–5.2)
ALP SERPL-CCNC: 61 U/L (ref 39–117)
ALT SERPL W P-5'-P-CCNC: 24 U/L (ref 1–33)
AST SERPL-CCNC: 32 U/L (ref 1–32)
BILIRUB CONJ SERPL-MCNC: <0.2 MG/DL (ref 0–0.3)
BILIRUB INDIRECT SERPL-MCNC: NORMAL MG/DL
BILIRUB SERPL-MCNC: 0.4 MG/DL (ref 0–1.2)
PROT SERPL-MCNC: 7.3 G/DL (ref 6–8.5)

## 2024-10-14 DIAGNOSIS — E55.9 VITAMIN D DEFICIENCY, UNSPECIFIED: ICD-10-CM

## 2024-10-14 DIAGNOSIS — R79.9 ABNORMAL FINDING OF BLOOD CHEMISTRY, UNSPECIFIED: ICD-10-CM

## 2024-10-14 DIAGNOSIS — E11.9 TYPE 2 DIABETES MELLITUS WITHOUT COMPLICATION, WITHOUT LONG-TERM CURRENT USE OF INSULIN: Primary | ICD-10-CM

## 2024-10-15 ENCOUNTER — TELEPHONE (OUTPATIENT)
Dept: INTERNAL MEDICINE | Facility: CLINIC | Age: 84
End: 2024-10-15
Payer: MEDICARE

## 2024-10-15 DIAGNOSIS — N28.9 RENAL INSUFFICIENCY: Primary | ICD-10-CM

## 2024-10-15 LAB
ALBUMIN SERPL-MCNC: 3.4 G/DL (ref 3.5–5.2)
ALBUMIN/GLOB SERPL: 1.2 G/DL
ALP SERPL-CCNC: 55 U/L (ref 39–117)
ALT SERPL-CCNC: 17 U/L (ref 1–33)
AST SERPL-CCNC: 25 U/L (ref 1–32)
BASOPHILS # BLD AUTO: 0.03 10*3/MM3 (ref 0–0.2)
BASOPHILS NFR BLD AUTO: 0.5 % (ref 0–1.5)
BILIRUB SERPL-MCNC: 0.3 MG/DL (ref 0–1.2)
BUN SERPL-MCNC: 18 MG/DL (ref 8–23)
BUN/CREAT SERPL: 13.4 (ref 7–25)
CALCIUM SERPL-MCNC: 9 MG/DL (ref 8.6–10.5)
CHLORIDE SERPL-SCNC: 104 MMOL/L (ref 98–107)
CHOLEST SERPL-MCNC: 216 MG/DL (ref 0–200)
CO2 SERPL-SCNC: 27.2 MMOL/L (ref 22–29)
CREAT SERPL-MCNC: 1.34 MG/DL (ref 0.57–1)
EGFRCR SERPLBLD CKD-EPI 2021: 38.9 ML/MIN/1.73
EOSINOPHIL # BLD AUTO: 0.2 10*3/MM3 (ref 0–0.4)
EOSINOPHIL NFR BLD AUTO: 3.1 % (ref 0.3–6.2)
ERYTHROCYTE [DISTWIDTH] IN BLOOD BY AUTOMATED COUNT: 12.5 % (ref 12.3–15.4)
GLOBULIN SER CALC-MCNC: 2.8 GM/DL
GLUCOSE SERPL-MCNC: 122 MG/DL (ref 65–99)
HBA1C MFR BLD: 6.7 % (ref 4.8–5.6)
HCT VFR BLD AUTO: 33.5 % (ref 34–46.6)
HDLC SERPL-MCNC: 51 MG/DL (ref 40–60)
HGB BLD-MCNC: 10.6 G/DL (ref 12–15.9)
IMM GRANULOCYTES # BLD AUTO: 0.02 10*3/MM3 (ref 0–0.05)
IMM GRANULOCYTES NFR BLD AUTO: 0.3 % (ref 0–0.5)
LDLC SERPL CALC-MCNC: 142 MG/DL (ref 0–100)
LYMPHOCYTES # BLD AUTO: 2.79 10*3/MM3 (ref 0.7–3.1)
LYMPHOCYTES NFR BLD AUTO: 42.9 % (ref 19.6–45.3)
MCH RBC QN AUTO: 30.7 PG (ref 26.6–33)
MCHC RBC AUTO-ENTMCNC: 31.6 G/DL (ref 31.5–35.7)
MCV RBC AUTO: 97.1 FL (ref 79–97)
MONOCYTES # BLD AUTO: 0.52 10*3/MM3 (ref 0.1–0.9)
MONOCYTES NFR BLD AUTO: 8 % (ref 5–12)
NEUTROPHILS # BLD AUTO: 2.95 10*3/MM3 (ref 1.7–7)
NEUTROPHILS NFR BLD AUTO: 45.2 % (ref 42.7–76)
NRBC BLD AUTO-RTO: 0 /100 WBC (ref 0–0.2)
PLATELET # BLD AUTO: 235 10*3/MM3 (ref 140–450)
POTASSIUM SERPL-SCNC: 4.6 MMOL/L (ref 3.5–5.2)
PROT SERPL-MCNC: 6.2 G/DL (ref 6–8.5)
RBC # BLD AUTO: 3.45 10*6/MM3 (ref 3.77–5.28)
SODIUM SERPL-SCNC: 142 MMOL/L (ref 136–145)
TRIGL SERPL-MCNC: 126 MG/DL (ref 0–150)
TSH SERPL DL<=0.005 MIU/L-ACNC: 3.36 UIU/ML (ref 0.27–4.2)
VIT B12 SERPL-MCNC: 197 PG/ML (ref 211–946)
VLDLC SERPL CALC-MCNC: 23 MG/DL (ref 5–40)
WBC # BLD AUTO: 6.51 10*3/MM3 (ref 3.4–10.8)

## 2024-10-15 RX ORDER — CHOLECALCIFEROL (VITAMIN D3) 125 MCG
1 CAPSULE ORAL DAILY
Qty: 90 TABLET | Refills: 3 | Status: SHIPPED | OUTPATIENT
Start: 2024-10-15 | End: 2026-04-15

## 2024-10-15 NOTE — TELEPHONE ENCOUNTER
----- Message from Farhan Schaefer sent at 10/15/2024  7:53 AM EDT -----  Vit b12 is low. Start methyl b12. Rx sent.      Renal funtion is a bit off.  Will repeat well hydrated. Order in.     Dm a bit better control.

## 2024-10-18 ENCOUNTER — OFFICE VISIT (OUTPATIENT)
Dept: INTERNAL MEDICINE | Facility: CLINIC | Age: 84
End: 2024-10-18
Payer: MEDICARE

## 2024-10-18 VITALS
DIASTOLIC BLOOD PRESSURE: 60 MMHG | BODY MASS INDEX: 26.68 KG/M2 | HEART RATE: 60 BPM | OXYGEN SATURATION: 99 % | HEIGHT: 67 IN | TEMPERATURE: 96.7 F | RESPIRATION RATE: 17 BRPM | WEIGHT: 170 LBS | SYSTOLIC BLOOD PRESSURE: 116 MMHG

## 2024-10-18 DIAGNOSIS — J84.112 IPF (IDIOPATHIC PULMONARY FIBROSIS): ICD-10-CM

## 2024-10-18 DIAGNOSIS — E11.9 TYPE 2 DIABETES MELLITUS WITHOUT COMPLICATION, WITHOUT LONG-TERM CURRENT USE OF INSULIN: Primary | ICD-10-CM

## 2024-10-18 DIAGNOSIS — I26.94 MULTIPLE SUBSEGMENTAL PULMONARY EMBOLI WITHOUT ACUTE COR PULMONALE: ICD-10-CM

## 2024-10-18 DIAGNOSIS — N28.9 RENAL INSUFFICIENCY: ICD-10-CM

## 2024-10-18 PROCEDURE — 3074F SYST BP LT 130 MM HG: CPT | Performed by: INTERNAL MEDICINE

## 2024-10-18 PROCEDURE — 1126F AMNT PAIN NOTED NONE PRSNT: CPT | Performed by: INTERNAL MEDICINE

## 2024-10-18 PROCEDURE — 99214 OFFICE O/P EST MOD 30 MIN: CPT | Performed by: INTERNAL MEDICINE

## 2024-10-18 PROCEDURE — 3078F DIAST BP <80 MM HG: CPT | Performed by: INTERNAL MEDICINE

## 2024-10-18 PROCEDURE — G2211 COMPLEX E/M VISIT ADD ON: HCPCS | Performed by: INTERNAL MEDICINE

## 2024-10-18 RX ORDER — METOPROLOL SUCCINATE 50 MG/1
50 TABLET, EXTENDED RELEASE ORAL DAILY
Qty: 90 TABLET | Refills: 3 | Status: SHIPPED | OUTPATIENT
Start: 2024-10-18

## 2024-10-18 NOTE — PROGRESS NOTES
Subjective     Patient ID: Radha Whelan is a 84 y.o. female. Patient is here for management of multiple medical problems.     Chief Complaint   Patient presents with    Chronic heart failure with preserved ejection fraction     History of Present Illness   Ipf  Stable.    Chf.          The following portions of the patient's history were reviewed and updated as appropriate: allergies, current medications, past family history, past medical history, past social history, past surgical history and problem list.    Review of Systems    Current Outpatient Medications:     acetaminophen (TYLENOL) 325 MG tablet, Take 2 tablets by mouth Every 6 (Six) Hours As Needed for Mild Pain ., Disp: , Rfl:     albuterol sulfate  (90 Base) MCG/ACT inhaler, INHALE 2 PUFFS INTO THE LUNGS EVERY 4 HOURS AS NEEDED, Disp: 6.7 g, Rfl: 0    apixaban (ELIQUIS) 2.5 MG tablet tablet, Take 1 tablet by mouth 2 (Two) Times a Day., Disp: 180 tablet, Rfl: 2    aspirin-acetaminophen-caffeine (EXCEDRIN MIGRAINE) 250-250-65 MG per tablet, Take 1 tablet by mouth Every 6 (Six) Hours As Needed for Headache., Disp: , Rfl:     B-12, Methylcobalamin, 1000 MCG sublingual tablet, Place 1 tablet under the tongue Daily., Disp: 90 tablet, Rfl: 3    Blood Glucose Monitoring Suppl (ONE TOUCH ULTRA 2) w/Device kit, USE AS NEEDED FOR BLOOD    SUGAR MONITORING, Disp: 1 each, Rfl: 0    bumetanide (BUMEX) 1 MG tablet, Take 1 tablet by mouth Daily., Disp: 90 tablet, Rfl: 3    clopidogrel (PLAVIX) 75 MG tablet, TAKE 1 TABLET BY MOUTH DAILY, Disp: 90 tablet, Rfl: 3    clotrimazole (LOTRIMIN) 1 % external solution, Apply  topically to the appropriate area as directed 2 (Two) Times a Day., Disp: 60 mL, Rfl: 0    Coenzyme Q10 200 MG capsule, Take 200 mg by mouth Every Other Day., Disp: , Rfl:     glipizide (GLUCOTROL XL) 2.5 MG 24 hr tablet, TAKE 1 TABLET BY MOUTH DAILY, Disp: 90 tablet, Rfl: 3    glucose blood test strip, Use as instructed, Disp: 100 each, Rfl: 12     "glucose monitor monitoring kit, 1 each As Needed (blood sugar)., Disp: 1 each, Rfl: 1    latanoprost (XALATAN) 0.005 % ophthalmic solution, PLACE 1 DROP IN EACH EYE EVERY DAY AT BEDTIME, Disp: , Rfl:     lisinopril (PRINIVIL,ZESTRIL) 40 MG tablet, TAKE 1 TABLET BY MOUTH DAILY, Disp: 90 tablet, Rfl: 3    loperamide (IMODIUM) 2 MG capsule, Take 1 capsule by mouth 4 (Four) Times a Day As Needed for Diarrhea., Disp: , Rfl:     lovastatin (MEVACOR) 10 MG tablet, Take 1 tablet by mouth Every Night. (Patient taking differently: Take 1 tablet by mouth Every Other Day.), Disp: 90 tablet, Rfl: 3    melatonin 3 MG tablet, Take 1 tablet by mouth Every Night., Disp: , Rfl:     metoprolol succinate XL (TOPROL-XL) 50 MG 24 hr tablet, Take 1 tablet by mouth Daily., Disp: 90 tablet, Rfl: 3    Nintedanib Esylate (Ofev) 150 MG capsule, Take 150 mg by mouth 2 (Two) Times a Day. Take with food, Disp: 180 capsule, Rfl: 3    pantoprazole (PROTONIX) 40 MG EC tablet, TAKE 1 TABLET BY MOUTH DAILY, Disp: 90 tablet, Rfl: 3    tobramycin-dexamethasone (TOBRADEX) 0.3-0.1 % ophthalmic suspension, Administer 1 drop to both eyes As Needed (dryness)., Disp: 5 mL, Rfl: 1    vitamin D3 125 MCG (5000 UT) capsule capsule, Take 1 capsule by mouth. Twice a week, Disp: , Rfl:     white petrolatum ointment, Apply 1 Application topically to the appropriate area as directed As Needed for Dry Skin. On buttock area, Disp: 500 g, Rfl: 1    glucose blood test strip, Use to check blood glucose daily., Disp: 100 each, Rfl: 12    Objective      Blood pressure 116/60, pulse 60, temperature 96.7 °F (35.9 °C), resp. rate 17, height 170.2 cm (67\"), weight 77.1 kg (170 lb), SpO2 99%.            Physical Exam     General Appearance:    Alert, cooperative, no distress, appears stated age   Head:    Normocephalic, without obvious abnormality, atraumatic   Eyes:    PERRL, conjunctiva/corneas clear, EOM's intact   Ears:    Normal TM's and external ear canals, both ears "   Nose:   Nares normal, septum midline, mucosa normal, no drainage   or sinus tenderness   Throat:   Lips, mucosa, and tongue normal; teeth and gums normal   Neck:   Supple, symmetrical, trachea midline, no adenopathy;        thyroid:  No enlargement/tenderness/nodules; no carotid    bruit or JVD   Back:     Symmetric, no curvature, ROM normal, no CVA tenderness   Lungs:     Crakles right lower lung only improved.   to auscultation bilaterally, respirations unlabored   Chest wall:    No tenderness or deformity   Heart:    Regular rate and rhythm, S1 and S2 normal, no murmur,        rub or gallop   Abdomen:     Soft, non-tender, bowel sounds active all four quadrants,     no masses, no organomegaly   Extremities:   Extremities normal, atraumatic, no cyanosis or edema   Pulses:   2+ and symmetric all extremities   Skin:   Skin color, texture, turgor normal, no rashes or lesions   Lymph nodes:   Cervical, supraclavicular, and axillary nodes normal   Neurologic:   CNII-XII intact. Normal strength, sensation and reflexes       throughout      Results for orders placed or performed in visit on 09/16/24   Hepatic Function Panel    Collection Time: 09/16/24  2:55 PM    Specimen: Blood   Result Value Ref Range    Total Protein 7.3 6.0 - 8.5 g/dL    Albumin 4.1 3.5 - 5.2 g/dL    ALT (SGPT) 24 1 - 33 U/L    AST (SGOT) 32 1 - 32 U/L    Alkaline Phosphatase 61 39 - 117 U/L    Total Bilirubin 0.4 0.0 - 1.2 mg/dL    Bilirubin, Direct <0.2 0.0 - 0.3 mg/dL    Bilirubin, Indirect           Assessment & Plan     Htn    Ipf stable.   Pulmonary emboli  Continue eliquis.      Still on oxygen with activiy for IPF. Not using at rest.                Component  Ref Range & Units 4 d ago  (10/14/24) 4 mo ago  (5/22/24) 6 mo ago  (4/9/24) 10 mo ago  (12/14/23) 10 mo ago  (11/29/23) 1 yr ago  (10/18/23) 1 yr ago  (6/15/23)   Hemoglobin A1C  4.80 - 5.60 % 6.70 High  7.50 High  7.80 High  CM 8.8 Abnormal  R 9.10 High  CM 11.00 High          Dm  much improved.  Low b12. Rx sent.     Still waiting on rx.      Diagnoses and all orders for this visit:    1. Type 2 diabetes mellitus without complication, without long-term current use of insulin (Primary)  -     glucose blood test strip; Use to check blood glucose daily.  Dispense: 100 each; Refill: 12    2. IPF (idiopathic pulmonary fibrosis)    3. Multiple subsegmental pulmonary emboli without acute cor pulmonale    4. Renal insufficiency    Other orders  -     metoprolol succinate XL (TOPROL-XL) 50 MG 24 hr tablet; Take 1 tablet by mouth Daily.  Dispense: 90 tablet; Refill: 3      Return in about 4 months (around 2/18/2025).          There are no Patient Instructions on file for this visit.     Farhan Schaefer MD    Assessment & Plan

## 2024-10-25 ENCOUNTER — TELEPHONE (OUTPATIENT)
Dept: INTERNAL MEDICINE | Facility: CLINIC | Age: 84
End: 2024-10-25
Payer: MEDICARE

## 2024-10-25 RX ORDER — TOBRAMYCIN AND DEXAMETHASONE 3; 1 MG/ML; MG/ML
1 SUSPENSION/ DROPS OPHTHALMIC 2 TIMES DAILY PRN
Qty: 5 ML | Refills: 1 | Status: SHIPPED | OUTPATIENT
Start: 2024-10-25 | End: 2024-10-25 | Stop reason: SDUPTHER

## 2024-10-25 RX ORDER — TOBRAMYCIN AND DEXAMETHASONE 3; 1 MG/ML; MG/ML
1 SUSPENSION/ DROPS OPHTHALMIC 2 TIMES DAILY PRN
Qty: 5 ML | Refills: 1 | Status: SHIPPED | OUTPATIENT
Start: 2024-10-25

## 2024-10-25 RX ORDER — TOBRAMYCIN AND DEXAMETHASONE 3; 1 MG/ML; MG/ML
1 SUSPENSION/ DROPS OPHTHALMIC AS NEEDED
Qty: 5 ML | Refills: 1 | Status: SHIPPED | OUTPATIENT
Start: 2024-10-25 | End: 2024-10-25 | Stop reason: SDUPTHER

## 2024-10-25 NOTE — TELEPHONE ENCOUNTER
Pharmacy Name: [id number] - [place], [place] - [address] AT [facility] [address] [place]  - [phone]  - [phone]      Pharmacy representative name: TAMAR    Pharmacy representative phone number: [phone]    What medication are you calling in regards to:  tobramycin-dexamethasone (TOBRADEX) 0.3-0.1 % ophthalmic suspension     What question does the pharmacy have: JOSE GUADALUPE FROM  [facility] NEEDD TO KNOW HOW OFTEN THE PATIENT NEED TO USE THE EYE DROPS     Who is the provider that prescribed the medication: DR. WEEKS    Additional notes: PLEASE CALL PHARMACY TO CLARIFY

## 2024-12-26 ENCOUNTER — TELEPHONE (OUTPATIENT)
Dept: PULMONOLOGY | Facility: CLINIC | Age: 84
End: 2024-12-26

## 2024-12-26 NOTE — TELEPHONE ENCOUNTER
Hub staff attempted to follow warm transfer process and was unsuccessful     Caller: Delores Zaidi    Relationship to patient: Emergency Contact    Best call back number:   [phone]     Patient is needing: PATIENTS DAUGHTER DROPPED OFF PAPERS THAT NEEDED TO BE FAXED FOR OFVE AND WAS TOLD THEY WOULD CALL FRIDAY TO CONFIRM THAT IT HAD BEEN DONE AND SHE HAS NOT HEARD ANYTHING BACK AND HER MOTHER IS ALMOST OUT OF THE MEDICATION.

## 2024-12-26 NOTE — TELEPHONE ENCOUNTER
Hub staff attempted to follow warm transfer process and was unsuccessful     Caller: Delores Zaidi    Relationship to patient: Emergency Contact    Best call back number: [phone]     Patient is needing: THIS IS THE SECOND CALL TODAY FROM THE PT DAUGHTER, THEY ARE TRYING TO GET THE DOCTOR TO CALL OR FAX THE SCRIPT FOT THE OFEV TO THE PEOPLE THAT ARE HELPING PAY FOR THIS SCRIPT. PT ONLY HAS 2 DAYS LEFT AND CONT AFFORD TO PAY THE $700 IF THIS IS NOT HANDLED. PLEASE CALL PT DAUGHTER BACK ASAP

## 2024-12-27 ENCOUNTER — TELEPHONE (OUTPATIENT)
Dept: PULMONOLOGY | Facility: CLINIC | Age: 84
End: 2024-12-27
Payer: MEDICARE

## 2024-12-27 DIAGNOSIS — J84.112 IPF (IDIOPATHIC PULMONARY FIBROSIS): ICD-10-CM

## 2024-12-27 RX ORDER — NINTEDANIB 150 MG/1
1 CAPSULE ORAL 2 TIMES DAILY
Qty: 180 CAPSULE | Refills: 3 | Status: SHIPPED | OUTPATIENT
Start: 2024-12-27

## 2024-12-27 NOTE — TELEPHONE ENCOUNTER
Hub staff attempted to follow warm transfer process and was unsuccessful     Caller: Delores Zaidi    Relationship to patient: Emergency Contact    Best call back number:     [phone]       Patient is needing: NEEDING TO SPEAK WITH RONAL REGARDING HER MOTHERS MEDICATION THAT SHE DROPPED OFF YESTERDAY TO BE FAXED ONCE MEMO CAME IN THIS MORNING TO SIGN. PLEASE CALL ASAP AND LET HER KNOW THAT IT HAS BEEN DONE

## 2025-01-06 DIAGNOSIS — I67.82 TEMPORARY CEREBRAL VASCULAR DYSFUNCTION: ICD-10-CM

## 2025-01-07 RX ORDER — PANTOPRAZOLE SODIUM 40 MG/1
40 TABLET, DELAYED RELEASE ORAL DAILY
Qty: 90 TABLET | Refills: 3 | Status: SHIPPED | OUTPATIENT
Start: 2025-01-07

## 2025-01-07 RX ORDER — CLOPIDOGREL BISULFATE 75 MG/1
75 TABLET ORAL DAILY
Qty: 90 TABLET | Refills: 3 | Status: SHIPPED | OUTPATIENT
Start: 2025-01-07

## 2025-01-07 RX ORDER — GLIPIZIDE 2.5 MG/1
2.5 TABLET, EXTENDED RELEASE ORAL DAILY
Qty: 90 TABLET | Refills: 3 | Status: SHIPPED | OUTPATIENT
Start: 2025-01-07

## 2025-01-15 ENCOUNTER — TELEPHONE (OUTPATIENT)
Dept: INTERNAL MEDICINE | Facility: CLINIC | Age: 85
End: 2025-01-15
Payer: MEDICARE

## 2025-01-15 NOTE — TELEPHONE ENCOUNTER
Pts daughter called because pt has been having low bp. Lowest it has been is 71/57. Pt wants to know if she can stop taking the metoprolol and go back to the amlodipine. Pls advise.

## 2025-01-24 ENCOUNTER — OFFICE VISIT (OUTPATIENT)
Dept: INTERNAL MEDICINE | Facility: CLINIC | Age: 85
End: 2025-01-24
Payer: MEDICARE

## 2025-01-24 VITALS
DIASTOLIC BLOOD PRESSURE: 52 MMHG | TEMPERATURE: 97.1 F | HEART RATE: 82 BPM | WEIGHT: 174 LBS | HEIGHT: 67 IN | BODY MASS INDEX: 27.31 KG/M2 | SYSTOLIC BLOOD PRESSURE: 100 MMHG | OXYGEN SATURATION: 98 % | RESPIRATION RATE: 16 BRPM

## 2025-01-24 DIAGNOSIS — E11.9 TYPE 2 DIABETES MELLITUS WITHOUT COMPLICATION, WITHOUT LONG-TERM CURRENT USE OF INSULIN: ICD-10-CM

## 2025-01-24 DIAGNOSIS — I95.2 HYPOTENSION DUE TO DRUGS: Primary | ICD-10-CM

## 2025-01-24 DIAGNOSIS — I50.32 CHRONIC HEART FAILURE WITH PRESERVED EJECTION FRACTION (HFPEF): ICD-10-CM

## 2025-01-24 PROCEDURE — 1126F AMNT PAIN NOTED NONE PRSNT: CPT | Performed by: INTERNAL MEDICINE

## 2025-01-24 PROCEDURE — 3074F SYST BP LT 130 MM HG: CPT | Performed by: INTERNAL MEDICINE

## 2025-01-24 PROCEDURE — 99214 OFFICE O/P EST MOD 30 MIN: CPT | Performed by: INTERNAL MEDICINE

## 2025-01-24 PROCEDURE — G2211 COMPLEX E/M VISIT ADD ON: HCPCS | Performed by: INTERNAL MEDICINE

## 2025-01-24 PROCEDURE — 3078F DIAST BP <80 MM HG: CPT | Performed by: INTERNAL MEDICINE

## 2025-01-24 RX ORDER — LISINOPRIL 20 MG/1
20 TABLET ORAL DAILY
Qty: 90 TABLET | Refills: 3 | Status: SHIPPED | OUTPATIENT
Start: 2025-01-24

## 2025-01-24 NOTE — PROGRESS NOTES
Subjective     Patient ID: Radha Whelan is a 84 y.o. female. Patient is here for management of multiple medical problems.     Chief Complaint   Patient presents with    Hypotension     History of Present Illness   Hypotension with symptoms.      The following portions of the patient's history were reviewed and updated as appropriate: allergies, current medications, past family history, past medical history, past social history, past surgical history and problem list.    Review of Systems    Current Outpatient Medications:     acetaminophen (TYLENOL) 325 MG tablet, Take 2 tablets by mouth Every 6 (Six) Hours As Needed for Mild Pain ., Disp: , Rfl:     albuterol sulfate  (90 Base) MCG/ACT inhaler, INHALE 2 PUFFS INTO THE LUNGS EVERY 4 HOURS AS NEEDED, Disp: 6.7 g, Rfl: 0    apixaban (ELIQUIS) 2.5 MG tablet tablet, Take 1 tablet by mouth 2 (Two) Times a Day., Disp: 180 tablet, Rfl: 3    aspirin-acetaminophen-caffeine (EXCEDRIN MIGRAINE) 250-250-65 MG per tablet, Take 1 tablet by mouth Every 6 (Six) Hours As Needed for Headache., Disp: , Rfl:     B-12, Methylcobalamin, 1000 MCG sublingual tablet, Place 1 tablet under the tongue Daily., Disp: 90 tablet, Rfl: 3    Blood Glucose Monitoring Suppl (ONE TOUCH ULTRA 2) w/Device kit, USE AS NEEDED FOR BLOOD    SUGAR MONITORING, Disp: 1 each, Rfl: 0    bumetanide (BUMEX) 1 MG tablet, Take 1 tablet by mouth Daily., Disp: 90 tablet, Rfl: 3    clopidogrel (PLAVIX) 75 MG tablet, TAKE 1 TABLET BY MOUTH DAILY, Disp: 90 tablet, Rfl: 3    clotrimazole (LOTRIMIN) 1 % external solution, Apply  topically to the appropriate area as directed 2 (Two) Times a Day., Disp: 60 mL, Rfl: 0    Coenzyme Q10 200 MG capsule, Take 200 mg by mouth Every Other Day., Disp: , Rfl:     glipizide (GLUCOTROL XL) 2.5 MG 24 hr tablet, TAKE 1 TABLET BY MOUTH DAILY, Disp: 90 tablet, Rfl: 3    glucose blood test strip, Use as instructed, Disp: 100 each, Rfl: 12    glucose blood test strip, Use to check blood  "glucose daily., Disp: 100 each, Rfl: 12    glucose monitor monitoring kit, 1 each As Needed (blood sugar)., Disp: 1 each, Rfl: 1    latanoprost (XALATAN) 0.005 % ophthalmic solution, PLACE 1 DROP IN EACH EYE EVERY DAY AT BEDTIME, Disp: , Rfl:     lisinopril (PRINIVIL,ZESTRIL) 20 MG tablet, Take 1 tablet by mouth Daily., Disp: 90 tablet, Rfl: 3    loperamide (IMODIUM) 2 MG capsule, Take 1 capsule by mouth 4 (Four) Times a Day As Needed for Diarrhea., Disp: , Rfl:     lovastatin (MEVACOR) 10 MG tablet, Take 1 tablet by mouth Every Night. (Patient taking differently: Take 1 tablet by mouth Every Other Day.), Disp: 90 tablet, Rfl: 3    melatonin 3 MG tablet, Take 1 tablet by mouth Every Night., Disp: , Rfl:     Nintedanib Esylate (Ofev) 150 MG capsule, Take 150 mg by mouth 2 (Two) Times a Day. Take with food, Disp: 180 capsule, Rfl: 3    pantoprazole (PROTONIX) 40 MG EC tablet, TAKE 1 TABLET BY MOUTH DAILY, Disp: 90 tablet, Rfl: 3    tobramycin-dexAMETHasone (TOBRADEX) 0.3-0.1 % ophthalmic suspension, Administer 1 drop to both eyes 2 (Two) Times a Day As Needed (dryness)., Disp: 5 mL, Rfl: 1    vitamin D3 125 MCG (5000 UT) capsule capsule, Take 1 capsule by mouth. Twice a week, Disp: , Rfl:     white petrolatum ointment, Apply 1 Application topically to the appropriate area as directed As Needed for Dry Skin. On buttock area, Disp: 500 g, Rfl: 1    Objective      Blood pressure 100/52, pulse 82, temperature 97.1 °F (36.2 °C), resp. rate 16, height 170.2 cm (67\"), weight 78.9 kg (174 lb), SpO2 98%.            Physical Exam     General Appearance:    Alert, cooperative, no distress, appears stated age   Head:    Normocephalic, without obvious abnormality, atraumatic   Eyes:    PERRL, conjunctiva/corneas clear, EOM's intact   Ears:    Normal TM's and external ear canals, both ears   Nose:   Nares normal, septum midline, mucosa normal, no drainage   or sinus tenderness   Throat:   Lips, mucosa, and tongue normal; teeth and " gums normal   Neck:   Supple, symmetrical, trachea midline, no adenopathy;        thyroid:  No enlargement/tenderness/nodules; no carotid    bruit or JVD   Back:     Symmetric, no curvature, ROM normal, no CVA tenderness   Lungs:     Clear to auscultation bilaterally, respirations unlabored   Chest wall:    No tenderness or deformity   Heart:    Regular rate and rhythm, S1 and S2 normal, no murmur,        rub or gallop   Abdomen:     Soft, non-tender, bowel sounds active all four quadrants,     no masses, no organomegaly   Extremities:   Extremities normal, atraumatic, no cyanosis or edema   Pulses:   2+ and symmetric all extremities   Skin:   Skin color, texture, turgor normal, no rashes or lesions   Lymph nodes:   Cervical, supraclavicular, and axillary nodes normal   Neurologic:   CNII-XII intact. Normal strength, sensation and reflexes       throughout      Results for orders placed or performed in visit on 09/16/24   Hepatic Function Panel    Collection Time: 09/16/24  2:55 PM    Specimen: Blood   Result Value Ref Range    Total Protein 7.3 6.0 - 8.5 g/dL    Albumin 4.1 3.5 - 5.2 g/dL    ALT (SGPT) 24 1 - 33 U/L    AST (SGOT) 32 1 - 32 U/L    Alkaline Phosphatase 61 39 - 117 U/L    Total Bilirubin 0.4 0.0 - 1.2 mg/dL    Bilirubin, Direct <0.2 0.0 - 0.3 mg/dL    Bilirubin, Indirect           Assessment & Plan   Off metoprolo. And reducing lisinopril today.   40-->20      Diet changes.          Diagnoses and all orders for this visit:    1. Hypotension due to drugs (Primary)  -     lisinopril (PRINIVIL,ZESTRIL) 20 MG tablet; Take 1 tablet by mouth Daily.  Dispense: 90 tablet; Refill: 3    2. Chronic heart failure with preserved ejection fraction (HFpEF)  -     apixaban (ELIQUIS) 2.5 MG tablet tablet; Take 1 tablet by mouth 2 (Two) Times a Day.  Dispense: 180 tablet; Refill: 3  -     Microalbumin / Creatinine Urine Ratio - Urine, Clean Catch    3. Type 2 diabetes mellitus without complication, without long-term  current use of insulin  -     Microalbumin / Creatinine Urine Ratio - Urine, Clean Catch      No follow-ups on file.  Has f/u in 2 weeks.            There are no Patient Instructions on file for this visit.     Farhan Schaefer MD    Assessment & Plan

## 2025-02-08 LAB
ALBUMIN SERPL-MCNC: 3.4 G/DL (ref 3.5–5.2)
ALBUMIN/GLOB SERPL: 1.2 G/DL
ALP SERPL-CCNC: 60 U/L (ref 39–117)
ALT SERPL-CCNC: 19 U/L (ref 1–33)
AST SERPL-CCNC: 27 U/L (ref 1–32)
BASOPHILS # BLD AUTO: 0.02 10*3/MM3 (ref 0–0.2)
BASOPHILS NFR BLD AUTO: 0.3 % (ref 0–1.5)
BILIRUB SERPL-MCNC: 0.2 MG/DL (ref 0–1.2)
BUN SERPL-MCNC: 19 MG/DL (ref 8–23)
BUN/CREAT SERPL: 14.4 (ref 7–25)
CALCIUM SERPL-MCNC: 8.7 MG/DL (ref 8.6–10.5)
CHLORIDE SERPL-SCNC: 101 MMOL/L (ref 98–107)
CHOLEST SERPL-MCNC: 228 MG/DL (ref 0–200)
CO2 SERPL-SCNC: 28.1 MMOL/L (ref 22–29)
CREAT SERPL-MCNC: 1.32 MG/DL (ref 0.57–1)
EGFRCR SERPLBLD CKD-EPI 2021: 39.6 ML/MIN/1.73
EOSINOPHIL # BLD AUTO: 0.07 10*3/MM3 (ref 0–0.4)
EOSINOPHIL NFR BLD AUTO: 1.1 % (ref 0.3–6.2)
ERYTHROCYTE [DISTWIDTH] IN BLOOD BY AUTOMATED COUNT: 13.6 % (ref 12.3–15.4)
GLOBULIN SER CALC-MCNC: 2.8 GM/DL
GLUCOSE SERPL-MCNC: 137 MG/DL (ref 65–99)
HBA1C MFR BLD: 7.5 % (ref 4.8–5.6)
HCT VFR BLD AUTO: 31.9 % (ref 34–46.6)
HDLC SERPL-MCNC: 54 MG/DL (ref 40–60)
HGB BLD-MCNC: 10.6 G/DL (ref 12–15.9)
IMM GRANULOCYTES # BLD AUTO: 0.03 10*3/MM3 (ref 0–0.05)
IMM GRANULOCYTES NFR BLD AUTO: 0.5 % (ref 0–0.5)
LDLC SERPL CALC-MCNC: 148 MG/DL (ref 0–100)
LYMPHOCYTES # BLD AUTO: 2.9 10*3/MM3 (ref 0.7–3.1)
LYMPHOCYTES NFR BLD AUTO: 44 % (ref 19.6–45.3)
MCH RBC QN AUTO: 29.6 PG (ref 26.6–33)
MCHC RBC AUTO-ENTMCNC: 33.2 G/DL (ref 31.5–35.7)
MCV RBC AUTO: 89.1 FL (ref 79–97)
MICROALBUMIN UR-MCNC: 47.1 UG/ML
MONOCYTES # BLD AUTO: 0.6 10*3/MM3 (ref 0.1–0.9)
MONOCYTES NFR BLD AUTO: 9.1 % (ref 5–12)
NEUTROPHILS # BLD AUTO: 2.97 10*3/MM3 (ref 1.7–7)
NEUTROPHILS NFR BLD AUTO: 45 % (ref 42.7–76)
NRBC BLD AUTO-RTO: 0 /100 WBC (ref 0–0.2)
PLATELET # BLD AUTO: 248 10*3/MM3 (ref 140–450)
POTASSIUM SERPL-SCNC: 4.8 MMOL/L (ref 3.5–5.2)
PROT SERPL-MCNC: 6.2 G/DL (ref 6–8.5)
RBC # BLD AUTO: 3.58 10*6/MM3 (ref 3.77–5.28)
SODIUM SERPL-SCNC: 139 MMOL/L (ref 136–145)
TRIGL SERPL-MCNC: 144 MG/DL (ref 0–150)
TSH SERPL DL<=0.005 MIU/L-ACNC: 4.09 UIU/ML (ref 0.27–4.2)
VIT B12 SERPL-MCNC: 222 PG/ML (ref 211–946)
VLDLC SERPL CALC-MCNC: 26 MG/DL (ref 5–40)
WBC # BLD AUTO: 6.59 10*3/MM3 (ref 3.4–10.8)

## 2025-02-12 ENCOUNTER — OFFICE VISIT (OUTPATIENT)
Dept: INTERNAL MEDICINE | Facility: CLINIC | Age: 85
End: 2025-02-12
Payer: MEDICARE

## 2025-02-12 VITALS
TEMPERATURE: 97.2 F | HEIGHT: 67 IN | BODY MASS INDEX: 27.15 KG/M2 | HEART RATE: 73 BPM | WEIGHT: 173 LBS | RESPIRATION RATE: 16 BRPM | SYSTOLIC BLOOD PRESSURE: 99 MMHG | OXYGEN SATURATION: 95 % | DIASTOLIC BLOOD PRESSURE: 50 MMHG

## 2025-02-12 DIAGNOSIS — I95.2 HYPOTENSION DUE TO DRUGS: Primary | ICD-10-CM

## 2025-02-12 DIAGNOSIS — I50.32 CHRONIC HEART FAILURE WITH PRESERVED EJECTION FRACTION (HFPEF): ICD-10-CM

## 2025-02-12 DIAGNOSIS — E11.9 TYPE 2 DIABETES MELLITUS WITHOUT COMPLICATION, WITHOUT LONG-TERM CURRENT USE OF INSULIN: ICD-10-CM

## 2025-02-12 PROCEDURE — 3074F SYST BP LT 130 MM HG: CPT | Performed by: INTERNAL MEDICINE

## 2025-02-12 PROCEDURE — 1126F AMNT PAIN NOTED NONE PRSNT: CPT | Performed by: INTERNAL MEDICINE

## 2025-02-12 PROCEDURE — 3078F DIAST BP <80 MM HG: CPT | Performed by: INTERNAL MEDICINE

## 2025-02-12 PROCEDURE — G2211 COMPLEX E/M VISIT ADD ON: HCPCS | Performed by: INTERNAL MEDICINE

## 2025-02-12 PROCEDURE — 99214 OFFICE O/P EST MOD 30 MIN: CPT | Performed by: INTERNAL MEDICINE

## 2025-02-12 RX ORDER — LISINOPRIL 20 MG/1
20 TABLET ORAL DAILY
Qty: 90 TABLET | Refills: 3 | Status: SHIPPED | OUTPATIENT
Start: 2025-02-12

## 2025-02-12 NOTE — PROGRESS NOTES
Subjective     Patient ID: Radha Whelan is a 84 y.o. female. Patient is here for management of multiple medical problems.     Chief Complaint   Patient presents with    Hypotension     History of Present Illness   Hypotension.      The following portions of the patient's history were reviewed and updated as appropriate: allergies, current medications, past family history, past medical history, past social history, past surgical history and problem list.    Review of Systems    Current Outpatient Medications:     acetaminophen (TYLENOL) 325 MG tablet, Take 2 tablets by mouth Every 6 (Six) Hours As Needed for Mild Pain ., Disp: , Rfl:     albuterol sulfate  (90 Base) MCG/ACT inhaler, INHALE 2 PUFFS INTO THE LUNGS EVERY 4 HOURS AS NEEDED, Disp: 6.7 g, Rfl: 0    apixaban (ELIQUIS) 2.5 MG tablet tablet, Take 1 tablet by mouth 2 (Two) Times a Day., Disp: 180 tablet, Rfl: 3    aspirin-acetaminophen-caffeine (EXCEDRIN MIGRAINE) 250-250-65 MG per tablet, Take 1 tablet by mouth Every 6 (Six) Hours As Needed for Headache., Disp: , Rfl:     B-12, Methylcobalamin, 1000 MCG sublingual tablet, Place 1 tablet under the tongue Daily., Disp: 90 tablet, Rfl: 3    Blood Glucose Monitoring Suppl (ONE TOUCH ULTRA 2) w/Device kit, USE AS NEEDED FOR BLOOD    SUGAR MONITORING, Disp: 1 each, Rfl: 0    bumetanide (BUMEX) 1 MG tablet, Take 1 tablet by mouth Daily., Disp: 90 tablet, Rfl: 3    clopidogrel (PLAVIX) 75 MG tablet, TAKE 1 TABLET BY MOUTH DAILY, Disp: 90 tablet, Rfl: 3    clotrimazole (LOTRIMIN) 1 % external solution, Apply  topically to the appropriate area as directed 2 (Two) Times a Day., Disp: 60 mL, Rfl: 0    Coenzyme Q10 200 MG capsule, Take 200 mg by mouth Every Other Day., Disp: , Rfl:     glipizide (GLUCOTROL XL) 2.5 MG 24 hr tablet, TAKE 1 TABLET BY MOUTH DAILY, Disp: 90 tablet, Rfl: 3    glucose blood test strip, Use as instructed, Disp: 100 each, Rfl: 12    glucose blood test strip, Use to check blood glucose daily.,  "Disp: 100 each, Rfl: 12    glucose monitor monitoring kit, 1 each As Needed (blood sugar)., Disp: 1 each, Rfl: 1    latanoprost (XALATAN) 0.005 % ophthalmic solution, PLACE 1 DROP IN EACH EYE EVERY DAY AT BEDTIME, Disp: , Rfl:     loperamide (IMODIUM) 2 MG capsule, Take 1 capsule by mouth 4 (Four) Times a Day As Needed for Diarrhea., Disp: , Rfl:     lovastatin (MEVACOR) 10 MG tablet, Take 1 tablet by mouth Every Night. (Patient taking differently: Take 1 tablet by mouth Every Other Day.), Disp: 90 tablet, Rfl: 3    melatonin 3 MG tablet, Take 1 tablet by mouth Every Night., Disp: , Rfl:     Nintedanib Esylate (Ofev) 150 MG capsule, Take 150 mg by mouth 2 (Two) Times a Day. Take with food, Disp: 180 capsule, Rfl: 3    pantoprazole (PROTONIX) 40 MG EC tablet, TAKE 1 TABLET BY MOUTH DAILY, Disp: 90 tablet, Rfl: 3    tobramycin-dexAMETHasone (TOBRADEX) 0.3-0.1 % ophthalmic suspension, Administer 1 drop to both eyes 2 (Two) Times a Day As Needed (dryness)., Disp: 5 mL, Rfl: 1    vitamin D3 125 MCG (5000 UT) capsule capsule, Take 1 capsule by mouth. Twice a week, Disp: , Rfl:     white petrolatum ointment, Apply 1 Application topically to the appropriate area as directed As Needed for Dry Skin. On buttock area, Disp: 500 g, Rfl: 1    Objective      Blood pressure 99/50, pulse 73, temperature 97.2 °F (36.2 °C), resp. rate 16, height 170.2 cm (67\"), weight 78.5 kg (173 lb), SpO2 95%.            Physical Exam     General Appearance:    Alert, cooperative, no distress, appears stated age   Head:    Normocephalic, without obvious abnormality, atraumatic   Eyes:    PERRL, conjunctiva/corneas clear, EOM's intact   Ears:    Normal TM's and external ear canals, both ears   Nose:   Nares normal, septum midline, mucosa normal, no drainage   or sinus tenderness   Throat:   Lips, mucosa, and tongue normal; teeth and gums normal   Neck:   Supple, symmetrical, trachea midline, no adenopathy;        thyroid:  No " enlargement/tenderness/nodules; no carotid    bruit or JVD   Back:     Symmetric, no curvature, ROM normal, no CVA tenderness   Lungs:     Clear to auscultation bilaterally, respirations unlabored   Chest wall:    No tenderness or deformity   Heart:    Regular rate and rhythm, S1 and S2 normal, no murmur,        rub or gallop   Abdomen:     Soft, non-tender, bowel sounds active all four quadrants,     no masses, no organomegaly   Extremities:   Extremities normal, atraumatic, no cyanosis or edema   Pulses:   2+ and symmetric all extremities   Skin:   Skin color, texture, turgor normal, no rashes or lesions   Lymph nodes:   Cervical, supraclavicular, and axillary nodes normal   Neurologic:   CNII-XII intact. Normal strength, sensation and reflexes       throughout      Results for orders placed or performed in visit on 10/14/24   Lipid Panel    Collection Time: 02/07/25  8:19 AM    Specimen: Blood   Result Value Ref Range    Total Cholesterol 228 (H) 0 - 200 mg/dL    Triglycerides 144 0 - 150 mg/dL    HDL Cholesterol 54 40 - 60 mg/dL    VLDL Cholesterol Kb 26 5 - 40 mg/dL    LDL Chol Calc (NIH) 148 (H) 0 - 100 mg/dL   Comprehensive Metabolic Panel    Collection Time: 02/07/25  8:19 AM    Specimen: Blood   Result Value Ref Range    Glucose 137 (H) 65 - 99 mg/dL    BUN 19 8 - 23 mg/dL    Creatinine 1.32 (H) 0.57 - 1.00 mg/dL    EGFR Result 39.6 (L) >60.0 mL/min/1.73    BUN/Creatinine Ratio 14.4 7.0 - 25.0    Sodium 139 136 - 145 mmol/L    Potassium 4.8 3.5 - 5.2 mmol/L    Chloride 101 98 - 107 mmol/L    Total CO2 28.1 22.0 - 29.0 mmol/L    Calcium 8.7 8.6 - 10.5 mg/dL    Total Protein 6.2 6.0 - 8.5 g/dL    Albumin 3.4 (L) 3.5 - 5.2 g/dL    Globulin 2.8 gm/dL    A/G Ratio 1.2 g/dL    Total Bilirubin 0.2 0.0 - 1.2 mg/dL    Alkaline Phosphatase 60 39 - 117 U/L    AST (SGOT) 27 1 - 32 U/L    ALT (SGPT) 19 1 - 33 U/L   Vitamin B12    Collection Time: 02/07/25  8:19 AM    Specimen: Blood   Result Value Ref Range    Vitamin  B-12 222 211 - 946 pg/mL   TSH    Collection Time: 02/07/25  8:19 AM    Specimen: Blood   Result Value Ref Range    TSH 4.090 0.270 - 4.200 uIU/mL   Hemoglobin A1c    Collection Time: 02/07/25  8:19 AM    Specimen: Blood   Result Value Ref Range    Hemoglobin A1C 7.50 (H) 4.80 - 5.60 %   MicroAlbumin, Urine, Random - Urine, Clean Catch    Collection Time: 02/07/25  8:19 AM    Specimen: Urine, Clean Catch   Result Value Ref Range    Microalbumin, Urine 47.1 Not Estab. ug/mL   CBC & Differential    Collection Time: 02/07/25  8:19 AM    Specimen: Blood   Result Value Ref Range    WBC 6.59 3.40 - 10.80 10*3/mm3    RBC 3.58 (L) 3.77 - 5.28 10*6/mm3    Hemoglobin 10.6 (L) 12.0 - 15.9 g/dL    Hematocrit 31.9 (L) 34.0 - 46.6 %    MCV 89.1 79.0 - 97.0 fL    MCH 29.6 26.6 - 33.0 pg    MCHC 33.2 31.5 - 35.7 g/dL    RDW 13.6 12.3 - 15.4 %    Platelets 248 140 - 450 10*3/mm3    Neutrophil Rel % 45.0 42.7 - 76.0 %    Lymphocyte Rel % 44.0 19.6 - 45.3 %    Monocyte Rel % 9.1 5.0 - 12.0 %    Eosinophil Rel % 1.1 0.3 - 6.2 %    Basophil Rel % 0.3 0.0 - 1.5 %    Neutrophils Absolute 2.97 1.70 - 7.00 10*3/mm3    Lymphocytes Absolute 2.90 0.70 - 3.10 10*3/mm3    Monocytes Absolute 0.60 0.10 - 0.90 10*3/mm3    Eosinophils Absolute 0.07 0.00 - 0.40 10*3/mm3    Basophils Absolute 0.02 0.00 - 0.20 10*3/mm3    Immature Granulocyte Rel % 0.5 0.0 - 0.5 %    Immature Grans Absolute 0.03 0.00 - 0.05 10*3/mm3    nRBC 0.0 0.0 - 0.2 /100 WBC         Assessment & Plan stay on  lisionpril 20mg. HFpEF. On bumex 1 mg daily. Will decrease to 0.5 mg a day with whole pill as needed for fluid retention.  Currently pt is with orthostatic hypotension with standing.        Ha1c jumped. Had knee injections. Pt reorpts better now.    Haxc 11-->7.5    Low vit b12. Will need to restart.      She had to increase oxygen to 3 litters.  Feels better.    If orthopnea. Will need to come in sooner.            Diagnoses and all orders for this visit:    1. Hypotension  due to drugs (Primary)  -     Lipid Panel  -     CBC & Differential  -     Comprehensive Metabolic Panel  -     Vitamin B12  -     Hemoglobin A1c  -     Microalbumin / Creatinine Urine Ratio - Urine, Clean Catch    2. Type 2 diabetes mellitus without complication, without long-term current use of insulin  -     Lipid Panel  -     CBC & Differential  -     Comprehensive Metabolic Panel  -     Vitamin B12  -     Hemoglobin A1c  -     Microalbumin / Creatinine Urine Ratio - Urine, Clean Catch    3. Chronic heart failure with preserved ejection fraction (HFpEF)  -     Lipid Panel  -     CBC & Differential  -     Comprehensive Metabolic Panel  -     Vitamin B12  -     Hemoglobin A1c  -     Microalbumin / Creatinine Urine Ratio - Urine, Clean Catch      Return in about 4 weeks (around 3/12/2025) for Annual physical, Medicare Wellness.          There are no Patient Instructions on file for this visit.     Farhan Schaefer MD    Assessment & Plan

## 2025-02-17 ENCOUNTER — OFFICE VISIT (OUTPATIENT)
Dept: PULMONOLOGY | Facility: CLINIC | Age: 85
End: 2025-02-17
Payer: MEDICARE

## 2025-02-17 VITALS
RESPIRATION RATE: 18 BRPM | SYSTOLIC BLOOD PRESSURE: 110 MMHG | OXYGEN SATURATION: 97 % | HEIGHT: 67 IN | DIASTOLIC BLOOD PRESSURE: 68 MMHG | HEART RATE: 94 BPM | BODY MASS INDEX: 27.31 KG/M2 | WEIGHT: 174 LBS

## 2025-02-17 DIAGNOSIS — Z79.899 HIGH RISK MEDICATION USE: ICD-10-CM

## 2025-02-17 DIAGNOSIS — G47.34 NOCTURNAL HYPOXIA: ICD-10-CM

## 2025-02-17 DIAGNOSIS — J84.112 IPF (IDIOPATHIC PULMONARY FIBROSIS): ICD-10-CM

## 2025-02-17 DIAGNOSIS — R09.02 EXERCISE HYPOXEMIA: ICD-10-CM

## 2025-02-17 DIAGNOSIS — G47.33 OSA (OBSTRUCTIVE SLEEP APNEA): ICD-10-CM

## 2025-02-17 DIAGNOSIS — Z79.899 DRUG THERAPY: Primary | ICD-10-CM

## 2025-02-17 DIAGNOSIS — G47.33 OBSTRUCTIVE SLEEP APNEA: ICD-10-CM

## 2025-02-17 RX ORDER — AZELASTINE 1 MG/ML
1 SPRAY, METERED NASAL 2 TIMES DAILY PRN
Qty: 1 EACH | Refills: 5 | Status: SHIPPED | OUTPATIENT
Start: 2025-02-17

## 2025-02-17 NOTE — PROGRESS NOTES
"Chief Complaint   Patient presents with    Breathing Problem    Follow-up         Subjective   Radha Whelan is a 84 y.o. female.   The patient comes in today for follow-up of ILD and obstructive sleep apnea.    She has not had any respiratory illness since last visit.     She has not needed HOWIE at all.    She takes Ofev as directed. She has diarrhea from Ofev and takes loperamide as needed. She has been on Ofev in 2 years September 2025.     She uses oxygen at night with CPAP machine and with activity at 2 LPM.       The following portions of the patient's history were reviewed and updated as appropriate: allergies, current medications, past family history, past medical history, past social history, and past surgical history.      Review of Systems   HENT:  Positive for rhinorrhea. Negative for postnasal drip, sinus pressure, sneezing and sore throat.    Respiratory:  Negative for cough, chest tightness, shortness of breath and wheezing.        Objective   Visit Vitals  /68   Pulse 94   Resp 18   Ht 170.2 cm (67\") Comment: pt reported   Wt 78.9 kg (174 lb)   SpO2 97% Comment: on room air (REST)   BMI 27.25 kg/m²     ============================  ============================    6 MINUTE WALK TEST    Radha Whelan   1940             BASELINE   SpO2%: 97 % RA    Heart Rate 94   Blood Pressure 110/68     EXERCISE SpO2% HEART RATE RA or O2 @ LPM   1 MINUTE 94 114 RA   2 MINUTES 89 120 RA   3 MINUTES 85 123 RA ADD 2LPM   4 MINUTES 91 117 2LPM   5 MINUTES 96 118 2LPM   6 MINUTES 95 119 2LPM   (Number of laps: 4 X 36 meters + Final partial lap:  meters = 144 meters)            Distance Walked:  144 Meters   SpO2% Post Exercise:   96%   HR Post Exercise:  101     Reason to stop (if applicable):   ____ Chest Pain   ____ Light Headedness   ____ Dyspnea Unrelieved by Rest   ____ Abnormal Gait Pattern   ____ Severe Fatigue   ____ Other (Specify: __________________________)    Tech Comments (if any):      Test performed by: " SP    ============================  ============================      Physical Exam  Vitals reviewed.   HENT:      Head: Atraumatic.      Mouth/Throat:      Mouth: Mucous membranes are moist.      Comments: Crowded oropharynx.   Eyes:      Extraocular Movements: Extraocular movements intact.   Cardiovascular:      Rate and Rhythm: Normal rate and regular rhythm.   Pulmonary:      Effort: Pulmonary effort is normal. No respiratory distress.      Comments: Bilateral rales noted.   Skin:     General: Skin is warm.   Neurological:      Mental Status: She is alert and oriented to person, place, and time.             Assessment & Plan   Diagnoses and all orders for this visit:    1. Drug therapy (Primary)  -     Hepatic Function Panel; Future    2. IPF (idiopathic pulmonary fibrosis)  -     Converted Six Minute Walk    3. Obstructive sleep apnea    4. High risk medication use    5. ODELL (obstructive sleep apnea)    6. Nocturnal hypoxia    7. Exercise hypoxemia    Other orders  -     azelastine (ASTELIN) 0.1 % nasal spray; Administer 1 spray into the nostril(s) as directed by provider 2 (Two) Times a Day As Needed for Rhinitis or Allergies. Use in each nostril as directed  Dispense: 1 each; Refill: 5           Return in about 6 months (around 8/17/2025) for Recheck, For Dr. Dias.    DISCUSSION (if any):  PFT 7/2023 consistent with no obstruction.  Severe restriction without air trapping suggested.  Preserved diffusion capacity.    Labs reviewed and within normal limits.  She will need to have hepatic panel every 3 months due to medication therapy of Ofev.  Hepatic panel has been ordered for May 2025.   Latest Reference Range & Units 02/07/25 08:19   Alkaline Phosphatase 39 - 117 U/L 60   Total Protein 6.0 - 8.5 g/dL 6.2   Albumin 3.5 - 5.2 g/dL 3.4 (L)   A/G Ratio g/dL 1.2   AST (SGOT) 1 - 32 U/L 27   ALT (SGPT) 1 - 33 U/L 19   Total Bilirubin 0.0 - 1.2 mg/dL 0.2   (L): Data is abnormally low    On 6 MWT, she required 2 L  of oxygen with activity.    She will need to continue using 2 L of oxygen with activity and at night with her CPAP machine.    She has episodes of diarrhea but it is not everyday. She states this is dependent on what she eats also. She does not feel the diarrhea is severe enough to change Ofev.    Continue Ofev twice a day.  Overall, she feels she is doing well and her breathing is stable.    She will need to continue Eliquis for life long use, due to PE.     I reviewed her last high-resolution CT from July 2024 and discussed results.  CT shows stable pulmonary fibrosis.    Study Result    Narrative & Impression   PROCEDURE: CT CHEST HI RESOLUTION DIAGNOSTIC-     HISTORY: Interstitial lung disease, compare October 22, 2023     TECHNIQUE: High-resolution imaging was performed during inspiration with  1.25 mm cuts obtained at 10 mm increments. No contrast was utilized.  Routine chest imaging was also performed.     FINDINGS:     There is mild subpleural thickening and some mild honeycombing most  prominent in the right lower lobe findings are similar to prior study.  No new area of consolidation is seen. No significant air trapping  identified.. Pacemaker again noted on the left.  No significant air trapping identified.  No pleural or pericardial effusion is seen. No adenopathy or mass lesion  is present.     IMPRESSION:  1. Evidence of pulmonary fibrosis similar to prior exam.           This study was performed with techniques to keep radiation doses as low  as reasonably achievable (ALARA). Individualized dose reduction  techniques using automated exposure control or adjustment of vA and/or  kV according to the patient size were employed.      This report was signed and finalized on 7/26/2024 4:24 PM by Hiwot Alves MD.       Sleep study 2015  AHI 7.7/hour  Supine AHI 21.7/hour  Titration study 2023    Latest PAP device provided in Sept 2023  DME company: [employer]     Current PAP settings: 6-15  Current mask type:  nasal pillows    Continue treatment with AutoPAP at a pressure of 6-15, with a nasal pillows.    Patient's compliance data was reviewed and the compliance is 100%.    Humidification setup, hose and mask care discussed.    Use every night for atleast 4 hours stressed.     Dictated utilizing Dragon dictation.    This document was electronically signed by CURTIS Dumont February 17, 2025  15:07 EST

## 2025-03-13 LAB
ALBUMIN SERPL-MCNC: 3.6 G/DL (ref 3.5–5.2)
ALBUMIN/CREAT UR: 18 MG/G CREAT (ref 0–29)
ALBUMIN/GLOB SERPL: 1.2 G/DL
ALP SERPL-CCNC: 66 U/L (ref 39–117)
ALT SERPL-CCNC: 29 U/L (ref 1–33)
AST SERPL-CCNC: 34 U/L (ref 1–32)
BASOPHILS # BLD AUTO: 0.02 10*3/MM3 (ref 0–0.2)
BASOPHILS NFR BLD AUTO: 0.3 % (ref 0–1.5)
BILIRUB SERPL-MCNC: 0.3 MG/DL (ref 0–1.2)
BUN SERPL-MCNC: 13 MG/DL (ref 8–23)
BUN/CREAT SERPL: 11.8 (ref 7–25)
CALCIUM SERPL-MCNC: 8.9 MG/DL (ref 8.6–10.5)
CHLORIDE SERPL-SCNC: 99 MMOL/L (ref 98–107)
CHOLEST SERPL-MCNC: 258 MG/DL (ref 0–200)
CO2 SERPL-SCNC: 29.8 MMOL/L (ref 22–29)
CREAT SERPL-MCNC: 1.1 MG/DL (ref 0.57–1)
CREAT UR-MCNC: 281.6 MG/DL
EGFRCR SERPLBLD CKD-EPI 2021: 49.3 ML/MIN/1.73
EOSINOPHIL # BLD AUTO: 0.1 10*3/MM3 (ref 0–0.4)
EOSINOPHIL NFR BLD AUTO: 1.4 % (ref 0.3–6.2)
ERYTHROCYTE [DISTWIDTH] IN BLOOD BY AUTOMATED COUNT: 13.6 % (ref 12.3–15.4)
GLOBULIN SER CALC-MCNC: 2.9 GM/DL
GLUCOSE SERPL-MCNC: 138 MG/DL (ref 65–99)
HBA1C MFR BLD: 7.7 % (ref 4.8–5.6)
HCT VFR BLD AUTO: 33.4 % (ref 34–46.6)
HDLC SERPL-MCNC: 57 MG/DL (ref 40–60)
HGB BLD-MCNC: 10.2 G/DL (ref 12–15.9)
IMM GRANULOCYTES # BLD AUTO: 0.02 10*3/MM3 (ref 0–0.05)
IMM GRANULOCYTES NFR BLD AUTO: 0.3 % (ref 0–0.5)
LDLC SERPL CALC-MCNC: 180 MG/DL (ref 0–100)
LYMPHOCYTES # BLD AUTO: 2.5 10*3/MM3 (ref 0.7–3.1)
LYMPHOCYTES NFR BLD AUTO: 36 % (ref 19.6–45.3)
MCH RBC QN AUTO: 27.1 PG (ref 26.6–33)
MCHC RBC AUTO-ENTMCNC: 30.5 G/DL (ref 31.5–35.7)
MCV RBC AUTO: 88.6 FL (ref 79–97)
MICROALBUMIN UR-MCNC: 52 UG/ML
MONOCYTES # BLD AUTO: 0.58 10*3/MM3 (ref 0.1–0.9)
MONOCYTES NFR BLD AUTO: 8.3 % (ref 5–12)
NEUTROPHILS # BLD AUTO: 3.73 10*3/MM3 (ref 1.7–7)
NEUTROPHILS NFR BLD AUTO: 53.7 % (ref 42.7–76)
NRBC BLD AUTO-RTO: 0 /100 WBC (ref 0–0.2)
PLATELET # BLD AUTO: 282 10*3/MM3 (ref 140–450)
POTASSIUM SERPL-SCNC: 4.3 MMOL/L (ref 3.5–5.2)
PROT SERPL-MCNC: 6.5 G/DL (ref 6–8.5)
RBC # BLD AUTO: 3.77 10*6/MM3 (ref 3.77–5.28)
SODIUM SERPL-SCNC: 139 MMOL/L (ref 136–145)
TRIGL SERPL-MCNC: 120 MG/DL (ref 0–150)
VIT B12 SERPL-MCNC: 360 PG/ML (ref 211–946)
VLDLC SERPL CALC-MCNC: 21 MG/DL (ref 5–40)
WBC # BLD AUTO: 6.95 10*3/MM3 (ref 3.4–10.8)

## 2025-03-14 ENCOUNTER — OFFICE VISIT (OUTPATIENT)
Dept: INTERNAL MEDICINE | Facility: CLINIC | Age: 85
End: 2025-03-14
Payer: MEDICARE

## 2025-03-14 VITALS
HEART RATE: 83 BPM | BODY MASS INDEX: 27 KG/M2 | DIASTOLIC BLOOD PRESSURE: 60 MMHG | TEMPERATURE: 96.7 F | RESPIRATION RATE: 16 BRPM | SYSTOLIC BLOOD PRESSURE: 110 MMHG | HEIGHT: 67 IN | WEIGHT: 172 LBS | OXYGEN SATURATION: 97 %

## 2025-03-14 DIAGNOSIS — I10 ESSENTIAL HYPERTENSION: ICD-10-CM

## 2025-03-14 DIAGNOSIS — D50.9 IRON DEFICIENCY ANEMIA, UNSPECIFIED IRON DEFICIENCY ANEMIA TYPE: ICD-10-CM

## 2025-03-14 DIAGNOSIS — Z00.00 ROUTINE GENERAL MEDICAL EXAMINATION AT A HEALTH CARE FACILITY: Primary | ICD-10-CM

## 2025-03-14 DIAGNOSIS — E11.9 TYPE 2 DIABETES MELLITUS WITHOUT COMPLICATION, WITHOUT LONG-TERM CURRENT USE OF INSULIN: ICD-10-CM

## 2025-03-14 NOTE — PROGRESS NOTES
Subjective     Patient ID: Radha Whelan is a 84 y.o. female. Patient is here for management of multiple medical problems.     Chief Complaint   Patient presents with    Medicare Wellness-subsequent     History of Present Illness   M/c wellness    Anemia and fast hr with activity  and lung issues. Will eval anemia to see if improvement.   Dm stable.        The following portions of the patient's history were reviewed and updated as appropriate: allergies, current medications, past family history, past medical history, past social history, past surgical history and problem list.    Review of Systems    Current Outpatient Medications:     acetaminophen (TYLENOL) 325 MG tablet, Take 2 tablets by mouth Every 6 (Six) Hours As Needed for Mild Pain ., Disp: , Rfl:     albuterol sulfate  (90 Base) MCG/ACT inhaler, INHALE 2 PUFFS INTO THE LUNGS EVERY 4 HOURS AS NEEDED, Disp: 6.7 g, Rfl: 0    apixaban (ELIQUIS) 2.5 MG tablet tablet, Take 1 tablet by mouth 2 (Two) Times a Day., Disp: 180 tablet, Rfl: 3    aspirin-acetaminophen-caffeine (EXCEDRIN MIGRAINE) 250-250-65 MG per tablet, Take 1 tablet by mouth Every 6 (Six) Hours As Needed for Headache., Disp: , Rfl:     azelastine (ASTELIN) 0.1 % nasal spray, Administer 1 spray into the nostril(s) as directed by provider 2 (Two) Times a Day As Needed for Rhinitis or Allergies. Use in each nostril as directed, Disp: 1 each, Rfl: 5    B-12, Methylcobalamin, 1000 MCG sublingual tablet, Place 1 tablet under the tongue Daily., Disp: 90 tablet, Rfl: 3    Blood Glucose Monitoring Suppl (ONE TOUCH ULTRA 2) w/Device kit, USE AS NEEDED FOR BLOOD    SUGAR MONITORING, Disp: 1 each, Rfl: 0    bumetanide (BUMEX) 1 MG tablet, Take 1 tablet by mouth Daily., Disp: 90 tablet, Rfl: 3    clopidogrel (PLAVIX) 75 MG tablet, TAKE 1 TABLET BY MOUTH DAILY, Disp: 90 tablet, Rfl: 3    clotrimazole (LOTRIMIN) 1 % external solution, Apply  topically to the appropriate area as directed 2 (Two) Times a Day.,  "Disp: 60 mL, Rfl: 0    Coenzyme Q10 200 MG capsule, Take 200 mg by mouth Every Other Day., Disp: , Rfl:     glipizide (GLUCOTROL XL) 2.5 MG 24 hr tablet, TAKE 1 TABLET BY MOUTH DAILY, Disp: 90 tablet, Rfl: 3    glucose blood test strip, Use as instructed, Disp: 100 each, Rfl: 12    glucose blood test strip, Use to check blood glucose daily., Disp: 100 each, Rfl: 12    glucose monitor monitoring kit, 1 each As Needed (blood sugar)., Disp: 1 each, Rfl: 1    latanoprost (XALATAN) 0.005 % ophthalmic solution, PLACE 1 DROP IN EACH EYE EVERY DAY AT BEDTIME, Disp: , Rfl:     lisinopril (PRINIVIL,ZESTRIL) 20 MG tablet, Take 1 tablet by mouth Daily., Disp: 90 tablet, Rfl: 3    loperamide (IMODIUM) 2 MG capsule, Take 1 capsule by mouth 4 (Four) Times a Day As Needed for Diarrhea., Disp: , Rfl:     lovastatin (MEVACOR) 10 MG tablet, Take 1 tablet by mouth Every Night. (Patient taking differently: Take 1 tablet by mouth Every Other Day.), Disp: 90 tablet, Rfl: 3    melatonin 3 MG tablet, Take 1 tablet by mouth Every Night., Disp: , Rfl:     Nintedanib Esylate (Ofev) 150 MG capsule, Take 150 mg by mouth 2 (Two) Times a Day. Take with food, Disp: 180 capsule, Rfl: 3    pantoprazole (PROTONIX) 40 MG EC tablet, TAKE 1 TABLET BY MOUTH DAILY, Disp: 90 tablet, Rfl: 3    tobramycin-dexAMETHasone (TOBRADEX) 0.3-0.1 % ophthalmic suspension, Administer 1 drop to both eyes 2 (Two) Times a Day As Needed (dryness)., Disp: 5 mL, Rfl: 1    vitamin D3 125 MCG (5000 UT) capsule capsule, Take 1 capsule by mouth. Twice a week, Disp: , Rfl:     white petrolatum ointment, Apply 1 Application topically to the appropriate area as directed As Needed for Dry Skin. On buttock area, Disp: 500 g, Rfl: 1    Objective      Blood pressure 110/60, pulse 83, temperature 96.7 °F (35.9 °C), resp. rate 16, height 170.2 cm (67\"), weight 78 kg (172 lb), SpO2 97%.            Physical Exam     General Appearance:    Alert, cooperative, no distress, appears stated age "   Head:    Normocephalic, without obvious abnormality, atraumatic   Eyes:    PERRL, conjunctiva/corneas clear, EOM's intact   Ears:    Normal TM's and external ear canals, both ears   Nose:   Nares normal, septum midline, mucosa normal, no drainage   or sinus tenderness   Throat:   Lips, mucosa, and tongue normal; teeth and gums normal   Neck:   Supple, symmetrical, trachea midline, no adenopathy;        thyroid:  No enlargement/tenderness/nodules; no carotid    bruit or JVD   Back:     Symmetric, no curvature, ROM normal, no CVA tenderness   Lungs:     Clear to auscultation bilaterally, respirations unlabored   Chest wall:    No tenderness or deformity   Heart:    Regular rate and rhythm, S1 and S2 normal, no murmur,        rub or gallop   Abdomen:     Soft, non-tender, bowel sounds active all four quadrants,     no masses, no organomegaly   Extremities:   Extremities normal, atraumatic, no cyanosis or edema   Pulses:   2+ and symmetric all extremities   Skin:   Skin color, texture, turgor normal, no rashes or lesions   Lymph nodes:   Cervical, supraclavicular, and axillary nodes normal   Neurologic:   CNII-XII intact. Normal strength, sensation and reflexes       throughout      Results for orders placed or performed in visit on 02/12/25   Lipid Panel    Collection Time: 03/12/25  7:58 AM    Specimen: Blood   Result Value Ref Range    Total Cholesterol 258 (H) 0 - 200 mg/dL    Triglycerides 120 0 - 150 mg/dL    HDL Cholesterol 57 40 - 60 mg/dL    VLDL Cholesterol Kb 21 5 - 40 mg/dL    LDL Chol Calc (NIH) 180 (H) 0 - 100 mg/dL   Comprehensive Metabolic Panel    Collection Time: 03/12/25  7:58 AM    Specimen: Blood   Result Value Ref Range    Glucose 138 (H) 65 - 99 mg/dL    BUN 13 8 - 23 mg/dL    Creatinine 1.10 (H) 0.57 - 1.00 mg/dL    EGFR Result 49.3 (L) >60.0 mL/min/1.73    BUN/Creatinine Ratio 11.8 7.0 - 25.0    Sodium 139 136 - 145 mmol/L    Potassium 4.3 3.5 - 5.2 mmol/L    Chloride 99 98 - 107 mmol/L     Total CO2 29.8 (H) 22.0 - 29.0 mmol/L    Calcium 8.9 8.6 - 10.5 mg/dL    Total Protein 6.5 6.0 - 8.5 g/dL    Albumin 3.6 3.5 - 5.2 g/dL    Globulin 2.9 gm/dL    A/G Ratio 1.2 g/dL    Total Bilirubin 0.3 0.0 - 1.2 mg/dL    Alkaline Phosphatase 66 39 - 117 U/L    AST (SGOT) 34 (H) 1 - 32 U/L    ALT (SGPT) 29 1 - 33 U/L   Vitamin B12    Collection Time: 03/12/25  7:58 AM    Specimen: Blood   Result Value Ref Range    Vitamin B-12 360 211 - 946 pg/mL   Hemoglobin A1c    Collection Time: 03/12/25  7:58 AM    Specimen: Blood   Result Value Ref Range    Hemoglobin A1C 7.70 (H) 4.80 - 5.60 %   CBC & Differential    Collection Time: 03/12/25  7:58 AM    Specimen: Blood   Result Value Ref Range    WBC 6.95 3.40 - 10.80 10*3/mm3    RBC 3.77 3.77 - 5.28 10*6/mm3    Hemoglobin 10.2 (L) 12.0 - 15.9 g/dL    Hematocrit 33.4 (L) 34.0 - 46.6 %    MCV 88.6 79.0 - 97.0 fL    MCH 27.1 26.6 - 33.0 pg    MCHC 30.5 (L) 31.5 - 35.7 g/dL    RDW 13.6 12.3 - 15.4 %    Platelets 282 140 - 450 10*3/mm3    Neutrophil Rel % 53.7 42.7 - 76.0 %    Lymphocyte Rel % 36.0 19.6 - 45.3 %    Monocyte Rel % 8.3 5.0 - 12.0 %    Eosinophil Rel % 1.4 0.3 - 6.2 %    Basophil Rel % 0.3 0.0 - 1.5 %    Neutrophils Absolute 3.73 1.70 - 7.00 10*3/mm3    Lymphocytes Absolute 2.50 0.70 - 3.10 10*3/mm3    Monocytes Absolute 0.58 0.10 - 0.90 10*3/mm3    Eosinophils Absolute 0.10 0.00 - 0.40 10*3/mm3    Basophils Absolute 0.02 0.00 - 0.20 10*3/mm3    Immature Granulocyte Rel % 0.3 0.0 - 0.5 %    Immature Grans Absolute 0.02 0.00 - 0.05 10*3/mm3    nRBC 0.0 0.0 - 0.2 /100 WBC   Microalbumin / Creatinine Urine Ratio - Urine, Clean Catch    Collection Time: 03/12/25  9:19 AM    Specimen: Urine, Clean Catch   Result Value Ref Range    Creatinine, Urine 281.6 Not Estab. mg/dL    Microalbumin, Urine 52.0 Not Estab. ug/mL    Microalbumin/Creatinine Ratio 18 0 - 29 mg/g creat         Assessment & Plan   Hypercholest. Pt missing doses.        Dm stable.        Diagnoses and all  orders for this visit:    1. Routine general medical examination at a health care facility (Primary)  -     Iron Profile  -     Vitamin B6  -     Folate  -     Reticulocytes    2. Type 2 diabetes mellitus without complication, without long-term current use of insulin  -     Iron Profile  -     Vitamin B6  -     Folate  -     Reticulocytes    3. Essential hypertension  -     Iron Profile  -     Vitamin B6  -     Folate  -     Reticulocytes    4. Iron deficiency anemia, unspecified iron deficiency anemia type  -     Iron Profile  -     Vitamin B6  -     Folate  -     Reticulocytes      Return in about 4 weeks (around 4/11/2025).          There are no Patient Instructions on file for this visit.     Farhan Schaefer MD    Assessment & Plan

## 2025-03-14 NOTE — PROGRESS NOTES
Subjective   The ABCs of the Annual Wellness Visit  Medicare Wellness Visit      Radha Whelan is a 84 y.o. patient who presents for a Medicare Wellness Visit.    The following portions of the patient's history were reviewed and   updated as appropriate: allergies, current medications, past family history, past medical history, past social history, past surgical history, and problem list.    Compared to one year ago, the patient's physical   health is better.  Compared to one year ago, the patient's mental   health is better.    Recent Hospitalizations:  This patient has had a [facility] admission record on file within the last 365 days.  Current Medical Providers:  Patient Care Team:  Farhan Schaefer MD as PCP - General (Internal Medicine)  Nik Chaudhari MD as Consulting Physician (General Surgery)  Juani Brooks MD as Consulting Physician (General Surgery)  Ashlyn Mays MD as Consulting Physician (Cardiology)  Stephan Laboy DO as Consulting Physician (Cardiology)  Lora Delaney APRN as Nurse Practitioner (Pulmonary Disease)  Karina Dias MD as Consulting Physician (Pulmonary Disease)  Avtar Corea PA-C (Physician Assistant)    Outpatient Medications Prior to Visit   Medication Sig Dispense Refill    acetaminophen (TYLENOL) 325 MG tablet Take 2 tablets by mouth Every 6 (Six) Hours As Needed for Mild Pain .      albuterol sulfate  (90 Base) MCG/ACT inhaler INHALE 2 PUFFS INTO THE LUNGS EVERY 4 HOURS AS NEEDED 6.7 g 0    apixaban (ELIQUIS) 2.5 MG tablet tablet Take 1 tablet by mouth 2 (Two) Times a Day. 180 tablet 3    aspirin-acetaminophen-caffeine (EXCEDRIN MIGRAINE) 250-250-65 MG per tablet Take 1 tablet by mouth Every 6 (Six) Hours As Needed for Headache.      azelastine (ASTELIN) 0.1 % nasal spray Administer 1 spray into the nostril(s) as directed by provider 2 (Two) Times a Day As Needed for Rhinitis or Allergies. Use in each nostril as directed 1 each 5    B-12,  Methylcobalamin, 1000 MCG sublingual tablet Place 1 tablet under the tongue Daily. 90 tablet 3    Blood Glucose Monitoring Suppl (ONE TOUCH ULTRA 2) w/Device kit USE AS NEEDED FOR BLOOD    SUGAR MONITORING 1 each 0    bumetanide (BUMEX) 1 MG tablet Take 1 tablet by mouth Daily. 90 tablet 3    clopidogrel (PLAVIX) 75 MG tablet TAKE 1 TABLET BY MOUTH DAILY 90 tablet 3    clotrimazole (LOTRIMIN) 1 % external solution Apply  topically to the appropriate area as directed 2 (Two) Times a Day. 60 mL 0    Coenzyme Q10 200 MG capsule Take 200 mg by mouth Every Other Day.      glipizide (GLUCOTROL XL) 2.5 MG 24 hr tablet TAKE 1 TABLET BY MOUTH DAILY 90 tablet 3    glucose blood test strip Use as instructed 100 each 12    glucose blood test strip Use to check blood glucose daily. 100 each 12    glucose monitor monitoring kit 1 each As Needed (blood sugar). 1 each 1    latanoprost (XALATAN) 0.005 % ophthalmic solution PLACE 1 DROP IN EACH EYE EVERY DAY AT BEDTIME      lisinopril (PRINIVIL,ZESTRIL) 20 MG tablet Take 1 tablet by mouth Daily. 90 tablet 3    loperamide (IMODIUM) 2 MG capsule Take 1 capsule by mouth 4 (Four) Times a Day As Needed for Diarrhea.      lovastatin (MEVACOR) 10 MG tablet Take 1 tablet by mouth Every Night. (Patient taking differently: Take 1 tablet by mouth Every Other Day.) 90 tablet 3    melatonin 3 MG tablet Take 1 tablet by mouth Every Night.      Nintedanib Esylate (Ofev) 150 MG capsule Take 150 mg by mouth 2 (Two) Times a Day. Take with food 180 capsule 3    pantoprazole (PROTONIX) 40 MG EC tablet TAKE 1 TABLET BY MOUTH DAILY 90 tablet 3    tobramycin-dexAMETHasone (TOBRADEX) 0.3-0.1 % ophthalmic suspension Administer 1 drop to both eyes 2 (Two) Times a Day As Needed (dryness). 5 mL 1    vitamin D3 125 MCG (5000 UT) capsule capsule Take 1 capsule by mouth. Twice a week      white petrolatum ointment Apply 1 Application topically to the appropriate area as directed As Needed for Dry Skin. On buttock  "area 500 g 1     No facility-administered medications prior to visit.     No opioid medication identified on active medication list. I have reviewed chart for other potential  high risk medication/s and harmful drug interactions in the elderly.      Aspirin is not on active medication list.  Aspirin use is not indicated based on review of current medical condition/s. Risk of harm outweighs potential benefits.  .    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  ACP discussion was held with the patient during this visit. Patient does not have an advance directive, declines further assistance.            Objective   Vitals:    03/14/25 1103   BP: 110/60   Pulse: 83   Resp: 16   Temp: 96.7 °F (35.9 °C)   SpO2: 97%  Comment: on 2 L of oxygen   Weight: 78 kg (172 lb)   Height: 170.2 cm (67\")   PainSc: 0-No pain       Estimated body mass index is 26.94 kg/m² as calculated from the following:    Height as of this encounter: 170.2 cm (67\").    Weight as of this encounter: 78 kg (172 lb).         Gait and Balance Evaluation:  Slow Tentative Pace and Short Strides        Does the patient have evidence of cognitive impairment? No  Lab Results   Component Value Date    CHLPL 258 (H) 03/12/2025    TRIG 120 03/12/2025    HDL 57 03/12/2025     (H) 03/12/2025    VLDL 21 " 03/12/2025    HGBA1C 7.70 (H) 03/12/2025                                                                                               Health  Risk Assessment    Smoking Status:  Social History     Tobacco Use   Smoking Status Never    Passive exposure: Never   Smokeless Tobacco Never   Tobacco Comments    I have never smoked.     Alcohol Consumption:  Social History     Substance and Sexual Activity   Alcohol Use Never       Fall Risk Screen  STEADI Fall Risk Assessment was completed, and patient is at MODERATE risk for falls. Assessment completed on:3/14/2025    Depression Screening   Little interest or pleasure in doing things? Not at all   Feeling down, depressed, or hopeless? Not at all   PHQ-2 Total Score 0      Health Habits and Functional and Cognitive Screening:      3/14/2025    11:06 AM   Functional & Cognitive Status   Do you have difficulty preparing food and eating? Yes   Do you have difficulty bathing yourself, getting dressed or grooming yourself? No   Do you have difficulty using the toilet? No   Do you have difficulty moving around from place to place? No   Do you have trouble with steps or getting out of a bed or a chair? Yes   Current Diet Limited Junk Food   Dental Exam Up to date   Eye Exam Up to date   Exercise (times per week) 0 times per week   Current Exercises Include No Regular Exercise   Do you need help using the phone?  No   Are you deaf or do you have serious difficulty hearing?  Yes   Do you need help to go to places out of walking distance? Yes   Do you need help shopping? No   Do you need help preparing meals?  Yes   Do you need help with housework?  Yes   Do you need help with laundry? No   Do you need help taking your medications? No   Do you need help managing money? No   Do you ever drive or ride in a car without wearing a seat belt? No   Have you felt unusual stress, anger or loneliness in the last month? No   Who do you live with? Spouse   If you need help, do you have  trouble finding someone available to you? No   Have you been bothered in the last four weeks by sexual problems? No   Do you have difficulty concentrating, remembering or making decisions? No           Age-appropriate Screening Schedule:  Refer to the list below for future screening recommendations based on patient's age, sex and/or medical conditions. Orders for these recommended tests are listed in the plan section. The patient has been provided with a written plan.    Health Maintenance List  Health Maintenance   Topic Date Due    DIABETIC EYE EXAM  10/11/2022    DXA SCAN  08/01/2024    ANNUAL WELLNESS VISIT  10/09/2024    COVID-19 Vaccine (4 - 2024-25 season) 03/16/2025 (Originally 9/1/2024)    BMI FOLLOWUP  04/15/2025    HEMOGLOBIN A1C  09/12/2025    LIPID PANEL  03/12/2026    URINE MICROALBUMIN-CREATININE RATIO (uACR)  03/12/2026    TDAP/TD VACCINES (3 - Td or Tdap) 10/01/2034    RSV Vaccine - Adults  Completed    INFLUENZA VACCINE  Completed    Pneumococcal Vaccine 50+  Completed    ZOSTER VACCINE  Addressed    MAMMOGRAM  Discontinued                                                                                                                                                CMS Preventative Services Quick Reference  Risk Factors Identified During Encounter  Fall Risk-High or Moderate: Discussed Fall Prevention in the home  Inactivity/Sedentary: Patient was advised to exercise at least 150 minutes a week per CDC recommendations.    The above risks/problems have been discussed with the patient.  Pertinent information has been shared with the patient in the After Visit Summary.  An After Visit Summary and PPPS were made available to the patient.    Follow Up:   Next Medicare Wellness visit to be scheduled in 1 year.     Assessment & Plan  Routine general medical examination at a health care facility              Follow Up:   No follow-ups on file.

## 2025-03-28 ENCOUNTER — OFFICE VISIT (OUTPATIENT)
Dept: CARDIOLOGY | Facility: CLINIC | Age: 85
End: 2025-03-28
Payer: MEDICARE

## 2025-03-28 VITALS
WEIGHT: 173.8 LBS | HEIGHT: 67 IN | DIASTOLIC BLOOD PRESSURE: 60 MMHG | SYSTOLIC BLOOD PRESSURE: 108 MMHG | HEART RATE: 71 BPM | BODY MASS INDEX: 27.28 KG/M2 | OXYGEN SATURATION: 100 %

## 2025-03-28 DIAGNOSIS — I50.32 CHRONIC HEART FAILURE WITH PRESERVED EJECTION FRACTION (HFPEF): Primary | ICD-10-CM

## 2025-03-28 DIAGNOSIS — I25.10 CORONARY ARTERY DISEASE INVOLVING NATIVE CORONARY ARTERY OF NATIVE HEART WITHOUT ANGINA PECTORIS: ICD-10-CM

## 2025-03-28 DIAGNOSIS — I10 ESSENTIAL HYPERTENSION: ICD-10-CM

## 2025-03-28 DIAGNOSIS — E78.2 MIXED HYPERLIPIDEMIA: ICD-10-CM

## 2025-03-28 NOTE — PROGRESS NOTES
"[facility] Cardiology    Encounter Date: 2025    Patient ID: Radha Whelan is a 84 y.o. female.  : 1940     PCP: Farhan Schaefer MD       Chief Complaint: Chronic heart failure with preserved ejection fraction (HFp      PROBLEM LIST:  Nonobstructive coronary artery disease  Echo 2019: EF 43%, grade 1 diastolic dysfunction  C 2019: No hemodynamically significant CAD  [facility], 2023: EF 55%. Nonobstructive plaque disease involving multiple vessels without any evidence of hemodynamically significant coronary disease. Normal hemodynamics.  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 Laboy  Hypertension  Pulmonary embolism, started on anticoagulation.  Pulmonary fibrosis, on supplemental oxygen.    History of Present Illness  Patient presents today for a follow-up with a history of HFpEF, CAD, and cardiac risk factors. Since last visit, patient has been doing well overall from a cardiovascular standpoint. She is on oxygen at night and when she is walking. She monitors her blood pressure regularly and this has been normal. Patient denies chest pain, shortness of breath, orthopnea, palpitations, edema, dizziness, and syncope.  She uses her oxygen during the day with activities and also at night.    Allergies   Allergen Reactions    Covid-19 Mrna Vacc (Moderna) Other (See Comments)     Myocarditits      Cumini Other (See Comments)     Complex migrane    Cheese Other (See Comments)     migrane    Ibuprofen Rash     Swelling all over    Other Other (See Comments)     \"ice cream\"-migranes    Saccharin Other (See " Comments)     migrane         Current Outpatient Medications:     acetaminophen (TYLENOL) 325 MG tablet, Take 2 tablets by mouth Every 6 (Six) Hours As Needed for Mild Pain ., Disp: , Rfl:     albuterol sulfate  (90 Base) MCG/ACT inhaler, INHALE 2 PUFFS INTO THE LUNGS EVERY 4 HOURS AS NEEDED, Disp: 6.7 g, Rfl: 0    apixaban (ELIQUIS) 2.5 MG tablet tablet, Take 1 tablet by mouth 2 (Two) Times a Day., Disp: 180 tablet, Rfl: 3    aspirin-acetaminophen-caffeine (EXCEDRIN MIGRAINE) 250-250-65 MG per tablet, Take 1 tablet by mouth Every 6 (Six) Hours As Needed for Headache., Disp: , Rfl:     azelastine (ASTELIN) 0.1 % nasal spray, Administer 1 spray into the nostril(s) as directed by provider 2 (Two) Times a Day As Needed for Rhinitis or Allergies. Use in each nostril as directed, Disp: 1 each, Rfl: 5    B-12, Methylcobalamin, 1000 MCG sublingual tablet, Place 1 tablet under the tongue Daily., Disp: 90 tablet, Rfl: 3    Blood Glucose Monitoring Suppl (ONE TOUCH ULTRA 2) w/Device kit, USE AS NEEDED FOR BLOOD    SUGAR MONITORING, Disp: 1 each, Rfl: 0    bumetanide (BUMEX) 1 MG tablet, Take 1 tablet by mouth Daily., Disp: 90 tablet, Rfl: 3    clopidogrel (PLAVIX) 75 MG tablet, TAKE 1 TABLET BY MOUTH DAILY, Disp: 90 tablet, Rfl: 3    clotrimazole (LOTRIMIN) 1 % external solution, Apply  topically to the appropriate area as directed 2 (Two) Times a Day., Disp: 60 mL, Rfl: 0    Coenzyme Q10 200 MG capsule, Take 200 mg by mouth Every Other Day., Disp: , Rfl:     glipizide (GLUCOTROL XL) 2.5 MG 24 hr tablet, TAKE 1 TABLET BY MOUTH DAILY, Disp: 90 tablet, Rfl: 3    glucose blood test strip, Use as instructed, Disp: 100 each, Rfl: 12    glucose blood test strip, Use to check blood glucose daily., Disp: 100 each, Rfl: 12    glucose monitor monitoring kit, 1 each As Needed (blood sugar)., Disp: 1 each, Rfl: 1    latanoprost (XALATAN) 0.005 % ophthalmic solution, PLACE 1 DROP IN EACH EYE EVERY DAY AT BEDTIME, Disp: , Rfl:      "lisinopril (PRINIVIL,ZESTRIL) 20 MG tablet, Take 1 tablet by mouth Daily., Disp: 90 tablet, Rfl: 3    loperamide (IMODIUM) 2 MG capsule, Take 1 capsule by mouth 4 (Four) Times a Day As Needed for Diarrhea., Disp: , Rfl:     lovastatin (MEVACOR) 10 MG tablet, Take 1 tablet by mouth Every Night. (Patient taking differently: Take 1 tablet by mouth Every Other Day.), Disp: 90 tablet, Rfl: 3    melatonin 3 MG tablet, Take 1 tablet by mouth Every Night., Disp: , Rfl:     Nintedanib Esylate (Ofev) 150 MG capsule, Take 150 mg by mouth 2 (Two) Times a Day. Take with food, Disp: 180 capsule, Rfl: 3    pantoprazole (PROTONIX) 40 MG EC tablet, TAKE 1 TABLET BY MOUTH DAILY, Disp: 90 tablet, Rfl: 3    tobramycin-dexAMETHasone (TOBRADEX) 0.3-0.1 % ophthalmic suspension, Administer 1 drop to both eyes 2 (Two) Times a Day As Needed (dryness)., Disp: 5 mL, Rfl: 1    vitamin D3 125 MCG (5000 UT) capsule capsule, Take 1 capsule by mouth. Twice a week, Disp: , Rfl:     white petrolatum ointment, Apply 1 Application topically to the appropriate area as directed As Needed for Dry Skin. On buttock area, Disp: 500 g, Rfl: 1    The following portions of the patient's history were reviewed and updated as appropriate: allergies, current medications, past family history, past medical history, past social history, past surgical history and problem list.    ROS  Review of Systems   14 point ROS negative except for that listed in the HPI.         Objective:     /60 (BP Location: Left arm, Patient Position: Sitting, Cuff Size: Adult)   Pulse 71   Ht 170.2 cm (67.01\")   Wt 78.8 kg (173 lb 12.8 oz)   SpO2 100%   BMI 27.21 kg/m²      Physical Exam  General: No apparent distress.  Neck: no JVD.  Chest:No respiratory distress, breath sounds are normal. No wheezes,  rhonchi or rales.  Cardiovascular: Normal S1 and S2, no murmur, gallop or rub.    Extremities: No edema.      Data Review:   Lab Results   Component Value Date    GLUCOSE 138 (H) " 03/12/2025    BUN 13 03/12/2025    CREATININE 1.10 (H) 03/12/2025    EGFR 49.3 (L) 03/12/2025    BCR 11.8 03/12/2025     03/12/2025    K 4.3 03/12/2025    CO2 29.8 (H) 03/12/2025    CALCIUM 8.9 03/12/2025    ALBUMIN 3.6 03/12/2025    AST 34 (H) 03/12/2025    ALT 29 03/12/2025     Lab Results   Component Value Date    CHLPL 258 (H) 03/12/2025    TRIG 120 03/12/2025    HDL 57 03/12/2025     (H) 03/12/2025      Lab Results   Component Value Date    WBC 6.95 03/12/2025    RBC 3.77 03/12/2025    HGB 10.2 (L) 03/12/2025    HCT 33.4 (L) 03/12/2025    MCV 88.6 03/12/2025     03/12/2025     Lab Results   Component Value Date    TSH 4.090 02/07/2025     Lab Results   Component Value Date    HGBA1C 7.70 (H) 03/12/2025        Procedures       Advance Care Planning   ACP discussion was held with the patient during this visit. Patient does not have an advance directive, declines further assistance.           Assessment:      Diagnosis Plan   1. Chronic heart failure with preserved ejection fraction (HFpEF)  Stable/well compensated currently, continue current medical regimen.  Continue current diuretic therapy for fluid management      2. Coronary artery disease involving native coronary artery of native heart without angina pectoris  Stable without angina on current activity. Continue on Eliquis 2.5 mg BID for stroke prophylaxis. Continue on Plavix 75 mg daily for antiplatelet therapy.     Same day echo for next visit.       3. Essential hypertension  Well controlled. Continue on lisinopril 20 mg daily for hypertension. Continue on Bumex 1 mg daily for fluid retention.       4. Mixed hyperlipidemia  Uncontrolled. Statin intolerant.         Plan:   Stable cardiac status.  No angina or CHF decompensation.  Continue to monitor daily weight, restrict salt/sodium  Continue current medications.   FU in 6 MO with same day echo, sooner as needed.  Thank you for allowing us to participate in the care of your patient.      Scribed for Ashlyn Mays MD by Marybel Wright. 3/28/2025  10:48 EDT    I, Ashlyn Mays MD, personally performed the services described in this documentation as scribed by the above named individual in my presence, and it is both accurate and complete.  3/28/2025  12:30 EDT      Please note that portions of this note may have been completed with a voice recognition program. Efforts were made to edit the dictations, but occasionally words are mistranscribed.

## 2025-04-01 ENCOUNTER — TELEPHONE (OUTPATIENT)
Dept: PULMONOLOGY | Facility: CLINIC | Age: 85
End: 2025-04-01

## 2025-04-01 NOTE — TELEPHONE ENCOUNTER
Caller: Delores Zaidi    Relationship to patient: Emergency Contact    Best call back number: [phone]     Patient is needing: PATIENT'S DAUGHTER SPOKE WITH [facility]. THEY TOLD HER TO LET US KNOW THEY WILL BE SENDING IN A PRESCRIPTION REQUEST FOR PORTABLE OXYGEN. IT WILL NEED TO BE SIGNED AND SENT AS SOON AS POSSIBLE.

## 2025-04-04 ENCOUNTER — PATIENT MESSAGE (OUTPATIENT)
Dept: INTERNAL MEDICINE | Facility: CLINIC | Age: 85
End: 2025-04-04
Payer: MEDICARE

## 2025-04-04 LAB
FOLATE SERPL-MCNC: 7.2 NG/ML (ref 4.78–24.2)
IRON SATN MFR SERPL: 8 % (ref 20–50)
IRON SERPL-MCNC: 32 MCG/DL (ref 37–145)
PYRIDOXAL PHOS SERPL-MCNC: 10.4 UG/L (ref 3.4–65.2)
RETICS/RBC NFR AUTO: 1.9 % (ref 0.7–1.9)
TIBC SERPL-MCNC: 402 MCG/DL
UIBC SERPL-MCNC: 370 MCG/DL (ref 112–346)

## 2025-04-04 RX ORDER — LOVASTATIN 10 MG/1
10 TABLET ORAL NIGHTLY
Qty: 90 TABLET | Refills: 3 | Status: SHIPPED | OUTPATIENT
Start: 2025-04-04

## 2025-04-04 RX ORDER — LATANOPROST 50 UG/ML
SOLUTION/ DROPS OPHTHALMIC
Status: CANCELLED | OUTPATIENT
Start: 2025-04-04

## 2025-04-04 NOTE — TELEPHONE ENCOUNTER
Patient states she needs her glaucoma eye drops. Patient will call eye doctor to get prescription.

## 2025-04-04 NOTE — TELEPHONE ENCOUNTER
Rx Refill Note   Last Rx historical provider 2021, do you want to manage  Requested Prescriptions     Pending Prescriptions Disp Refills    latanoprost (XALATAN) 0.005 % ophthalmic solution        Last office visit with prescribing clinician: 3/14/2025   Last telemedicine visit with prescribing clinician: Visit date not found   Next office visit with prescribing clinician: 4/15/2025                         Would you like a call back once the refill request has been completed: [] Yes [] No    If the office needs to give you a call back, can they leave a voicemail: [] Yes [] No    Branden Mills, JOANIE  04/04/25, 14:00 EDT

## 2025-04-08 ENCOUNTER — TRANSCRIBE ORDERS (OUTPATIENT)
Dept: ADMINISTRATIVE | Facility: HOSPITAL | Age: 85
End: 2025-04-08
Payer: MEDICARE

## 2025-04-08 DIAGNOSIS — Z12.31 ENCOUNTER FOR SCREENING MAMMOGRAM FOR MALIGNANT NEOPLASM OF BREAST: Primary | ICD-10-CM

## 2025-04-09 RX ORDER — BUMETANIDE 1 MG/1
1 TABLET ORAL DAILY
Qty: 90 TABLET | Refills: 3 | Status: SHIPPED | OUTPATIENT
Start: 2025-04-09

## 2025-04-09 NOTE — TELEPHONE ENCOUNTER
Lab Results   Component Value Date    GLUCOSE 138 (H) 03/12/2025    BUN 13 03/12/2025    CREATININE 1.10 (H) 03/12/2025     03/12/2025    K 4.3 03/12/2025    CL 99 03/12/2025    CALCIUM 8.9 03/12/2025    PROTEINTOT 6.5 03/12/2025    ALBUMIN 3.6 03/12/2025    ALT 29 03/12/2025    AST 34 (H) 03/12/2025    ALKPHOS 66 03/12/2025    BILITOT 0.3 03/12/2025    GLOB 2.9 03/12/2025    AGRATIO 1.2 03/12/2025    BCR 11.8 03/12/2025    ANIONGAP 6.2 05/24/2024    EGFR 49.3 (L) 03/12/2025

## 2025-04-15 ENCOUNTER — OFFICE VISIT (OUTPATIENT)
Dept: INTERNAL MEDICINE | Facility: CLINIC | Age: 85
End: 2025-04-15
Payer: MEDICARE

## 2025-04-15 VITALS
DIASTOLIC BLOOD PRESSURE: 64 MMHG | HEIGHT: 67 IN | BODY MASS INDEX: 27.47 KG/M2 | OXYGEN SATURATION: 94 % | HEART RATE: 63 BPM | WEIGHT: 175 LBS | TEMPERATURE: 97.1 F | SYSTOLIC BLOOD PRESSURE: 124 MMHG | RESPIRATION RATE: 16 BRPM

## 2025-04-15 DIAGNOSIS — D50.9 IRON DEFICIENCY ANEMIA, UNSPECIFIED IRON DEFICIENCY ANEMIA TYPE: ICD-10-CM

## 2025-04-15 DIAGNOSIS — L03.031 CELLULITIS OF TOE OF RIGHT FOOT: ICD-10-CM

## 2025-04-15 DIAGNOSIS — R60.0 BILATERAL LEG EDEMA: ICD-10-CM

## 2025-04-15 DIAGNOSIS — L08.9 TOE INFECTION: Primary | ICD-10-CM

## 2025-04-15 RX ORDER — HYDROCHLOROTHIAZIDE 12.5 MG/1
12.5 TABLET ORAL DAILY PRN
Qty: 90 TABLET | Refills: 3 | Status: SHIPPED | OUTPATIENT
Start: 2025-04-15

## 2025-04-15 RX ORDER — SILVER SULFADIAZINE 10 MG/G
1 CREAM TOPICAL 2 TIMES DAILY
Qty: 100 G | Refills: 0 | Status: SHIPPED | OUTPATIENT
Start: 2025-04-15

## 2025-04-15 RX ORDER — DOXYCYCLINE 100 MG/1
100 CAPSULE ORAL 2 TIMES DAILY
Qty: 20 CAPSULE | Refills: 0 | Status: SHIPPED | OUTPATIENT
Start: 2025-04-15 | End: 2025-04-18 | Stop reason: SDUPTHER

## 2025-04-15 RX ORDER — HYDROCHLOROTHIAZIDE 12.5 MG/1
12.5 TABLET ORAL DAILY PRN
Qty: 90 TABLET | Refills: 3 | Status: SHIPPED | OUTPATIENT
Start: 2025-04-15 | End: 2025-04-15 | Stop reason: SDUPTHER

## 2025-04-15 NOTE — PROGRESS NOTES
Subjective     Patient ID: Radha Whelan is a 84 y.o. female. Patient is here for management of multiple medical problems.     Chief Complaint   Patient presents with    Foot Swelling     History of Present Illness   Increased leg edema.  Now with right foot with erythema of 2nd toes right foot. Has had needles in it repeatedly. Twice by  and once in dermatology.   Started after pantyhose rubbed a blister on it.    The following portions of the patient's history were reviewed and updated as appropriate: allergies, current medications, past family history, past medical history, past social history, past surgical history and problem list.    Review of Systems    Current Outpatient Medications:     acetaminophen (TYLENOL) 325 MG tablet, Take 2 tablets by mouth Every 6 (Six) Hours As Needed for Mild Pain ., Disp: , Rfl:     albuterol sulfate  (90 Base) MCG/ACT inhaler, INHALE 2 PUFFS INTO THE LUNGS EVERY 4 HOURS AS NEEDED, Disp: 6.7 g, Rfl: 0    apixaban (ELIQUIS) 2.5 MG tablet tablet, Take 1 tablet by mouth 2 (Two) Times a Day., Disp: 180 tablet, Rfl: 3    aspirin-acetaminophen-caffeine (EXCEDRIN MIGRAINE) 250-250-65 MG per tablet, Take 1 tablet by mouth Every 6 (Six) Hours As Needed for Headache., Disp: , Rfl:     azelastine (ASTELIN) 0.1 % nasal spray, Administer 1 spray into the nostril(s) as directed by provider 2 (Two) Times a Day As Needed for Rhinitis or Allergies. Use in each nostril as directed, Disp: 1 each, Rfl: 5    B-12, Methylcobalamin, 1000 MCG sublingual tablet, Place 1 tablet under the tongue Daily., Disp: 90 tablet, Rfl: 3    Blood Glucose Monitoring Suppl (ONE TOUCH ULTRA 2) w/Device kit, USE AS NEEDED FOR BLOOD    SUGAR MONITORING, Disp: 1 each, Rfl: 0    bumetanide (BUMEX) 1 MG tablet, TAKE 1 TABLET BY MOUTH DAILY, Disp: 90 tablet, Rfl: 3    clopidogrel (PLAVIX) 75 MG tablet, TAKE 1 TABLET BY MOUTH DAILY, Disp: 90 tablet, Rfl: 3    clotrimazole (LOTRIMIN) 1 % external solution, Apply   topically to the appropriate area as directed 2 (Two) Times a Day., Disp: 60 mL, Rfl: 0    Coenzyme Q10 200 MG capsule, Take 200 mg by mouth Every Other Day., Disp: , Rfl:     glipizide (GLUCOTROL XL) 2.5 MG 24 hr tablet, TAKE 1 TABLET BY MOUTH DAILY, Disp: 90 tablet, Rfl: 3    glucose blood test strip, Use as instructed, Disp: 100 each, Rfl: 12    glucose blood test strip, Use to check blood glucose daily., Disp: 100 each, Rfl: 12    glucose monitor monitoring kit, 1 each As Needed (blood sugar)., Disp: 1 each, Rfl: 1    hydroCHLOROthiazide 12.5 MG tablet, Take 1 tablet by mouth Daily As Needed (leg edema)., Disp: 90 tablet, Rfl: 3    latanoprost (XALATAN) 0.005 % ophthalmic solution, PLACE 1 DROP IN EACH EYE EVERY DAY AT BEDTIME, Disp: , Rfl:     lisinopril (PRINIVIL,ZESTRIL) 20 MG tablet, Take 1 tablet by mouth Daily., Disp: 90 tablet, Rfl: 3    loperamide (IMODIUM) 2 MG capsule, Take 1 capsule by mouth 4 (Four) Times a Day As Needed for Diarrhea., Disp: , Rfl:     lovastatin (MEVACOR) 10 MG tablet, Take 1 tablet by mouth Every Night., Disp: 90 tablet, Rfl: 3    melatonin 3 MG tablet, Take 1 tablet by mouth Every Night., Disp: , Rfl:     Nintedanib Esylate (Ofev) 150 MG capsule, Take 150 mg by mouth 2 (Two) Times a Day. Take with food, Disp: 180 capsule, Rfl: 3    pantoprazole (PROTONIX) 40 MG EC tablet, TAKE 1 TABLET BY MOUTH DAILY, Disp: 90 tablet, Rfl: 3    tobramycin-dexAMETHasone (TOBRADEX) 0.3-0.1 % ophthalmic suspension, Administer 1 drop to both eyes 2 (Two) Times a Day As Needed (dryness)., Disp: 5 mL, Rfl: 1    vitamin D3 125 MCG (5000 UT) capsule capsule, Take 1 capsule by mouth. Twice a week, Disp: , Rfl:     white petrolatum ointment, Apply 1 Application topically to the appropriate area as directed As Needed for Dry Skin. On buttock area, Disp: 500 g, Rfl: 1    doxycycline (VIBRAMYCIN) 100 MG capsule, Take 1 capsule by mouth 2 (Two) Times a Day., Disp: 20 capsule, Rfl: 0    silver sulfadiazine  "(Silvadene) 1 % cream, Apply 1 Application topically to the appropriate area as directed 2 (Two) Times a Day., Disp: 100 g, Rfl: 0    Objective      Blood pressure 124/64, pulse 63, temperature 97.1 °F (36.2 °C), resp. rate 16, height 170.2 cm (67.01\"), weight 79.4 kg (175 lb), SpO2 94%.    BMI is >= 25 and <30. (Overweight) The following options were offered after discussion;: weight loss educational material (shared in after visit summary), exercise counseling/recommendations, and nutrition counseling/recommendations       Physical Exam     General Appearance:    Alert, cooperative, no distress, appears stated age   Head:    Normocephalic, without obvious abnormality, atraumatic   Eyes:    PERRL, conjunctiva/corneas clear, EOM's intact   Ears:    Normal TM's and external ear canals, both ears   Nose:   Nares normal, septum midline, mucosa normal, no drainage   or sinus tenderness   Throat:   Lips, mucosa, and tongue normal; teeth and gums normal   Neck:   Supple, symmetrical, trachea midline, no adenopathy;        thyroid:  No enlargement/tenderness/nodules; no carotid    bruit or JVD   Back:     Symmetric, no curvature, ROM normal, no CVA tenderness   Lungs:     Clear to auscultation bilaterally, respirations unlabored   Chest wall:    No tenderness or deformity   Heart:    Regular rate and rhythm, S1 and S2 normal, no murmur,        rub or gallop   Abdomen:     Soft, non-tender, bowel sounds active all four quadrants,     no masses, no organomegaly   Extremities:   Extremities normal, atraumatic, no cyanosis or edema   Pulses:   2+ and symmetric all extremities   Skin:   Skin color, texture, turgor normal, no rashes or lesions   Lymph nodes:   Cervical, supraclavicular, and axillary nodes normal   Neurologic:   CNII-XII intact. Normal strength, sensation and reflexes       throughout      Results for orders placed or performed in visit on 03/14/25   Iron Profile    Collection Time: 04/01/25  8:30 AM    Specimen: " Blood   Result Value Ref Range    TIBC 402 mcg/dL    UIBC 370 (H) 112 - 346 mcg/dL    Iron 32 (L) 37 - 145 mcg/dL    Iron Saturation 8 (L) 20 - 50 %   Vitamin B6    Collection Time: 04/01/25  8:30 AM    Specimen: Blood   Result Value Ref Range    Vitamin B6 10.4 3.4 - 65.2 ug/L   Folate    Collection Time: 04/01/25  8:30 AM    Specimen: Blood   Result Value Ref Range    Folate 7.20 4.78 - 24.20 ng/mL   Reticulocytes    Collection Time: 04/01/25  8:30 AM    Specimen: Blood   Result Value Ref Range    Reticulocyte Absolute 1.90 0.70 - 1.90 %         Assessment & Plan   ncreased leg edema.  Now with right foot with erythema of 2nd toes right foot. Has had needles in it repeatedly. Twice by  and once in dermatology.   Started after pantyhose rubbed a blister on it.      On bumex. Will add hctz as needed for salt load from eating out.      Diagnoses and all orders for this visit:    1. Toe infection (Primary)  -     doxycycline (VIBRAMYCIN) 100 MG capsule; Take 1 capsule by mouth 2 (Two) Times a Day.  Dispense: 20 capsule; Refill: 0  -     silver sulfadiazine (Silvadene) 1 % cream; Apply 1 Application topically to the appropriate area as directed 2 (Two) Times a Day.  Dispense: 100 g; Refill: 0    2. Cellulitis of toe of right foot  -     doxycycline (VIBRAMYCIN) 100 MG capsule; Take 1 capsule by mouth 2 (Two) Times a Day.  Dispense: 20 capsule; Refill: 0  -     silver sulfadiazine (Silvadene) 1 % cream; Apply 1 Application topically to the appropriate area as directed 2 (Two) Times a Day.  Dispense: 100 g; Refill: 0    3. Bilateral leg edema  -     Discontinue: hydroCHLOROthiazide 12.5 MG tablet; Take 1 tablet by mouth Daily As Needed (leg edema).  Dispense: 90 tablet; Refill: 3  -     hydroCHLOROthiazide 12.5 MG tablet; Take 1 tablet by mouth Daily As Needed (leg edema).  Dispense: 90 tablet; Refill: 3    4. Iron deficiency anemia, unspecified iron deficiency anemia type  -     Iron Profile  -     CBC &  Differential      Return in about 6 weeks (around 5/27/2025).          There are no Patient Instructions on file for this visit.     Farhan Schaefer MD    Assessment & Plan

## 2025-04-18 ENCOUNTER — TELEPHONE (OUTPATIENT)
Dept: INTERNAL MEDICINE | Facility: CLINIC | Age: 85
End: 2025-04-18
Payer: MEDICARE

## 2025-04-18 DIAGNOSIS — L03.031 CELLULITIS OF TOE OF RIGHT FOOT: ICD-10-CM

## 2025-04-18 DIAGNOSIS — L08.9 TOE INFECTION: ICD-10-CM

## 2025-04-18 RX ORDER — DOXYCYCLINE 100 MG/1
100 CAPSULE ORAL 2 TIMES DAILY
Qty: 20 CAPSULE | Refills: 0 | Status: SHIPPED | OUTPATIENT
Start: 2025-04-18

## 2025-04-18 NOTE — TELEPHONE ENCOUNTER
Vera from [facility] pharmacy called and states dr leach sent patients doxycycline to mail order and needs to get it called in to them so they can fill for patient to  today. Call [phone]

## 2025-04-25 RX ORDER — CEPHALEXIN 500 MG/1
500 CAPSULE ORAL 3 TIMES DAILY
Qty: 21 CAPSULE | Refills: 0 | Status: SHIPPED | OUTPATIENT
Start: 2025-04-25 | End: 2025-05-02

## 2025-05-23 LAB
ALBUMIN SERPL-MCNC: 3.7 G/DL (ref 3.5–5.2)
ALP SERPL-CCNC: 72 U/L (ref 39–117)
ALT SERPL-CCNC: 49 U/L (ref 1–33)
AST SERPL-CCNC: 78 U/L (ref 1–32)
BASOPHILS # BLD AUTO: 0.03 10*3/MM3 (ref 0–0.2)
BASOPHILS NFR BLD AUTO: 0.5 % (ref 0–1.5)
BILIRUB DIRECT SERPL-MCNC: 0.2 MG/DL (ref 0–0.3)
BILIRUB SERPL-MCNC: 0.4 MG/DL (ref 0–1.2)
EOSINOPHIL # BLD AUTO: 0.18 10*3/MM3 (ref 0–0.4)
EOSINOPHIL NFR BLD AUTO: 3.1 % (ref 0.3–6.2)
ERYTHROCYTE [DISTWIDTH] IN BLOOD BY AUTOMATED COUNT: 18.4 % (ref 12.3–15.4)
HCT VFR BLD AUTO: 37.1 % (ref 34–46.6)
HGB BLD-MCNC: 12.1 G/DL (ref 12–15.9)
IMM GRANULOCYTES # BLD AUTO: 0.02 10*3/MM3 (ref 0–0.05)
IMM GRANULOCYTES NFR BLD AUTO: 0.3 % (ref 0–0.5)
IRON SATN MFR SERPL: 60 % (ref 20–50)
IRON SERPL-MCNC: 199 MCG/DL (ref 37–145)
LYMPHOCYTES # BLD AUTO: 2.46 10*3/MM3 (ref 0.7–3.1)
LYMPHOCYTES NFR BLD AUTO: 42.2 % (ref 19.6–45.3)
MCH RBC QN AUTO: 29.2 PG (ref 26.6–33)
MCHC RBC AUTO-ENTMCNC: 32.6 G/DL (ref 31.5–35.7)
MCV RBC AUTO: 89.6 FL (ref 79–97)
MONOCYTES # BLD AUTO: 0.52 10*3/MM3 (ref 0.1–0.9)
MONOCYTES NFR BLD AUTO: 8.9 % (ref 5–12)
NEUTROPHILS # BLD AUTO: 2.62 10*3/MM3 (ref 1.7–7)
NEUTROPHILS NFR BLD AUTO: 45 % (ref 42.7–76)
NRBC BLD AUTO-RTO: 0 /100 WBC (ref 0–0.2)
PLATELET # BLD AUTO: 250 10*3/MM3 (ref 140–450)
PROT SERPL-MCNC: 6.5 G/DL (ref 6–8.5)
RBC # BLD AUTO: 4.14 10*6/MM3 (ref 3.77–5.28)
TIBC SERPL-MCNC: 334 MCG/DL
UIBC SERPL-MCNC: 135 MCG/DL (ref 112–346)
WBC # BLD AUTO: 5.83 10*3/MM3 (ref 3.4–10.8)

## 2025-05-28 ENCOUNTER — OFFICE VISIT (OUTPATIENT)
Dept: INTERNAL MEDICINE | Facility: CLINIC | Age: 85
End: 2025-05-28
Payer: MEDICARE

## 2025-05-28 VITALS
HEART RATE: 70 BPM | BODY MASS INDEX: 27.47 KG/M2 | WEIGHT: 175 LBS | RESPIRATION RATE: 17 BRPM | TEMPERATURE: 97.4 F | HEIGHT: 67 IN | SYSTOLIC BLOOD PRESSURE: 122 MMHG | DIASTOLIC BLOOD PRESSURE: 60 MMHG | OXYGEN SATURATION: 97 %

## 2025-05-28 DIAGNOSIS — D50.9 IRON DEFICIENCY ANEMIA, UNSPECIFIED IRON DEFICIENCY ANEMIA TYPE: ICD-10-CM

## 2025-05-28 DIAGNOSIS — E11.9 TYPE 2 DIABETES MELLITUS WITHOUT COMPLICATION, WITHOUT LONG-TERM CURRENT USE OF INSULIN: Primary | ICD-10-CM

## 2025-05-28 DIAGNOSIS — R74.8 ELEVATED LIVER ENZYMES: ICD-10-CM

## 2025-05-28 DIAGNOSIS — Z99.81 OXYGEN DEPENDENT: ICD-10-CM

## 2025-05-28 RX ORDER — FLUOROURACIL 50 MG/G
1 CREAM TOPICAL 2 TIMES DAILY
Qty: 40 G | Refills: 2 | Status: SHIPPED | OUTPATIENT
Start: 2025-05-28

## 2025-05-28 NOTE — PROGRESS NOTES
Subjective     Patient ID: Radha Whelan is a 84 y.o. female. Patient is here for management of multiple medical problems.     Chief Complaint   Patient presents with    Anemia    Hypertension    Diabetes     History of Present Illness   Anemia hypertension.    Dm        The following portions of the patient's history were reviewed and updated as appropriate: allergies, current medications, past family history, past medical history, past social history, past surgical history and problem list.    Review of Systems    Current Outpatient Medications:     acetaminophen (TYLENOL) 325 MG tablet, Take 2 tablets by mouth Every 6 (Six) Hours As Needed for Mild Pain ., Disp: , Rfl:     albuterol sulfate  (90 Base) MCG/ACT inhaler, INHALE 2 PUFFS INTO THE LUNGS EVERY 4 HOURS AS NEEDED, Disp: 6.7 g, Rfl: 0    apixaban (ELIQUIS) 2.5 MG tablet tablet, Take 1 tablet by mouth 2 (Two) Times a Day., Disp: 180 tablet, Rfl: 3    aspirin-acetaminophen-caffeine (EXCEDRIN MIGRAINE) 250-250-65 MG per tablet, Take 1 tablet by mouth Every 6 (Six) Hours As Needed for Headache., Disp: , Rfl:     azelastine (ASTELIN) 0.1 % nasal spray, Administer 1 spray into the nostril(s) as directed by provider 2 (Two) Times a Day As Needed for Rhinitis or Allergies. Use in each nostril as directed, Disp: 1 each, Rfl: 5    B-12, Methylcobalamin, 1000 MCG sublingual tablet, Place 1 tablet under the tongue Daily., Disp: 90 tablet, Rfl: 3    Blood Glucose Monitoring Suppl (ONE TOUCH ULTRA 2) w/Device kit, USE AS NEEDED FOR BLOOD    SUGAR MONITORING, Disp: 1 each, Rfl: 0    bumetanide (BUMEX) 1 MG tablet, TAKE 1 TABLET BY MOUTH DAILY, Disp: 90 tablet, Rfl: 3    clopidogrel (PLAVIX) 75 MG tablet, TAKE 1 TABLET BY MOUTH DAILY, Disp: 90 tablet, Rfl: 3    clotrimazole (LOTRIMIN) 1 % external solution, Apply  topically to the appropriate area as directed 2 (Two) Times a Day., Disp: 60 mL, Rfl: 0    Coenzyme Q10 200 MG capsule, Take 200 mg by mouth Every Other  Day., Disp: , Rfl:     glipizide (GLUCOTROL XL) 2.5 MG 24 hr tablet, TAKE 1 TABLET BY MOUTH DAILY, Disp: 90 tablet, Rfl: 3    glucose blood test strip, Use as instructed, Disp: 100 each, Rfl: 12    glucose blood test strip, Use to check blood glucose daily., Disp: 100 each, Rfl: 12    glucose monitor monitoring kit, 1 each As Needed (blood sugar)., Disp: 1 each, Rfl: 1    hydroCHLOROthiazide 12.5 MG tablet, Take 1 tablet by mouth Daily As Needed (leg edema)., Disp: 90 tablet, Rfl: 3    latanoprost (XALATAN) 0.005 % ophthalmic solution, PLACE 1 DROP IN EACH EYE EVERY DAY AT BEDTIME, Disp: , Rfl:     lisinopril (PRINIVIL,ZESTRIL) 20 MG tablet, Take 1 tablet by mouth Daily., Disp: 90 tablet, Rfl: 3    loperamide (IMODIUM) 2 MG capsule, Take 1 capsule by mouth 4 (Four) Times a Day As Needed for Diarrhea., Disp: , Rfl:     lovastatin (MEVACOR) 10 MG tablet, Take 1 tablet by mouth Every Night., Disp: 90 tablet, Rfl: 3    melatonin 3 MG tablet, Take 1 tablet by mouth Every Night., Disp: , Rfl:     Nintedanib Esylate (Ofev) 150 MG capsule, Take 150 mg by mouth 2 (Two) Times a Day. Take with food, Disp: 180 capsule, Rfl: 3    pantoprazole (PROTONIX) 40 MG EC tablet, TAKE 1 TABLET BY MOUTH DAILY, Disp: 90 tablet, Rfl: 3    silver sulfadiazine (Silvadene) 1 % cream, Apply 1 Application topically to the appropriate area as directed 2 (Two) Times a Day., Disp: 100 g, Rfl: 0    tobramycin-dexAMETHasone (TOBRADEX) 0.3-0.1 % ophthalmic suspension, Administer 1 drop to both eyes 2 (Two) Times a Day As Needed (dryness)., Disp: 5 mL, Rfl: 1    vitamin D3 125 MCG (5000 UT) capsule capsule, Take 1 capsule by mouth. Twice a week, Disp: , Rfl:     white petrolatum ointment, Apply 1 Application topically to the appropriate area as directed As Needed for Dry Skin. On buttock area, Disp: 500 g, Rfl: 1    fluorouracil (EFUDEX) 5 % cream, Apply 1 Application topically to the appropriate area as directed 2 (Two) Times a Day., Disp: 40 g, Rfl:  "2    Objective      Blood pressure 122/60, pulse 70, temperature 97.4 °F (36.3 °C), resp. rate 17, height 170.2 cm (67.01\"), weight 79.4 kg (175 lb), SpO2 97%.            Physical Exam     General Appearance:    Alert, cooperative, no distress, appears stated age   Head:    Normocephalic, without obvious abnormality, atraumatic   Eyes:    PERRL, conjunctiva/corneas clear, EOM's intact   Ears:    Normal TM's and external ear canals, both ears   Nose:   Nares normal, septum midline, mucosa normal, no drainage   or sinus tenderness   Throat:   Lips, mucosa, and tongue normal; teeth and gums normal   Neck:   Supple, symmetrical, trachea midline, no adenopathy;        thyroid:  No enlargement/tenderness/nodules; no carotid    bruit or JVD   Back:     Symmetric, no curvature, ROM normal, no CVA tenderness   Lungs:     Clear to auscultation bilaterally, respirations unlabored   Chest wall:    No tenderness or deformity   Heart:    Regular rate and rhythm, S1 and S2 normal, no murmur,        rub or gallop   Abdomen:     Soft, non-tender, bowel sounds active all four quadrants,     no masses, no organomegaly   Extremities:   Extremities normal, atraumatic, no cyanosis or edema   Pulses:   2+ and symmetric all extremities   Skin:   Skin color, texture, turgor normal, no rashes or lesions   Lymph nodes:   Cervical, supraclavicular, and axillary nodes normal   Neurologic:   CNII-XII intact. Normal strength, sensation and reflexes       throughout      Results for orders placed or performed in visit on 05/23/25   Hepatic Function Panel    Collection Time: 05/23/25  8:16 AM   Result Value Ref Range    Total Protein 6.5 6.0 - 8.5 g/dL    Albumin 3.7 3.5 - 5.2 g/dL    Total Bilirubin 0.4 0.0 - 1.2 mg/dL    Bilirubin, Direct 0.2 0.0 - 0.3 mg/dL    Alkaline Phosphatase 72 39 - 117 U/L    AST (SGOT) 78 (H) 1 - 32 U/L    ALT (SGPT) 49 (H) 1 - 33 U/L         Assessment & Plan   Iron is now  replace.  Will monitor. Anemia resolbed.  Dm " stable.     Start exercising low impact and low intensity.      Elevated lft. Ophe vs iron. Will monitor.     Using ozygen with activity. Has protable with her.    On cpap with oxygen tied in. Doing well.        Diagnoses and all orders for this visit:    1. Type 2 diabetes mellitus without complication, without long-term current use of insulin (Primary)  -     fluorouracil (EFUDEX) 5 % cream; Apply 1 Application topically to the appropriate area as directed 2 (Two) Times a Day.  Dispense: 40 g; Refill: 2  -     CBC & Differential  -     Vitamin B12  -     Comprehensive Metabolic Panel  -     TSH  -     Hemoglobin A1c  -     Microalbumin / Creatinine Urine Ratio - Urine, Clean Catch  -     Iron Profile w/o Ferritin    2. Iron deficiency anemia, unspecified iron deficiency anemia type  -     fluorouracil (EFUDEX) 5 % cream; Apply 1 Application topically to the appropriate area as directed 2 (Two) Times a Day.  Dispense: 40 g; Refill: 2  -     CBC & Differential  -     Vitamin B12  -     Comprehensive Metabolic Panel  -     TSH  -     Hemoglobin A1c  -     Microalbumin / Creatinine Urine Ratio - Urine, Clean Catch  -     Iron Profile w/o Ferritin    3. Elevated liver enzymes  -     fluorouracil (EFUDEX) 5 % cream; Apply 1 Application topically to the appropriate area as directed 2 (Two) Times a Day.  Dispense: 40 g; Refill: 2  -     CBC & Differential  -     Vitamin B12  -     Comprehensive Metabolic Panel  -     TSH  -     Hemoglobin A1c  -     Microalbumin / Creatinine Urine Ratio - Urine, Clean Catch  -     Iron Profile w/o Ferritin    4. Oxygen dependent      Return in about 4 months (around 9/28/2025).          There are no Patient Instructions on file for this visit.     Farhan Schaefer MD    Assessment & Plan

## 2025-06-23 ENCOUNTER — HOSPITAL ENCOUNTER (OUTPATIENT)
Dept: MAMMOGRAPHY | Facility: HOSPITAL | Age: 85
Discharge: HOME | End: 2025-06-23
Admitting: INTERNAL MEDICINE
Payer: MEDICARE

## 2025-06-23 DIAGNOSIS — Z12.31 ENCOUNTER FOR SCREENING MAMMOGRAM FOR MALIGNANT NEOPLASM OF BREAST: ICD-10-CM

## 2025-06-23 PROCEDURE — 77067 SCR MAMMO BI INCL CAD: CPT

## 2025-06-23 PROCEDURE — 77063 BREAST TOMOSYNTHESIS BI: CPT

## 2025-07-07 RX ORDER — TOBRAMYCIN AND DEXAMETHASONE 3; 1 MG/ML; MG/ML
1 SUSPENSION/ DROPS OPHTHALMIC 2 TIMES DAILY PRN
Qty: 5 ML | Refills: 1 | Status: SHIPPED | OUTPATIENT
Start: 2025-07-07

## 2025-07-14 DIAGNOSIS — E11.9 TYPE 2 DIABETES MELLITUS WITHOUT COMPLICATION, WITHOUT LONG-TERM CURRENT USE OF INSULIN: Primary | ICD-10-CM

## 2025-07-14 RX ORDER — BLOOD-GLUCOSE METER
1 EACH MISCELLANEOUS TAKE AS DIRECTED
Qty: 1 KIT | Refills: 0 | Status: SHIPPED | OUTPATIENT
Start: 2025-07-14

## 2025-07-14 RX ORDER — BLOOD SUGAR DIAGNOSTIC
STRIP MISCELLANEOUS
Qty: 100 EACH | Refills: 12 | Status: SHIPPED | OUTPATIENT
Start: 2025-07-14

## 2025-07-18 LAB
MC_CV_MDC_IDC_RATE_1: 160
MC_CV_MDC_IDC_ZONE_ID: 1
MDC_IDC_MSMT_BATTERY_REMAINING_LONGEVITY: 48 MO
MDC_IDC_MSMT_BATTERY_REMAINING_PERCENTAGE: 78 %
MDC_IDC_MSMT_BATTERY_STATUS: NORMAL
MDC_IDC_MSMT_LEADCHNL_RA_DTM: NORMAL
MDC_IDC_MSMT_LEADCHNL_RA_IMPEDANCE_VALUE: 633
MDC_IDC_MSMT_LEADCHNL_RA_PACING_THRESHOLD_POLARITY: NORMAL
MDC_IDC_MSMT_LEADCHNL_RA_SENSING_INTR_AMPL: 5.6
MDC_IDC_MSMT_LEADCHNL_RV_DTM: NORMAL
MDC_IDC_MSMT_LEADCHNL_RV_IMPEDANCE_VALUE: 830
MDC_IDC_MSMT_LEADCHNL_RV_PACING_THRESHOLD_POLARITY: NORMAL
MDC_IDC_MSMT_LEADCHNL_RV_SENSING_INTR_AMPL: 15.8
MDC_IDC_PG_IMPLANT_DTM: NORMAL
MDC_IDC_PG_MFG: NORMAL
MDC_IDC_PG_MODEL: NORMAL
MDC_IDC_PG_SERIAL: NORMAL
MDC_IDC_PG_TYPE: NORMAL
MDC_IDC_SESS_DTM: NORMAL
MDC_IDC_SESS_TYPE: NORMAL
MDC_IDC_SET_BRADY_AT_MODE_SWITCH_RATE: 170
MDC_IDC_SET_BRADY_LOWRATE: 60
MDC_IDC_SET_BRADY_MAX_SENSOR_RATE: 130
MDC_IDC_SET_BRADY_MAX_TRACKING_RATE: 130
MDC_IDC_SET_BRADY_MODE: NORMAL
MDC_IDC_SET_BRADY_PAV_DELAY: 220
MDC_IDC_SET_BRADY_SAV_DELAY: 220
MDC_IDC_SET_LEADCHNL_RA_PACING_AMPLITUDE: 2
MDC_IDC_SET_LEADCHNL_RA_PACING_POLARITY: NORMAL
MDC_IDC_SET_LEADCHNL_RA_PACING_PULSEWIDTH: 0.4
MDC_IDC_SET_LEADCHNL_RA_SENSING_POLARITY: NORMAL
MDC_IDC_SET_LEADCHNL_RA_SENSING_SENSITIVITY: 0.75
MDC_IDC_SET_LEADCHNL_RV_PACING_AMPLITUDE: 2
MDC_IDC_SET_LEADCHNL_RV_PACING_POLARITY: NORMAL
MDC_IDC_SET_LEADCHNL_RV_PACING_PULSEWIDTH: 0.4
MDC_IDC_SET_LEADCHNL_RV_SENSING_POLARITY: NORMAL
MDC_IDC_SET_LEADCHNL_RV_SENSING_SENSITIVITY: 2.5
MDC_IDC_SET_ZONE_STATUS: NORMAL
MDC_IDC_SET_ZONE_TYPE: NORMAL
MDC_IDC_STAT_AT_BURDEN_PERCENT: 1
MDC_IDC_STAT_BRADY_RA_PERCENT_PACED: 14
MDC_IDC_STAT_BRADY_RV_PERCENT_PACED: 0

## 2025-08-04 ENCOUNTER — OFFICE VISIT (OUTPATIENT)
Dept: CARDIOLOGY | Facility: CLINIC | Age: 85
End: 2025-08-04
Payer: MEDICARE

## 2025-08-04 VITALS
SYSTOLIC BLOOD PRESSURE: 114 MMHG | HEIGHT: 67 IN | WEIGHT: 174 LBS | DIASTOLIC BLOOD PRESSURE: 58 MMHG | BODY MASS INDEX: 27.31 KG/M2 | HEART RATE: 71 BPM | OXYGEN SATURATION: 95 %

## 2025-08-04 DIAGNOSIS — I49.5 SINUS NODE DYSFUNCTION: Primary | ICD-10-CM

## 2025-08-04 DIAGNOSIS — Z79.01 CHRONIC ANTICOAGULATION: ICD-10-CM

## 2025-08-04 DIAGNOSIS — Z95.0 PRESENCE OF CARDIAC PACEMAKER: ICD-10-CM

## 2025-08-09 ENCOUNTER — OFFICE VISIT (OUTPATIENT)
Dept: INTERNAL MEDICINE | Facility: CLINIC | Age: 85
End: 2025-08-09
Payer: MEDICARE

## 2025-08-09 VITALS
SYSTOLIC BLOOD PRESSURE: 116 MMHG | OXYGEN SATURATION: 99 % | DIASTOLIC BLOOD PRESSURE: 76 MMHG | TEMPERATURE: 97.1 F | BODY MASS INDEX: 27.31 KG/M2 | HEART RATE: 77 BPM | WEIGHT: 174 LBS | HEIGHT: 67 IN | RESPIRATION RATE: 20 BRPM

## 2025-08-09 DIAGNOSIS — R74.8 ELEVATED LIVER ENZYMES: ICD-10-CM

## 2025-08-09 DIAGNOSIS — M79.674 PAIN OF TOE OF RIGHT FOOT: Primary | ICD-10-CM

## 2025-08-09 DIAGNOSIS — D50.9 IRON DEFICIENCY ANEMIA, UNSPECIFIED IRON DEFICIENCY ANEMIA TYPE: ICD-10-CM

## 2025-08-09 DIAGNOSIS — E55.9 VITAMIN D DEFICIENCY, UNSPECIFIED: ICD-10-CM

## 2025-08-09 DIAGNOSIS — E11.9 TYPE 2 DIABETES MELLITUS WITHOUT COMPLICATION, WITHOUT LONG-TERM CURRENT USE OF INSULIN: ICD-10-CM

## 2025-08-22 ENCOUNTER — OFFICE VISIT (OUTPATIENT)
Dept: PULMONOLOGY | Facility: CLINIC | Age: 85
End: 2025-08-22
Payer: MEDICARE

## 2025-08-22 ENCOUNTER — LAB (OUTPATIENT)
Dept: LAB | Facility: HOSPITAL | Age: 85
End: 2025-08-22
Payer: MEDICARE

## 2025-08-22 VITALS
DIASTOLIC BLOOD PRESSURE: 60 MMHG | WEIGHT: 171 LBS | RESPIRATION RATE: 18 BRPM | SYSTOLIC BLOOD PRESSURE: 124 MMHG | HEART RATE: 83 BPM | OXYGEN SATURATION: 97 % | BODY MASS INDEX: 26.84 KG/M2 | HEIGHT: 67 IN

## 2025-08-22 DIAGNOSIS — G47.33 OBSTRUCTIVE SLEEP APNEA: ICD-10-CM

## 2025-08-22 DIAGNOSIS — G47.34 NOCTURNAL HYPOXIA: ICD-10-CM

## 2025-08-22 DIAGNOSIS — Z86.711 HISTORY OF PULMONARY EMBOLISM: ICD-10-CM

## 2025-08-22 DIAGNOSIS — R09.02 EXERCISE HYPOXEMIA: ICD-10-CM

## 2025-08-22 DIAGNOSIS — J84.112 IPF (IDIOPATHIC PULMONARY FIBROSIS): Primary | ICD-10-CM

## 2025-08-22 PROCEDURE — 99214 OFFICE O/P EST MOD 30 MIN: CPT | Performed by: INTERNAL MEDICINE

## 2025-08-25 ENCOUNTER — RESULTS FOLLOW-UP (OUTPATIENT)
Dept: PULMONOLOGY | Facility: CLINIC | Age: 85
End: 2025-08-25
Payer: MEDICARE

## 2025-08-28 ENCOUNTER — HOSPITAL ENCOUNTER (EMERGENCY)
Facility: HOSPITAL | Age: 85
Discharge: HOME | End: 2025-08-28
Attending: EMERGENCY MEDICINE
Payer: MEDICARE

## 2025-08-28 VITALS
OXYGEN SATURATION: 96 % | RESPIRATION RATE: 18 BRPM | DIASTOLIC BLOOD PRESSURE: 71 MMHG | HEIGHT: 67 IN | HEART RATE: 103 BPM | WEIGHT: 171.08 LBS | SYSTOLIC BLOOD PRESSURE: 143 MMHG | BODY MASS INDEX: 26.85 KG/M2 | TEMPERATURE: 98 F

## 2025-08-28 DIAGNOSIS — S46.812A STRAIN OF LEFT TRAPEZIUS MUSCLE, INITIAL ENCOUNTER: Primary | ICD-10-CM

## 2025-08-28 DIAGNOSIS — J84.112 IPF (IDIOPATHIC PULMONARY FIBROSIS): ICD-10-CM

## 2025-08-28 PROCEDURE — 99283 EMERGENCY DEPT VISIT LOW MDM: CPT | Performed by: EMERGENCY MEDICINE

## 2025-08-28 PROCEDURE — 25010000002 DEXAMETHASONE SODIUM PHOSPHATE 10 MG/ML SOLUTION: Performed by: NURSE PRACTITIONER

## 2025-08-28 RX ORDER — DEXAMETHASONE SODIUM PHOSPHATE 10 MG/ML
10 INJECTION, SOLUTION INTRAMUSCULAR; INTRAVENOUS ONCE
Status: COMPLETED | OUTPATIENT
Start: 2025-08-28 | End: 2025-08-28

## 2025-08-28 RX ORDER — METHOCARBAMOL 750 MG/1
750 TABLET, FILM COATED ORAL NIGHTLY
Qty: 7 TABLET | Refills: 0 | Status: SHIPPED | OUTPATIENT
Start: 2025-08-28 | End: 2025-09-04

## 2025-08-28 RX ORDER — NINTEDANIB 150 MG/1
CAPSULE ORAL
Qty: 180 CAPSULE | Refills: 2 | Status: SHIPPED | OUTPATIENT
Start: 2025-08-28

## 2025-08-28 RX ORDER — METHOCARBAMOL 750 MG/1
750 TABLET, FILM COATED ORAL ONCE
Status: COMPLETED | OUTPATIENT
Start: 2025-08-28 | End: 2025-08-28

## 2025-08-28 RX ADMIN — METHOCARBAMOL 750 MG: 750 TABLET ORAL at 17:02

## 2025-08-28 RX ADMIN — DEXAMETHASONE SODIUM PHOSPHATE 10 MG: 10 INJECTION, SOLUTION INTRAMUSCULAR; INTRAVENOUS at 17:03

## (undated) DEVICE — DEV COMP RAD PRELUDESYNC 24CM

## (undated) DEVICE — ADULT, W/LG. BACK PAD, RADIOTRANSPARENT ELEMENT AND LEAD WIRE: Brand: DEFIBRILLATION ELECTRODES

## (undated) DEVICE — CATH DIAG EXPO .045 FL3  5F 100CM

## (undated) DEVICE — GW INQWIRE FC PTFE STD J/1.5 .035 260

## (undated) DEVICE — HANDPIECE SET WITH HIGH FLOW TIP AND SUCTION TUBE: Brand: INTERPULSE

## (undated) DEVICE — LIMB HOLDER, WRIST/ANKLE: Brand: DEROYAL

## (undated) DEVICE — SUT VIC 2/0 CT1 27IN J259H

## (undated) DEVICE — PK HIP GEN 20

## (undated) DEVICE — GLV SURG SENSICARE GREEN W/ALOE PF LF 8 STRL

## (undated) DEVICE — TIBURON HIP DRAPE WITH POUCHES: Brand: CONVERTORS

## (undated) DEVICE — INTRO SHEATH PRELUDE IDEAL SPRNG COIL 021 6F 23X80CM

## (undated) DEVICE — PK CATH CARD 10

## (undated) DEVICE — DECANT BG O JET

## (undated) DEVICE — ST EXT IV SMARTSITE 2VLV SP M LL 5ML IV1

## (undated) DEVICE — CATH DIAG EXPO M/ PK 5F FL4/FR4 PIG

## (undated) DEVICE — SUT ETHIB 2 CV V37 MS/4 30IN MX69G

## (undated) DEVICE — ST INF PRI SMRTSTE 20DRP 2VLV 24ML 117

## (undated) DEVICE — MODEL AT P65, P/N 701554-001KIT CONTENTS: HAND CONTROLLER, 3-WAY HIGH-PRESSURE STOPCOCK WITH ROTATING END AND PREMIUM HIGH-PRESSURE TUBING: Brand: ANGIOTOUCH® KIT

## (undated) DEVICE — SYS SKIN CLS DERMABOND PRINEO W/22CM MESH TP

## (undated) DEVICE — CATH DIAG EXPO .045 FL3.5 5F 100CM

## (undated) DEVICE — TUBING, SUCTION, 1/4" X 10', STRAIGHT: Brand: MEDLINE

## (undated) DEVICE — PENCL E/S HNDSWCH ROCKRBTN HOLSTR 10FT

## (undated) DEVICE — CANN NASL CO2 DIVIDED A/

## (undated) DEVICE — DRSNG SURG AQUACEL AG 9X30CM

## (undated) DEVICE — MEDI-VAC YANKAUER SUCTION HANDLE W/BULBOUS TIP: Brand: CARDINAL HEALTH

## (undated) DEVICE — SET PRIMARY GRVTY 10DP MALE LL 104IN

## (undated) DEVICE — GLV SURG SENSICARE W/ALOE PF LF 7.5 STRL

## (undated) DEVICE — LEX ELECTRO PHYSIOLOGY: Brand: MEDLINE INDUSTRIES, INC.

## (undated) DEVICE — IRRISEPT WOUND DEBRIDMENT AND CLEANSING SYSTEM: Brand: IRRISEPT

## (undated) DEVICE — INTRO TEAR AWAY/LVD W/SD PRT 6F 13CM

## (undated) DEVICE — RECIPROCATING BLADE HEAVY DUTY LONG, OFFSET  (77.6 X 0.77 X 11.2MM)

## (undated) DEVICE — SUT VIC 0 CT 36IN J958H

## (undated) DEVICE — MODEL BT2000 P/N 700287-012KIT CONTENTS: MANIFOLD WITH SALINE AND CONTRAST PORTS, SALINE TUBING WITH SPIKE AND HAND SYRINGE, TRANSDUCER: Brand: BT2000 AUTOMATED MANIFOLD KIT

## (undated) DEVICE — DRAPE,U/ SHT,SPLIT,PLAS,STERIL: Brand: MEDLINE

## (undated) DEVICE — ZIPPERED TOGA, X-LARGE: Brand: FLYTE

## (undated) DEVICE — CUFF SCD HEMOFORCE SEQ CALF STD MD

## (undated) DEVICE — DRSNG WND BORDR/ADHS NONADHR/GZ LF 4X10IN STRL